# Patient Record
Sex: FEMALE | Race: WHITE | NOT HISPANIC OR LATINO
[De-identification: names, ages, dates, MRNs, and addresses within clinical notes are randomized per-mention and may not be internally consistent; named-entity substitution may affect disease eponyms.]

---

## 2017-01-10 ENCOUNTER — PRIOR ORIGINAL RECORDS (OUTPATIENT)
Dept: OTHER | Age: 67
End: 2017-01-10

## 2017-01-23 ENCOUNTER — OFFICE VISIT (OUTPATIENT)
Dept: INTERNAL MEDICINE CLINIC | Facility: CLINIC | Age: 67
End: 2017-01-23

## 2017-01-23 VITALS
TEMPERATURE: 98 F | DIASTOLIC BLOOD PRESSURE: 66 MMHG | HEART RATE: 60 BPM | BODY MASS INDEX: 31.29 KG/M2 | WEIGHT: 187.81 LBS | HEIGHT: 65 IN | SYSTOLIC BLOOD PRESSURE: 100 MMHG

## 2017-01-23 DIAGNOSIS — Z95.0 PRESENCE OF PERMANENT CARDIAC PACEMAKER: ICD-10-CM

## 2017-01-23 DIAGNOSIS — Z86.79 HISTORY OF ATRIAL FIBRILLATION: ICD-10-CM

## 2017-01-23 DIAGNOSIS — G40.909 SEIZURE DISORDER (HCC): ICD-10-CM

## 2017-01-23 DIAGNOSIS — N63.10 BREAST MASS, RIGHT: ICD-10-CM

## 2017-01-23 DIAGNOSIS — I10 ESSENTIAL HYPERTENSION: Primary | ICD-10-CM

## 2017-01-23 DIAGNOSIS — R41.3 MEMORY LOSS: ICD-10-CM

## 2017-01-23 PROCEDURE — G0463 HOSPITAL OUTPT CLINIC VISIT: HCPCS | Performed by: INTERNAL MEDICINE

## 2017-01-23 PROCEDURE — 99214 OFFICE O/P EST MOD 30 MIN: CPT | Performed by: INTERNAL MEDICINE

## 2017-01-23 NOTE — PROGRESS NOTES
HPI:   eBlkis Barrera is a 78year old female presents with the following problems. Patient generally feels well. She notes occasional lightheadedness. No syncope. These last for about 10-15 seconds. They have been 4-5 times a week.   She has a hist Rfl: 0   Meclizine HCl 12.5 MG Oral Tab Take 1 tablet (12.5 mg total) by mouth 3 (three) times daily as needed. Disp: 30 tablet Rfl: 0   nadolol 40 MG Oral Tab Take one half tablet daily.  Disp: 90 tablet Rfl: 3   lamoTRIgine 25 MG Oral Tab Take 25 mg by mo 2006    Comment as per NG    NASAL SCOPY,REMV TOTL ETHMOID  2006    Comment \"Ethmoidectomy, max antrostomies\"; as per NG    CARDIAC PACEMAKER PLACEMENT      Comment as per NG    ELECTROCARDIOGRAM, COMPLETE  02-    Comment SCANNED TO MEDIA TAB - 0 Types: Cigarettes      Quit date: 01/01/1998    Smokeless Status: Never Used                        Alcohol Use: Yes           0.6 oz/week       1 Standard drinks or equivalent per week       Comment: very seldom. 2-3 time per year.          REVIEW OF SYST was recently resected at a lower rate to monitor pulse rates above 130.    3. Presence of permanent cardiac pacemaker  Patient followed in pacemaker clinic. 4. Seizure disorder ([facility] [address]  Asymptomatic. Followed by neurology. On therapy.     5. Memory loss

## 2017-01-25 ENCOUNTER — LAB ENCOUNTER (OUTPATIENT)
Dept: LAB | Facility: HOSPITAL | Age: 67
End: 2017-01-25
Attending: INTERNAL MEDICINE
Payer: MEDICARE

## 2017-01-25 ENCOUNTER — HOSPITAL ENCOUNTER (OUTPATIENT)
Dept: ULTRASOUND IMAGING | Facility: HOSPITAL | Age: 67
Discharge: HOME | End: 2017-01-25
Attending: INTERNAL MEDICINE
Payer: MEDICARE

## 2017-01-25 ENCOUNTER — HOSPITAL ENCOUNTER (OUTPATIENT)
Dept: MAMMOGRAPHY | Facility: HOSPITAL | Age: 67
Discharge: HOME | End: 2017-01-25
Attending: INTERNAL MEDICINE
Payer: MEDICARE

## 2017-01-25 ENCOUNTER — OFFICE VISIT (OUTPATIENT)
Dept: NEUROLOGY | Facility: CLINIC | Age: 67
End: 2017-01-25

## 2017-01-25 VITALS
WEIGHT: 190 LBS | RESPIRATION RATE: 16 BRPM | HEIGHT: 65 IN | DIASTOLIC BLOOD PRESSURE: 76 MMHG | BODY MASS INDEX: 31.65 KG/M2 | SYSTOLIC BLOOD PRESSURE: 124 MMHG | HEART RATE: 72 BPM

## 2017-01-25 DIAGNOSIS — R56.9 SEIZURE (HCC): ICD-10-CM

## 2017-01-25 DIAGNOSIS — I10 ESSENTIAL HYPERTENSION: ICD-10-CM

## 2017-01-25 DIAGNOSIS — R94.02 ABNORMAL PET SCAN OF HEAD: ICD-10-CM

## 2017-01-25 DIAGNOSIS — G43.909 MIGRAINE WITHOUT STATUS MIGRAINOSUS, NOT INTRACTABLE, UNSPECIFIED MIGRAINE TYPE: ICD-10-CM

## 2017-01-25 DIAGNOSIS — M48.062 LUMBAR STENOSIS WITH NEUROGENIC CLAUDICATION: Primary | ICD-10-CM

## 2017-01-25 DIAGNOSIS — N63.10 BREAST MASS, RIGHT: ICD-10-CM

## 2017-01-25 DIAGNOSIS — G25.81 RESTLESS LEGS SYNDROME: ICD-10-CM

## 2017-01-25 DIAGNOSIS — N13.9 URINARY OBSTRUCTION: ICD-10-CM

## 2017-01-25 LAB
ALBUMIN SERPL BCP-MCNC: 4 G/DL (ref 3.5–4.8)
ALBUMIN/GLOB SERPL: 1.1 {RATIO} (ref 1–2)
ALP SERPL-CCNC: 61 U/L (ref 32–100)
ALT SERPL-CCNC: 51 U/L (ref 14–54)
ANION GAP SERPL CALC-SCNC: 9 MMOL/L (ref 0–18)
AST SERPL-CCNC: 33 U/L (ref 15–41)
BASOPHILS # BLD: 0 K/UL (ref 0–0.2)
BASOPHILS NFR BLD: 0 %
BILIRUB SERPL-MCNC: 0.7 MG/DL (ref 0.3–1.2)
BUN SERPL-MCNC: 14 MG/DL (ref 8–20)
BUN/CREAT SERPL: 12.8 (ref 10–20)
CALCIUM SERPL-MCNC: 9.6 MG/DL (ref 8.5–10.5)
CHLORIDE SERPL-SCNC: 109 MMOL/L (ref 95–110)
CHOLEST SERPL-MCNC: 273 MG/DL (ref 110–200)
CO2 SERPL-SCNC: 22 MMOL/L (ref 22–32)
CREAT SERPL-MCNC: 1.09 MG/DL (ref 0.5–1.5)
EOSINOPHIL # BLD: 0 K/UL (ref 0–0.7)
EOSINOPHIL NFR BLD: 0 %
ERYTHROCYTE [DISTWIDTH] IN BLOOD BY AUTOMATED COUNT: 13.5 % (ref 11–15)
GLOBULIN PLAS-MCNC: 3.6 G/DL (ref 2.5–3.7)
GLUCOSE SERPL-MCNC: 125 MG/DL (ref 70–99)
HCT VFR BLD AUTO: 42.4 % (ref 35–48)
HDLC SERPL-MCNC: 44 MG/DL
HGB BLD-MCNC: 14.3 G/DL (ref 12–16)
LDLC SERPL CALC-MCNC: 204 MG/DL (ref 0–99)
LYMPHOCYTES # BLD: 1.5 K/UL (ref 1–4)
LYMPHOCYTES NFR BLD: 16 %
MCH RBC QN AUTO: 30.1 PG (ref 27–32)
MCHC RBC AUTO-ENTMCNC: 33.8 G/DL (ref 32–37)
MCV RBC AUTO: 89.2 FL (ref 80–100)
MONOCYTES # BLD: 0.5 K/UL (ref 0–1)
MONOCYTES NFR BLD: 5 %
NEUTROPHILS # BLD AUTO: 7.5 K/UL (ref 1.8–7.7)
NEUTROPHILS NFR BLD: 79 %
NONHDLC SERPL-MCNC: 229 MG/DL
OSMOLALITY UR CALC.SUM OF ELEC: 292 MOSM/KG (ref 275–295)
PLATELET # BLD AUTO: 225 K/UL (ref 140–400)
PMV BLD AUTO: 10.5 FL (ref 7.4–10.3)
POTASSIUM SERPL-SCNC: 4.3 MMOL/L (ref 3.3–5.1)
PROT SERPL-MCNC: 7.6 G/DL (ref 5.9–8.4)
RBC # BLD AUTO: 4.75 M/UL (ref 3.7–5.4)
SODIUM SERPL-SCNC: 140 MMOL/L (ref 136–144)
TRIGL SERPL-MCNC: 123 MG/DL (ref 1–149)
WBC # BLD AUTO: 9.5 K/UL (ref 4–11)

## 2017-01-25 PROCEDURE — 85025 COMPLETE CBC W/AUTO DIFF WBC: CPT

## 2017-01-25 PROCEDURE — 36415 COLL VENOUS BLD VENIPUNCTURE: CPT

## 2017-01-25 PROCEDURE — 80061 LIPID PANEL: CPT

## 2017-01-25 PROCEDURE — 77066 DX MAMMO INCL CAD BI: CPT

## 2017-01-25 PROCEDURE — 99214 OFFICE O/P EST MOD 30 MIN: CPT | Performed by: OTHER

## 2017-01-25 PROCEDURE — 80053 COMPREHEN METABOLIC PANEL: CPT

## 2017-01-25 PROCEDURE — 76642 ULTRASOUND BREAST LIMITED: CPT

## 2017-01-25 NOTE — PATIENT INSTRUCTIONS
Refill policies:    • Allow 2 business days for refills; controlled substances may take longer.   • Contact your pharmacy at least 5 days prior to running out of medication and have them send an electronic request or submit request through the “request re your physician has recommended that you have a procedure or additional testing performed. [facility] will contact your insurance carrier to obtain pre-certification or prior authorization.     Unfortunately, DAWNA has seen an increas

## 2017-01-25 NOTE — PROGRESS NOTES
[facility]    Rima Cedillo is a 78year old female. HPI:     Patient is being seen in evaluation for a new complaint. She was last seen by me 12/19/16.   I currently follow her for seizures, RLS; in addition, she has histor by mouth daily. (Patient taking differently: Take 5 mg by mouth daily.  ) Disp: 30 tablet Rfl: 12   Acetaminophen-Codeine #3 300-30 MG Oral Tab Take 1 tablet by mouth every 6 (six) hours as needed for Pain.  Disp: 60 tablet Rfl: 1   omeprazole (PRILOSEC) 20 spinal stenosis with neurogenic claudication; less likely peripheral neuropathy. No myelopathic features to suggest cervical or thoracic myelopathy.     –Will request records of prior EMG/NCS, spinal imaging    –Will consider additional evaluation pending

## 2017-01-26 ENCOUNTER — TELEPHONE (OUTPATIENT)
Dept: INTERNAL MEDICINE CLINIC | Facility: CLINIC | Age: 67
End: 2017-01-26

## 2017-01-26 ENCOUNTER — TELEPHONE (OUTPATIENT)
Dept: NEUROLOGY | Facility: CLINIC | Age: 67
End: 2017-01-26

## 2017-01-26 DIAGNOSIS — E78.5 HYPERLIPIDEMIA, UNSPECIFIED HYPERLIPIDEMIA TYPE: Primary | ICD-10-CM

## 2017-01-26 RX ORDER — ATORVASTATIN CALCIUM 20 MG/1
20 TABLET, FILM COATED ORAL NIGHTLY
Qty: 30 TABLET | Refills: 12 | Status: SHIPPED | OUTPATIENT
Start: 2017-01-26 | End: 2018-02-13

## 2017-01-26 NOTE — TELEPHONE ENCOUNTER
Please notify patient that her labs show that her blood sugars 125. This is getting close to when we diagnosed diabetes mellitus. This can be helped with a 5-10 pound weight loss. However her cholesterol is increased significantly.   It is elevated enoug

## 2017-01-26 NOTE — TELEPHONE ENCOUNTER
Relayed MD's message to patient---verbalized understanding. Pt states she has taken Lipitor in past  eRx sent  Labs ordered  Pt has appt with Dr. Jacob Broderick next Tues.

## 2017-02-07 ENCOUNTER — TELEPHONE (OUTPATIENT)
Dept: NEUROLOGY | Facility: CLINIC | Age: 67
End: 2017-02-07

## 2017-02-08 ENCOUNTER — MED REC SCAN ONLY (OUTPATIENT)
Dept: NEUROLOGY | Facility: CLINIC | Age: 67
End: 2017-02-08

## 2017-02-08 NOTE — TELEPHONE ENCOUNTER
Bilateral lower extremity EMG report reviewed, done 10/3/16. Results felt to be most consistent with left L5/S1 radiculopathy. No evidence of peripheral neuropathy affecting lower extremities. Results scanned in.

## 2017-02-10 ENCOUNTER — TELEPHONE (OUTPATIENT)
Dept: NEUROLOGY | Facility: CLINIC | Age: 67
End: 2017-02-10

## 2017-02-24 ENCOUNTER — PRIOR ORIGINAL RECORDS (OUTPATIENT)
Dept: OTHER | Age: 67
End: 2017-02-24

## 2017-02-24 ENCOUNTER — APPOINTMENT (OUTPATIENT)
Dept: LAB | Facility: HOSPITAL | Age: 67
End: 2017-02-24
Attending: INTERNAL MEDICINE
Payer: MEDICARE

## 2017-02-24 ENCOUNTER — OFFICE VISIT (OUTPATIENT)
Dept: INTERNAL MEDICINE CLINIC | Facility: CLINIC | Age: 67
End: 2017-02-24

## 2017-02-24 VITALS
SYSTOLIC BLOOD PRESSURE: 138 MMHG | DIASTOLIC BLOOD PRESSURE: 80 MMHG | WEIGHT: 189 LBS | BODY MASS INDEX: 31.49 KG/M2 | TEMPERATURE: 98 F | HEIGHT: 65 IN | HEART RATE: 64 BPM

## 2017-02-24 DIAGNOSIS — E78.5 HYPERLIPIDEMIA, UNSPECIFIED HYPERLIPIDEMIA TYPE: ICD-10-CM

## 2017-02-24 DIAGNOSIS — R92.8 ABNORMAL MAMMOGRAM: ICD-10-CM

## 2017-02-24 DIAGNOSIS — N60.01 BREAST CYST, RIGHT: ICD-10-CM

## 2017-02-24 DIAGNOSIS — K64.9 BLEEDING HEMORRHOIDS: ICD-10-CM

## 2017-02-24 DIAGNOSIS — F48.2 PSEUDOBULBAR AFFECT: ICD-10-CM

## 2017-02-24 DIAGNOSIS — G40.909 SEIZURE DISORDER (HCC): ICD-10-CM

## 2017-02-24 DIAGNOSIS — R19.7 DIARRHEA, UNSPECIFIED TYPE: ICD-10-CM

## 2017-02-24 DIAGNOSIS — I10 ESSENTIAL HYPERTENSION: Primary | ICD-10-CM

## 2017-02-24 DIAGNOSIS — M48.061 SPINAL STENOSIS OF LUMBAR REGION: ICD-10-CM

## 2017-02-24 DIAGNOSIS — R41.3 MEMORY LOSS: ICD-10-CM

## 2017-02-24 PROCEDURE — 99213 OFFICE O/P EST LOW 20 MIN: CPT | Performed by: INTERNAL MEDICINE

## 2017-02-24 PROCEDURE — 99215 OFFICE O/P EST HI 40 MIN: CPT | Performed by: INTERNAL MEDICINE

## 2017-02-24 PROCEDURE — 87507 IADNA-DNA/RNA PROBE TQ 12-25: CPT | Performed by: INTERNAL MEDICINE

## 2017-02-24 NOTE — PROGRESS NOTES
HPI:   Monica Shaw is a 78year old female presents with the following problems. Patient has spinal stenosis. She had a epidural steroid injection February 6. This was by Iglesia Yates. She still has a significant amount of pain.   Pain level is at did see Dr. Vikram Olivares. He felt patient had dense breast tissue in the area of concern. Upper outer right breast.  No definite mass appreciated. He wished a 3 month follow-up. Patient reminded of this. Patient has had diarrhea.   Up to 6 or 7 times a Oral Tab Take 1 tablet (10 mg total) by mouth daily. (Patient taking differently: Take 5 mg by mouth daily.  ) Disp: 30 tablet Rfl: 12   Acetaminophen-Codeine #3 300-30 MG Oral Tab Take 1 tablet by mouth every 6 (six) hours as needed for Pain.  Disp: 60 tab pins and plate from right wrist (fracture)    CATARACT Right late July 2015    OTHER SURGICAL HISTORY Left     Comment L knee scoped--3 torn meniscus--biocartilage done    OTHER SURGICAL HISTORY Right 8/2016    Comment R knee scoped for torn miniscus 4. 0-11.0 K/UL   RBC 4.75 3.70-5.40 M/UL   HGB 14.3 12.0-16.0 g/dL   HCT 42.4 35.0-48.0 %   MCV 89.2 80.0-100.0 fL   MCH 30.1 27.0-32.0 pg   MCHC 33.8 32.0-37.0 g/dl   RDW 13.5 11.0-15.0 %    140-400 K/UL   MPV 10.5 (H) 7.4-10.3 fL   Neutrophil % 79 GI:  good BS's. no masses, organomegaly or tenderness   RECTAL: Patient had colonoscopy November 2015. Exam limited to perianal area. Hemorrhoids noted. Nonbleeding. Not irritated.   MUSCULOSKELETAL: No specific tenderness to palpation of lumbar spine

## 2017-02-25 ENCOUNTER — TELEPHONE (OUTPATIENT)
Dept: INTERNAL MEDICINE CLINIC | Facility: CLINIC | Age: 67
End: 2017-02-25

## 2017-02-25 NOTE — TELEPHONE ENCOUNTER
Please call patient and let her know that her stool test for different bacteria and viruses came out good. Please ask her if she still having the loose bowels.

## 2017-02-27 RX ORDER — ROPINIROLE 1 MG/1
TABLET, FILM COATED ORAL
Qty: 270 TABLET | Refills: 0 | Status: SHIPPED | OUTPATIENT
Start: 2017-02-27 | End: 2017-06-05

## 2017-02-27 NOTE — TELEPHONE ENCOUNTER
Discussed with patient. Patient having 5 times a day loose bowels. No other symptoms. No fever. Patient does however feel hot and cold. No abdominal pain. Encourage her to keep her fluids up. Check BMP tomorrow. Order in place.   If no relief may ne

## 2017-02-28 ENCOUNTER — APPOINTMENT (OUTPATIENT)
Dept: LAB | Facility: HOSPITAL | Age: 67
End: 2017-02-28
Attending: INTERNAL MEDICINE
Payer: MEDICARE

## 2017-02-28 ENCOUNTER — APPOINTMENT (OUTPATIENT)
Dept: GENERAL RADIOLOGY | Facility: HOSPITAL | Age: 67
End: 2017-02-28
Attending: EMERGENCY MEDICINE
Payer: MEDICARE

## 2017-02-28 ENCOUNTER — HOSPITAL ENCOUNTER (OUTPATIENT)
Facility: HOSPITAL | Age: 67
Setting detail: OBSERVATION
Discharge: HOME | End: 2017-03-01
Attending: EMERGENCY MEDICINE | Admitting: HOSPITALIST
Payer: MEDICARE

## 2017-02-28 ENCOUNTER — PRIOR ORIGINAL RECORDS (OUTPATIENT)
Dept: OTHER | Age: 67
End: 2017-02-28

## 2017-02-28 DIAGNOSIS — R07.9 ACUTE CHEST PAIN: Primary | ICD-10-CM

## 2017-02-28 DIAGNOSIS — E78.5 HYPERLIPIDEMIA, UNSPECIFIED HYPERLIPIDEMIA TYPE: ICD-10-CM

## 2017-02-28 DIAGNOSIS — I10 ESSENTIAL HYPERTENSION: ICD-10-CM

## 2017-02-28 PROCEDURE — 84460 ALANINE AMINO (ALT) (SGPT): CPT

## 2017-02-28 PROCEDURE — 84450 TRANSFERASE (AST) (SGOT): CPT

## 2017-02-28 PROCEDURE — 80048 BASIC METABOLIC PNL TOTAL CA: CPT

## 2017-02-28 PROCEDURE — 36415 COLL VENOUS BLD VENIPUNCTURE: CPT

## 2017-02-28 PROCEDURE — 80061 LIPID PANEL: CPT

## 2017-02-28 PROCEDURE — 71020 XR CHEST PA + LAT CHEST (CPT=71020): CPT

## 2017-02-28 PROCEDURE — 99219 INITIAL OBSERVATION CARE,LEVL II: CPT | Performed by: HOSPITALIST

## 2017-02-28 RX ORDER — SODIUM CHLORIDE 9 MG/ML
INJECTION, SOLUTION INTRAVENOUS
Status: COMPLETED
Start: 2017-02-28 | End: 2017-02-28

## 2017-02-28 RX ORDER — MECLIZINE HCL 12.5 MG/1
12.5 TABLET ORAL 3 TIMES DAILY PRN
Status: DISCONTINUED | OUTPATIENT
Start: 2017-02-28 | End: 2017-03-01

## 2017-02-28 RX ORDER — ALPRAZOLAM 0.25 MG/1
0.25 TABLET ORAL
Status: ACTIVE | OUTPATIENT
Start: 2017-03-01 | End: 2017-03-01

## 2017-02-28 RX ORDER — PANTOPRAZOLE SODIUM 20 MG/1
20 TABLET, DELAYED RELEASE ORAL
Status: DISCONTINUED | OUTPATIENT
Start: 2017-03-01 | End: 2017-03-01

## 2017-02-28 RX ORDER — ONDANSETRON 2 MG/ML
4 INJECTION INTRAMUSCULAR; INTRAVENOUS EVERY 6 HOURS PRN
Status: DISCONTINUED | OUTPATIENT
Start: 2017-02-28 | End: 2017-03-01

## 2017-02-28 RX ORDER — ACETAMINOPHEN AND CODEINE PHOSPHATE 300; 30 MG/1; MG/1
1 TABLET ORAL EVERY 6 HOURS PRN
Status: DISCONTINUED | OUTPATIENT
Start: 2017-02-28 | End: 2017-03-01

## 2017-02-28 RX ORDER — ROPINIROLE 1 MG/1
1 TABLET, FILM COATED ORAL 3 TIMES DAILY
Status: DISCONTINUED | OUTPATIENT
Start: 2017-02-28 | End: 2017-03-01

## 2017-02-28 RX ORDER — LAMOTRIGINE 25 MG/1
25 TABLET ORAL DAILY
Status: DISCONTINUED | OUTPATIENT
Start: 2017-02-28 | End: 2017-03-01

## 2017-02-28 RX ORDER — DILTIAZEM HYDROCHLORIDE 5 MG/ML
10 INJECTION INTRAVENOUS SEE ADMIN INSTRUCTIONS
Status: DISCONTINUED | OUTPATIENT
Start: 2017-03-01 | End: 2017-03-01

## 2017-02-28 RX ORDER — SODIUM CHLORIDE 9 MG/ML
INJECTION, SOLUTION INTRAVENOUS CONTINUOUS
Status: DISCONTINUED | OUTPATIENT
Start: 2017-02-28 | End: 2017-03-01

## 2017-02-28 RX ORDER — POLYETHYLENE GLYCOL 3350 17 G/17G
17 POWDER, FOR SOLUTION ORAL DAILY PRN
Status: DISCONTINUED | OUTPATIENT
Start: 2017-02-28 | End: 2017-03-01

## 2017-02-28 RX ORDER — BISACODYL 10 MG
10 SUPPOSITORY, RECTAL RECTAL
Status: DISCONTINUED | OUTPATIENT
Start: 2017-02-28 | End: 2017-03-01

## 2017-02-28 RX ORDER — HYDROCODONE BITARTRATE AND ACETAMINOPHEN 5; 325 MG/1; MG/1
1 TABLET ORAL EVERY 4 HOURS PRN
Status: DISCONTINUED | OUTPATIENT
Start: 2017-02-28 | End: 2017-03-01

## 2017-02-28 RX ORDER — ATORVASTATIN CALCIUM 20 MG/1
20 TABLET, FILM COATED ORAL NIGHTLY
Status: DISCONTINUED | OUTPATIENT
Start: 2017-02-28 | End: 2017-03-01

## 2017-02-28 RX ORDER — METOPROLOL TARTRATE 50 MG/1
100 TABLET, FILM COATED ORAL ONCE AS NEEDED
Status: DISCONTINUED | OUTPATIENT
Start: 2017-03-01 | End: 2017-03-01

## 2017-02-28 RX ORDER — NITROGLYCERIN 0.4 MG/1
0.4 TABLET SUBLINGUAL EVERY 5 MIN PRN
Status: DISCONTINUED | OUTPATIENT
Start: 2017-02-28 | End: 2017-03-01

## 2017-02-28 RX ORDER — DOCUSATE SODIUM 100 MG/1
100 CAPSULE, LIQUID FILLED ORAL 2 TIMES DAILY
Status: DISCONTINUED | OUTPATIENT
Start: 2017-02-28 | End: 2017-03-01

## 2017-02-28 RX ORDER — METOPROLOL TARTRATE 50 MG/1
50 TABLET, FILM COATED ORAL
Status: DISCONTINUED | OUTPATIENT
Start: 2017-02-28 | End: 2017-03-01

## 2017-02-28 RX ORDER — HYDROCODONE BITARTRATE AND ACETAMINOPHEN 5; 325 MG/1; MG/1
2 TABLET ORAL EVERY 4 HOURS PRN
Status: DISCONTINUED | OUTPATIENT
Start: 2017-02-28 | End: 2017-03-01

## 2017-02-28 RX ORDER — ACETAMINOPHEN 325 MG/1
650 TABLET ORAL EVERY 6 HOURS PRN
Status: DISCONTINUED | OUTPATIENT
Start: 2017-02-28 | End: 2017-03-01

## 2017-02-28 RX ORDER — ACETAMINOPHEN 325 MG/1
650 TABLET ORAL EVERY 4 HOURS PRN
Status: DISCONTINUED | OUTPATIENT
Start: 2017-02-28 | End: 2017-03-01

## 2017-02-28 RX ORDER — SODIUM PHOSPHATE, DIBASIC AND SODIUM PHOSPHATE, MONOBASIC 7; 19 G/133ML; G/133ML
1 ENEMA RECTAL ONCE AS NEEDED
Status: ACTIVE | OUTPATIENT
Start: 2017-02-28 | End: 2017-02-28

## 2017-02-28 RX ORDER — METOPROLOL TARTRATE 50 MG/1
50 TABLET, FILM COATED ORAL ONCE AS NEEDED
Status: DISCONTINUED | OUTPATIENT
Start: 2017-03-01 | End: 2017-03-01

## 2017-02-28 RX ORDER — NITROGLYCERIN 0.4 MG/1
0.4 TABLET SUBLINGUAL ONCE
Status: COMPLETED | OUTPATIENT
Start: 2017-03-01 | End: 2017-03-01

## 2017-02-28 RX ORDER — DONEPEZIL HYDROCHLORIDE 5 MG/1
5 TABLET, FILM COATED ORAL NIGHTLY
Status: DISCONTINUED | OUTPATIENT
Start: 2017-02-28 | End: 2017-03-01

## 2017-02-28 RX ORDER — LISINOPRIL 10 MG/1
10 TABLET ORAL DAILY
Status: DISCONTINUED | OUTPATIENT
Start: 2017-02-28 | End: 2017-03-01

## 2017-02-28 NOTE — H&P
[facility]    PATIENT'S NAME: Denise Carolann   ATTENDING PHYSICIAN: Arielle Whaley MD   PATIENT ACCOUNT#:   [id number]    LOCATION:  [place] [address]  MEDICAL RECORD #:   [id number]       YOB: 1950  ADMISSION DATE:       02/28/2 cancer. REVIEW OF SYSTEMS:  Patient describes chest discomfort, midsternal, which is sharp to tightness in sensation that comes and goes randomly and lasts around 5-10 minutes at a time. She denies any recent trauma or lifting any heavy objects.   No st

## 2017-02-28 NOTE — ED NOTES
Pt presents to ED for c/o midsternal CP onset earlier today while having blood drawn at [address], [address] for [facility]. Pt states CP is intermittent and described as \"tight\", rating 3-4/10. Denies SOB or dizziness. Hx of seizures and pacemaker.

## 2017-02-28 NOTE — ED INITIAL ASSESSMENT (HPI)
Chest Pain intermittently this morning, substernal, lasting approximately 10 seconds. Also c/o multiple episodes of diarrhea. +SOB/+Diaphoresis.

## 2017-02-28 NOTE — ED PROVIDER NOTES
Patient Seen in: [facility] AND [facility] Emergency Department    History   Patient presents with:  Chest Pain Angina (cardiovascular)    Stated Complaint: chest pain    HPI    78year old female presenting with chest pain  Location: left chest area.   Quality: st JING KULKARNI TOTL ETHMOID  2006    Comment \"Ethmoidectomy, max antrostomies\"; as per NG    CARDIAC PACEMAKER PLACEMENT      Comment as per NG    ELECTROCARDIOGRAM, COMPLETE  02-    Comment SCANNED TO MEDIA TAB - 02-    HYSTERECTOMY      Comme NG   • Cancer Sister      Lung Cancer, Kidney Cancer; as per NG   • Hypertension Brother      as per NG   • Cancer Brother      Throat Cancer; as per NG   • Heart Disease Brother      CAD; as per NG   • Cancer Other      Thyroid Cancer; as per NG   • Letty injected. No scleral icterus. Pupils are equal.   Neck: Normal range of motion and phonation normal. Neck supple. No JVD present. Cardiovascular: Normal rate and regular rhythm.     Pulmonary/Chest: Effort normal and breath sounds normal. No accessory mus (CPT=71020)         COMPARISON: [facility], X CHEST PA LAT ROUTINE, 4/22/2016,     9:24. [facility], X CHEST PA LAT ROUTINE, 5/23/2016,     12:06. [address], 5/24/2016,     12:24.   El related pathology such as GERD, gastritis, esophagitis, PUD, esophageal spasm; Musculoskeletal    HEART score for chest pain   History- (Highly suspicious 2pt, Mod 1pt, slightly 0pt)        1   ECG- (significant ST deviation 2pt, Non spec 1pt, nl 0pt)  1 management issues, and the need for follow-up, with the patient/gaurdian. See radiology reports for details.      Monitor Interpretation: normal sinus rhythm with a rate of 60    Oxygen Saturation: 97% on room air, Normal    Consults: No orders of the defin

## 2017-02-28 NOTE — HISTORICAL OFFICE NOTE
Norma Russian  : 1950  ACCOUNT:  [id number]  [phone]  PCP: Dr. Janell Paul     TODAY'S DATE: 01/10/2017  DICTATED BY:  Ene Reveles M.D. ]    CHIEF COMPLAINT: [Followup of Atrial fibrillation, paroxysmal, Followup of Pacemaker B.S DR  and no SOCIAL HISTORY: SMOKING: Former tobacco use. 30-40 pack year history smoking, quit 1998. CAFFEINE: none. ALCOHOL: drinks rarely. EXERCISE: regular exercise. DIET: low fat, low cholesterol. MARITAL STATUS: . OCCUPATION: .      ALLERGIES: N 03/08/16 LamoTRIgine           25MG      2 tablets twice daily                    03/08/16 Lisinopril            20MG      1 tablet twice daily                     06/12/15 Aspirin               81MG      1 tablet every day                       07/09/14 N

## 2017-03-01 ENCOUNTER — HOSPITAL ENCOUNTER (OUTPATIENT)
Dept: CT IMAGING | Facility: HOSPITAL | Age: 67
Setting detail: OBSERVATION
Discharge: HOME | End: 2017-03-01
Attending: HOSPITALIST
Payer: MEDICARE

## 2017-03-01 ENCOUNTER — APPOINTMENT (OUTPATIENT)
Dept: CT IMAGING | Facility: HOSPITAL | Age: 67
End: 2017-03-01
Attending: HOSPITALIST
Payer: MEDICARE

## 2017-03-01 ENCOUNTER — PRIOR ORIGINAL RECORDS (OUTPATIENT)
Dept: OTHER | Age: 67
End: 2017-03-01

## 2017-03-01 VITALS
HEART RATE: 60 BPM | HEIGHT: 65 IN | SYSTOLIC BLOOD PRESSURE: 128 MMHG | OXYGEN SATURATION: 98 % | DIASTOLIC BLOOD PRESSURE: 61 MMHG | WEIGHT: 190.19 LBS | TEMPERATURE: 98 F | RESPIRATION RATE: 16 BRPM | BODY MASS INDEX: 31.69 KG/M2

## 2017-03-01 PROCEDURE — 75574 CT ANGIO HRT W/3D IMAGE: CPT

## 2017-03-01 PROCEDURE — 99217 OBSERVATION CARE DISCHARGE: CPT | Performed by: HOSPITALIST

## 2017-03-01 RX ORDER — POTASSIUM CHLORIDE 20 MEQ/1
40 TABLET, EXTENDED RELEASE ORAL EVERY 4 HOURS
Status: COMPLETED | OUTPATIENT
Start: 2017-03-01 | End: 2017-03-01

## 2017-03-01 RX ORDER — NITROGLYCERIN 0.4 MG/1
TABLET SUBLINGUAL
Status: COMPLETED
Start: 2017-03-01 | End: 2017-03-01

## 2017-03-01 RX ADMIN — NITROGLYCERIN 0.4 MG: 0.4 TABLET SUBLINGUAL at 10:06:00

## 2017-03-01 NOTE — DISCHARGE SUMMARY
[facility] AND [facility]  Discharge Summary    Melva Fuentes Patient Status:  Observation    3/28/1950 MRN [id number]   [facility] Attending Clover Keita MD   Hosp Day # 1 PCP Morgan Joseph MD     Date of Admission: 2017    Date of unspecified timing of dementia onset    rivaroxaban 15 MG Oral Tab  Take 15 mg by mouth daily with food.   Associated Diagnoses:Migraine without status migrainosus, not intractable, unspecified migraine type; Nonintractable epilepsy without status epileptic

## 2017-03-01 NOTE — IMAGING NOTE
Impatient: TO CT at 1000  HX TAKEN, pt has pacemaker set at 60bpm ,verified consent signed by pt last night  18 gauge to right antecubital flushes well , good  Blood return  V/s131/64 HR60 R16 99 RA  Pt given nitro tablet 0.4 mg sl at 10:06  TO CT TABLE O2

## 2017-03-01 NOTE — PROGRESS NOTES
Patient has a permanent pacemaker. Unsure if she would be able to complete CTA because her heart rate is sustained at 60bpm. Discussed with hospitalist last night who was also unsure.  Called down to CT and they were unsure as well and said that we would ne

## 2017-03-02 ENCOUNTER — TELEPHONE (OUTPATIENT)
Dept: INTERNAL MEDICINE UNIT | Facility: HOSPITAL | Age: 67
End: 2017-03-02

## 2017-03-02 ENCOUNTER — TELEPHONE (OUTPATIENT)
Dept: INTERNAL MEDICINE CLINIC | Facility: CLINIC | Age: 67
End: 2017-03-02

## 2017-03-02 NOTE — TELEPHONE ENCOUNTER
Spoke with patient. She reports back pain started in hospital yesterday morning. Pain lasted about 20 minutes and then improved. She states she told the nurse and doctor at the hospital, but was told maybe she pulled a muscle.  Last night, pain returned at

## 2017-03-02 NOTE — TELEPHONE ENCOUNTER
Patient contacted and scheduled to see Demian Sellers tomorrow at [facility], patient advised that for now she can take Tylenol if needed for any discomfort.

## 2017-03-02 NOTE — TELEPHONE ENCOUNTER
Patient discharged from [facility] on March 1, 2017. Please call patient to schedule hospital follow-up appointment with PCP, Dr. Anastasiia Freeman.

## 2017-03-02 NOTE — TELEPHONE ENCOUNTER
Please ask patient to come tomorrow for office visit. I can then try to figure out what next steps should be. I will also repeat her kidney blood test since she had recent dye exposure.   For now if any discomfort okay to take Tylenol according to sorin

## 2017-03-02 NOTE — TELEPHONE ENCOUNTER
Pt was seen in the ER on Tuesday around 12:00, kept 23 hours, went in for chest pain  Did heart testing and standing xray  Now pt has back pain, can't lay on back or side, hurt last night 9:00 til 2:00, after 2:00 pt slept  Could she have pleurisy?   Please

## 2017-03-03 ENCOUNTER — TELEPHONE (OUTPATIENT)
Dept: INTERNAL MEDICINE CLINIC | Facility: CLINIC | Age: 67
End: 2017-03-03

## 2017-03-03 ENCOUNTER — APPOINTMENT (OUTPATIENT)
Dept: LAB | Age: 67
End: 2017-03-03
Attending: INTERNAL MEDICINE
Payer: MEDICARE

## 2017-03-03 ENCOUNTER — OFFICE VISIT (OUTPATIENT)
Dept: INTERNAL MEDICINE CLINIC | Facility: CLINIC | Age: 67
End: 2017-03-03

## 2017-03-03 VITALS
SYSTOLIC BLOOD PRESSURE: 88 MMHG | BODY MASS INDEX: 31 KG/M2 | TEMPERATURE: 99 F | DIASTOLIC BLOOD PRESSURE: 58 MMHG | WEIGHT: 186 LBS | HEART RATE: 64 BPM

## 2017-03-03 DIAGNOSIS — I10 ESSENTIAL HYPERTENSION: ICD-10-CM

## 2017-03-03 DIAGNOSIS — K59.1 FUNCTIONAL DIARRHEA: ICD-10-CM

## 2017-03-03 DIAGNOSIS — R07.89 OTHER CHEST PAIN: Primary | ICD-10-CM

## 2017-03-03 DIAGNOSIS — R03.1 LOW BLOOD PRESSURE READING: ICD-10-CM

## 2017-03-03 DIAGNOSIS — M54.6 ACUTE MIDLINE THORACIC BACK PAIN: ICD-10-CM

## 2017-03-03 DIAGNOSIS — I10 ESSENTIAL HYPERTENSION: Primary | ICD-10-CM

## 2017-03-03 LAB
ANION GAP SERPL CALC-SCNC: 8 MMOL/L (ref 0–18)
BUN SERPL-MCNC: 16 MG/DL (ref 8–20)
BUN/CREAT SERPL: 10.6 (ref 10–20)
CALCIUM SERPL-MCNC: 8.9 MG/DL (ref 8.5–10.5)
CHLORIDE SERPL-SCNC: 107 MMOL/L (ref 95–110)
CO2 SERPL-SCNC: 27 MMOL/L (ref 22–32)
CREAT SERPL-MCNC: 1.51 MG/DL (ref 0.5–1.5)
GLUCOSE SERPL-MCNC: 108 MG/DL (ref 70–99)
OSMOLALITY UR CALC.SUM OF ELEC: 296 MOSM/KG (ref 275–295)
POTASSIUM SERPL-SCNC: 3.5 MMOL/L (ref 3.3–5.1)
SODIUM SERPL-SCNC: 142 MMOL/L (ref 136–144)

## 2017-03-03 PROCEDURE — 99215 OFFICE O/P EST HI 40 MIN: CPT | Performed by: INTERNAL MEDICINE

## 2017-03-03 PROCEDURE — 36415 COLL VENOUS BLD VENIPUNCTURE: CPT

## 2017-03-03 PROCEDURE — G0463 HOSPITAL OUTPT CLINIC VISIT: HCPCS | Performed by: INTERNAL MEDICINE

## 2017-03-03 PROCEDURE — 80048 BASIC METABOLIC PNL TOTAL CA: CPT

## 2017-03-03 NOTE — PROGRESS NOTES
HPI:   Piotr Valdez is a 78year old female presents with the following problems. Patient was recently in the hospital.  This was for chest pain. .  She had chest pain. A cardiac workup was done. No ischemia was identified.   She had a CT angiogram t she will be fine driving home. She will be staying at home until feeling better. I told her to liberalize her diet with little extra salt in her diet. She states she does this anyway.   She will take 1 or 2 blood pressures today and call the office with • High cholesterol      as per NG   • Restless leg syndrome      as per NG   • Unspecified sleep apnea      as per NG   • Seizure disorder (HCC)      as per NG   • History of colonic polyps 07-     as per NG   • Diverticulosis      as per NG   • L NG   • Heart Disease Mother      as per NG   • Migraines Mother      as per NG   • Cancer Sister      Lung Cancer, Kidney Cancer; as per NG   • Hypertension Brother      as per NG   • Cancer Brother      Throat Cancer; as per NG   • Heart Disease Brother Range   Hold Gold Auto Resulted    -RAINBOW DRAW LAVENDER TALL (BNP)   Result Value Ref Range   Hold Lavender Auto Resulted    -CBC W/ DIFFERENTIAL   Result Value Ref Range   WBC 7.5 4.0-11.0 K/UL   RBC 4.60 3.70-5.40 M/UL   HGB 13.8 12.0-16.0 g/dL   HCT 4 above.  NEURO: See above. PSYCHE:  denies depression or anxiety      EXAM:   BP 88/58 mmHg  Pulse 64  Temp(Src) 99 °F (37.2 °C) (Oral)  Wt 186 lb (84.369 kg)    GENERAL:  well developed, well nourished. in no apparent distress  SKIN:  no rashes.   no susp exposure.     Follow up 2 weeks  Morgan Joseph MD  3/3/2017  9:26 AM

## 2017-03-03 NOTE — TELEPHONE ENCOUNTER
Pt calling with B/P readings from today, at 11:30 a.m. 98/64, 2:45 p.m. 130/74 she was telling an exciting story so she was excited she is not sure if the last one is accurate, she will continue to take it during the weekend.    Tasked to nursing

## 2017-03-03 NOTE — TELEPHONE ENCOUNTER
Please notify patient that her electrolytes today did show that her general kidney function was a little bit weaker. This I think is contributed to by multiple factors. One possibly the dye that she got was a little bit harder on her kidneys.   Next is th

## 2017-03-14 ENCOUNTER — TELEPHONE (OUTPATIENT)
Dept: NEUROLOGY | Facility: CLINIC | Age: 67
End: 2017-03-14

## 2017-03-14 NOTE — TELEPHONE ENCOUNTER
Clinic note 2/7/17 from Dr. Sera Traylor reviewed (scanned in). 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 CT head ordered as pt endorsed new L hemisensory loss.   Rufina Mazariegos discussed for PBA (awaiting cardio clearanc

## 2017-03-15 ENCOUNTER — MED REC SCAN ONLY (OUTPATIENT)
Dept: NEUROLOGY | Facility: CLINIC | Age: 67
End: 2017-03-15

## 2017-03-17 ENCOUNTER — APPOINTMENT (OUTPATIENT)
Dept: LAB | Age: 67
End: 2017-03-17
Attending: INTERNAL MEDICINE
Payer: MEDICARE

## 2017-03-17 ENCOUNTER — TELEPHONE (OUTPATIENT)
Dept: INTERNAL MEDICINE CLINIC | Facility: CLINIC | Age: 67
End: 2017-03-17

## 2017-03-17 ENCOUNTER — OFFICE VISIT (OUTPATIENT)
Dept: INTERNAL MEDICINE CLINIC | Facility: CLINIC | Age: 67
End: 2017-03-17

## 2017-03-17 VITALS
HEART RATE: 62 BPM | DIASTOLIC BLOOD PRESSURE: 78 MMHG | RESPIRATION RATE: 14 BRPM | OXYGEN SATURATION: 97 % | WEIGHT: 186 LBS | TEMPERATURE: 98 F | SYSTOLIC BLOOD PRESSURE: 110 MMHG | HEIGHT: 65 IN | BODY MASS INDEX: 30.99 KG/M2

## 2017-03-17 DIAGNOSIS — K59.1 FUNCTIONAL DIARRHEA: ICD-10-CM

## 2017-03-17 DIAGNOSIS — G40.909 SEIZURE DISORDER (HCC): ICD-10-CM

## 2017-03-17 DIAGNOSIS — I10 ESSENTIAL HYPERTENSION: Primary | ICD-10-CM

## 2017-03-17 DIAGNOSIS — R41.3 MEMORY LOSS: ICD-10-CM

## 2017-03-17 DIAGNOSIS — M48.061 SPINAL STENOSIS OF LUMBAR REGION: ICD-10-CM

## 2017-03-17 DIAGNOSIS — I10 ESSENTIAL HYPERTENSION: ICD-10-CM

## 2017-03-17 DIAGNOSIS — R07.89 OTHER CHEST PAIN: ICD-10-CM

## 2017-03-17 DIAGNOSIS — E78.5 HYPERLIPIDEMIA, UNSPECIFIED HYPERLIPIDEMIA TYPE: ICD-10-CM

## 2017-03-17 PROCEDURE — 36415 COLL VENOUS BLD VENIPUNCTURE: CPT

## 2017-03-17 PROCEDURE — 80048 BASIC METABOLIC PNL TOTAL CA: CPT

## 2017-03-17 PROCEDURE — 99215 OFFICE O/P EST HI 40 MIN: CPT | Performed by: INTERNAL MEDICINE

## 2017-03-17 PROCEDURE — G0463 HOSPITAL OUTPT CLINIC VISIT: HCPCS | Performed by: INTERNAL MEDICINE

## 2017-03-17 NOTE — PROGRESS NOTES
HPI:   Alvira Duane is a 78year old female presents with the following problems. Patient feels well. She has no acute complaints. She will be getting an epidural injection next week. This is her second one.    She states that she has been diagno ONE TABLET BY MOUTH THREE TIMES A DAY Disp: 270 tablet Rfl: 0   Atorvastatin Calcium (LIPITOR) 20 MG Oral Tab Take 1 tablet (20 mg total) by mouth nightly.  Disp: 30 tablet Rfl: 12   Meclizine HCl 12.5 MG Oral Tab Take 1 tablet (12.5 mg total) by mouth 3 (t Surgical History    CHOLECYSTECTOMY      Comment as per NG    CARPAL TUNNEL RELEASE  2011    Comment as per NG    RECONSTR NOSE+JEAN SEPTAL REPAIR  2006    Comment as per NG    NASAL SCOPY,REMV TOTL ETHMOID  2006    Comment \"Ethmoidectomy, max antrostomies Anion Gap 8 0-18   Calculated Osmolality 296 (H) 275-295 mOsm/kg   GFR, Non- 34 (L) >=60   GFR, -American 42 (L) >=60      Social History:     Smoking Status: Former Smoker                   Packs/Day: 1.00  Years: 40        Types: Essential hypertension  Blood pressure controlled off lisinopril. Will continue nadolol. 2. Other chest pain  Atypical chest pain. Noncardiac. 3. Functional diarrhea  Trial of Metamucil.     4. Hyperlipidemia, unspecified hyperlipidemia type  Lipids

## 2017-03-21 ENCOUNTER — TELEPHONE (OUTPATIENT)
Dept: NEUROLOGY | Facility: CLINIC | Age: 67
End: 2017-03-21

## 2017-03-21 ENCOUNTER — TELEPHONE (OUTPATIENT)
Dept: INTERNAL MEDICINE CLINIC | Facility: CLINIC | Age: 67
End: 2017-03-21

## 2017-03-21 DIAGNOSIS — N18.9 CKD (CHRONIC KIDNEY DISEASE), UNSPECIFIED STAGE: Primary | ICD-10-CM

## 2017-03-21 NOTE — TELEPHONE ENCOUNTER
Please notify patient that her recent kidney test shows continued mild decrease in kidney function. Slightly more than in the past.  For now will keep off of the lisinopril that she had been on before.   I would like to repeat her kidney ultrasound that sh

## 2017-03-28 ENCOUNTER — TELEPHONE (OUTPATIENT)
Dept: INTERNAL MEDICINE CLINIC | Facility: CLINIC | Age: 67
End: 2017-03-28

## 2017-03-28 ENCOUNTER — HOSPITAL ENCOUNTER (OUTPATIENT)
Dept: ULTRASOUND IMAGING | Age: 67
Discharge: HOME | End: 2017-03-28
Attending: INTERNAL MEDICINE
Payer: MEDICARE

## 2017-03-28 DIAGNOSIS — N18.9 CKD (CHRONIC KIDNEY DISEASE), UNSPECIFIED STAGE: ICD-10-CM

## 2017-03-28 PROCEDURE — 76770 US EXAM ABDO BACK WALL COMP: CPT

## 2017-03-29 NOTE — TELEPHONE ENCOUNTER
Patient contacted and relayed 's message regarding kidney ultrasound results. Contact information for  and Nasrin Son sent to patient via Mape message as requested.

## 2017-03-29 NOTE — TELEPHONE ENCOUNTER
Please notify patient that her kidney ultrasound came out good. She has a tiny stone in her left kidney. This is not causing any problems. There is also a small cyst right kidney.   It may be wise that she see a nephrologist.  We can give her the names o

## 2017-03-30 ENCOUNTER — TELEPHONE (OUTPATIENT)
Dept: INTERNAL MEDICINE CLINIC | Facility: CLINIC | Age: 67
End: 2017-03-30

## 2017-03-30 NOTE — TELEPHONE ENCOUNTER
Pt is calling she would like to have Dr Clement Atwood call her today, pt is experiencing nausea off and on when she stands it gets worse, pt is wondering if it is the kidneys working slow, Dr Swetha Amos told pt to call Dr Clement Atwood, please call pt [phone]   Tasked to nursing

## 2017-03-30 NOTE — TELEPHONE ENCOUNTER
I do not think that the nausea is from her kidneys. Kidney function is not that bad. I wonder if she has some mild gastritis. We could recommend her to take Zantac over-the-counter daily to see if that helps. I can see patient next week if she wishes.

## 2017-04-05 ENCOUNTER — TELEPHONE (OUTPATIENT)
Dept: INTERNAL MEDICINE CLINIC | Facility: CLINIC | Age: 67
End: 2017-04-05

## 2017-04-05 RX ORDER — AZITHROMYCIN 250 MG/1
TABLET, FILM COATED ORAL
Qty: 6 TABLET | Refills: 0 | Status: SHIPPED | OUTPATIENT
Start: 2017-04-05 | End: 2017-06-07 | Stop reason: ALTCHOICE

## 2017-04-05 NOTE — TELEPHONE ENCOUNTER
Started with laryngitis now has cough with chest congestion causing her to be out of breath  Can something be prescribed?  Please call patient today [phone] to advise

## 2017-04-05 NOTE — TELEPHONE ENCOUNTER
Z-pack ordered. Pt notified and also instructed to get OTC Robitussin DM and to follow up with Dr. Silas Reveles next week if not better.

## 2017-04-05 NOTE — TELEPHONE ENCOUNTER
Patient states symptoms started last weekend when she lost her voice, now with chest congestion and cough (greenish phlegm), gets short of breath easily with activity. Patient denies fever/chills, states she is sweating a lot.  Patient denies sore throat or

## 2017-04-21 ENCOUNTER — TELEPHONE (OUTPATIENT)
Dept: INTERNAL MEDICINE CLINIC | Facility: CLINIC | Age: 67
End: 2017-04-21

## 2017-04-21 RX ORDER — PREDNISONE 20 MG/1
TABLET ORAL
Qty: 8 TABLET | Refills: 0 | Status: SHIPPED | OUTPATIENT
Start: 2017-04-21 | End: 2017-06-14

## 2017-04-21 NOTE — TELEPHONE ENCOUNTER
Pt has gout in her L ankle. It started today. She is asking for a prescription for Prednisone. Please send to [facility] in [place].                   Tasked to Nursing. high

## 2017-04-21 NOTE — TELEPHONE ENCOUNTER
Please advise- called patient who states her left ankle is starting to get painful and a little red , she had gout before and thinks it is gout again and wants RX for Prednisone - to DR. FLEMING

## 2017-05-22 ENCOUNTER — HOSPITAL ENCOUNTER (OUTPATIENT)
Age: 67
Discharge: HOME | End: 2017-05-22
Attending: EMERGENCY MEDICINE
Payer: MEDICARE

## 2017-05-22 ENCOUNTER — APPOINTMENT (OUTPATIENT)
Dept: GENERAL RADIOLOGY | Age: 67
End: 2017-05-22
Attending: EMERGENCY MEDICINE
Payer: MEDICARE

## 2017-05-22 ENCOUNTER — TELEPHONE (OUTPATIENT)
Dept: INTERNAL MEDICINE CLINIC | Facility: CLINIC | Age: 67
End: 2017-05-22

## 2017-05-22 VITALS
SYSTOLIC BLOOD PRESSURE: 137 MMHG | WEIGHT: 190 LBS | BODY MASS INDEX: 32.44 KG/M2 | HEIGHT: 64 IN | HEART RATE: 70 BPM | DIASTOLIC BLOOD PRESSURE: 67 MMHG | RESPIRATION RATE: 18 BRPM | OXYGEN SATURATION: 95 % | TEMPERATURE: 98 F

## 2017-05-22 DIAGNOSIS — J20.8 ACUTE VIRAL BRONCHITIS: Primary | ICD-10-CM

## 2017-05-22 PROCEDURE — 93010 ELECTROCARDIOGRAM REPORT: CPT

## 2017-05-22 PROCEDURE — 93010 ELECTROCARDIOGRAM REPORT: CPT | Performed by: EMERGENCY MEDICINE

## 2017-05-22 PROCEDURE — 99214 OFFICE O/P EST MOD 30 MIN: CPT

## 2017-05-22 PROCEDURE — 93005 ELECTROCARDIOGRAM TRACING: CPT

## 2017-05-22 PROCEDURE — 71020 XR CHEST PA + LAT CHEST (CPT=71020): CPT | Performed by: EMERGENCY MEDICINE

## 2017-05-22 NOTE — TELEPHONE ENCOUNTER
Patient's chest feels tight since yesterday. Runny nose, dry cough. When she takes a deep breath it makes her cough. Gets out of breath easily & does hurt somewhat. Not cardiac issue  Questioning if it could be pneumonia.   No temp

## 2017-05-22 NOTE — ED INITIAL ASSESSMENT (HPI)
Presents with non-productive cough and nasal congestion since Friday that has gotten progressively worse. Reports cough is constant and states \"my chest just feels tight. \" Pt reports shortness of breath and green nasal drainage. Denies fevers or chills.

## 2017-05-22 NOTE — TELEPHONE ENCOUNTER
Please have patient go to immediate care where she can be examined in she can have a chest x-ray. Chest x-ray not available in our building today.

## 2017-05-22 NOTE — TELEPHONE ENCOUNTER
Patient states she \"knew something was coming on \" the day before yesterday but during the night cough got worse (dry) and chest started feeling tighter.  Patient states sometimes it hurts to take a deep breath and she will immediately start cough when sh

## 2017-05-22 NOTE — TELEPHONE ENCOUNTER
Called and relayed message to patient. Verbalized understanding. She will go to Immediate care in [place].  Nursing f/u 5/23

## 2017-05-22 NOTE — ED PROVIDER NOTES
Patient Seen in: [facility] CLINICS Immediate Care In [address] [place] [address]    History   Patient presents with:  Cough/URI    Stated Complaint: Cough/SOB    HPI    Patient is a 68-year-old female who presents to the urgent care with a chief complaint of cough and shor BSO; as per     KNEE ARTHROSCOPY Right 2013    Comment as per     KNEE SURGERY  1/2015    Comment x3     CATARACT Left 7/10/15    OTHER  June or July 2015    Comment removal of pins and plate from right wrist (fracture)    CATARACT Right late July 2015 CAD; as per NG   • Cancer Other      Thyroid Cancer; as per NG   • Cancer Sister      Kidney Cancer; as per NG   • Cancer Maternal Grandmother      Colon         Smoking Status: Former Smoker                   Packs/Day: 1.00  Years: 40        Types: Cigar Normal EKG            MDM           Disposition and Plan     Clinical Impression:  Acute viral bronchitis  (primary encounter diagnosis)    Disposition:  Discharge    Follow-up:  Magdiel Patel MD  [address] [place] [address] [id number] [phone]

## 2017-05-24 NOTE — TELEPHONE ENCOUNTER
Pt called for follow up from UC visit. Pt reported that she went to UC and \"they did nothing. \" Stated she was informed that she has acute bronchitis and was not given any medication. When asked how she is feeling, pt stated \"I'm okay.  I still have this

## 2017-05-25 ENCOUNTER — HOSPITAL ENCOUNTER (OUTPATIENT)
Dept: ULTRASOUND IMAGING | Facility: HOSPITAL | Age: 67
Discharge: HOME | End: 2017-05-25
Attending: UROLOGY
Payer: MEDICARE

## 2017-05-25 ENCOUNTER — LAB ENCOUNTER (OUTPATIENT)
Dept: LAB | Facility: HOSPITAL | Age: 67
End: 2017-05-25
Attending: UROLOGY
Payer: MEDICARE

## 2017-05-25 DIAGNOSIS — N13.9 OBSTRUCTIVE UROPATHY: ICD-10-CM

## 2017-05-25 DIAGNOSIS — N13.9 OBSTRUCTED, UROPATHY: ICD-10-CM

## 2017-05-25 DIAGNOSIS — N13.9 URINARY OBSTRUCTION: Primary | ICD-10-CM

## 2017-05-25 PROCEDURE — 82550 ASSAY OF CK (CPK): CPT

## 2017-05-25 PROCEDURE — 82565 ASSAY OF CREATININE: CPT

## 2017-05-25 PROCEDURE — 36415 COLL VENOUS BLD VENIPUNCTURE: CPT

## 2017-05-25 PROCEDURE — 76857 US EXAM PELVIC LIMITED: CPT | Performed by: UROLOGY

## 2017-05-25 NOTE — TELEPHONE ENCOUNTER
Patient called back and was relayed 's message. Patient verbalized understanding and states that she is feeling better, does not think that she needs to come in at this point.

## 2017-05-25 NOTE — TELEPHONE ENCOUNTER
I can see her today if she wants. The reason that she did not get any antibiotics was that her chest x-ray was clear without pneumonia.   Current guidelines are  indicating that most of our coughs and bronchitis are due to viruses and antibiotics are not v

## 2017-05-31 NOTE — H&P
[facility]    PATIENT'S NAME: Anthony Middleton   ATTENDING PHYSICIAN: Macarena Salvador MD   PATIENT ACCOUNT#:   [id number]    LOCATION:  [address] [place] [address] RECORD #:   [id number]       YOB: 1950  ADMISSION DATE:       06/19/201 rectocele may recur. She may have rectal damage. She may have problems with constipation. Understanding all, she accepts and desires us to proceed.     PAST MEDICAL HISTORY:  Urodynamics in 05/2006, history of diverticulosis, urosepsis twice in the past, lymphadenopathy. LUNGS:  Clear. HEART:  Normal S1, S2, with no murmur. Pulses normal.  FLANKS:  Normal.  ABDOMEN:  Normal.  There is no mass or tenderness. Liver and spleen are normal.  There is no hernia.   EXTREMITIES:  Normal gait and station, digits

## 2017-06-05 ENCOUNTER — LAB ENCOUNTER (OUTPATIENT)
Dept: LAB | Age: 67
End: 2017-06-05
Attending: UROLOGY
Payer: MEDICARE

## 2017-06-05 ENCOUNTER — APPOINTMENT (OUTPATIENT)
Dept: LAB | Age: 67
End: 2017-06-05
Attending: UROLOGY
Payer: MEDICARE

## 2017-06-05 DIAGNOSIS — Z01.818 PRE-OP TESTING: Primary | ICD-10-CM

## 2017-06-05 DIAGNOSIS — Z01.812 PRE-OPERATIVE LABORATORY EXAMINATION: ICD-10-CM

## 2017-06-05 PROCEDURE — 93010 ELECTROCARDIOGRAM REPORT: CPT | Performed by: UROLOGY

## 2017-06-05 PROCEDURE — 86850 RBC ANTIBODY SCREEN: CPT

## 2017-06-05 PROCEDURE — 93005 ELECTROCARDIOGRAM TRACING: CPT

## 2017-06-05 PROCEDURE — 36415 COLL VENOUS BLD VENIPUNCTURE: CPT

## 2017-06-05 PROCEDURE — 86900 BLOOD TYPING SEROLOGIC ABO: CPT

## 2017-06-05 PROCEDURE — 86901 BLOOD TYPING SEROLOGIC RH(D): CPT

## 2017-06-05 PROCEDURE — 85025 COMPLETE CBC W/AUTO DIFF WBC: CPT

## 2017-06-05 PROCEDURE — 85610 PROTHROMBIN TIME: CPT

## 2017-06-05 PROCEDURE — 80053 COMPREHEN METABOLIC PANEL: CPT

## 2017-06-06 ENCOUNTER — PRIOR ORIGINAL RECORDS (OUTPATIENT)
Dept: OTHER | Age: 67
End: 2017-06-06

## 2017-06-06 ENCOUNTER — TELEPHONE (OUTPATIENT)
Dept: INTERNAL MEDICINE CLINIC | Facility: CLINIC | Age: 67
End: 2017-06-06

## 2017-06-06 DIAGNOSIS — R73.9 HYPERGLYCEMIA: Primary | ICD-10-CM

## 2017-06-06 RX ORDER — NADOLOL 40 MG/1
TABLET ORAL
Qty: 90 TABLET | Refills: 3 | OUTPATIENT
Start: 2017-06-06

## 2017-06-06 RX ORDER — ROPINIROLE 1 MG/1
TABLET, FILM COATED ORAL
Qty: 270 TABLET | Refills: 0 | Status: SHIPPED | OUTPATIENT
Start: 2017-06-06 | End: 2017-09-08

## 2017-06-06 RX ORDER — NADOLOL 20 MG/1
20 TABLET ORAL DAILY
Qty: 90 TABLET | Refills: 3 | Status: SHIPPED | OUTPATIENT
Start: 2017-06-06 | End: 2017-07-06

## 2017-06-06 NOTE — TELEPHONE ENCOUNTER
Pt has been on Nadolol since prior to 2010 at 40 mg daily; Pt verified she has been changed to 20 mg daily ;   Will change the tablet size  Reviewed and Rx done

## 2017-06-06 NOTE — TELEPHONE ENCOUNTER
Called patient and relayed message from Dr. Perla Hagan. She did state that she had candy before last labs. She verbalized understanding of instructions and states she can have labs done tomorrow. She leaves Thursday for vacation.  Patient also inquiring about N

## 2017-06-06 NOTE — TELEPHONE ENCOUNTER
Pt is calling she had labs drawn on 6/5, Dr Tania King wants Dr Mathew Maurer to look at results and give the ok   Pt said the sugar was high but she had sugar before the test not knowing they would test her sugard  Pt is scheduled for surgery on 6/19.   She will be nimisha

## 2017-06-07 ENCOUNTER — TELEPHONE (OUTPATIENT)
Dept: INTERNAL MEDICINE CLINIC | Facility: CLINIC | Age: 67
End: 2017-06-07

## 2017-06-07 ENCOUNTER — APPOINTMENT (OUTPATIENT)
Dept: LAB | Age: 67
End: 2017-06-07
Attending: INTERNAL MEDICINE
Payer: MEDICARE

## 2017-06-07 ENCOUNTER — OFFICE VISIT (OUTPATIENT)
Dept: HEMATOLOGY/ONCOLOGY | Facility: HOSPITAL | Age: 67
End: 2017-06-07
Attending: INTERNAL MEDICINE
Payer: MEDICARE

## 2017-06-07 VITALS
HEIGHT: 64 IN | BODY MASS INDEX: 32.44 KG/M2 | SYSTOLIC BLOOD PRESSURE: 168 MMHG | TEMPERATURE: 100 F | WEIGHT: 190 LBS | DIASTOLIC BLOOD PRESSURE: 80 MMHG | RESPIRATION RATE: 16 BRPM | HEART RATE: 65 BPM

## 2017-06-07 DIAGNOSIS — IMO0002 CYSTOCELE, UNSPECIFIED CYSTOCELE LOCATION: ICD-10-CM

## 2017-06-07 DIAGNOSIS — R79.1 ELEVATED INR: Primary | ICD-10-CM

## 2017-06-07 DIAGNOSIS — R73.9 HYPERGLYCEMIA: ICD-10-CM

## 2017-06-07 DIAGNOSIS — Z80.51 FAMILY HISTORY OF RENAL CANCER: ICD-10-CM

## 2017-06-07 DIAGNOSIS — N28.9 RENAL LESION: ICD-10-CM

## 2017-06-07 DIAGNOSIS — N81.6 RECTOCELE: ICD-10-CM

## 2017-06-07 LAB
ANION GAP SERPL CALC-SCNC: 10 MMOL/L (ref 0–18)
BUN SERPL-MCNC: 20 MG/DL (ref 8–20)
BUN/CREAT SERPL: 16 (ref 10–20)
CALCIUM SERPL-MCNC: 9.6 MG/DL (ref 8.5–10.5)
CHLORIDE SERPL-SCNC: 109 MMOL/L (ref 95–110)
CO2 SERPL-SCNC: 25 MMOL/L (ref 22–32)
CREAT SERPL-MCNC: 1.25 MG/DL (ref 0.5–1.5)
GLUCOSE SERPL-MCNC: 87 MG/DL (ref 70–99)
HBA1C MFR BLD: 5.5 % (ref 4–6)
OSMOLALITY UR CALC.SUM OF ELEC: 300 MOSM/KG (ref 275–295)
POTASSIUM SERPL-SCNC: 4.2 MMOL/L (ref 3.3–5.1)
SODIUM SERPL-SCNC: 144 MMOL/L (ref 136–144)

## 2017-06-07 PROCEDURE — 99204 OFFICE O/P NEW MOD 45 MIN: CPT | Performed by: INTERNAL MEDICINE

## 2017-06-07 PROCEDURE — 36415 COLL VENOUS BLD VENIPUNCTURE: CPT

## 2017-06-07 PROCEDURE — G0463 HOSPITAL OUTPT CLINIC VISIT: HCPCS | Performed by: INTERNAL MEDICINE

## 2017-06-07 PROCEDURE — 80048 BASIC METABOLIC PNL TOTAL CA: CPT

## 2017-06-07 PROCEDURE — 83036 HEMOGLOBIN GLYCOSYLATED A1C: CPT

## 2017-06-07 RX ORDER — MULTIVITAMIN WITH IRON
250 TABLET ORAL
COMMUNITY
End: 2018-03-07

## 2017-06-07 NOTE — TELEPHONE ENCOUNTER
[facility] - verbalized understanding . Copy of results faxed to DR. Juan Jose Jj at [phone]

## 2017-06-07 NOTE — TELEPHONE ENCOUNTER
Blood sugar and HbA1 c came out good. blood sugar normal. I'm not sure why the recent blood sugar was so high but for now noth Thx.ing else to do kidney function still mildly weak but stable. Nothing further to do except avoid NSAID's.  Please fax results t

## 2017-06-07 NOTE — CONSULTS
[facility]    PATIENT'S NAME: Hillary GOMES   CONSULTING PHYSICIAN: Willem Butterfield MD   PATIENT ACCOUNT #: [de-identified] LOCATION: [address] [place] [address] RECORD #: [id number] YOB: 1950   CONSULTATION DATE: 06/07/2017       CANCER MYRTLE 2000, carpal tunnel repair in 2005, history of diverticulosis, history of sepsis secondary urinary tract infection. MEDICATION:  Atorvastatin, Aricept, Lamictal, meclizine, Corgard, omeprazole, Deltasone, rivaroxaban 15 mg daily, Requip.       ALLERGIES: Hematology preoperatively in the setting of an elevated INR of 1.3 prior to planned procedures including sling placement with rectocele repair and possible small cystocele repair and cystoscopy for rectocele/cystocele/stress incontinence as well as a left

## 2017-06-19 ENCOUNTER — HOSPITAL ENCOUNTER (INPATIENT)
Facility: HOSPITAL | Age: 67
LOS: 2 days | Discharge: HOME | DRG: 748 | End: 2017-06-21
Attending: UROLOGY | Admitting: UROLOGY
Payer: MEDICARE

## 2017-06-19 ENCOUNTER — ANESTHESIA (OUTPATIENT)
Dept: SURGERY | Facility: HOSPITAL | Age: 67
DRG: 748 | End: 2017-06-19
Payer: MEDICARE

## 2017-06-19 ENCOUNTER — SURGERY (OUTPATIENT)
Age: 67
End: 2017-06-19

## 2017-06-19 ENCOUNTER — ANESTHESIA EVENT (OUTPATIENT)
Dept: SURGERY | Facility: HOSPITAL | Age: 67
DRG: 748 | End: 2017-06-19
Payer: MEDICARE

## 2017-06-19 DIAGNOSIS — N39.3 STRESS INCONTINENCE IN FEMALE: Primary | ICD-10-CM

## 2017-06-19 PROBLEM — I48.0 PAROXYSMAL A-FIB (HCC): Chronic | Status: ACTIVE | Noted: 2017-06-19

## 2017-06-19 PROBLEM — R32 INCONTINENCE IN FEMALE: Status: ACTIVE | Noted: 2017-06-19

## 2017-06-19 PROCEDURE — 0JQC0ZZ REPAIR PELVIC REGION SUBCUTANEOUS TISSUE AND FASCIA, OPEN APPROACH: ICD-10-PCS | Performed by: OBSTETRICS & GYNECOLOGY

## 2017-06-19 PROCEDURE — 99232 SBSQ HOSP IP/OBS MODERATE 35: CPT | Performed by: HOSPITALIST

## 2017-06-19 PROCEDURE — 0TJB8ZZ INSPECTION OF BLADDER, VIA NATURAL OR ARTIFICIAL OPENING ENDOSCOPIC: ICD-10-PCS | Performed by: UROLOGY

## 2017-06-19 PROCEDURE — 0JUC0JZ SUPPLEMENT OF PELVIC REGION SUBCUTANEOUS TISSUE AND FASCIA WITH SYNTHETIC SUBSTITUTE, OPEN APPROACH: ICD-10-PCS | Performed by: UROLOGY

## 2017-06-19 PROCEDURE — 0KQM0ZZ REPAIR PERINEUM MUSCLE, OPEN APPROACH: ICD-10-PCS | Performed by: OBSTETRICS & GYNECOLOGY

## 2017-06-19 RX ORDER — NALOXONE HYDROCHLORIDE 0.4 MG/ML
80 INJECTION, SOLUTION INTRAMUSCULAR; INTRAVENOUS; SUBCUTANEOUS AS NEEDED
Status: ACTIVE | OUTPATIENT
Start: 2017-06-19 | End: 2017-06-19

## 2017-06-19 RX ORDER — DIPHENHYDRAMINE HYDROCHLORIDE 50 MG/ML
12.5 INJECTION INTRAMUSCULAR; INTRAVENOUS NIGHTLY PRN
Status: DISCONTINUED | OUTPATIENT
Start: 2017-06-19 | End: 2017-06-21

## 2017-06-19 RX ORDER — MORPHINE SULFATE 2 MG/ML
2 INJECTION, SOLUTION INTRAMUSCULAR; INTRAVENOUS EVERY 2 HOUR PRN
Status: DISCONTINUED | OUTPATIENT
Start: 2017-06-19 | End: 2017-06-21

## 2017-06-19 RX ORDER — ROPINIROLE 0.25 MG/1
0.25 TABLET, FILM COATED ORAL 3 TIMES DAILY
Status: DISCONTINUED | OUTPATIENT
Start: 2017-06-19 | End: 2017-06-21

## 2017-06-19 RX ORDER — SODIUM CHLORIDE 9 MG/ML
INJECTION, SOLUTION INTRAVENOUS CONTINUOUS PRN
Status: DISCONTINUED | OUTPATIENT
Start: 2017-06-19 | End: 2017-06-19 | Stop reason: SURG

## 2017-06-19 RX ORDER — HALOPERIDOL 5 MG/ML
0.25 INJECTION INTRAMUSCULAR ONCE AS NEEDED
Status: DISCONTINUED | OUTPATIENT
Start: 2017-06-19 | End: 2017-06-19 | Stop reason: HOSPADM

## 2017-06-19 RX ORDER — DOCUSATE SODIUM 100 MG/1
100 CAPSULE, LIQUID FILLED ORAL 2 TIMES DAILY
Status: DISCONTINUED | OUTPATIENT
Start: 2017-06-19 | End: 2017-06-21

## 2017-06-19 RX ORDER — SODIUM CHLORIDE, SODIUM LACTATE, POTASSIUM CHLORIDE, CALCIUM CHLORIDE 600; 310; 30; 20 MG/100ML; MG/100ML; MG/100ML; MG/100ML
INJECTION, SOLUTION INTRAVENOUS CONTINUOUS
Status: DISCONTINUED | OUTPATIENT
Start: 2017-06-19 | End: 2017-06-21

## 2017-06-19 RX ORDER — ROCURONIUM BROMIDE 10 MG/ML
INJECTION, SOLUTION INTRAVENOUS AS NEEDED
Status: DISCONTINUED | OUTPATIENT
Start: 2017-06-19 | End: 2017-06-19 | Stop reason: SURG

## 2017-06-19 RX ORDER — LAMOTRIGINE 25 MG/1
25 TABLET ORAL DAILY
Status: DISCONTINUED | OUTPATIENT
Start: 2017-06-20 | End: 2017-06-21

## 2017-06-19 RX ORDER — DONEPEZIL HYDROCHLORIDE 5 MG/1
5 TABLET, FILM COATED ORAL NIGHTLY
Status: DISCONTINUED | OUTPATIENT
Start: 2017-06-19 | End: 2017-06-21

## 2017-06-19 RX ORDER — ONDANSETRON 2 MG/ML
4 INJECTION INTRAMUSCULAR; INTRAVENOUS ONCE AS NEEDED
Status: DISCONTINUED | OUTPATIENT
Start: 2017-06-19 | End: 2017-06-19 | Stop reason: HOSPADM

## 2017-06-19 RX ORDER — HYDROMORPHONE HYDROCHLORIDE 1 MG/ML
0.2 INJECTION, SOLUTION INTRAMUSCULAR; INTRAVENOUS; SUBCUTANEOUS EVERY 5 MIN PRN
Status: DISCONTINUED | OUTPATIENT
Start: 2017-06-19 | End: 2017-06-19 | Stop reason: HOSPADM

## 2017-06-19 RX ORDER — ACETAMINOPHEN AND CODEINE PHOSPHATE 300; 30 MG/1; MG/1
1 TABLET ORAL EVERY 6 HOURS PRN
Status: DISCONTINUED | OUTPATIENT
Start: 2017-06-19 | End: 2017-06-21

## 2017-06-19 RX ORDER — OXYBUTYNIN CHLORIDE 5 MG/1
5 TABLET ORAL EVERY 6 HOURS PRN
Status: DISCONTINUED | OUTPATIENT
Start: 2017-06-19 | End: 2017-06-21

## 2017-06-19 RX ORDER — OMEPRAZOLE 20 MG/1
20 CAPSULE, DELAYED RELEASE ORAL
Status: DISCONTINUED | OUTPATIENT
Start: 2017-06-19 | End: 2017-06-19

## 2017-06-19 RX ORDER — LABETALOL HYDROCHLORIDE 5 MG/ML
INJECTION, SOLUTION INTRAVENOUS AS NEEDED
Status: DISCONTINUED | OUTPATIENT
Start: 2017-06-19 | End: 2017-06-19 | Stop reason: SURG

## 2017-06-19 RX ORDER — METOPROLOL TARTRATE 5 MG/5ML
2.5 INJECTION INTRAVENOUS ONCE
Status: DISCONTINUED | OUTPATIENT
Start: 2017-06-19 | End: 2017-06-19 | Stop reason: HOSPADM

## 2017-06-19 RX ORDER — ONDANSETRON 2 MG/ML
INJECTION INTRAMUSCULAR; INTRAVENOUS AS NEEDED
Status: DISCONTINUED | OUTPATIENT
Start: 2017-06-19 | End: 2017-06-19 | Stop reason: SURG

## 2017-06-19 RX ORDER — DEXAMETHASONE SODIUM PHOSPHATE 4 MG/ML
VIAL (ML) INJECTION AS NEEDED
Status: DISCONTINUED | OUTPATIENT
Start: 2017-06-19 | End: 2017-06-19 | Stop reason: SURG

## 2017-06-19 RX ORDER — ONDANSETRON 4 MG/1
4 TABLET, FILM COATED ORAL EVERY 6 HOURS PRN
Status: DISCONTINUED | OUTPATIENT
Start: 2017-06-19 | End: 2017-06-21

## 2017-06-19 RX ORDER — FAMOTIDINE 20 MG/1
20 TABLET ORAL 2 TIMES DAILY
Status: DISCONTINUED | OUTPATIENT
Start: 2017-06-19 | End: 2017-06-21

## 2017-06-19 RX ORDER — HYDROMORPHONE HYDROCHLORIDE 1 MG/ML
0.4 INJECTION, SOLUTION INTRAMUSCULAR; INTRAVENOUS; SUBCUTANEOUS EVERY 5 MIN PRN
Status: DISCONTINUED | OUTPATIENT
Start: 2017-06-19 | End: 2017-06-19 | Stop reason: HOSPADM

## 2017-06-19 RX ORDER — BISACODYL 10 MG
10 SUPPOSITORY, RECTAL RECTAL
Status: DISCONTINUED | OUTPATIENT
Start: 2017-06-19 | End: 2017-06-21

## 2017-06-19 RX ORDER — FAMOTIDINE 20 MG/1
20 TABLET ORAL ONCE
Status: COMPLETED | OUTPATIENT
Start: 2017-06-19 | End: 2017-06-19

## 2017-06-19 RX ORDER — SODIUM PHOSPHATE, DIBASIC AND SODIUM PHOSPHATE, MONOBASIC 7; 19 G/133ML; G/133ML
1 ENEMA RECTAL ONCE AS NEEDED
Status: DISCONTINUED | OUTPATIENT
Start: 2017-06-19 | End: 2017-06-20

## 2017-06-19 RX ORDER — DIPHENHYDRAMINE HCL 25 MG
25 CAPSULE ORAL NIGHTLY PRN
Status: DISCONTINUED | OUTPATIENT
Start: 2017-06-19 | End: 2017-06-21

## 2017-06-19 RX ORDER — DIAPER,BRIEF,INFANT-TODD,DISP
EACH MISCELLANEOUS EVERY 8 HOURS PRN
Status: DISCONTINUED | OUTPATIENT
Start: 2017-06-19 | End: 2017-06-21

## 2017-06-19 RX ORDER — ATORVASTATIN CALCIUM 20 MG/1
20 TABLET, FILM COATED ORAL NIGHTLY
Status: DISCONTINUED | OUTPATIENT
Start: 2017-06-19 | End: 2017-06-21

## 2017-06-19 RX ORDER — MORPHINE SULFATE 4 MG/ML
4 INJECTION, SOLUTION INTRAMUSCULAR; INTRAVENOUS EVERY 10 MIN PRN
Status: DISCONTINUED | OUTPATIENT
Start: 2017-06-19 | End: 2017-06-19 | Stop reason: HOSPADM

## 2017-06-19 RX ORDER — ATROPA BELLADONNA AND OPIUM 16.2; 6 MG/1; MG/1
1 SUPPOSITORY RECTAL EVERY 6 HOURS PRN
Status: DISCONTINUED | OUTPATIENT
Start: 2017-06-19 | End: 2017-06-21

## 2017-06-19 RX ORDER — HYDROCODONE BITARTRATE AND ACETAMINOPHEN 5; 325 MG/1; MG/1
1 TABLET ORAL AS NEEDED
Status: DISCONTINUED | OUTPATIENT
Start: 2017-06-19 | End: 2017-06-19 | Stop reason: HOSPADM

## 2017-06-19 RX ORDER — GLYCOPYRROLATE 0.2 MG/ML
INJECTION INTRAMUSCULAR; INTRAVENOUS AS NEEDED
Status: DISCONTINUED | OUTPATIENT
Start: 2017-06-19 | End: 2017-06-19 | Stop reason: SURG

## 2017-06-19 RX ORDER — SODIUM CHLORIDE 0.9 % (FLUSH) 0.9 %
10 SYRINGE (ML) INJECTION AS NEEDED
Status: DISCONTINUED | OUTPATIENT
Start: 2017-06-19 | End: 2017-06-21

## 2017-06-19 RX ORDER — MORPHINE SULFATE 10 MG/ML
6 INJECTION, SOLUTION INTRAMUSCULAR; INTRAVENOUS EVERY 10 MIN PRN
Status: DISCONTINUED | OUTPATIENT
Start: 2017-06-19 | End: 2017-06-19 | Stop reason: HOSPADM

## 2017-06-19 RX ORDER — METOCLOPRAMIDE 10 MG/1
10 TABLET ORAL ONCE
Status: COMPLETED | OUTPATIENT
Start: 2017-06-19 | End: 2017-06-19

## 2017-06-19 RX ORDER — HYDROCODONE BITARTRATE AND ACETAMINOPHEN 5; 325 MG/1; MG/1
2 TABLET ORAL AS NEEDED
Status: DISCONTINUED | OUTPATIENT
Start: 2017-06-19 | End: 2017-06-19 | Stop reason: HOSPADM

## 2017-06-19 RX ORDER — ACETAMINOPHEN AND CODEINE PHOSPHATE 300; 30 MG/1; MG/1
1 TABLET ORAL EVERY 4 HOURS PRN
Status: DISCONTINUED | OUTPATIENT
Start: 2017-06-19 | End: 2017-06-21

## 2017-06-19 RX ORDER — LIDOCAINE HYDROCHLORIDE 10 MG/ML
INJECTION, SOLUTION EPIDURAL; INFILTRATION; INTRACAUDAL; PERINEURAL AS NEEDED
Status: DISCONTINUED | OUTPATIENT
Start: 2017-06-19 | End: 2017-06-19 | Stop reason: SURG

## 2017-06-19 RX ORDER — PHENAZOPYRIDINE HYDROCHLORIDE 200 MG/1
200 TABLET, FILM COATED ORAL 3 TIMES DAILY PRN
Status: DISCONTINUED | OUTPATIENT
Start: 2017-06-19 | End: 2017-06-21

## 2017-06-19 RX ORDER — HYDROMORPHONE HYDROCHLORIDE 1 MG/ML
0.6 INJECTION, SOLUTION INTRAMUSCULAR; INTRAVENOUS; SUBCUTANEOUS EVERY 5 MIN PRN
Status: DISCONTINUED | OUTPATIENT
Start: 2017-06-19 | End: 2017-06-19 | Stop reason: HOSPADM

## 2017-06-19 RX ORDER — MORPHINE SULFATE 4 MG/ML
4 INJECTION, SOLUTION INTRAMUSCULAR; INTRAVENOUS EVERY 2 HOUR PRN
Status: DISCONTINUED | OUTPATIENT
Start: 2017-06-19 | End: 2017-06-21

## 2017-06-19 RX ORDER — NEOSTIGMINE METHYLSULFATE 0.5 MG/ML
INJECTION INTRAVENOUS AS NEEDED
Status: DISCONTINUED | OUTPATIENT
Start: 2017-06-19 | End: 2017-06-19 | Stop reason: SURG

## 2017-06-19 RX ORDER — ONDANSETRON 2 MG/ML
4 INJECTION INTRAMUSCULAR; INTRAVENOUS EVERY 6 HOURS PRN
Status: DISCONTINUED | OUTPATIENT
Start: 2017-06-19 | End: 2017-06-21

## 2017-06-19 RX ORDER — ACETAMINOPHEN 325 MG/1
650 TABLET ORAL ONCE
Status: COMPLETED | OUTPATIENT
Start: 2017-06-19 | End: 2017-06-19

## 2017-06-19 RX ORDER — POLYETHYLENE GLYCOL 3350 17 G/17G
17 POWDER, FOR SOLUTION ORAL DAILY PRN
Status: DISCONTINUED | OUTPATIENT
Start: 2017-06-19 | End: 2017-06-21

## 2017-06-19 RX ORDER — MORPHINE SULFATE 4 MG/ML
6 INJECTION, SOLUTION INTRAMUSCULAR; INTRAVENOUS EVERY 2 HOUR PRN
Status: DISCONTINUED | OUTPATIENT
Start: 2017-06-19 | End: 2017-06-21

## 2017-06-19 RX ORDER — MIDAZOLAM HYDROCHLORIDE 1 MG/ML
INJECTION INTRAMUSCULAR; INTRAVENOUS AS NEEDED
Status: DISCONTINUED | OUTPATIENT
Start: 2017-06-19 | End: 2017-06-19 | Stop reason: SURG

## 2017-06-19 RX ORDER — MAGNESIUM OXIDE 400 MG (241.3 MG MAGNESIUM) TABLET
400 TABLET DAILY
Status: DISCONTINUED | OUTPATIENT
Start: 2017-06-19 | End: 2017-06-21

## 2017-06-19 RX ORDER — MORPHINE SULFATE 2 MG/ML
2 INJECTION, SOLUTION INTRAMUSCULAR; INTRAVENOUS EVERY 10 MIN PRN
Status: DISCONTINUED | OUTPATIENT
Start: 2017-06-19 | End: 2017-06-19 | Stop reason: HOSPADM

## 2017-06-19 RX ADMIN — ONDANSETRON 4 MG: 2 INJECTION INTRAMUSCULAR; INTRAVENOUS at 12:57:00

## 2017-06-19 RX ADMIN — ROCURONIUM BROMIDE 40 MG: 10 INJECTION, SOLUTION INTRAVENOUS at 10:02:00

## 2017-06-19 RX ADMIN — ROCURONIUM BROMIDE 10 MG: 10 INJECTION, SOLUTION INTRAVENOUS at 12:08:00

## 2017-06-19 RX ADMIN — LABETALOL HYDROCHLORIDE 5 MG: 5 INJECTION, SOLUTION INTRAVENOUS at 11:54:00

## 2017-06-19 RX ADMIN — SODIUM CHLORIDE: 9 INJECTION, SOLUTION INTRAVENOUS at 12:23:00

## 2017-06-19 RX ADMIN — SODIUM CHLORIDE: 9 INJECTION, SOLUTION INTRAVENOUS at 15:06:00

## 2017-06-19 RX ADMIN — ROCURONIUM BROMIDE 10 MG: 10 INJECTION, SOLUTION INTRAVENOUS at 13:13:00

## 2017-06-19 RX ADMIN — LIDOCAINE HYDROCHLORIDE 50 MG: 10 INJECTION, SOLUTION EPIDURAL; INFILTRATION; INTRACAUDAL; PERINEURAL at 10:02:00

## 2017-06-19 RX ADMIN — MIDAZOLAM HYDROCHLORIDE 2 MG: 1 INJECTION INTRAMUSCULAR; INTRAVENOUS at 09:57:00

## 2017-06-19 RX ADMIN — DEXAMETHASONE SODIUM PHOSPHATE 4 MG: 4 MG/ML VIAL (ML) INJECTION at 10:21:00

## 2017-06-19 RX ADMIN — LABETALOL HYDROCHLORIDE 5 MG: 5 INJECTION, SOLUTION INTRAVENOUS at 12:03:00

## 2017-06-19 RX ADMIN — SODIUM CHLORIDE, SODIUM LACTATE, POTASSIUM CHLORIDE, CALCIUM CHLORIDE: 600; 310; 30; 20 INJECTION, SOLUTION INTRAVENOUS at 09:56:00

## 2017-06-19 RX ADMIN — GLYCOPYRROLATE 0.7 MG: 0.2 INJECTION INTRAMUSCULAR; INTRAVENOUS at 14:56:00

## 2017-06-19 RX ADMIN — NEOSTIGMINE METHYLSULFATE 4 MG: 0.5 INJECTION INTRAVENOUS at 14:56:00

## 2017-06-19 NOTE — H&P
Brandy Valentin 43 NAME: Dayanara aBker   ATTENDING PHYSICIAN: Mynor Nick MD   PATIENT ACCOUNT#:   [id number]    LOCATION:  [facility]  MEDICAL RECORD #:   [id number]       YOB: 1950  ADMISSION DATE:       06/19/2017 IBS, hyperlipidemia, and hypertension and seizure disorder. PAST SURGICAL HISTORY:  STEFANI-BSO 1993 with anterior repair. Cholecystectomy 2000. Carpal tunnel repair in 2005 and 2012. Pacemaker placed in 2010. History of knee surgeries x3.   No repl extends to the hymen and moderate levator strength. Cervix is absent. Uterus is absent. Adnexa shows no parametrial tenderness or masses. Rectovaginal examination is confirmatory. IMPRESSION:    1.    Mixed urinary incontinence, stress greater than u

## 2017-06-19 NOTE — INTERVAL H&P NOTE
Pre-op Diagnosis: Stress incontinence, urge incontinence, cystocele rectocele , Rectocele    The above referenced H&P was reviewed by René Tatum MD on 6/19/2017, the patient was examined and no significant changes have occurred in the patient's cond

## 2017-06-19 NOTE — INTERVAL H&P NOTE
Pre-op Diagnosis: Stress incontinence, urge incontinence, cystocele rectocele , Rectocele    The above referenced H&P was reviewed by Fabricio Mederos MD on 6/19/2017, the patient was examined and no significant changes have occurred in the patient's conditi

## 2017-06-19 NOTE — BRIEF OP NOTE
[facility] POST ANESTHESIA CARE UNIT  Brief Op Note       Patients Name: Harlanjomar Simon  Attending Physician: Vaibhav Parks MD  Operating Physician: Antonio Meade MD  CSN: [id number]     Location:  OR  MRN: [id number]    Date of Birth: [birth year]

## 2017-06-19 NOTE — PROGRESS NOTES
[facility] - [facility]    Progress Note    Rise Klinefelter Patient Status:  Hospital Outpatient Surgery    3/28/1950 MRN [id number]   Location [address] UNIT Attending Elsie Gilbert MD   Hosp Day # 0 PCP Raisa Yee placement, PAIN CONTROL, CONT HOME MEDS. Hypertension, benign  CONT HOME MEDS, MONITOR. Restless legs syndrome  CONT HOME MEDS. Epilepsy ([facility] [address]  CONT HOME MEDS. Obstructive sleep apnea  NON COMPLIANT WITH CPAP.        CKD (chronic kid

## 2017-06-19 NOTE — ANESTHESIA POSTPROCEDURE EVALUATION
Patient: Sola Christiansen    Procedure Summary     Date Anesthesia Start Anesthesia Stop Room / Location    06/19/17 0956 1515 300 [facility] MAIN OR 08 / 300 [facility] MAIN OR       Procedure Diagnosis Surgeon Responsible Provider    CYSTOSCOPY (N/A Perineum); CYSTOCELE REPAIR

## 2017-06-19 NOTE — ANESTHESIA PREPROCEDURE EVALUATION
Anesthesia PreOp Note    HPI:     Thien Torres is a 75year old female who presents for preoperative consultation requested by: Aimee Romero MD    Date of Surgery: 6/19/2017    Procedure(s):  CYSTOSCOPY  PUBO VAGINAL SLING  ANTERIOR POSTERIOR REPA Diverticulosis      as per NG   • Lymphocytic colitis 2011     as per NG   • Mild cognitive impairment      as per NG   • Carpal tunnel syndrome 2011     as per NG   • History of total hysterectomy with removal of both tubes and ovaries      as per NG   • TABLET BY MOUTH THREE TIMES A DAY Disp: 270 tablet Rfl: 0 6/19/2017 at Unknown time   nadolol 20 MG Oral Tab Take 1 tablet (20 mg total) by mouth daily.  (Patient taking differently: Take 20 mg by mouth nightly.  ) Disp: 90 tablet Rfl: 3 6/18/2017 at 2200 NG   • Cancer Brother      Throat Cancer; as per NG   • Heart Disease Brother      CAD; as per NG   • Cancer Other      Thyroid Cancer; as per NG   • Cancer Sister      Kidney Cancer; as per NG   • Cancer Maternal Grandmother      Colon       Social Histor 06/14/17  1458 06/19/17  0737   BP:  133/75   Pulse:  63   Temp:  98.1 °F (36.7 °C)   TempSrc:  Oral   Resp:  16   Height: 1.645 m (5' 4.75\")    Weight: 86.183 kg (190 lb) 83.462 kg (184 lb)   SpO2:  97%        Anesthesia ROS/Med Hx and Physical Exam

## 2017-06-19 NOTE — PLAN OF CARE
GENITOURINARY - ADULT    • Absence of urinary retention Progressing        PAIN - ADULT    • Verbalizes/displays adequate comfort level or patient's stated pain goal Progressing        Patient Centered Care    • Patient preferences are identified and integ

## 2017-06-20 PROCEDURE — 99233 SBSQ HOSP IP/OBS HIGH 50: CPT | Performed by: HOSPITALIST

## 2017-06-20 RX ORDER — ACETAMINOPHEN AND CODEINE PHOSPHATE 300; 30 MG/1; MG/1
1 TABLET ORAL EVERY 4 HOURS PRN
Status: DISCONTINUED | OUTPATIENT
Start: 2017-06-20 | End: 2017-06-20

## 2017-06-20 NOTE — PROGRESS NOTES
[facility]    Progress Note    Kelsi Lundberg Patient Status:  Inpatient    3/28/1950 MRN [id number]   Location [facility] 4W/SW/SE Attending Jocy Leyva MD   Hosp Day # 1 PCP Spencer Goodpasture, MD     Subjective:  Pain controlled    R Acetaminophen-Codeine #3, Normal Saline Flush, morphINE sulfate **OR** morphINE sulfate **OR** morphINE sulfate, diphenhydrAMINE **OR** DiphenhydrAMINE HCl, PEG 3350, magnesium hydroxide, bisacodyl, ondansetron HCl **OR** Ondansetron HCl, Bacitracin Zinc,

## 2017-06-20 NOTE — PROGRESS NOTES
Therapeutic Interchange B Complex Vitamins daily with STRESS FORMULA/ZINC  OR TABS daily    Gurjit WONG Ph.  [id number]  5/19/2017

## 2017-06-20 NOTE — CM/SW NOTE
CTL met with pt at bedside. Prior to admission pt lived in a 2 level home with her . Pt states that she was independent with all ADL's. Pt has 15 stairs up to her bedroom which she is able to navigate carefully due to painful knees.   Pt still has

## 2017-06-20 NOTE — PROGRESS NOTES
Therapeutic Interchange NADOLOL 20 MG OR TABS daily with metoprolol Tartrate (LOPRESSOR) tab 25 mg 2 times daily. Carrol WONG Ph.  [id number]  6/19/2017

## 2017-06-20 NOTE — PROGRESS NOTES
Patient resting in bed, states some mild discomfort, would like to have tylenol #3 when it due again. Dobbins in place and draining yellow urine. No bleeding or clotting noted. Tolerating diet. No distress noted. Will continue to monitor.

## 2017-06-20 NOTE — PROGRESS NOTES
Therapeutic Interchange magnesium 250 MG Oral Tab  With magnesium oxide (MAG-OX) tab 400 mg (provides 240mg elemental magnesium). Carrol WONG Ph.  [id number]  6/19/2017

## 2017-06-20 NOTE — PROGRESS NOTES
Juanita Mock is a 75year old female. Patient doing well this morning minimal drainage from vaginal wound. Her hemoglobin has drifted down. This probably postoperative. We will monitor today. Will leave packing in 1 more day.   Dobbins will continue f repaired; as per NG   • Lipid screening 11-     as per NG   • Osteoporosis screening 10-     as per NG   • Family history of colon cancer      Maternal grandmother had colon cancer in her 76s.    • Torn meniscus 2015     L knee   • Chronic atr as per NG   • Cancer Other      Thyroid Cancer; as per NG   • Cancer Sister      Kidney Cancer; as per NG   • Cancer Maternal Grandmother      Colon      Social History:   Smoking Status: Former Smoker                   Packs/Day: 1.00  Years: 36        Ty mL, Oral, Daily PRN  •  bisacodyl (DULCOLAX) rectal suppository 10 mg, 10 mg, Rectal, Daily PRN  •  ondansetron HCl (ZOFRAN) injection 4 mg, 4 mg, Intravenous, Q6H PRN **OR** Ondansetron HCl (ZOFRAN) tab 4 mg, 4 mg, Oral, Q6H PRN  •  famoTIDine (PEPCID) ta nose normal, mouth normal  NECK: supple, no lymphadenopathy, thyroid wnl, neck normal  RESPIRATORY: no rales, rhonchi, or wheezes B, lungs clear  CARDIO: RRR without murmur, no murmur, no gallop, normal S1 and S2  ABDOMEN: soft, non-tender with no palpable [facility], XR CHEST PA + LAT CHEST (CPT=71020), 2/28/2017, 13:34. INDICATIONS: Dry cough, difficulty breathing, and generalized chest pain for 3 days. TECHNIQUE:   Two views. PA and lateral views were obtained.   FINDINGS:  CARDIAC/MEDIASTINUM: The cardio disease)     Hepatic steatosis     Aortic atherosclerosis (HCC)     Abnormal PET scan of head     Alzheimer disease     Pseudobulbar affect     Memory loss     Abnormal mammogram     Acute chest pain     Chest pain     Incontinence in female     Paroxysmal

## 2017-06-20 NOTE — OPERATIVE REPORT
[facility]    PATIENT'S NAME: Apontehumza Suh   ATTENDING PHYSICIAN: Darcie Marie MD   OPERATING PHYSICIAN: Silviano Samuels MD   PATIENT ACCOUNT#:   [id number]    LOCATION:  [facility] [address] #:   [id number]       DATE OF BIRTH administered. She had a bowel prep preoperatively. She received Ancef 1 g on call to the OR. She was then put into the candy cane stirru in the lithotomy position. She had an abdominal prep and a vaginal prep.   First, a 16-French Dobbins catheter was p threaded the 0 Prolene that was tied to the sling on both needle openings and brought those through and on the suprapubic incision. We did this on the left and the right.   We marked the mid section of the sling and put this up nicely under the bladder Cloteal Bonus vagina too much, although the patient is not currently sexually active and she is beyond child-bearing years. We then undermined it. We then took out a small triangle of perineal tissue down to just above the rectum.   At this point, I helped Dr. Zainab Key as next day, and we will check her blood tests in the recovery room. Dictated By Ciera Curtis MD  d: 06/19/2017 15:32:21  t: 06/19/2017 19:21:46  [facility] [id number]  GJK/    cc: Chales Favre, MD [facility].  Josué Zambrano MD

## 2017-06-20 NOTE — OPERATIVE REPORT
[facility]    PATIENT'S NAME: Greggconnor Shaka GOMES   ATTENDING PHYSICIAN: Jose Angel Yanes MD   OPERATING PHYSICIAN: Mitra Phan.  Yossi Mariscal MD   PATIENT ACCOUNT#:   [id number]    LOCATION:  [address] [place] [address],[address] #:   [id number]       DATE OF BIRTH: anterior vaginal mucosa, attention was turned to the rectocele and perineum. Allis clamps were placed on the lateral edges of the introitus.   Next, an incision was made transversely across the top of the perineum and the most distal aspect of the introitu after irrigating out any remaining clots. The patient was then awakened, extubated, and taken to the recovery room in stable condition having tolerated the procedure well.   All sponge, needle, and instrument counts were correct x2 at the end of the proced

## 2017-06-20 NOTE — PROGRESS NOTES
[facility]    OB/GYNE Progress Note      Anaya Victoria Patient Status:  Inpatient    3/28/1950 MRN [id number]   Location [facility] 4W/SW/SE Attending Mary Christianson MD   Hosp Day # 1 PCP Jose Angel Jeffery MD       Assessment/Plan canseco catheter been removed: No  Reason for continuing: gyne or perineal surgery      Physical exam:  Constitution: WDWN female in NAD  Abd: soft NT, ND  Incision(s): abd bandage clean and dry  Extremities: calves nt, SCDs on  Pelvic: Ext genitalia:bruised

## 2017-06-21 ENCOUNTER — PRIOR ORIGINAL RECORDS (OUTPATIENT)
Dept: OTHER | Age: 67
End: 2017-06-21

## 2017-06-21 VITALS
TEMPERATURE: 98 F | BODY MASS INDEX: 31.03 KG/M2 | HEIGHT: 64.75 IN | HEART RATE: 61 BPM | DIASTOLIC BLOOD PRESSURE: 51 MMHG | WEIGHT: 184 LBS | RESPIRATION RATE: 16 BRPM | OXYGEN SATURATION: 97 % | SYSTOLIC BLOOD PRESSURE: 115 MMHG

## 2017-06-21 PROCEDURE — 99239 HOSP IP/OBS DSCHRG MGMT >30: CPT | Performed by: HOSPITALIST

## 2017-06-21 RX ORDER — SULFAMETHOXAZOLE AND TRIMETHOPRIM 800; 160 MG/1; MG/1
1 TABLET ORAL EVERY 12 HOURS SCHEDULED
Status: DISCONTINUED | OUTPATIENT
Start: 2017-06-21 | End: 2017-06-21

## 2017-06-21 RX ORDER — ACETAMINOPHEN AND CODEINE PHOSPHATE 300; 30 MG/1; MG/1
1 TABLET ORAL EVERY 4 HOURS PRN
Qty: 40 TABLET | Refills: 1 | Status: SHIPPED | OUTPATIENT
Start: 2017-06-21 | End: 2019-07-16 | Stop reason: ALTCHOICE

## 2017-06-21 RX ORDER — PHENAZOPYRIDINE HYDROCHLORIDE 95 MG/1
95 TABLET ORAL 3 TIMES DAILY PRN
Qty: 21 TABLET | Refills: 6 | Status: SHIPPED | OUTPATIENT
Start: 2017-06-21 | End: 2017-09-22

## 2017-06-21 RX ORDER — MELATONIN
325 2 TIMES DAILY WITH MEALS
Status: DISCONTINUED | OUTPATIENT
Start: 2017-06-21 | End: 2017-06-21

## 2017-06-21 RX ORDER — PSEUDOEPHEDRINE HCL 30 MG
100 TABLET ORAL 2 TIMES DAILY
Qty: 60 CAPSULE | Refills: 6 | Status: SHIPPED | OUTPATIENT
Start: 2017-06-21 | End: 2017-09-22

## 2017-06-21 RX ORDER — OXYBUTYNIN CHLORIDE 5 MG/1
5 TABLET ORAL EVERY 6 HOURS PRN
Qty: 5 TABLET | Refills: 1 | Status: SHIPPED | OUTPATIENT
Start: 2017-06-21 | End: 2017-06-29

## 2017-06-21 RX ORDER — MELATONIN
325 2 TIMES DAILY WITH MEALS
Qty: 120 TABLET | Refills: 6 | Status: SHIPPED | OUTPATIENT
Start: 2017-06-21 | End: 2018-04-02

## 2017-06-21 RX ORDER — SULFAMETHOXAZOLE AND TRIMETHOPRIM 800; 160 MG/1; MG/1
1 TABLET ORAL EVERY 12 HOURS SCHEDULED
Qty: 14 TABLET | Refills: 3 | Status: SHIPPED | OUTPATIENT
Start: 2017-06-21 | End: 2017-06-29

## 2017-06-21 NOTE — DISCHARGE SUMMARY
[facility] - [facility]    Discharge Summary    Tiffany Najera Patient Status:  Inpatient    3/28/1950 MRN [id number]   Location [facility] 4W/SW/SE Attending Zulay Chen MD   [address] Day # 2 PCP Calvin Woodall MD     Date of Admission: 2017 SpO2 97 %, not currently breastfeeding.   General:  Alert, no distress  HEENT:  Normocephalic, atraumatic  Neck:  Supple, symmetrical  Cardiac:  Regular rate, regular rhythm  Pulmonary:  Clear to auscultation bilaterally, respirations unlabored  Gastrointes you understand how and when to take each. Take 1 tablet by mouth every 6 (six) hours as needed for Pain.     Quantity:  60 tablet   Refills:  1       Acetaminophen-Codeine #3 300-30 MG Tabs   Last time this was given:  1 tablet on 6/21/2017  5:52 AM TIMES A DAY    Quantity:  270 tablet   Refills:  0            Where to Get Your Medications      Please  your prescriptions at the location directed by your doctor or nurse     Bring a paper prescription for each of these medications    - Acetaminop

## 2017-06-21 NOTE — PROGRESS NOTES
[facility]    OB/GYNE Progress Note      Belkis Barrera Patient Status:  Inpatient    3/28/1950 MRN [id number]   Location [facility] 4W/SW/SE Attending Fercho Broussard MD   Hosp Day # 2 PCP Shantel Lugo MD       Assessment/Plan   Dis mass index is 30.84 kg/(m^2).     Canseco Catheter Information  Does patient have a canseco catheter: yes  Has the canseco catheter been removed: No  Reason for continuing: gyne or perineal surgery and bladder dysfunction      Physical exam:  Constitution: WDWN f

## 2017-06-21 NOTE — PROGRESS NOTES
Tamiko Cazares is a 75year old female. Patient doing well today. Has history of neurogenic bladder. We will continue Dobbins at home and take out next week. Then begin patient on intermittent catheterization. After doing a fill pull flow.   Patient's h cognitive impairment      as per NG   • Carpal tunnel syndrome 2011     as per NG   • History of total hysterectomy with removal of both tubes and ovaries      as per NG   • Tendon tear 2012     Surgically repaired; as per NG   • Lipid screening 11- Mother      as per NG   • Cancer Sister      Lung Cancer, Kidney Cancer; as per NG   • Hypertension Brother      as per NG   • Cancer Brother      Throat Cancer; as per NG   • Heart Disease Brother      CAD; as per NG   • Cancer Other      Thyroid Cancer; Intravenous, Q2H PRN  •  diphenhydrAMINE (BENADRYL) cap/tab 25 mg, 25 mg, Oral, Nightly PRN **OR** DiphenhydrAMINE HCl (BENADRYL) injection 12.5 mg, 12.5 mg, Intravenous, Nightly PRN  •  docusate sodium (COLACE) cap 100 mg, 100 mg, Oral, BID  •  PEG 3350 ( negative  Behavioral/Psych: negative  Endocrine: negative  Allergic/Immunologic:negative    PHYSICAL EXAM:     /51 mmHg  Pulse 61  Temp(Src) 98.2 °F (36.8 °C) (Oral)  Resp 16  Ht 5' 4.75\" (1.645 m)  Wt 184 lb (83.462 kg)  BMI 30.84 kg/m2  SpO2 97% Lalo Click, [address], [address], [address] [facility], [address] [facility], [address], [address], NITRITE, LEUUR, WBCUR, RBCUR, BACUR, EPIUR in the last 72 hours. No results for input(s): URINE, CULTI, BLDSMR in the last 72 hours.      Imaging:   Xr Chest Pa + Lat Chest (cpt= first dose today. Patient will ambulate advance diet and then be discharged to home.   Principle Problem:   Incontinence in female    Active Problem:   Patient Active Problem List:     Hypertension, benign     Noninfectious gastroenteritis and colitis

## 2017-06-22 ENCOUNTER — TELEPHONE (OUTPATIENT)
Dept: INTERNAL MEDICINE UNIT | Facility: HOSPITAL | Age: 67
End: 2017-06-22

## 2017-06-22 NOTE — DISCHARGE SUMMARY
[facility]    PATIENT'S NAME: Maykel Pierre   ATTENDING PHYSICIAN: Leah Schultz MD   PATIENT ACCOUNT#:   [id number]    LOCATION:  [address] [place] [address] RECORD #:   [id number]       YOB: 1950  ADMISSION DATE:       06/19/2017 until her urine is completely clear, which I foresee happening in the next day or two. She understands the small stroke risk because of her atrial fibrillation when she is off the Xarelto.   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

## 2017-06-22 NOTE — TELEPHONE ENCOUNTER
Pt contacted, does she need to follow up with Dr Timo Mayers?   She is scheduled for follow up appts with Dr Shelli Springer and Dr Daija Bonilla  Pt would like to wait until after she sees the other specialists, after 7/10/17 if appt with Dr Timo Mayers is needed  Please call to ad

## 2017-06-22 NOTE — TELEPHONE ENCOUNTER
Please schedule patient for a hospital follow up appointment with pcp .   Pt was discharged on 6/21/17

## 2017-06-27 ENCOUNTER — LAB ENCOUNTER (OUTPATIENT)
Dept: LAB | Facility: HOSPITAL | Age: 67
End: 2017-06-27
Attending: UROLOGY
Payer: MEDICARE

## 2017-06-27 ENCOUNTER — PRIOR ORIGINAL RECORDS (OUTPATIENT)
Dept: OTHER | Age: 67
End: 2017-06-27

## 2017-06-27 DIAGNOSIS — N81.6 RECTOCELE: ICD-10-CM

## 2017-06-27 DIAGNOSIS — D64.9 ANEMIA: Primary | ICD-10-CM

## 2017-06-27 PROCEDURE — 80048 BASIC METABOLIC PNL TOTAL CA: CPT

## 2017-06-27 PROCEDURE — 36415 COLL VENOUS BLD VENIPUNCTURE: CPT

## 2017-06-27 PROCEDURE — 85018 HEMOGLOBIN: CPT

## 2017-06-28 ENCOUNTER — TELEPHONE (OUTPATIENT)
Dept: INTERNAL MEDICINE CLINIC | Facility: CLINIC | Age: 67
End: 2017-06-28

## 2017-06-28 NOTE — TELEPHONE ENCOUNTER
Dr. Sherlyn Jacob calling asking to speak to Dr. Timothy Lamb -     He wanted to speak to doctor about patient and an appointment for her to see Dr. Timothy Lamb for a hospital follow up - advised Dr. Sherlyn Jacob not to worry I would call pt which I did and made her an appt for

## 2017-06-29 ENCOUNTER — OFFICE VISIT (OUTPATIENT)
Dept: INTERNAL MEDICINE CLINIC | Facility: CLINIC | Age: 67
End: 2017-06-29

## 2017-06-29 VITALS
HEART RATE: 68 BPM | DIASTOLIC BLOOD PRESSURE: 70 MMHG | BODY MASS INDEX: 31.58 KG/M2 | WEIGHT: 185 LBS | HEIGHT: 64 IN | SYSTOLIC BLOOD PRESSURE: 106 MMHG | TEMPERATURE: 99 F

## 2017-06-29 DIAGNOSIS — R53.1 WEAKNESS: ICD-10-CM

## 2017-06-29 DIAGNOSIS — I10 ESSENTIAL HYPERTENSION: Primary | ICD-10-CM

## 2017-06-29 DIAGNOSIS — R03.1 LOW BLOOD PRESSURE READING: ICD-10-CM

## 2017-06-29 DIAGNOSIS — I48.0 PAROXYSMAL ATRIAL FIBRILLATION (HCC): ICD-10-CM

## 2017-06-29 DIAGNOSIS — IMO0002 CYSTOCELE, UNSPECIFIED CYSTOCELE LOCATION: ICD-10-CM

## 2017-06-29 DIAGNOSIS — N81.6 RECTOCELE: ICD-10-CM

## 2017-06-29 PROCEDURE — 93010 ELECTROCARDIOGRAM REPORT: CPT | Performed by: INTERNAL MEDICINE

## 2017-06-29 PROCEDURE — 93005 ELECTROCARDIOGRAM TRACING: CPT | Performed by: INTERNAL MEDICINE

## 2017-06-29 PROCEDURE — G0463 HOSPITAL OUTPT CLINIC VISIT: HCPCS | Performed by: INTERNAL MEDICINE

## 2017-06-29 PROCEDURE — 99215 OFFICE O/P EST HI 40 MIN: CPT | Performed by: INTERNAL MEDICINE

## 2017-06-29 RX ORDER — ERYTHROMYCIN 500 MG/1
1 TABLET, COATED ORAL DAILY
COMMUNITY
Start: 2017-05-31 | End: 2017-09-22

## 2017-06-29 NOTE — PROGRESS NOTES
HPI:   Caron Toth is a 75year old female presents with the following problems. Patient presents for weakness. She initially described it as dizziness but she now causing more weakness. Some lightheadedness.   Does not seem to be related to [facility] range since 2014. She has had a normal renal ultrasound. Otherwise she feels well with no chest pain or shortness of breath. No acute GI or  symptoms other than what is related to her surgery.     Yesterday I spoke to Sadi Blankenship regarding patient's s Diagnosis Date   • Alzheimer disease    • Arrhythmia    • Atrial fibrillation [facility])    • Back problem    • Carpal tunnel syndrome 2011    as per NG   • Chronic atrial fibrillation ([facility] [address]    • Diverticulosis     as per NG   • Family history of colon cancer done  8/2016: OTHER SURGICAL HISTORY Right      Comment: R knee scoped for torn miniscus  2006: RECONSTR NOSE+JEAN SEPTAL REPAIR      Comment: as per NG   Family History   Problem Relation Age of Onset   • Hypertension Father      as per NG   • Heart Diseas pain  HEENT:  denies nasal congestion, sinus pain or sore throat  LUNGS:  denies shortness of breath or cough  CARDIOVASCULAR:  denies chest pain or palpitations  GI:  denies abdominal pain, blood in stool, changes in bowels  :  denies dysuria or frequen with her cardiologist in July. She will keep track of her blood pressures. 2. Low blood pressure reading  Relatively low blood pressure reading on today's visit. Some orthostatic changes.  - ELECTROCARDIOGRAM, COMPLETE    3.  Paroxysmal atrial fibrilla

## 2017-07-06 RX ORDER — NADOLOL 20 MG/1
TABLET ORAL
Refills: 0 | COMMUNITY
Start: 2017-07-06 | End: 2018-02-14

## 2017-07-07 LAB
BUN: 17 MG/DL
CALCIUM: 9.3 MG/DL
CHLORIDE: 109 MEQ/L
CHOLESTEROL, TOTAL: 122 MG/DL
CREATININE, SERUM: 1.31 MG/DL
GLUCOSE: 90 MG/DL
GLUCOSE: 95 MG/DL
HDL CHOLESTEROL: 39 MG/DL
HEMATOCRIT: 29.6 %
HEMATOCRIT: 30.7 %
HEMATOCRIT: 36.2 %
HEMOGLOBIN: 10.3 G/DL
HEMOGLOBIN: 11.3 G/DL
HEMOGLOBIN: 12.2 G/DL
HEMOGLOBIN: 9.9 G/DL
LDL CHOLESTEROL: 64 MG/DL
MCH: 30.2 PG
MCH: 30.6 PG
MCHC: 33.5 G/DL
MCHC: 33.7 G/DL
MCV: 89.5 FL
MCV: 91.5 FL
NON-HDL CHOLESTEROL: 83 MG/DL
PLATELETS: 135 K/UL
PLATELETS: 152 K/UL
POTASSIUM, SERUM: 4.9 MEQ/L
RED BLOOD COUNT: 3.24 X 10-6/U
RED BLOOD COUNT: 4.04 X 10-6/U
SODIUM: 142 MEQ/L
TRIGLYCERIDES: 97 MG/DL
WHITE BLOOD COUNT: 4.6 X 10-3/U
WHITE BLOOD COUNT: 9.2 X 10-3/U

## 2017-07-11 ENCOUNTER — PRIOR ORIGINAL RECORDS (OUTPATIENT)
Dept: OTHER | Age: 67
End: 2017-07-11

## 2017-07-21 ENCOUNTER — HOSPITAL ENCOUNTER (OUTPATIENT)
Dept: ULTRASOUND IMAGING | Age: 67
Discharge: HOME | End: 2017-07-21
Attending: UROLOGY
Payer: MEDICARE

## 2017-07-21 ENCOUNTER — APPOINTMENT (OUTPATIENT)
Dept: LAB | Age: 67
End: 2017-07-21
Attending: UROLOGY
Payer: MEDICARE

## 2017-07-21 DIAGNOSIS — N81.6 RECTOCELE: ICD-10-CM

## 2017-07-21 DIAGNOSIS — N31.0 UNINHIBITED NEUROPATHIC BLADDER, NOT ELSEWHERE CLASSIFIED: ICD-10-CM

## 2017-07-21 DIAGNOSIS — D64.9 ANEMIA, UNSPECIFIED TYPE: ICD-10-CM

## 2017-07-21 LAB
ANION GAP SERPL CALC-SCNC: 6 MMOL/L (ref 0–18)
BUN SERPL-MCNC: 9 MG/DL (ref 8–20)
BUN/CREAT SERPL: 7.5 (ref 10–20)
CALCIUM SERPL-MCNC: 9.4 MG/DL (ref 8.5–10.5)
CHLORIDE SERPL-SCNC: 109 MMOL/L (ref 95–110)
CO2 SERPL-SCNC: 25 MMOL/L (ref 22–32)
CREAT SERPL-MCNC: 1.2 MG/DL (ref 0.5–1.5)
GLUCOSE SERPL-MCNC: 106 MG/DL (ref 70–99)
HGB BLD-MCNC: 12.4 G/DL (ref 12–16)
OSMOLALITY UR CALC.SUM OF ELEC: 289 MOSM/KG (ref 275–295)
POTASSIUM SERPL-SCNC: 4.3 MMOL/L (ref 3.3–5.1)
SODIUM SERPL-SCNC: 140 MMOL/L (ref 136–144)

## 2017-07-21 PROCEDURE — 36415 COLL VENOUS BLD VENIPUNCTURE: CPT

## 2017-07-21 PROCEDURE — 85018 HEMOGLOBIN: CPT

## 2017-07-21 PROCEDURE — 76857 US EXAM PELVIC LIMITED: CPT | Performed by: UROLOGY

## 2017-07-21 PROCEDURE — 80048 BASIC METABOLIC PNL TOTAL CA: CPT

## 2017-08-04 ENCOUNTER — HOSPITAL ENCOUNTER (OUTPATIENT)
Dept: ULTRASOUND IMAGING | Age: 67
Discharge: HOME | End: 2017-08-04
Attending: UROLOGY
Payer: MEDICARE

## 2017-08-04 ENCOUNTER — HOSPITAL ENCOUNTER (OUTPATIENT)
Dept: GENERAL RADIOLOGY | Age: 67
Discharge: HOME | End: 2017-08-04
Attending: UROLOGY
Payer: MEDICARE

## 2017-08-04 DIAGNOSIS — R10.9 BILATERAL FLANK PAIN: ICD-10-CM

## 2017-08-04 DIAGNOSIS — Z87.442 PERSONAL HISTORY OF KIDNEY STONES: ICD-10-CM

## 2017-08-04 DIAGNOSIS — R10.9 ACUTE LEFT FLANK PAIN: ICD-10-CM

## 2017-08-04 DIAGNOSIS — Z87.442 HISTORY OF KIDNEY STONES: ICD-10-CM

## 2017-08-04 PROCEDURE — 74000 XR ABDOMEN (KUB) (1 AP VIEW)  (CPT=74000): CPT | Performed by: UROLOGY

## 2017-08-04 PROCEDURE — 76770 US EXAM ABDO BACK WALL COMP: CPT | Performed by: UROLOGY

## 2017-08-28 ENCOUNTER — TELEPHONE (OUTPATIENT)
Dept: INTERNAL MEDICINE CLINIC | Facility: CLINIC | Age: 67
End: 2017-08-28

## 2017-08-28 RX ORDER — PREDNISONE 20 MG/1
TABLET ORAL
Qty: 8 TABLET | Refills: 0 | Status: SHIPPED | OUTPATIENT
Start: 2017-08-28 | End: 2017-09-13

## 2017-08-28 NOTE — TELEPHONE ENCOUNTER
Pt has gout in left foot - pt can be reached at [phone]    Pt is asking if dr. Alejandro Mitchell can give her something    Pharm using - [facility] [place]

## 2017-08-28 NOTE — TELEPHONE ENCOUNTER
Please advise - called patient who states  She has gout on left foot arch , swelling a little redness, tenderness , has to use cane to walk - to DR. FLEMING

## 2017-08-28 NOTE — TELEPHONE ENCOUNTER
Please let patient know that we can call in prednisone. 20 mg tablets. Quantity #8.  2 tablets every day for 4 days. No refills. This usually resolves her gout attack. Please ask her if the gout is in the first toe joint or on the top of her foot.   Pl

## 2017-08-31 ENCOUNTER — PRIOR ORIGINAL RECORDS (OUTPATIENT)
Dept: OTHER | Age: 67
End: 2017-08-31

## 2017-08-31 ENCOUNTER — MYAURORA ACCOUNT LINK (OUTPATIENT)
Dept: OTHER | Age: 67
End: 2017-08-31

## 2017-09-08 RX ORDER — ROPINIROLE 1 MG/1
TABLET, FILM COATED ORAL
Qty: 270 TABLET | Refills: 0 | Status: SHIPPED | OUTPATIENT
Start: 2017-09-08 | End: 2017-12-07

## 2017-09-08 NOTE — TELEPHONE ENCOUNTER
Medication request: Ropinirole HCL 1 mg take 1 tab TID, qt:270 0 refills    LOV: 1/25/17  NOV: None  Last refill: 6/6/17 qt:270

## 2017-09-11 ENCOUNTER — TELEPHONE (OUTPATIENT)
Dept: INTERNAL MEDICINE CLINIC | Facility: CLINIC | Age: 67
End: 2017-09-11

## 2017-09-11 NOTE — TELEPHONE ENCOUNTER
Edie Ross requesting refill for:  Atorvastatin 20MG, pt requesting #90 tablets per refill   Form placed in purple folder  Tasked to [employer]

## 2017-09-11 NOTE — TELEPHONE ENCOUNTER
Patient requesting refills be #90 not #30. Authorized pharmacy to change remaining refills to #90 per protocol. Verbalized understanding.

## 2017-09-13 ENCOUNTER — TELEPHONE (OUTPATIENT)
Dept: INTERNAL MEDICINE CLINIC | Facility: CLINIC | Age: 67
End: 2017-09-13

## 2017-09-13 RX ORDER — PREDNISONE 20 MG/1
TABLET ORAL
Qty: 8 TABLET | Refills: 0 | Status: SHIPPED | OUTPATIENT
Start: 2017-09-13 | End: 2017-09-21

## 2017-09-13 NOTE — TELEPHONE ENCOUNTER
Called patient to get more information. Patient just got over having gout in left foot. She took prednisone prescribed by Dr Shana Sparks. She now has gout in her root foot, the big toe joint that connects to the foot is painful. She has a history of gout.  [employer]

## 2017-09-21 RX ORDER — PREDNISONE 20 MG/1
TABLET ORAL
Qty: 8 TABLET | Refills: 0 | Status: SHIPPED | OUTPATIENT
Start: 2017-09-21 | End: 2017-10-24

## 2017-09-21 NOTE — TELEPHONE ENCOUNTER
Patient calling with week 3 of gout which is in her left ankle & instep     Please call pt asap [phone]

## 2017-09-21 NOTE — TELEPHONE ENCOUNTER
Please call in prednisone. 20 mg tablets. 2 tablets a day for 4 days. Quantity #8. This will get her started on treatment. Please make an appointment to see me tomorrow.

## 2017-09-21 NOTE — TELEPHONE ENCOUNTER
To Dr. Shana Sparks - see below - pain level 7-8/10. Pt asking if there a new maintenance med? Pt states she eats bland diet and drinks alcohol rarely (\"maybe once every 2 months\").

## 2017-09-22 ENCOUNTER — OFFICE VISIT (OUTPATIENT)
Dept: INTERNAL MEDICINE CLINIC | Facility: CLINIC | Age: 67
End: 2017-09-22

## 2017-09-22 ENCOUNTER — APPOINTMENT (OUTPATIENT)
Dept: LAB | Age: 67
End: 2017-09-22
Attending: INTERNAL MEDICINE
Payer: MEDICARE

## 2017-09-22 VITALS
HEIGHT: 64 IN | BODY MASS INDEX: 33.06 KG/M2 | TEMPERATURE: 98 F | HEART RATE: 60 BPM | WEIGHT: 193.63 LBS | SYSTOLIC BLOOD PRESSURE: 130 MMHG | DIASTOLIC BLOOD PRESSURE: 72 MMHG

## 2017-09-22 DIAGNOSIS — M10.00 ACUTE IDIOPATHIC GOUT, UNSPECIFIED SITE: Primary | ICD-10-CM

## 2017-09-22 DIAGNOSIS — M10.00 ACUTE IDIOPATHIC GOUT, UNSPECIFIED SITE: ICD-10-CM

## 2017-09-22 PROCEDURE — 80048 BASIC METABOLIC PNL TOTAL CA: CPT

## 2017-09-22 PROCEDURE — 99214 OFFICE O/P EST MOD 30 MIN: CPT | Performed by: INTERNAL MEDICINE

## 2017-09-22 PROCEDURE — 36415 COLL VENOUS BLD VENIPUNCTURE: CPT

## 2017-09-22 PROCEDURE — G0463 HOSPITAL OUTPT CLINIC VISIT: HCPCS | Performed by: INTERNAL MEDICINE

## 2017-09-22 PROCEDURE — 84550 ASSAY OF BLOOD/URIC ACID: CPT

## 2017-09-22 RX ORDER — PREDNISONE 10 MG/1
TABLET ORAL
Refills: 0 | COMMUNITY
Start: 2017-09-22 | End: 2017-10-03

## 2017-09-22 NOTE — PROGRESS NOTES
Jaylon Rojas is a 75year old female. HPI:   Patient presents with: Follow - Up: F/U on gout left foot , yesterday started also on right foot       Patient has had recurrent gout attacks.   On August 28 she had left MTP area pain and tenderness consist tablet Rfl: 1   ferrous sulfate 325 (65 FE) MG Oral Tab EC Take 1 tablet (325 mg total) by mouth 2 (two) times daily with meals. Disp: 120 tablet Rfl: 6   B COMPLEX VITAMINS OR Take by mouth. Disp:  Rfl:    magnesium 250 MG Oral Tab Take 250 mg by mouth.  D 1/1/1998  Smokeless tobacco: Never Used                      Alcohol use: Yes           0.6 oz/week     Standard drinks or equivalent: 1 per week     Comment: very seldom. 2-3 time per year.        REVIEW OF SYSTEMS:   GENERAL HEALTH:  feels well otherwise asked to call at the end of her treatment regime. Marylene Mech, MD  9/22/2017  12:26 PM

## 2017-09-24 ENCOUNTER — TELEPHONE (OUTPATIENT)
Dept: INTERNAL MEDICINE CLINIC | Facility: CLINIC | Age: 67
End: 2017-09-24

## 2017-09-24 NOTE — TELEPHONE ENCOUNTER
Please let patient know her uric acid level is elevated as I expected. Nothing to do now. I hope her foot pain from gout is improving. Per our office visit discussion she should call when she is on last dose of prednisone and I can figure out next steps.

## 2017-09-25 NOTE — TELEPHONE ENCOUNTER
Called patient and relayed DR. FLEMING message - verbalized understanding - so far not much improvement

## 2017-10-02 RX ORDER — PREDNISONE 10 MG/1
10 TABLET ORAL
Qty: 30 EACH | Refills: 1 | COMMUNITY
Start: 2017-10-02 | End: 2017-10-03

## 2017-10-02 NOTE — TELEPHONE ENCOUNTER
Pt. Is calling to inform Dr. Clement Atwood that she finished her prednisone, now her gout has came back  Ph. # [phone]   Routed to clinical

## 2017-10-02 NOTE — TELEPHONE ENCOUNTER
Discussed with patient. Patient has return of what sounds like gout left foot. Around the fourth digit and dorsal foot and ankle. Discussed with patient I am not sure why she is getting recurrent attacks. Her uric acid is elevated.   I have referred her

## 2017-10-03 ENCOUNTER — TELEPHONE (OUTPATIENT)
Dept: INTERNAL MEDICINE CLINIC | Facility: CLINIC | Age: 67
End: 2017-10-03

## 2017-10-03 RX ORDER — PREDNISONE 10 MG/1
20 TABLET ORAL DAILY
Qty: 30 TABLET | Refills: 1 | COMMUNITY
Start: 2017-10-03 | End: 2017-10-24

## 2017-10-03 RX ORDER — PREDNISONE 10 MG/1
10 TABLET ORAL
Qty: 30 EACH | Refills: 1 | Status: SHIPPED | OUTPATIENT
Start: 2017-10-03 | End: 2017-10-03 | Stop reason: CLARIF

## 2017-10-03 NOTE — TELEPHONE ENCOUNTER
Pt called Dr Tete Johnson but can't get in until 10/21, is there another Dr that is recommended?   Please casimiro pt [phone]   Tasked to nursing

## 2017-10-03 NOTE — TELEPHONE ENCOUNTER
Spoke with Abdirahman Stoddard at [facility]. Provided clarification that patient should be on Prednisone 10 mg tabs, 2 tabs daily until seen by rheumatology per Dr. Lisa Rank 9/24/17 TT note. Abdirahman Stoddard verbalized understanding. Med module updated.

## 2017-10-03 NOTE — TELEPHONE ENCOUNTER
Spoke with rheumatology office. First available new patient appt is 10/17/17 at 130 pm. This appt is at [address] [place] [address] location [address] [facility] with Dr. Billy Stinson. Patient scheduled.  Patient was also added to wait list and will get a notification via My

## 2017-10-11 PROCEDURE — 86038 ANTINUCLEAR ANTIBODIES: CPT | Performed by: INTERNAL MEDICINE

## 2017-10-11 PROCEDURE — 86200 CCP ANTIBODY: CPT | Performed by: INTERNAL MEDICINE

## 2017-10-20 ENCOUNTER — TELEPHONE (OUTPATIENT)
Dept: INTERNAL MEDICINE CLINIC | Facility: CLINIC | Age: 67
End: 2017-10-20

## 2017-10-20 NOTE — TELEPHONE ENCOUNTER
Barney Long. I will be seeing Diego Sung Monday for follow-up. Thank you for seeing her October 11 for gout. Please let me know your thoughts about continued treatment options for gout. Thank you for your consultation October 11.   In the past she had

## 2017-10-26 ENCOUNTER — TELEPHONE (OUTPATIENT)
Dept: INTERNAL MEDICINE CLINIC | Facility: CLINIC | Age: 67
End: 2017-10-26

## 2017-10-26 NOTE — TELEPHONE ENCOUNTER
Relayed message to patient. She verbalized understanding of instructions, but states she had forgotten all about the lump in her breast--reviewed mammo and US results from Jan 2017. States she has Alzheimer's.  She reports she will call Dr. Jogre Deal for a

## 2017-10-26 NOTE — TELEPHONE ENCOUNTER
Please call patient and let her know that I received the consultation from her rheumatologist regarding her gout. I hope she is doing well on the colchicine.   I wanted to remind her to see Dr. Ernesto Marshall the surgeon that she saw her earlier this year for

## 2017-12-07 ENCOUNTER — LAB ENCOUNTER (OUTPATIENT)
Dept: LAB | Age: 67
End: 2017-12-07
Attending: INTERNAL MEDICINE
Payer: MEDICARE

## 2017-12-07 DIAGNOSIS — Z79.899 ENCOUNTER FOR LONG-TERM (CURRENT) USE OF HIGH-RISK MEDICATION: ICD-10-CM

## 2017-12-07 DIAGNOSIS — M10.9 GOUTY ARTHRITIS: ICD-10-CM

## 2017-12-07 PROCEDURE — 84550 ASSAY OF BLOOD/URIC ACID: CPT

## 2017-12-07 PROCEDURE — 84520 ASSAY OF UREA NITROGEN: CPT

## 2017-12-07 PROCEDURE — 80076 HEPATIC FUNCTION PANEL: CPT

## 2017-12-07 PROCEDURE — 85025 COMPLETE CBC W/AUTO DIFF WBC: CPT

## 2017-12-07 PROCEDURE — 82565 ASSAY OF CREATININE: CPT

## 2017-12-07 PROCEDURE — 36415 COLL VENOUS BLD VENIPUNCTURE: CPT

## 2017-12-07 RX ORDER — ROPINIROLE 1 MG/1
TABLET, FILM COATED ORAL
Qty: 270 TABLET | Refills: 0 | Status: SHIPPED | OUTPATIENT
Start: 2017-12-07 | End: 2018-04-03

## 2018-01-09 ENCOUNTER — LAB ENCOUNTER (OUTPATIENT)
Dept: LAB | Age: 68
End: 2018-01-09
Attending: INTERNAL MEDICINE
Payer: COMMERCIAL

## 2018-01-09 ENCOUNTER — PRIOR ORIGINAL RECORDS (OUTPATIENT)
Dept: OTHER | Age: 68
End: 2018-01-09

## 2018-01-09 ENCOUNTER — OFFICE VISIT (OUTPATIENT)
Dept: INTERNAL MEDICINE CLINIC | Facility: CLINIC | Age: 68
End: 2018-01-09

## 2018-01-09 VITALS
TEMPERATURE: 99 F | WEIGHT: 190 LBS | DIASTOLIC BLOOD PRESSURE: 66 MMHG | SYSTOLIC BLOOD PRESSURE: 116 MMHG | HEIGHT: 64.5 IN | BODY MASS INDEX: 32.04 KG/M2 | HEART RATE: 61 BPM | OXYGEN SATURATION: 98 %

## 2018-01-09 DIAGNOSIS — I10 ESSENTIAL HYPERTENSION: ICD-10-CM

## 2018-01-09 DIAGNOSIS — M10.00 ACUTE IDIOPATHIC GOUT, UNSPECIFIED SITE: ICD-10-CM

## 2018-01-09 DIAGNOSIS — N63.10 BREAST MASS, RIGHT: ICD-10-CM

## 2018-01-09 DIAGNOSIS — E78.5 HYPERLIPIDEMIA, UNSPECIFIED HYPERLIPIDEMIA TYPE: ICD-10-CM

## 2018-01-09 DIAGNOSIS — I48.0 PAROXYSMAL ATRIAL FIBRILLATION (HCC): ICD-10-CM

## 2018-01-09 DIAGNOSIS — Z00.00 MEDICARE ANNUAL WELLNESS VISIT, INITIAL: Primary | ICD-10-CM

## 2018-01-09 DIAGNOSIS — R41.3 MEMORY LOSS: ICD-10-CM

## 2018-01-09 DIAGNOSIS — Z87.898 HISTORY OF SEIZURE: ICD-10-CM

## 2018-01-09 PROBLEM — Z90.79 HISTORY OF TOTAL HYSTERECTOMY WITH REMOVAL OF BOTH TUBES AND OVARIES: Status: RESOLVED | Noted: 2018-01-09 | Resolved: 2018-01-09

## 2018-01-09 PROBLEM — F48.2 PSEUDOBULBAR AFFECT: Status: RESOLVED | Noted: 2017-02-24 | Resolved: 2018-01-09

## 2018-01-09 PROBLEM — Z90.710 HISTORY OF TOTAL HYSTERECTOMY WITH REMOVAL OF BOTH TUBES AND OVARIES: Status: RESOLVED | Noted: 2018-01-09 | Resolved: 2018-01-09

## 2018-01-09 PROBLEM — R32 INCONTINENCE: Status: RESOLVED | Noted: 2018-01-09 | Resolved: 2018-01-09

## 2018-01-09 PROBLEM — E78.00 HIGH CHOLESTEROL: Status: RESOLVED | Noted: 2018-01-09 | Resolved: 2018-01-09

## 2018-01-09 PROBLEM — K57.90 DIVERTICULOSIS: Status: RESOLVED | Noted: 2018-01-09 | Resolved: 2018-01-09

## 2018-01-09 PROBLEM — G47.30 UNSPECIFIED SLEEP APNEA: Status: RESOLVED | Noted: 2018-01-09 | Resolved: 2018-01-09

## 2018-01-09 PROBLEM — Z90.722 HISTORY OF TOTAL HYSTERECTOMY WITH REMOVAL OF BOTH TUBES AND OVARIES: Status: RESOLVED | Noted: 2018-01-09 | Resolved: 2018-01-09

## 2018-01-09 PROBLEM — Z86.69 HISTORY OF MIGRAINE HEADACHES: Status: RESOLVED | Noted: 2018-01-09 | Resolved: 2018-01-09

## 2018-01-09 PROBLEM — G25.81 RESTLESS LEG SYNDROME: Status: RESOLVED | Noted: 2018-01-09 | Resolved: 2018-01-09

## 2018-01-09 LAB
ALBUMIN SERPL BCP-MCNC: 3.7 G/DL (ref 3.5–4.8)
ALBUMIN/GLOB SERPL: 1.3 {RATIO} (ref 1–2)
ALP SERPL-CCNC: 55 U/L (ref 32–100)
ALT SERPL-CCNC: 56 U/L (ref 14–54)
ANION GAP SERPL CALC-SCNC: 6 MMOL/L (ref 0–18)
AST SERPL-CCNC: 44 U/L (ref 15–41)
BASOPHILS # BLD: 0 K/UL (ref 0–0.2)
BASOPHILS NFR BLD: 1 %
BILIRUB SERPL-MCNC: 0.6 MG/DL (ref 0.3–1.2)
BUN SERPL-MCNC: 15 MG/DL (ref 8–20)
BUN/CREAT SERPL: 12.9 (ref 10–20)
CALCIUM SERPL-MCNC: 9.2 MG/DL (ref 8.5–10.5)
CHLORIDE SERPL-SCNC: 112 MMOL/L (ref 95–110)
CHOLEST SERPL-MCNC: 128 MG/DL (ref 110–200)
CO2 SERPL-SCNC: 26 MMOL/L (ref 22–32)
CREAT SERPL-MCNC: 1.16 MG/DL (ref 0.5–1.5)
EOSINOPHIL # BLD: 0.2 K/UL (ref 0–0.7)
EOSINOPHIL NFR BLD: 4 %
ERYTHROCYTE [DISTWIDTH] IN BLOOD BY AUTOMATED COUNT: 12.9 % (ref 11–15)
GLOBULIN PLAS-MCNC: 2.9 G/DL (ref 2.5–3.7)
GLUCOSE SERPL-MCNC: 92 MG/DL (ref 70–99)
HCT VFR BLD AUTO: 39.2 % (ref 35–48)
HDLC SERPL-MCNC: 32 MG/DL
HGB BLD-MCNC: 13.2 G/DL (ref 12–16)
LDLC SERPL CALC-MCNC: 61 MG/DL (ref 0–99)
LYMPHOCYTES # BLD: 1.4 K/UL (ref 1–4)
LYMPHOCYTES NFR BLD: 30 %
MCH RBC QN AUTO: 29.7 PG (ref 27–32)
MCHC RBC AUTO-ENTMCNC: 33.8 G/DL (ref 32–37)
MCV RBC AUTO: 87.8 FL (ref 80–100)
MONOCYTES # BLD: 0.4 K/UL (ref 0–1)
MONOCYTES NFR BLD: 9 %
NEUTROPHILS # BLD AUTO: 2.5 K/UL (ref 1.8–7.7)
NEUTROPHILS NFR BLD: 56 %
NONHDLC SERPL-MCNC: 96 MG/DL
OSMOLALITY UR CALC.SUM OF ELEC: 298 MOSM/KG (ref 275–295)
PLATELET # BLD AUTO: 149 K/UL (ref 140–400)
PMV BLD AUTO: 11.7 FL (ref 7.4–10.3)
POTASSIUM SERPL-SCNC: 4.1 MMOL/L (ref 3.3–5.1)
PROT SERPL-MCNC: 6.6 G/DL (ref 5.9–8.4)
RBC # BLD AUTO: 4.46 M/UL (ref 3.7–5.4)
SODIUM SERPL-SCNC: 144 MMOL/L (ref 136–144)
TRIGL SERPL-MCNC: 176 MG/DL (ref 1–149)
URATE SERPL-MCNC: 6 MG/DL (ref 2.1–7.4)
WBC # BLD AUTO: 4.6 K/UL (ref 4–11)

## 2018-01-09 PROCEDURE — 84550 ASSAY OF BLOOD/URIC ACID: CPT

## 2018-01-09 PROCEDURE — 80061 LIPID PANEL: CPT

## 2018-01-09 PROCEDURE — 85025 COMPLETE CBC W/AUTO DIFF WBC: CPT

## 2018-01-09 PROCEDURE — G0438 PPPS, INITIAL VISIT: HCPCS | Performed by: INTERNAL MEDICINE

## 2018-01-09 PROCEDURE — 80053 COMPREHEN METABOLIC PANEL: CPT

## 2018-01-09 PROCEDURE — 36415 COLL VENOUS BLD VENIPUNCTURE: CPT

## 2018-01-09 PROCEDURE — 99214 OFFICE O/P EST MOD 30 MIN: CPT | Performed by: INTERNAL MEDICINE

## 2018-01-09 NOTE — PROGRESS NOTES
HPI:   Anne Tucker is a 75year old female presents with the following problems. Patient here for Medicare annual wellness visit. She feels well. She has no complaints. I did notice that she is not on her seizure medication.   Patient states s and Alzheimer's disease. All these problems are noted and stable. They will be followed in the future with future office visits.     Wt Readings from Last 3 Encounters:  01/09/18 : 190 lb (86.2 kg)  10/24/17 : 193 lb (87.5 kg)  10/11/17 : 193 lb (87.5 kg) headaches     as per NG   • History of total hysterectomy with removal of both tubes and ovaries     as per NG   • Incontinence     DUE TO CYSTOCELE/RECTOCELE   • Lipid screening 11-    as per NG   • Lymphocytic colitis 2011    as per NG   • Mild co NG   • Migraines Mother      as per NG   • Cancer Sister      Lung Cancer, Kidney Cancer; as per NG   • Hypertension Brother      as per NG   • Cancer Brother      Throat Cancer; as per NG   • Heart Disease Brother      CAD; as per NG   • Cancer Sister OF SYSTEMS:   GENERAL:  feels well. No acute distress.    EYES ;  denies blurred vision or eye pain  HEENT:  denies nasal congestion, sinus pain or sore throat  LUNGS:  denies shortness of breath or cough  CARDIOVASCULAR:  denies chest pain or palpitations aspirin?: Yes    Have you had any immunizations at another office such as Influenza, Hepatitis B, Tetanus, or Pneumococcal?: Yes     Functional Ability     Bathing or Showering: Able without help    Toileting: Able without help    Dressing: Able without he Acuity: Corrected   Right Eye Chart Acuity: 20/20 Left Eye Chart Acuity: 20/20     Cognitive Assessment     What day of the week is this?: Correct    What month is it?: Correct    What year is it?: Correct    Recall \"Ball\": Correct    Recall \"Flag\": Co for: CHLAMYDIA No flowsheet data found. Screening Mammogram      Mammogram  Annually Mammogram,1 Yr due on 01/25/2018 Update Health Maintenance if applicable   Immunizations      Influenza No orders found for this or any previous visit.  Update Immunizat visit, initial  Patient here for Medicare annual wellness visit    2. Acute idiopathic gout, unspecified site  Controlled on colchicine. Asymptomatic. Check uric acid. - URIC ACID, SERUM; Future    3.  Essential hypertension  Blood pressure under good co MD  1/9/2018  9:34 AM

## 2018-01-12 PROBLEM — M10.9 ARTHRITIS, GOUTY: Status: ACTIVE | Noted: 2018-01-12

## 2018-01-16 ENCOUNTER — TELEPHONE (OUTPATIENT)
Dept: INTERNAL MEDICINE CLINIC | Facility: CLINIC | Age: 68
End: 2018-01-16

## 2018-01-16 DIAGNOSIS — R79.89 ELEVATED LIVER FUNCTION TESTS: Primary | ICD-10-CM

## 2018-01-17 NOTE — TELEPHONE ENCOUNTER
Called patient and relayed DR. FLEMING message , she verbalized understanding an will tessy on her calendar to go for blood work in 3 weeks

## 2018-01-17 NOTE — TELEPHONE ENCOUNTER
Please notify patient her cholesterol is good. Triglycerides just minimally elevated. This can be helped with mild weight loss of 5 or 10 pounds over the next 6 months. 2 of her liver enzymes were just minimally elevated.   For now I do not think anythin

## 2018-02-06 ENCOUNTER — HOSPITAL ENCOUNTER (OUTPATIENT)
Dept: MAMMOGRAPHY | Facility: HOSPITAL | Age: 68
Discharge: HOME | End: 2018-02-06
Attending: INTERNAL MEDICINE
Payer: MEDICARE

## 2018-02-06 DIAGNOSIS — N63.10 BREAST MASS, RIGHT: ICD-10-CM

## 2018-02-06 PROCEDURE — 77066 DX MAMMO INCL CAD BI: CPT | Performed by: INTERNAL MEDICINE

## 2018-02-08 RX ORDER — ATORVASTATIN CALCIUM 20 MG/1
20 TABLET, FILM COATED ORAL NIGHTLY
Qty: 90 TABLET | Refills: 3 | Status: CANCELLED | OUTPATIENT
Start: 2018-02-08

## 2018-02-08 NOTE — TELEPHONE ENCOUNTER
Please advise on refills - there is high interaction with Atorvastatin - colchicine, also patient states she she is Nadolol 10 mg daily not 20 - to DR. FLEMING

## 2018-02-08 NOTE — TELEPHONE ENCOUNTER
Pt requesting NEW RX for:  Nadolol  Atorvastatin    Pt will use [facility] for these medications  Pt is low on medication  Tasked to RX

## 2018-02-13 NOTE — TELEPHONE ENCOUNTER
Spoke to DR.K anderson- in light of pt currently taking colchicine, will hold off on atorvastatin and discussed TLC with pt at length;   Pt seems eager to trial  She will get repeat LFTs this month

## 2018-02-23 NOTE — TELEPHONE ENCOUNTER
Called patient to clarify. She is taking 1/2 of a 20 mg nadolol pill daily. RX pended below.   To Dr Israel Gongora to review---it is unclear if that dose has been authorized from this office before

## 2018-02-26 PROBLEM — R79.89 ELEVATED LFTS: Status: ACTIVE | Noted: 2018-02-26

## 2018-02-26 RX ORDER — NADOLOL 20 MG/1
10 TABLET ORAL DAILY
Qty: 45 TABLET | Refills: 3 | Status: ON HOLD | OUTPATIENT
Start: 2018-02-26 | End: 2018-03-15

## 2018-02-26 NOTE — TELEPHONE ENCOUNTER
Yes.  This is been a long-term medication. Okay to refill. She takes one half of a 20 mg tablet. Refills as necessary. Thank you.

## 2018-02-28 ENCOUNTER — TELEPHONE (OUTPATIENT)
Dept: INTERNAL MEDICINE CLINIC | Facility: CLINIC | Age: 68
End: 2018-02-28

## 2018-02-28 ENCOUNTER — LAB ENCOUNTER (OUTPATIENT)
Dept: LAB | Age: 68
End: 2018-02-28
Attending: INTERNAL MEDICINE
Payer: MEDICARE

## 2018-02-28 DIAGNOSIS — R53.1 WEAKNESS: ICD-10-CM

## 2018-02-28 DIAGNOSIS — M10.9 GOUTY ARTHRITIS: ICD-10-CM

## 2018-02-28 DIAGNOSIS — Z79.899 ENCOUNTER FOR LONG-TERM (CURRENT) USE OF HIGH-RISK MEDICATION: ICD-10-CM

## 2018-02-28 DIAGNOSIS — M51.17 INTERVERTEBRAL DISC DISORDER WITH RADICULOPATHY OF LUMBOSACRAL REGION: Primary | ICD-10-CM

## 2018-02-28 DIAGNOSIS — R79.89 ELEVATED LFTS: ICD-10-CM

## 2018-02-28 DIAGNOSIS — I10 ESSENTIAL HYPERTENSION, MALIGNANT: ICD-10-CM

## 2018-02-28 DIAGNOSIS — I48.91 A-FIB (HCC): ICD-10-CM

## 2018-02-28 DIAGNOSIS — R79.89 ELEVATED LIVER FUNCTION TESTS: ICD-10-CM

## 2018-02-28 DIAGNOSIS — Z79.899 ENCOUNTER FOR LONG-TERM (CURRENT) DRUG USE: ICD-10-CM

## 2018-02-28 LAB
ALBUMIN SERPL BCP-MCNC: 3.9 G/DL (ref 3.5–4.8)
ALBUMIN/GLOB SERPL: 1.4 {RATIO} (ref 1–2)
ALP SERPL-CCNC: 64 U/L (ref 32–100)
ALT SERPL-CCNC: 61 U/L (ref 14–54)
ANION GAP SERPL CALC-SCNC: 7 MMOL/L (ref 0–18)
APTT PPP: 32.5 SECONDS (ref 23.2–35.3)
AST SERPL-CCNC: 41 U/L (ref 15–41)
BASOPHILS # BLD: 0.1 K/UL (ref 0–0.2)
BASOPHILS NFR BLD: 2 %
BILIRUB DIRECT SERPL-MCNC: 0.1 MG/DL (ref 0–0.2)
BILIRUB SERPL-MCNC: 0.6 MG/DL (ref 0.3–1.2)
BILIRUB UR QL: NEGATIVE
BUN SERPL-MCNC: 9 MG/DL (ref 8–20)
BUN/CREAT SERPL: 8.7 (ref 10–20)
CALCIUM SERPL-MCNC: 9.3 MG/DL (ref 8.5–10.5)
CHLORIDE SERPL-SCNC: 110 MMOL/L (ref 95–110)
CO2 SERPL-SCNC: 25 MMOL/L (ref 22–32)
COLOR UR: YELLOW
CREAT SERPL-MCNC: 1.03 MG/DL (ref 0.5–1.5)
EOSINOPHIL # BLD: 0.1 K/UL (ref 0–0.7)
EOSINOPHIL NFR BLD: 3 %
ERYTHROCYTE [DISTWIDTH] IN BLOOD BY AUTOMATED COUNT: 14 % (ref 11–15)
GLOBULIN PLAS-MCNC: 2.8 G/DL (ref 2.5–3.7)
GLUCOSE SERPL-MCNC: 98 MG/DL (ref 70–99)
GLUCOSE UR-MCNC: NEGATIVE MG/DL
HCT VFR BLD AUTO: 39.7 % (ref 35–48)
HGB BLD-MCNC: 13.4 G/DL (ref 12–16)
INR BLD: 1.2 (ref 0.9–1.2)
KETONES UR-MCNC: NEGATIVE MG/DL
LYMPHOCYTES # BLD: 1.3 K/UL (ref 1–4)
LYMPHOCYTES NFR BLD: 28 %
MCH RBC QN AUTO: 29.2 PG (ref 27–32)
MCHC RBC AUTO-ENTMCNC: 33.8 G/DL (ref 32–37)
MCV RBC AUTO: 86.5 FL (ref 80–100)
MONOCYTES # BLD: 0.3 K/UL (ref 0–1)
MONOCYTES NFR BLD: 8 %
NEUTROPHILS # BLD AUTO: 2.7 K/UL (ref 1.8–7.7)
NEUTROPHILS NFR BLD: 59 %
NITRITE UR QL STRIP.AUTO: NEGATIVE
OSMOLALITY UR CALC.SUM OF ELEC: 293 MOSM/KG (ref 275–295)
PATIENT FASTING: YES
PH UR: 5 [PH] (ref 5–8)
PLATELET # BLD AUTO: 150 K/UL (ref 140–400)
PMV BLD AUTO: 10.4 FL (ref 7.4–10.3)
POTASSIUM SERPL-SCNC: 3.6 MMOL/L (ref 3.3–5.1)
PROT SERPL-MCNC: 6.7 G/DL (ref 5.9–8.4)
PROT UR-MCNC: NEGATIVE MG/DL
PROTHROMBIN TIME: 14.7 SECONDS (ref 11.8–14.5)
RBC # BLD AUTO: 4.59 M/UL (ref 3.7–5.4)
RBC #/AREA URNS AUTO: 5 /HPF
SODIUM SERPL-SCNC: 142 MMOL/L (ref 136–144)
SP GR UR STRIP: 1.02 (ref 1–1.03)
URATE SERPL-MCNC: 6.3 MG/DL (ref 2.1–7.4)
UROBILINOGEN UR STRIP-ACNC: <2
VIT C UR-MCNC: NEGATIVE MG/DL
WBC # BLD AUTO: 4.5 K/UL (ref 4–11)
WBC #/AREA URNS AUTO: 125 /HPF

## 2018-02-28 PROCEDURE — 80053 COMPREHEN METABOLIC PANEL: CPT

## 2018-02-28 PROCEDURE — 82248 BILIRUBIN DIRECT: CPT

## 2018-02-28 PROCEDURE — 85730 THROMBOPLASTIN TIME PARTIAL: CPT

## 2018-02-28 PROCEDURE — 85025 COMPLETE CBC W/AUTO DIFF WBC: CPT

## 2018-02-28 PROCEDURE — 85610 PROTHROMBIN TIME: CPT

## 2018-02-28 PROCEDURE — 84550 ASSAY OF BLOOD/URIC ACID: CPT

## 2018-02-28 PROCEDURE — 36415 COLL VENOUS BLD VENIPUNCTURE: CPT

## 2018-02-28 PROCEDURE — 81001 URINALYSIS AUTO W/SCOPE: CPT

## 2018-02-28 NOTE — PROGRESS NOTES
Rollert message sent to patient. May continue with current dose of allopurinol. One liver enzyme was noted to be mildly elevated.

## 2018-02-28 NOTE — TELEPHONE ENCOUNTER
Pt needs medical clearance for a disc discography with disc decompression. Pt dropped off a list of tests she needs. Does she need to see ? Pt is aware that  is not here today. She doesn't have the procedure scheduled yet. Placed on 's desk.      To Nursing

## 2018-03-01 ENCOUNTER — TELEPHONE (OUTPATIENT)
Dept: INTERNAL MEDICINE CLINIC | Facility: CLINIC | Age: 68
End: 2018-03-01

## 2018-03-01 DIAGNOSIS — R82.90 ABNORMAL URINALYSIS: Primary | ICD-10-CM

## 2018-03-01 NOTE — TELEPHONE ENCOUNTER
Patient called back and relayed MDs message-verbalized understanding. She is aware that Dr. Isabella Stern is going to wait until her hears from Dr. Karlene Cunningham and then will decide from there what test are needed. Patient states she has spinal stenosis and a herniated disc (believes it's in the lower spine) She's had back pain problems for 30-40 years but within the last year was founf to have the 2 conditions mentioned.      abdoulaye Leyv

## 2018-03-01 NOTE — TELEPHONE ENCOUNTER
Message noted. Please tell patient that I have sent a letter to [facility] to call me with the particulars about the proposed spinal procedure. In addition I advised him to call Dr. Shaun Thomas patient's cardiologist in regards to the Xarelto issue is probably Xarelto will need to be held for sometime before and after the procedure. The advice regarding Xarelto needs to be managed by patient's cardiologist and [address]. For now I will not place any orders until I hear from [facility]. Please ask patient if she is having back problems. How long she has been having the back problems. Thank you.

## 2018-03-02 NOTE — TELEPHONE ENCOUNTER
Called patient and relayed DR. FLEMING message - verbalized understanding.  Patient does not have any UTI symptoms

## 2018-03-02 NOTE — TELEPHONE ENCOUNTER
Please notify patient that the urinalysis that the rheumatologist did show some white cells and red cells. I have left an order for urine culture to make sure that she does not have any infection. Please ask her if she has any symptoms.

## 2018-03-04 ENCOUNTER — APPOINTMENT (OUTPATIENT)
Dept: LAB | Facility: HOSPITAL | Age: 68
End: 2018-03-04
Attending: INTERNAL MEDICINE
Payer: COMMERCIAL

## 2018-03-04 DIAGNOSIS — R82.90 ABNORMAL URINALYSIS: ICD-10-CM

## 2018-03-04 PROCEDURE — 87086 URINE CULTURE/COLONY COUNT: CPT

## 2018-03-04 PROCEDURE — 87077 CULTURE AEROBIC IDENTIFY: CPT

## 2018-03-04 PROCEDURE — 87186 SC STD MICRODIL/AGAR DIL: CPT

## 2018-03-06 ENCOUNTER — TELEPHONE (OUTPATIENT)
Dept: INTERNAL MEDICINE CLINIC | Facility: CLINIC | Age: 68
End: 2018-03-06

## 2018-03-06 DIAGNOSIS — R82.90 ABNORMAL URINALYSIS: Primary | ICD-10-CM

## 2018-03-06 RX ORDER — CIPROFLOXACIN 250 MG/1
250 TABLET, FILM COATED ORAL 2 TIMES DAILY
Qty: 6 TABLET | Refills: 0 | Status: SHIPPED | OUTPATIENT
Start: 2018-03-06 | End: 2018-03-07

## 2018-03-06 NOTE — TELEPHONE ENCOUNTER
Please call patient and let her know that her urine culture did show a bacteria called Klebsiella. I had like her to be on Cipro 250 mg.  Quantity #6.  1 p.o. twice daily ×3 days. Please ask if she is having any UTI symptoms.   I placed an order for urine

## 2018-03-07 ENCOUNTER — OFFICE VISIT (OUTPATIENT)
Dept: INTERNAL MEDICINE CLINIC | Facility: CLINIC | Age: 68
End: 2018-03-07

## 2018-03-07 VITALS
TEMPERATURE: 98 F | DIASTOLIC BLOOD PRESSURE: 80 MMHG | SYSTOLIC BLOOD PRESSURE: 150 MMHG | WEIGHT: 178 LBS | HEART RATE: 60 BPM | HEIGHT: 65 IN | BODY MASS INDEX: 29.66 KG/M2

## 2018-03-07 DIAGNOSIS — R73.9 HYPERGLYCEMIA: ICD-10-CM

## 2018-03-07 DIAGNOSIS — R11.2 NAUSEA AND VOMITING, INTRACTABILITY OF VOMITING NOT SPECIFIED, UNSPECIFIED VOMITING TYPE: ICD-10-CM

## 2018-03-07 DIAGNOSIS — R63.4 WEIGHT LOSS: Primary | ICD-10-CM

## 2018-03-07 DIAGNOSIS — R41.3 MEMORY LOSS: ICD-10-CM

## 2018-03-07 DIAGNOSIS — G89.29 CHRONIC BACK PAIN, UNSPECIFIED BACK LOCATION, UNSPECIFIED BACK PAIN LATERALITY: ICD-10-CM

## 2018-03-07 DIAGNOSIS — I48.0 PAROXYSMAL ATRIAL FIBRILLATION (HCC): ICD-10-CM

## 2018-03-07 DIAGNOSIS — E78.5 HYPERLIPIDEMIA, UNSPECIFIED HYPERLIPIDEMIA TYPE: ICD-10-CM

## 2018-03-07 DIAGNOSIS — I10 ESSENTIAL HYPERTENSION: ICD-10-CM

## 2018-03-07 DIAGNOSIS — M54.9 CHRONIC BACK PAIN, UNSPECIFIED BACK LOCATION, UNSPECIFIED BACK PAIN LATERALITY: ICD-10-CM

## 2018-03-07 PROCEDURE — G0463 HOSPITAL OUTPT CLINIC VISIT: HCPCS | Performed by: INTERNAL MEDICINE

## 2018-03-07 PROCEDURE — 99214 OFFICE O/P EST MOD 30 MIN: CPT | Performed by: INTERNAL MEDICINE

## 2018-03-07 RX ORDER — LIDOCAINE 50 MG/G
OINTMENT TOPICAL
Refills: 1 | COMMUNITY
Start: 2018-03-05 | End: 2018-05-17

## 2018-03-07 NOTE — TELEPHONE ENCOUNTER
Pt. Has been very nauseous this morning it was bright yellow she hasn't eaten anything this morning. Pt. Is shivering cold but doesn't have a fever   Please advise ph.  # [phone]   Routed high to clinical

## 2018-03-07 NOTE — PROGRESS NOTES
Kelsi Lundberg is a 75year old female. HPI:   Patient presents with:  Nausea: She has felt like she wants to throw up every day for about 2 weeks. She vomited today for the first time. She denies pain, fever.  She also is being treated for a UTI found Pseudobulbar affect 2/24/2017   • Restless leg syndrome     as per NG   • Restless legs syndrome 5/16/2013   • Seizure disorder [facility])     as per NG   • Tendon tear 2012    Surgically repaired; as per NG   • Unspecified essential hypertension     as per NG as per NG   • Cancer Maternal Grandmother      Colon      Social History: Smoking status: Former Smoker                                                              Packs/day: 1.00      Years: 40.00        Types: Cigarettes     Quit date: 1/1/1998  [employer] hematochezia  All other review of systems are negative. PHYSICAL EXAM:   Blood pressure 150/80, pulse 60, temperature 98.4 °F (36.9 °C), temperature source Oral, height 5' 5\" (1.651 m), weight 178 lb (80.7 kg), not currently breastfeeding.   Constit requested or ordered in this encounter       Imaging & Referrals:  CT ABDOMEN+PELVIS(CONTRAST ONLY)(CPT=74177)  XR CHEST PA + LAT CHEST (CPT=71046)     3/7/2018  Queen Junior MD

## 2018-03-13 ENCOUNTER — PRIOR ORIGINAL RECORDS (OUTPATIENT)
Dept: OTHER | Age: 68
End: 2018-03-13

## 2018-03-14 ENCOUNTER — TELEPHONE (OUTPATIENT)
Dept: INTERNAL MEDICINE CLINIC | Facility: CLINIC | Age: 68
End: 2018-03-14

## 2018-03-14 ENCOUNTER — APPOINTMENT (OUTPATIENT)
Dept: LAB | Facility: HOSPITAL | Age: 68
DRG: 392 | End: 2018-03-14
Attending: INTERNAL MEDICINE
Payer: MEDICARE

## 2018-03-14 ENCOUNTER — HOSPITAL ENCOUNTER (OUTPATIENT)
Dept: GENERAL RADIOLOGY | Facility: HOSPITAL | Age: 68
Discharge: HOME | DRG: 392 | End: 2018-03-14
Attending: INTERNAL MEDICINE
Payer: MEDICARE

## 2018-03-14 ENCOUNTER — HOSPITAL ENCOUNTER (OUTPATIENT)
Dept: CT IMAGING | Facility: HOSPITAL | Age: 68
Discharge: HOME | DRG: 392 | End: 2018-03-14
Attending: INTERNAL MEDICINE
Payer: MEDICARE

## 2018-03-14 DIAGNOSIS — R63.4 WEIGHT LOSS: ICD-10-CM

## 2018-03-14 DIAGNOSIS — I10 ESSENTIAL HYPERTENSION: ICD-10-CM

## 2018-03-14 DIAGNOSIS — R11.2 NAUSEA AND VOMITING, INTRACTABILITY OF VOMITING NOT SPECIFIED, UNSPECIFIED VOMITING TYPE: ICD-10-CM

## 2018-03-14 DIAGNOSIS — M51.17 INTERVERTEBRAL DISC DISORDER WITH RADICULOPATHY OF LUMBOSACRAL REGION: ICD-10-CM

## 2018-03-14 DIAGNOSIS — Z01.818 PRE-OP TESTING: ICD-10-CM

## 2018-03-14 DIAGNOSIS — R73.9 HYPERGLYCEMIA: ICD-10-CM

## 2018-03-14 DIAGNOSIS — R82.90 ABNORMAL URINALYSIS: ICD-10-CM

## 2018-03-14 DIAGNOSIS — E87.6 HYPOKALEMIA: Primary | ICD-10-CM

## 2018-03-14 DIAGNOSIS — R53.1 WEAKNESS: Primary | ICD-10-CM

## 2018-03-14 LAB
ALBUMIN SERPL BCP-MCNC: 3.9 G/DL (ref 3.5–4.8)
ALBUMIN/GLOB SERPL: 1.4 {RATIO} (ref 1–2)
ALP SERPL-CCNC: 59 U/L (ref 32–100)
ALT SERPL-CCNC: 68 U/L (ref 14–54)
ANION GAP SERPL CALC-SCNC: 9 MMOL/L (ref 0–18)
APTT PPP: 42.9 SECONDS (ref 23.2–35.3)
AST SERPL-CCNC: 50 U/L (ref 15–41)
BACTERIA UR QL AUTO: NEGATIVE /HPF
BASOPHILS # BLD: 0 K/UL (ref 0–0.2)
BASOPHILS NFR BLD: 1 %
BILIRUB SERPL-MCNC: 0.9 MG/DL (ref 0.3–1.2)
BILIRUB UR QL: NEGATIVE
BUN SERPL-MCNC: 10 MG/DL (ref 8–20)
BUN/CREAT SERPL: 7.9 (ref 10–20)
CALCIUM SERPL-MCNC: 9 MG/DL (ref 8.5–10.5)
CHLORIDE SERPL-SCNC: 108 MMOL/L (ref 95–110)
CLARITY UR: CLEAR
CO2 SERPL-SCNC: 23 MMOL/L (ref 22–32)
COLOR UR: YELLOW
CREAT SERPL-MCNC: 1.27 MG/DL (ref 0.5–1.5)
EOSINOPHIL # BLD: 0.2 K/UL (ref 0–0.7)
EOSINOPHIL NFR BLD: 3 %
ERYTHROCYTE [DISTWIDTH] IN BLOOD BY AUTOMATED COUNT: 14.6 % (ref 11–15)
GLOBULIN PLAS-MCNC: 2.8 G/DL (ref 2.5–3.7)
GLUCOSE SERPL-MCNC: 102 MG/DL (ref 70–99)
GLUCOSE UR-MCNC: NEGATIVE MG/DL
HBA1C MFR BLD: 5.4 % (ref 4–6)
HCT VFR BLD AUTO: 40.5 % (ref 35–48)
HGB BLD-MCNC: 13.9 G/DL (ref 12–16)
HGB UR QL STRIP.AUTO: NEGATIVE
INR BLD: 2.5 (ref 0.9–1.2)
KETONES UR-MCNC: NEGATIVE MG/DL
LYMPHOCYTES # BLD: 1.8 K/UL (ref 1–4)
LYMPHOCYTES NFR BLD: 34 %
MCH RBC QN AUTO: 29.8 PG (ref 27–32)
MCHC RBC AUTO-ENTMCNC: 34.2 G/DL (ref 32–37)
MCV RBC AUTO: 87 FL (ref 80–100)
MONOCYTES # BLD: 0.4 K/UL (ref 0–1)
MONOCYTES NFR BLD: 8 %
NEUTROPHILS # BLD AUTO: 2.9 K/UL (ref 1.8–7.7)
NEUTROPHILS NFR BLD: 55 %
NITRITE UR QL STRIP.AUTO: NEGATIVE
OSMOLALITY UR CALC.SUM OF ELEC: 289 MOSM/KG (ref 275–295)
PATIENT FASTING: YES
PH UR: 5 [PH] (ref 5–8)
PLATELET # BLD AUTO: 166 K/UL (ref 140–400)
PMV BLD AUTO: 10.9 FL (ref 7.4–10.3)
POTASSIUM SERPL-SCNC: 3 MMOL/L (ref 3.3–5.1)
PROT SERPL-MCNC: 6.7 G/DL (ref 5.9–8.4)
PROT UR-MCNC: NEGATIVE MG/DL
PROTHROMBIN TIME: 26.7 SECONDS (ref 11.8–14.5)
RBC # BLD AUTO: 4.66 M/UL (ref 3.7–5.4)
RBC #/AREA URNS AUTO: <1 /HPF
SODIUM SERPL-SCNC: 140 MMOL/L (ref 136–144)
SP GR UR STRIP: 1.02 (ref 1–1.03)
TSH SERPL-ACNC: 1.51 UIU/ML (ref 0.45–5.33)
UROBILINOGEN UR STRIP-ACNC: <2
VIT C UR-MCNC: NEGATIVE MG/DL
WBC # BLD AUTO: 5.3 K/UL (ref 4–11)
WBC #/AREA URNS AUTO: 3 /HPF

## 2018-03-14 PROCEDURE — 81001 URINALYSIS AUTO W/SCOPE: CPT

## 2018-03-14 PROCEDURE — 87086 URINE CULTURE/COLONY COUNT: CPT

## 2018-03-14 PROCEDURE — 85610 PROTHROMBIN TIME: CPT

## 2018-03-14 PROCEDURE — 93005 ELECTROCARDIOGRAM TRACING: CPT

## 2018-03-14 PROCEDURE — 83036 HEMOGLOBIN GLYCOSYLATED A1C: CPT

## 2018-03-14 PROCEDURE — 84443 ASSAY THYROID STIM HORMONE: CPT

## 2018-03-14 PROCEDURE — 93010 ELECTROCARDIOGRAM REPORT: CPT | Performed by: INTERNAL MEDICINE

## 2018-03-14 PROCEDURE — 36415 COLL VENOUS BLD VENIPUNCTURE: CPT

## 2018-03-14 PROCEDURE — 74177 CT ABD & PELVIS W/CONTRAST: CPT | Performed by: INTERNAL MEDICINE

## 2018-03-14 PROCEDURE — 85730 THROMBOPLASTIN TIME PARTIAL: CPT

## 2018-03-14 PROCEDURE — 71046 X-RAY EXAM CHEST 2 VIEWS: CPT | Performed by: INTERNAL MEDICINE

## 2018-03-14 PROCEDURE — 85025 COMPLETE CBC W/AUTO DIFF WBC: CPT

## 2018-03-14 PROCEDURE — 80053 COMPREHEN METABOLIC PANEL: CPT

## 2018-03-14 NOTE — TELEPHONE ENCOUNTER
Routed to Dr. Nicolasa Freeman (on call)- can you please review patient's labs from today? K+ 3.0. Okay to wait? Please advise. Thanks!

## 2018-03-14 NOTE — TELEPHONE ENCOUNTER
Tell her K+ low at 3.0-  Should be 3.6 - give her KCL 20 meq daily for 10 days and repeat serum K+. Is she on diuretics not on med sheet, vomiting or diarrhea? ?

## 2018-03-15 ENCOUNTER — HOSPITAL ENCOUNTER (INPATIENT)
Facility: HOSPITAL | Age: 68
LOS: 3 days | Discharge: HOME | DRG: 392 | End: 2018-03-18
Attending: EMERGENCY MEDICINE | Admitting: HOSPITALIST
Payer: MEDICARE

## 2018-03-15 DIAGNOSIS — E87.6 HYPOKALEMIA: Primary | ICD-10-CM

## 2018-03-15 DIAGNOSIS — R11.2 NAUSEA VOMITING AND DIARRHEA: ICD-10-CM

## 2018-03-15 DIAGNOSIS — R19.7 NAUSEA VOMITING AND DIARRHEA: ICD-10-CM

## 2018-03-15 LAB
ALBUMIN SERPL BCP-MCNC: 4.2 G/DL (ref 3.5–4.8)
ALP SERPL-CCNC: 65 U/L (ref 32–100)
ALT SERPL-CCNC: 72 U/L (ref 14–54)
ANION GAP SERPL CALC-SCNC: 8 MMOL/L (ref 0–18)
AST SERPL-CCNC: 56 U/L (ref 15–41)
BASOPHILS # BLD: 0 K/UL (ref 0–0.2)
BASOPHILS NFR BLD: 1 %
BILIRUB DIRECT SERPL-MCNC: 0.2 MG/DL (ref 0–0.2)
BILIRUB SERPL-MCNC: 1 MG/DL (ref 0.3–1.2)
BILIRUB UR QL: NEGATIVE
BUN SERPL-MCNC: 8 MG/DL (ref 8–20)
BUN/CREAT SERPL: 6.3 (ref 10–20)
CALCIUM SERPL-MCNC: 9.4 MG/DL (ref 8.5–10.5)
CHLORIDE SERPL-SCNC: 106 MMOL/L (ref 95–110)
CLARITY UR: CLEAR
CO2 SERPL-SCNC: 26 MMOL/L (ref 22–32)
COLOR UR: YELLOW
CREAT SERPL-MCNC: 1.26 MG/DL (ref 0.5–1.5)
CRP SERPL-MCNC: <0.5 MG/DL (ref 0–0.9)
EOSINOPHIL # BLD: 0.1 K/UL (ref 0–0.7)
EOSINOPHIL NFR BLD: 3 %
ERYTHROCYTE [DISTWIDTH] IN BLOOD BY AUTOMATED COUNT: 14.7 % (ref 11–15)
FERRITIN SERPL IA-MCNC: 116 NG/ML (ref 11–307)
GLUCOSE SERPL-MCNC: 93 MG/DL (ref 70–99)
GLUCOSE UR-MCNC: NEGATIVE MG/DL
HCT VFR BLD AUTO: 43.2 % (ref 35–48)
HGB BLD-MCNC: 14.4 G/DL (ref 12–16)
HGB UR QL STRIP.AUTO: NEGATIVE
INR BLD: 1.1 (ref 0.9–1.2)
IRON SATN MFR SERPL: 17 % (ref 15–50)
IRON SERPL-MCNC: 46 MCG/DL (ref 28–170)
KETONES UR-MCNC: NEGATIVE MG/DL
LEUKOCYTE ESTERASE UR QL STRIP.AUTO: NEGATIVE
LIPASE SERPL-CCNC: 66 U/L (ref 22–51)
LYMPHOCYTES # BLD: 1.6 K/UL (ref 1–4)
LYMPHOCYTES NFR BLD: 33 %
MCH RBC QN AUTO: 29.2 PG (ref 27–32)
MCHC RBC AUTO-ENTMCNC: 33.5 G/DL (ref 32–37)
MCV RBC AUTO: 87.1 FL (ref 80–100)
MONOCYTES # BLD: 0.4 K/UL (ref 0–1)
MONOCYTES NFR BLD: 8 %
NEUTROPHILS # BLD AUTO: 2.7 K/UL (ref 1.8–7.7)
NEUTROPHILS NFR BLD: 56 %
NITRITE UR QL STRIP.AUTO: NEGATIVE
OSMOLALITY UR CALC.SUM OF ELEC: 288 MOSM/KG (ref 275–295)
PH UR: 6 [PH] (ref 5–8)
PLATELET # BLD AUTO: 168 K/UL (ref 140–400)
PMV BLD AUTO: 10.1 FL (ref 7.4–10.3)
POTASSIUM SERPL-SCNC: 2.7 MMOL/L (ref 3.3–5.1)
POTASSIUM SERPL-SCNC: 3.2 MMOL/L (ref 3.3–5.1)
PROT SERPL-MCNC: 7.2 G/DL (ref 5.9–8.4)
PROT UR-MCNC: NEGATIVE MG/DL
PROTHROMBIN TIME: 14.1 SECONDS (ref 11.8–14.5)
RBC # BLD AUTO: 4.95 M/UL (ref 3.7–5.4)
SODIUM SERPL-SCNC: 140 MMOL/L (ref 136–144)
SP GR UR STRIP: 1.02 (ref 1–1.03)
TIBC SERPL-MCNC: 269 MCG/DL (ref 228–428)
TRANSFERRIN SERPL-MCNC: 204 MG/DL (ref 192–382)
UROBILINOGEN UR STRIP-ACNC: <2
VIT C UR-MCNC: NEGATIVE MG/DL
WBC # BLD AUTO: 4.9 K/UL (ref 4–11)

## 2018-03-15 PROCEDURE — 99221 1ST HOSP IP/OBS SF/LOW 40: CPT | Performed by: HOSPITALIST

## 2018-03-15 RX ORDER — 0.9 % SODIUM CHLORIDE 0.9 %
VIAL (ML) INJECTION
Status: COMPLETED
Start: 2018-03-15 | End: 2018-03-15

## 2018-03-15 RX ORDER — ONDANSETRON 2 MG/ML
4 INJECTION INTRAMUSCULAR; INTRAVENOUS EVERY 6 HOURS PRN
Status: DISCONTINUED | OUTPATIENT
Start: 2018-03-15 | End: 2018-03-18

## 2018-03-15 RX ORDER — MELATONIN
325 2 TIMES DAILY WITH MEALS
Status: DISCONTINUED | OUTPATIENT
Start: 2018-03-15 | End: 2018-03-18

## 2018-03-15 RX ORDER — ROPINIROLE 1 MG/1
1 TABLET, FILM COATED ORAL DAILY
Status: DISCONTINUED | OUTPATIENT
Start: 2018-03-16 | End: 2018-03-18

## 2018-03-15 RX ORDER — SODIUM CHLORIDE 0.9 % (FLUSH) 0.9 %
3 SYRINGE (ML) INJECTION AS NEEDED
Status: DISCONTINUED | OUTPATIENT
Start: 2018-03-15 | End: 2018-03-18

## 2018-03-15 RX ORDER — DONEPEZIL HYDROCHLORIDE 5 MG/1
5 TABLET, FILM COATED ORAL NIGHTLY
Status: DISCONTINUED | OUTPATIENT
Start: 2018-03-15 | End: 2018-03-18

## 2018-03-15 RX ORDER — ACETAMINOPHEN AND CODEINE PHOSPHATE 300; 30 MG/1; MG/1
1 TABLET ORAL EVERY 4 HOURS PRN
Status: DISCONTINUED | OUTPATIENT
Start: 2018-03-15 | End: 2018-03-18

## 2018-03-15 RX ORDER — PANTOPRAZOLE SODIUM 40 MG/1
INJECTION, POWDER, FOR SOLUTION INTRAVENOUS
Status: DISPENSED
Start: 2018-03-15 | End: 2018-03-16

## 2018-03-15 RX ORDER — ROPINIROLE 1 MG/1
2 TABLET, FILM COATED ORAL NIGHTLY
Status: DISCONTINUED | OUTPATIENT
Start: 2018-03-15 | End: 2018-03-18

## 2018-03-15 RX ORDER — ALLOPURINOL 100 MG/1
200 TABLET ORAL DAILY
Status: DISCONTINUED | OUTPATIENT
Start: 2018-03-15 | End: 2018-03-18

## 2018-03-15 RX ORDER — ONDANSETRON 2 MG/ML
INJECTION INTRAMUSCULAR; INTRAVENOUS
Status: COMPLETED
Start: 2018-03-15 | End: 2018-03-15

## 2018-03-15 RX ORDER — SODIUM CHLORIDE 9 MG/ML
125 INJECTION, SOLUTION INTRAVENOUS CONTINUOUS
Status: DISCONTINUED | OUTPATIENT
Start: 2018-03-15 | End: 2018-03-17

## 2018-03-15 RX ORDER — ENOXAPARIN SODIUM 100 MG/ML
40 INJECTION SUBCUTANEOUS DAILY
Status: DISCONTINUED | OUTPATIENT
Start: 2018-03-15 | End: 2018-03-15

## 2018-03-15 RX ORDER — NADOLOL 20 MG/1
10 TABLET ORAL DAILY
Status: DISCONTINUED | OUTPATIENT
Start: 2018-03-15 | End: 2018-03-15

## 2018-03-15 RX ORDER — MELATONIN
Status: DISPENSED
Start: 2018-03-15 | End: 2018-03-16

## 2018-03-15 RX ORDER — SODIUM CHLORIDE 9 MG/ML
INJECTION, SOLUTION INTRAVENOUS
Status: COMPLETED
Start: 2018-03-15 | End: 2018-03-15

## 2018-03-15 RX ORDER — MAGNESIUM CARB/ALUMINUM HYDROX 105-160MG
296 TABLET,CHEWABLE ORAL ONCE
Status: COMPLETED | OUTPATIENT
Start: 2018-03-15 | End: 2018-03-15

## 2018-03-15 NOTE — PROGRESS NOTES
Nadolol is a non-formulary medication and will be therapeutically interchanged to Metoprolol per P&T protocol

## 2018-03-15 NOTE — PLAN OF CARE
Minimal or absence of nausea and vomiting Progressing      Maintains or returns to baseline bowel function Progressing      Maintains adequate nutritional intake (undernourished) Progressing      Electrolytes maintained within normal limits Progressing

## 2018-03-15 NOTE — H&P
[facility]    PATIENT'S NAME: Aldo Shelia   ATTENDING PHYSICIAN: Slick Ulrich MD   PATIENT ACCOUNT#:   [id number]    LOCATION:  [address] [place] [address]  MEDICAL RECORD #:   [id number]       YOB: 1950  ADMISSION DATE:       03/15 and reconciled; please refer to the patient's chart for a detailed list of home medications. ALLERGIES:  Hydrocodone. FAMILY HISTORY:  Reviewed, currently noncontributory to the case.     SOCIAL HISTORY:  The patient is a former smoker, quit greater t is unable to tolerate a p.o. diet. She denies any abdominal pain. She does not have leukocytosis. No fevers or chills currently. At this time, we will start the patient on IV fluids, IV antiemetics, and IV PPI.   Gastroenterology consult has been placed

## 2018-03-15 NOTE — TELEPHONE ENCOUNTER
Riana Mesa. No need to address. I am addressing with patient. She is not on Coumadin. She is on Xarelto. Discussed I am not sure why her coags are elevated but she has lost weight and is not eating well. Has elevated liver enzymes. I will be evaluating.

## 2018-03-15 NOTE — ED PROVIDER NOTES
Patient Seen in: [facility] AND [facility] Emergency Department    History   Patient presents with:  Weakness    Stated Complaint: dizziness    HPI    Patient is a 16-year-old female that complains of weakness.   She states for the last 2 months she has had no ap cognitive impairment     as per NG   • Obstructive sleep apnea 12/30/2013   • Pseudobulbar affect 2/24/2017   • Restless leg syndrome     as per NG   • Restless legs syndrome 5/16/2013   • Seizure disorder [facility])     as per NG   • Tendon tear 2012    Surgic [03/15/18 0926]  BP: 148/81  Pulse: 62  Resp: 18  Temp: 97.9 °F (36.6 °C)  Temp src: Oral  SpO2: 97 %  O2 Device: None (Room air)    Current:/71   Pulse 58   Temp 97.9 °F (36.6 °C) (Oral)   Resp 15   Ht 157.5 cm (5' 2\")   Wt 78 kg   SpO2 95%   BMI 3 All other components within normal limits   PROTHROMBIN TIME (PT) - Normal   URINALYSIS WITH CULTURE REFLEX   CBC WITH DIFFERENTIAL WITH PLATELET    Narrative: The following orders were created for panel order CBC WITH DIFFERENTIAL WITH PLATELET.   Proc

## 2018-03-15 NOTE — TELEPHONE ENCOUNTER
Please advise - called patient to relay DR. SANDAR message - patient old me she got call from DR. FLEMING to go to ER .  Is that correct? - F/U3/16

## 2018-03-15 NOTE — TELEPHONE ENCOUNTER
Yes.  Sorry I should have documented this early. I am having patient go to emergency room. Nothing more needs to be done regarding her potassium. She should still go to emergency room as discussed with her earlier.

## 2018-03-15 NOTE — ED INITIAL ASSESSMENT (HPI)
Patient sent by Dr. Zainab Dumont for eval. Patient with poor appetite and nausea since January    Patient feels weak, tired, dizzy

## 2018-03-15 NOTE — CONSULTS
[facility]    Report of Consultation    Anne Tucker Patient Status:  Emergency    3/28/1950 MRN [id number]   Location [address] Attending Tami Campbell MD   Hosp Day # 0 PCP Sergio Armenta MD     Reason for C per NG   • Family history of colon cancer     Maternal grandmother had colon cancer in her 76s.    • Gout    • Hepatic steatosis 3/11/2016   • High cholesterol     as per NG   • History of colonic polyps 07-    as per NG   • History of migraine heada Father      as per NG   • Heart Disease Father      as per NG   • Migraines Father      as per NG   • Neurological Disorder Mother      Alzheimer's disease; as per NG   • Hypertension Mother      as per NG   • Heart Disease Mother      as per NG   • Van Child Chest wall:    No tenderness or deformity   Heart:    Regular rate and rhythm, S1 and S2 normal, no murmur, rub   or gallop   Abdomen:     Soft, non-tender, bowel sounds active all four quadrants,     no masses, no organomegaly   Genitalia:     Rectal: viral serology, JIMMY, iron studies    Lorenzo Felty, MD  3/15/2018  10:55 AM

## 2018-03-16 ENCOUNTER — SURGERY (OUTPATIENT)
Age: 68
End: 2018-03-16

## 2018-03-16 LAB
ALBUMIN SERPL BCP-MCNC: 3.6 G/DL (ref 3.5–4.8)
ALBUMIN/GLOB SERPL: 1.4 {RATIO} (ref 1–2)
ALP SERPL-CCNC: 57 U/L (ref 32–100)
ALT SERPL-CCNC: 60 U/L (ref 14–54)
ANION GAP SERPL CALC-SCNC: 6 MMOL/L (ref 0–18)
APTT PPP: 32.8 SECONDS (ref 23.2–35.3)
AST SERPL-CCNC: 48 U/L (ref 15–41)
BASOPHILS # BLD: 0 K/UL (ref 0–0.2)
BASOPHILS NFR BLD: 1 %
BILIRUB SERPL-MCNC: 1.2 MG/DL (ref 0.3–1.2)
BUN SERPL-MCNC: 6 MG/DL (ref 8–20)
BUN/CREAT SERPL: 5.9 (ref 10–20)
CALCIUM SERPL-MCNC: 8.9 MG/DL (ref 8.5–10.5)
CHLORIDE SERPL-SCNC: 115 MMOL/L (ref 95–110)
CO2 SERPL-SCNC: 21 MMOL/L (ref 22–32)
CREAT SERPL-MCNC: 1.01 MG/DL (ref 0.5–1.5)
EOSINOPHIL # BLD: 0.2 K/UL (ref 0–0.7)
EOSINOPHIL NFR BLD: 4 %
ERYTHROCYTE [DISTWIDTH] IN BLOOD BY AUTOMATED COUNT: 14.6 % (ref 11–15)
GLOBULIN PLAS-MCNC: 2.6 G/DL (ref 2.5–3.7)
GLUCOSE SERPL-MCNC: 93 MG/DL (ref 70–99)
HAV IGM SERPL QL IA: NONREACTIVE
HBV CORE IGM SERPL QL IA: NONREACTIVE
HBV SURFACE AG SERPL QL IA: NONREACTIVE
HCT VFR BLD AUTO: 38.1 % (ref 35–48)
HCV AB SERPL QL IA: NONREACTIVE
HGB BLD-MCNC: 13.1 G/DL (ref 12–16)
INR BLD: 1.2 (ref 0.9–1.2)
LYMPHOCYTES # BLD: 1.5 K/UL (ref 1–4)
LYMPHOCYTES NFR BLD: 34 %
MAGNESIUM SERPL-MCNC: 2.1 MG/DL (ref 1.8–2.5)
MCH RBC QN AUTO: 29.9 PG (ref 27–32)
MCHC RBC AUTO-ENTMCNC: 34.4 G/DL (ref 32–37)
MCV RBC AUTO: 86.8 FL (ref 80–100)
MONOCYTES # BLD: 0.4 K/UL (ref 0–1)
MONOCYTES NFR BLD: 9 %
NEUTROPHILS # BLD AUTO: 2.4 K/UL (ref 1.8–7.7)
NEUTROPHILS NFR BLD: 53 %
OSMOLALITY UR CALC.SUM OF ELEC: 291 MOSM/KG (ref 275–295)
PHOSPHATE SERPL-MCNC: 3.4 MG/DL (ref 2.4–4.7)
PLATELET # BLD AUTO: 163 K/UL (ref 140–400)
PMV BLD AUTO: 10.7 FL (ref 7.4–10.3)
POTASSIUM SERPL-SCNC: 3.7 MMOL/L (ref 3.3–5.1)
PROT SERPL-MCNC: 6.2 G/DL (ref 5.9–8.4)
PROTHROMBIN TIME: 14.4 SECONDS (ref 11.8–14.5)
RBC # BLD AUTO: 4.39 M/UL (ref 3.7–5.4)
SODIUM SERPL-SCNC: 142 MMOL/L (ref 136–144)
WBC # BLD AUTO: 4.5 K/UL (ref 4–11)

## 2018-03-16 PROCEDURE — 0DBN8ZX EXCISION OF SIGMOID COLON, VIA NATURAL OR ARTIFICIAL OPENING ENDOSCOPIC, DIAGNOSTIC: ICD-10-PCS | Performed by: INTERNAL MEDICINE

## 2018-03-16 PROCEDURE — 99233 SBSQ HOSP IP/OBS HIGH 50: CPT | Performed by: HOSPITALIST

## 2018-03-16 PROCEDURE — 0DB68ZX EXCISION OF STOMACH, VIA NATURAL OR ARTIFICIAL OPENING ENDOSCOPIC, DIAGNOSTIC: ICD-10-PCS | Performed by: INTERNAL MEDICINE

## 2018-03-16 RX ORDER — MIDAZOLAM HYDROCHLORIDE 1 MG/ML
INJECTION INTRAMUSCULAR; INTRAVENOUS
Status: DISCONTINUED | OUTPATIENT
Start: 2018-03-16 | End: 2018-03-16 | Stop reason: HOSPADM

## 2018-03-16 RX ORDER — 0.9 % SODIUM CHLORIDE 0.9 %
VIAL (ML) INJECTION
Status: COMPLETED
Start: 2018-03-16 | End: 2018-03-16

## 2018-03-16 RX ORDER — MULTIPLE VITAMINS W/ MINERALS TAB 9MG-400MCG
1 TAB ORAL DAILY
Status: DISCONTINUED | OUTPATIENT
Start: 2018-03-16 | End: 2018-03-18

## 2018-03-16 NOTE — OPERATIVE REPORT
[facility] - [facility]    Esophagogastroduodenoscopy and Colonoscopy Report    Marco Antoniomarshal Graylilibeth Patient Status:  Inpatient   Date of Birth 3/28/1950 MRN [id number]   Location [facility] ENDOSCOPY LAB SUITES Attending Ken Theodore MD     PCP the descending duodenum. The scope was withdrawn and the mucosa was observed for abnormalities. Subsequently, the Olympus video colonoscope was inserted into the rectum and advanced to the cecum.   The scope was withdrawn and the mucosa was observed for a

## 2018-03-16 NOTE — H&P
Orrspelsv 48 Patient Status:  Inpatient    3/28/1950 MRN [id number]   Location [facility] ENDOSCOPY LAB SUITES Attending Ken Theodore MD   Hosp Day # 1 PCP Taisha Rojas MD     Date:  3 Family history of colon cancer     Maternal grandmother had colon cancer in her 76s.    • Gout    • Hepatic steatosis 3/11/2016   • High cholesterol     as per NG   • History of colonic polyps 07-    as per NG   • History of migraine headaches     as per NG   • Heart Disease Father      as per NG   • Migraines Father      as per NG   • Neurological Disorder Mother      Alzheimer's disease; as per NG   • Hypertension Mother      as per NG   • Heart Disease Mother      as per NG   • Migraines Mother and rhythm  Abdominal: normal findings.        Results:     Lab Results  Component Value Date   WBC 4.5 03/16/2018   HGB 13.1 03/16/2018   HCT 38.1 03/16/2018    03/16/2018   CREATSERUM 1.01 03/16/2018   BUN 6 (L) 03/16/2018    03/16/2018   K 3

## 2018-03-16 NOTE — CM/SW NOTE
SW met w/ pt to discuss discharge planning. Pt stated that she lives at home w/ her . Pt reported being independent w/ ADL's. Pt reported that she has a walker and cane, but does not use. Pt requested HP information - SW provided paperwork.     Abb

## 2018-03-16 NOTE — PROGRESS NOTES
[facility]    Progress Note    David Simon Patient Status:  Inpatient    3/28/1950 MRN [id number]   Location [facility] 5SW/SE Attending Sana Triplett MD   [facility] Day # 1 PCP Tammy Castleman, MD     Subjective:   Subjective: re: treatment and plan.     Results:     Lab Results  Component Value Date   WBC 4.5 03/16/2018   HGB 13.1 03/16/2018   HCT 38.1 03/16/2018    03/16/2018   CREATSERUM 1.01 03/16/2018   BUN 6 (L) 03/16/2018    03/16/2018   K 3.7 03/16/2018   CL

## 2018-03-16 NOTE — PLAN OF CARE
Problem: Patient/Family Goals  Goal: Patient/Family Long Term Goal  Patient's Long Term Goal: to be able to tolerate food    Interventions:  - determine cause for nausea and diarrhea  - food/nutrition consult for weight loss  - See additional Care Plan [facility] tolerated  - Evaluate effectiveness of GI medications  - Encourage mobilization and activity  - Obtain nutritional consult as needed  - Establish a toileting routine/schedule  - Consider collaborating with pharmacy to review patient's medication profile

## 2018-03-16 NOTE — DIETARY NOTE
ADULT NUTRITION INITIAL ASSESSMENT    Pt is at high nutrition risk. Pt does not meet malnutrition criteria. RECOMMENDATIONS TO MD:  Pt has lost 7 % of body weight since 1/9/18, due to N, V and D.  Pt has reports she's had a poor oral intake x 2 months atherosclerosis seen on CT scan October 2014. • Atrophic kidney 3/11/2016    CT scan October 8, 2014 shows left kidney smaller than the right. Bilateral areas of renal parenchymal scar. Symmetric nephrograms.   Moderate calcific atherosclerosis of aort 86.2 kg (190 lb)      GASTROINTESTINAL: early satiety, nausea, vomiting and diarrhea    FOOD/NUTRITION RELATED HISTORY:  Appetite: Poor  Intake: not yet recorded; pt had taken small amount of clear liquid lunch when I visited her today.      Intake Meeting Goals:      halt wt loss, maintain wt within 5%, PO and supplement greater than 75% of needs, return to normal GI function, labs WNL, promote healing and improved GI status      DIETITIAN FOLLOW UP: RD to follow up within 5 days

## 2018-03-16 NOTE — RESPIRATORY THERAPY NOTE
JENNIFER ASSESSMENT:    Pt does have a previous diagnosis of JENNIFER. Pt does not routinely use a CPAP device at home.  Patient refused CPAP

## 2018-03-16 NOTE — PRE-SEDATION ASSESSMENT
Physician Pre-Sedation Assessment    Pre-Sedation Assessment:    Sedation History:No prior issues    Cardiac: normal S1, S2  Respiratory: breath sounds clear bilaterally   Abdomen: soft, BS (+), non-tender    ASA Classification: III    Plan: IV Sedation

## 2018-03-16 NOTE — CM/SW NOTE
Met with patient at bedside to explain the BPCI/Medicare program. Patient agreed with phone follow up for 3 months from [address] [place] [address] after discharge from [address] [place] [address]. Patient was enrolled under DRG    392  . BPCI/Medicare letter and brochure provided.

## 2018-03-17 LAB
ANION GAP SERPL CALC-SCNC: 4 MMOL/L (ref 0–18)
BASOPHILS # BLD: 0 K/UL (ref 0–0.2)
BASOPHILS NFR BLD: 1 %
BUN SERPL-MCNC: 2 MG/DL (ref 8–20)
BUN/CREAT SERPL: 2.1 (ref 10–20)
CALCIUM SERPL-MCNC: 8.5 MG/DL (ref 8.5–10.5)
CHLORIDE SERPL-SCNC: 112 MMOL/L (ref 95–110)
CO2 SERPL-SCNC: 26 MMOL/L (ref 22–32)
CREAT SERPL-MCNC: 0.97 MG/DL (ref 0.5–1.5)
EOSINOPHIL # BLD: 0.2 K/UL (ref 0–0.7)
EOSINOPHIL NFR BLD: 4 %
ERYTHROCYTE [DISTWIDTH] IN BLOOD BY AUTOMATED COUNT: 14.5 % (ref 11–15)
GLUCOSE SERPL-MCNC: 100 MG/DL (ref 70–99)
HCT VFR BLD AUTO: 35.6 % (ref 35–48)
HGB BLD-MCNC: 12.3 G/DL (ref 12–16)
LYMPHOCYTES # BLD: 1.6 K/UL (ref 1–4)
LYMPHOCYTES NFR BLD: 40 %
MAGNESIUM SERPL-MCNC: 1.7 MG/DL (ref 1.8–2.5)
MCH RBC QN AUTO: 29.8 PG (ref 27–32)
MCHC RBC AUTO-ENTMCNC: 34.5 G/DL (ref 32–37)
MCV RBC AUTO: 86.6 FL (ref 80–100)
MONOCYTES # BLD: 0.4 K/UL (ref 0–1)
MONOCYTES NFR BLD: 10 %
NEUTROPHILS # BLD AUTO: 1.8 K/UL (ref 1.8–7.7)
NEUTROPHILS NFR BLD: 45 %
OSMOLALITY UR CALC.SUM OF ELEC: 290 MOSM/KG (ref 275–295)
PHOSPHATE SERPL-MCNC: 3.5 MG/DL (ref 2.4–4.7)
PLATELET # BLD AUTO: 141 K/UL (ref 140–400)
PMV BLD AUTO: 9.8 FL (ref 7.4–10.3)
POTASSIUM SERPL-SCNC: 3.7 MMOL/L (ref 3.3–5.1)
RBC # BLD AUTO: 4.11 M/UL (ref 3.7–5.4)
SODIUM SERPL-SCNC: 142 MMOL/L (ref 136–144)
WBC # BLD AUTO: 3.9 K/UL (ref 4–11)

## 2018-03-17 PROCEDURE — 99233 SBSQ HOSP IP/OBS HIGH 50: CPT | Performed by: HOSPITALIST

## 2018-03-17 RX ORDER — SODIUM CHLORIDE 9 MG/ML
75 INJECTION, SOLUTION INTRAVENOUS CONTINUOUS
Status: DISCONTINUED | OUTPATIENT
Start: 2018-03-17 | End: 2018-03-18

## 2018-03-17 RX ORDER — AMLODIPINE BESYLATE 5 MG/1
5 TABLET ORAL DAILY
Status: DISCONTINUED | OUTPATIENT
Start: 2018-03-17 | End: 2018-03-18

## 2018-03-17 RX ORDER — MAGNESIUM OXIDE 400 MG (241.3 MG MAGNESIUM) TABLET
400 TABLET ONCE
Status: COMPLETED | OUTPATIENT
Start: 2018-03-17 | End: 2018-03-17

## 2018-03-17 RX ORDER — MAGNESIUM SULFATE 1 G/100ML
1 INJECTION INTRAVENOUS ONCE
Status: COMPLETED | OUTPATIENT
Start: 2018-03-17 | End: 2018-03-17

## 2018-03-17 RX ORDER — POTASSIUM CHLORIDE 20 MEQ/1
40 TABLET, EXTENDED RELEASE ORAL ONCE
Status: COMPLETED | OUTPATIENT
Start: 2018-03-17 | End: 2018-03-17

## 2018-03-17 NOTE — PLAN OF CARE
Problem: Patient/Family Goals  Goal: Patient/Family Long Term Goal  Patient's Long Term Goal: to be able to tolerate food    Interventions:  - determine cause for nausea and diarrhea  - food/nutrition consult for weight loss  - See additional Care Plan [facility] consult as needed  - Establish a toileting routine/schedule  - Consider collaborating with pharmacy to review patient's medication profile   Outcome: Progressing  Patient complain of nausea during the shift. Patient medicated with Zofran.   Goal: Maintains

## 2018-03-17 NOTE — PROGRESS NOTES
[facility]    Progress Note    [place] López Patient Status:  Inpatient    3/28/1950 MRN [id number]   Location [facility] 5SW/SE Attending Yariel Ochoa MD   [facility] Day # 2 PCP Don Cunningham MD     Subjective:   Subjective: prophylaxis will be IV PPI daily. Deep vein thrombosis prophylaxis. Xarelto    Dispo:  - biopsy results reviewed. - diet advanced to full liquds  - continue IVF, IV antiemetics. - appreciate GI Recs  - will continue to monitor.          Greater than 30

## 2018-03-17 NOTE — PROGRESS NOTES
[facility] HOSP - [facility]    GI Progress Note      [place] Jermyn Patient Status:  Inpatient    3/28/1950 MRN [id number]   Location [facility] 5SW/SE Attending Denise Gaviria MD   Hosp Day # 2 PCP Jermain Chester MD          SUBJECTIVE:     Anyi Cheng eosinophils, few acute inflammatory cells, few circumscribed hyperplastic lymphoid cell aggregates, and diffusely increased intraepithelial lymphocytes (see comment). · Fragment of hyperplastic polyp also present.   · No evidence of fungal organisms, jeffrey

## 2018-03-18 VITALS
RESPIRATION RATE: 20 BRPM | BODY MASS INDEX: 30.1 KG/M2 | OXYGEN SATURATION: 98 % | SYSTOLIC BLOOD PRESSURE: 166 MMHG | HEIGHT: 64 IN | DIASTOLIC BLOOD PRESSURE: 90 MMHG | WEIGHT: 176.31 LBS | TEMPERATURE: 98 F | HEART RATE: 60 BPM

## 2018-03-18 LAB
ANION GAP SERPL CALC-SCNC: 8 MMOL/L (ref 0–18)
BASOPHILS # BLD: 0 K/UL (ref 0–0.2)
BASOPHILS NFR BLD: 1 %
BUN SERPL-MCNC: 4 MG/DL (ref 8–20)
BUN/CREAT SERPL: 4.3 (ref 10–20)
CALCIUM SERPL-MCNC: 9.2 MG/DL (ref 8.5–10.5)
CHLORIDE SERPL-SCNC: 108 MMOL/L (ref 95–110)
CO2 SERPL-SCNC: 23 MMOL/L (ref 22–32)
CREAT SERPL-MCNC: 0.93 MG/DL (ref 0.5–1.5)
EOSINOPHIL # BLD: 0.2 K/UL (ref 0–0.7)
EOSINOPHIL NFR BLD: 4 %
ERYTHROCYTE [DISTWIDTH] IN BLOOD BY AUTOMATED COUNT: 14.9 % (ref 11–15)
GLUCOSE SERPL-MCNC: 113 MG/DL (ref 70–99)
HCT VFR BLD AUTO: 38.5 % (ref 35–48)
HGB BLD-MCNC: 13.3 G/DL (ref 12–16)
LYMPHOCYTES # BLD: 1.6 K/UL (ref 1–4)
LYMPHOCYTES NFR BLD: 30 %
MAGNESIUM SERPL-MCNC: 1.7 MG/DL (ref 1.8–2.5)
MCH RBC QN AUTO: 29.8 PG (ref 27–32)
MCHC RBC AUTO-ENTMCNC: 34.6 G/DL (ref 32–37)
MCV RBC AUTO: 86.1 FL (ref 80–100)
MONOCYTES # BLD: 0.5 K/UL (ref 0–1)
MONOCYTES NFR BLD: 10 %
NEUTROPHILS # BLD AUTO: 3 K/UL (ref 1.8–7.7)
NEUTROPHILS NFR BLD: 56 %
OSMOLALITY UR CALC.SUM OF ELEC: 286 MOSM/KG (ref 275–295)
PHOSPHATE SERPL-MCNC: 3.5 MG/DL (ref 2.4–4.7)
PLATELET # BLD AUTO: 156 K/UL (ref 140–400)
PMV BLD AUTO: 10.4 FL (ref 7.4–10.3)
POTASSIUM SERPL-SCNC: 3.1 MMOL/L (ref 3.3–5.1)
POTASSIUM SERPL-SCNC: 3.1 MMOL/L (ref 3.3–5.1)
POTASSIUM SERPL-SCNC: 4.2 MMOL/L (ref 3.3–5.1)
RBC # BLD AUTO: 4.47 M/UL (ref 3.7–5.4)
SODIUM SERPL-SCNC: 139 MMOL/L (ref 136–144)
WBC # BLD AUTO: 5.4 K/UL (ref 4–11)

## 2018-03-18 PROCEDURE — 99239 HOSP IP/OBS DSCHRG MGMT >30: CPT | Performed by: HOSPITALIST

## 2018-03-18 RX ORDER — AMLODIPINE BESYLATE 5 MG/1
5 TABLET ORAL DAILY
Qty: 30 TABLET | Refills: 0 | Status: SHIPPED | OUTPATIENT
Start: 2018-03-19 | End: 2018-05-17

## 2018-03-18 RX ORDER — MAGNESIUM SULFATE HEPTAHYDRATE 40 MG/ML
2 INJECTION, SOLUTION INTRAVENOUS ONCE
Status: COMPLETED | OUTPATIENT
Start: 2018-03-18 | End: 2018-03-18

## 2018-03-18 RX ORDER — PANTOPRAZOLE SODIUM 40 MG/1
40 TABLET, DELAYED RELEASE ORAL
Qty: 30 TABLET | Refills: 2 | Status: SHIPPED | OUTPATIENT
Start: 2018-03-18 | End: 2018-04-20 | Stop reason: ALTCHOICE

## 2018-03-18 NOTE — PLAN OF CARE
Pt discharged home in stable condition, written prescriptions given and follow up appointments, all questions answered, PIV removed, and tele box. Pt left unit in stable condition.

## 2018-03-18 NOTE — PROGRESS NOTES
[facility] HOSP - [facility]    GI Progress Note      Juanita Mock Patient Status:  Inpatient    3/28/1950 MRN [id number]   [facility] 5SW/SE Attending Justine Carvajal MD   Hosp Day # 3 PCP Ricardo Jara MD          SUBJECTIVE:     No e

## 2018-03-18 NOTE — PLAN OF CARE
Problem: Patient/Family Goals  Goal: Patient/Family Long Term Goal  Patient's Long Term Goal: to be able to tolerate food    Interventions:  - determine cause for nausea and diarrhea  - food/nutrition consult for weight loss  - See additional Care Plan [facility] PO as ordered and tolerated  - Evaluate effectiveness of GI medications  - Encourage mobilization and activity  - Obtain nutritional consult as needed  - Establish a toileting routine/schedule  - Consider collaborating with pharmacy to review patient's med patient safety including physical limitations  - Instruct pt to call for assistance with activity based on assessment  - Modify environment to reduce risk of injury  - Provide assistive devices as appropriate  - Consider OT/PT consult to assist with streng

## 2018-03-18 NOTE — DISCHARGE SUMMARY
[facility]    Discharge Summary    Alvira Duane Patient Status:  Inpatient    3/28/1950 MRN [id number]   [facility] 5SW/SE Attending Penny Asif MD   [facility] Day # 3 PCP Rubina Osborne MD     Date of Admission: 3/15/20 protocol. Acute microscopic colitis  - continue PPI  - discharge on soflt diet   - continue metamucil   - diarhea stopped at this time   - follow up with GI in 2-3 weeks.      Hypokalemia  - continue to replace K  - discharge after K repletion       [address] [facility]  Start taking on:  3/19/2018      Take 1 tablet (5 mg total) by mouth daily.    Quantity:  30 tablet  Refills:  0        CONTINUE taking these medications      Instructions Prescription details   Acetaminophen-Codeine #3 300-30 MG Tabs  Commonly kno

## 2018-03-19 ENCOUNTER — PATIENT OUTREACH (OUTPATIENT)
Dept: CASE MANAGEMENT | Age: 68
End: 2018-03-19

## 2018-03-19 ENCOUNTER — TELEPHONE (OUTPATIENT)
Dept: INTERNAL MEDICINE UNIT | Facility: HOSPITAL | Age: 68
End: 2018-03-19

## 2018-03-19 LAB — NUCLEAR IGG TITR SER IF: NEGATIVE {TITER}

## 2018-03-19 NOTE — PROGRESS NOTES
TCM OUTREACH    My chart message sent to patient requesting call back at [phone] to review discharge instructions and medications.

## 2018-03-19 NOTE — PROGRESS NOTES
Initial Post Discharge Follow Up   Discharge Date: 3/18/18  Contact Date: 3/19/2018    Consent Verification:  Assessment Completed With: Patient  HIPAA Verified?   Yes    Discharge Dx:   Hypokalemia     General:   • How have you been since your discharge were prescribed a new medication:   o Was the new medication’s purpose explained? yes  o Do you have any questions about your new medication?  No  • Did you  your discharge medications when you left the hospital? Yes  • May I go over your medications tomorrow)  Is there any reason as to why you cannot make your appointments? No  NCM Reviewed upcoming Specialist Appt with patient     Yes    Overall Rating:    How would you rate the care you received while in the hospital?  good      Interventions by NC

## 2018-03-20 ENCOUNTER — OFFICE VISIT (OUTPATIENT)
Dept: INTERNAL MEDICINE CLINIC | Facility: CLINIC | Age: 68
End: 2018-03-20

## 2018-03-20 VITALS
BODY MASS INDEX: 29.35 KG/M2 | WEIGHT: 174 LBS | TEMPERATURE: 98 F | HEART RATE: 60 BPM | SYSTOLIC BLOOD PRESSURE: 90 MMHG | DIASTOLIC BLOOD PRESSURE: 60 MMHG | OXYGEN SATURATION: 99 % | HEIGHT: 64.5 IN

## 2018-03-20 DIAGNOSIS — N18.30 STAGE 3 CHRONIC KIDNEY DISEASE (HCC): ICD-10-CM

## 2018-03-20 DIAGNOSIS — I48.0 PAROXYSMAL ATRIAL FIBRILLATION (HCC): ICD-10-CM

## 2018-03-20 DIAGNOSIS — K55.20 AVM (ARTERIOVENOUS MALFORMATION) OF COLON: ICD-10-CM

## 2018-03-20 DIAGNOSIS — K29.00 ACUTE SUPERFICIAL GASTRITIS WITHOUT HEMORRHAGE: ICD-10-CM

## 2018-03-20 DIAGNOSIS — I10 ESSENTIAL HYPERTENSION: ICD-10-CM

## 2018-03-20 DIAGNOSIS — R74.8 ELEVATED LIVER ENZYMES: ICD-10-CM

## 2018-03-20 DIAGNOSIS — R63.4 WEIGHT LOSS: Primary | ICD-10-CM

## 2018-03-20 DIAGNOSIS — R41.3 MEMORY LOSS: ICD-10-CM

## 2018-03-20 PROCEDURE — 99496 TRANSJ CARE MGMT HIGH F2F 7D: CPT | Performed by: INTERNAL MEDICINE

## 2018-03-20 PROCEDURE — 1111F DSCHRG MED/CURRENT MED MERGE: CPT | Performed by: INTERNAL MEDICINE

## 2018-03-20 NOTE — PROGRESS NOTES
HPI:   Samantha Ureña is a 75year old female presents with the following problems. Wt Readings from Last 3 Encounters:  03/20/18 : 174 lb (78.9 kg)  03/15/18 : 176 lb 4.8 oz (80 kg)  03/07/18 : 178 lb (80.7 kg)    Body mass index is 29.41 kg/m². • Chronic atrial fibrillation (HCC)    • Diverticulosis     as per NG   • Family history of colon cancer     Maternal grandmother had colon cancer in her 76s.    • Gout    • Hepatic steatosis 3/11/2016   • High cholesterol     as per NG   • History of col OTHER SURGICAL HISTORY Right      Comment: R knee scoped for torn miniscus  2006: RECONSTR NOSE+JEAN SEPTAL REPAIR      Comment: as per NG   Family History   Problem Relation Age of Onset   • Hypertension Father      as per NG   • Heart Disease Father >=60   -PROTHROMBIN TIME (PT)   Result Value Ref Range   PT 14.1 11.8 - 14.5 seconds   INR 1.1 0.9 - 1.2   -HEPATIC FUNCTION PANEL (7)   Result Value Ref Range   AST 56 (H) 15 - 41 U/L   ALT 72 (H) 14 - 54 U/L   Alkaline Phosphatase 65 32 - 100 U/L   Bilir -MAGNESIUM   Result Value Ref Range   Magnesium 2.1 1.8 - 2.5 mg/dL   -PHOSPHORUS   Result Value Ref Range   Phosphorus 3.4 2.4 - 4.7 mg/dL   -PTT, ACTIVATED   Result Value Ref Range   PTT 32.8 23.2 - 35.3 seconds   -PROTHROMBIN TIME (PT)   Result Value Collected:           03/16/2018 10:52 AM          Ordering Location:     [facility]          Received:            03/16/2018 01:42 PM                                Endoscopy Lab Suites                                                         Patholo similar histologic findings have   been described in association with other clinical conditions including   drug effects (Ticlopidine, others), infectious colitis ([place]   diarrhea), and with other autoimmune diseases (Hashimoto's thyroiditis,   others) MCHC 33.5 32.0 - 37.0 g/dl   RDW 14.7 11.0 - 15.0 %    140 - 400 K/UL   MPV 10.1 7.4 - 10.3 fL   Neutrophil % 56 %   Lymphocyte % 33 %   Monocyte % 8 %   Eosinophil % 3 %   Basophil % 1 %   Neutrophil Absolute 2.7 1.8 - 7.7 K/UL   Lymphocyte Absol 56 %   Lymphocyte % 30 %   Monocyte % 10 %   Eosinophil % 4 %   Basophil % 1 %   Neutrophil Absolute 3.0 1.8 - 7.7 K/UL   Lymphocyte Absolute 1.6 1.0 - 4.0 K/UL   Monocyte Absolute 0.5 0.0 - 1.0 K/UL   Eosinophil Absolute 0.2 0.0 - 0.7 K/UL   Basophil Abso Nonbleeding. MUSCULOSKELETAL:  back is not tender, no joint swelling  EXTREMITIES:  no cyanosis, clubbing or edema. Feet show no signs of ulceration. Dorsal pedal pulse normal B/L. NEURO:  Awake and aware.   motor strength symmetric arms and legs,  reflex daily living. ? Referrals as listed bleow in orders Assisted in scheduling required follow-up with community providers and services. Medication Reconciliation:  I am aware of an inpatient discharge within the last 30 days.   The discharge medication lis Pantoprazole Sodium 40 MG Oral Tab EC Take 1 tablet (40 mg total) by mouth every morning before breakfast.   lidocaine 5 % External Ointment APPLY 1 TO 3 GRAM(S) TO THE AFFECTED AREA(S) 3 TO 4 TIMES DAILY AS NEEDED   NADOLOL OR Take 10 mg by mouth daily. arthroscopy (Right, 2013); knee surgery (1/2015); cataract (Left, 7/10/15); other (June or July 2015); cataract (Right, late July 2015); other surgical history (Left); other surgical history (Right, 8/2016); arthroscopy of joint unlisted; and colonoscopy ( clear  NECK: supple, no adenopathy, no bruits  CHEST: Breast exam January 2018 was benign. Mammogram February 2018 benign.   LUNGS: clear to auscultation  CARDIO: RRR without murmur  GI: good BS's, no masses, HSM or tenderness  EXTREMITIES: no cyanosis, cl

## 2018-03-27 ENCOUNTER — TELEPHONE (OUTPATIENT)
Dept: INTERNAL MEDICINE CLINIC | Facility: CLINIC | Age: 68
End: 2018-03-27

## 2018-03-27 NOTE — TELEPHONE ENCOUNTER
Patient states she was hospitalized last week with microscopic colitis home last Sunday. Was feeling much better but now diarrhea and vomiting has come back. Wants to know what she should do. Please call her.

## 2018-03-28 NOTE — TELEPHONE ENCOUNTER
Discussed with patient. She had been doing well post discharge but now has some loose bowels and vomiting on occasion coming back. No acute symptoms. No fevers or chills. No abdominal pain. She continues with Protonix. She did have recent colonoscopy that showed microscopic/lymphocytic colitis. I discussed that it may be wise for her to contact Dr. Medardo Cloud. There may be other medication that aware of her microscopic colitis. Patient verbalized understanding and will call Dr. Medardo Cloud Wednesday. Amando Garcia, I wonder if you could make a recommendation as to if there is any treatment for her microscopic/lymphocytic colitis since her lower GI symptoms have returned. Thank you.   Marleny Brooks. )

## 2018-03-29 ENCOUNTER — TELEPHONE (OUTPATIENT)
Dept: INTERNAL MEDICINE CLINIC | Facility: CLINIC | Age: 68
End: 2018-03-29

## 2018-03-29 ENCOUNTER — LAB ENCOUNTER (OUTPATIENT)
Dept: LAB | Age: 68
End: 2018-03-29
Attending: INTERNAL MEDICINE
Payer: MEDICARE

## 2018-03-29 DIAGNOSIS — R11.10 VOMITING AND DIARRHEA: ICD-10-CM

## 2018-03-29 DIAGNOSIS — R19.7 VOMITING AND DIARRHEA: ICD-10-CM

## 2018-03-29 DIAGNOSIS — R19.7 VOMITING AND DIARRHEA: Primary | ICD-10-CM

## 2018-03-29 DIAGNOSIS — R11.10 VOMITING AND DIARRHEA: Primary | ICD-10-CM

## 2018-03-29 LAB
ALBUMIN SERPL BCP-MCNC: 4.1 G/DL (ref 3.5–4.8)
ALP SERPL-CCNC: 67 U/L (ref 32–100)
ALT SERPL-CCNC: 92 U/L (ref 14–54)
ANION GAP SERPL CALC-SCNC: 10 MMOL/L (ref 0–18)
AST SERPL-CCNC: 83 U/L (ref 15–41)
BASOPHILS # BLD: 0 K/UL (ref 0–0.2)
BASOPHILS NFR BLD: 1 %
BILIRUB DIRECT SERPL-MCNC: 0.2 MG/DL (ref 0–0.2)
BILIRUB SERPL-MCNC: 1.4 MG/DL (ref 0.3–1.2)
BUN SERPL-MCNC: 10 MG/DL (ref 8–20)
BUN/CREAT SERPL: 9.3 (ref 10–20)
CALCIUM SERPL-MCNC: 9.1 MG/DL (ref 8.5–10.5)
CHLORIDE SERPL-SCNC: 105 MMOL/L (ref 95–110)
CO2 SERPL-SCNC: 26 MMOL/L (ref 22–32)
CREAT SERPL-MCNC: 1.08 MG/DL (ref 0.5–1.5)
EOSINOPHIL # BLD: 0.1 K/UL (ref 0–0.7)
EOSINOPHIL NFR BLD: 2 %
ERYTHROCYTE [DISTWIDTH] IN BLOOD BY AUTOMATED COUNT: 14.4 % (ref 11–15)
GLUCOSE SERPL-MCNC: 97 MG/DL (ref 70–99)
HCT VFR BLD AUTO: 41.3 % (ref 35–48)
HGB BLD-MCNC: 14.2 G/DL (ref 12–16)
LYMPHOCYTES # BLD: 1.8 K/UL (ref 1–4)
LYMPHOCYTES NFR BLD: 30 %
MCH RBC QN AUTO: 29.6 PG (ref 27–32)
MCHC RBC AUTO-ENTMCNC: 34.4 G/DL (ref 32–37)
MCV RBC AUTO: 85.9 FL (ref 80–100)
MONOCYTES # BLD: 0.5 K/UL (ref 0–1)
MONOCYTES NFR BLD: 9 %
NEUTROPHILS # BLD AUTO: 3.6 K/UL (ref 1.8–7.7)
NEUTROPHILS NFR BLD: 59 %
OSMOLALITY UR CALC.SUM OF ELEC: 291 MOSM/KG (ref 275–295)
PLATELET # BLD AUTO: 208 K/UL (ref 140–400)
PMV BLD AUTO: 10.6 FL (ref 7.4–10.3)
POTASSIUM SERPL-SCNC: 2.7 MMOL/L (ref 3.3–5.1)
PROT SERPL-MCNC: 7 G/DL (ref 5.9–8.4)
RBC # BLD AUTO: 4.81 M/UL (ref 3.7–5.4)
SODIUM SERPL-SCNC: 141 MMOL/L (ref 136–144)
WBC # BLD AUTO: 6.1 K/UL (ref 4–11)

## 2018-03-29 PROCEDURE — 85025 COMPLETE CBC W/AUTO DIFF WBC: CPT

## 2018-03-29 PROCEDURE — 36415 COLL VENOUS BLD VENIPUNCTURE: CPT

## 2018-03-29 PROCEDURE — 80048 BASIC METABOLIC PNL TOTAL CA: CPT

## 2018-03-29 PROCEDURE — 80076 HEPATIC FUNCTION PANEL: CPT

## 2018-03-29 NOTE — TELEPHONE ENCOUNTER
Spoke with patient and relayed Dr. Ashley Andre message. Patient verbalized an understanding and will have labs done today.

## 2018-03-29 NOTE — TELEPHONE ENCOUNTER
I placed order for CBC, BMP and liver enzymes that will check all the check. She can go today for it. No fasting needed.

## 2018-03-29 NOTE — TELEPHONE ENCOUNTER
Clari Yip- My office will contact patient and start budesonide 9 mg qd. I will see her in 4 weeks if office.

## 2018-03-29 NOTE — TELEPHONE ENCOUNTER
Pt. Would like Dr. Raymond Stephenson to order a blood test for her to check her potassium levels Pt. States she has lost 5 lbs since her last office visit, she feels very weak, and tired. Please advise  ph.  # [phone]    Routed to clinical

## 2018-03-29 NOTE — TELEPHONE ENCOUNTER
Please contact patient and start Budesonide 3 mg #90 sig: take 3 PO q AM with 2 refills.  To see me in 4 weeks

## 2018-03-29 NOTE — TELEPHONE ENCOUNTER
Spoke with patient and she states that she did get message from Dr. Camila Griffith yesterday-- she picked up Zofran, but nausea has not been bad enough today that she needed it yet and Budesonide will not be ready to  until today.    Patient notes that she is s

## 2018-03-30 ENCOUNTER — HOSPITAL ENCOUNTER (EMERGENCY)
Facility: HOSPITAL | Age: 68
Discharge: HOME | End: 2018-03-30
Attending: EMERGENCY MEDICINE
Payer: MEDICARE

## 2018-03-30 ENCOUNTER — TELEPHONE (OUTPATIENT)
Dept: INTERNAL MEDICINE CLINIC | Facility: CLINIC | Age: 68
End: 2018-03-30

## 2018-03-30 VITALS
OXYGEN SATURATION: 98 % | SYSTOLIC BLOOD PRESSURE: 161 MMHG | HEART RATE: 62 BPM | DIASTOLIC BLOOD PRESSURE: 79 MMHG | TEMPERATURE: 98 F | RESPIRATION RATE: 18 BRPM

## 2018-03-30 DIAGNOSIS — E87.6 HYPOKALEMIA: Primary | ICD-10-CM

## 2018-03-30 PROCEDURE — 80048 BASIC METABOLIC PNL TOTAL CA: CPT | Performed by: EMERGENCY MEDICINE

## 2018-03-30 PROCEDURE — 83735 ASSAY OF MAGNESIUM: CPT | Performed by: EMERGENCY MEDICINE

## 2018-03-30 PROCEDURE — 93010 ELECTROCARDIOGRAM REPORT: CPT | Performed by: EMERGENCY MEDICINE

## 2018-03-30 PROCEDURE — 99284 EMERGENCY DEPT VISIT MOD MDM: CPT

## 2018-03-30 PROCEDURE — 93005 ELECTROCARDIOGRAM TRACING: CPT

## 2018-03-30 PROCEDURE — 85025 COMPLETE CBC W/AUTO DIFF WBC: CPT | Performed by: EMERGENCY MEDICINE

## 2018-03-30 PROCEDURE — 96360 HYDRATION IV INFUSION INIT: CPT

## 2018-03-30 RX ORDER — POTASSIUM CHLORIDE 20 MEQ/1
20 TABLET, EXTENDED RELEASE ORAL DAILY
Qty: 3 TABLET | Refills: 0 | Status: SHIPPED | OUTPATIENT
Start: 2018-03-30 | End: 2018-04-02

## 2018-03-30 RX ORDER — POTASSIUM CHLORIDE 20 MEQ/1
60 TABLET, EXTENDED RELEASE ORAL ONCE
Status: COMPLETED | OUTPATIENT
Start: 2018-03-30 | End: 2018-03-30

## 2018-03-30 NOTE — ED PROVIDER NOTES
Patient Seen in: [facility] AND [facility] Emergency Department    History   Patient presents with:  Abnormal Result (metabolic, cardiac)    Stated Complaint: low K    HPI    69-year-old female recently admitted for microscopic ischemic colitis who had problems cognitive impairment     as per NG   • Obstructive sleep apnea 12/30/2013   • Pseudobulbar affect 2/24/2017   • Restless leg syndrome     as per NG   • Restless legs syndrome 5/16/2013   • Seizure disorder [facility])     as per NG   • Tendon tear 2012    Surgic vital signs reviewed. All other systems reviewed and negative except as noted above.     Physical Exam   ED Triage Vitals [03/30/18 0853]  BP: 123/76  Pulse: 63  Resp: 16  Temp: 97.6 °F (36.4 °C)  Temp src: Oral  SpO2: 98 %  O2 Device: None (Room air) DIFFERENTIAL     EKG    Rate, intervals and axes as noted on EKG Report.   Rate: 60  Rhythm: Sinus Rhythm  Reading: No acute ischemia, normal intervals and axis           ED Course as of Mar 30 1113  ---------------------------------------------------------

## 2018-03-30 NOTE — ED INITIAL ASSESSMENT (HPI)
Patient sent by Dr. Silas Reveles for low potassium. Denies any symptoms, recent diarrhea, subsided.

## 2018-03-30 NOTE — TELEPHONE ENCOUNTER
[facility] - verbalized understanding F/U 4/2

## 2018-03-30 NOTE — TELEPHONE ENCOUNTER
Please call patient this morning early and let her know that her potassium is low at 2.7. This is from her diarrhea. This is what she expected.   I think with the diarrhea and the nausea and the low potassium please ask her to return to the emergency room

## 2018-04-02 ENCOUNTER — TELEPHONE (OUTPATIENT)
Dept: INTERNAL MEDICINE CLINIC | Facility: CLINIC | Age: 68
End: 2018-04-02

## 2018-04-02 ENCOUNTER — APPOINTMENT (OUTPATIENT)
Dept: LAB | Age: 68
End: 2018-04-02
Attending: INTERNAL MEDICINE
Payer: MEDICARE

## 2018-04-02 ENCOUNTER — OFFICE VISIT (OUTPATIENT)
Dept: INTERNAL MEDICINE CLINIC | Facility: CLINIC | Age: 68
End: 2018-04-02

## 2018-04-02 VITALS
DIASTOLIC BLOOD PRESSURE: 74 MMHG | HEART RATE: 60 BPM | HEIGHT: 64.5 IN | TEMPERATURE: 98 F | SYSTOLIC BLOOD PRESSURE: 120 MMHG | WEIGHT: 175.38 LBS | BODY MASS INDEX: 29.58 KG/M2

## 2018-04-02 DIAGNOSIS — I48.0 PAROXYSMAL ATRIAL FIBRILLATION (HCC): ICD-10-CM

## 2018-04-02 DIAGNOSIS — K52.832 LYMPHOCYTIC COLITIS: ICD-10-CM

## 2018-04-02 DIAGNOSIS — R63.4 WEIGHT LOSS: ICD-10-CM

## 2018-04-02 DIAGNOSIS — R74.8 ELEVATED LIVER ENZYMES: ICD-10-CM

## 2018-04-02 DIAGNOSIS — E87.6 HYPOKALEMIA: ICD-10-CM

## 2018-04-02 DIAGNOSIS — I10 ESSENTIAL HYPERTENSION: ICD-10-CM

## 2018-04-02 DIAGNOSIS — E87.6 HYPOKALEMIA: Primary | ICD-10-CM

## 2018-04-02 PROCEDURE — 99214 OFFICE O/P EST MOD 30 MIN: CPT | Performed by: INTERNAL MEDICINE

## 2018-04-02 PROCEDURE — 80048 BASIC METABOLIC PNL TOTAL CA: CPT

## 2018-04-02 PROCEDURE — G0463 HOSPITAL OUTPT CLINIC VISIT: HCPCS | Performed by: INTERNAL MEDICINE

## 2018-04-02 PROCEDURE — 36415 COLL VENOUS BLD VENIPUNCTURE: CPT

## 2018-04-02 PROCEDURE — 80076 HEPATIC FUNCTION PANEL: CPT

## 2018-04-02 RX ORDER — ROPINIROLE 1 MG/1
TABLET, FILM COATED ORAL
Qty: 90 TABLET | Refills: 0 | OUTPATIENT
Start: 2018-04-02

## 2018-04-02 NOTE — TELEPHONE ENCOUNTER
Called patient and relayed DR. FLEMING message - verbalized understanding and will be here at 10:00. She was able to  medication prescribed DR. Racquel Ramirez

## 2018-04-02 NOTE — TELEPHONE ENCOUNTER
Please call patient this morning at about 8:00 and ask how she is doing. I realize I had her go to the emergency room Friday for her low potassium. I know she was given a supplement of potassium. We should repeat the level today.   I can see her at 10 AM

## 2018-04-02 NOTE — PROGRESS NOTES
Cj Seo is a 70year old female   HPI:   Pt.presents for the following problems. Patient needed to go back to the emergency room Friday for a potassium of 0.7. Patient had recently been in the hospital with lymphocytic/microscopic colitis.   Her Rfl: 0   Budesonide 3 MG Oral Cap DR Particles Three caps PO daily Disp: 90 capsule Rfl: 1   Ondansetron HCl (ZOFRAN) 4 mg tablet Take 1 tablet (4 mg total) by mouth every 8 (eight) hours as needed for Nausea.  Disp: 30 tablet Rfl: 0   AmLODIPine Besylate 5 3/11/2016   • High cholesterol     as per NG   • History of colonic polyps 07-    as per NG   • History of migraine headaches     as per NG   • History of total hysterectomy with removal of both tubes and ovaries     as per NG   • Incontinence     D Hypertension Father      as per NG   • Heart Disease Father      as per NG   • Migraines Father      as per NG   • Neurological Disorder Mother      Alzheimer's disease; as per NG   • Hypertension Mother      as per NG   • Heart Disease Mother      as per tenderness  MUSCULOSKELETAL:  back is not tender, no joint swelling  EXTREMITIES:  no cyanosis, clubbing or edema. NEURO:  Awake and aware. ASSESSMENT AND PLAN:   1. Weight loss  Seems to have stabilized. We will continue to monitor.   Patient has f

## 2018-04-02 NOTE — TELEPHONE ENCOUNTER
Stat BMP and hepatic panel have resulted. No critical values but some abnormalities.     FYI to Dr Darla Kanner

## 2018-04-03 ENCOUNTER — TELEPHONE (OUTPATIENT)
Dept: INTERNAL MEDICINE CLINIC | Facility: CLINIC | Age: 68
End: 2018-04-03

## 2018-04-03 DIAGNOSIS — R74.8 ELEVATED LIVER ENZYMES: ICD-10-CM

## 2018-04-03 DIAGNOSIS — R94.4 DECREASED GFR: Primary | ICD-10-CM

## 2018-04-03 RX ORDER — ROPINIROLE 1 MG/1
TABLET, FILM COATED ORAL
Qty: 90 TABLET | Refills: 0 | Status: SHIPPED | OUTPATIENT
Start: 2018-04-03 | End: 2018-05-02

## 2018-04-03 RX ORDER — ROPINIROLE 1 MG/1
TABLET, FILM COATED ORAL
Qty: 90 TABLET | Refills: 0 | OUTPATIENT
Start: 2018-04-03

## 2018-04-03 NOTE — TELEPHONE ENCOUNTER
Spoke with patient and relayed Dr. Tessa Verde message below - potassium is perfectly normal and as long as there is no diarrhea, she does not need any more potassium replacement; liver enzymes are much better and pt may continue her allopurinol at current do

## 2018-04-03 NOTE — TELEPHONE ENCOUNTER
Please notify patient that her potassium now is perfectly normal.  As long as there is no diarrhea I do not think that she needs any more potassium replacement. Her liver enzymes are much better.   Therefore she can continue the allopurinol at current dose

## 2018-04-04 ENCOUNTER — PATIENT MESSAGE (OUTPATIENT)
Dept: INTERNAL MEDICINE CLINIC | Facility: CLINIC | Age: 68
End: 2018-04-04

## 2018-04-04 NOTE — TELEPHONE ENCOUNTER
From: David Simon  To: Zen Rubi MD  Sent: 4/4/2018 9:05 AM CDT  Subject: Test Results Question    I was told my potassium was at a normal level yesterday but the last test listed was on 3/30 and it said 2.9.  I’m either missing that test result on

## 2018-04-18 ENCOUNTER — OFFICE VISIT (OUTPATIENT)
Dept: NEUROLOGY | Facility: CLINIC | Age: 68
End: 2018-04-18

## 2018-04-18 VITALS
WEIGHT: 173 LBS | HEART RATE: 61 BPM | BODY MASS INDEX: 28.82 KG/M2 | HEIGHT: 65 IN | SYSTOLIC BLOOD PRESSURE: 108 MMHG | DIASTOLIC BLOOD PRESSURE: 70 MMHG

## 2018-04-18 DIAGNOSIS — M48.062 LUMBAR STENOSIS WITH NEUROGENIC CLAUDICATION: Primary | ICD-10-CM

## 2018-04-18 DIAGNOSIS — G43.909 MIGRAINE WITHOUT STATUS MIGRAINOSUS, NOT INTRACTABLE, UNSPECIFIED MIGRAINE TYPE: ICD-10-CM

## 2018-04-18 DIAGNOSIS — R94.02 ABNORMAL PET SCAN OF HEAD: ICD-10-CM

## 2018-04-18 DIAGNOSIS — G25.81 RESTLESS LEGS SYNDROME: ICD-10-CM

## 2018-04-18 DIAGNOSIS — R56.9 SEIZURE (HCC): ICD-10-CM

## 2018-04-18 PROCEDURE — 99213 OFFICE O/P EST LOW 20 MIN: CPT | Performed by: OTHER

## 2018-04-18 NOTE — PROGRESS NOTES
Neurology Outpateint Follow-up Note    Rise Klinefelter is a 70year old female. HPI:     Patient is being seen in follow-up. I saw her in clinic last in January 2017. In the interim, I have had a chance to review some outside records; see below. today's visit):    Current Outpatient Prescriptions:  rOPINIRole HCl 1 MG Oral Tab TAKE ONE TABLET BY MOUTH THREE TIMES DAILY Disp: 90 tablet Rfl: 0   Budesonide 3 MG Oral Cap DR Particles Three caps PO daily Disp: 90 capsule Rfl: 1   AmLODIPine Besylate 5 or dysphonia  Motor: 5/5 strength proximally and distally in arms including thumb opposition, 4+/5 in bilateral hip flexion, knee flexion and extension (very slightly weaker on right side), 4/5 in bilateral foot dorsiflexion and 5/5 in bilateral foot plant

## 2018-04-20 ENCOUNTER — TELEPHONE (OUTPATIENT)
Dept: INTERNAL MEDICINE CLINIC | Facility: CLINIC | Age: 68
End: 2018-04-20

## 2018-04-20 ENCOUNTER — OFFICE VISIT (OUTPATIENT)
Dept: INTERNAL MEDICINE CLINIC | Facility: CLINIC | Age: 68
End: 2018-04-20

## 2018-04-20 ENCOUNTER — APPOINTMENT (OUTPATIENT)
Dept: LAB | Age: 68
End: 2018-04-20
Attending: INTERNAL MEDICINE
Payer: MEDICARE

## 2018-04-20 VITALS
OXYGEN SATURATION: 99 % | WEIGHT: 170 LBS | TEMPERATURE: 99 F | BODY MASS INDEX: 28.32 KG/M2 | SYSTOLIC BLOOD PRESSURE: 102 MMHG | HEIGHT: 65 IN | HEART RATE: 61 BPM | DIASTOLIC BLOOD PRESSURE: 74 MMHG

## 2018-04-20 DIAGNOSIS — R63.4 WEIGHT LOSS: ICD-10-CM

## 2018-04-20 DIAGNOSIS — F03.91 DEMENTIA WITH BEHAVIORAL DISTURBANCE, UNSPECIFIED DEMENTIA TYPE (HCC): ICD-10-CM

## 2018-04-20 DIAGNOSIS — R94.4 DECREASED GFR: ICD-10-CM

## 2018-04-20 DIAGNOSIS — I10 ESSENTIAL HYPERTENSION: ICD-10-CM

## 2018-04-20 DIAGNOSIS — R74.8 ELEVATED LIVER ENZYMES: ICD-10-CM

## 2018-04-20 DIAGNOSIS — K52.832 LYMPHOCYTIC COLITIS: Primary | ICD-10-CM

## 2018-04-20 PROCEDURE — 99214 OFFICE O/P EST MOD 30 MIN: CPT | Performed by: INTERNAL MEDICINE

## 2018-04-20 PROCEDURE — 36415 COLL VENOUS BLD VENIPUNCTURE: CPT

## 2018-04-20 PROCEDURE — G0463 HOSPITAL OUTPT CLINIC VISIT: HCPCS | Performed by: INTERNAL MEDICINE

## 2018-04-20 PROCEDURE — 80053 COMPREHEN METABOLIC PANEL: CPT

## 2018-04-20 RX ORDER — POTASSIUM CHLORIDE 1500 MG/1
20 TABLET, FILM COATED, EXTENDED RELEASE ORAL DAILY
Qty: 30 TABLET | Refills: 0 | Status: SHIPPED | OUTPATIENT
Start: 2018-04-20 | End: 2018-05-20

## 2018-04-20 RX ORDER — NADOLOL 20 MG/1
10 TABLET ORAL DAILY
Qty: 45 TABLET | Refills: 3 | Status: SHIPPED | OUTPATIENT
Start: 2018-04-20 | End: 2018-06-07

## 2018-04-20 NOTE — PROGRESS NOTES
HPI:   Adrienne Duncan is a 70year old female presents with the following problems. Patient still has difficulty with bowels. She has a diagnosis of lymphocytic/microscopic colitis. She states she will not move her bowels for 2 or 3 days.   Then have TO THE AFFECTED AREA(S) 3 TO 4 TIMES DAILY AS NEEDED Disp:  Rfl: 1   NADOLOL OR Take 10 mg by mouth daily. Disp:  Rfl:    allopurinol 100 MG Oral Tab Take 2 tablets (200 mg total) by mouth daily.  Disp: 180 tablet Rfl: 0   Acetaminophen-Codeine #3 300-30 MG Unspecified sleep apnea     as per NG      Past Surgical History:  No date: ARTHROSCOPY OF JOINT UNLISTED      Comment: BILAT.  KNEES  No date: CARDIAC PACEMAKER PLACEMENT      Comment: as per VISHNU  2011: CARPAL TUNNEL RELEASE      Comment: as per VISHNU  7/10/15 62/05/18  -BASIC METABOLIC PANEL (8)   Result Value Ref Range   Glucose 96 70 - 99 mg/dL   Sodium 144 136 - 144 mmol/L   Potassium 4.5 3.3 - 5.1 mmol/L   Chloride 110 95 - 110 mmol/L   CO2 28 22 - 32 mmol/L   BUN 14 8 - 20 mg/dL   Creatinine 1.49 0.50 - 1. Biomarker positive.   PSYCHE:  denies depression or anxiety      EXAM:   /74 (BP Location: Right arm, Patient Position: Sitting, Cuff Size: adult)   Pulse 61   Temp 98.8 °F (37.1 °C) (Oral)   Ht 5' 5\" (1.651 m)   Wt 170 lb (77.1 kg)   SpO2 99%   BMI

## 2018-04-20 NOTE — TELEPHONE ENCOUNTER
Spoke with patient and relayed Dr. Taiwo Quiñones message and recommendation. Patient verbalized understanding. Patient agreeable to taking K-Dur 20 mEq 1 p.o. Daily #30. Rx sent to patient's pharmacy in [place] at [place], [place].  Also, Patient will call in 2 week

## 2018-04-20 NOTE — TELEPHONE ENCOUNTER
Please let patient know that her potassium came out just a little bit low again. Nothing very serious. However I would like her to take a potassium supplement. Please call in K-Dur 20 milliequivalents. 1 p.o. daily. Quantity #30.   Her kidney function

## 2018-04-26 ENCOUNTER — TELEPHONE (OUTPATIENT)
Dept: NEUROLOGY | Facility: CLINIC | Age: 68
End: 2018-04-26

## 2018-04-27 ENCOUNTER — MED REC SCAN ONLY (OUTPATIENT)
Dept: NEUROLOGY | Facility: CLINIC | Age: 68
End: 2018-04-27

## 2018-05-02 RX ORDER — ROPINIROLE 1 MG/1
TABLET, FILM COATED ORAL
Qty: 90 TABLET | Refills: 5 | Status: SHIPPED | OUTPATIENT
Start: 2018-05-02 | End: 2018-10-29

## 2018-05-29 ENCOUNTER — TELEPHONE (OUTPATIENT)
Dept: NEUROLOGY | Facility: CLINIC | Age: 68
End: 2018-05-29

## 2018-06-04 ENCOUNTER — MYAURORA ACCOUNT LINK (OUTPATIENT)
Dept: OTHER | Age: 68
End: 2018-06-04

## 2018-06-06 ENCOUNTER — TELEPHONE (OUTPATIENT)
Dept: NEUROLOGY | Facility: CLINIC | Age: 68
End: 2018-06-06

## 2018-06-06 ENCOUNTER — TELEPHONE (OUTPATIENT)
Dept: INTERNAL MEDICINE CLINIC | Facility: CLINIC | Age: 68
End: 2018-06-06

## 2018-06-06 NOTE — TELEPHONE ENCOUNTER
Progress note from [address] [place] [address] neurology 5/29/18 reviewed, placed for scanning.

## 2018-06-06 NOTE — TELEPHONE ENCOUNTER
As FYI to DR. FLEMING -- called patient who states she is not on Amlodipine , sh eis taking 10 mg Nadolol ( breaks 20 into half)

## 2018-06-06 NOTE — TELEPHONE ENCOUNTER
[address] and spoke with pharmacist - relayed message that patient does not take Amlodipine . Pharmacist states RX for Nadolol was never picked up. patient gets it from [facility] - as [id number] to DR. FLEMING

## 2018-06-06 NOTE — TELEPHONE ENCOUNTER
MD to advise. OK to refill? We received a refill request for pts Amlodipine. It looks like during her 5/17 Rheumtology Visit this Rx was discontinued.

## 2018-06-07 RX ORDER — NADOLOL 20 MG/1
10 TABLET ORAL DAILY
Qty: 45 TABLET | Refills: 3 | Status: SHIPPED | OUTPATIENT
Start: 2018-06-07 | End: 2019-08-22

## 2018-06-14 ENCOUNTER — OFFICE VISIT (OUTPATIENT)
Dept: INTERNAL MEDICINE CLINIC | Facility: CLINIC | Age: 68
End: 2018-06-14

## 2018-06-14 VITALS
WEIGHT: 167.81 LBS | DIASTOLIC BLOOD PRESSURE: 80 MMHG | HEART RATE: 64 BPM | BODY MASS INDEX: 27.96 KG/M2 | SYSTOLIC BLOOD PRESSURE: 160 MMHG | TEMPERATURE: 99 F | HEIGHT: 65 IN

## 2018-06-14 DIAGNOSIS — R21 RASH: Primary | ICD-10-CM

## 2018-06-14 PROCEDURE — 99214 OFFICE O/P EST MOD 30 MIN: CPT | Performed by: INTERNAL MEDICINE

## 2018-06-14 PROCEDURE — G0463 HOSPITAL OUTPT CLINIC VISIT: HCPCS | Performed by: INTERNAL MEDICINE

## 2018-06-14 NOTE — PROGRESS NOTES
Kelsi Lundberg is a 70year old female. HPI:   Patient presents with:  Rash: started 2 days ago , now between fingers , on arms and legs and face -tried OTC cortisone cream       Patient started 2 days ago with a rash. It started between her fingers.   [place] has not started that yet. She is on medical marijuana for her back which helps. Current Outpatient Prescriptions:  nadolol 20 MG Oral Tab Take 0.5 tablets (10 mg total) by mouth daily.  Disp: 45 tablet Rfl: 3   ALLOPURINOL 100 MG Oral Tab TAKE ONE TAB Lymphocytic colitis 2011    as per NG   • Migraine 9/21/2012   • Mild cognitive impairment     as per NG   • Obstructive sleep apnea 12/30/2013   • Pseudobulbar affect 2/24/2017   • Restless leg syndrome     as per NG   • Restless legs syndrome 5/16/2013 lot of time in the yard other than picking up dog waste. She will see dermatology tomorrow. She will take Benadryl 25 mg every 6 hours if needed.   If her face area gets worse she does have prednisone at home and I told her she could take 110 mg tablet to

## 2018-07-03 RX ORDER — ATORVASTATIN CALCIUM 20 MG/1
TABLET, FILM COATED ORAL
Qty: 30 TABLET | Refills: 11 | Status: SHIPPED | OUTPATIENT
Start: 2018-07-03 | End: 2019-06-27

## 2018-07-09 ENCOUNTER — LAB ENCOUNTER (OUTPATIENT)
Dept: LAB | Age: 68
End: 2018-07-09
Attending: INTERNAL MEDICINE
Payer: MEDICARE

## 2018-07-09 ENCOUNTER — OFFICE VISIT (OUTPATIENT)
Dept: INTERNAL MEDICINE CLINIC | Facility: CLINIC | Age: 68
End: 2018-07-09

## 2018-07-09 ENCOUNTER — TELEPHONE (OUTPATIENT)
Dept: INTERNAL MEDICINE CLINIC | Facility: CLINIC | Age: 68
End: 2018-07-09

## 2018-07-09 ENCOUNTER — PRIOR ORIGINAL RECORDS (OUTPATIENT)
Dept: OTHER | Age: 68
End: 2018-07-09

## 2018-07-09 VITALS
HEIGHT: 65 IN | DIASTOLIC BLOOD PRESSURE: 78 MMHG | HEART RATE: 59 BPM | WEIGHT: 159 LBS | OXYGEN SATURATION: 98 % | TEMPERATURE: 99 F | BODY MASS INDEX: 26.49 KG/M2 | SYSTOLIC BLOOD PRESSURE: 132 MMHG

## 2018-07-09 DIAGNOSIS — I10 ESSENTIAL HYPERTENSION: ICD-10-CM

## 2018-07-09 DIAGNOSIS — K52.832 LYMPHOCYTIC COLITIS: ICD-10-CM

## 2018-07-09 DIAGNOSIS — F03.91 DEMENTIA WITH BEHAVIORAL DISTURBANCE, UNSPECIFIED DEMENTIA TYPE (HCC): ICD-10-CM

## 2018-07-09 DIAGNOSIS — R94.4 DECREASED GFR: ICD-10-CM

## 2018-07-09 DIAGNOSIS — R63.4 WEIGHT LOSS: ICD-10-CM

## 2018-07-09 DIAGNOSIS — R63.4 WEIGHT LOSS: Primary | ICD-10-CM

## 2018-07-09 DIAGNOSIS — R74.8 ELEVATED LIVER ENZYMES: ICD-10-CM

## 2018-07-09 LAB
ALBUMIN SERPL BCP-MCNC: 3.9 G/DL (ref 3.5–4.8)
ALBUMIN/GLOB SERPL: 1.2 {RATIO} (ref 1–2)
ALP SERPL-CCNC: 72 U/L (ref 32–100)
ALT SERPL-CCNC: 35 U/L (ref 14–54)
ANION GAP SERPL CALC-SCNC: 10 MMOL/L (ref 0–18)
AST SERPL-CCNC: 44 U/L (ref 15–41)
BASOPHILS # BLD: 0 K/UL (ref 0–0.2)
BASOPHILS NFR BLD: 1 %
BILIRUB SERPL-MCNC: 1.1 MG/DL (ref 0.3–1.2)
BUN SERPL-MCNC: 10 MG/DL (ref 8–20)
BUN/CREAT SERPL: 8.8 (ref 10–20)
CALCIUM SERPL-MCNC: 9.4 MG/DL (ref 8.5–10.5)
CHLORIDE SERPL-SCNC: 105 MMOL/L (ref 95–110)
CO2 SERPL-SCNC: 26 MMOL/L (ref 22–32)
CREAT SERPL-MCNC: 1.14 MG/DL (ref 0.5–1.5)
EOSINOPHIL # BLD: 0.2 K/UL (ref 0–0.7)
EOSINOPHIL NFR BLD: 4 %
ERYTHROCYTE [DISTWIDTH] IN BLOOD BY AUTOMATED COUNT: 13.2 % (ref 11–15)
GLOBULIN PLAS-MCNC: 3.2 G/DL (ref 2.5–3.7)
GLUCOSE SERPL-MCNC: 94 MG/DL (ref 70–99)
HCT VFR BLD AUTO: 40.4 % (ref 35–48)
HGB BLD-MCNC: 13.7 G/DL (ref 12–16)
LYMPHOCYTES # BLD: 1.1 K/UL (ref 1–4)
LYMPHOCYTES NFR BLD: 23 %
MCH RBC QN AUTO: 29.2 PG (ref 27–32)
MCHC RBC AUTO-ENTMCNC: 34 G/DL (ref 32–37)
MCV RBC AUTO: 85.8 FL (ref 80–100)
MONOCYTES # BLD: 0.4 K/UL (ref 0–1)
MONOCYTES NFR BLD: 8 %
NEUTROPHILS # BLD AUTO: 3.2 K/UL (ref 1.8–7.7)
NEUTROPHILS NFR BLD: 65 %
OSMOLALITY UR CALC.SUM OF ELEC: 291 MOSM/KG (ref 275–295)
PATIENT FASTING: YES
PLATELET # BLD AUTO: 179 K/UL (ref 140–400)
PMV BLD AUTO: 10.4 FL (ref 7.4–10.3)
POTASSIUM SERPL-SCNC: 3.3 MMOL/L (ref 3.3–5.1)
PROT SERPL-MCNC: 7.1 G/DL (ref 5.9–8.4)
RBC # BLD AUTO: 4.71 M/UL (ref 3.7–5.4)
SODIUM SERPL-SCNC: 141 MMOL/L (ref 136–144)
T4 FREE SERPL-MCNC: 1.07 NG/DL (ref 0.58–1.64)
TSH SERPL-ACNC: 2.02 UIU/ML (ref 0.45–5.33)
WBC # BLD AUTO: 4.9 K/UL (ref 4–11)

## 2018-07-09 PROCEDURE — 36415 COLL VENOUS BLD VENIPUNCTURE: CPT

## 2018-07-09 PROCEDURE — G0463 HOSPITAL OUTPT CLINIC VISIT: HCPCS | Performed by: INTERNAL MEDICINE

## 2018-07-09 PROCEDURE — 99214 OFFICE O/P EST MOD 30 MIN: CPT | Performed by: INTERNAL MEDICINE

## 2018-07-09 PROCEDURE — 80053 COMPREHEN METABOLIC PANEL: CPT

## 2018-07-09 PROCEDURE — 84443 ASSAY THYROID STIM HORMONE: CPT

## 2018-07-09 PROCEDURE — 85025 COMPLETE CBC W/AUTO DIFF WBC: CPT

## 2018-07-09 PROCEDURE — 84439 ASSAY OF FREE THYROXINE: CPT

## 2018-07-09 RX ORDER — RIVASTIGMINE 4.6 MG/24H
PATCH, EXTENDED RELEASE TRANSDERMAL
COMMUNITY
Start: 2018-07-03 | End: 2018-07-09

## 2018-07-09 RX ORDER — PANTOPRAZOLE SODIUM 40 MG/1
TABLET, DELAYED RELEASE ORAL
COMMUNITY
Start: 2018-07-03 | End: 2018-07-09

## 2018-07-09 RX ORDER — MOMETASONE FUROATE 1 MG/G
OINTMENT TOPICAL
COMMUNITY
Start: 2018-06-15 | End: 2018-08-06

## 2018-07-09 RX ORDER — PANTOPRAZOLE SODIUM 40 MG/1
40 TABLET, DELAYED RELEASE ORAL
Qty: 30 TABLET | Refills: 1 | Status: SHIPPED | OUTPATIENT
Start: 2018-07-09 | End: 2018-08-09

## 2018-07-09 NOTE — PROGRESS NOTES
HPI:   Issac Lewis is a 70year old female presents with the following problems. Patient continues to lose weight. This started in January. Patient does admit to decreased oral calories. She has anorexia. On and off nausea.   If she eats too much members of [facility] gastroenterology. We will update labs. I will follow-up with her in 1 month. She will start the Protonix. His history of paroxysmal atrial fibrillation. She is on Xarelto for that.     Her rash that I recently saw he October 2014. • Atrophic kidney 3/11/2016    CT scan October 8, 2014 shows left kidney smaller than the right. Bilateral areas of renal parenchymal scar. Symmetric nephrograms. Moderate calcific atherosclerosis of aorta.     • Carpal tunnel syndrome 2 \"Ethmoidectomy, max antrostomies\"; as per NG  June or July 2015: OTHER      Comment: removal of pins and plate from right wrist                (fracture)  No date: OTHER SURGICAL HISTORY Left      Comment: L knee scoped--3 torn meniscus--biocartilage dysuria or frequency  NEURO:  denies headaches or dizziness  PSYCHE: Patient has dementia. She is functioning well. She is independent. She follows with neurology.       EXAM:   /78 (BP Location: Left arm, Patient Position: Sitting, Cuff Size: larg liver.  Will update levels. She does not seem to be in any toxic medication for her liver. 5. Essential hypertension  Blood pressure under satisfactory control. Continue nadolol.     6. Dementia with behavioral disturbance, unspecified dementia type  F

## 2018-07-10 NOTE — TELEPHONE ENCOUNTER
Spoke patient and informed of MD recommendation. She verbalized to follow thru with nephrology and gastroenterology. She verbalized understanding.

## 2018-07-10 NOTE — TELEPHONE ENCOUNTER
Please notify patient that her labs look acceptable. Mild kidney weakness that she has had before. Mild elevation of one liver enzyme. We discussed in office she will follow-up with nephrology either Dr. Amaury Hoyt or Dr. Dinora Moreno.    Also I gave her an order

## 2018-07-18 ENCOUNTER — HOSPITAL ENCOUNTER (OUTPATIENT)
Dept: ULTRASOUND IMAGING | Age: 68
Discharge: HOME | End: 2018-07-18
Attending: INTERNAL MEDICINE
Payer: MEDICARE

## 2018-07-18 DIAGNOSIS — R74.8 ELEVATED LIVER ENZYMES: ICD-10-CM

## 2018-07-18 DIAGNOSIS — R94.4 DECREASED GFR: ICD-10-CM

## 2018-07-18 PROCEDURE — 76775 US EXAM ABDO BACK WALL LIM: CPT | Performed by: INTERNAL MEDICINE

## 2018-07-22 ENCOUNTER — TELEPHONE (OUTPATIENT)
Dept: INTERNAL MEDICINE CLINIC | Facility: CLINIC | Age: 68
End: 2018-07-22

## 2018-07-22 NOTE — TELEPHONE ENCOUNTER
Please notify patient renal U/S okay. She does have some simple cysts but nothing more significant.  As we discussed in past I would like her to see a nephrologist Dr. Jose R Mcdermott or Dr. Salvador Giles

## 2018-07-23 NOTE — TELEPHONE ENCOUNTER
As FYI to DR. FLEMING - called patient and relayed DR. FLEMING message- verbalized understanding.  Patient states she has bello 8/7 with nephrologist

## 2018-07-24 NOTE — TELEPHONE ENCOUNTER
Pt is calling today asking if we can please send over the blood and US results sent to Dr Mingo Cameron. Fax number is [phone]. Best call back for the pt is [phone]. Tasked to nursing.

## 2018-07-24 NOTE — TELEPHONE ENCOUNTER
7/18/18 u/s results, 7/9/18 lab results faxed to Dr. Ethelene Prader: [phone]. Pt notified, understanding verbalized.

## 2018-08-03 ENCOUNTER — TELEPHONE (OUTPATIENT)
Dept: INTERNAL MEDICINE CLINIC | Facility: CLINIC | Age: 68
End: 2018-08-03

## 2018-08-03 NOTE — TELEPHONE ENCOUNTER
Arnold requesting PA    [employer]     ID  [id number],   Placed in purple folder    Tasked to rx

## 2018-08-06 ENCOUNTER — OFFICE VISIT (OUTPATIENT)
Dept: INTERNAL MEDICINE CLINIC | Facility: CLINIC | Age: 68
End: 2018-08-06
Payer: MEDICARE

## 2018-08-06 VITALS
WEIGHT: 151.19 LBS | HEART RATE: 64 BPM | TEMPERATURE: 99 F | DIASTOLIC BLOOD PRESSURE: 90 MMHG | BODY MASS INDEX: 25.19 KG/M2 | HEIGHT: 65 IN | SYSTOLIC BLOOD PRESSURE: 150 MMHG

## 2018-08-06 DIAGNOSIS — I48.0 PAROXYSMAL ATRIAL FIBRILLATION (HCC): ICD-10-CM

## 2018-08-06 DIAGNOSIS — R63.4 WEIGHT LOSS: Primary | ICD-10-CM

## 2018-08-06 DIAGNOSIS — R94.4 DECREASED GFR: ICD-10-CM

## 2018-08-06 DIAGNOSIS — R41.3 MEMORY LOSS: ICD-10-CM

## 2018-08-06 DIAGNOSIS — I10 ESSENTIAL HYPERTENSION: ICD-10-CM

## 2018-08-06 DIAGNOSIS — K52.832 LYMPHOCYTIC COLITIS: ICD-10-CM

## 2018-08-06 DIAGNOSIS — R74.8 ELEVATED LIVER ENZYMES: ICD-10-CM

## 2018-08-06 PROCEDURE — 99215 OFFICE O/P EST HI 40 MIN: CPT | Performed by: INTERNAL MEDICINE

## 2018-08-06 PROCEDURE — G0463 HOSPITAL OUTPT CLINIC VISIT: HCPCS | Performed by: INTERNAL MEDICINE

## 2018-08-06 NOTE — PROGRESS NOTES
HPI:   Nancy Currie is a 70year old female presents with the following problems. Patient continued to lose weight. Noted is that her weight March 2017 was 190 pounds. In January this year she was 190 pounds. We talked about this.   She feels krystle hydronephrosis. The etiology of her weight loss is unclear. She has an appoint with gastroenterology tomorrow. .     Wt Readings from Last 3 Encounters:  08/06/18 : 151 lb 3.2 oz (68.6 kg)  07/09/18 : 159 lb (72.1 kg)  06/14/18 : 167 lb 12.8 oz ( • Diverticulosis     as per NG   • Family history of colon cancer     Maternal grandmother had colon cancer in her 76s.    • Gout    • Hepatic steatosis 3/11/2016   • High cholesterol     as per NG   • History of colonic polyps 07-    as per NG   • R knee scoped for torn miniscus  2006: RECONSTR NOSE+JEAN SEPTAL REPAIR      Comment: as per NG   Family History   Problem Relation Age of Onset   • Hypertension Father      as per NG   • Heart Disease Father      as per NG   • Migraines Father      as per 27.0 - 32.0 pg   MCHC 34.0 32.0 - 37.0 g/dl   RDW 13.2 11.0 - 15.0 %    140 - 400 K/UL   MPV 10.4 (H) 7.4 - 10.3 fL   Neutrophil % 65 %   Lymphocyte % 23 %   Monocyte % 8 %   Eosinophil % 4 %   Basophil % 1 %   Neutrophil Absolute 3.2 1.8 - 7.7 K/UL organomegaly or tenderness   RECTAL: Recent EGD and colonoscopy. See above. MUSCULOSKELE recent EGD and colonoscopy. EXTREMITIES:  no cyanosis, clubbing or edema. NEURO:  Awake and aware. ASSESSMENT AND PLAN:         1.  Lymphocytic colitis  Patien

## 2018-08-07 ENCOUNTER — LAB ENCOUNTER (OUTPATIENT)
Dept: LAB | Age: 68
End: 2018-08-07
Attending: INTERNAL MEDICINE
Payer: MEDICARE

## 2018-08-07 DIAGNOSIS — K52.832 LYMPHOCYTIC COLITIS: ICD-10-CM

## 2018-08-07 DIAGNOSIS — R63.4 WEIGHT LOSS: ICD-10-CM

## 2018-08-07 DIAGNOSIS — R63.0 LOSS OF APPETITE: ICD-10-CM

## 2018-08-07 LAB
BUN SERPL-MCNC: 11 MG/DL (ref 8–20)
CREAT SERPL-MCNC: 1.26 MG/DL (ref 0.5–1.5)
IGA SERPL-MCNC: 245 MG/DL (ref 68–378)

## 2018-08-07 PROCEDURE — 82784 ASSAY IGA/IGD/IGG/IGM EACH: CPT

## 2018-08-07 PROCEDURE — 83516 IMMUNOASSAY NONANTIBODY: CPT

## 2018-08-07 PROCEDURE — 86256 FLUORESCENT ANTIBODY TITER: CPT

## 2018-08-07 PROCEDURE — 82565 ASSAY OF CREATININE: CPT

## 2018-08-07 PROCEDURE — 84520 ASSAY OF UREA NITROGEN: CPT

## 2018-08-07 PROCEDURE — 36415 COLL VENOUS BLD VENIPUNCTURE: CPT

## 2018-08-08 LAB
GLIADIN IGA SER-ACNC: 1 U/ML (ref ?–7)
GLIADIN IGG SER-ACNC: <0.4 U/ML (ref ?–7)
TTG IGA SER-ACNC: 0.5 U/ML (ref ?–7)

## 2018-08-09 ENCOUNTER — TELEPHONE (OUTPATIENT)
Dept: INTERNAL MEDICINE CLINIC | Facility: CLINIC | Age: 68
End: 2018-08-09

## 2018-08-09 NOTE — TELEPHONE ENCOUNTER
Barney Carballo. Thank you for seeing Alyx Yanez. She has chronic kidney disease. I think we should probably do her CAT scans WITHOUT  dye. Please let me know what you think.   My cell phone number [phone]

## 2018-08-10 ENCOUNTER — TELEPHONE (OUTPATIENT)
Dept: INTERNAL MEDICINE CLINIC | Facility: CLINIC | Age: 68
End: 2018-08-10

## 2018-08-10 NOTE — TELEPHONE ENCOUNTER
Nurse called and spoke to the patient and relayed the message from MD.     Patient verbalized understanding.

## 2018-08-10 NOTE — TELEPHONE ENCOUNTER
Please call patient and let her know that I have a message out to [facility] her gastroenterologist.  There is a CAT scan of chest abdomen and pelvis ordered but it is WITH IV dye and I do not want her to get any IV dye so as not to damage her kidneys.   I am w

## 2018-08-22 ENCOUNTER — TELEPHONE (OUTPATIENT)
Dept: INTERNAL MEDICINE CLINIC | Facility: CLINIC | Age: 68
End: 2018-08-22

## 2018-08-29 ENCOUNTER — TELEPHONE (OUTPATIENT)
Dept: NEUROLOGY | Facility: CLINIC | Age: 68
End: 2018-08-29

## 2018-08-30 LAB
ALT (SGPT): 35 U/L
AST (SGOT): 44 U/L
BILIRUBIN TOTAL: 1.1 MG/DL
BUN: 10 MG/DL
CALCIUM: 9.4 MG/DL
CHLORIDE: 105 MEQ/L
CHOLESTEROL, TOTAL: 128 MG/DL
CREATININE, SERUM: 1.14 MG/DL
GLUCOSE: 94 MG/DL
GLUCOSE: 94 MG/DL
HDL CHOLESTEROL: 32 MG/DL
HEMATOCRIT: 40.4 %
HEMOGLOBIN: 13.7 G/DL
LDL CHOLESTEROL: 61 MG/DL
NON-HDL CHOLESTEROL: 96 MG/DL
PLATELETS: 179 K/UL
POTASSIUM, SERUM: 3.3 MEQ/L
PROTEIN, TOTAL: 7.1 G/DL
RED BLOOD COUNT: 4.71 X 10-6/U
SGOT (AST): 44 IU/L
SGPT (ALT): 35 IU/L
SODIUM: 141 MEQ/L
THYROID STIMULATING HORMONE: 2.02 MLU/L
TRIGLYCERIDES: 176 MG/DL
WHITE BLOOD COUNT: 4.9 X 10-3/U

## 2018-08-31 ENCOUNTER — PRIOR ORIGINAL RECORDS (OUTPATIENT)
Dept: OTHER | Age: 68
End: 2018-08-31

## 2018-09-04 ENCOUNTER — TELEPHONE (OUTPATIENT)
Dept: INTERNAL MEDICINE CLINIC | Facility: CLINIC | Age: 68
End: 2018-09-04

## 2018-09-04 NOTE — TELEPHONE ENCOUNTER
Hi Dr. Cholo Tate. Thank you very much for seeing Mercy Noriega recently. I will be seeing her Thursday, September 6 for follow-up on her weight loss. Please let me know if you have any recommendations in terms of her weight loss from gastroenterology point of view.

## 2018-09-06 ENCOUNTER — OFFICE VISIT (OUTPATIENT)
Dept: INTERNAL MEDICINE CLINIC | Facility: CLINIC | Age: 68
End: 2018-09-06
Payer: MEDICARE

## 2018-09-06 VITALS
OXYGEN SATURATION: 98 % | HEART RATE: 60 BPM | TEMPERATURE: 99 F | HEIGHT: 64.75 IN | BODY MASS INDEX: 25.4 KG/M2 | SYSTOLIC BLOOD PRESSURE: 110 MMHG | DIASTOLIC BLOOD PRESSURE: 70 MMHG | WEIGHT: 150.63 LBS

## 2018-09-06 DIAGNOSIS — I10 ESSENTIAL HYPERTENSION: Primary | ICD-10-CM

## 2018-09-06 DIAGNOSIS — Z00.00 ROUTINE HEALTH MAINTENANCE: ICD-10-CM

## 2018-09-06 DIAGNOSIS — I48.0 PAROXYSMAL ATRIAL FIBRILLATION (HCC): ICD-10-CM

## 2018-09-06 DIAGNOSIS — R41.3 MEMORY LOSS: ICD-10-CM

## 2018-09-06 DIAGNOSIS — R94.4 DECREASED GFR: ICD-10-CM

## 2018-09-06 DIAGNOSIS — R63.4 WEIGHT LOSS: ICD-10-CM

## 2018-09-06 PROCEDURE — G0463 HOSPITAL OUTPT CLINIC VISIT: HCPCS | Performed by: INTERNAL MEDICINE

## 2018-09-06 PROCEDURE — 99214 OFFICE O/P EST MOD 30 MIN: CPT | Performed by: INTERNAL MEDICINE

## 2018-09-06 NOTE — PROGRESS NOTES
HPI:   Niko Delgado is a 70year old female presents with the following problems. Patient generally feels well. She is maintaining her weight right at 150. This is stable for 1 month. She has seen Dr. Cassie Jalloh from gastroneurology.   No organic basis fo Current Outpatient Prescriptions:  ALLOPURINOL 100 MG Oral Tab TAKE ONE TABLET BY MOUTH TWICE DAILY  Disp: 180 tablet Rfl: 0   ATORVASTATIN 20 MG Oral Tab TAKE ONE TABLET BY MOUTH NIGHTLY  Disp: 30 tablet Rfl: 11   nadolol 20 MG Oral Tab Take 0.5 tab ovaries     as per NG   • Incontinence     DUE TO CYSTOCELE/RECTOCELE   • Lymphocytic colitis 2011    as per NG   • Migraine 9/21/2012   • Migraines    • Mild cognitive impairment     as per NG   • Obstructive sleep apnea 12/30/2013   • Pseudobulbar affect as per NG   • Hypertension Mother      as per NG   • Heart Disease Mother      as per NG   • Migraines Mother      as per NG   • Cancer Sister      Lung Cancer, Kidney Cancer; as per NG   • Hypertension Brother      as per NG   • Cancer Brother      Throat frequency  MUSCULOSKELETAL:  Voices no back pain or joint pain  NEURO:  Voices no headaches or dizziness  PSYCHE: Voices no depression or anxiety      EXAM:   /70 (BP Location: Right arm, Patient Position: Sitting, Cuff Size: adult)   Pulse 60   Temp maintenance  Recommended flu vaccine.         Follow up 2 months    Spencer Goodpasture, MD  9/6/2018  2:50 PM

## 2018-09-06 NOTE — TELEPHONE ENCOUNTER
Dr. Aicha Jane did message me back. No organic problems found for weight loss. Patient has lost weight I believe due to decreased caloric intake. At times she has decreased appetite. On-and-off nausea. Workup negative.   I did tell her that Dr. Aicha Jane would like

## 2018-09-07 ENCOUNTER — HOSPITAL ENCOUNTER (OUTPATIENT)
Dept: INTERVENTIONAL RADIOLOGY/VASCULAR | Facility: HOSPITAL | Age: 68
Discharge: HOME | End: 2018-09-07
Attending: INTERNAL MEDICINE | Admitting: INTERNAL MEDICINE
Payer: MEDICARE

## 2018-09-07 ENCOUNTER — HOSPITAL ENCOUNTER (OUTPATIENT)
Dept: CV DIAGNOSTICS | Facility: HOSPITAL | Age: 68
Discharge: HOME | End: 2018-09-07
Attending: INTERNAL MEDICINE | Admitting: INTERNAL MEDICINE
Payer: MEDICARE

## 2018-09-07 VITALS
WEIGHT: 152 LBS | HEIGHT: 65 IN | DIASTOLIC BLOOD PRESSURE: 60 MMHG | BODY MASS INDEX: 25.33 KG/M2 | OXYGEN SATURATION: 97 % | HEART RATE: 60 BPM | RESPIRATION RATE: 10 BRPM | SYSTOLIC BLOOD PRESSURE: 108 MMHG

## 2018-09-07 DIAGNOSIS — I48.91 A-FIB (HCC): ICD-10-CM

## 2018-09-07 PROCEDURE — 93312 ECHO TRANSESOPHAGEAL: CPT

## 2018-09-07 PROCEDURE — 93320 DOPPLER ECHO COMPLETE: CPT | Performed by: INTERNAL MEDICINE

## 2018-09-07 PROCEDURE — B245ZZ4 ULTRASONOGRAPHY OF LEFT HEART, TRANSESOPHAGEAL: ICD-10-PCS | Performed by: INTERNAL MEDICINE

## 2018-09-07 PROCEDURE — 99152 MOD SED SAME PHYS/QHP 5/>YRS: CPT

## 2018-09-07 PROCEDURE — 99153 MOD SED SAME PHYS/QHP EA: CPT

## 2018-09-07 PROCEDURE — 93325 DOPPLER ECHO COLOR FLOW MAPG: CPT | Performed by: INTERNAL MEDICINE

## 2018-09-07 RX ORDER — SODIUM CHLORIDE 9 MG/ML
INJECTION, SOLUTION INTRAVENOUS CONTINUOUS
Status: DISCONTINUED | OUTPATIENT
Start: 2018-09-07 | End: 2018-09-07

## 2018-09-07 RX ORDER — SODIUM CHLORIDE 9 MG/ML
INJECTION, SOLUTION INTRAVENOUS
Status: DISCONTINUED
Start: 2018-09-07 | End: 2018-09-07

## 2018-09-07 RX ORDER — MIDAZOLAM HYDROCHLORIDE 1 MG/ML
INJECTION INTRAMUSCULAR; INTRAVENOUS
Status: DISCONTINUED
Start: 2018-09-07 | End: 2018-09-07

## 2018-09-07 RX ORDER — MIDAZOLAM HYDROCHLORIDE 1 MG/ML
INJECTION INTRAMUSCULAR; INTRAVENOUS
Status: DISCONTINUED
Start: 2018-09-07 | End: 2018-09-07 | Stop reason: WASHOUT

## 2018-09-07 NOTE — PROCEDURES
.Cardiology Transesophageal Echo Note    PRE-PROCEDURE DIAGNOSIS: Pre-Watchman Occlusion Device Evaluation    PROCEDURE: Transesophageal Echocardiogram.    SEDATION:   Topical spray x 1  Versed: 5 mg  Fentanyl: 75 mcg  I personally supervised the intraveno At 45 degrees the SARABJIT ostium diameter was 16.3 mm, the SARABJIT length was 19.1 mm. At 90 degrees the SARABJIT ostium diameter was 15.3 mm, the SARABJIT length was 19.7 mm. At 135 degrees the SARABJIT ostium diameter was 13.8 mm, the SARABJIT length was 16.4 mm.   · The maximal SARABJIT

## 2018-09-13 ENCOUNTER — HOSPITAL ENCOUNTER (OUTPATIENT)
Dept: ULTRASOUND IMAGING | Age: 68
Discharge: HOME | End: 2018-09-13
Attending: UROLOGY
Payer: MEDICARE

## 2018-09-13 DIAGNOSIS — N13.9 OBSTRUCTED, UROPATHY: ICD-10-CM

## 2018-09-13 PROCEDURE — 76857 US EXAM PELVIC LIMITED: CPT | Performed by: UROLOGY

## 2018-09-20 ENCOUNTER — TELEPHONE (OUTPATIENT)
Dept: INTERVENTIONAL RADIOLOGY/VASCULAR | Facility: HOSPITAL | Age: 68
End: 2018-09-20

## 2018-09-20 NOTE — TELEPHONE ENCOUNTER
Called pt to discuss Watchman procedure. Procedure explained to patient and questions answered. Informational packet mailed to pt. Will call her back with a procedure date.

## 2018-09-22 ENCOUNTER — TELEPHONE (OUTPATIENT)
Dept: NEUROLOGY | Facility: CLINIC | Age: 68
End: 2018-09-22

## 2018-09-22 NOTE — TELEPHONE ENCOUNTER
Note from Nakita CUMMINS from [facility] re: dementia reviewed (visit date 8/27/18); placed for scanning.

## 2018-09-25 ENCOUNTER — TELEPHONE (OUTPATIENT)
Dept: INTERVENTIONAL RADIOLOGY/VASCULAR | Facility: HOSPITAL | Age: 68
End: 2018-09-25

## 2018-09-25 NOTE — TELEPHONE ENCOUNTER
Called pt to give Watchman procedure date of November 9. PAT's put into Epic and central scheduling number given to pt and instructed to have PAT's done at THE [facility] the week of October 29.   Pt verbalized understanding

## 2018-09-26 ENCOUNTER — MED REC SCAN ONLY (OUTPATIENT)
Dept: NEUROLOGY | Facility: CLINIC | Age: 68
End: 2018-09-26

## 2018-10-08 ENCOUNTER — PRIOR ORIGINAL RECORDS (OUTPATIENT)
Dept: OTHER | Age: 68
End: 2018-10-08

## 2018-10-29 RX ORDER — ROPINIROLE 1 MG/1
TABLET, FILM COATED ORAL
Qty: 90 TABLET | Refills: 3 | Status: SHIPPED | OUTPATIENT
Start: 2018-10-29 | End: 2019-02-24

## 2018-11-07 ENCOUNTER — LAB ENCOUNTER (OUTPATIENT)
Dept: LAB | Facility: HOSPITAL | Age: 68
End: 2018-11-07
Attending: INTERNAL MEDICINE
Payer: MEDICARE

## 2018-11-07 ENCOUNTER — PRIOR ORIGINAL RECORDS (OUTPATIENT)
Dept: OTHER | Age: 68
End: 2018-11-07

## 2018-11-07 ENCOUNTER — HOSPITAL ENCOUNTER (OUTPATIENT)
Dept: GENERAL RADIOLOGY | Facility: HOSPITAL | Age: 68
Discharge: HOME | End: 2018-11-07
Attending: INTERNAL MEDICINE
Payer: MEDICARE

## 2018-11-07 DIAGNOSIS — R79.89 ELEVATED LFTS: ICD-10-CM

## 2018-11-07 DIAGNOSIS — M10.9 GOUTY ARTHRITIS: ICD-10-CM

## 2018-11-07 DIAGNOSIS — I48.91 ATRIAL FIBRILLATION (HCC): ICD-10-CM

## 2018-11-07 DIAGNOSIS — Z79.899 ENCOUNTER FOR LONG-TERM (CURRENT) USE OF HIGH-RISK MEDICATION: ICD-10-CM

## 2018-11-07 PROCEDURE — 86901 BLOOD TYPING SEROLOGIC RH(D): CPT

## 2018-11-07 PROCEDURE — 86850 RBC ANTIBODY SCREEN: CPT

## 2018-11-07 PROCEDURE — 80048 BASIC METABOLIC PNL TOTAL CA: CPT

## 2018-11-07 PROCEDURE — 86900 BLOOD TYPING SEROLOGIC ABO: CPT

## 2018-11-07 PROCEDURE — 84550 ASSAY OF BLOOD/URIC ACID: CPT

## 2018-11-07 PROCEDURE — 36415 COLL VENOUS BLD VENIPUNCTURE: CPT

## 2018-11-07 PROCEDURE — 85025 COMPLETE CBC W/AUTO DIFF WBC: CPT

## 2018-11-07 PROCEDURE — 71046 X-RAY EXAM CHEST 2 VIEWS: CPT | Performed by: INTERNAL MEDICINE

## 2018-11-07 PROCEDURE — 93010 ELECTROCARDIOGRAM REPORT: CPT | Performed by: UROLOGY

## 2018-11-07 PROCEDURE — 93005 ELECTROCARDIOGRAM TRACING: CPT

## 2018-11-07 NOTE — HISTORICAL OFFICE NOTE
Hemalatha Wolfe  : 1950  ACCOUNT:  [id number]  [phone]  PCP: Dr. Meyer Russian     TODAY'S DATE: 2018  DICTATED BY:  [Dr. Lobito Fletcher: [Followup of Atrial fibrillation, paroxysmal, Followup of Pacemaker B.S DR  and not exercise. DIET: low fat, low cholesterol. MARITAL STATUS: . OCCUPATION: .      ALLERGIES: No Known Allergies    MEDICATIONS: Selected prescriptions see below    VITAL SIGNS: [B/P - 170/86 , Pulse - 60, Respiration - 18, Weight -  152, Hei benign  5. Pacemaker B.S  7/12, not dependent  6. Pacer reprogramming  7. Precordial pain      PLAN:  [1.  Will call to schedule transesophageal echocardiogram for pre-watchman workup  2.   Continue medications  3 workup for annual follow-up in pacer clin

## 2018-11-08 ENCOUNTER — PRIOR ORIGINAL RECORDS (OUTPATIENT)
Dept: OTHER | Age: 68
End: 2018-11-08

## 2018-11-08 NOTE — PROGRESS NOTES
GIROPTIC message sent to patient. Uric acid level continues to be at goal. May continue with current medications.

## 2018-11-09 ENCOUNTER — HOSPITAL ENCOUNTER (INPATIENT)
Facility: HOSPITAL | Age: 68
LOS: 2 days | Discharge: HOME | DRG: 274 | End: 2018-11-11
Attending: INTERNAL MEDICINE | Admitting: INTERNAL MEDICINE
Payer: MEDICARE

## 2018-11-09 ENCOUNTER — HOSPITAL ENCOUNTER (INPATIENT)
Dept: CV DIAGNOSTICS | Facility: HOSPITAL | Age: 68
Discharge: HOME | DRG: 274 | End: 2018-11-09
Attending: INTERNAL MEDICINE
Payer: MEDICARE

## 2018-11-09 ENCOUNTER — ANESTHESIA EVENT (OUTPATIENT)
Dept: CARDIAC SURGERY | Facility: HOSPITAL | Age: 68
End: 2018-11-09

## 2018-11-09 ENCOUNTER — ANESTHESIA (OUTPATIENT)
Dept: CARDIAC SURGERY | Facility: HOSPITAL | Age: 68
End: 2018-11-09

## 2018-11-09 PROCEDURE — 85347 COAGULATION TIME ACTIVATED: CPT

## 2018-11-09 PROCEDURE — 80053 COMPREHEN METABOLIC PANEL: CPT | Performed by: INTERNAL MEDICINE

## 2018-11-09 PROCEDURE — 93005 ELECTROCARDIOGRAM TRACING: CPT

## 2018-11-09 PROCEDURE — 85027 COMPLETE CBC AUTOMATED: CPT | Performed by: INTERNAL MEDICINE

## 2018-11-09 PROCEDURE — 93010 ELECTROCARDIOGRAM REPORT: CPT | Performed by: INTERNAL MEDICINE

## 2018-11-09 PROCEDURE — 85014 HEMATOCRIT: CPT

## 2018-11-09 PROCEDURE — 82803 BLOOD GASES ANY COMBINATION: CPT

## 2018-11-09 PROCEDURE — 87081 CULTURE SCREEN ONLY: CPT | Performed by: INTERNAL MEDICINE

## 2018-11-09 PROCEDURE — 82330 ASSAY OF CALCIUM: CPT

## 2018-11-09 PROCEDURE — 84295 ASSAY OF SERUM SODIUM: CPT

## 2018-11-09 PROCEDURE — 85025 COMPLETE CBC W/AUTO DIFF WBC: CPT | Performed by: INTERNAL MEDICINE

## 2018-11-09 PROCEDURE — 36415 COLL VENOUS BLD VENIPUNCTURE: CPT

## 2018-11-09 PROCEDURE — B245ZZ4 ULTRASONOGRAPHY OF LEFT HEART, TRANSESOPHAGEAL: ICD-10-PCS | Performed by: INTERNAL MEDICINE

## 2018-11-09 PROCEDURE — 84132 ASSAY OF SERUM POTASSIUM: CPT | Performed by: INTERNAL MEDICINE

## 2018-11-09 PROCEDURE — 84132 ASSAY OF SERUM POTASSIUM: CPT

## 2018-11-09 PROCEDURE — 86920 COMPATIBILITY TEST SPIN: CPT

## 2018-11-09 PROCEDURE — 02L73DK OCCLUSION OF LEFT ATRIAL APPENDAGE WITH INTRALUMINAL DEVICE, PERCUTANEOUS APPROACH: ICD-10-PCS | Performed by: INTERNAL MEDICINE

## 2018-11-09 PROCEDURE — 85610 PROTHROMBIN TIME: CPT | Performed by: INTERNAL MEDICINE

## 2018-11-09 DEVICE — LEFT ATRIAL APPENDAGE CLOSURE DEVICE WITH DELIVERY SYSTEM
Type: IMPLANTABLE DEVICE | Status: FUNCTIONAL
Brand: WATCHMAN®

## 2018-11-09 RX ORDER — ROPINIROLE 1 MG/1
1 TABLET, FILM COATED ORAL 3 TIMES DAILY
Status: DISCONTINUED | OUTPATIENT
Start: 2018-11-09 | End: 2018-11-11

## 2018-11-09 RX ORDER — CEFAZOLIN SODIUM/WATER 2 G/20 ML
SYRINGE (ML) INTRAVENOUS
Status: DISCONTINUED | OUTPATIENT
Start: 2018-11-09 | End: 2018-11-11

## 2018-11-09 RX ORDER — ALLOPURINOL 100 MG/1
100 TABLET ORAL 2 TIMES DAILY
Status: DISCONTINUED | OUTPATIENT
Start: 2018-11-09 | End: 2018-11-11

## 2018-11-09 RX ORDER — SODIUM CHLORIDE 9 MG/ML
INJECTION, SOLUTION INTRAVENOUS CONTINUOUS
Status: DISCONTINUED | OUTPATIENT
Start: 2018-11-09 | End: 2018-11-09

## 2018-11-09 RX ORDER — ACETAMINOPHEN 325 MG/1
650 TABLET ORAL EVERY 4 HOURS PRN
Status: DISCONTINUED | OUTPATIENT
Start: 2018-11-09 | End: 2018-11-11

## 2018-11-09 RX ORDER — SODIUM CHLORIDE 9 MG/ML
INJECTION, SOLUTION INTRAVENOUS CONTINUOUS
Status: DISCONTINUED | OUTPATIENT
Start: 2018-11-09 | End: 2018-11-11

## 2018-11-09 RX ORDER — ACETAMINOPHEN AND CODEINE PHOSPHATE 300; 30 MG/1; MG/1
2 TABLET ORAL EVERY 4 HOURS PRN
Status: DISCONTINUED | OUTPATIENT
Start: 2018-11-09 | End: 2018-11-11

## 2018-11-09 RX ORDER — CEFAZOLIN SODIUM/WATER 2 G/20 ML
SYRINGE (ML) INTRAVENOUS
Status: COMPLETED
Start: 2018-11-09 | End: 2018-11-09

## 2018-11-09 RX ORDER — CEFAZOLIN SODIUM/WATER 2 G/20 ML
2 SYRINGE (ML) INTRAVENOUS
Status: DISCONTINUED | OUTPATIENT
Start: 2018-11-09 | End: 2018-11-09 | Stop reason: HOSPADM

## 2018-11-09 RX ORDER — COLCHICINE 0.6 MG/1
0.6 TABLET ORAL EVERY OTHER DAY
Status: DISCONTINUED | OUTPATIENT
Start: 2018-11-10 | End: 2018-11-11

## 2018-11-09 RX ORDER — ATORVASTATIN CALCIUM 20 MG/1
20 TABLET, FILM COATED ORAL NIGHTLY
Status: DISCONTINUED | OUTPATIENT
Start: 2018-11-09 | End: 2018-11-11

## 2018-11-09 RX ORDER — PROTAMINE SULFATE 10 MG/ML
INJECTION, SOLUTION INTRAVENOUS
Status: COMPLETED
Start: 2018-11-09 | End: 2018-11-09

## 2018-11-09 RX ORDER — POTASSIUM CHLORIDE 20 MEQ/1
40 TABLET, EXTENDED RELEASE ORAL EVERY 4 HOURS
Status: COMPLETED | OUTPATIENT
Start: 2018-11-09 | End: 2018-11-09

## 2018-11-09 RX ORDER — ACETAMINOPHEN AND CODEINE PHOSPHATE 300; 30 MG/1; MG/1
1 TABLET ORAL EVERY 4 HOURS PRN
Status: DISCONTINUED | OUTPATIENT
Start: 2018-11-09 | End: 2018-11-11

## 2018-11-09 RX ORDER — ONDANSETRON 2 MG/ML
4 INJECTION INTRAMUSCULAR; INTRAVENOUS EVERY 6 HOURS PRN
Status: DISCONTINUED | OUTPATIENT
Start: 2018-11-09 | End: 2018-11-11

## 2018-11-09 RX ORDER — HEPARIN SODIUM 5000 [USP'U]/ML
INJECTION, SOLUTION INTRAVENOUS; SUBCUTANEOUS
Status: COMPLETED
Start: 2018-11-09 | End: 2018-11-09

## 2018-11-09 NOTE — OPERATIVE REPORT
PROCEDURE PERFORMED:   1. Watchman device implant. 2. Transseptal catheterization with left atrial pressure recording.       INDICATIONS FOR PROCEDURE: History of bleeds on anticoagulation CHADSVasc score 3    OPERATORS: Jakob Joy MD, Minna Kemp compression between 14 and 15. Also, a seal of the device was measured by color flow Doppler, the size of the residual leak was 1-2mm.    The device was then released from the delivery sheath, and the delivery sheath and access sheath were gently withdrawn

## 2018-11-09 NOTE — PROCEDURES
[facility]    PATIENT'S NAME: Esteban Carbajal   ATTENDING PHYSICIAN: Jacky Tran M.D. OPERATING PHYSICIAN: Kirstin Goodman M.D.    PATIENT ACCOUNT#:   [de-identified]    LOCATION:  [address] [place] [address]  MEDICAL RECORD #:   [id number]       DATE OF BIRTH: posteriorly. Because of geographic orientation and the angle of the catheter across the septum, the Watchman delivery catheter would not cross the septum until this area was dilated with a small peripheral balloon.   The delivery catheter was then position

## 2018-11-09 NOTE — ANESTHESIA PREPROCEDURE EVALUATION
PRE-OP EVALUATION    Patient Name: Riana Green    Pre-op Diagnosis: atrial fibrillation    Procedure(s):      Surgeon(s) and Role:     Ever Lopez MD - Primary    Pre-op vitals reviewed. Body mass index is 24.96 kg/m².     Current medication colon          Past Surgical History:   Procedure Laterality Date   • ARTHROSCOPY OF JOINT UNLISTED      BILAT.  KNEES   • CARDIAC PACEMAKER PLACEMENT      as per NG   • CARPAL TUNNEL RELEASE  2011    as per NG   • CATARACT Left 7/10/15   • CATARACT Right l MCH 29.6 11/07/2018    MCHC 34.1 11/07/2018    RDW 14.5 11/07/2018     11/07/2018    MPV 10.3 11/07/2018     Lab Results   Component Value Date     11/07/2018    K 3.7 11/07/2018     11/07/2018    CO2 25 11/07/2018    BUN 15 11/07/2018

## 2018-11-09 NOTE — PROGRESS NOTES
[facility]  TAMRA Note    Nancy Currie Location:      MRN [id number]   Admission Date 11/9/2018 Operation Date 11/9/2018   Attending Physician Herman Murphy MD Operating Physician Noman Dacosta MD     Pre-Operative Diagnosis: PAF, SARABJIT occluder de

## 2018-11-09 NOTE — PROCEDURES
PROCEDURE PERFORMED:   1. Watchman device implant. 2. Transseptal catheterization with left atrial pressure recording. INDICATIONS FOR PROCEDURE: History of gib on xarelto.  CHADSVasc score 3    OPERATORS: Crow Mcneal MD, Dr. Wing Lenin DUMONT, Dr. Roger Salazar PASS criteria of position, anchor, size, and seal (Color flow leak was 1 mm). This is outlined in the procedure note. 2. No pericardial effusion by TAMRA at the conclusion of the procedure.   3. The patient was transported to postanesthesia recovery in [facility]

## 2018-11-09 NOTE — ANESTHESIA POSTPROCEDURE EVALUATION
[address] Patient Status:  Inpatient   Age/Gender 70year old female MRN [id number]   [facility] 6NE-A Attending Nafisa Dutton MD   Hosp Day # 0 PCP Nestor Goodpasture, MD       Anesthesia Post-op Note    Procedure(s):

## 2018-11-10 ENCOUNTER — PRIOR ORIGINAL RECORDS (OUTPATIENT)
Dept: OTHER | Age: 68
End: 2018-11-10

## 2018-11-10 ENCOUNTER — APPOINTMENT (OUTPATIENT)
Dept: CV DIAGNOSTICS | Facility: HOSPITAL | Age: 68
DRG: 274 | End: 2018-11-10
Attending: INTERNAL MEDICINE
Payer: MEDICARE

## 2018-11-10 PROCEDURE — 85610 PROTHROMBIN TIME: CPT | Performed by: INTERNAL MEDICINE

## 2018-11-10 PROCEDURE — 80048 BASIC METABOLIC PNL TOTAL CA: CPT | Performed by: INTERNAL MEDICINE

## 2018-11-10 NOTE — PLAN OF CARE
CARDIOVASCULAR - ADULT    • Maintains optimal cardiac output and hemodynamic stability Progressing        Patient/Family Goals    • Patient/Family Short Term Goal Progressing        S/p Watchman Device Placement earlier today, verbalized \"I feel fine. \"

## 2018-11-10 NOTE — PLAN OF CARE
Patient is alert and oriented. On room air, A-paced/SR on tele. Right groin is c/d/i, soft to the touch. Denies any chest pain or SOB. Called Echo and will be done this afternoon. Plan for echo today and then possible discharge.  POC updated with patient, a

## 2018-11-10 NOTE — PROGRESS NOTES
S: no c/o     O:   Blood pressure 110/55, pulse 60, temperature 98.1 °F (36.7 °C), temperature source Oral, resp. rate 18, height 5' 5\" (1.651 m), weight 150 lb (68 kg), SpO2 99 %, not currently breastfeeding.        11/09/18  2219 11/10/18  0107 11/10/18 12.5 mg Oral 2x Daily(Beta Blocker)   • rOPINIRole HCl  1 mg Oral TID           Patient Active Problem List:     Family history of colon cancer     CKD (chronic kidney disease)     Alzheimer disease     Paroxysmal A-fib (HCC)     Arthritis, gouty     [place]

## 2018-11-10 NOTE — SIGNIFICANT EVENT
Received patient as a transfer from [address] [place] [address]. She was alert and oriented and not showing any signs of distress. Right groin puncture site was soft and has no signs of bleeding. Needs attended.

## 2018-11-10 NOTE — PLAN OF CARE
1045-Admitted to 6614 s/p Watchman's procedure. VS per protocol, stable, rt groin CDI. Denies pain or nausea. 1500-HOB elevated, groin remains soft, CDI.  1600-Up to BR for voiding, in chair for meal. Monitor shows Atrial paced rhythm. K+ replaced.   CARDIO

## 2018-11-11 ENCOUNTER — APPOINTMENT (OUTPATIENT)
Dept: CV DIAGNOSTICS | Facility: HOSPITAL | Age: 68
DRG: 274 | End: 2018-11-11
Attending: INTERNAL MEDICINE
Payer: MEDICARE

## 2018-11-11 VITALS
BODY MASS INDEX: 24.99 KG/M2 | TEMPERATURE: 99 F | HEIGHT: 65 IN | RESPIRATION RATE: 18 BRPM | HEART RATE: 62 BPM | OXYGEN SATURATION: 95 % | DIASTOLIC BLOOD PRESSURE: 93 MMHG | WEIGHT: 150 LBS | SYSTOLIC BLOOD PRESSURE: 132 MMHG

## 2018-11-11 PROCEDURE — 93306 TTE W/DOPPLER COMPLETE: CPT | Performed by: INTERNAL MEDICINE

## 2018-11-11 PROCEDURE — 85610 PROTHROMBIN TIME: CPT | Performed by: INTERNAL MEDICINE

## 2018-11-11 NOTE — PROGRESS NOTES
[facility]  Progress Note    Genie Joel Patient Status:  Inpatient    3/28/1950 MRN [id number]   [facility] 8NE-A Attending Mariana Dixon MD   Hosp Day # 2 PCP Bibi Early MD       Assessment and Plan:  Patient Active Problem L 1.75 11/11/2018    PTP 21.1 11/11/2018       Medications:    Current Facility-Administered Medications:  ceFAZolin sodium (ANCEF/KEFZOL) 2 GM/20ML premix IV syringe   PRN   0.9%  NaCl infusion  Intravenous Continuous   acetaminophen (TYLENOL) tab 650 mg 65

## 2018-11-12 ENCOUNTER — PATIENT OUTREACH (OUTPATIENT)
Dept: CASE MANAGEMENT | Age: 68
End: 2018-11-12

## 2018-11-12 DIAGNOSIS — I48.0 PAROXYSMAL A-FIB (HCC): Chronic | ICD-10-CM

## 2018-11-12 DIAGNOSIS — Z02.9 ENCOUNTERS FOR ADMINISTRATIVE PURPOSE: ICD-10-CM

## 2018-11-12 LAB
BUN: 18 MG/DL
CALCIUM: 8.1 MG/DL
CHLORIDE: 113 MEQ/L
CREATININE, SERUM: 1.13 MG/DL
GLUCOSE: 104 MG/DL
POTASSIUM, SERUM: 4.8 MEQ/L
SODIUM: 141 MEQ/L

## 2018-11-12 PROCEDURE — 1111F DSCHRG MED/CURRENT MED MERGE: CPT

## 2018-11-12 NOTE — PROGRESS NOTES
Initial Post Discharge Follow Up   Discharge Date: 11/11/18  Contact Date: 11/12/2018    Consent Verification:  Assessment Completed With: Patient  HIPAA Verified? Yes    Discharge Dx:  S/p watchman device implant on 11/9/18 with Dr. Sandee Styles.      Gener ONE TABLET BY MOUTH EVERY EIGHT HOURS AS NEEDED FOR NAUSEA Disp: 30 tablet Rfl: 0   Acetaminophen-Codeine #3 300-30 MG Oral Tab Take 1 tablet by mouth every 4 (four) hours as needed.  Disp: 40 tablet Rfl: 1   rivaroxaban 15 MG Oral Tab Take 15 mg by mouth d Asya Lundberg, [address] [facility]. [address] (EMA [place])    Dec 21, 2018  7:00 AM CST CARDIAC EC TRANSESOPHOGEAL with [facility] CARD ECHO TAMRA [address] [facility])    This exam could require an [facility]          PCP TCM/HFU appointment: scheduled at D/C within 7-14 days  no     NCM Reviewed/scheduled/rescheduled PCP TCM/HFU appointment with pt:  Yes      Have you made all of your follow up appointments?  yes    Is there

## 2018-11-13 ENCOUNTER — TELEPHONE (OUTPATIENT)
Dept: INTERVENTIONAL RADIOLOGY/VASCULAR | Facility: HOSPITAL | Age: 68
End: 2018-11-13

## 2018-11-16 ENCOUNTER — PRIOR ORIGINAL RECORDS (OUTPATIENT)
Dept: OTHER | Age: 68
End: 2018-11-16

## 2018-11-16 ENCOUNTER — MYAURORA ACCOUNT LINK (OUTPATIENT)
Dept: OTHER | Age: 68
End: 2018-11-16

## 2018-11-16 NOTE — DISCHARGE SUMMARY
[facility]  Discharge Summary    Melva Fuentes Patient Status:  Inpatient    3/28/1950 MRN [id number]   [facility] 8NE-A Attending No att. providers found   Hosp Day # 2 PCP Morgan Joseph MD     Date of Admission: 2018    Date o needle was achieved after the needle was advanced beyond the dilator tip. Prior to transseptal access, a heparin bolus was given. Following transseptal access, a second heparin bolus was given followed by intermittent.  The needle and dilator were removed, Oral Tab  Take 15 mg by mouth daily.   , Historical    ROPINIROLE HCL 1 MG Oral Tab  TAKE ONE TABLET BY MOUTH THREE TIMES DAILY , Normal, Disp-90 tablet, R-3    colchicine 0.6 MG Oral Tab  Take 1 tablet (0.6 mg total) by mouth every other day., Normal, Disp

## 2018-11-19 ENCOUNTER — OFFICE VISIT (OUTPATIENT)
Dept: INTERNAL MEDICINE CLINIC | Facility: CLINIC | Age: 68
End: 2018-11-19
Payer: MEDICARE

## 2018-11-19 VITALS
BODY MASS INDEX: 25.8 KG/M2 | WEIGHT: 153 LBS | SYSTOLIC BLOOD PRESSURE: 120 MMHG | HEIGHT: 64.75 IN | HEART RATE: 64 BPM | DIASTOLIC BLOOD PRESSURE: 64 MMHG | TEMPERATURE: 98 F

## 2018-11-19 DIAGNOSIS — R41.3 MEMORY LOSS: ICD-10-CM

## 2018-11-19 DIAGNOSIS — R94.4 DECREASED GFR: ICD-10-CM

## 2018-11-19 DIAGNOSIS — R63.4 WEIGHT LOSS: ICD-10-CM

## 2018-11-19 DIAGNOSIS — Z00.00 ROUTINE HEALTH MAINTENANCE: ICD-10-CM

## 2018-11-19 DIAGNOSIS — I10 ESSENTIAL HYPERTENSION: Primary | ICD-10-CM

## 2018-11-19 DIAGNOSIS — I48.0 PAROXYSMAL ATRIAL FIBRILLATION (HCC): ICD-10-CM

## 2018-11-19 PROCEDURE — 99495 TRANSJ CARE MGMT MOD F2F 14D: CPT | Performed by: INTERNAL MEDICINE

## 2018-11-19 NOTE — PROGRESS NOTES
HPI:   Lyndsey Wright is a 70year old female presents with the following problems. Patient feels well. Her weight is stable. She has no GI complaints. Her diarrhea is resolved. She has hypertension. Repeat blood pressure 120/64.     She recently amyloid deposition in the gray matter consistent with Alzheimer's disease. • Alzheimer disease    • Aortic atherosclerosis ([address] 3/11/2016    Aortic atherosclerosis seen on CT scan October 2014.     • Arrhythmia    • Atrophic kidney 3/11/2016    CT scan 6/19/2017    Performed by Ravindra Norman MD at [address] [facility] OR   • ELECTROCARDIOGRAM, COMPLETE  02-    SCANNED TO MEDIA TAB - 02-   • ESOPHAGOGASTRODUODENOSCOPY (EGD) N/A 3/16/2018    Performed by Jud Dickerson MD at [address]  31.0 - 37.0 g/dL    RDW 13.7 11.5 - 16.0 %    RDW-SD 44.0 35.1 - 46.3 fL   COMP METABOLIC PANEL (14)   Result Value Ref Range    Glucose 115 (H) 70 - 99 mg/dL    Sodium 140 136 - 144 mmol/L    Potassium 3.4 (L) 3.6 - 5.1 mmol/L    Chloride 110 101 - 111 mm (Bezet) 462 ms    P Axis 60 degrees    R Axis 41 degrees    T Axis 50 degrees   EKG 12-LEAD   Result Value Ref Range    Ventricular rate 60 BPM    Atrial rate 60 BPM    P-R Interval 224 ms    QRS Duration 82 ms    Q-T Interval 466 ms    QTC Calculation (Be 0. 33 0.10 - 1.00 x10(3) uL    Eosinophil Absolute 0.09 0.00 - 0.30 x10(3) uL    Basophil Absolute 0.02 0.00 - 0.10 x10(3) uL    Immature Granulocyte Absolute 0.05 0.00 - 1.00 x10(3) uL    Neutrophil % 73.0 %    Lymphocyte % 18.1 %    Monocyte % 6.0 %    Eo cyanosis, clubbing or edema. NEURO:  Awake and aware. ASSESSMENT AND PLAN:         1. Essential hypertension  Blood pressure under good control. Continue current therapy. Follow-up in 6 months. 2. Decreased GFR  Stable. Discussed follow-up.   Pa

## 2018-11-26 LAB
BUN: 15 MG/DL
CALCIUM: 9.6 MG/DL
CHLORIDE: 108 MEQ/L
CREATININE, SERUM: 1.03 MG/DL
GLUCOSE: 85 MG/DL
HEMATOCRIT: 42.2 %
HEMOGLOBIN: 14.4 G/DL
PLATELETS: 174 K/UL
POTASSIUM, SERUM: 3.7 MEQ/L
RED BLOOD COUNT: 4.86 X 10-6/U
SODIUM: 140 MEQ/L
WHITE BLOOD COUNT: 5.1 X 10-3/U

## 2018-12-17 ENCOUNTER — MYAURORA ACCOUNT LINK (OUTPATIENT)
Dept: OTHER | Age: 68
End: 2018-12-17

## 2018-12-18 NOTE — HISTORICAL OFFICE NOTE
Arelviszahra Kwons  : 1950  ACCOUNT:  [id number]  [phone]  PCP: Dr. Cesar Butler     TODAY'S DATE: 2018  DICTATED BY:  [Dr. Marie Win: [Followup of Atrial fibrillation, paroxysmal, Followup of Pacemaker B.S DR  and not exercise. DIET: low fat, low cholesterol. MARITAL STATUS: . OCCUPATION: .      ALLERGIES: No Known Allergies    MEDICATIONS: Selected prescriptions see below    VITAL SIGNS: [B/P - 170/86 , Pulse - 60, Respiration - 18, Weight -  152, Hei benign  5. Pacemaker B.S  7/12, not dependent  6. Pacer reprogramming  7. Precordial pain      PLAN:  [1.  Will call to schedule transesophageal echocardiogram for pre-watchman workup  2.   Continue medications  3 workup for annual follow-up in pacer clin

## 2018-12-18 NOTE — HISTORICAL OFFICE NOTE
Michelle Chaya  : 1950  ACCOUNT:  [id number]  [phone]  PCP: Dr. Hicks Grade     TODAY'S DATE: 2018  DICTATED BY:  MARIA G Merlos]      CHIEF COMPLAINT: [Followup of Atrial fibrillation, paroxysmal, Followup of Hypertension, benign and Negative for AAA. SOCIAL HISTORY: SMOKING: Former tobacco use. 30-40 pack year history smoking, quit 1998. CAFFEINE: none. ALCOHOL: drinks rarely. EXERCISE: regular exercise. DIET: low fat, low cholesterol. MARITAL STATUS: .  OCCUPATION: [employer] [facility] as scheduled  Follow-up with Dr. Laurie Valle as scheduled  Call with questions or new symptoms]    PRESCRIPTIONS:   09/14/18 *Xarelto              15MG      1 TABLET DAILY AT     6:00PM             11/16/18 Atorvastatin Calcium  20MG      ONE NIG

## 2018-12-21 ENCOUNTER — HOSPITAL ENCOUNTER (OUTPATIENT)
Dept: CV DIAGNOSTICS | Facility: HOSPITAL | Age: 68
Discharge: HOME | End: 2018-12-21
Attending: INTERNAL MEDICINE
Payer: MEDICARE

## 2018-12-21 ENCOUNTER — HOSPITAL ENCOUNTER (OUTPATIENT)
Dept: INTERVENTIONAL RADIOLOGY/VASCULAR | Facility: HOSPITAL | Age: 68
Discharge: HOME | End: 2018-12-21
Attending: INTERNAL MEDICINE | Admitting: INTERNAL MEDICINE
Payer: MEDICARE

## 2018-12-21 VITALS
HEART RATE: 60 BPM | OXYGEN SATURATION: 100 % | RESPIRATION RATE: 14 BRPM | SYSTOLIC BLOOD PRESSURE: 162 MMHG | BODY MASS INDEX: 25.49 KG/M2 | WEIGHT: 153 LBS | HEIGHT: 65 IN | DIASTOLIC BLOOD PRESSURE: 76 MMHG

## 2018-12-21 DIAGNOSIS — I48.91 A-FIB (HCC): ICD-10-CM

## 2018-12-21 PROCEDURE — 93312 ECHO TRANSESOPHAGEAL: CPT

## 2018-12-21 PROCEDURE — 99153 MOD SED SAME PHYS/QHP EA: CPT

## 2018-12-21 PROCEDURE — 99152 MOD SED SAME PHYS/QHP 5/>YRS: CPT

## 2018-12-21 PROCEDURE — 93320 DOPPLER ECHO COMPLETE: CPT | Performed by: INTERNAL MEDICINE

## 2018-12-21 PROCEDURE — 93325 DOPPLER ECHO COLOR FLOW MAPG: CPT | Performed by: INTERNAL MEDICINE

## 2018-12-21 PROCEDURE — B24BZZ4 ULTRASONOGRAPHY OF HEART WITH AORTA, TRANSESOPHAGEAL: ICD-10-PCS | Performed by: INTERNAL MEDICINE

## 2018-12-21 RX ORDER — MIDAZOLAM HYDROCHLORIDE 1 MG/ML
INJECTION INTRAMUSCULAR; INTRAVENOUS
Status: COMPLETED
Start: 2018-12-21 | End: 2018-12-21

## 2018-12-21 RX ORDER — CLOPIDOGREL BISULFATE 75 MG/1
75 TABLET ORAL DAILY
Qty: 30 TABLET | Refills: 5 | Status: SHIPPED | OUTPATIENT
Start: 2018-12-21 | End: 2019-06-10

## 2018-12-21 RX ORDER — ASPIRIN 81 MG/1
81 TABLET, CHEWABLE ORAL DAILY
Qty: 30 TABLET | Refills: 11 | Status: SHIPPED | OUTPATIENT
Start: 2018-12-21 | End: 2019-03-28

## 2018-12-21 RX ORDER — SODIUM CHLORIDE 9 MG/ML
INJECTION, SOLUTION INTRAVENOUS CONTINUOUS
Status: DISCONTINUED | OUTPATIENT
Start: 2018-12-21 | End: 2018-12-21

## 2018-12-21 RX ORDER — MIDAZOLAM HYDROCHLORIDE 1 MG/ML
1 INJECTION INTRAMUSCULAR; INTRAVENOUS ONCE AS NEEDED
Status: DISCONTINUED | OUTPATIENT
Start: 2018-12-21 | End: 2018-12-21 | Stop reason: HOSPADM

## 2018-12-21 RX ORDER — MIDAZOLAM HYDROCHLORIDE 1 MG/ML
INJECTION INTRAMUSCULAR; INTRAVENOUS
Status: DISCONTINUED
Start: 2018-12-21 | End: 2018-12-21 | Stop reason: WASHOUT

## 2018-12-21 RX ADMIN — MIDAZOLAM HYDROCHLORIDE 5 MG: 1 INJECTION INTRAMUSCULAR; INTRAVENOUS at 10:00:00

## 2018-12-21 NOTE — PROGRESS NOTES
S/P Meng, pt naeem procedure well. VSS. Pt sleeping.  at bedside. Will continue to monitor. 1200: pt awake, c/o slight throat pain. DC instructions reviewed. Stop Eliquis. Script for plavix and aspirin given. Pt ambulated well. naeem po well.  IV DC and

## 2018-12-21 NOTE — H&P
History & Physical Examination    Patient Name: Shawn Louis  MRN: [id number]  [facility]: [id number]  YOB: 1950    Diagnosis: atrial fib s/p 24 mm Watchman implant    History of Present Illness:   69 yo female s/p Watchman device implantation now aorta.    • Carpal tunnel syndrome 2011    as per NG   • Chronic atrial fibrillation (HCC)    • Crohn disease ([facility] [address]    • Diverticulosis     as per NG   • Family history of colon cancer     Maternal grandmother had colon cancer in her 76s.    • Gout    • Hep antrostomies\"; as per NG   • OTHER  June or July 2015    removal of pins and plate from right wrist (fracture)   • OTHER SURGICAL HISTORY Left     L knee scoped--3 torn meniscus--biocartilage done   • OTHER SURGICAL HISTORY Right 8/2016    R knee scoped f Angel Luis  12/21/2018  9:57 AM

## 2018-12-21 NOTE — PROCEDURES
Cardiology Transesophageal Echo Note    PRE-PROCEDURE DIAGNOSIS: Paroxysmal atrial fibrillation and pre-WATCHMAN device assessment    PROCEDURE: Transesophageal Echocardiogram.     SEDATION:   Topical spray x 1  Versed: 5 mg  Fentanyl: 75 mcg    I personal of 2-3 mm. · There was no evidence for intracardiac mass or thrombus  over the [facility] device. 3D imaging shows well seated device with minimal mitral shoulder.     Recommend to stop coumadin and start aspirin 81 mg daily and plavix 75 mg daily at leas

## 2019-02-12 ENCOUNTER — HOSPITAL ENCOUNTER (EMERGENCY)
Facility: HOSPITAL | Age: 69
Discharge: HOME | End: 2019-02-13
Attending: EMERGENCY MEDICINE
Payer: MEDICARE

## 2019-02-12 DIAGNOSIS — R56.9 GENERALIZED SEIZURE (HCC): Primary | ICD-10-CM

## 2019-02-12 LAB
ANION GAP SERPL CALC-SCNC: 28 MMOL/L (ref 0–18)
BASOPHILS # BLD AUTO: 0.05 X10(3) UL (ref 0–0.2)
BASOPHILS NFR BLD AUTO: 0.5 %
BUN BLD-MCNC: 18 MG/DL (ref 7–18)
BUN/CREAT SERPL: 10.5 (ref 10–20)
CALCIUM BLD-MCNC: 9.5 MG/DL (ref 8.5–10.1)
CHLORIDE SERPL-SCNC: 101 MMOL/L (ref 98–107)
CO2 SERPL-SCNC: 12 MMOL/L (ref 21–32)
CREAT BLD-MCNC: 1.71 MG/DL (ref 0.55–1.02)
DEPRECATED RDW RBC AUTO: 43.8 FL (ref 35.1–46.3)
EOSINOPHIL # BLD AUTO: 0.03 X10(3) UL (ref 0–0.7)
EOSINOPHIL NFR BLD AUTO: 0.3 %
ERYTHROCYTE [DISTWIDTH] IN BLOOD BY AUTOMATED COUNT: 13.2 % (ref 11–15)
GLUCOSE BLD-MCNC: 199 MG/DL (ref 70–99)
GLUCOSE BLDC GLUCOMTR-MCNC: 181 MG/DL (ref 70–99)
HCT VFR BLD AUTO: 45.6 % (ref 35–48)
HGB BLD-MCNC: 14.4 G/DL (ref 12–16)
IMM GRANULOCYTES # BLD AUTO: 0.04 X10(3) UL (ref 0–1)
IMM GRANULOCYTES NFR BLD: 0.4 %
LYMPHOCYTES # BLD AUTO: 2.84 X10(3) UL (ref 1–4)
LYMPHOCYTES NFR BLD AUTO: 27.5 %
MCH RBC QN AUTO: 28.9 PG (ref 26–34)
MCHC RBC AUTO-ENTMCNC: 31.6 G/DL (ref 31–37)
MCV RBC AUTO: 91.4 FL (ref 80–100)
MONOCYTES # BLD AUTO: 0.44 X10(3) UL (ref 0.1–1)
MONOCYTES NFR BLD AUTO: 4.3 %
NEUTROPHILS # BLD AUTO: 6.93 X10 (3) UL (ref 1.5–7.7)
NEUTROPHILS # BLD AUTO: 6.93 X10(3) UL (ref 1.5–7.7)
NEUTROPHILS NFR BLD AUTO: 67 %
OSMOLALITY SERPL CALC.SUM OF ELEC: 299 MOSM/KG (ref 275–295)
PLATELET # BLD AUTO: 262 10(3)UL (ref 150–450)
POTASSIUM SERPL-SCNC: 3.6 MMOL/L (ref 3.5–5.1)
RBC # BLD AUTO: 4.99 X10(6)UL (ref 3.8–5.3)
SODIUM SERPL-SCNC: 141 MMOL/L (ref 136–145)
WBC # BLD AUTO: 10.3 X10(3) UL (ref 4–11)

## 2019-02-12 PROCEDURE — 80048 BASIC METABOLIC PNL TOTAL CA: CPT | Performed by: EMERGENCY MEDICINE

## 2019-02-12 PROCEDURE — 96365 THER/PROPH/DIAG IV INF INIT: CPT

## 2019-02-12 PROCEDURE — 93005 ELECTROCARDIOGRAM TRACING: CPT

## 2019-02-12 PROCEDURE — 82962 GLUCOSE BLOOD TEST: CPT

## 2019-02-12 PROCEDURE — 93010 ELECTROCARDIOGRAM REPORT: CPT | Performed by: EMERGENCY MEDICINE

## 2019-02-12 PROCEDURE — 96375 TX/PRO/DX INJ NEW DRUG ADDON: CPT

## 2019-02-12 PROCEDURE — 99284 EMERGENCY DEPT VISIT MOD MDM: CPT

## 2019-02-12 PROCEDURE — 85025 COMPLETE CBC W/AUTO DIFF WBC: CPT | Performed by: EMERGENCY MEDICINE

## 2019-02-12 RX ORDER — LEVETIRACETAM 500 MG/1
500 TABLET ORAL 2 TIMES DAILY
Qty: 60 TABLET | Refills: 0 | Status: SHIPPED | OUTPATIENT
Start: 2019-02-12 | End: 2019-03-14

## 2019-02-12 RX ORDER — ACETAMINOPHEN 500 MG
1000 TABLET ORAL ONCE
Status: COMPLETED | OUTPATIENT
Start: 2019-02-12 | End: 2019-02-12

## 2019-02-12 RX ORDER — ONDANSETRON 2 MG/ML
4 INJECTION INTRAMUSCULAR; INTRAVENOUS ONCE
Status: COMPLETED | OUTPATIENT
Start: 2019-02-12 | End: 2019-02-12

## 2019-02-13 ENCOUNTER — TELEPHONE (OUTPATIENT)
Dept: NEUROLOGY | Facility: CLINIC | Age: 69
End: 2019-02-13

## 2019-02-13 VITALS
DIASTOLIC BLOOD PRESSURE: 60 MMHG | HEART RATE: 83 BPM | WEIGHT: 165 LBS | TEMPERATURE: 98 F | SYSTOLIC BLOOD PRESSURE: 150 MMHG | OXYGEN SATURATION: 98 % | RESPIRATION RATE: 18 BRPM

## 2019-02-13 RX ORDER — IBUPROFEN 600 MG/1
600 TABLET ORAL ONCE
Status: COMPLETED | OUTPATIENT
Start: 2019-02-13 | End: 2019-02-13

## 2019-02-13 NOTE — ED PROVIDER NOTES
Patient Seen in: [facility] AND [facility] Emergency Department    History   Patient presents with:  Seizures      HPI    Patient presents to the ED after having a generalized seizure while at the [employer].   Witnessed tonic-clonic movements b deformity. Neurological: She has normal strength. No cranial nerve deficit or sensory deficit. GCS eye subscore is 3. GCS verbal subscore is 4. GCS motor subscore is 6. Somnolent but arousable   Skin: Skin is warm and dry.    Psychiatric: She has a norm 178/79 (!) 171/80 (!) 178/76 (!) 169/79   Pulse: 82 116 78 78   Resp: 20 21 16 17   Temp:       TempSrc:       SpO2: 99% (!) 68% 99% 99%   Weight:         *I personally reviewed and interpreted all ED vitals.     Pulse Ox: 99%, Room air, Normal     Monitor

## 2019-02-13 NOTE — TELEPHONE ENCOUNTER
Went to the [place] [address] ED around 9pm for a seizure. This morning she got up to use the bathroom, legs are weak.  states she has been sleeping all day. He tried to wake her but patient states she is too tired and falls back asleep.  ED prescribed Leveti

## 2019-02-14 ENCOUNTER — PRIOR ORIGINAL RECORDS (OUTPATIENT)
Dept: OTHER | Age: 69
End: 2019-02-14

## 2019-02-14 NOTE — TELEPHONE ENCOUNTER
Pt states that she feels \"wound up\", like she cannot relax and she's anxious and she is wondering if this is normal and if she should be driving.  Pt states she started the Keppra 2 nights ago and thinks she may be reacting to it, please advise

## 2019-02-14 NOTE — TELEPHONE ENCOUNTER
Agree w/ recommendations, seizures cause \"post ictal state\" afterwards which can cause fatigue, can last several days.

## 2019-02-14 NOTE — TELEPHONE ENCOUNTER
Informed Lore Senate of Dr. Pat Bowens note. He states Demi Torrez is doing much better now. She got up from bed and is moving around a little.

## 2019-02-14 NOTE — TELEPHONE ENCOUNTER
Pt called stated that she has been on Keppra 500mg po bid for couple of days and feels anxious, nervous and difficulty sleeping. Pt was told that this may occur until her body gets use of medication. Pt told not to stop Keppra.  That although bothersome sym

## 2019-02-15 ENCOUNTER — PRIOR ORIGINAL RECORDS (OUTPATIENT)
Dept: OTHER | Age: 69
End: 2019-02-15

## 2019-02-25 RX ORDER — ROPINIROLE 1 MG/1
TABLET, FILM COATED ORAL
Qty: 90 TABLET | Refills: 0 | Status: ON HOLD | OUTPATIENT
Start: 2019-02-25 | End: 2019-05-02

## 2019-02-28 VITALS
HEIGHT: 65 IN | WEIGHT: 152 LBS | HEART RATE: 60 BPM | RESPIRATION RATE: 18 BRPM | BODY MASS INDEX: 25.33 KG/M2 | DIASTOLIC BLOOD PRESSURE: 86 MMHG | SYSTOLIC BLOOD PRESSURE: 170 MMHG

## 2019-02-28 VITALS
SYSTOLIC BLOOD PRESSURE: 168 MMHG | WEIGHT: 190 LBS | HEART RATE: 66 BPM | RESPIRATION RATE: 16 BRPM | DIASTOLIC BLOOD PRESSURE: 90 MMHG

## 2019-02-28 VITALS
HEIGHT: 66 IN | HEART RATE: 60 BPM | SYSTOLIC BLOOD PRESSURE: 122 MMHG | OXYGEN SATURATION: 97 % | DIASTOLIC BLOOD PRESSURE: 74 MMHG | WEIGHT: 185 LBS | BODY MASS INDEX: 29.73 KG/M2 | RESPIRATION RATE: 10 BRPM

## 2019-02-28 VITALS
BODY MASS INDEX: 25.33 KG/M2 | DIASTOLIC BLOOD PRESSURE: 60 MMHG | HEART RATE: 60 BPM | WEIGHT: 152 LBS | SYSTOLIC BLOOD PRESSURE: 136 MMHG | HEIGHT: 65 IN

## 2019-03-01 VITALS
HEIGHT: 65 IN | WEIGHT: 190 LBS | SYSTOLIC BLOOD PRESSURE: 132 MMHG | OXYGEN SATURATION: 97 % | BODY MASS INDEX: 31.65 KG/M2 | HEART RATE: 68 BPM | DIASTOLIC BLOOD PRESSURE: 74 MMHG | RESPIRATION RATE: 10 BRPM

## 2019-03-01 VITALS
DIASTOLIC BLOOD PRESSURE: 70 MMHG | WEIGHT: 189 LBS | HEIGHT: 65 IN | HEART RATE: 62 BPM | SYSTOLIC BLOOD PRESSURE: 130 MMHG | BODY MASS INDEX: 31.49 KG/M2

## 2019-03-13 ENCOUNTER — TELEPHONE (OUTPATIENT)
Dept: CARDIOLOGY | Age: 69
End: 2019-03-13

## 2019-03-13 PROBLEM — E78.00 PURE HYPERCHOLESTEROLEMIA: Status: ACTIVE | Noted: 2019-03-13

## 2019-03-13 PROBLEM — I48.0 PAROXYSMAL ATRIAL FIBRILLATION (CMD): Status: ACTIVE | Noted: 2019-03-13

## 2019-03-13 PROBLEM — I10 HTN (HYPERTENSION), BENIGN: Status: ACTIVE | Noted: 2019-03-13

## 2019-03-13 PROBLEM — I49.5 SICK SINUS SYNDROME (CMD): Status: ACTIVE | Noted: 2019-03-13

## 2019-03-13 PROBLEM — Z95.0 PACEMAKER: Status: ACTIVE | Noted: 2019-03-13

## 2019-03-13 PROBLEM — R07.2 PRECORDIAL PAIN: Status: ACTIVE | Noted: 2019-03-13

## 2019-03-13 RX ORDER — ATORVASTATIN CALCIUM 20 MG/1
20 TABLET, FILM COATED ORAL
COMMUNITY
Start: 2018-11-16

## 2019-03-13 RX ORDER — NADOLOL 20 MG/1
20 TABLET ORAL
COMMUNITY
Start: 2014-07-09

## 2019-03-13 RX ORDER — ROPINIROLE 1 MG/1
1 TABLET, FILM COATED ORAL
COMMUNITY
Start: 2013-09-18

## 2019-03-14 ENCOUNTER — OFFICE VISIT (OUTPATIENT)
Dept: CARDIOLOGY | Age: 69
End: 2019-03-14

## 2019-03-14 VITALS
WEIGHT: 157 LBS | DIASTOLIC BLOOD PRESSURE: 76 MMHG | RESPIRATION RATE: 18 BRPM | HEART RATE: 66 BPM | HEIGHT: 65 IN | SYSTOLIC BLOOD PRESSURE: 110 MMHG | OXYGEN SATURATION: 94 % | BODY MASS INDEX: 26.16 KG/M2

## 2019-03-14 DIAGNOSIS — I48.92 ATRIAL FLUTTER, UNSPECIFIED TYPE (CMD): ICD-10-CM

## 2019-03-14 DIAGNOSIS — Z86.79 HISTORY OF ATRIAL FIBRILLATION: Primary | ICD-10-CM

## 2019-03-14 PROCEDURE — 93000 ELECTROCARDIOGRAM COMPLETE: CPT | Performed by: NURSE PRACTITIONER

## 2019-03-14 PROCEDURE — 99214 OFFICE O/P EST MOD 30 MIN: CPT | Performed by: NURSE PRACTITIONER

## 2019-03-14 RX ORDER — DONEPEZIL HYDROCHLORIDE 10 MG/1
10 TABLET, FILM COATED ORAL
COMMUNITY

## 2019-03-14 RX ORDER — COLCHICINE 0.6 MG/1
0.6 TABLET ORAL PRN
COMMUNITY

## 2019-03-14 RX ORDER — CLOPIDOGREL BISULFATE 75 MG/1
75 TABLET ORAL
COMMUNITY
Start: 2018-12-21 | End: 2019-08-20

## 2019-03-14 RX ORDER — ALLOPURINOL 100 MG/1
100 TABLET ORAL 2 TIMES DAILY
COMMUNITY

## 2019-03-18 ENCOUNTER — TELEPHONE (OUTPATIENT)
Dept: CARDIOLOGY | Age: 69
End: 2019-03-18

## 2019-03-19 ENCOUNTER — TELEPHONE (OUTPATIENT)
Dept: CARDIOLOGY | Age: 69
End: 2019-03-19

## 2019-03-21 ENCOUNTER — APPOINTMENT (OUTPATIENT)
Dept: LAB | Age: 69
End: 2019-03-21
Attending: UROLOGY
Payer: MEDICARE

## 2019-03-21 DIAGNOSIS — N39.0 URINARY TRACT INFECTION WITHOUT HEMATURIA, SITE UNSPECIFIED: ICD-10-CM

## 2019-03-21 PROCEDURE — 87186 SC STD MICRODIL/AGAR DIL: CPT

## 2019-03-21 PROCEDURE — 87077 CULTURE AEROBIC IDENTIFY: CPT

## 2019-03-21 PROCEDURE — 87086 URINE CULTURE/COLONY COUNT: CPT

## 2019-03-28 ENCOUNTER — APPOINTMENT (OUTPATIENT)
Dept: LAB | Age: 69
End: 2019-03-28
Attending: INTERNAL MEDICINE
Payer: MEDICARE

## 2019-03-28 ENCOUNTER — OFFICE VISIT (OUTPATIENT)
Dept: INTERNAL MEDICINE CLINIC | Facility: CLINIC | Age: 69
End: 2019-03-28
Payer: MEDICARE

## 2019-03-28 VITALS
DIASTOLIC BLOOD PRESSURE: 70 MMHG | HEART RATE: 64 BPM | SYSTOLIC BLOOD PRESSURE: 110 MMHG | HEIGHT: 65.1 IN | WEIGHT: 154.81 LBS | TEMPERATURE: 98 F | BODY MASS INDEX: 25.79 KG/M2

## 2019-03-28 DIAGNOSIS — I48.0 PAROXYSMAL ATRIAL FIBRILLATION (HCC): ICD-10-CM

## 2019-03-28 DIAGNOSIS — Z87.898 HISTORY OF SEIZURE: ICD-10-CM

## 2019-03-28 DIAGNOSIS — R41.3 MEMORY LOSS: ICD-10-CM

## 2019-03-28 DIAGNOSIS — I10 ESSENTIAL HYPERTENSION: Primary | ICD-10-CM

## 2019-03-28 DIAGNOSIS — M10.00 IDIOPATHIC GOUT, UNSPECIFIED CHRONICITY, UNSPECIFIED SITE: ICD-10-CM

## 2019-03-28 DIAGNOSIS — R11.0 NAUSEA: ICD-10-CM

## 2019-03-28 DIAGNOSIS — N18.30 STAGE 3 CHRONIC KIDNEY DISEASE (HCC): ICD-10-CM

## 2019-03-28 DIAGNOSIS — Z12.31 VISIT FOR SCREENING MAMMOGRAM: ICD-10-CM

## 2019-03-28 DIAGNOSIS — Z95.818 PRESENCE OF WATCHMAN LEFT ATRIAL APPENDAGE CLOSURE DEVICE: ICD-10-CM

## 2019-03-28 DIAGNOSIS — R82.90 ABNORMAL URINALYSIS: ICD-10-CM

## 2019-03-28 PROCEDURE — 99215 OFFICE O/P EST HI 40 MIN: CPT | Performed by: INTERNAL MEDICINE

## 2019-03-28 PROCEDURE — 81003 URINALYSIS AUTO W/O SCOPE: CPT

## 2019-03-28 PROCEDURE — G0463 HOSPITAL OUTPT CLINIC VISIT: HCPCS | Performed by: INTERNAL MEDICINE

## 2019-03-28 PROCEDURE — 87086 URINE CULTURE/COLONY COUNT: CPT

## 2019-03-28 RX ORDER — ONDANSETRON 4 MG/1
TABLET, FILM COATED ORAL
Qty: 30 TABLET | Refills: 0 | Status: SHIPPED | OUTPATIENT
Start: 2019-03-28 | End: 2019-07-16

## 2019-03-28 RX ORDER — LEVETIRACETAM 500 MG/1
500 TABLET ORAL 2 TIMES DAILY
COMMUNITY
End: 2019-10-10

## 2019-03-28 NOTE — PROGRESS NOTES
David Simon is a 78year old female   HPI:   Pt.presents for the following problems. Patient had a recent urine culture showing Klebsiella. She received an antibiotic. This was for 5 days. She cannot remember the name of the antibiotic.   She has Last 3 Encounters:  03/28/19 : 154 lb 12.8 oz (70.2 kg)  02/12/19 : 165 lb (74.8 kg)  12/18/18 : 153 lb (69.4 kg)    Body mass index is 25.68 kg/m².        Current Outpatient Medications:  Ondansetron HCl (ZOFRAN) 4 mg tablet TAKE ONE TABLET BY MOUTH EVERY grandmother had colon cancer in her 76s.    • Gout    • Hepatic steatosis 3/11/2016   • High blood pressure    • High cholesterol     as per NG   • History of colonic polyps 07-    as per NG   • History of migraine headaches     as per NG   • History scoped--3 torn meniscus--biocartilage done   • OTHER SURGICAL HISTORY Right 8/2016    R knee scoped for torn miniscus   • RECONSTR NOSE+JEAN SEPTAL REPAIR  2006    as per VISHNU   • TOTAL KNEE REPLACEMENT     • FRANK N/A 11/9/2018    Performed by Rosalie Ambrocio a history of Alzheimer's disease.     EXAM:   /70 (BP Location: Right arm, Patient Position: Sitting, Cuff Size: adult)   Pulse 64   Temp 97.9 °F (36.6 °C) (Oral)   Ht 5' 5.1\" (1.654 m)   Wt 154 lb 12.8 oz (70.2 kg)   BMI 25.68 kg/m²     GENERAL:  we device. Off anticoagulation. Tolerating well. On Plavix. Patient follows with cardiology. 6. Memory loss  Patient followed closely at her neurologist.  [place] to have Alzheimer's disease. Patient states that she is \"sundowning\".   She will get irrit

## 2019-03-29 ENCOUNTER — TELEPHONE (OUTPATIENT)
Dept: INTERNAL MEDICINE CLINIC | Facility: CLINIC | Age: 69
End: 2019-03-29

## 2019-03-29 RX ORDER — ROPINIROLE 1 MG/1
TABLET, FILM COATED ORAL
Qty: 90 TABLET | Refills: 0 | OUTPATIENT
Start: 2019-03-29

## 2019-04-05 ENCOUNTER — TELEPHONE (OUTPATIENT)
Dept: INTERNAL MEDICINE CLINIC | Facility: CLINIC | Age: 69
End: 2019-04-05

## 2019-04-05 ENCOUNTER — LAB ENCOUNTER (OUTPATIENT)
Dept: LAB | Age: 69
End: 2019-04-05
Attending: INTERNAL MEDICINE
Payer: MEDICARE

## 2019-04-05 DIAGNOSIS — R41.3 MEMORY LOSS: ICD-10-CM

## 2019-04-05 DIAGNOSIS — I10 ESSENTIAL HYPERTENSION: ICD-10-CM

## 2019-04-05 DIAGNOSIS — D72.819 LEUKOPENIA, UNSPECIFIED TYPE: Primary | ICD-10-CM

## 2019-04-05 PROCEDURE — 82607 VITAMIN B-12: CPT

## 2019-04-05 PROCEDURE — 85025 COMPLETE CBC W/AUTO DIFF WBC: CPT

## 2019-04-05 PROCEDURE — 80061 LIPID PANEL: CPT

## 2019-04-05 PROCEDURE — 80053 COMPREHEN METABOLIC PANEL: CPT

## 2019-04-05 PROCEDURE — 83921 ORGANIC ACID SINGLE QUANT: CPT

## 2019-04-05 PROCEDURE — 84443 ASSAY THYROID STIM HORMONE: CPT

## 2019-04-05 PROCEDURE — 36415 COLL VENOUS BLD VENIPUNCTURE: CPT

## 2019-04-05 NOTE — TELEPHONE ENCOUNTER
Please notify patient that I thought her labs came out good. Kidney function stable. Lipids are good. No anemia. White count is just a little bit low but I do not think this is very concerning. We can repeat in  1 month.   She does not have to fast.  I

## 2019-04-11 RX ORDER — RIVAROXABAN 15 MG/1
TABLET, FILM COATED ORAL
COMMUNITY
End: 2019-08-20

## 2019-04-23 ENCOUNTER — ANCILLARY PROCEDURE (OUTPATIENT)
Dept: CARDIOLOGY | Age: 69
End: 2019-04-23
Attending: INTERNAL MEDICINE

## 2019-04-23 ENCOUNTER — HOSPITAL ENCOUNTER (EMERGENCY)
Facility: HOSPITAL | Age: 69
Discharge: NURSING, REHAB OR LONG-TERM CARE | End: 2019-04-23
Attending: EMERGENCY MEDICINE
Payer: MEDICARE

## 2019-04-23 ENCOUNTER — ANCILLARY ORDERS (OUTPATIENT)
Dept: CARDIOLOGY | Age: 69
End: 2019-04-23

## 2019-04-23 VITALS
SYSTOLIC BLOOD PRESSURE: 122 MMHG | HEART RATE: 61 BPM | TEMPERATURE: 98 F | OXYGEN SATURATION: 97 % | RESPIRATION RATE: 16 BRPM | DIASTOLIC BLOOD PRESSURE: 55 MMHG

## 2019-04-23 DIAGNOSIS — Z45.018 ENCOUNTER FOR INTERROGATION OF CARDIAC PACEMAKER: ICD-10-CM

## 2019-04-23 DIAGNOSIS — F32.A DEPRESSION, UNSPECIFIED DEPRESSION TYPE: Primary | ICD-10-CM

## 2019-04-23 DIAGNOSIS — R45.851 SUICIDAL IDEATION: ICD-10-CM

## 2019-04-23 PROCEDURE — 80329 ANALGESICS NON-OPIOID 1 OR 2: CPT | Performed by: EMERGENCY MEDICINE

## 2019-04-23 PROCEDURE — 81001 URINALYSIS AUTO W/SCOPE: CPT | Performed by: EMERGENCY MEDICINE

## 2019-04-23 PROCEDURE — 99285 EMERGENCY DEPT VISIT HI MDM: CPT

## 2019-04-23 PROCEDURE — 87186 SC STD MICRODIL/AGAR DIL: CPT | Performed by: EMERGENCY MEDICINE

## 2019-04-23 PROCEDURE — 87086 URINE CULTURE/COLONY COUNT: CPT | Performed by: EMERGENCY MEDICINE

## 2019-04-23 PROCEDURE — 80320 DRUG SCREEN QUANTALCOHOLS: CPT | Performed by: EMERGENCY MEDICINE

## 2019-04-23 PROCEDURE — 93294 REM INTERROG EVL PM/LDLS PM: CPT | Performed by: INTERNAL MEDICINE

## 2019-04-23 PROCEDURE — 85025 COMPLETE CBC W/AUTO DIFF WBC: CPT | Performed by: EMERGENCY MEDICINE

## 2019-04-23 PROCEDURE — 87077 CULTURE AEROBIC IDENTIFY: CPT | Performed by: EMERGENCY MEDICINE

## 2019-04-23 PROCEDURE — 80307 DRUG TEST PRSMV CHEM ANLYZR: CPT | Performed by: EMERGENCY MEDICINE

## 2019-04-23 PROCEDURE — 80076 HEPATIC FUNCTION PANEL: CPT | Performed by: EMERGENCY MEDICINE

## 2019-04-23 PROCEDURE — 36415 COLL VENOUS BLD VENIPUNCTURE: CPT

## 2019-04-23 PROCEDURE — 80349 CANNABINOIDS NATURAL: CPT | Performed by: EMERGENCY MEDICINE

## 2019-04-23 PROCEDURE — 80048 BASIC METABOLIC PNL TOTAL CA: CPT | Performed by: EMERGENCY MEDICINE

## 2019-04-23 RX ORDER — ACETAMINOPHEN AND CODEINE PHOSPHATE 300; 30 MG/1; MG/1
1 TABLET ORAL ONCE
Status: COMPLETED | OUTPATIENT
Start: 2019-04-23 | End: 2019-04-23

## 2019-04-23 NOTE — ED INITIAL ASSESSMENT (HPI)
Pt comes in w/ SI. Pt called her MD and told her that she had some thoughts of going to sleep and not waking up. Pt states she recently has been diagnosed with dementia and has had a really hard time dealing with the diagnosis.  Pt states her  is in

## 2019-04-23 NOTE — ED PROVIDER NOTES
Patient Seen in: [facility] AND [facility] Emergency Department    History   No chief complaint on file. Stated Complaint: psych eval/ SI    HPI    Patient complains of depression/anxiety for several months.   She is having a hard time dealing with her diagnos disorder ([facility]     as per NG   • Seizure disorder ([facility]    • Tendon tear 2012    Surgically repaired; as per NG   • Unspecified essential hypertension     as per NG   • Unspecified sleep apnea     as per NG       Past Surgical History:   Procedure Promise Sagastume Bisulfate 75 MG Oral Tab,  Take 1 tablet (75 mg total) by mouth daily. colchicine 0.6 MG Oral Tab,  Take 1 tablet (0.6 mg total) by mouth every other day.    ATORVASTATIN 20 MG Oral Tab,  TAKE ONE TABLET BY MOUTH NIGHTLY    nadolol 20 MG Oral Tab,  Take 0 SpO2 100 %   O2 Device None (Room air)       Current:BP (!) 176/78   Pulse 60   Temp 98.2 °F (36.8 °C) (Temporal)   Resp 16   SpO2 99%    PULSE OX Nl on room air    GENERAL: awake alert no focal deficits  HEAD: normocephalic, atraumatic,   EYES: PERRLA, PLATELET    Narrative: The following orders were created for panel order CBC WITH DIFFERENTIAL WITH PLATELET.   Procedure                               Abnormality         Status                     ---------                               -----------

## 2019-04-23 NOTE — BH LEVEL OF CARE ASSESSMENT
Level of Care Assessment Note    General Questions  Why are you here?: \"I was diagnosed with dementia four years ago and I am feeling so overwhelmed. \"  Precipitating Events: Patient called her PCP with her feelings that she is overwhelmed and feels suici and this appears to be stressful for patient. Patient's  stated he wasn't aware of patient's suicidal feelings and how overwhelmed she was until seeing the police cars at the house this morning.   feels the patient has been on the Internet con it.\"  Family History or Personal Lived Experience of Loss or Near Loss by Suicide: Denies    Danger to Others/Property  Have you harmed someone or had thoughts about wanting someone harmed or killed in the past 30 days?: No  Have you harmed someone or had Treatment  Recovery Support Groups: Denies Past History  History of Seclusion/Restraint: No                                                                                                                                      Functional Impairment  Currentl behavior  Judgment: Fair  Fair/poor judgment as evidenced by: evidenced by symptoms and behavior  Thought Patterns  Clarity/Relevance: Coherent;Logical  Flow: Organized; Perseveration  Content: Ordinary  Level of Consciousness: Alert  Level of Consciousness Precautions: Seizure  Refused Treatment: No  Education Provided: Call 911 in an Emergency;[facility] Crisis Line Number;Advised to call if condition worsens; Advised to call with questions  Transferred: No    Primary Psychiatric Diagnosis  Depressive Disorders: Un

## 2019-04-24 NOTE — ED NOTES
Superior at pt bedside. Pt belongings given to superior, EMTALA paper work and original petition given to superior to bring to [facility], Copy of petition and face sheet given to superior.

## 2019-04-24 NOTE — ED NOTES
Call back from [facility]. Nash Honeycutt has been accepted for admission to [facility] under the care of Dr. Jaya Sosa. She will be admitted to [facility], Room 3310(2). Okay to call for transport.

## 2019-05-02 ENCOUNTER — HOSPITAL ENCOUNTER (OUTPATIENT)
Facility: HOSPITAL | Age: 69
Setting detail: OBSERVATION
Discharge: HOME HEALTH | End: 2019-05-06
Attending: EMERGENCY MEDICINE | Admitting: HOSPITALIST
Payer: MEDICARE

## 2019-05-02 ENCOUNTER — APPOINTMENT (OUTPATIENT)
Dept: CT IMAGING | Facility: HOSPITAL | Age: 69
End: 2019-05-02
Attending: EMERGENCY MEDICINE
Payer: MEDICARE

## 2019-05-02 DIAGNOSIS — R41.82 ALTERED MENTAL STATUS, UNSPECIFIED ALTERED MENTAL STATUS TYPE: Primary | ICD-10-CM

## 2019-05-02 DIAGNOSIS — T50.905A ADVERSE EFFECT OF DRUG, INITIAL ENCOUNTER: ICD-10-CM

## 2019-05-02 DIAGNOSIS — N30.00 ACUTE CYSTITIS WITHOUT HEMATURIA: ICD-10-CM

## 2019-05-02 PROCEDURE — 99223 1ST HOSP IP/OBS HIGH 75: CPT | Performed by: HOSPITALIST

## 2019-05-02 PROCEDURE — 70450 CT HEAD/BRAIN W/O DYE: CPT | Performed by: EMERGENCY MEDICINE

## 2019-05-02 RX ORDER — SODIUM CHLORIDE 9 MG/ML
INJECTION, SOLUTION INTRAVENOUS CONTINUOUS
Status: DISCONTINUED | OUTPATIENT
Start: 2019-05-02 | End: 2019-05-03

## 2019-05-02 RX ORDER — CLOPIDOGREL BISULFATE 75 MG/1
75 TABLET ORAL DAILY
Status: DISCONTINUED | OUTPATIENT
Start: 2019-05-03 | End: 2019-05-06

## 2019-05-02 RX ORDER — SODIUM CHLORIDE 9 MG/ML
INJECTION, SOLUTION INTRAVENOUS ONCE
Status: COMPLETED | OUTPATIENT
Start: 2019-05-02 | End: 2019-05-03

## 2019-05-02 RX ORDER — ONDANSETRON 2 MG/ML
4 INJECTION INTRAMUSCULAR; INTRAVENOUS EVERY 6 HOURS PRN
Status: DISCONTINUED | OUTPATIENT
Start: 2019-05-02 | End: 2019-05-06

## 2019-05-02 RX ORDER — CITALOPRAM 10 MG/1
10 TABLET ORAL DAILY
COMMUNITY
End: 2019-07-23

## 2019-05-02 RX ORDER — ALLOPURINOL 100 MG/1
100 TABLET ORAL 2 TIMES DAILY
Status: DISCONTINUED | OUTPATIENT
Start: 2019-05-02 | End: 2019-05-06

## 2019-05-02 RX ORDER — HEPARIN SODIUM 5000 [USP'U]/ML
5000 INJECTION, SOLUTION INTRAVENOUS; SUBCUTANEOUS EVERY 12 HOURS SCHEDULED
Status: DISCONTINUED | OUTPATIENT
Start: 2019-05-02 | End: 2019-05-03

## 2019-05-02 RX ORDER — COLCHICINE 0.6 MG/1
0.6 TABLET ORAL EVERY OTHER DAY
Status: DISCONTINUED | OUTPATIENT
Start: 2019-05-03 | End: 2019-05-06

## 2019-05-02 RX ORDER — LEVETIRACETAM 500 MG/1
1000 TABLET ORAL 2 TIMES DAILY
Status: DISCONTINUED | OUTPATIENT
Start: 2019-05-02 | End: 2019-05-06

## 2019-05-02 RX ORDER — ATORVASTATIN CALCIUM 20 MG/1
20 TABLET, FILM COATED ORAL NIGHTLY
Status: DISCONTINUED | OUTPATIENT
Start: 2019-05-02 | End: 2019-05-06

## 2019-05-02 RX ORDER — ESCITALOPRAM OXALATE 10 MG/1
10 TABLET ORAL DAILY
Status: DISCONTINUED | OUTPATIENT
Start: 2019-05-03 | End: 2019-05-06

## 2019-05-02 RX ORDER — TRAZODONE HYDROCHLORIDE 100 MG/1
100 TABLET ORAL NIGHTLY
Status: ON HOLD | COMMUNITY
End: 2019-05-06

## 2019-05-02 RX ORDER — SODIUM CHLORIDE 0.9 % (FLUSH) 0.9 %
3 SYRINGE (ML) INJECTION AS NEEDED
Status: DISCONTINUED | OUTPATIENT
Start: 2019-05-02 | End: 2019-05-06

## 2019-05-02 RX ORDER — ACETAMINOPHEN 325 MG/1
650 TABLET ORAL EVERY 6 HOURS PRN
Status: DISCONTINUED | OUTPATIENT
Start: 2019-05-02 | End: 2019-05-06

## 2019-05-02 NOTE — ED NOTES
Orders for admission, patient is aware of plan and ready to go upstairs. Any questions, please call ED RN Edson Garnett  at extension [phone]. Patient will be getting rocephin here.

## 2019-05-02 NOTE — ED PROVIDER NOTES
Patient Seen in: [facility] AND [facility] Emergency Department    History   Patient presents with:  Dizziness (neurologic)    Stated Complaint: Unsteady gate    HPI    Patient presents to the emergency department today with complaint of not feeling right.   Gracie NG   • Obstructive sleep apnea 12/30/2013   • Pacemaker    • Pseudobulbar affect 2/24/2017   • Restless leg syndrome     as per NG   • Restless legs syndrome 5/16/2013   • Seizure disorder [facility])     as per NG   • Seizure disorder (HCC)    • Tendon tear 201 in the evening   ALLOPURINOL 100 MG Oral Tab,  TAKE ONE TABLET BY MOUTH TWICE DAILY   ROPINIROLE HCL 1 MG Oral Tab,  TAKE ONE TABLET BY MOUTH THREE TIMES DAILY   Clopidogrel Bisulfate 75 MG Oral Tab,  Take 1 tablet (75 mg total) by mouth daily.    colchicin no vomiting  Genitourinary: no dysuria  Musculoskeletal: no back pain    Positive for stated complaint: Unsteady gate  Other systems are as noted in HPI. Constitutional and vital signs reviewed.       All other systems reviewed and negative except as noted urine infection. We will give IV fluids IV Rocephin. I recommended admission. It seems her altered mental status seems to be multifactorial.  She had recent urine infection on April 23 with growing out Klebsiella.   I discussed with her  and avelina that you schedule follow up care with a primary care provider within the next three months to obtain basic health screening including reassessment of your blood pressure.       Clinical Impression:  Altered mental status, unspecified altered mental status t

## 2019-05-02 NOTE — PLAN OF CARE
Problem: Patient Centered Care  Goal: Patient preferences are identified and integrated in the patient's plan of care  Description  Interventions:  - What would you like us to know as we care for you?  \"I just came back from [facility] for [facility]

## 2019-05-02 NOTE — ED INITIAL ASSESSMENT (HPI)
Patient here for difficulty keeping her balance today. Sister was in town and was concerned about her balance. Patient was just given new medications, and has a new prescription for medical marijuana.  States she feels like she has a \"rubbery spine and no

## 2019-05-03 ENCOUNTER — APPOINTMENT (OUTPATIENT)
Dept: ULTRASOUND IMAGING | Facility: HOSPITAL | Age: 69
End: 2019-05-03
Attending: Other
Payer: MEDICARE

## 2019-05-03 PROCEDURE — 99233 SBSQ HOSP IP/OBS HIGH 50: CPT | Performed by: HOSPITALIST

## 2019-05-03 PROCEDURE — 99214 OFFICE O/P EST MOD 30 MIN: CPT | Performed by: OTHER

## 2019-05-03 PROCEDURE — 93880 EXTRACRANIAL BILAT STUDY: CPT | Performed by: OTHER

## 2019-05-03 RX ORDER — HEPARIN SODIUM 5000 [USP'U]/ML
5000 INJECTION, SOLUTION INTRAVENOUS; SUBCUTANEOUS EVERY 12 HOURS SCHEDULED
Status: DISCONTINUED | OUTPATIENT
Start: 2019-05-03 | End: 2019-05-03

## 2019-05-03 RX ORDER — SENNOSIDES 8.6 MG
17.2 TABLET ORAL NIGHTLY
Status: DISCONTINUED | OUTPATIENT
Start: 2019-05-03 | End: 2019-05-06

## 2019-05-03 RX ORDER — ROPINIROLE 1 MG/1
1 TABLET, FILM COATED ORAL 2 TIMES DAILY
Status: DISCONTINUED | OUTPATIENT
Start: 2019-05-03 | End: 2019-05-03

## 2019-05-03 RX ORDER — ROPINIROLE 1 MG/1
1 TABLET, FILM COATED ORAL 2 TIMES DAILY
COMMUNITY
End: 2019-05-20 | Stop reason: DRUGHIGH

## 2019-05-03 RX ORDER — ROPINIROLE 1 MG/1
1 TABLET, FILM COATED ORAL DAILY
Status: DISCONTINUED | OUTPATIENT
Start: 2019-05-04 | End: 2019-05-06

## 2019-05-03 RX ORDER — ROPINIROLE 1 MG/1
2 TABLET, FILM COATED ORAL NIGHTLY
Status: DISCONTINUED | OUTPATIENT
Start: 2019-05-03 | End: 2019-05-06

## 2019-05-03 RX ORDER — SODIUM CHLORIDE 9 MG/ML
INJECTION, SOLUTION INTRAVENOUS
Status: COMPLETED
Start: 2019-05-03 | End: 2019-05-03

## 2019-05-03 RX ORDER — HEPARIN SODIUM 5000 [USP'U]/ML
5000 INJECTION, SOLUTION INTRAVENOUS; SUBCUTANEOUS EVERY 12 HOURS SCHEDULED
Status: DISCONTINUED | OUTPATIENT
Start: 2019-05-03 | End: 2019-05-06

## 2019-05-03 NOTE — CONSULTS
[facility]    Report of Consultation    Rima Cedillo Patient Status:  Observation    3/28/1950 MRN [id number]   Location [facility] 5SW/SE Attending Neto Stinson MD   Hosp Day # 0 PCP Leighton Kerns MD     Date of Admission: Moderate calcific atherosclerosis of aorta.     • Carpal tunnel syndrome 2011    as per NG   • Chronic atrial fibrillation (HCC)    • Crohn disease ([facility] [address]    • Diverticulosis     as per NG   • Family history of colon cancer     Maternal grandmother had colon as per NG   • KNEE SURGERY  1/2015    x3    • NASAL SCOPY,REMV TOTL ETHMOID  2006    \"Ethmoidectomy, max antrostomies\"; as per NG   • OTHER  June or July 2015    removal of pins and plate from right wrist (fracture)   • OTHER SURGICAL HISTORY Left     L 5,000 Units Subcutaneous 2 times per day   Normal Saline Flush 0.9 % injection 3 mL 3 mL Intravenous PRN   acetaminophen (TYLENOL) tab 650 mg 650 mg Oral Q6H PRN   ondansetron HCl (ZOFRAN) injection 4 mg 4 mg Intravenous Q6H PRN   CefTRIAXone Sodium (ROCEP 05/03/19  0951   BP: 144/67 111/50 125/68 140/72   Pulse: 59 60 65 60   Resp: 12 12 14 16   Temp: 98.4 °F (36.9 °C) 98.4 °F (36.9 °C) 97.3 °F (36.3 °C) 97.5 °F (36.4 °C)   TempSrc: Oral Oral Oral Oral   SpO2: 98% 94% 96% 100%   Weight:           General: N WBC 3.1 (L) 05/03/2019    HGB 11.2 (L) 05/03/2019    HCT 34.8 (L) 05/03/2019    .0 (L) 05/03/2019    CREATSERUM 0.99 05/03/2019    BUN 19 (H) 05/03/2019     05/03/2019    K 3.9 05/03/2019     (H) 05/03/2019    CO2 23.0 05/03/2019    GLU your patient.     Jazmyne Harris  5/3/2019

## 2019-05-03 NOTE — PHYSICAL THERAPY NOTE
PHYSICAL THERAPY EVALUATION - INPATIENT     Room Number: 561/561-A  Evaluation Date: 5/3/2019  Type of Evaluation: Initial   Physician Order: PT Eval and Treat    Presenting Problem: Poor balance, L sided weakness, UTI  Reason for Therapy: Mobility Dysfun Degree of Impairment: 41.77% has been calculated based on documentation in the [facility] '6 clicks' Inpatient Basic Mobility Short Form. Research supports that patients with this level of impairment may benefit from home with HHPT.  Recommended initial supervi [facility])    • Diverticulosis     as per NG   • Family history of colon cancer     Maternal grandmother had colon cancer in her 76s.    • Gout    • Hepatic steatosis 3/11/2016   • High blood pressure    • High cholesterol     as per NG   • History of colonic po or July 2015    removal of pins and plate from right wrist (fracture)   • OTHER SURGICAL HISTORY Left     L knee scoped--3 torn meniscus--biocartilage done   • OTHER SURGICAL HISTORY Right 8/2016    R knee scoped for torn miniscus   • RECONSTR NOSE+JEAN SEP the patient currently need. ..   -   Moving to and from a bed to a chair (including a wheelchair)?: A Little   -   Need to walk in hospital room?: A Little   -   Climbing 3-5 steps with a railing?: A Lot     AM-PAC Score:  Raw Score: 19   Approx Degree of I

## 2019-05-03 NOTE — PROGRESS NOTES
[facility]    Progress Note    Rise Klinefelter Patient Status:  Observation    3/28/1950 MRN [id number]   Location [facility] 5SW/SE Attending Diane Goodman MD   Hosp Day # 0 PCP Charley Bray MD       Subjective:     Feeling b Marcia Alcantar MD      Assessment and Plan:     Feeling \"out of it. \"   Poor balance. Trouble initiating gate. Improved but still present. This could very likely be medication effect likely due to trazodone or due to the marijuana.    Less likely due to celex

## 2019-05-03 NOTE — PLAN OF CARE
Problem: Patient Centered Care  Goal: Patient preferences are identified and integrated in the patient's plan of care  Description  Interventions:  - What would you like us to know as we care for you?  \"I just came back from [facility] for the Alzheimer precautions)  Outcome: Progressing     Problem: Impaired Functional Mobility  Goal: Achieve highest/safest level of mobility/gait  Description  Interventions:  - Assess patient's functional ability and stability  - Promote increasing activity/tolerance for

## 2019-05-03 NOTE — PLAN OF CARE
Problem: Patient Centered Care  Goal: Patient preferences are identified and integrated in the patient's plan of care  Description  Interventions:  - What would you like us to know as we care for you?  \"I just came back from [facility] for the Alzheimer part  Description  INTERVENTIONS:  - Support and protect limb and body alignment per provider's orders  - Instruct and reinforce with patient and family use of appropriate assistive device and precautions (e.g. spinal or hip dislocation precautions)  Nikole Foster

## 2019-05-03 NOTE — CM/SW NOTE
SW received MDO for [facility] services. Pt has been screened per chart review and during nursing rounds. Pt is from home w/ her  in [facility]. Pt live in a 2 level house w/ 15 stairs to the bedroom. Pt owns a cane and a walker.  [facility] orders have been entered

## 2019-05-03 NOTE — SLP NOTE
ADULT SWALLOWING EVALUATION    ASSESSMENT    ASSESSMENT/OVERALL IMPRESSION:  SLP BSSE orders received and acknowledged. Pt denies any difficulties swallowing and notes slurred speech has resolved.    Pt positioned upright 90 degrees in bed for swallow asses PET scan of brain May 6, 2016 showing bilateral temporoparietal beta amyloid deposition in the gray matter consistent with Alzheimer's disease.     • Alzheimer disease    • Aortic atherosclerosis ([address] 3/11/2016    Aortic atherosclerosis seen on CT scan Oct intracranial abnormality. OBJECTIVE   ORAL MOTOR EXAMINATION  Dentition: Functional(partials at home )  Symmetry: Within Functional Limits  Strength:  Within Functional Limits  Tone: Within Functional Limits  Range of Motion: Within Functional Limits

## 2019-05-03 NOTE — OCCUPATIONAL THERAPY NOTE
OCCUPATIONAL THERAPY EVALUATION - INPATIENT     Room Number: 561/561-A  Evaluation Date: 5/3/2019  Type of Evaluation: Initial  Presenting Problem: (AMS)    Physician Order: IP Consult to Occupational Therapy  Reason for Therapy: ADL/IADL Dysfunction and D third floor. Sister at bedside. The patient's Approx Degree of Impairment: 25.8% has been calculated based on documentation in the [facility] '6 clicks' Inpatient Daily Activity Short Form.   Research supports that patients with this level of impairment may b headaches     as per NG   • History of total hysterectomy with removal of both tubes and ovaries     as per NG   • Incontinence     DUE TO CYSTOCELE/RECTOCELE   • Lymphocytic colitis 2011    as per NG   • Migraine 9/21/2012   • Migraines    • Mild cognitiv • WATCHMAN N/A 11/9/2018    Performed by Jaiden Hamilton MD at [facility]  Type of Home: House  Home Layout: Two level  Lives With: Alone     Toilet and Equipment: Standard height toilet  Shower/Tub and Equipment: Tub-shower combo(suctio rinsing, drying)?: A Little  -   Toileting, which includes using toilet, bedpan or urinal? : A Little  -   Putting on and taking off regular upper body clothing?: None  -   Taking care of personal grooming such as brushing teeth?: None  -   Eating meals?:

## 2019-05-03 NOTE — CONSULTS
[facility]    Cardiology Consultation    [facility] Location: [facility] 5SW/SE    3/28/1950 MRN [id number]   Consulting Date 5/3/2019 [facility] [id number]   Consulting Physician Abundio Wagner MD Attending Physician Rishi Cardenas, pressure    • High cholesterol     as per NG   • History of colonic polyps 07-    as per NG   • History of migraine headaches     as per NG   • History of total hysterectomy with removal of both tubes and ovaries     as per NG   • Incontinence     D for torn miniscus   • RECONSTR NOSE+JEAN SEPTAL REPAIR  2006    as per NG   • TOTAL KNEE REPLACEMENT     • WATCHMAN N/A 11/9/2018    Performed by Mariana Dixon MD at [facility] History   Problem Relation Age of Onset   • Hypertension Father Disp:  Rfl:    ALLOPURINOL 100 MG Oral Tab TAKE ONE TABLET BY MOUTH TWICE DAILY Disp: 180 tablet Rfl: 0   Clopidogrel Bisulfate 75 MG Oral Tab Take 1 tablet (75 mg total) by mouth daily.  Disp: 30 tablet Rfl: 5   ATORVASTATIN 20 MG Oral Tab TAKE ONE TABLET 19 05/03/2019     05/03/2019    K 3.9 05/03/2019     05/03/2019    CO2 23.0 05/03/2019    GLU 93 05/03/2019    CA 7.9 05/03/2019    INR 1.01 05/03/2019       IMPRESSIONS:  1) Possible cerebellar stroke   - ataxia and diplopia   - other causes b

## 2019-05-04 ENCOUNTER — APPOINTMENT (OUTPATIENT)
Dept: CT IMAGING | Facility: HOSPITAL | Age: 69
End: 2019-05-04
Attending: Other
Payer: MEDICARE

## 2019-05-04 ENCOUNTER — APPOINTMENT (OUTPATIENT)
Dept: CV DIAGNOSTICS | Facility: HOSPITAL | Age: 69
End: 2019-05-04
Attending: Other
Payer: MEDICARE

## 2019-05-04 PROCEDURE — 70450 CT HEAD/BRAIN W/O DYE: CPT | Performed by: OTHER

## 2019-05-04 PROCEDURE — 93306 TTE W/DOPPLER COMPLETE: CPT | Performed by: OTHER

## 2019-05-04 PROCEDURE — 99233 SBSQ HOSP IP/OBS HIGH 50: CPT | Performed by: HOSPITALIST

## 2019-05-04 PROCEDURE — 99214 OFFICE O/P EST MOD 30 MIN: CPT | Performed by: OTHER

## 2019-05-04 NOTE — PROGRESS NOTES
[facility]    Progress Note    Genie Joel Patient Status:  Observation    3/28/1950 MRN [id number]   Location [facility] 3W/SW Attending Rosa Odonnell MD   Hosp Day # 0 PCP Bibi Early MD       Subjective:     No CP or S 5/3/2019  CONCLUSION:  1. Bilateral carotid bifurcation/proximal ICA plaque without hemodynamic significance. 2. Antegrade flow within both vertebral arteries.   No significant change    Dictated by (CST): Gini Griffin MD on 5/03/2019 at 14:09     Ap

## 2019-05-04 NOTE — PROGRESS NOTES
[facility]    Cardiology Progress Note    Genie Joel Patient Status:  Observation    3/28/1950 MRN [id number]   Location [facility] 3W/SW Attending Rosa Odonnell, [address] [facility] Se Day # 0 PCP Bibi Early MD     2019  Subject Comment:ITCHES WITH 2 TABS, O.K. WITH ONE    Medications:    Current Facility-Administered Medications:  Senna (SENOKOT) tab 17.2 mg 17.2 mg Oral Nightly   Heparin Sodium (Porcine) 5000 UNIT/ML injection 5,000 Units 5,000 Units Subcutaneous 2 times per day Carlito     RECOMMENDATIONS:  - continue Plavix  - needs TAMRA to evaluate the SARABJIT and Watchman position, rule leak or thrombus on Monday. Will follow. Foreign Franco MD  [facility]. Interventional Cardiology.   [phone]

## 2019-05-04 NOTE — PLAN OF CARE
Problem: Patient Centered Care  Goal: Patient preferences are identified and integrated in the patient's plan of care  Description  Interventions:  - What would you like us to know as we care for you?  \"I just came back from [facility] for the Alzheimer adequate protection for wounds/incisions during mobilization  - Obtain PT/OT consults as needed  - Advance activity as appropriate  - Communicate ordered activity level and limitations with patient/family  5/4/2019 0310 by Dianne Delarosa RN  Outcome: Progr

## 2019-05-04 NOTE — PROGRESS NOTES
Neurology Inpatient Follow-up Note      HPI:     Patient is being seen in follow-up. Interval notes and work-up reviewed. Patient states she is feeling better but not completely back to baseline. Still having some left-sided hemiataxia.   She states that ischemic disease. There is also large vessel atherosclerosis. 3. Chronic left maxillary sinusitis.     ASSESSMENT/PLAN:   Left-sided hemiataxia  Restless leg syndrome  History of seizure disorder  Although toxic/metabolic etiologies, such as recent ps

## 2019-05-05 PROCEDURE — 99232 SBSQ HOSP IP/OBS MODERATE 35: CPT | Performed by: HOSPITALIST

## 2019-05-05 PROCEDURE — 99214 OFFICE O/P EST MOD 30 MIN: CPT | Performed by: OTHER

## 2019-05-05 NOTE — PROGRESS NOTES
[facility]    Progress Note    Mati Murillo Patient Status:  Observation    3/28/1950 MRN [id number]   Location [facility] 3W/SW Attending Camacho Gill MD   Hosp Day # 0 PCP Anais Ventura MD       Subjective:     Pt says he balance.  Trouble initiating gate.   Improved but still present. This could very likely be medication effect likely due to trazodone or due to the marijuana.   Less likely due to celexa. At this point there is high suspicion that this is acute CVA.   Also

## 2019-05-05 NOTE — PHYSICAL THERAPY NOTE
PHYSICAL THERAPY TREATMENT NOTE - INPATIENT     Room Number: 320/320-A       Presenting Problem: Poor balance, L sided weakness, UTI(possible CVA)    Problem List  Principal Problem:    Altered mental status, unspecified altered mental status type  Active Seizure    WEIGHT BEARING RESTRICTION  Weight Bearing Restriction: None                PAIN ASSESSMENT   Ratin  Location: Mild back spasms  Management Techniques:  Activity promotion;Repositioning;Relaxation    BALANCE transfers Sit to/from Stand at assistance level: independent with least restrictive AD   Goal #2  Current Status Supervision   Goal #3 Patient is able to ambulate 500 feet with least restrictive assist device:  at assistance level: modified independent   G

## 2019-05-05 NOTE — PROGRESS NOTES
[facility]OSP - [facility]    Progress Note    Ruchi Pugh Patient Status:  Observation    3/28/1950 MRN [id number]   Location [facility] 3W/SW Attending Cruzito Baltazar MD   Hosp Day # 0 PCP Betsy Villatoro MD        Subjective:     Siddhartha Zuniga PHOS 3.5 03/18/2018    TROP 0.00 02/28/2017    CK 87 05/25/2017    B12 382 04/05/2019    ETOH <3 04/23/2019       Ct Brain Or Head (52453)    Result Date: 5/4/2019  CONCLUSION:  1. No acute intracranial process by noncontrast CT technique.   2. Senescent ch

## 2019-05-05 NOTE — PLAN OF CARE
Problem: Patient/Family Goals  Goal: Patient/Family Long Term Goal  Description  Patient's Long Term Goal: Prevent stroke in future    Interventions:  - Will make lifestyle changes to diet, exercise  - Know signs/symptoms of stroke /TIA   - Monitor self Daily Living  Goal: Achieve highest/safest level of independence in self care  Description  Interventions:  - Assess ability and encourage patient to participate in ADLs to maximize function  - Promote sitting position while performing ADLs such as feeding

## 2019-05-05 NOTE — PROGRESS NOTES
Neurology Inpatient Follow-up Note      HPI:     Patient is being seen in follow-up.   She states she had a very unusual sensation of a vision of \"chandeliers turning;\" she states that she has had  similar symptom in the past in 2010, which she attributes neurologist Dr. Vikki Ledesma; would be hesitant to increase antiepileptic dose any further as patient already on a high dose    –I did discuss with patient at length in lay terms the role that cannabis can play in aggravating/triggering various neurologic symptoms

## 2019-05-06 ENCOUNTER — TELEPHONE (OUTPATIENT)
Dept: INTERNAL MEDICINE CLINIC | Facility: CLINIC | Age: 69
End: 2019-05-06

## 2019-05-06 ENCOUNTER — APPOINTMENT (OUTPATIENT)
Dept: INTERVENTIONAL RADIOLOGY/VASCULAR | Facility: HOSPITAL | Age: 69
End: 2019-05-06
Attending: INTERNAL MEDICINE
Payer: MEDICARE

## 2019-05-06 ENCOUNTER — APPOINTMENT (OUTPATIENT)
Dept: CV DIAGNOSTICS | Facility: HOSPITAL | Age: 69
End: 2019-05-06
Attending: INTERNAL MEDICINE
Payer: MEDICARE

## 2019-05-06 VITALS
SYSTOLIC BLOOD PRESSURE: 126 MMHG | RESPIRATION RATE: 16 BRPM | DIASTOLIC BLOOD PRESSURE: 59 MMHG | BODY MASS INDEX: 27 KG/M2 | OXYGEN SATURATION: 98 % | TEMPERATURE: 98 F | HEART RATE: 61 BPM | WEIGHT: 164.5 LBS

## 2019-05-06 PROCEDURE — 93325 DOPPLER ECHO COLOR FLOW MAPG: CPT | Performed by: INTERNAL MEDICINE

## 2019-05-06 PROCEDURE — 93320 DOPPLER ECHO COMPLETE: CPT | Performed by: INTERNAL MEDICINE

## 2019-05-06 PROCEDURE — 99213 OFFICE O/P EST LOW 20 MIN: CPT | Performed by: OTHER

## 2019-05-06 PROCEDURE — 99217 OBSERVATION CARE DISCHARGE: CPT | Performed by: HOSPITALIST

## 2019-05-06 PROCEDURE — B245ZZ4 ULTRASONOGRAPHY OF LEFT HEART, TRANSESOPHAGEAL: ICD-10-PCS | Performed by: INTERNAL MEDICINE

## 2019-05-06 RX ORDER — MIDAZOLAM HYDROCHLORIDE 1 MG/ML
3 INJECTION INTRAMUSCULAR; INTRAVENOUS ONCE
Status: COMPLETED | OUTPATIENT
Start: 2019-05-06 | End: 2019-05-06

## 2019-05-06 RX ORDER — POTASSIUM CHLORIDE 20 MEQ/1
40 TABLET, EXTENDED RELEASE ORAL ONCE
Status: COMPLETED | OUTPATIENT
Start: 2019-05-06 | End: 2019-05-06

## 2019-05-06 RX ORDER — MIDAZOLAM HYDROCHLORIDE 1 MG/ML
INJECTION INTRAMUSCULAR; INTRAVENOUS
Status: COMPLETED
Start: 2019-05-06 | End: 2019-05-06

## 2019-05-06 RX ORDER — CEFADROXIL 500 MG/1
500 CAPSULE ORAL 2 TIMES DAILY
Qty: 8 CAPSULE | Refills: 0 | Status: SHIPPED | OUTPATIENT
Start: 2019-05-06 | End: 2019-05-06

## 2019-05-06 RX ORDER — SODIUM CHLORIDE 9 MG/ML
INJECTION, SOLUTION INTRAVENOUS
Status: DISCONTINUED
Start: 2019-05-06 | End: 2019-05-06

## 2019-05-06 RX ORDER — CEFADROXIL 500 MG/1
500 CAPSULE ORAL 2 TIMES DAILY
Qty: 8 CAPSULE | Refills: 0 | Status: SHIPPED | OUTPATIENT
Start: 2019-05-06 | End: 2019-05-10

## 2019-05-06 NOTE — PROGRESS NOTES
Neurology Inpatient Follow-up Note      HPI:     Patient is being seen in follow-up. Interval notes, work-up reviewed.       Past Medical Hisotory:  Reviewed    Medications:  Reviewed    Allergies:    Hydrocodone             UNKNOWN  Hydrocodone discharge      No further inpatient recommendations. Please page with any questions / concerns.     Melita Barakat MD  [address] [place] [address]  [phone]

## 2019-05-06 NOTE — CM/SW NOTE
349pm:     [facility] informed SW that pt is already current w/ another [place] [address] agency. Pt stated it is called [employer]. SW attempted to search agency and it is not listed; does not exist in [employer] either.      Pt provided SW w/ home RN, Catehrine Baker phone nu

## 2019-05-06 NOTE — PLAN OF CARE
Problem: Patient Centered Care  Goal: Patient preferences are identified and integrated in the patient's plan of care  Description  Interventions:  - What would you like us to know as we care for you?  \"I just came back from [facility] for the Alzheimer body part  Description  INTERVENTIONS:  - Support and protect limb and body alignment per provider's orders  - Instruct and reinforce with patient and family use of appropriate assistive device and precautions (e.g. spinal or hip dislocation precautions)

## 2019-05-06 NOTE — PROGRESS NOTES
[facility] - [facility]    Progress Note    Caryn López Patient Status:  Observation    3/28/1950 MRN [id number]   Location [facility] 3W/SW Attending Romy Izaguirre MD   Hosp Day # 0 PCP Don Cunningham MD       Subjective:     Pt says he Klebsiella  - PT/OT  - neurology on consult  - cont plavix  - TAMRA today to eval Watchman device position and to r/o leak or thrombus.   Cardiology on consult.     Hx of RLS  - cont home Requip       Hx of depression  - cont lexapro for home celexa     Hx o

## 2019-05-06 NOTE — PROGRESS NOTES
[facility] AND [facility]  [facility] Cardiology Progress Note    Susan Kingston Patient Status:  Observation    3/28/1950 MRN [id number]   Location [facility] Attending Candance Lewis, [address] [facility] Se Day # 0 PCP Mary Espinosa MD       Po migraine headaches     as per NG   • History of total hysterectomy with removal of both tubes and ovaries     as per NG   • Incontinence     DUE TO CYSTOCELE/RECTOCELE   • Lymphocytic colitis 2011    as per NG   • Migraine 9/21/2012   • Migraines    • Mild FRANK N/A 11/9/2018    Performed by Andrés Swenson MD at [facility] History   Problem Relation Age of Onset   • Hypertension Father         as per NG   • Heart Disease Father         as per NG   • Migraines Father         as per NG   • Neurologi

## 2019-05-06 NOTE — PLAN OF CARE
Problem: Patient Centered Care  Goal: Patient preferences are identified and integrated in the patient's plan of care  Description  Interventions:  - What would you like us to know as we care for you?  \"I just came back from [facility] for the Alzheimer needed  - Ensure adequate protection for wounds/incisions during mobilization  - Obtain PT/OT consults as needed  - Advance activity as appropriate  - Communicate ordered activity level and limitations with patient/family  5/6/2019 1542 by Bharath Finnegan,

## 2019-05-06 NOTE — TELEPHONE ENCOUNTER
Naila Mcconnell from [facility] calling. Asking if Dr. Silas Reveles okay with signing orders. Only need call back if Dr. Silas Reveles is not going to sign orders.  [phone]

## 2019-05-06 NOTE — DISCHARGE SUMMARY
[facility]    Discharge Summary    Samantha Ureña Patient Status:  Observation    3/28/1950 MRN [id number]   Location [facility] 3W/SW Attending Yocasta Smyth MD   Hosp Day # 0 PCP Salty Gardiner MD     Date of Admission:  brief period of blurred vision. Today pt felt very weak, especially in the legs, and she felt \"out of it. \"    Pt complains that it has been hard to initiate movement.    Pt ate two marijuana cookies last night however this is the norm for her and she olson Caps  Commonly known as:  DURICEF      Take 1 capsule (500 mg total) by mouth 2 (two) times daily for 4 days.    Stop taking on:  5/10/2019  Quantity:  8 capsule  Refills:  0        CONTINUE taking these medications      Instructions Prescription details or nurse    Bring a paper prescription for each of these medications  · Cefadroxil 500 MG Caps         Follow up Visits: Follow-up Information     Lalo Saenz MD In 1 week.     Specialty:  Internal Medicine  Contact information:  [facility].

## 2019-05-06 NOTE — OCCUPATIONAL THERAPY NOTE
OCCUPATIONAL THERAPY TREATMENT NOTE - INPATIENT        Room Number: 320/320-A           Presenting Problem: (AMS)    Problem List  Principal Problem:    Altered mental status, unspecified altered mental status type  Active Problems:    Altered mental statu bedpan or urinal? : None  -   Putting on and taking off regular upper body clothing?: None  -   Taking care of personal grooming such as brushing teeth?: None  -   Eating meals?: None    AM-PAC Score:  Score: 24  Approx Degree of Impairment: 0%  Standardiz

## 2019-05-06 NOTE — PROGRESS NOTES
Patient received back from TAMRA. VS stable. q15 vs post procedure completed. Patient alert and oriented x4. No complaints.  Gag reflex checked-back to normal.

## 2019-05-06 NOTE — PHYSICAL THERAPY NOTE
PHYSICAL THERAPY TREATMENT NOTE - INPATIENT     Room Number: 320/320-A       Presenting Problem: Poor balance, L sided weakness, UTI(possible CVA)    Problem List  Principal Problem:    Altered mental status, unspecified altered mental status type  Active Static Sitting: Good  Dynamic Sitting: Good           Static Standing: Good  Dynamic Standing: Fair +          AM-PAC '6-Clicks' INPATIENT SHORT FORM - BASIC MOBILITY  How much difficulty does the patient currently ha w/o AD & SBA to CGA   Goal #4 Patient will negotiate 12 stairs/one curb w/ assistive device and supervision   Goal #4   Current Status Up/down 12 stairs w/ HR & CGA   Goal #5 Patient to demonstrate independence with home activity/exercise instructions prov

## 2019-05-06 NOTE — PROGRESS NOTES
Nancy Currie  [id number]  5/6/2019    Post procedure/ recovery hand-off report given to [facility]. Patient's vital signs are stable. Kept NPO and on bedrest. Dr Hank Rooney spoke with her post procedure and he will call  as well.     Rosa Harris RN

## 2019-05-07 ENCOUNTER — PATIENT OUTREACH (OUTPATIENT)
Dept: CASE MANAGEMENT | Age: 69
End: 2019-05-07

## 2019-05-07 DIAGNOSIS — R27.0 ATAXIA: ICD-10-CM

## 2019-05-07 DIAGNOSIS — Z02.9 ENCOUNTERS FOR UNSPECIFIED ADMINISTRATIVE PURPOSE: ICD-10-CM

## 2019-05-07 PROCEDURE — 1111F DSCHRG MED/CURRENT MED MERGE: CPT

## 2019-05-07 NOTE — PROGRESS NOTES
Initial Post Discharge Follow Up   Discharge Date: 5/6/19  Contact Date: 5/7/2019    Consent Verification:  Assessment Completed With: Patient  HIPAA Verified?   Yes    Discharge Dx:     Ataxia  [place] disturbance  Medication side effect  Probable acute CV mouth daily. Disp:  Rfl:    Ondansetron HCl (ZOFRAN) 4 mg tablet TAKE ONE TABLET BY MOUTH EVERY EIGHT HOURS AS NEEDED FOR NAUSEA Disp: 30 tablet Rfl: 0   levETIRAcetam 500 MG Oral Tab Take 500 mg by mouth 2 (two) times daily.  1000 mg in am and 1000mg in th you need addressed before your next visit with your PCP?  (DME, meds, disease concerns, Etc): No     Follow up appointments:  Pt stated she is seeing her neurologist on 5/20/19 and the psychiatrist later today. NCM scheduled HFU appt with PCP on 5/10/19.

## 2019-05-08 DIAGNOSIS — Z86.79 HISTORY OF ATRIAL FIBRILLATION: ICD-10-CM

## 2019-05-10 ENCOUNTER — OFFICE VISIT (OUTPATIENT)
Dept: INTERNAL MEDICINE CLINIC | Facility: CLINIC | Age: 69
End: 2019-05-10
Payer: MEDICARE

## 2019-05-10 VITALS
BODY MASS INDEX: 26.66 KG/M2 | SYSTOLIC BLOOD PRESSURE: 114 MMHG | HEART RATE: 68 BPM | OXYGEN SATURATION: 98 % | TEMPERATURE: 98 F | HEIGHT: 65 IN | WEIGHT: 160 LBS | DIASTOLIC BLOOD PRESSURE: 64 MMHG

## 2019-05-10 DIAGNOSIS — G30.9 ALZHEIMER'S DEMENTIA WITHOUT BEHAVIORAL DISTURBANCE, UNSPECIFIED TIMING OF DEMENTIA ONSET (HCC): ICD-10-CM

## 2019-05-10 DIAGNOSIS — Z95.818 PRESENCE OF WATCHMAN LEFT ATRIAL APPENDAGE CLOSURE DEVICE: ICD-10-CM

## 2019-05-10 DIAGNOSIS — Z87.898 HISTORY OF SEIZURE: ICD-10-CM

## 2019-05-10 DIAGNOSIS — R26.9 GAIT DISTURBANCE: ICD-10-CM

## 2019-05-10 DIAGNOSIS — F02.80 ALZHEIMER'S DEMENTIA WITHOUT BEHAVIORAL DISTURBANCE, UNSPECIFIED TIMING OF DEMENTIA ONSET (HCC): ICD-10-CM

## 2019-05-10 DIAGNOSIS — R27.0 ATAXIA: Primary | ICD-10-CM

## 2019-05-10 DIAGNOSIS — N18.30 STAGE 3 CHRONIC KIDNEY DISEASE (HCC): ICD-10-CM

## 2019-05-10 DIAGNOSIS — R82.90 ABNORMAL URINALYSIS: ICD-10-CM

## 2019-05-10 DIAGNOSIS — M10.00 IDIOPATHIC GOUT, UNSPECIFIED CHRONICITY, UNSPECIFIED SITE: ICD-10-CM

## 2019-05-10 DIAGNOSIS — Z86.69 HISTORY OF RESTLESS LEGS SYNDROME: ICD-10-CM

## 2019-05-10 DIAGNOSIS — I10 ESSENTIAL HYPERTENSION: ICD-10-CM

## 2019-05-10 PROCEDURE — 99496 TRANSJ CARE MGMT HIGH F2F 7D: CPT | Performed by: INTERNAL MEDICINE

## 2019-05-10 NOTE — PROGRESS NOTES
HPI:    Rise Klinefelter is a 78year old female here today for hospital follow up.    [facility]   Admission Date: 5/2/19   Discharge Date: 5/6/19  Hospital Discharge Diagnoses (since 4/10/2019) she does this for sleep. Hospital discharge Aleks Chavis reviewed. She was seen by neurology. She was not able to have an MRI due to pacemaker. A CT scan of the brain mild chronic microvascular ischemic changes without acute intracranial abnormality.   Was n intermittently. I did tell her not quite sure why this is happening. Allergies:  She is allergic to hydrocodone and hydrocodone.     Current Meds:    Current Outpatient Medications on File Prior to Visit:  Cefadroxil 500 MG Oral Cap Take 1 capsule (500 (12/30/2013), Pacemaker, Pseudobulbar affect (2/24/2017), Restless leg syndrome, Restless legs syndrome (5/16/2013), Seizure disorder ([address] [facility] Tendon tear (2012), Unspecified essential hypertension, and Unspecified sleep apnea.     She hysterectomy. Estimated body mass index is 26.63 kg/m² as calculated from the following:    Height as of this encounter: 5' 5\" (1.651 m). Weight as of this encounter: 160 lb (72.6 kg).    /64   Pulse 68   Temp 97.7 °F (36.5 °C) (Oral)   Ht 5' 5\" severe    Overall Risk:   severe    Patient seen within 7 days from date of discharge.      Sissy Goddard MD, 5/10/2019

## 2019-05-14 ENCOUNTER — TELEPHONE (OUTPATIENT)
Dept: INTERVENTIONAL RADIOLOGY/VASCULAR | Facility: HOSPITAL | Age: 69
End: 2019-05-14

## 2019-05-14 NOTE — TELEPHONE ENCOUNTER
No answer, left message. It has been 6 months since your Watchman procedure and you can stop your Plavix. I instructed pt to call and let me know she rec'd this message.

## 2019-05-15 ENCOUNTER — TELEPHONE (OUTPATIENT)
Dept: INTERVENTIONAL RADIOLOGY/VASCULAR | Facility: HOSPITAL | Age: 69
End: 2019-05-15

## 2019-05-15 NOTE — TELEPHONE ENCOUNTER
Aristides Farnsworth returned call. She is very happy to D/C her Plavix. She will continue on ASA 81mg and I will call her at her 1 year post Watchman anniversary. Encouraged to call with questions/concerns.

## 2019-05-16 ENCOUNTER — APPOINTMENT (OUTPATIENT)
Dept: LAB | Age: 69
End: 2019-05-16
Attending: INTERNAL MEDICINE
Payer: MEDICARE

## 2019-05-16 ENCOUNTER — TELEPHONE (OUTPATIENT)
Dept: INTERVENTIONAL RADIOLOGY/VASCULAR | Facility: HOSPITAL | Age: 69
End: 2019-05-16

## 2019-05-16 ENCOUNTER — TELEPHONE (OUTPATIENT)
Dept: INTERNAL MEDICINE CLINIC | Facility: CLINIC | Age: 69
End: 2019-05-16

## 2019-05-16 DIAGNOSIS — R82.90 ABNORMAL URINALYSIS: Primary | ICD-10-CM

## 2019-05-16 DIAGNOSIS — R82.90 ABNORMAL URINALYSIS: ICD-10-CM

## 2019-05-16 PROCEDURE — 87086 URINE CULTURE/COLONY COUNT: CPT

## 2019-05-16 PROCEDURE — 81003 URINALYSIS AUTO W/O SCOPE: CPT

## 2019-05-16 NOTE — TELEPHONE ENCOUNTER
Please call pt, was order placed by Dr Silas Reveles for pt to take follow up urine lab  Pt has had several UTI's.   Dr Silas Reveles wanted to be sure pt ok  Pt saw Dr Silas Reveles earlier this week  Tasked to nursing

## 2019-05-16 NOTE — TELEPHONE ENCOUNTER
Spoke to patient and informed her orders have been placed in system for urinalysis and urine culture, she verbalizes understanding.

## 2019-05-16 NOTE — TELEPHONE ENCOUNTER
Per OV notes dated 5/10/2019 - \" will repeat urine and urine culture in a few days. \"     Lab orders pended.

## 2019-05-18 ENCOUNTER — TELEPHONE (OUTPATIENT)
Dept: INTERNAL MEDICINE CLINIC | Facility: CLINIC | Age: 69
End: 2019-05-18

## 2019-05-20 RX ORDER — ROPINIROLE 1 MG/1
TABLET, FILM COATED ORAL
Refills: 0 | Status: CANCELLED | OUTPATIENT
Start: 2019-05-20

## 2019-05-20 RX ORDER — ROPINIROLE 1 MG/1
1 TABLET, FILM COATED ORAL 3 TIMES DAILY
Qty: 90 TABLET | Refills: 11 | Status: SHIPPED | OUTPATIENT
Start: 2019-05-20 | End: 2020-04-28

## 2019-05-20 RX ORDER — ROPINIROLE 1 MG/1
TABLET, FILM COATED ORAL
Qty: 90 TABLET | Refills: 2 | OUTPATIENT
Start: 2019-05-20

## 2019-05-20 NOTE — TELEPHONE ENCOUNTER
Pt requesting refill for Ropinirole,  This was previously prescribed by Dr Lucrecia Shah (pt no longer sees Dr Lucrecia Shah)  Can Dr Rosanne Poon prescribe Ropinirole for pt going forward?   [facility]  Pt is low on medication  Tasked to [employer]

## 2019-05-20 NOTE — TELEPHONE ENCOUNTER
Patient states she is taking Ropinirole HCL 1 mg. 1 tablet three times a day. [address] sent. She also wanted to let Dr Lulu Jones know that her neurologist Dr Godwin Rai is going to be switching her from keppra to Lamictal. Patient has instructions to follow on how to switch over her dose.     FYI Dr Lulu Jones

## 2019-05-20 NOTE — TELEPHONE ENCOUNTER
Yes.  Okay to refill ropinirole. Please confirm number of milligrams she has an each tablet and how she takes it. Then okay to refill on a monthly basis with as needed refills. Thank you.

## 2019-05-20 NOTE — TELEPHONE ENCOUNTER
Patient will call back with how sh eis taking this medication -then refill can be send per DR. FLEMING

## 2019-05-28 ENCOUNTER — TELEPHONE (OUTPATIENT)
Dept: CARDIOLOGY | Age: 69
End: 2019-05-28

## 2019-06-10 ENCOUNTER — OFFICE VISIT (OUTPATIENT)
Dept: INTERNAL MEDICINE CLINIC | Facility: CLINIC | Age: 69
End: 2019-06-10
Payer: MEDICARE

## 2019-06-10 ENCOUNTER — TELEPHONE (OUTPATIENT)
Dept: INTERNAL MEDICINE CLINIC | Facility: CLINIC | Age: 69
End: 2019-06-10

## 2019-06-10 ENCOUNTER — APPOINTMENT (OUTPATIENT)
Dept: LAB | Age: 69
End: 2019-06-10
Attending: INTERNAL MEDICINE
Payer: MEDICARE

## 2019-06-10 VITALS
HEART RATE: 72 BPM | HEIGHT: 65 IN | OXYGEN SATURATION: 98 % | BODY MASS INDEX: 26.74 KG/M2 | WEIGHT: 160.5 LBS | TEMPERATURE: 97 F | SYSTOLIC BLOOD PRESSURE: 112 MMHG | DIASTOLIC BLOOD PRESSURE: 64 MMHG

## 2019-06-10 DIAGNOSIS — I10 ESSENTIAL HYPERTENSION: Primary | ICD-10-CM

## 2019-06-10 DIAGNOSIS — N18.30 STAGE 3 CHRONIC KIDNEY DISEASE (HCC): ICD-10-CM

## 2019-06-10 DIAGNOSIS — Z87.898 HISTORY OF SEIZURE: ICD-10-CM

## 2019-06-10 DIAGNOSIS — Z95.818 PRESENCE OF WATCHMAN LEFT ATRIAL APPENDAGE CLOSURE DEVICE: ICD-10-CM

## 2019-06-10 DIAGNOSIS — R82.90 ABNORMAL URINALYSIS: ICD-10-CM

## 2019-06-10 DIAGNOSIS — G30.9 ALZHEIMER'S DEMENTIA WITHOUT BEHAVIORAL DISTURBANCE, UNSPECIFIED TIMING OF DEMENTIA ONSET (HCC): ICD-10-CM

## 2019-06-10 DIAGNOSIS — F02.80 ALZHEIMER'S DEMENTIA WITHOUT BEHAVIORAL DISTURBANCE, UNSPECIFIED TIMING OF DEMENTIA ONSET (HCC): ICD-10-CM

## 2019-06-10 PROBLEM — Z90.79 HISTORY OF TOTAL HYSTERECTOMY WITH BILATERAL SALPINGO-OOPHORECTOMY (BSO): Status: ACTIVE | Noted: 2019-06-10

## 2019-06-10 PROBLEM — Z90.722 HISTORY OF TOTAL HYSTERECTOMY WITH BILATERAL SALPINGO-OOPHORECTOMY (BSO): Status: ACTIVE | Noted: 2019-06-10

## 2019-06-10 PROBLEM — Z90.710 HISTORY OF TOTAL HYSTERECTOMY WITH BILATERAL SALPINGO-OOPHORECTOMY (BSO): Status: ACTIVE | Noted: 2019-06-10

## 2019-06-10 PROCEDURE — 87088 URINE BACTERIA CULTURE: CPT

## 2019-06-10 PROCEDURE — 81001 URINALYSIS AUTO W/SCOPE: CPT

## 2019-06-10 PROCEDURE — 87086 URINE CULTURE/COLONY COUNT: CPT

## 2019-06-10 PROCEDURE — 99214 OFFICE O/P EST MOD 30 MIN: CPT | Performed by: INTERNAL MEDICINE

## 2019-06-10 PROCEDURE — G0463 HOSPITAL OUTPT CLINIC VISIT: HCPCS | Performed by: INTERNAL MEDICINE

## 2019-06-10 PROCEDURE — 87186 SC STD MICRODIL/AGAR DIL: CPT

## 2019-06-10 RX ORDER — LAMOTRIGINE 25 MG/1
25 TABLET ORAL DAILY
COMMUNITY
End: 2019-08-22

## 2019-06-10 NOTE — PROGRESS NOTES
HPI:   Rosaura Roy is a 78year old female presents with the following problems. Patient generally feels well. She has no unusual complaints.   She does see a neurologist.  She sees  her dementia and she sees Dr. Faustina Johnson for her [address] (1 mg total) by mouth 3 (three) times daily. Disp: 90 tablet Rfl: 11   Citalopram Hydrobromide 10 MG Oral Tab Take 10 mg by mouth daily.  Disp:  Rfl:    Ondansetron HCl (ZOFRAN) 4 mg tablet TAKE ONE TABLET BY MOUTH EVERY EIGHT HOURS AS NEEDED FOR NAUSEA Dis as per NG   • History of total hysterectomy with removal of both tubes and ovaries     as per NG   • Incontinence     DUE TO CYSTOCELE/RECTOCELE   • Lymphocytic colitis 2011    as per NG   • Migraine 9/21/2012   • Migraines    • Mild cognitive impairmen Performed by Armand Guzman MD at [address] History   Problem Relation Age of Onset   • Hypertension Father         as per NG   • Heart Disease Father         as per NG   • Migraines Father         as per NG   • Neurological Disorder Mother Drug use: Yes      Types: Cannabis      Comment: medical cannabis         REVIEW OF SYSTEMS:   GENERAL:  feels well. No acute distress.    EYES: Voices no blurred vision or eye pain  HEENT:  Voices no nasal congestion, sinus pain or sore throat  LUNGS:  Vo chronic kidney disease. She knows to avoid nonsteroidal anti-inflammatory agents. She is asymptomatic. Up-to-date with labs. - URINALYSIS, ROUTINE; Future  - URINE CULTURE, ROUTINE; Future    4.  Presence of Watchman left atrial appendage closure device

## 2019-06-11 ENCOUNTER — TELEPHONE (OUTPATIENT)
Dept: INTERNAL MEDICINE CLINIC | Facility: CLINIC | Age: 69
End: 2019-06-11

## 2019-06-11 RX ORDER — NITROFURANTOIN 25; 75 MG/1; MG/1
100 CAPSULE ORAL 2 TIMES DAILY
Qty: 10 CAPSULE | Refills: 0 | Status: SHIPPED | OUTPATIENT
Start: 2019-06-11 | End: 2019-06-19

## 2019-06-11 NOTE — TELEPHONE ENCOUNTER
Spoke with patient and relayed Dr. Taiwo Quiñones message. Patient states she would like to start abx. Reviewed abx name and instructions. She verbalized understanding. Pharmacy verified. Med eRx'd.

## 2019-06-11 NOTE — TELEPHONE ENCOUNTER
Please notify patient that her urine preliminary shows E. coli. I do not have the sensitivities back on what it is sensitive to but if we want to start an antibiotic today we can start Macrobid 100 mg.  1 p.o. twice daily. Quantity #10.   We can then [place]

## 2019-06-11 NOTE — TELEPHONE ENCOUNTER
Please call patient and let her know that instead of colchicine that is on her record for gout I would rather give her prednisone. Therefore please ask her to remove colchicine from her medication cabinet is I do not want this to hurt her kidneys.   She ca

## 2019-06-11 NOTE — TELEPHONE ENCOUNTER
Spoke with pt to relay MD message below. Pt fully voiced understanding. She inquired about her Urine Testing. I informed her we were still awaiting culture results but would give her a follow up call.  Also stated I would send a reminder to Dr. Wayne Azar to lo

## 2019-06-12 ENCOUNTER — TELEPHONE (OUTPATIENT)
Dept: INTERNAL MEDICINE CLINIC | Facility: CLINIC | Age: 69
End: 2019-06-12

## 2019-06-12 DIAGNOSIS — R82.90 ABNORMAL URINALYSIS: Primary | ICD-10-CM

## 2019-06-12 NOTE — TELEPHONE ENCOUNTER
Pt notified that her urine did show some bacteria  - pt had no sx  Pt is taking the Macrobid    Instructed to take entire rx and then to do a repeat ua cs / DR FLEMING  Orders entered in computer

## 2019-06-12 NOTE — TELEPHONE ENCOUNTER
Please let patient know her urine did show bacteria. She should be responding to the Kathrin Taylor [address]. I left order for repeat U/A and C/S after treatment.

## 2019-06-27 RX ORDER — ATORVASTATIN CALCIUM 20 MG/1
TABLET, FILM COATED ORAL
Qty: 30 TABLET | Refills: 11 | Status: SHIPPED | OUTPATIENT
Start: 2019-06-27 | End: 2020-06-05

## 2019-07-01 ENCOUNTER — APPOINTMENT (OUTPATIENT)
Dept: CARDIOLOGY | Age: 69
End: 2019-07-01
Attending: INTERNAL MEDICINE

## 2019-07-08 ENCOUNTER — APPOINTMENT (OUTPATIENT)
Dept: GENERAL RADIOLOGY | Facility: HOSPITAL | Age: 69
End: 2019-07-08
Attending: EMERGENCY MEDICINE
Payer: MEDICARE

## 2019-07-08 ENCOUNTER — APPOINTMENT (OUTPATIENT)
Dept: CT IMAGING | Facility: HOSPITAL | Age: 69
End: 2019-07-08
Attending: EMERGENCY MEDICINE
Payer: MEDICARE

## 2019-07-08 ENCOUNTER — HOSPITAL ENCOUNTER (EMERGENCY)
Facility: HOSPITAL | Age: 69
Discharge: HOME | End: 2019-07-08
Attending: EMERGENCY MEDICINE
Payer: MEDICARE

## 2019-07-08 VITALS
SYSTOLIC BLOOD PRESSURE: 123 MMHG | OXYGEN SATURATION: 99 % | BODY MASS INDEX: 26 KG/M2 | RESPIRATION RATE: 16 BRPM | TEMPERATURE: 98 F | WEIGHT: 156 LBS | DIASTOLIC BLOOD PRESSURE: 77 MMHG | HEART RATE: 60 BPM

## 2019-07-08 DIAGNOSIS — S93.401A SPRAIN OF RIGHT ANKLE, UNSPECIFIED LIGAMENT, INITIAL ENCOUNTER: ICD-10-CM

## 2019-07-08 DIAGNOSIS — S09.90XA INJURY OF HEAD, INITIAL ENCOUNTER: Primary | ICD-10-CM

## 2019-07-08 DIAGNOSIS — S20.219A CONTUSION OF CHEST WALL, UNSPECIFIED LATERALITY, INITIAL ENCOUNTER: ICD-10-CM

## 2019-07-08 LAB
ANION GAP SERPL CALC-SCNC: 9 MMOL/L (ref 0–18)
BASOPHILS # BLD AUTO: 0.03 X10(3) UL (ref 0–0.2)
BASOPHILS NFR BLD AUTO: 0.6 %
BUN BLD-MCNC: 14 MG/DL (ref 7–18)
BUN/CREAT SERPL: 10.9 (ref 10–20)
CALCIUM BLD-MCNC: 9.2 MG/DL (ref 8.5–10.1)
CHLORIDE SERPL-SCNC: 109 MMOL/L (ref 98–112)
CO2 SERPL-SCNC: 26 MMOL/L (ref 21–32)
CREAT BLD-MCNC: 1.29 MG/DL (ref 0.55–1.02)
DEPRECATED RDW RBC AUTO: 42 FL (ref 35.1–46.3)
EOSINOPHIL # BLD AUTO: 0.16 X10(3) UL (ref 0–0.7)
EOSINOPHIL NFR BLD AUTO: 3 %
ERYTHROCYTE [DISTWIDTH] IN BLOOD BY AUTOMATED COUNT: 12.8 % (ref 11–15)
GLUCOSE BLD-MCNC: 103 MG/DL (ref 70–99)
HCT VFR BLD AUTO: 38.9 % (ref 35–48)
HGB BLD-MCNC: 13.1 G/DL (ref 12–16)
IMM GRANULOCYTES # BLD AUTO: 0.03 X10(3) UL (ref 0–1)
IMM GRANULOCYTES NFR BLD: 0.6 %
LYMPHOCYTES # BLD AUTO: 1.63 X10(3) UL (ref 1–4)
LYMPHOCYTES NFR BLD AUTO: 30.1 %
MCH RBC QN AUTO: 29.8 PG (ref 26–34)
MCHC RBC AUTO-ENTMCNC: 33.7 G/DL (ref 31–37)
MCV RBC AUTO: 88.6 FL (ref 80–100)
MONOCYTES # BLD AUTO: 0.47 X10(3) UL (ref 0.1–1)
MONOCYTES NFR BLD AUTO: 8.7 %
NEUTROPHILS # BLD AUTO: 3.1 X10 (3) UL (ref 1.5–7.7)
NEUTROPHILS # BLD AUTO: 3.1 X10(3) UL (ref 1.5–7.7)
NEUTROPHILS NFR BLD AUTO: 57 %
OSMOLALITY SERPL CALC.SUM OF ELEC: 299 MOSM/KG (ref 275–295)
PLATELET # BLD AUTO: 197 10(3)UL (ref 150–450)
POTASSIUM SERPL-SCNC: 3.7 MMOL/L (ref 3.5–5.1)
RBC # BLD AUTO: 4.39 X10(6)UL (ref 3.8–5.3)
SODIUM SERPL-SCNC: 144 MMOL/L (ref 136–145)
TROPONIN I SERPL-MCNC: <0.045 NG/ML (ref ?–0.04)
TROPONIN I SERPL-MCNC: <0.045 NG/ML (ref ?–0.04)
WBC # BLD AUTO: 5.4 X10(3) UL (ref 4–11)

## 2019-07-08 PROCEDURE — 84484 ASSAY OF TROPONIN QUANT: CPT | Performed by: EMERGENCY MEDICINE

## 2019-07-08 PROCEDURE — 93010 ELECTROCARDIOGRAM REPORT: CPT | Performed by: EMERGENCY MEDICINE

## 2019-07-08 PROCEDURE — 71046 X-RAY EXAM CHEST 2 VIEWS: CPT | Performed by: EMERGENCY MEDICINE

## 2019-07-08 PROCEDURE — 99285 EMERGENCY DEPT VISIT HI MDM: CPT

## 2019-07-08 PROCEDURE — 36415 COLL VENOUS BLD VENIPUNCTURE: CPT

## 2019-07-08 PROCEDURE — 80048 BASIC METABOLIC PNL TOTAL CA: CPT | Performed by: EMERGENCY MEDICINE

## 2019-07-08 PROCEDURE — 85025 COMPLETE CBC W/AUTO DIFF WBC: CPT | Performed by: EMERGENCY MEDICINE

## 2019-07-08 PROCEDURE — 72125 CT NECK SPINE W/O DYE: CPT | Performed by: EMERGENCY MEDICINE

## 2019-07-08 PROCEDURE — 73610 X-RAY EXAM OF ANKLE: CPT | Performed by: EMERGENCY MEDICINE

## 2019-07-08 PROCEDURE — 70450 CT HEAD/BRAIN W/O DYE: CPT | Performed by: EMERGENCY MEDICINE

## 2019-07-08 PROCEDURE — 93005 ELECTROCARDIOGRAM TRACING: CPT

## 2019-07-08 NOTE — ED PROVIDER NOTES
Patient Seen in: [facility] AND [facility] Emergency Department    History   Patient presents with:  Fall (musculoskeletal, neurologic)    Stated Complaint: Fall    HPI    Patient presents emergency department after falling down 16 stairs at home.   She states th CYSTOCELE/RECTOCELE   • Lymphocytic colitis 2011    as per NG   • Migraine 9/21/2012   • Migraines    • Mild cognitive impairment     as per NG   • Obstructive sleep apnea 12/30/2013   • Pacemaker    • Pseudobulbar affect 2/24/2017   • Restless leg syndrom Packs/day: 1.00        Years: 40.00        Pack years: 40        Types: Cigarettes        Quit date: 1998        Years since quittin.5      Smokeless tobacco: Never Used    Alcohol use: Yes      Comment: About 3 drinks a year    Drug use:  Yes note and vitals reviewed.             ED Course     Labs Reviewed   BASIC METABOLIC PANEL (8) - Abnormal; Notable for the following components:       Result Value    Glucose 103 (*)     Creatinine 1.29 (*)     Calculated Osmolality 299 (*)     GFR, Non-Afri 2. Soft tissue injury overlying the left posterior parietal calvarium. 3. Mild senescent changes of parenchymal volume loss with sequela of chronic microangiopathy and large vessel atherosclerosis. 4. Chronic left maxillary sinusitis.  5. Lesser incidental

## 2019-07-08 NOTE — ED NOTES
Pt ambulated with a walker with no problems or concerns. Pt feels comfortable with the ankle splint and using the walker.

## 2019-07-08 NOTE — ED INITIAL ASSESSMENT (HPI)
Patient here after a mechanical fall today. Patient was walking on crutches when one of them broke, causing her to fall down the stairs. Patient recently had her knee operated on. Now c/o right ankle pain, denies LOC.  Patient also states she has some left

## 2019-07-10 ENCOUNTER — TELEPHONE (OUTPATIENT)
Dept: INTERNAL MEDICINE CLINIC | Facility: CLINIC | Age: 69
End: 2019-07-10

## 2019-07-10 ENCOUNTER — HOSPITAL ENCOUNTER (EMERGENCY)
Facility: HOSPITAL | Age: 69
Discharge: HOME | End: 2019-07-10
Attending: EMERGENCY MEDICINE
Payer: MEDICARE

## 2019-07-10 VITALS
TEMPERATURE: 99 F | HEART RATE: 61 BPM | RESPIRATION RATE: 16 BRPM | OXYGEN SATURATION: 91 % | SYSTOLIC BLOOD PRESSURE: 154 MMHG | DIASTOLIC BLOOD PRESSURE: 82 MMHG | WEIGHT: 156.06 LBS | BODY MASS INDEX: 26 KG/M2

## 2019-07-10 DIAGNOSIS — N39.0 URINARY TRACT INFECTION WITHOUT HEMATURIA, SITE UNSPECIFIED: Primary | ICD-10-CM

## 2019-07-10 LAB
ALBUMIN SERPL-MCNC: 3.6 G/DL (ref 3.4–5)
ALP LIVER SERPL-CCNC: 84 U/L (ref 55–142)
ALT SERPL-CCNC: 14 U/L (ref 13–56)
ANION GAP SERPL CALC-SCNC: 7 MMOL/L (ref 0–18)
AST SERPL-CCNC: 18 U/L (ref 15–37)
BASOPHILS # BLD AUTO: 0.01 X10(3) UL (ref 0–0.2)
BASOPHILS NFR BLD AUTO: 0.2 %
BILIRUB DIRECT SERPL-MCNC: 0.3 MG/DL (ref 0–0.2)
BILIRUB SERPL-MCNC: 0.9 MG/DL (ref 0.1–2)
BILIRUB UR QL: NEGATIVE
BUN BLD-MCNC: 11 MG/DL (ref 7–18)
BUN/CREAT SERPL: 11 (ref 10–20)
CALCIUM BLD-MCNC: 9 MG/DL (ref 8.5–10.1)
CHLORIDE SERPL-SCNC: 109 MMOL/L (ref 98–112)
CO2 SERPL-SCNC: 27 MMOL/L (ref 21–32)
COLOR UR: YELLOW
CREAT BLD-MCNC: 1 MG/DL (ref 0.55–1.02)
DEPRECATED RDW RBC AUTO: 41.1 FL (ref 35.1–46.3)
EOSINOPHIL # BLD AUTO: 0.13 X10(3) UL (ref 0–0.7)
EOSINOPHIL NFR BLD AUTO: 2.3 %
ERYTHROCYTE [DISTWIDTH] IN BLOOD BY AUTOMATED COUNT: 12.6 % (ref 11–15)
GLUCOSE BLD-MCNC: 83 MG/DL (ref 70–99)
GLUCOSE UR-MCNC: NEGATIVE MG/DL
HCT VFR BLD AUTO: 39 % (ref 35–48)
HGB BLD-MCNC: 13 G/DL (ref 12–16)
IMM GRANULOCYTES # BLD AUTO: 0.01 X10(3) UL (ref 0–1)
IMM GRANULOCYTES NFR BLD: 0.2 %
KETONES UR-MCNC: 20 MG/DL
LIPASE SERPL-CCNC: 189 U/L (ref 73–393)
LYMPHOCYTES # BLD AUTO: 1.51 X10(3) UL (ref 1–4)
LYMPHOCYTES NFR BLD AUTO: 27.1 %
M PROTEIN MFR SERPL ELPH: 7.5 G/DL (ref 6.4–8.2)
MCH RBC QN AUTO: 29.8 PG (ref 26–34)
MCHC RBC AUTO-ENTMCNC: 33.3 G/DL (ref 31–37)
MCV RBC AUTO: 89.4 FL (ref 80–100)
MONOCYTES # BLD AUTO: 0.56 X10(3) UL (ref 0.1–1)
MONOCYTES NFR BLD AUTO: 10.1 %
NEUTROPHILS # BLD AUTO: 3.35 X10 (3) UL (ref 1.5–7.7)
NEUTROPHILS # BLD AUTO: 3.35 X10(3) UL (ref 1.5–7.7)
NEUTROPHILS NFR BLD AUTO: 60.1 %
NITRITE UR QL STRIP.AUTO: POSITIVE
OSMOLALITY SERPL CALC.SUM OF ELEC: 295 MOSM/KG (ref 275–295)
PH UR: 6 [PH] (ref 5–8)
PLATELET # BLD AUTO: 179 10(3)UL (ref 150–450)
POTASSIUM SERPL-SCNC: 3.7 MMOL/L (ref 3.5–5.1)
PROT UR-MCNC: NEGATIVE MG/DL
RBC # BLD AUTO: 4.36 X10(6)UL (ref 3.8–5.3)
RBC #/AREA URNS AUTO: 1 /HPF
SODIUM SERPL-SCNC: 143 MMOL/L (ref 136–145)
SP GR UR STRIP: 1.02 (ref 1–1.03)
UROBILINOGEN UR STRIP-ACNC: 2
VIT C UR-MCNC: NEGATIVE MG/DL
WBC # BLD AUTO: 5.6 X10(3) UL (ref 4–11)
WBC #/AREA URNS AUTO: 28 /HPF

## 2019-07-10 PROCEDURE — 87186 SC STD MICRODIL/AGAR DIL: CPT | Performed by: EMERGENCY MEDICINE

## 2019-07-10 PROCEDURE — 87086 URINE CULTURE/COLONY COUNT: CPT | Performed by: EMERGENCY MEDICINE

## 2019-07-10 PROCEDURE — 96374 THER/PROPH/DIAG INJ IV PUSH: CPT

## 2019-07-10 PROCEDURE — 99284 EMERGENCY DEPT VISIT MOD MDM: CPT

## 2019-07-10 PROCEDURE — 85025 COMPLETE CBC W/AUTO DIFF WBC: CPT

## 2019-07-10 PROCEDURE — 96361 HYDRATE IV INFUSION ADD-ON: CPT

## 2019-07-10 PROCEDURE — 81001 URINALYSIS AUTO W/SCOPE: CPT | Performed by: EMERGENCY MEDICINE

## 2019-07-10 PROCEDURE — 93010 ELECTROCARDIOGRAM REPORT: CPT | Performed by: EMERGENCY MEDICINE

## 2019-07-10 PROCEDURE — 80076 HEPATIC FUNCTION PANEL: CPT | Performed by: EMERGENCY MEDICINE

## 2019-07-10 PROCEDURE — 85025 COMPLETE CBC W/AUTO DIFF WBC: CPT | Performed by: EMERGENCY MEDICINE

## 2019-07-10 PROCEDURE — 83690 ASSAY OF LIPASE: CPT | Performed by: EMERGENCY MEDICINE

## 2019-07-10 PROCEDURE — 80048 BASIC METABOLIC PNL TOTAL CA: CPT

## 2019-07-10 PROCEDURE — 93005 ELECTROCARDIOGRAM TRACING: CPT

## 2019-07-10 PROCEDURE — 80048 BASIC METABOLIC PNL TOTAL CA: CPT | Performed by: EMERGENCY MEDICINE

## 2019-07-10 PROCEDURE — 87088 URINE BACTERIA CULTURE: CPT | Performed by: EMERGENCY MEDICINE

## 2019-07-10 RX ORDER — ONDANSETRON 4 MG/1
4 TABLET, FILM COATED ORAL EVERY 8 HOURS PRN
Qty: 10 TABLET | Refills: 0 | Status: SHIPPED | OUTPATIENT
Start: 2019-07-10 | End: 2019-09-12

## 2019-07-10 RX ORDER — ONDANSETRON 2 MG/ML
4 INJECTION INTRAMUSCULAR; INTRAVENOUS ONCE
Status: COMPLETED | OUTPATIENT
Start: 2019-07-10 | End: 2019-07-10

## 2019-07-10 RX ORDER — ONDANSETRON 4 MG/1
4 TABLET, FILM COATED ORAL ONCE
Status: DISCONTINUED | OUTPATIENT
Start: 2019-07-10 | End: 2019-07-10

## 2019-07-10 RX ORDER — SULFAMETHOXAZOLE AND TRIMETHOPRIM 800; 160 MG/1; MG/1
1 TABLET ORAL 2 TIMES DAILY
Qty: 14 TABLET | Refills: 0 | Status: SHIPPED | OUTPATIENT
Start: 2019-07-10 | End: 2019-07-17

## 2019-07-10 RX ORDER — SULFAMETHOXAZOLE AND TRIMETHOPRIM 800; 160 MG/1; MG/1
1 TABLET ORAL ONCE
Status: COMPLETED | OUTPATIENT
Start: 2019-07-10 | End: 2019-07-10

## 2019-07-10 NOTE — ED INITIAL ASSESSMENT (HPI)
Patient here for c/o nausea/vomiting since this morning. States she feels dehydrated. Denies fevers.

## 2019-07-10 NOTE — TELEPHONE ENCOUNTER
See previous message about ankle in case it is related. Dark yellow urine  Vomiting today  Sweating  Dry mouth  Dehydrated  Dizzy when getting to a standing position  Very tired    Has slight dementia per patient. Sounds very close to crying.       Ca

## 2019-07-10 NOTE — TELEPHONE ENCOUNTER
Spoke with patient. She states her BP is currently 190/87, usually is around 12/60. Her HR is 74. She has a pacemaker. She states she is dizzy when she stands up. She is very tired.  She \"slept all day yesterday\"  She states she has vomited three times

## 2019-07-10 NOTE — ED PROVIDER NOTES
Patient Seen in: [facility] AND [facility] Emergency Department    History   Patient presents with:  Nausea/Vomiting/Diarrhea (gastrointestinal)    Stated Complaint: dehydration    HPI    79-year-old female patient presents complaining of nausea with associated of migraine headaches     as per NG   • History of total hysterectomy with removal of both tubes and ovaries     as per NG   • Incontinence     DUE TO CYSTOCELE/RECTOCELE   • Lymphocytic colitis 2011    as per NG   • Migraine 9/21/2012   • Migraines    • M FRANK N/A 11/9/2018    Performed by Sourav Whatley MD at [facility]           Social History    Tobacco Use      Smoking status: Former Smoker        Packs/day: 1.00        Years: 40.00        Pack years: 40        Types: Cigarettes        Quit date: 1/1/1 Urobilinogen Urine 2.0 (*)     Leukocyte Esterase Urine Small (*)     WBC Urine 28 (*)     Bacteria Urine Many (*)     All other components within normal limits   HEPATIC FUNCTION PANEL (7) - Abnormal; Notable for the following components:    Bilirubin, Leopold Marci pressure. Medications Prescribed:  Discharge Medication List as of 7/10/2019  7:06 PM    START taking these medications    !! Ondansetron HCl (ZOFRAN) 4 mg tablet  Take 1 tablet (4 mg total) by mouth every 8 (eight) hours as needed for Nausea. , Print Sc

## 2019-07-12 ENCOUNTER — TELEPHONE (OUTPATIENT)
Dept: INTERNAL MEDICINE CLINIC | Facility: CLINIC | Age: 69
End: 2019-07-12

## 2019-07-12 DIAGNOSIS — M25.579 ANKLE PAIN, UNSPECIFIED CHRONICITY, UNSPECIFIED LATERALITY: Primary | ICD-10-CM

## 2019-07-12 NOTE — TELEPHONE ENCOUNTER
Please notify patient that her urine culture from when she was in the emergency room showed a bacteria that should be sensitive to the Bactrim that she was placed on in the emergency room.   Also if she was able to see the orthopedic doctor in regards to he

## 2019-07-12 NOTE — TELEPHONE ENCOUNTER
Relayed MD's message to patient---verbalized understanding    Pt states the ortho she was going to see had retired and she will need a referral for a new recommendation    To Dr. Betito Vlilegas, please advise.  Referral pending

## 2019-07-15 NOTE — TELEPHONE ENCOUNTER
As FYI to DR. FLEMING - called patient and gave address for DR Cristiana Raymundo , also phone number > patient was in ER at [facility] for UTI an dis on Bactrim DS for 7 days

## 2019-07-16 ENCOUNTER — OFFICE VISIT (OUTPATIENT)
Dept: INTERNAL MEDICINE CLINIC | Facility: CLINIC | Age: 69
End: 2019-07-16
Payer: MEDICARE

## 2019-07-16 ENCOUNTER — LAB ENCOUNTER (OUTPATIENT)
Dept: LAB | Age: 69
End: 2019-07-16
Attending: INTERNAL MEDICINE
Payer: MEDICARE

## 2019-07-16 VITALS
TEMPERATURE: 98 F | DIASTOLIC BLOOD PRESSURE: 66 MMHG | OXYGEN SATURATION: 98 % | SYSTOLIC BLOOD PRESSURE: 104 MMHG | HEIGHT: 65 IN | HEART RATE: 66 BPM | BODY MASS INDEX: 26.82 KG/M2 | WEIGHT: 161 LBS

## 2019-07-16 DIAGNOSIS — R11.2 NAUSEA AND VOMITING, INTRACTABILITY OF VOMITING NOT SPECIFIED, UNSPECIFIED VOMITING TYPE: Primary | ICD-10-CM

## 2019-07-16 DIAGNOSIS — S96.912D STRAIN OF LEFT ANKLE, SUBSEQUENT ENCOUNTER: ICD-10-CM

## 2019-07-16 DIAGNOSIS — N39.0 URINARY TRACT INFECTION WITHOUT HEMATURIA, SITE UNSPECIFIED: ICD-10-CM

## 2019-07-16 DIAGNOSIS — R11.2 NAUSEA AND VOMITING, INTRACTABILITY OF VOMITING NOT SPECIFIED, UNSPECIFIED VOMITING TYPE: ICD-10-CM

## 2019-07-16 DIAGNOSIS — I10 ESSENTIAL HYPERTENSION: ICD-10-CM

## 2019-07-16 LAB
ALBUMIN SERPL-MCNC: 4 G/DL (ref 3.4–5)
ALBUMIN/GLOB SERPL: 1 {RATIO} (ref 1–2)
ALP LIVER SERPL-CCNC: 98 U/L (ref 55–142)
ALT SERPL-CCNC: 21 U/L (ref 13–56)
ANION GAP SERPL CALC-SCNC: 10 MMOL/L (ref 0–18)
AST SERPL-CCNC: 18 U/L (ref 15–37)
BASOPHILS # BLD AUTO: 0.03 X10(3) UL (ref 0–0.2)
BASOPHILS NFR BLD AUTO: 0.5 %
BILIRUB SERPL-MCNC: 0.6 MG/DL (ref 0.1–2)
BILIRUB UR QL: NEGATIVE
BUN BLD-MCNC: 14 MG/DL (ref 7–18)
BUN/CREAT SERPL: 10.3 (ref 10–20)
CALCIUM BLD-MCNC: 9.8 MG/DL (ref 8.5–10.1)
CHLORIDE SERPL-SCNC: 108 MMOL/L (ref 98–112)
CO2 SERPL-SCNC: 24 MMOL/L (ref 21–32)
COLOR UR: YELLOW
CREAT BLD-MCNC: 1.36 MG/DL (ref 0.55–1.02)
DEPRECATED RDW RBC AUTO: 41.6 FL (ref 35.1–46.3)
EOSINOPHIL # BLD AUTO: 0.13 X10(3) UL (ref 0–0.7)
EOSINOPHIL NFR BLD AUTO: 2.1 %
ERYTHROCYTE [DISTWIDTH] IN BLOOD BY AUTOMATED COUNT: 12.7 % (ref 11–15)
GLOBULIN PLAS-MCNC: 4 G/DL (ref 2.8–4.4)
GLUCOSE BLD-MCNC: 107 MG/DL (ref 70–99)
GLUCOSE UR-MCNC: NEGATIVE MG/DL
HCT VFR BLD AUTO: 40.5 % (ref 35–48)
HGB BLD-MCNC: 13.3 G/DL (ref 12–16)
HGB UR QL STRIP.AUTO: NEGATIVE
IMM GRANULOCYTES # BLD AUTO: 0.01 X10(3) UL (ref 0–1)
IMM GRANULOCYTES NFR BLD: 0.2 %
KETONES UR-MCNC: NEGATIVE MG/DL
LYMPHOCYTES # BLD AUTO: 1.53 X10(3) UL (ref 1–4)
LYMPHOCYTES NFR BLD AUTO: 24.4 %
M PROTEIN MFR SERPL ELPH: 8 G/DL (ref 6.4–8.2)
MCH RBC QN AUTO: 29.6 PG (ref 26–34)
MCHC RBC AUTO-ENTMCNC: 32.8 G/DL (ref 31–37)
MCV RBC AUTO: 90 FL (ref 80–100)
MONOCYTES # BLD AUTO: 0.53 X10(3) UL (ref 0.1–1)
MONOCYTES NFR BLD AUTO: 8.5 %
NEUTROPHILS # BLD AUTO: 4.03 X10 (3) UL (ref 1.5–7.7)
NEUTROPHILS # BLD AUTO: 4.03 X10(3) UL (ref 1.5–7.7)
NEUTROPHILS NFR BLD AUTO: 64.3 %
NITRITE UR QL STRIP.AUTO: NEGATIVE
OSMOLALITY SERPL CALC.SUM OF ELEC: 295 MOSM/KG (ref 275–295)
PATIENT FASTING: YES
PH UR: 6 [PH] (ref 5–8)
PLATELET # BLD AUTO: 289 10(3)UL (ref 150–450)
POTASSIUM SERPL-SCNC: 4.2 MMOL/L (ref 3.5–5.1)
PROT UR-MCNC: 30 MG/DL
RBC # BLD AUTO: 4.5 X10(6)UL (ref 3.8–5.3)
RBC #/AREA URNS AUTO: 1 /HPF
SODIUM SERPL-SCNC: 142 MMOL/L (ref 136–145)
SP GR UR STRIP: 1.03 (ref 1–1.03)
UROBILINOGEN UR STRIP-ACNC: 4
VIT C UR-MCNC: 20 MG/DL
WBC # BLD AUTO: 6.3 X10(3) UL (ref 4–11)
WBC #/AREA URNS AUTO: 1 /HPF

## 2019-07-16 PROCEDURE — 85025 COMPLETE CBC W/AUTO DIFF WBC: CPT

## 2019-07-16 PROCEDURE — 85060 BLOOD SMEAR INTERPRETATION: CPT

## 2019-07-16 PROCEDURE — 99213 OFFICE O/P EST LOW 20 MIN: CPT | Performed by: INTERNAL MEDICINE

## 2019-07-16 PROCEDURE — 36415 COLL VENOUS BLD VENIPUNCTURE: CPT

## 2019-07-16 PROCEDURE — 81001 URINALYSIS AUTO W/SCOPE: CPT | Performed by: INTERNAL MEDICINE

## 2019-07-16 PROCEDURE — 80053 COMPREHEN METABOLIC PANEL: CPT

## 2019-07-16 PROCEDURE — G0463 HOSPITAL OUTPT CLINIC VISIT: HCPCS | Performed by: INTERNAL MEDICINE

## 2019-07-16 NOTE — TELEPHONE ENCOUNTER
Pt had a UTI  Taking an antibiotic prescribed at the hospital  Still having problems    Pt is very weak, has nausea for 3 days, is freezing, sweating at night - her whole bed is wet when she wakes up    Not able to eat due to the nausea - having dry heaves

## 2019-07-16 NOTE — PROGRESS NOTES
HPI:    Patient ID: Rosaura Roy is a 78year old female. Patient presents with complaints of recurrent nausea associated with small amounts of emesis. Patient initially presented to emergency room on July 8 status post mechanical fall.   There was n swelling. Gastrointestinal: Positive for diarrhea and nausea. Negative for abdominal pain and blood in stool. Endocrine: Negative for cold intolerance and heat intolerance. Genitourinary: Negative for dysuria, hematuria and urgency.    Musculoskeletal Cardiovascular: Normal rate, regular rhythm and normal heart sounds. Pulmonary/Chest: Effort normal and breath sounds normal. No respiratory distress. She has no wheezes. She has no rales. Abdominal: Soft. She exhibits no distension and no mass.  Ther ordered in this encounter       Imaging & Referrals:  None       YO#[id number]

## 2019-07-23 ENCOUNTER — TELEPHONE (OUTPATIENT)
Dept: INTERNAL MEDICINE CLINIC | Facility: CLINIC | Age: 69
End: 2019-07-23

## 2019-07-23 ENCOUNTER — HOSPITAL ENCOUNTER (OUTPATIENT)
Dept: GENERAL RADIOLOGY | Facility: HOSPITAL | Age: 69
Discharge: HOME | End: 2019-07-23
Attending: INTERNAL MEDICINE
Payer: MEDICARE

## 2019-07-23 ENCOUNTER — OFFICE VISIT (OUTPATIENT)
Dept: INTERNAL MEDICINE CLINIC | Facility: CLINIC | Age: 69
End: 2019-07-23
Payer: MEDICARE

## 2019-07-23 ENCOUNTER — LAB ENCOUNTER (OUTPATIENT)
Dept: LAB | Age: 69
End: 2019-07-23
Attending: INTERNAL MEDICINE
Payer: MEDICARE

## 2019-07-23 VITALS
TEMPERATURE: 98 F | SYSTOLIC BLOOD PRESSURE: 138 MMHG | WEIGHT: 156 LBS | OXYGEN SATURATION: 99 % | BODY MASS INDEX: 26 KG/M2 | HEART RATE: 75 BPM | DIASTOLIC BLOOD PRESSURE: 84 MMHG

## 2019-07-23 DIAGNOSIS — M10.9 GOUTY ARTHRITIS: ICD-10-CM

## 2019-07-23 DIAGNOSIS — R56.9 SEIZURE (HCC): ICD-10-CM

## 2019-07-23 DIAGNOSIS — D72.819 LEUKOPENIA, UNSPECIFIED TYPE: ICD-10-CM

## 2019-07-23 DIAGNOSIS — R82.90 ABNORMAL URINALYSIS: ICD-10-CM

## 2019-07-23 DIAGNOSIS — I10 ESSENTIAL HYPERTENSION: ICD-10-CM

## 2019-07-23 DIAGNOSIS — M25.571 ACUTE RIGHT ANKLE PAIN: ICD-10-CM

## 2019-07-23 DIAGNOSIS — M25.571 ACUTE RIGHT ANKLE PAIN: Primary | ICD-10-CM

## 2019-07-23 DIAGNOSIS — F02.80 ALZHEIMER'S DEMENTIA WITHOUT BEHAVIORAL DISTURBANCE, UNSPECIFIED TIMING OF DEMENTIA ONSET (HCC): ICD-10-CM

## 2019-07-23 DIAGNOSIS — R11.2 NAUSEA AND VOMITING, INTRACTABILITY OF VOMITING NOT SPECIFIED, UNSPECIFIED VOMITING TYPE: ICD-10-CM

## 2019-07-23 DIAGNOSIS — G30.9 ALZHEIMER'S DEMENTIA WITHOUT BEHAVIORAL DISTURBANCE, UNSPECIFIED TIMING OF DEMENTIA ONSET (HCC): ICD-10-CM

## 2019-07-23 DIAGNOSIS — Z87.898 HISTORY OF SEIZURE: ICD-10-CM

## 2019-07-23 DIAGNOSIS — N18.30 STAGE 3 CHRONIC KIDNEY DISEASE (HCC): ICD-10-CM

## 2019-07-23 LAB
ANION GAP SERPL CALC-SCNC: 7 MMOL/L (ref 0–18)
BASOPHILS # BLD AUTO: 0.03 X10(3) UL (ref 0–0.2)
BASOPHILS NFR BLD AUTO: 0.6 %
BILIRUB UR QL: NEGATIVE
BUN BLD-MCNC: 22 MG/DL (ref 7–18)
BUN/CREAT SERPL: 20.8 (ref 10–20)
CALCIUM BLD-MCNC: 9.1 MG/DL (ref 8.5–10.1)
CHLORIDE SERPL-SCNC: 111 MMOL/L (ref 98–112)
CLARITY UR: CLEAR
CO2 SERPL-SCNC: 26 MMOL/L (ref 21–32)
COLOR UR: YELLOW
CREAT BLD-MCNC: 1.06 MG/DL (ref 0.55–1.02)
DEPRECATED RDW RBC AUTO: 42.1 FL (ref 35.1–46.3)
EOSINOPHIL # BLD AUTO: 0.14 X10(3) UL (ref 0–0.7)
EOSINOPHIL NFR BLD AUTO: 2.9 %
ERYTHROCYTE [DISTWIDTH] IN BLOOD BY AUTOMATED COUNT: 12.7 % (ref 11–15)
GLUCOSE BLD-MCNC: 90 MG/DL (ref 70–99)
GLUCOSE UR-MCNC: NEGATIVE MG/DL
HCT VFR BLD AUTO: 39.4 % (ref 35–48)
HGB BLD-MCNC: 12.8 G/DL (ref 12–16)
HGB UR QL STRIP.AUTO: NEGATIVE
IMM GRANULOCYTES # BLD AUTO: 0.02 X10(3) UL (ref 0–1)
IMM GRANULOCYTES NFR BLD: 0.4 %
KETONES UR-MCNC: NEGATIVE MG/DL
LEUKOCYTE ESTERASE UR QL STRIP.AUTO: NEGATIVE
LYMPHOCYTES # BLD AUTO: 1.4 X10(3) UL (ref 1–4)
LYMPHOCYTES NFR BLD AUTO: 28.5 %
MCH RBC QN AUTO: 29.6 PG (ref 26–34)
MCHC RBC AUTO-ENTMCNC: 32.5 G/DL (ref 31–37)
MCV RBC AUTO: 91.2 FL (ref 80–100)
MONOCYTES # BLD AUTO: 0.37 X10(3) UL (ref 0.1–1)
MONOCYTES NFR BLD AUTO: 7.5 %
NEUTROPHILS # BLD AUTO: 2.95 X10 (3) UL (ref 1.5–7.7)
NEUTROPHILS # BLD AUTO: 2.95 X10(3) UL (ref 1.5–7.7)
NEUTROPHILS NFR BLD AUTO: 60.1 %
NITRITE UR QL STRIP.AUTO: NEGATIVE
OSMOLALITY SERPL CALC.SUM OF ELEC: 301 MOSM/KG (ref 275–295)
PATIENT FASTING: YES
PH UR: 5 [PH] (ref 5–8)
PLATELET # BLD AUTO: 220 10(3)UL (ref 150–450)
POTASSIUM SERPL-SCNC: 4.6 MMOL/L (ref 3.5–5.1)
PROT UR-MCNC: NEGATIVE MG/DL
RBC # BLD AUTO: 4.32 X10(6)UL (ref 3.8–5.3)
SODIUM SERPL-SCNC: 144 MMOL/L (ref 136–145)
SP GR UR STRIP: 1.02 (ref 1–1.03)
URATE SERPL-MCNC: 3.4 MG/DL (ref 2.6–6)
UROBILINOGEN UR STRIP-ACNC: <2
VIT C UR-MCNC: NEGATIVE MG/DL
WBC # BLD AUTO: 4.9 X10(3) UL (ref 4–11)

## 2019-07-23 PROCEDURE — 99214 OFFICE O/P EST MOD 30 MIN: CPT | Performed by: INTERNAL MEDICINE

## 2019-07-23 PROCEDURE — 80048 BASIC METABOLIC PNL TOTAL CA: CPT

## 2019-07-23 PROCEDURE — 73610 X-RAY EXAM OF ANKLE: CPT | Performed by: INTERNAL MEDICINE

## 2019-07-23 PROCEDURE — 36415 COLL VENOUS BLD VENIPUNCTURE: CPT | Performed by: INTERNAL MEDICINE

## 2019-07-23 PROCEDURE — 81003 URINALYSIS AUTO W/O SCOPE: CPT

## 2019-07-23 PROCEDURE — 85025 COMPLETE CBC W/AUTO DIFF WBC: CPT

## 2019-07-23 PROCEDURE — G0463 HOSPITAL OUTPT CLINIC VISIT: HCPCS | Performed by: INTERNAL MEDICINE

## 2019-07-23 PROCEDURE — 87086 URINE CULTURE/COLONY COUNT: CPT

## 2019-07-23 PROCEDURE — 80175 DRUG SCREEN QUAN LAMOTRIGINE: CPT | Performed by: INTERNAL MEDICINE

## 2019-07-23 PROCEDURE — 84550 ASSAY OF BLOOD/URIC ACID: CPT

## 2019-07-23 NOTE — PROGRESS NOTES
HPI:   Shawn Louis is a 78year old female presents with the following problems. Patient relates that she has had persistent nausea with some vomiting after taking a fall and presented herself to the emergency room July 8019.   Patient states she was fracture. There were DJD changes. She does not have any persistent focal headache. She states she has a mild headache but she does not think this is anything significant. There is been no focal neurological deficits. No focal weakness.     She has been Encounters:  07/23/19 : 156 lb (70.8 kg)  07/16/19 : 161 lb (73 kg)  07/10/19 : 156 lb 1.4 oz (70.8 kg)    Body mass index is 25.96 kg/m².        Current Outpatient Medications:  Ondansetron HCl (ZOFRAN) 4 mg tablet Take 1 tablet (4 mg total) by mouth every grandmother had colon cancer in her 76s.    • Gout    • Hepatic steatosis 3/11/2016   • High blood pressure    • High cholesterol     as per NG   • History of colonic polyps 07-    as per NG   • History of migraine headaches     as per NG   • History scoped--3 torn meniscus--biocartilage done   • OTHER SURGICAL HISTORY Right 8/2016    R knee scoped for torn miniscus   • RECONSTR NOSE+JEAN SEPTAL REPAIR  2006    as per NG   • TOTAL KNEE REPLACEMENT     • FRANK N/A 11/9/2018    Performed by Violeta Roque Result Value Ref Range    WBC 6.3 4.0 - 11.0 x10(3) uL    RBC 4.50 3.80 - 5.30 x10(6)uL    HGB 13.3 12.0 - 16.0 g/dL    HCT 40.5 35.0 - 48.0 %    MCV 90.0 80.0 - 100.0 fL    MCH 29.6 26.0 - 34.0 pg    MCHC 32.8 31.0 - 37.0 g/dL    RDW-SD 41.6 35.1 - 46. 3 Left arm, Patient Position: Sitting, Cuff Size: adult)   Pulse 75   Temp 97.6 °F (36.4 °C) (Oral)   Wt 156 lb (70.8 kg)   SpO2 99%   BMI 25.96 kg/m²     GENERAL:  well developed, well nourished. in no apparent distress  SKIN:  no rashes.   no suspicious le vomiting. She did look up citalopram along with Zofran and interactions and it does say that she has many of the symptoms. She wishes to stop the citalopram which seems reasonable. She is not depressed.   She knows to call if symptoms of depression retur

## 2019-07-23 NOTE — TELEPHONE ENCOUNTER
Please let patient know that the radiology reading on her ankle x-ray did not show any fractures.   She can follow-up with Dr. Katina Dacosta ( ortho)  or his associates as we discussed during office visit

## 2019-07-24 LAB — LAMOTRIGINE: 9.4 UG/ML

## 2019-07-25 ENCOUNTER — TELEPHONE (OUTPATIENT)
Dept: INTERNAL MEDICINE CLINIC | Facility: CLINIC | Age: 69
End: 2019-07-25

## 2019-07-25 NOTE — TELEPHONE ENCOUNTER
Please let patient know that her Lamotrigine level came out within a therapeutic range. We will fax to Dr. Izella Boas. Contact and fax information in out box along with lab results.

## 2019-07-26 NOTE — TELEPHONE ENCOUNTER
I spoke with patient and relayed Dr. Kolton Arguello message. She verbalized understanding. Result faxed to Dr. Kamar Shore at [phone]. Fax confirmation received.

## 2019-08-20 ENCOUNTER — HOSPITAL ENCOUNTER (OUTPATIENT)
Facility: HOSPITAL | Age: 69
Setting detail: OBSERVATION
Discharge: HOME | End: 2019-08-21
Attending: EMERGENCY MEDICINE | Admitting: HOSPITALIST
Payer: MEDICARE

## 2019-08-20 ENCOUNTER — APPOINTMENT (OUTPATIENT)
Dept: GENERAL RADIOLOGY | Facility: HOSPITAL | Age: 69
End: 2019-08-20
Payer: MEDICARE

## 2019-08-20 ENCOUNTER — OFFICE VISIT (OUTPATIENT)
Dept: CARDIOLOGY | Age: 69
End: 2019-08-20

## 2019-08-20 ENCOUNTER — TELEPHONE (OUTPATIENT)
Dept: CARDIOLOGY | Age: 69
End: 2019-08-20

## 2019-08-20 VITALS
DIASTOLIC BLOOD PRESSURE: 72 MMHG | HEIGHT: 65 IN | SYSTOLIC BLOOD PRESSURE: 124 MMHG | BODY MASS INDEX: 26.82 KG/M2 | HEART RATE: 64 BPM | WEIGHT: 161 LBS

## 2019-08-20 DIAGNOSIS — R06.00 DYSPNEA, UNSPECIFIED TYPE: ICD-10-CM

## 2019-08-20 DIAGNOSIS — I48.0 PAROXYSMAL ATRIAL FIBRILLATION (CMD): Primary | ICD-10-CM

## 2019-08-20 DIAGNOSIS — R07.9 ACUTE CHEST PAIN: Primary | ICD-10-CM

## 2019-08-20 LAB
ANION GAP SERPL CALC-SCNC: 6 MMOL/L (ref 0–18)
BASOPHILS # BLD AUTO: 0.03 X10(3) UL (ref 0–0.2)
BASOPHILS NFR BLD AUTO: 0.6 %
BUN BLD-MCNC: 16 MG/DL (ref 7–18)
BUN/CREAT SERPL: 14.3 (ref 10–20)
CALCIUM BLD-MCNC: 9.2 MG/DL (ref 8.5–10.1)
CHLORIDE SERPL-SCNC: 109 MMOL/L (ref 98–112)
CO2 SERPL-SCNC: 27 MMOL/L (ref 21–32)
CREAT BLD-MCNC: 1.12 MG/DL (ref 0.55–1.02)
DEPRECATED RDW RBC AUTO: 45.2 FL (ref 35.1–46.3)
EOSINOPHIL # BLD AUTO: 0.21 X10(3) UL (ref 0–0.7)
EOSINOPHIL NFR BLD AUTO: 4.3 %
ERYTHROCYTE [DISTWIDTH] IN BLOOD BY AUTOMATED COUNT: 13.5 % (ref 11–15)
GLUCOSE BLD-MCNC: 80 MG/DL (ref 70–99)
HCT VFR BLD AUTO: 41.4 % (ref 35–48)
HGB BLD-MCNC: 13.3 G/DL (ref 12–16)
IMM GRANULOCYTES # BLD AUTO: 0.01 X10(3) UL (ref 0–1)
IMM GRANULOCYTES NFR BLD: 0.2 %
LYMPHOCYTES # BLD AUTO: 1.53 X10(3) UL (ref 1–4)
LYMPHOCYTES NFR BLD AUTO: 31.4 %
MCH RBC QN AUTO: 29.6 PG (ref 26–34)
MCHC RBC AUTO-ENTMCNC: 32.1 G/DL (ref 31–37)
MCV RBC AUTO: 92.2 FL (ref 80–100)
MONOCYTES # BLD AUTO: 0.4 X10(3) UL (ref 0.1–1)
MONOCYTES NFR BLD AUTO: 8.2 %
NEUTROPHILS # BLD AUTO: 2.69 X10 (3) UL (ref 1.5–7.7)
NEUTROPHILS # BLD AUTO: 2.69 X10(3) UL (ref 1.5–7.7)
NEUTROPHILS NFR BLD AUTO: 55.3 %
OSMOLALITY SERPL CALC.SUM OF ELEC: 294 MOSM/KG (ref 275–295)
PLATELET # BLD AUTO: 206 10(3)UL (ref 150–450)
POTASSIUM SERPL-SCNC: 4.6 MMOL/L (ref 3.5–5.1)
RBC # BLD AUTO: 4.49 X10(6)UL (ref 3.8–5.3)
SODIUM SERPL-SCNC: 142 MMOL/L (ref 136–145)
TROPONIN I SERPL-MCNC: <0.045 NG/ML (ref ?–0.04)
TROPONIN I SERPL-MCNC: <0.045 NG/ML (ref ?–0.04)
WBC # BLD AUTO: 4.9 X10(3) UL (ref 4–11)

## 2019-08-20 PROCEDURE — 99215 OFFICE O/P EST HI 40 MIN: CPT | Performed by: INTERNAL MEDICINE

## 2019-08-20 PROCEDURE — 99214 OFFICE O/P EST MOD 30 MIN: CPT | Performed by: NURSE PRACTITIONER

## 2019-08-20 PROCEDURE — 93000 ELECTROCARDIOGRAM COMPLETE: CPT | Performed by: NURSE PRACTITIONER

## 2019-08-20 PROCEDURE — 99220 INITIAL OBSERVATION CARE,LEVL III: CPT | Performed by: HOSPITALIST

## 2019-08-20 PROCEDURE — 71046 X-RAY EXAM CHEST 2 VIEWS: CPT | Performed by: EMERGENCY MEDICINE

## 2019-08-20 RX ORDER — CITALOPRAM HYDROBROMIDE 10 MG/1
10 TABLET ORAL DAILY
COMMUNITY
End: 2019-08-20

## 2019-08-20 RX ORDER — ONDANSETRON 4 MG/1
4 TABLET, FILM COATED ORAL PRN
COMMUNITY
Start: 2019-03-28

## 2019-08-20 RX ORDER — LEVETIRACETAM 500 MG/1
500 TABLET ORAL 2 TIMES DAILY
COMMUNITY
End: 2019-08-20

## 2019-08-20 RX ORDER — ASPIRIN 81 MG/1
324 TABLET, CHEWABLE ORAL ONCE
Status: COMPLETED | OUTPATIENT
Start: 2019-08-20 | End: 2019-08-20

## 2019-08-20 NOTE — ED NOTES
Patient has no chest pain or SOB at this time. Patient placed on monitor.  Family remains at bedside

## 2019-08-20 NOTE — ED INITIAL ASSESSMENT (HPI)
Patient seen in her cardiologist's office today for chest tightness and sob. Sent to ER for abnormal EKG.

## 2019-08-20 NOTE — ED NOTES
Patient states she has had chestpain/SOB x1 week. Patient has unlabored,even breathing at this time.    Patient sent by cardiologist.

## 2019-08-21 ENCOUNTER — APPOINTMENT (OUTPATIENT)
Dept: CT IMAGING | Facility: HOSPITAL | Age: 69
End: 2019-08-21
Attending: HOSPITALIST
Payer: MEDICARE

## 2019-08-21 VITALS
SYSTOLIC BLOOD PRESSURE: 126 MMHG | BODY MASS INDEX: 26.66 KG/M2 | HEART RATE: 63 BPM | TEMPERATURE: 98 F | DIASTOLIC BLOOD PRESSURE: 49 MMHG | HEIGHT: 65 IN | OXYGEN SATURATION: 94 % | RESPIRATION RATE: 16 BRPM | WEIGHT: 160 LBS

## 2019-08-21 PROBLEM — R06.00 DYSPNEA, UNSPECIFIED TYPE: Status: ACTIVE | Noted: 2019-08-21

## 2019-08-21 LAB
ANION GAP SERPL CALC-SCNC: 9 MMOL/L (ref 0–18)
BASOPHILS # BLD AUTO: 0.03 X10(3) UL (ref 0–0.2)
BASOPHILS NFR BLD AUTO: 0.6 %
BUN BLD-MCNC: 17 MG/DL (ref 7–18)
BUN/CREAT SERPL: 15.2 (ref 10–20)
CALCIUM BLD-MCNC: 8.6 MG/DL (ref 8.5–10.1)
CHLORIDE SERPL-SCNC: 111 MMOL/L (ref 98–112)
CHOLEST SMN-MCNC: 156 MG/DL (ref ?–200)
CO2 SERPL-SCNC: 25 MMOL/L (ref 21–32)
CREAT BLD-MCNC: 1.12 MG/DL (ref 0.55–1.02)
DEPRECATED RDW RBC AUTO: 43.3 FL (ref 35.1–46.3)
EOSINOPHIL # BLD AUTO: 0.15 X10(3) UL (ref 0–0.7)
EOSINOPHIL NFR BLD AUTO: 3.2 %
ERYTHROCYTE [DISTWIDTH] IN BLOOD BY AUTOMATED COUNT: 13.3 % (ref 11–15)
GLUCOSE BLD-MCNC: 94 MG/DL (ref 70–99)
HCT VFR BLD AUTO: 37.4 % (ref 35–48)
HDLC SERPL-MCNC: 47 MG/DL (ref 40–59)
HGB BLD-MCNC: 12.1 G/DL (ref 12–16)
IMM GRANULOCYTES # BLD AUTO: 0.01 X10(3) UL (ref 0–1)
IMM GRANULOCYTES NFR BLD: 0.2 %
LDLC SERPL CALC-MCNC: 89 MG/DL (ref ?–100)
LYMPHOCYTES # BLD AUTO: 1.33 X10(3) UL (ref 1–4)
LYMPHOCYTES NFR BLD AUTO: 28.8 %
MCH RBC QN AUTO: 28.9 PG (ref 26–34)
MCHC RBC AUTO-ENTMCNC: 32.4 G/DL (ref 31–37)
MCV RBC AUTO: 89.5 FL (ref 80–100)
MONOCYTES # BLD AUTO: 0.35 X10(3) UL (ref 0.1–1)
MONOCYTES NFR BLD AUTO: 7.6 %
NEUTROPHILS # BLD AUTO: 2.75 X10 (3) UL (ref 1.5–7.7)
NEUTROPHILS # BLD AUTO: 2.75 X10(3) UL (ref 1.5–7.7)
NEUTROPHILS NFR BLD AUTO: 59.6 %
NONHDLC SERPL-MCNC: 109 MG/DL (ref ?–130)
OSMOLALITY SERPL CALC.SUM OF ELEC: 301 MOSM/KG (ref 275–295)
PATIENT FASTING: NO
PATIENT FASTING: NO
PLATELET # BLD AUTO: 170 10(3)UL (ref 150–450)
POTASSIUM SERPL-SCNC: 4.2 MMOL/L (ref 3.5–5.1)
RBC # BLD AUTO: 4.18 X10(6)UL (ref 3.8–5.3)
SODIUM SERPL-SCNC: 145 MMOL/L (ref 136–145)
TRIGL SERPL-MCNC: 98 MG/DL (ref 30–149)
TROPONIN I SERPL-MCNC: <0.045 NG/ML (ref ?–0.04)
VLDLC SERPL CALC-MCNC: 20 MG/DL (ref 0–30)
WBC # BLD AUTO: 4.6 X10(3) UL (ref 4–11)

## 2019-08-21 PROCEDURE — 75574 CT ANGIO HRT W/3D IMAGE: CPT | Performed by: INTERNAL MEDICINE

## 2019-08-21 PROCEDURE — 99217 OBSERVATION CARE DISCHARGE: CPT | Performed by: HOSPITALIST

## 2019-08-21 PROCEDURE — 75574 CT ANGIO HRT W/3D IMAGE: CPT | Performed by: HOSPITALIST

## 2019-08-21 PROCEDURE — 99214 OFFICE O/P EST MOD 30 MIN: CPT | Performed by: INTERNAL MEDICINE

## 2019-08-21 RX ORDER — SODIUM CHLORIDE 9 MG/ML
INJECTION, SOLUTION INTRAVENOUS CONTINUOUS
Status: DISCONTINUED | OUTPATIENT
Start: 2019-08-21 | End: 2019-08-21

## 2019-08-21 RX ORDER — ACETAMINOPHEN 325 MG/1
650 TABLET ORAL EVERY 6 HOURS PRN
Status: DISCONTINUED | OUTPATIENT
Start: 2019-08-21 | End: 2019-08-21

## 2019-08-21 RX ORDER — METOPROLOL TARTRATE 50 MG/1
50 TABLET, FILM COATED ORAL ONCE
Status: DISCONTINUED | OUTPATIENT
Start: 2019-08-21 | End: 2019-08-21

## 2019-08-21 RX ORDER — METOPROLOL TARTRATE 100 MG/1
100 TABLET ORAL ONCE
Status: DISCONTINUED | OUTPATIENT
Start: 2019-08-21 | End: 2019-08-21

## 2019-08-21 RX ORDER — METOPROLOL TARTRATE 50 MG/1
50 TABLET, FILM COATED ORAL ONCE
Status: DISCONTINUED | OUTPATIENT
Start: 2019-08-22 | End: 2019-08-21

## 2019-08-21 RX ORDER — NITROGLYCERIN 0.4 MG/1
0.4 TABLET SUBLINGUAL EVERY 5 MIN PRN
Status: DISCONTINUED | OUTPATIENT
Start: 2019-08-21 | End: 2019-08-21

## 2019-08-21 RX ORDER — ROPINIROLE 1 MG/1
1 TABLET, FILM COATED ORAL DAILY
Status: DISCONTINUED | OUTPATIENT
Start: 2019-08-21 | End: 2019-08-21

## 2019-08-21 RX ORDER — LEVETIRACETAM 500 MG/1
500 TABLET ORAL 2 TIMES DAILY
Status: DISCONTINUED | OUTPATIENT
Start: 2019-08-21 | End: 2019-08-21

## 2019-08-21 RX ORDER — METOPROLOL TARTRATE 50 MG/1
50 TABLET, FILM COATED ORAL ONCE AS NEEDED
Status: DISCONTINUED | OUTPATIENT
Start: 2019-08-21 | End: 2019-08-21

## 2019-08-21 RX ORDER — ONDANSETRON 4 MG/1
4 TABLET, FILM COATED ORAL EVERY 8 HOURS PRN
Status: DISCONTINUED | OUTPATIENT
Start: 2019-08-21 | End: 2019-08-21

## 2019-08-21 RX ORDER — TEMAZEPAM 7.5 MG/1
7.5 CAPSULE ORAL NIGHTLY PRN
Status: DISCONTINUED | OUTPATIENT
Start: 2019-08-21 | End: 2019-08-21

## 2019-08-21 RX ORDER — ALLOPURINOL 100 MG/1
100 TABLET ORAL 2 TIMES DAILY
Status: DISCONTINUED | OUTPATIENT
Start: 2019-08-21 | End: 2019-08-21

## 2019-08-21 RX ORDER — ASPIRIN 325 MG
325 TABLET ORAL DAILY
Status: DISCONTINUED | OUTPATIENT
Start: 2019-08-21 | End: 2019-08-21

## 2019-08-21 RX ORDER — NITROGLYCERIN 0.4 MG/1
0.4 TABLET SUBLINGUAL ONCE
Status: COMPLETED | OUTPATIENT
Start: 2019-08-21 | End: 2019-08-21

## 2019-08-21 RX ORDER — METOPROLOL TARTRATE 50 MG/1
50 TABLET, FILM COATED ORAL ONCE AS NEEDED
Status: DISCONTINUED | OUTPATIENT
Start: 2019-08-22 | End: 2019-08-21

## 2019-08-21 RX ORDER — SODIUM CHLORIDE 0.9 % (FLUSH) 0.9 %
3 SYRINGE (ML) INJECTION AS NEEDED
Status: DISCONTINUED | OUTPATIENT
Start: 2019-08-21 | End: 2019-08-21

## 2019-08-21 RX ORDER — DILTIAZEM HYDROCHLORIDE 5 MG/ML
5 INJECTION INTRAVENOUS SEE ADMIN INSTRUCTIONS
Status: DISCONTINUED | OUTPATIENT
Start: 2019-08-21 | End: 2019-08-21

## 2019-08-21 RX ORDER — ROPINIROLE 1 MG/1
2 TABLET, FILM COATED ORAL NIGHTLY
Status: DISCONTINUED | OUTPATIENT
Start: 2019-08-21 | End: 2019-08-21

## 2019-08-21 RX ORDER — METOPROLOL TARTRATE 100 MG/1
100 TABLET ORAL ONCE
Status: DISCONTINUED | OUTPATIENT
Start: 2019-08-22 | End: 2019-08-21

## 2019-08-21 RX ORDER — METOPROLOL TARTRATE 5 MG/5ML
5 INJECTION INTRAVENOUS SEE ADMIN INSTRUCTIONS
Status: DISCONTINUED | OUTPATIENT
Start: 2019-08-21 | End: 2019-08-21

## 2019-08-21 RX ORDER — METOPROLOL TARTRATE 100 MG/1
100 TABLET ORAL ONCE AS NEEDED
Status: DISCONTINUED | OUTPATIENT
Start: 2019-08-21 | End: 2019-08-21

## 2019-08-21 RX ORDER — LAMOTRIGINE 25 MG/1
25 TABLET ORAL DAILY
Status: DISCONTINUED | OUTPATIENT
Start: 2019-08-21 | End: 2019-08-21

## 2019-08-21 RX ORDER — ATORVASTATIN CALCIUM 20 MG/1
20 TABLET, FILM COATED ORAL NIGHTLY
Status: DISCONTINUED | OUTPATIENT
Start: 2019-08-21 | End: 2019-08-21

## 2019-08-21 RX ORDER — ONDANSETRON 2 MG/ML
4 INJECTION INTRAMUSCULAR; INTRAVENOUS EVERY 6 HOURS PRN
Status: DISCONTINUED | OUTPATIENT
Start: 2019-08-21 | End: 2019-08-21

## 2019-08-21 RX ORDER — ROPINIROLE 1 MG/1
2 TABLET, FILM COATED ORAL NIGHTLY
Status: ON HOLD | COMMUNITY
End: 2019-08-21

## 2019-08-21 NOTE — CONSULTS
[facility] NOTE  Cardiology Consultation    Llano Bob Patient Status:  Emergency    3/28/1950 MRN [id number]   Location [address] Attending Ba Leiva MD   Hosp Day # 0 PCP Efren Mittal MD     Re hematuria     Adverse effect of drug, initial encounter     Ataxia     Epilepsy ([facility] [address]     Essential hypertension     History of total hysterectomy with bilateral salpingo-oophorectomy (BSO)     Presence of Watchman left atrial appendage closure device Unspecified sleep apnea     as per NG     Past Surgical History:   Procedure Laterality Date   • ARTHROSCOPY OF JOINT UNLISTED      BILAT.  KNEES   • CARDIAC PACEMAKER PLACEMENT      as per NG   • CARPAL TUNNEL RELEASE  2011    as per NG   • CATARACT Left 7 NG   • Cancer Sister         Kidney Cancer; as per NG   • Cancer Other         Thyroid Cancer; as per NG   • Cancer Maternal Grandmother         Colon      reports that she quit smoking about 21 years ago. Her smoking use included cigarettes. She has a 40. 08/20/19 2018       Last 3 Weights  08/20/19 1656 : 160 lb (72.6 kg)  07/23/19 1028 : 156 lb (70.8 kg)  07/16/19 1044 : 161 lb (73 kg)            Physical Exam:  Physical Exam:  Gen: Aox3, NAD  Head; atraumatic normocephalic  Conjunctiva; not injected cassidy

## 2019-08-21 NOTE — ED PROVIDER NOTES
Patient Seen in: [facility] AND [facility] Emergency Department    History   Patient presents with:  Chest Pain Angina (cardiovascular)    Stated Complaint: Abnormal EKG-sent by cardiology    HPI    The patient is a 62-year-old female who initially presented to Obstructive sleep apnea 12/30/2013   • Pacemaker    • Pseudobulbar affect 2/24/2017   • Restless leg syndrome     as per NG   • Restless legs syndrome 5/16/2013   • Seizure disorder [facility])     as per NG   [facility]    • Tendon tear 2012    Louis 1998        Years since quittin.6      Smokeless tobacco: Never Used    Alcohol use: Yes      Comment: About 3 drinks a year    Drug use: Yes      Types: Cannabis      Comment: medical cannabis             Review of Systems    Positive for stated diagnosis includes acute coronary syndrome, arrhythmia, infection        ED Course     Labs Reviewed   BASIC METABOLIC PANEL (8) - Abnormal; Notable for the following components:       Result Value    Creatinine 1.12 (*)     GFR, Non- 50 (* diagnosis)  Dyspnea, unspecified type    Disposition:  Admit  8/20/2019  7:13 pm    Follow-up:  No follow-up provider specified.   We recommend that you schedule follow up care with a primary care provider within the next three months to obtain basic health

## 2019-08-21 NOTE — PROGRESS NOTES
[facility]    Cardiology Progress Note    Rosaura Roy Patient Status:  Observation    3/28/1950 MRN [id number]   Location [facility] 3W/ Attending Rajendra Juarez MD   Hosp Day # 0 PCP Rahul Knowles MD         Assessmen Value Date    WBC 4.6 08/21/2019    HGB 12.1 08/21/2019    HCT 37.4 08/21/2019    .0 08/21/2019    CREATSERUM 1.12 (H) 08/21/2019    BUN 17 08/21/2019     08/21/2019    K 4.2 08/21/2019     08/21/2019    CO2 25.0 08/21/2019    GLU 94 08/

## 2019-08-21 NOTE — IMAGING NOTE
SIGNED CONSENT FOR CTA IN CHART. HISTORY REVIEWED. Francis3 Rae Seals. LABS COMPLETED:  GFR = 50   CREATINE = 1.12.    CTA ORDERED BY DR. Carlos Stallworth. PT WAS NOT GIVEN CTA PREMEDS. PACEMAKER ADJUSTED TO HR IN MID-50'S.     TO CT TABLE A

## 2019-08-21 NOTE — H&P
[facility]    PATIENT'S NAME: Denise Carolann   ATTENDING PHYSICIAN: Arielle Whaley MD   PATIENT ACCOUNT#:   [id number]    LOCATION:  [facility] [address] [place] [address]  MEDICAL RECORD #:   [id number]       YOB: 1950  ADMISSION DATE:       08/20/ activity because of generalized osteoarthritis. Other 12-point review of systems negative. No jaw pain, shoulder pain, or neck pain. PHYSICAL EXAMINATION:    GENERAL:  Alert. Oriented to time, place, and person. No acute distress.    VITAL SIGNS:

## 2019-08-21 NOTE — PLAN OF CARE
Problem: Patient Centered Care  Goal: Patient preferences are identified and integrated in the patient's plan of care  Description  Interventions:  - What would you like us to know as we care for you?  I was diagnosed with apnea and do not use a machine a Continuous cardiac monitoring, monitor vital signs, obtain 12 lead EKG if indicated  - Evaluate effectiveness of antiarrhythmic and heart rate control medications as ordered  - Initiate emergency measures for life threatening arrhythmias  - Monitor electro

## 2019-08-22 ENCOUNTER — TELEPHONE (OUTPATIENT)
Dept: FAMILY MEDICINE CLINIC | Facility: CLINIC | Age: 69
End: 2019-08-22

## 2019-08-22 ENCOUNTER — PATIENT OUTREACH (OUTPATIENT)
Dept: CASE MANAGEMENT | Age: 69
End: 2019-08-22

## 2019-08-22 ENCOUNTER — OFFICE VISIT (OUTPATIENT)
Dept: INTERNAL MEDICINE CLINIC | Facility: CLINIC | Age: 69
End: 2019-08-22
Payer: MEDICARE

## 2019-08-22 VITALS
TEMPERATURE: 98 F | SYSTOLIC BLOOD PRESSURE: 140 MMHG | HEART RATE: 60 BPM | BODY MASS INDEX: 26.33 KG/M2 | WEIGHT: 158 LBS | HEIGHT: 65 IN | OXYGEN SATURATION: 97 % | DIASTOLIC BLOOD PRESSURE: 82 MMHG

## 2019-08-22 DIAGNOSIS — R07.9 ACUTE CHEST PAIN: ICD-10-CM

## 2019-08-22 DIAGNOSIS — I48.0 PAROXYSMAL ATRIAL FIBRILLATION (CMD): ICD-10-CM

## 2019-08-22 DIAGNOSIS — R05.9 COUGH: ICD-10-CM

## 2019-08-22 DIAGNOSIS — R55 NEAR SYNCOPE: ICD-10-CM

## 2019-08-22 DIAGNOSIS — R39.9 LOWER URINARY TRACT SYMPTOMS: ICD-10-CM

## 2019-08-22 DIAGNOSIS — N18.30 STAGE 3 CHRONIC KIDNEY DISEASE (HCC): ICD-10-CM

## 2019-08-22 DIAGNOSIS — Z95.818 PRESENCE OF WATCHMAN LEFT ATRIAL APPENDAGE CLOSURE DEVICE: ICD-10-CM

## 2019-08-22 DIAGNOSIS — Z02.9 ENCOUNTERS FOR UNSPECIFIED ADMINISTRATIVE PURPOSE: ICD-10-CM

## 2019-08-22 DIAGNOSIS — Z09 HOSPITAL DISCHARGE FOLLOW-UP: Primary | ICD-10-CM

## 2019-08-22 DIAGNOSIS — R19.4 BOWEL HABIT CHANGES: ICD-10-CM

## 2019-08-22 DIAGNOSIS — G30.9 ALZHEIMER'S DEMENTIA WITHOUT BEHAVIORAL DISTURBANCE, UNSPECIFIED TIMING OF DEMENTIA ONSET (HCC): ICD-10-CM

## 2019-08-22 DIAGNOSIS — Z87.898 HISTORY OF SEIZURES: ICD-10-CM

## 2019-08-22 DIAGNOSIS — F02.80 ALZHEIMER'S DEMENTIA WITHOUT BEHAVIORAL DISTURBANCE, UNSPECIFIED TIMING OF DEMENTIA ONSET (HCC): ICD-10-CM

## 2019-08-22 DIAGNOSIS — R11.0 NAUSEA: ICD-10-CM

## 2019-08-22 LAB
BACTERIA UR QL AUTO: NEGATIVE /HPF
BILIRUB UR QL: NEGATIVE
CLARITY UR: CLEAR
COLOR UR: YELLOW
GLUCOSE UR-MCNC: NEGATIVE MG/DL
KETONES UR-MCNC: NEGATIVE MG/DL
LEUKOCYTE ESTERASE UR QL STRIP.AUTO: NEGATIVE
NITRITE UR QL STRIP.AUTO: NEGATIVE
PH UR: 6 [PH] (ref 5–8)
PROT UR-MCNC: NEGATIVE MG/DL
RBC #/AREA URNS AUTO: 1 /HPF
SP GR UR STRIP: 1.01 (ref 1–1.03)
UROBILINOGEN UR STRIP-ACNC: <2
VIT C UR-MCNC: NEGATIVE MG/DL
WBC #/AREA URNS AUTO: <1 /HPF

## 2019-08-22 PROCEDURE — 81001 URINALYSIS AUTO W/SCOPE: CPT | Performed by: INTERNAL MEDICINE

## 2019-08-22 PROCEDURE — 93005 ELECTROCARDIOGRAM TRACING: CPT | Performed by: INTERNAL MEDICINE

## 2019-08-22 PROCEDURE — 1111F DSCHRG MED/CURRENT MED MERGE: CPT | Performed by: INTERNAL MEDICINE

## 2019-08-22 PROCEDURE — 99215 OFFICE O/P EST HI 40 MIN: CPT | Performed by: INTERNAL MEDICINE

## 2019-08-22 PROCEDURE — 93010 ELECTROCARDIOGRAM REPORT: CPT | Performed by: INTERNAL MEDICINE

## 2019-08-22 PROCEDURE — G0463 HOSPITAL OUTPT CLINIC VISIT: HCPCS | Performed by: INTERNAL MEDICINE

## 2019-08-22 PROCEDURE — 1111F DSCHRG MED/CURRENT MED MERGE: CPT

## 2019-08-22 RX ORDER — LAMOTRIGINE 150 MG/1
1 TABLET ORAL 2 TIMES DAILY
COMMUNITY
Start: 2019-08-06 | End: 2021-11-30 | Stop reason: ALTCHOICE

## 2019-08-22 RX ORDER — OMEPRAZOLE 20 MG/1
20 TABLET, DELAYED RELEASE ORAL DAILY
Refills: 0 | COMMUNITY
Start: 2019-08-22 | End: 2020-09-18

## 2019-08-22 RX ORDER — DONEPEZIL HYDROCHLORIDE 10 MG/1
10 TABLET, FILM COATED ORAL NIGHTLY
COMMUNITY

## 2019-08-22 RX ORDER — NADOLOL 20 MG/1
10 TABLET ORAL DAILY
Qty: 45 TABLET | Refills: 3 | Status: SHIPPED | OUTPATIENT
Start: 2019-08-22 | End: 2020-08-03

## 2019-08-22 NOTE — PROGRESS NOTES
HPI:   Piotr Valdez is a 78year old female presents with the following problems. Patient was in the hospital August 20,019 overnight. She presented with chest discomfort and fatigue.   Patient relates to me that she had been for about a week having hemorrhoids. Wt Readings from Last 3 Encounters:  08/22/19 : 158 lb (71.7 kg)  08/20/19 : 160 lb (72.6 kg)  07/23/19 : 156 lb (70.8 kg)    Body mass index is 26.29 kg/m².        Current Outpatient Medications:  lamoTRIgine 150 MG Oral Tab Take 1 tablet b Maternal grandmother had colon cancer in her 76s.    • Gout    • Hepatic steatosis 3/11/2016   • High blood pressure    • High cholesterol     as per NG   • History of colonic polyps 07-    as per NG   • History of migraine headaches     as per NG L knee scoped--3 torn meniscus--biocartilage done   • OTHER SURGICAL HISTORY Right 8/2016    R knee scoped for torn miniscus   • RECONSTR NOSE+JEAN SEPTAL REPAIR  2006    as per NG   • TOTAL KNEE REPLACEMENT     • FRANK N/A 11/9/2018    Performed by Albina Arevalo HDL Chol 109 <130 mg/dL    FASTING No    TROPONIN I   Result Value Ref Range    Troponin <0.045 <0.045 ng/mL   BASIC METABOLIC PANEL (8)   Result Value Ref Range    Glucose 94 70 - 99 mg/dL    Sodium 145 136 - 145 mmol/L    Potassium 4.2 3.5 - 5.1 mmol/L 48.0 %    MCV 89.5 80.0 - 100.0 fL    MCH 28.9 26.0 - 34.0 pg    MCHC 32.4 31.0 - 37.0 g/dL    RDW-SD 43.3 35.1 - 46.3 fL    RDW 13.3 11.0 - 15.0 %    .0 150.0 - 450.0 10(3)uL    Neutrophil Absolute Prelim 2.75 1.50 - 7.70 x10 (3) uL    Neutrophil A Atraumatic.    pharynx without exudate or erythema  EYES:  PERRL . conjunctiva clear. NECK:  supple, no adenopathy,   LUNGS:  clear to auscultation. Effort normal  CARDIO:  RRR without murmur.   S1 and S2 normal.  GI:  good BS's. no masses, organomegaly or regular rhythm at a rate of 76. She felt back to normal in about 5 to 10 minutes. Because of this episode I did not want her to drive home and her  Cameron Rincon came to get her.   I introduced myself and we had a discussion regarding patient's Dre Morton

## 2019-08-22 NOTE — PROGRESS NOTES
Initial Post Discharge Follow Up   Discharge Date: 8/21/19  Contact Date: 8/22/2019    Consent Verification:  Assessment Completed With: Patient  HIPAA Verified?   Yes    Discharge Dx:   Acute chest pain    Was TCC ordered: no    General:   • How have yo Nausea. Disp: 10 tablet Rfl: 0   ATORVASTATIN 20 MG Oral Tab TAKE ONE TABLET BY MOUTH ONE TIME DAILY at night Disp: 30 tablet Rfl: 11   lamoTRIgine 25 MG Oral Tab Take 25 mg by mouth daily.  Titrate up; Start with 25mg po qd for 2 week and then 25mg po bid disease concerns, Etc): Yes     Follow up appointments:      Your appointments     Date & Time Appointment Department [facility])    Sep 12, 2019 10:30 AM CDT Exam - Establ[facility] ([place] reviewed and discussed. Any changes or updates to medications and or orders sent to PCP.      For patients with TCC appointments:     NCM offered sooner TCC appointment if schedule allowed:  n/a    []  Advised patient to bring all medications and blood gluc

## 2019-08-22 NOTE — TELEPHONE ENCOUNTER
NCM spoke with pt for TCM. Pt complains of cough, fatigue, n/v and SOB with exertion. Pt having no complaints of ST, HA, fever or chills. Pt has scheduled a TCM HFU to see Jeaneth Callahan today at 2:30pm.  Advised pt if symptoms worsening to call 911 or go to ER for evaluation. Please advise of any further recommendations. Pt high risk for readmission. Thank you.

## 2019-08-23 ENCOUNTER — TELEPHONE (OUTPATIENT)
Dept: INTERNAL MEDICINE CLINIC | Facility: CLINIC | Age: 69
End: 2019-08-23

## 2019-08-23 NOTE — TELEPHONE ENCOUNTER
Pt is calling she saw Blood Urine results on MyChart  The results said Large, pt would like to know what this means   Please call pt [phone]    Tasked to nursing

## 2019-08-23 NOTE — TELEPHONE ENCOUNTER
Please let her know that for now she is okay. That she has the dipstick. She had nothing out of the microscope. Usually if nothing is under the microscope in terms of blood the dipstick is not very significant. We will wait for urine culture to come back and then call her with results. Nothing to do for now.

## 2019-08-25 ENCOUNTER — TELEPHONE (OUTPATIENT)
Dept: INTERNAL MEDICINE CLINIC | Facility: CLINIC | Age: 69
End: 2019-08-25

## 2019-08-25 NOTE — TELEPHONE ENCOUNTER
Please let patient know her urine was good. No infection. In addition ask her sometime this week to go for follow up kidney blood test we wanted to get but then she had a weak spell in office and we decided not to get it,  BMP order in place.

## 2019-09-03 ENCOUNTER — APPOINTMENT (OUTPATIENT)
Dept: CT IMAGING | Facility: HOSPITAL | Age: 69
End: 2019-09-03
Attending: EMERGENCY MEDICINE
Payer: MEDICARE

## 2019-09-03 ENCOUNTER — HOSPITAL ENCOUNTER (EMERGENCY)
Facility: HOSPITAL | Age: 69
Discharge: HOME | End: 2019-09-03
Attending: EMERGENCY MEDICINE
Payer: MEDICARE

## 2019-09-03 ENCOUNTER — TELEPHONE (OUTPATIENT)
Dept: INTERNAL MEDICINE CLINIC | Facility: CLINIC | Age: 69
End: 2019-09-03

## 2019-09-03 VITALS
WEIGHT: 152 LBS | SYSTOLIC BLOOD PRESSURE: 181 MMHG | TEMPERATURE: 98 F | BODY MASS INDEX: 25.33 KG/M2 | RESPIRATION RATE: 18 BRPM | HEIGHT: 65 IN | OXYGEN SATURATION: 100 % | DIASTOLIC BLOOD PRESSURE: 88 MMHG | HEART RATE: 115 BPM

## 2019-09-03 DIAGNOSIS — R11.2 NAUSEA AND VOMITING IN ADULT: Primary | ICD-10-CM

## 2019-09-03 DIAGNOSIS — N18.30 STAGE 3 CHRONIC KIDNEY DISEASE (HCC): Primary | ICD-10-CM

## 2019-09-03 DIAGNOSIS — R18.8 PELVIC FLUID COLLECTION: ICD-10-CM

## 2019-09-03 LAB
ALBUMIN SERPL-MCNC: 4 G/DL (ref 3.4–5)
ALBUMIN/GLOB SERPL: 1 {RATIO} (ref 1–2)
ALP LIVER SERPL-CCNC: 90 U/L (ref 55–142)
ALT SERPL-CCNC: 17 U/L (ref 13–56)
ANION GAP SERPL CALC-SCNC: 7 MMOL/L (ref 0–18)
AST SERPL-CCNC: 17 U/L (ref 15–37)
BASOPHILS # BLD AUTO: 0.02 X10(3) UL (ref 0–0.2)
BASOPHILS NFR BLD AUTO: 0.4 %
BILIRUB SERPL-MCNC: 0.8 MG/DL (ref 0.1–2)
BILIRUB UR QL: NEGATIVE
BUN BLD-MCNC: 13 MG/DL (ref 7–18)
BUN/CREAT SERPL: 11.3 (ref 10–20)
CALCIUM BLD-MCNC: 9.6 MG/DL (ref 8.5–10.1)
CHLORIDE SERPL-SCNC: 112 MMOL/L (ref 98–112)
CLARITY UR: CLEAR
CO2 SERPL-SCNC: 26 MMOL/L (ref 21–32)
COLOR UR: YELLOW
CREAT BLD-MCNC: 1.15 MG/DL (ref 0.55–1.02)
DEPRECATED RDW RBC AUTO: 44.3 FL (ref 35.1–46.3)
EOSINOPHIL # BLD AUTO: 0.13 X10(3) UL (ref 0–0.7)
EOSINOPHIL NFR BLD AUTO: 2.3 %
ERYTHROCYTE [DISTWIDTH] IN BLOOD BY AUTOMATED COUNT: 13.6 % (ref 11–15)
GLOBULIN PLAS-MCNC: 3.9 G/DL (ref 2.8–4.4)
GLUCOSE BLD-MCNC: 92 MG/DL (ref 70–99)
GLUCOSE UR-MCNC: NEGATIVE MG/DL
HCT VFR BLD AUTO: 40.5 % (ref 35–48)
HGB BLD-MCNC: 13.4 G/DL (ref 12–16)
HGB UR QL STRIP.AUTO: NEGATIVE
IMM GRANULOCYTES # BLD AUTO: 0.01 X10(3) UL (ref 0–1)
IMM GRANULOCYTES NFR BLD: 0.2 %
KETONES UR-MCNC: NEGATIVE MG/DL
LEUKOCYTE ESTERASE UR QL STRIP.AUTO: NEGATIVE
LIPASE SERPL-CCNC: 266 U/L (ref 73–393)
LYMPHOCYTES # BLD AUTO: 1.67 X10(3) UL (ref 1–4)
LYMPHOCYTES NFR BLD AUTO: 30.1 %
M PROTEIN MFR SERPL ELPH: 7.9 G/DL (ref 6.4–8.2)
MCH RBC QN AUTO: 29.5 PG (ref 26–34)
MCHC RBC AUTO-ENTMCNC: 33.1 G/DL (ref 31–37)
MCV RBC AUTO: 89 FL (ref 80–100)
MONOCYTES # BLD AUTO: 0.46 X10(3) UL (ref 0.1–1)
MONOCYTES NFR BLD AUTO: 8.3 %
NEUTROPHILS # BLD AUTO: 3.26 X10 (3) UL (ref 1.5–7.7)
NEUTROPHILS # BLD AUTO: 3.26 X10(3) UL (ref 1.5–7.7)
NEUTROPHILS NFR BLD AUTO: 58.7 %
NITRITE UR QL STRIP.AUTO: NEGATIVE
OSMOLALITY SERPL CALC.SUM OF ELEC: 300 MOSM/KG (ref 275–295)
PH UR: 6 [PH] (ref 5–8)
PLATELET # BLD AUTO: 214 10(3)UL (ref 150–450)
POTASSIUM SERPL-SCNC: 3.6 MMOL/L (ref 3.5–5.1)
PROT UR-MCNC: NEGATIVE MG/DL
RBC # BLD AUTO: 4.55 X10(6)UL (ref 3.8–5.3)
SODIUM SERPL-SCNC: 145 MMOL/L (ref 136–145)
SP GR UR STRIP: 1.01 (ref 1–1.03)
UROBILINOGEN UR STRIP-ACNC: <2
VIT C UR-MCNC: NEGATIVE MG/DL
WBC # BLD AUTO: 5.6 X10(3) UL (ref 4–11)

## 2019-09-03 PROCEDURE — 80053 COMPREHEN METABOLIC PANEL: CPT | Performed by: EMERGENCY MEDICINE

## 2019-09-03 PROCEDURE — 96361 HYDRATE IV INFUSION ADD-ON: CPT

## 2019-09-03 PROCEDURE — 85025 COMPLETE CBC W/AUTO DIFF WBC: CPT | Performed by: EMERGENCY MEDICINE

## 2019-09-03 PROCEDURE — 74177 CT ABD & PELVIS W/CONTRAST: CPT | Performed by: EMERGENCY MEDICINE

## 2019-09-03 PROCEDURE — 99285 EMERGENCY DEPT VISIT HI MDM: CPT

## 2019-09-03 PROCEDURE — 80053 COMPREHEN METABOLIC PANEL: CPT

## 2019-09-03 PROCEDURE — 85025 COMPLETE CBC W/AUTO DIFF WBC: CPT

## 2019-09-03 PROCEDURE — 83690 ASSAY OF LIPASE: CPT | Performed by: EMERGENCY MEDICINE

## 2019-09-03 PROCEDURE — 81003 URINALYSIS AUTO W/O SCOPE: CPT | Performed by: EMERGENCY MEDICINE

## 2019-09-03 PROCEDURE — 96374 THER/PROPH/DIAG INJ IV PUSH: CPT

## 2019-09-03 RX ORDER — ONDANSETRON 4 MG/1
4 TABLET, ORALLY DISINTEGRATING ORAL EVERY 8 HOURS PRN
Qty: 20 TABLET | Refills: 0 | Status: SHIPPED | OUTPATIENT
Start: 2019-09-03 | End: 2019-09-11

## 2019-09-03 RX ORDER — ONDANSETRON 4 MG/1
4 TABLET, ORALLY DISINTEGRATING ORAL EVERY 4 HOURS PRN
Qty: 20 TABLET | Refills: 0 | Status: SHIPPED | OUTPATIENT
Start: 2019-09-03 | End: 2019-09-10

## 2019-09-03 RX ORDER — ONDANSETRON 2 MG/ML
4 INJECTION INTRAMUSCULAR; INTRAVENOUS ONCE
Status: COMPLETED | OUTPATIENT
Start: 2019-09-03 | End: 2019-09-03

## 2019-09-03 NOTE — ED PROVIDER NOTES
Patient Seen in: [facility] Emergency Department    History   Patient presents with:  Nausea/Vomiting/Diarrhea (gastrointestinal)    Stated Complaint: vomiting    HPI    This patient is here with nausea and vomiting that started yesterday.   She st per NG   • History of colonic polyps 07-    as per NG   • History of migraine headaches     as per NG   • History of total hysterectomy with removal of both tubes and ovaries     as per NG   • Incontinence     DUE TO CYSTOCELE/RECTOCELE   • Lymphocy NOSE+JEAN SEPTAL REPAIR  2006    as per NG   • TOTAL KNEE REPLACEMENT     • WATCHMAN N/A 11/9/2018    Performed by Roshan Benton MD at [address] History    Tobacco Use      Smoking status: Former Smoker        Packs/day: 1.00        Years: 40.00 above.      Physical Exam     ED Triage Vitals [09/03/19 1355]   BP (!) 168/78   Pulse 60   Resp 16   Temp 97.4 °F (36.3 °C)   Temp src Oral   SpO2 96 %   O2 Device None (Room air)       Current:BP (!) 181/88   Pulse 115   Temp 98 °F (36.7 °C)   Resp 18 limits   LIPASE - Normal   CBC WITH DIFFERENTIAL WITH PLATELET    Narrative: The following orders were created for panel order CBC WITH DIFFERENTIAL WITH PLATELET.   Procedure                               Abnormality         Status

## 2019-09-03 NOTE — TELEPHONE ENCOUNTER
Spoke with patient and relayed Dr. Penny Mendoza message. She verbalized understanding of instructions. She is hesitant to go to ER because of how long she will likely have to be there, but agrees to go to protect her kidneys. Nursing, please f/u tomorrow.

## 2019-09-03 NOTE — TELEPHONE ENCOUNTER
Pt spent all day yesterday throwing up  Woke up today nauseous  Out of breath last night & today  Breaking out into sweats     Requests to speak with Dr Andrew Glass nurse

## 2019-09-03 NOTE — TELEPHONE ENCOUNTER
Spoke with patient. She reports she has had nausea in the mornings x 1.5 years. However, yesterday she developed vomiting. She states she \"could not stop\" vomiting from 11 am to 2 pm yesterday.  Vomited green bile, then had dry heaves, once she had nothin

## 2019-09-03 NOTE — TELEPHONE ENCOUNTER
Message noted. I think with those symptoms it may be wise for her to be checked in the emergency room where she can get a blood test to make sure she is not getting dehydrated and possibly receive some IV fluids.   I realize this is something she has exper

## 2019-09-04 NOTE — TELEPHONE ENCOUNTER
Message noted. I agree with patient seeing Dr. Pauline Elizalde. She may or may not need another colonoscopy. Her kidney function in the emergency room was stable. However she did get some dye with her CAT scan and is sometimes can affect kidney function.   Please

## 2019-09-05 ENCOUNTER — OFFICE VISIT (OUTPATIENT)
Dept: PHYSICAL THERAPY | Age: 69
End: 2019-09-05
Attending: ORTHOPAEDIC SURGERY
Payer: MEDICARE

## 2019-09-05 ENCOUNTER — ORDER TRANSCRIPTION (OUTPATIENT)
Dept: PHYSICAL THERAPY | Age: 69
End: 2019-09-05

## 2019-09-05 ENCOUNTER — APPOINTMENT (OUTPATIENT)
Dept: LAB | Age: 69
End: 2019-09-05
Attending: INTERNAL MEDICINE
Payer: MEDICARE

## 2019-09-05 DIAGNOSIS — M25.571 RIGHT ANKLE PAIN: Primary | ICD-10-CM

## 2019-09-05 DIAGNOSIS — M25.571 RIGHT ANKLE PAIN: ICD-10-CM

## 2019-09-05 DIAGNOSIS — N18.30 STAGE 3 CHRONIC KIDNEY DISEASE (HCC): ICD-10-CM

## 2019-09-05 LAB
ANION GAP SERPL CALC-SCNC: 6 MMOL/L (ref 0–18)
BUN BLD-MCNC: 13 MG/DL (ref 7–18)
BUN/CREAT SERPL: 10.3 (ref 10–20)
CALCIUM BLD-MCNC: 9.1 MG/DL (ref 8.5–10.1)
CHLORIDE SERPL-SCNC: 111 MMOL/L (ref 98–112)
CO2 SERPL-SCNC: 28 MMOL/L (ref 21–32)
CREAT BLD-MCNC: 1.26 MG/DL (ref 0.55–1.02)
GLUCOSE BLD-MCNC: 96 MG/DL (ref 70–99)
OSMOLALITY SERPL CALC.SUM OF ELEC: 300 MOSM/KG (ref 275–295)
PATIENT FASTING: YES
POTASSIUM SERPL-SCNC: 3.8 MMOL/L (ref 3.5–5.1)
SODIUM SERPL-SCNC: 145 MMOL/L (ref 136–145)

## 2019-09-05 PROCEDURE — 97110 THERAPEUTIC EXERCISES: CPT

## 2019-09-05 PROCEDURE — 80048 BASIC METABOLIC PNL TOTAL CA: CPT

## 2019-09-05 PROCEDURE — 97162 PT EVAL MOD COMPLEX 30 MIN: CPT

## 2019-09-05 PROCEDURE — 36415 COLL VENOUS BLD VENIPUNCTURE: CPT

## 2019-09-05 NOTE — PROGRESS NOTES
LOWER EXTREMITY EVALUATION:   Referring Physician: Dr. Blaire Head  Diagnosis: R ankle pain/sprain  Date of Service: 9/5/2019     PATIENT Dalia Vásquez is a 78year old female who presents to therapy today with complaints of R ankle pain.   Pt HEP.  Pt voiced understanding and performs HEP correctly without reported pain. Skilled Physical Therapy is medically necessary to address the above impairments and reach functional goals.      Precautions:  None  OBJECTIVE:   Observation: increased redness without compensation  · Pt will have increased ankle strength to 5/5 throughout for improved ankle control with ADLs such as prolonged gait and stair negotiation   · Pt will have improved SLS to >10s for increased ankle stability with ambulation on uneven

## 2019-09-05 NOTE — TELEPHONE ENCOUNTER
I spoke with patient and relayed Dr. Estella Galloway message. She verbalized understanding. I provided her with the number for central scheduling. She wanted to let Dr. Parveen Ovalle know that she is still nauseated today.

## 2019-09-05 NOTE — TELEPHONE ENCOUNTER
CT results noted with trace fluid in the pelvic area. This usually is not a problem but I did place order for pelvic ultrasound.

## 2019-09-05 NOTE — TELEPHONE ENCOUNTER
Noted.  Will await BMP to make recommendations regarding nausea. I am wondering if it is one of her medications.

## 2019-09-05 NOTE — TELEPHONE ENCOUNTER
Spoke to Alyce Cushing. I relayed Dr Kolton Arguello message to her. She verbalized understanding. She states she can go for BMP today. She woke up this morning with some nausea again. She expressed frustration for why she still has this after 1.5 years.  I encouraged

## 2019-09-06 NOTE — TELEPHONE ENCOUNTER
Discussed with patient. Discussed her constant nausea that she is had on and off for 2 years. No consistent diarrhea but occasional loose stools.   I did tell her I am at a loss to know exactly why she has the persistent nausea but I did encourage her to

## 2019-09-09 ENCOUNTER — OFFICE VISIT (OUTPATIENT)
Dept: PHYSICAL THERAPY | Age: 69
End: 2019-09-09
Attending: ORTHOPAEDIC SURGERY
Payer: MEDICARE

## 2019-09-09 PROCEDURE — 97110 THERAPEUTIC EXERCISES: CPT

## 2019-09-09 PROCEDURE — 97112 NEUROMUSCULAR REEDUCATION: CPT

## 2019-09-09 NOTE — PROGRESS NOTES
Dx:    Right ankle pain (M25.571)      Insurance (Authorized # of Visits): Medicare           Authorizing Physician: Dr. Esteban Stuart Next MD visit: none scheduled  Fall Risk: standard         Precautions: n/a             Subjective: Ankle is feeling better.

## 2019-09-11 ENCOUNTER — TELEPHONE (OUTPATIENT)
Dept: INTERNAL MEDICINE CLINIC | Facility: CLINIC | Age: 69
End: 2019-09-11

## 2019-09-11 RX ORDER — ONDANSETRON 4 MG/1
4 TABLET, ORALLY DISINTEGRATING ORAL EVERY 8 HOURS PRN
Qty: 20 TABLET | Refills: 0 | Status: SHIPPED | OUTPATIENT
Start: 2019-09-11 | End: 2019-09-25

## 2019-09-11 NOTE — TELEPHONE ENCOUNTER
S/w patient for update. She states that she has not vomited since she last spoke with our office. Just dry heaving. She has been taking frequent small sips of Gingerale. No appetite to eat. I advised to continue with hydration as that's most important.  Spo

## 2019-09-11 NOTE — TELEPHONE ENCOUNTER
Pt has been vomiting this morning for about 1/2 an hour  Can't even get out of bed if she stands up she throws up    Asks for call back from Nurse to advise on how she can settle her stomach  - pt does have an appt tomorrow with Dr Odalys Camacho      [phone]

## 2019-09-11 NOTE — TELEPHONE ENCOUNTER
She can take zofran 4mg every 6 hours; would encourage small amounts of liquids at a time maybe 4oz every 30 minutes to get her hydrated. pedialyte and gatorade can help keep her hydration for now.  If she is unable to tolerate anything orally, needs to go

## 2019-09-11 NOTE — TELEPHONE ENCOUNTER
Called patient and relayed DR. ALMAGUER message -verbalized understanding. F/U in afternoon .  Has bello tomorrow with

## 2019-09-11 NOTE — TELEPHONE ENCOUNTER
Patient was seen in the ER on 9/319 for nausea and vomiting. Given zofran for home. She was advised to see GI for follow up in 2 days. She is scheduled with Dr. Marie Serrano in GI on 9/12/19. She is scheduled to see Dr. Syeda Mclain 9/12/19.  She has vomited three times t

## 2019-09-12 ENCOUNTER — APPOINTMENT (OUTPATIENT)
Dept: LAB | Age: 69
End: 2019-09-12
Attending: INTERNAL MEDICINE
Payer: MEDICARE

## 2019-09-12 ENCOUNTER — TELEPHONE (OUTPATIENT)
Dept: INTERNAL MEDICINE CLINIC | Facility: CLINIC | Age: 69
End: 2019-09-12

## 2019-09-12 ENCOUNTER — OFFICE VISIT (OUTPATIENT)
Dept: INTERNAL MEDICINE CLINIC | Facility: CLINIC | Age: 69
End: 2019-09-12
Payer: MEDICARE

## 2019-09-12 VITALS
BODY MASS INDEX: 25.22 KG/M2 | DIASTOLIC BLOOD PRESSURE: 92 MMHG | HEART RATE: 61 BPM | WEIGHT: 151.38 LBS | HEIGHT: 65 IN | TEMPERATURE: 98 F | SYSTOLIC BLOOD PRESSURE: 166 MMHG | OXYGEN SATURATION: 98 %

## 2019-09-12 DIAGNOSIS — Z12.31 VISIT FOR SCREENING MAMMOGRAM: ICD-10-CM

## 2019-09-12 DIAGNOSIS — R18.8 PELVIC FLUID COLLECTION: ICD-10-CM

## 2019-09-12 DIAGNOSIS — R93.5 ABNORMAL CT OF THE ABDOMEN: ICD-10-CM

## 2019-09-12 DIAGNOSIS — F02.80 ALZHEIMER'S DEMENTIA WITHOUT BEHAVIORAL DISTURBANCE, UNSPECIFIED TIMING OF DEMENTIA ONSET (HCC): ICD-10-CM

## 2019-09-12 DIAGNOSIS — N13.9 OBSTRUCTED, UROPATHY: ICD-10-CM

## 2019-09-12 DIAGNOSIS — I10 ESSENTIAL HYPERTENSION: ICD-10-CM

## 2019-09-12 DIAGNOSIS — R39.9 LOWER URINARY TRACT SYMPTOMS: ICD-10-CM

## 2019-09-12 DIAGNOSIS — N18.30 STAGE 3 CHRONIC KIDNEY DISEASE (HCC): ICD-10-CM

## 2019-09-12 DIAGNOSIS — R11.2 INTRACTABLE VOMITING WITH NAUSEA, UNSPECIFIED VOMITING TYPE: ICD-10-CM

## 2019-09-12 DIAGNOSIS — N18.30 STAGE 3 CHRONIC KIDNEY DISEASE (HCC): Primary | ICD-10-CM

## 2019-09-12 DIAGNOSIS — R63.4 WEIGHT LOSS: ICD-10-CM

## 2019-09-12 DIAGNOSIS — N63.20 LUMP OF BREAST, LEFT: ICD-10-CM

## 2019-09-12 DIAGNOSIS — Z87.898 HISTORY OF SEIZURES: ICD-10-CM

## 2019-09-12 DIAGNOSIS — G30.9 ALZHEIMER'S DEMENTIA WITHOUT BEHAVIORAL DISTURBANCE, UNSPECIFIED TIMING OF DEMENTIA ONSET (HCC): ICD-10-CM

## 2019-09-12 LAB
ANION GAP SERPL CALC-SCNC: 4 MMOL/L (ref 0–18)
BUN BLD-MCNC: 12 MG/DL (ref 7–18)
BUN/CREAT SERPL: 9.6 (ref 10–20)
CALCIUM BLD-MCNC: 9.1 MG/DL (ref 8.5–10.1)
CHLORIDE SERPL-SCNC: 112 MMOL/L (ref 98–112)
CO2 SERPL-SCNC: 30 MMOL/L (ref 21–32)
CREAT BLD-MCNC: 1.25 MG/DL (ref 0.55–1.02)
GLUCOSE BLD-MCNC: 94 MG/DL (ref 70–99)
HAV IGM SER QL: 2.4 MG/DL (ref 1.6–2.6)
OSMOLALITY SERPL CALC.SUM OF ELEC: 302 MOSM/KG (ref 275–295)
PATIENT FASTING: YES
POTASSIUM SERPL-SCNC: 4 MMOL/L (ref 3.5–5.1)
SODIUM SERPL-SCNC: 146 MMOL/L (ref 136–145)

## 2019-09-12 PROCEDURE — G0463 HOSPITAL OUTPT CLINIC VISIT: HCPCS | Performed by: INTERNAL MEDICINE

## 2019-09-12 PROCEDURE — 80048 BASIC METABOLIC PNL TOTAL CA: CPT

## 2019-09-12 PROCEDURE — 83735 ASSAY OF MAGNESIUM: CPT

## 2019-09-12 PROCEDURE — 36415 COLL VENOUS BLD VENIPUNCTURE: CPT

## 2019-09-12 PROCEDURE — 99214 OFFICE O/P EST MOD 30 MIN: CPT | Performed by: INTERNAL MEDICINE

## 2019-09-12 NOTE — TELEPHONE ENCOUNTER
Called patient who is feeling somewhat better . Will be seeing DR. Dawson GHOSH today and also DR. FLEMING

## 2019-09-12 NOTE — PROGRESS NOTES
HPI:   Juanita Mock is a 78year old female presents with the following problems. Patient continues with nausea and vomiting. She states this is been going on for 2 years. However a little bit worse recently. She is continue to lose weight.   Etiol She will be seeing her neurologist  in 2 weeks. She will be going for some testing this Monday. She wonders about her magnesium level. We will recheck BMP and magnesium.     Wt Readings from Last 3 Encounters:  09/12/19 : 151 lb 6.4 oz (68.7 kg Bilateral areas of renal parenchymal scar. Symmetric nephrograms. Moderate calcific atherosclerosis of aorta.     • Carpal tunnel syndrome 2011    as per NG   • Chronic atrial fibrillation ([address]    • Crohn disease ([address]    • Diverticulosis     as per NG as per NG   • KNEE ARTHROSCOPY Right 2013    as per NG   • KNEE SURGERY  1/2015    x3    • NASAL SCOPY,REMV TOTL ETHMOID  2006    \"Ethmoidectomy, max antrostomies\"; as per NG   • OTHER  June or July 2015    removal of pins and plate from right wrist (fra Smoking status: Former Smoker        Packs/day: 1.00        Years: 40.00        Pack years: 40        Types: Cigarettes        Quit date: 1998        Years since quittin.7      Smokeless tobacco: Never Used    Alcohol use: Yes      Comment: About wishes to be checked for magnesium. Will check magnesium along with repeat renal function  - MAGNESIUM; Future  - BASIC METABOLIC PANEL (8); Future    2. Weight loss  Patient continues to lose weight. Historically May 2016 she was 189 pounds.   March 2017 (CPT=77066);  Future        Follow up 6 months    Spencer Goodpasture, MD  9/12/2019  12:26 PM

## 2019-09-13 NOTE — TELEPHONE ENCOUNTER
Please let patient know that her electrolytes and magnesium are good. Kidney level still weak but stable. No changes needed for now.

## 2019-09-16 ENCOUNTER — HOSPITAL ENCOUNTER (OUTPATIENT)
Dept: MAMMOGRAPHY | Facility: HOSPITAL | Age: 69
Discharge: HOME | End: 2019-09-16
Attending: INTERNAL MEDICINE
Payer: MEDICARE

## 2019-09-16 ENCOUNTER — HOSPITAL ENCOUNTER (OUTPATIENT)
Dept: ULTRASOUND IMAGING | Facility: HOSPITAL | Age: 69
Discharge: HOME | End: 2019-09-16
Attending: INTERNAL MEDICINE
Payer: MEDICARE

## 2019-09-16 DIAGNOSIS — N63.20 LEFT BREAST MASS: ICD-10-CM

## 2019-09-16 DIAGNOSIS — Z12.31 VISIT FOR SCREENING MAMMOGRAM: ICD-10-CM

## 2019-09-16 DIAGNOSIS — N63.20 LUMP OF BREAST, LEFT: ICD-10-CM

## 2019-09-16 PROCEDURE — 76642 ULTRASOUND BREAST LIMITED: CPT | Performed by: INTERNAL MEDICINE

## 2019-09-16 PROCEDURE — 77062 BREAST TOMOSYNTHESIS BI: CPT | Performed by: INTERNAL MEDICINE

## 2019-09-16 PROCEDURE — 77066 DX MAMMO INCL CAD BI: CPT | Performed by: INTERNAL MEDICINE

## 2019-09-17 ENCOUNTER — TELEPHONE (OUTPATIENT)
Dept: INTERNAL MEDICINE CLINIC | Facility: CLINIC | Age: 69
End: 2019-09-17

## 2019-09-18 ENCOUNTER — OFFICE VISIT (OUTPATIENT)
Dept: PHYSICAL THERAPY | Age: 69
End: 2019-09-18
Attending: ORTHOPAEDIC SURGERY
Payer: MEDICARE

## 2019-09-18 PROCEDURE — 97112 NEUROMUSCULAR REEDUCATION: CPT

## 2019-09-18 PROCEDURE — 97110 THERAPEUTIC EXERCISES: CPT

## 2019-09-18 NOTE — PROGRESS NOTES
Dx:    Right ankle pain (M25.571)      Insurance (Authorized # of Visits): Medicare           Authorizing Physician: Dr. Cinda Jeffery Next MD visit: none scheduled  Fall Risk: standard         Precautions: n/a             Subjective: Pt wears her air cast in HEP: no change    Charges: 1 NM, 2 TE    Total Timed Treatment: 40 min  Total Treatment Time: 45 min

## 2019-09-18 NOTE — TELEPHONE ENCOUNTER
Yes because we need to be sure Dr. Thomas Morris does not feel anything as mammograms and U/S may miss small masses but very doubtful. This is just to be on safe side.

## 2019-09-18 NOTE — TELEPHONE ENCOUNTER
I spoke with patient and relayed Dr. Ashley Andre message. She verbalized understanding. She had a breast exam during the US part of the mammogram testing. Both breasts were checked. Should she still see Dr. Dean Polk? Thank you.

## 2019-09-18 NOTE — TELEPHONE ENCOUNTER
I spoke with patient and relayed Dr. Jailyn Reeves message. She verbalized understanding. I provided her with the number for Dr. Jorge Deal. She verbalized understanding.

## 2019-09-20 ENCOUNTER — OFFICE VISIT (OUTPATIENT)
Dept: PHYSICAL THERAPY | Age: 69
End: 2019-09-20
Attending: ORTHOPAEDIC SURGERY
Payer: MEDICARE

## 2019-09-20 PROCEDURE — 97112 NEUROMUSCULAR REEDUCATION: CPT

## 2019-09-20 PROCEDURE — 97110 THERAPEUTIC EXERCISES: CPT

## 2019-09-20 NOTE — PROGRESS NOTES
Dx:    Right ankle pain (M25.571)      Insurance (Authorized # of Visits): Medicare           Authorizing Physician: Dr. Loraine Scott Next MD visit: none scheduled  Fall Risk: standard         Precautions: n/a             Subjective: Pt wearing her compressio kicks 10x on airex  Marches on airex 20x  SL stance 5x  Cone taps (aeromat) 10x  Heel toe on line with therapist assist 2 laps   NeuroMuscular Shaun  SL kicks 10x2 on airex  Marches on airex 20x  SL stance 5x  Cone taps (aeromat) 10x2  Heel toe on line with

## 2019-09-24 ENCOUNTER — OFFICE VISIT (OUTPATIENT)
Dept: PHYSICAL THERAPY | Age: 69
End: 2019-09-24
Attending: ORTHOPAEDIC SURGERY
Payer: MEDICARE

## 2019-09-24 PROCEDURE — 97110 THERAPEUTIC EXERCISES: CPT

## 2019-09-24 NOTE — PROGRESS NOTES
Dx:    Right ankle pain (M25.571)      Insurance (Authorized # of Visits): Medicare           Authorizing Physician: Dr. Payne July Next MD visit: none scheduled  Fall Risk: standard         Precautions: n/a             Subjective: Pt has come to the clinic Date: 9/24/19                 TX#: 5/10 Date:    Tx#: 6/   TherEx:  PROM L Ankle all directions 5 min  TB ankle ex's PF/DF/INV/EVER Red 20x each  Seated HR 20x  Seated TR 20  Standing HR 10x TherEx:  PROM L Ankle all directions 5 min  Seated HR 20x  Seated

## 2019-09-25 ENCOUNTER — TELEPHONE (OUTPATIENT)
Dept: PHYSICAL THERAPY | Age: 69
End: 2019-09-25

## 2019-09-25 ENCOUNTER — TELEPHONE (OUTPATIENT)
Dept: INTERNAL MEDICINE CLINIC | Facility: CLINIC | Age: 69
End: 2019-09-25

## 2019-09-25 RX ORDER — ONDANSETRON 4 MG/1
4 TABLET, ORALLY DISINTEGRATING ORAL EVERY 8 HOURS PRN
Qty: 20 TABLET | Refills: 0 | Status: SHIPPED | OUTPATIENT
Start: 2019-09-25 | End: 2019-12-06

## 2019-09-25 NOTE — TELEPHONE ENCOUNTER
Mobile number is still busy. I reached her at home and let her know that I could not reach on her cell. She said something might be wrong with her cell. She feels the same.  Patient was not sure what dose of zofran she is currently taking and she asked me t

## 2019-09-25 NOTE — TELEPHONE ENCOUNTER
I sent over refill for Zofran. Please ask her to call Dr. Brenda Presley from  who she recently saw as I do not know next step but maybe Dr. Brenda Presley may have some ideas. ( Hi Dr. Brenda Presley, Sravanthi Davila. I am not sure what next steps would be for her nausea. Thank you.   Ronni Mart )

## 2019-09-25 NOTE — TELEPHONE ENCOUNTER
Attempted to call patient back x2. Phone rings busy each time. How much zofran is she currently taking?

## 2019-09-26 ENCOUNTER — HOSPITAL ENCOUNTER (OUTPATIENT)
Dept: ULTRASOUND IMAGING | Age: 69
Discharge: HOME | End: 2019-09-26
Attending: UROLOGY
Payer: MEDICARE

## 2019-09-26 ENCOUNTER — APPOINTMENT (OUTPATIENT)
Dept: PHYSICAL THERAPY | Age: 69
End: 2019-09-26
Attending: ORTHOPAEDIC SURGERY
Payer: MEDICARE

## 2019-09-26 DIAGNOSIS — N13.9 OBSTRUCTED, UROPATHY: ICD-10-CM

## 2019-09-26 DIAGNOSIS — N28.1 RENAL CYST: ICD-10-CM

## 2019-09-26 DIAGNOSIS — R31.9 HEMATURIA, UNSPECIFIED TYPE: ICD-10-CM

## 2019-09-26 PROCEDURE — 76770 US EXAM ABDO BACK WALL COMP: CPT | Performed by: UROLOGY

## 2019-09-27 ENCOUNTER — APPOINTMENT (OUTPATIENT)
Dept: GENERAL RADIOLOGY | Facility: HOSPITAL | Age: 69
End: 2019-09-27
Attending: EMERGENCY MEDICINE
Payer: MEDICARE

## 2019-09-27 ENCOUNTER — HOSPITAL ENCOUNTER (EMERGENCY)
Facility: HOSPITAL | Age: 69
Discharge: HOME | End: 2019-09-27
Attending: EMERGENCY MEDICINE
Payer: MEDICARE

## 2019-09-27 ENCOUNTER — TELEPHONE (OUTPATIENT)
Dept: INTERNAL MEDICINE CLINIC | Facility: CLINIC | Age: 69
End: 2019-09-27

## 2019-09-27 VITALS
HEART RATE: 59 BPM | TEMPERATURE: 98 F | DIASTOLIC BLOOD PRESSURE: 79 MMHG | SYSTOLIC BLOOD PRESSURE: 148 MMHG | OXYGEN SATURATION: 95 % | RESPIRATION RATE: 10 BRPM

## 2019-09-27 DIAGNOSIS — R07.9 CHEST PAIN OF UNCERTAIN ETIOLOGY: Primary | ICD-10-CM

## 2019-09-27 PROCEDURE — 84484 ASSAY OF TROPONIN QUANT: CPT | Performed by: EMERGENCY MEDICINE

## 2019-09-27 PROCEDURE — 85025 COMPLETE CBC W/AUTO DIFF WBC: CPT | Performed by: EMERGENCY MEDICINE

## 2019-09-27 PROCEDURE — 71045 X-RAY EXAM CHEST 1 VIEW: CPT | Performed by: EMERGENCY MEDICINE

## 2019-09-27 PROCEDURE — 85379 FIBRIN DEGRADATION QUANT: CPT | Performed by: EMERGENCY MEDICINE

## 2019-09-27 PROCEDURE — 93010 ELECTROCARDIOGRAM REPORT: CPT | Performed by: EMERGENCY MEDICINE

## 2019-09-27 PROCEDURE — 80048 BASIC METABOLIC PNL TOTAL CA: CPT | Performed by: EMERGENCY MEDICINE

## 2019-09-27 PROCEDURE — 99284 EMERGENCY DEPT VISIT MOD MDM: CPT

## 2019-09-27 PROCEDURE — 93005 ELECTROCARDIOGRAM TRACING: CPT

## 2019-09-27 PROCEDURE — 96374 THER/PROPH/DIAG INJ IV PUSH: CPT

## 2019-09-27 RX ORDER — KETOROLAC TROMETHAMINE 15 MG/ML
15 INJECTION, SOLUTION INTRAMUSCULAR; INTRAVENOUS ONCE
Status: COMPLETED | OUTPATIENT
Start: 2019-09-27 | End: 2019-09-27

## 2019-09-27 NOTE — TELEPHONE ENCOUNTER
Pt states of +nausea x2yrs. /83, pulse 60. She has a pacemaker. Intermittent sharp pain rates 4 out of 10. Pain start R side of R breast and radiates to above breast and faint feeling in R shoulder. Pain onset this morning.  \"If I open mouth and take

## 2019-09-27 NOTE — TELEPHONE ENCOUNTER
Patient calling, having right sided pain. Up around the breast, above inside. Nausea. Pain level 3 to a 4.

## 2019-09-27 NOTE — ED INITIAL ASSESSMENT (HPI)
Patient here for right sided chest pain that began this morning and is worse when she takes a deep breath. States the pain radiates up in to her jaw and down her right arm. Patient denies nausea, diaphoresis or recent travel.

## 2019-09-27 NOTE — TELEPHONE ENCOUNTER
Discussed with patient. She indicates that she has some right-sided chest pain that hurts worse with breathing. She is not on a blood thinner. I did tell her my concern regarding blood clots and pulmonary emboli. Patient verbalizes understanding.   She

## 2019-09-27 NOTE — ED NOTES
Pt states this morning approx 8am woke up with rt sided chest pain. States it began to radiate down rt arm and seemed to get worse with deep breathing. States on her way her it began to radiate to rt jaw.  Denies any new n/v, recent travel, fevers, diaphore

## 2019-09-27 NOTE — ED PROVIDER NOTES
Patient Seen in: [facility] [place] AND [facility] Emergency Department      History   Patient presents with:  Chest Pain Angina (cardiovascular)    Stated Complaint: Chest pain    HPI    23-year-old female presents for evaluation of chest pain.   Patient reports right-s 2/24/2017   • Restless leg syndrome     as per NG   • Restless legs syndrome 5/16/2013   • Seizure disorder [facility])     as per NG   • Seizure disorder ([facility]    • Tendon tear 2012    Surgically repaired; as per NG   • Unspecified essential hypertension     as Comment: About 3 drinks a year    Drug use: Yes      Types: Cannabis      Comment: medical cannabis             Review of Systems    Positive for stated complaint: Chest pain  Other systems are as noted in HPI. Constitutional and vital signs reviewed. Narrative: The following orders were created for panel order CBC WITH DIFFERENTIAL WITH PLATELET.   Procedure                               Abnormality         Status                     ---------                               -----------         ------ on 9/27/2019 at 14:07         Approved by (CST):  Sherry Rader MD on 9/27/2019 at 14:09                 ED Medications Administered:   Medications   Ketorolac Tromethamine (TORADOL) injection 15 mg (has no administration in time range)             Re-E bedside. They both verbalized understanding of and agreement with this plan. Further Outpatient evaluation and treatment will be required.  I personally discussed the results of the above ED workup and a number of associated acute management issues with

## 2019-10-01 NOTE — DISCHARGE SUMMARY
[facility] HOSPITALIST  DISCHARGE SUMMARY     Samantha Ureña Patient Status:  Observation    3/28/1950 MRN [id number]   [facility] 3W/ Attending No att. providers found   Hosp Day # 0 PCP Salty Gardiner MD     Date of Admission:  medications      Instructions Prescription details   atorvastatin 20 MG Tabs  Commonly known as:  LIPITOR      TAKE ONE TABLET BY MOUTH ONE TIME DAILY at night   Quantity:  30 tablet  Refills:  11     levETIRAcetam 500 MG Tabs  Commonly known as:  KEPPRA

## 2019-10-03 NOTE — TELEPHONE ENCOUNTER
Noted patient did report to ER on 9/27/19. Prior to this, per 9/25/19 TT, Dr. James Garza states, \"I sent over refill for Zofran. Please ask her to call Dr. Gosia Snowden from GI who she recently saw as I do not know next step but maybe Dr. Gosia Snowden may have some ideas.   ( H

## 2019-10-09 NOTE — PROGRESS NOTES
Pt was last seen on 9/24/19. On 10/9/19, the patient self-discharged secondary to an ongoing medical issue that includes nausea effecting her ability to participate in physical therapy. Unable to perform an official discharge secondary to attendance.  Recom

## 2019-10-15 ENCOUNTER — APPOINTMENT (OUTPATIENT)
Dept: PHYSICAL THERAPY | Age: 69
End: 2019-10-15
Attending: ORTHOPAEDIC SURGERY
Payer: MEDICARE

## 2019-10-15 PROCEDURE — 88305 TISSUE EXAM BY PATHOLOGIST: CPT | Performed by: INTERNAL MEDICINE

## 2019-10-18 ENCOUNTER — TELEPHONE (OUTPATIENT)
Dept: INTERNAL MEDICINE CLINIC | Facility: CLINIC | Age: 69
End: 2019-10-18

## 2019-10-18 RX ORDER — ONDANSETRON 4 MG/1
4 TABLET, ORALLY DISINTEGRATING ORAL EVERY 8 HOURS PRN
Qty: 20 TABLET | Refills: 0 | Status: CANCELLED | OUTPATIENT
Start: 2019-10-18

## 2019-10-18 NOTE — TELEPHONE ENCOUNTER
Refill request for ondansetron 4 MG Oral Tablet Dispersible. Pt is hoping to get a higher dose than she has gotten before. Pt states she is having the same symptoms.      Symptoms: nausea, gagging, feeling of throwing up    [place] in [facility] call

## 2019-10-25 ENCOUNTER — OFFICE VISIT (OUTPATIENT)
Dept: INTERNAL MEDICINE CLINIC | Facility: CLINIC | Age: 69
End: 2019-10-25
Payer: MEDICARE

## 2019-10-25 VITALS
SYSTOLIC BLOOD PRESSURE: 150 MMHG | WEIGHT: 149 LBS | BODY MASS INDEX: 25 KG/M2 | OXYGEN SATURATION: 99 % | HEART RATE: 60 BPM | DIASTOLIC BLOOD PRESSURE: 70 MMHG

## 2019-10-25 DIAGNOSIS — Z87.898 HISTORY OF SEIZURES: ICD-10-CM

## 2019-10-25 DIAGNOSIS — R11.2 INTRACTABLE VOMITING WITH NAUSEA, UNSPECIFIED VOMITING TYPE: ICD-10-CM

## 2019-10-25 DIAGNOSIS — N18.30 STAGE 3 CHRONIC KIDNEY DISEASE (HCC): ICD-10-CM

## 2019-10-25 DIAGNOSIS — F03.90 DEMENTIA WITHOUT BEHAVIORAL DISTURBANCE, UNSPECIFIED DEMENTIA TYPE (HCC): ICD-10-CM

## 2019-10-25 DIAGNOSIS — R61 PERSPIRATION EXCESSIVE: ICD-10-CM

## 2019-10-25 DIAGNOSIS — N63.20 BREAST MASS, LEFT: ICD-10-CM

## 2019-10-25 DIAGNOSIS — R23.2 HOT FLASHES: Primary | ICD-10-CM

## 2019-10-25 PROCEDURE — G0463 HOSPITAL OUTPT CLINIC VISIT: HCPCS | Performed by: INTERNAL MEDICINE

## 2019-10-25 PROCEDURE — 99214 OFFICE O/P EST MOD 30 MIN: CPT | Performed by: INTERNAL MEDICINE

## 2019-10-25 NOTE — PROGRESS NOTES
HPI:   Shawn Louis is a 78year old female presents with the following problems. Patient is here because over the last 2 months she has been having hot flashes. She will get a hot flash.   She will feel warm all over her body and then have a breakou a watchman's device. He has a history of atrial fibrillation and now because of the watchman's device does not need to take any anticoagulation. An echocardiogram May 2019 showed ejection fraction 60%. No wall motion abnormalities.   Watchman device was parenchymal scar. Symmetric nephrograms. Moderate calcific atherosclerosis of aorta.     • Carpal tunnel syndrome 2011    as per NG   • Chronic atrial fibrillation    • Crohn disease ([address]    • Diverticulosis     as per NG   • Family history of colon canc POLYPECTOMY 57000 N/A 10/15/2019    Performed by Davey Burkitt, MD at [address]; as per NG   • KNEE ARTHROSCOPY Right 2013    as per NG   • KNEE SURGERY  1/2015    x3    • NASAL SCOPY,REMV TOTL ETHMOID  2006    \"Et Specimens:   A) - Duodenum, duodenal bxs                                                                         B) - Gastric biopsy, gastric bxs                                                                    C) - Esophagu measuring in aggregate 0.4 x 0.3 x 0.1 cm. The specimen is submitted in toto in cassette C.   (cp/jaf)         Interpretation Benign        Social History:   Social History    Tobacco Use      Smoking status: Former Smoker        Packs/day: 1.00        Yea postmenopausal.  She did have a thyroid test earlier this year. We may need to repeat that with other testing. We will get back to her. 3. Stage 3 chronic kidney disease (HCC)  Stable. 4. History of seizures  Stable. On therapy.      5. Intractable

## 2019-10-29 ENCOUNTER — ANCILLARY PROCEDURE (OUTPATIENT)
Dept: CARDIOLOGY | Age: 69
End: 2019-10-29
Attending: INTERNAL MEDICINE

## 2019-10-29 ENCOUNTER — TELEPHONE (OUTPATIENT)
Dept: CARDIOLOGY | Age: 69
End: 2019-10-29

## 2019-10-29 DIAGNOSIS — Z95.0 PACEMAKER: ICD-10-CM

## 2019-11-19 ENCOUNTER — HOSPITAL ENCOUNTER (OUTPATIENT)
Age: 69
Discharge: HOME | End: 2019-11-19
Payer: COMMERCIAL

## 2019-11-27 ENCOUNTER — TELEPHONE (OUTPATIENT)
Dept: INTERNAL MEDICINE CLINIC | Facility: CLINIC | Age: 69
End: 2019-11-27

## 2019-11-27 NOTE — TELEPHONE ENCOUNTER
JUANA to Dr. Hao Blanchard----    To Dr. Rodriguez Riggs----    Please review in MD absence. Patient has chronic nausea and was referred to GI, had EGD with pan gastritis charted. Pt inquiring if she can take 8mg of Zofran instead of prescribed 4mg?

## 2019-11-27 NOTE — TELEPHONE ENCOUNTER
Pt like to know if she can take 2 of Ondansetron 4mg since once is not helping. She is still gagging and threw up yesterday and today.

## 2019-12-02 RX ORDER — ONDANSETRON 4 MG/1
4 TABLET, ORALLY DISINTEGRATING ORAL EVERY 8 HOURS PRN
Qty: 20 TABLET | Refills: 0 | Status: CANCELLED | OUTPATIENT
Start: 2019-12-02

## 2019-12-02 NOTE — TELEPHONE ENCOUNTER
Spoke to Murray. She complains of urinary frequency. She states she is going every 10 minutes and only a small amount comes out. This is the second day of frequency. The urine is cloudy. No pain or burning with urination.   She does report lower abdominal

## 2019-12-03 ENCOUNTER — TELEPHONE (OUTPATIENT)
Dept: INTERVENTIONAL RADIOLOGY/VASCULAR | Facility: HOSPITAL | Age: 69
End: 2019-12-03

## 2019-12-03 NOTE — TELEPHONE ENCOUNTER
Message noted. I placed order for urine and urine culture. Please let her know that I will check with  her gastroenterologist about the safety of Zofran.   I do realize she gets nauseous and she has had an extensive evaluation without an obvious cau

## 2019-12-05 ENCOUNTER — TELEPHONE (OUTPATIENT)
Dept: INTERNAL MEDICINE CLINIC | Facility: CLINIC | Age: 69
End: 2019-12-05

## 2019-12-05 ENCOUNTER — APPOINTMENT (OUTPATIENT)
Dept: LAB | Age: 69
End: 2019-12-05
Attending: INTERNAL MEDICINE
Payer: MEDICARE

## 2019-12-05 DIAGNOSIS — R39.198 ABNORMAL URINATION: Primary | ICD-10-CM

## 2019-12-05 DIAGNOSIS — R39.198 ABNORMAL URINATION: ICD-10-CM

## 2019-12-05 PROCEDURE — 87186 SC STD MICRODIL/AGAR DIL: CPT

## 2019-12-05 PROCEDURE — 87086 URINE CULTURE/COLONY COUNT: CPT

## 2019-12-05 PROCEDURE — 87088 URINE BACTERIA CULTURE: CPT

## 2019-12-05 PROCEDURE — 81001 URINALYSIS AUTO W/SCOPE: CPT

## 2019-12-06 ENCOUNTER — TELEPHONE (OUTPATIENT)
Dept: INTERNAL MEDICINE CLINIC | Facility: CLINIC | Age: 69
End: 2019-12-06

## 2019-12-06 DIAGNOSIS — R82.90 ABNORMAL URINALYSIS: Primary | ICD-10-CM

## 2019-12-06 RX ORDER — ONDANSETRON 4 MG/1
4 TABLET, ORALLY DISINTEGRATING ORAL EVERY 8 HOURS PRN
Qty: 20 TABLET | Refills: 0 | Status: SHIPPED | OUTPATIENT
Start: 2019-12-06 | End: 2020-02-17

## 2019-12-06 RX ORDER — NITROFURANTOIN 25; 75 MG/1; MG/1
100 CAPSULE ORAL 2 TIMES DAILY
Qty: 10 CAPSULE | Refills: 0 | Status: SHIPPED | OUTPATIENT
Start: 2019-12-06 | End: 2019-12-11

## 2019-12-06 NOTE — TELEPHONE ENCOUNTER
Please ask Vianca Callahan to call 's office at [phone]. Please ask her to get in touch with  in regards to the question about Zofran. I am not quite sure what next steps would be for her chronic nausea.   I did leave a message for Dr. Angela Andrade December

## 2019-12-06 NOTE — TELEPHONE ENCOUNTER
Called and Relayed MD's message to patient---verbalized understanding. Contact info provided for Dr. Emil Theodore.

## 2019-12-06 NOTE — TELEPHONE ENCOUNTER
Please let patient know that her urine culture does show a preliminary result of having a bacteria called E. coli. I sent over Macrobid 100 mg.  1 p.o. twice daily x5 days to her pharmacy.

## 2019-12-06 NOTE — TELEPHONE ENCOUNTER
Please let patient know that her urinalysis does show some evidence of UTI but the culture is still being worked on and will not be available till Monday.   With that in mind if she does have urinary symptoms such as burning or frequency we can go ahead and

## 2019-12-06 NOTE — TELEPHONE ENCOUNTER
Dr Timo Mayers, patients called and relayed your message. Patient denies any urinary symptoms with burning, frequency, pain, or foul smelling urine. Pt states it was just that one day with some urine cloudiness that went away the next day.  Oral antibiotic was n

## 2019-12-06 NOTE — TELEPHONE ENCOUNTER
Refill request received for ondansetron. Please refer to 12/2/19 telephone encounter for reference. To Dr. Grayce Eisenmenger to please advise.

## 2019-12-06 NOTE — TELEPHONE ENCOUNTER
Patient calling asking if Dr. Patience Gomez spoke to Dr. Judge Walker regarding Zofran. Still having nausea daily. Today lying in bed with nausea.     Best number to contact patient at [phone]

## 2019-12-06 NOTE — TELEPHONE ENCOUNTER
I spoke with patient to let her know that Dr. Timo Mayers refilled her medication. She verbalized understanding.

## 2019-12-06 NOTE — TELEPHONE ENCOUNTER
Patient called and relayed Dr Taiwo Quiñones message. Patient verbalized understanding with no further questions noted. Patient will  oral antibiotic to get started on.

## 2019-12-06 NOTE — TELEPHONE ENCOUNTER
Routing comment      MD Lc Jennings MD; Leopoldo Cota [facility] Clinical Staff; Vesna Gonzalez, RN 5 minutes ago (1:47 PM)      Hi.   T  No evidence of organic GI pathology contributing to her chronic nausea at this time.  Currently being rx for UT

## 2019-12-10 NOTE — TELEPHONE ENCOUNTER
Please call patient and let her know that a put in an order for a urine and urine culture that she can get after she has completed her antibiotics to make sure the bacteria in her urine has resolved.

## 2019-12-13 ENCOUNTER — APPOINTMENT (OUTPATIENT)
Dept: LAB | Age: 69
End: 2019-12-13
Attending: INTERNAL MEDICINE
Payer: MEDICARE

## 2019-12-13 DIAGNOSIS — R82.90 ABNORMAL URINALYSIS: ICD-10-CM

## 2019-12-13 PROCEDURE — 81003 URINALYSIS AUTO W/O SCOPE: CPT

## 2019-12-13 PROCEDURE — 87086 URINE CULTURE/COLONY COUNT: CPT

## 2019-12-16 ENCOUNTER — TELEPHONE (OUTPATIENT)
Dept: INTERNAL MEDICINE CLINIC | Facility: CLINIC | Age: 69
End: 2019-12-16

## 2019-12-16 NOTE — TELEPHONE ENCOUNTER
Discussed with Talib Kilpatrick. No mention of any GI symptoms when urinalysis report given to patient.

## 2020-01-02 NOTE — Clinical Note
How Severe Is Your Skin Lesion?: moderate
JUANA, TCM appt 10/8/2020.  TCM template completed
Have Your Skin Lesions Been Treated?: not been treated
Is This A New Presentation, Or A Follow-Up?: Skin Lesions

## 2020-01-09 ENCOUNTER — APPOINTMENT (OUTPATIENT)
Dept: LAB | Age: 70
End: 2020-01-09
Attending: INTERNAL MEDICINE
Payer: MEDICARE

## 2020-01-09 PROCEDURE — 84120 ASSAY OF URINE PORPHYRINS: CPT | Performed by: INTERNAL MEDICINE

## 2020-01-09 PROCEDURE — 83497 ASSAY OF 5-HIAA: CPT | Performed by: INTERNAL MEDICINE

## 2020-01-27 LAB
ALBUMIN SERPL-MCNC: 3.9 G/DL
ALP SERPL-CCNC: 93 U/L
ALT SERPL-CCNC: 17 UNITS/L
AST SERPL-CCNC: 18 UNITS/L
BILIRUB SERPL-MCNC: 0.63 MG/DL
CALCIUM SERPL-MCNC: 8.9 MG/DL
CHLORIDE SERPL-SCNC: 107 MMOL/L
CO2 SERPL-SCNC: 29.3 MMOL/L
CREAT SERPL-MCNC: 1.1 MG/DL
GLUCOSE SERPL-MCNC: 94 MG/DL
HCT VFR BLD CALC: 41.4 %
HGB BLD-MCNC: 13.5 G/DL
POTASSIUM SERPL-SCNC: 4.3 MMOL/L
PROT SERPL-MCNC: 7.7 G/DL
RBC # BLD: 4.63 10*6/UL
SODIUM SERPL-SCNC: 145 MMOL/L
WBC # BLD: 5.53 K/MCL

## 2020-02-10 ENCOUNTER — TELEPHONE (OUTPATIENT)
Dept: CARDIOLOGY | Age: 70
End: 2020-02-10

## 2020-02-11 ENCOUNTER — APPOINTMENT (OUTPATIENT)
Dept: CARDIOLOGY | Age: 70
End: 2020-02-11
Attending: INTERNAL MEDICINE

## 2020-02-17 RX ORDER — ONDANSETRON 4 MG/1
4 TABLET, ORALLY DISINTEGRATING ORAL EVERY 8 HOURS PRN
Qty: 20 TABLET | Refills: 0 | Status: SHIPPED | OUTPATIENT
Start: 2020-02-17 | End: 2022-10-24

## 2020-02-28 ENCOUNTER — ANCILLARY PROCEDURE (OUTPATIENT)
Dept: CARDIOLOGY | Age: 70
End: 2020-02-28
Attending: INTERNAL MEDICINE

## 2020-02-28 ENCOUNTER — APPOINTMENT (OUTPATIENT)
Dept: CARDIOLOGY | Age: 70
End: 2020-02-28

## 2020-02-28 VITALS — BODY MASS INDEX: 24.13 KG/M2 | WEIGHT: 145 LBS

## 2020-02-28 DIAGNOSIS — Z95.0 CARDIAC PACEMAKER: ICD-10-CM

## 2020-02-28 PROCEDURE — 93280 PM DEVICE PROGR EVAL DUAL: CPT | Performed by: INTERNAL MEDICINE

## 2020-02-28 RX ORDER — LAMOTRIGINE 150 MG/1
1 TABLET ORAL 2 TIMES DAILY
COMMUNITY
Start: 2019-08-06

## 2020-02-28 RX ORDER — CITALOPRAM HYDROBROMIDE 10 MG/1
10 TABLET ORAL EVERY EVENING
COMMUNITY
Start: 2020-02-11

## 2020-03-10 ENCOUNTER — TELEPHONE (OUTPATIENT)
Dept: INTERNAL MEDICINE CLINIC | Facility: CLINIC | Age: 70
End: 2020-03-10

## 2020-03-10 NOTE — TELEPHONE ENCOUNTER
Patient is calling with problems with her stomach. Patient states she has had this problem for two years that Dr Parveen Ovalle knows about according to the patient.    Patient took a bite of a donut and doubled over in pain, patient is wondering if Dr Parveen Ovalle can o

## 2020-03-23 ENCOUNTER — TELEPHONE (OUTPATIENT)
Dept: INTERNAL MEDICINE CLINIC | Facility: CLINIC | Age: 70
End: 2020-03-23

## 2020-03-23 DIAGNOSIS — R19.8 GASTROINTESTINAL COMPLAINTS: ICD-10-CM

## 2020-03-23 DIAGNOSIS — R82.90 ABNORMAL URINALYSIS: Primary | ICD-10-CM

## 2020-03-24 NOTE — TELEPHONE ENCOUNTER
RX sent to [address] [facility] [place] with [employer] mail order.    Dr Mac Neither please advise on Referral? 1

## 2020-03-24 NOTE — TELEPHONE ENCOUNTER
See telephone message from yesterday. Patient was going to see a gastroenterologist at [facility] today. Yesterday discussed with her and both of us felt she did not need an appointment.

## 2020-04-21 ENCOUNTER — ANCILLARY PROCEDURE (OUTPATIENT)
Dept: CARDIOLOGY | Age: 70
End: 2020-04-21
Attending: INTERNAL MEDICINE

## 2020-04-21 DIAGNOSIS — Z95.0 CARDIAC PACEMAKER IN SITU: ICD-10-CM

## 2020-04-21 PROCEDURE — 93294 REM INTERROG EVL PM/LDLS PM: CPT | Performed by: INTERNAL MEDICINE

## 2020-04-28 ENCOUNTER — APPOINTMENT (OUTPATIENT)
Dept: CARDIOLOGY | Age: 70
End: 2020-04-28

## 2020-04-28 RX ORDER — ROPINIROLE 1 MG/1
TABLET, FILM COATED ORAL
Qty: 90 TABLET | Refills: 5 | Status: SHIPPED | OUTPATIENT
Start: 2020-04-28 | End: 2021-03-09

## 2020-04-28 NOTE — TELEPHONE ENCOUNTER
Due for OV in October    Refill request is for a maintenance medication and has met the criteria specified in the Ambulatory Medication Refill Standing Order for eligibility, visits, laboratory, alerts and was sent to the requested pharmacy.     Requested P

## 2020-06-05 RX ORDER — ATORVASTATIN CALCIUM 20 MG/1
TABLET, FILM COATED ORAL
Qty: 90 TABLET | Refills: 3 | Status: SHIPPED | OUTPATIENT
Start: 2020-06-05 | End: 2021-06-01

## 2020-07-13 ENCOUNTER — TELEPHONE (OUTPATIENT)
Dept: CARDIOLOGY | Age: 70
End: 2020-07-13

## 2020-07-15 ENCOUNTER — OFFICE VISIT (OUTPATIENT)
Dept: CARDIOLOGY | Age: 70
End: 2020-07-15

## 2020-07-15 VITALS
HEIGHT: 65 IN | BODY MASS INDEX: 24.66 KG/M2 | HEART RATE: 71 BPM | SYSTOLIC BLOOD PRESSURE: 136 MMHG | DIASTOLIC BLOOD PRESSURE: 82 MMHG | WEIGHT: 148 LBS | OXYGEN SATURATION: 97 %

## 2020-07-15 DIAGNOSIS — I49.5 SICK SINUS SYNDROME (CMD): Primary | ICD-10-CM

## 2020-07-15 DIAGNOSIS — I48.0 PAROXYSMAL ATRIAL FIBRILLATION (CMD): ICD-10-CM

## 2020-07-15 DIAGNOSIS — E78.00 PURE HYPERCHOLESTEROLEMIA: ICD-10-CM

## 2020-07-15 DIAGNOSIS — I10 HTN (HYPERTENSION), BENIGN: ICD-10-CM

## 2020-07-15 DIAGNOSIS — Z95.0 PACEMAKER: ICD-10-CM

## 2020-07-15 PROCEDURE — 99214 OFFICE O/P EST MOD 30 MIN: CPT | Performed by: INTERNAL MEDICINE

## 2020-07-15 RX ORDER — ASPIRIN 81 MG/1
81 TABLET, CHEWABLE ORAL DAILY
Qty: 30 TABLET | Refills: 11 | Status: SHIPPED | COMMUNITY
Start: 2020-07-15

## 2020-07-15 ASSESSMENT — PATIENT HEALTH QUESTIONNAIRE - PHQ9
SUM OF ALL RESPONSES TO PHQ9 QUESTIONS 1 AND 2: 0
2. FEELING DOWN, DEPRESSED OR HOPELESS: NOT AT ALL
CLINICAL INTERPRETATION OF PHQ2 SCORE: NO FURTHER SCREENING NEEDED
1. LITTLE INTEREST OR PLEASURE IN DOING THINGS: NOT AT ALL
CLINICAL INTERPRETATION OF PHQ9 SCORE: NO FURTHER SCREENING NEEDED
SUM OF ALL RESPONSES TO PHQ9 QUESTIONS 1 AND 2: 0

## 2020-07-17 ENCOUNTER — APPOINTMENT (OUTPATIENT)
Dept: CARDIOLOGY | Age: 70
End: 2020-07-17

## 2020-07-31 ENCOUNTER — ANCILLARY PROCEDURE (OUTPATIENT)
Dept: CARDIOLOGY | Age: 70
End: 2020-07-31
Attending: INTERNAL MEDICINE

## 2020-07-31 DIAGNOSIS — Z95.0 CARDIAC PACEMAKER: ICD-10-CM

## 2020-08-03 PROCEDURE — 93294 REM INTERROG EVL PM/LDLS PM: CPT | Performed by: INTERNAL MEDICINE

## 2020-08-03 RX ORDER — NADOLOL 20 MG/1
10 TABLET ORAL DAILY
Qty: 45 TABLET | Refills: 3 | Status: SHIPPED | OUTPATIENT
Start: 2020-08-03 | End: 2020-09-15

## 2020-08-14 NOTE — TELEPHONE ENCOUNTER
Called patient piper cailin she threw up this morning , had nothing to eat , so it was like bile. She also lost 10 pounds in 3 weeks. Still nauseated which has been going on for 3 weeks, denies fever .  Transferred to [employer] to schedule with available MD giles 5

## 2020-08-29 ENCOUNTER — TELEPHONE (OUTPATIENT)
Dept: INTERNAL MEDICINE CLINIC | Facility: CLINIC | Age: 70
End: 2020-08-29

## 2020-08-29 NOTE — TELEPHONE ENCOUNTER
Please let patient know her celiac test was negative ( good ). I see a Urine with reflex is ( in process ) but I do not see any results? Can you check with lab? Thank you.

## 2020-08-31 NOTE — TELEPHONE ENCOUNTER
Spoke with the lab. Specimen was sent to [facility] an needs to process for 3 days - will likely see result come through today. Left voicemail for patient relaying Dr. Skyler Doherty message per HIPAA and notifying that other lab work is still processing.      Radha Linda

## 2020-08-31 NOTE — TELEPHONE ENCOUNTER
Please let patient know that I did receive her urinalysis back and her urinalysis was clear. No signs of infection.

## 2020-09-04 ENCOUNTER — OFFICE VISIT (OUTPATIENT)
Dept: INTERNAL MEDICINE CLINIC | Facility: CLINIC | Age: 70
End: 2020-09-04
Payer: MEDICARE

## 2020-09-04 VITALS
WEIGHT: 155 LBS | BODY MASS INDEX: 25.83 KG/M2 | DIASTOLIC BLOOD PRESSURE: 86 MMHG | HEIGHT: 65 IN | HEART RATE: 64 BPM | TEMPERATURE: 97 F | SYSTOLIC BLOOD PRESSURE: 158 MMHG

## 2020-09-04 DIAGNOSIS — N18.30 STAGE 3 CHRONIC KIDNEY DISEASE (HCC): ICD-10-CM

## 2020-09-04 DIAGNOSIS — Z23 FLU VACCINE NEED: ICD-10-CM

## 2020-09-04 DIAGNOSIS — Z12.31 VISIT FOR SCREENING MAMMOGRAM: ICD-10-CM

## 2020-09-04 DIAGNOSIS — Z78.0 ASYMPTOMATIC POSTMENOPAUSAL STATE: ICD-10-CM

## 2020-09-04 DIAGNOSIS — I10 ESSENTIAL HYPERTENSION: ICD-10-CM

## 2020-09-04 DIAGNOSIS — Z00.00 MEDICARE ANNUAL WELLNESS VISIT, INITIAL: Primary | ICD-10-CM

## 2020-09-04 DIAGNOSIS — F03.90 DEMENTIA WITHOUT BEHAVIORAL DISTURBANCE, UNSPECIFIED DEMENTIA TYPE (HCC): ICD-10-CM

## 2020-09-04 PROCEDURE — G0439 PPPS, SUBSEQ VISIT: HCPCS | Performed by: INTERNAL MEDICINE

## 2020-09-04 PROCEDURE — 99214 OFFICE O/P EST MOD 30 MIN: CPT | Performed by: INTERNAL MEDICINE

## 2020-09-04 PROCEDURE — G0463 HOSPITAL OUTPT CLINIC VISIT: HCPCS | Performed by: INTERNAL MEDICINE

## 2020-09-04 NOTE — PROGRESS NOTES
HPI:   Mati Murillo is a 69year old female presents with the following problems. Patient is here for Medicare annual wellness visit    She does have a diagnosis of dementia. She has a diagnosis of Alzheimer's dementia.   However she is wondering if encourage her to get her seasonal flu vaccine  Wt Readings from Last 3 Encounters:  09/04/20 : 155 lb (70.3 kg)  08/27/20 : 153 lb (69.4 kg)  01/06/20 : 147 lb (66.7 kg)    Body mass index is 25.79 kg/m².      Current Outpatient Medications   Medication Sig atherosclerosis of aorta.     • Carpal tunnel syndrome 2011    as per NG   • Chronic atrial fibrillation (HCC)    • Crohn disease ([facility] [address]    • Diverticulosis     as per NG   • Family history of colon cancer     Maternal grandmother had colon cancer in her 76s MD CARLY at [address] [place] [address]; as per NG   • KNEE ARTHROSCOPY Right 2013    as per NG   • KNEE SURGERY  1/2015    x3    • NASAL SCOPY,REMV TOTL ETHMOID  2006    \"Ethmoidectomy, max antrostomies\"; as per NG   • OTHER  Iliana U/mL      Social History:   Social History    Tobacco Use      Smoking status: Former Smoker        Packs/day: 1.00        Years: 40.00        Pack years: 40        Types: Cigarettes        Quit date: 1998        Years since quittin.6      Smokele poor?: No    Type of Diet: Balanced    How does the patient maintain a good energy level?: Other    How would you describe your daily physical activity?: Moderate    How would you describe your current health state?: Fair    How do you maintain positive me for AWV/SWV)      Hearing Screening    Screening Method:  Questionnaire  I have a problem hearing over the telephone:  No I have trouble following the conversations when two or more people are talking at the same time:  No   I have trouble understanding th HbgA1C   Annually HgbA1C (%)   Date Value   05/03/2019 5.4    No flowsheet data found. Fasting Blood Sugar (FSB)Annually Glucose (mg/dL)   Date Value   01/27/2020 94    No flowsheet data found.    Cardiovascular Disease Screening     LDL Annually LDL Cho previous visit.  Update Immunization Activity if applicable     SPECIFIC DISEASE MONITORING Internal Lab or Procedure External Lab or Procedure   Annual Monitoring of Persistent     Medications (ACE/ARB, digoxin, diuretics)    Potassium  Annually Potassium entered into electronic health record includes paroxysmal atrial fibrillation, family history of colon cancer, chronic kidney disease, Alzheimer's disease, arthritis, gouty, elevated LFTs, AVM of the colon, altered mental status, acute cystitis without hem

## 2020-09-15 ENCOUNTER — TELEPHONE (OUTPATIENT)
Dept: INTERNAL MEDICINE CLINIC | Facility: CLINIC | Age: 70
End: 2020-09-15

## 2020-09-15 ENCOUNTER — NURSE ONLY (OUTPATIENT)
Dept: INTERNAL MEDICINE CLINIC | Facility: CLINIC | Age: 70
End: 2020-09-15
Payer: MEDICARE

## 2020-09-15 ENCOUNTER — LAB ENCOUNTER (OUTPATIENT)
Dept: LAB | Age: 70
End: 2020-09-15
Attending: INTERNAL MEDICINE
Payer: MEDICARE

## 2020-09-15 VITALS — SYSTOLIC BLOOD PRESSURE: 156 MMHG | HEART RATE: 62 BPM | DIASTOLIC BLOOD PRESSURE: 82 MMHG

## 2020-09-15 DIAGNOSIS — M79.671 RIGHT FOOT PAIN: ICD-10-CM

## 2020-09-15 DIAGNOSIS — I10 ESSENTIAL HYPERTENSION: Primary | ICD-10-CM

## 2020-09-15 DIAGNOSIS — N18.30 STAGE 3 CHRONIC KIDNEY DISEASE (HCC): ICD-10-CM

## 2020-09-15 DIAGNOSIS — I10 ESSENTIAL HYPERTENSION: ICD-10-CM

## 2020-09-15 LAB
ALBUMIN SERPL-MCNC: 4 G/DL (ref 3.4–5)
ALP LIVER SERPL-CCNC: 73 U/L (ref 55–142)
ALT SERPL-CCNC: 19 U/L (ref 13–56)
ANION GAP SERPL CALC-SCNC: 8 MMOL/L (ref 0–18)
AST SERPL-CCNC: 21 U/L (ref 15–37)
BILIRUB DIRECT SERPL-MCNC: 0.2 MG/DL (ref 0–0.2)
BILIRUB SERPL-MCNC: 0.8 MG/DL (ref 0.1–2)
BUN BLD-MCNC: 23 MG/DL (ref 7–18)
BUN/CREAT SERPL: 16.8 (ref 10–20)
CALCIUM BLD-MCNC: 9.3 MG/DL (ref 8.5–10.1)
CHLORIDE SERPL-SCNC: 107 MMOL/L (ref 98–112)
CHOLEST SMN-MCNC: 173 MG/DL (ref ?–200)
CO2 SERPL-SCNC: 25 MMOL/L (ref 21–32)
CREAT BLD-MCNC: 1.37 MG/DL (ref 0.55–1.02)
GLUCOSE BLD-MCNC: 109 MG/DL (ref 70–99)
HDLC SERPL-MCNC: 54 MG/DL (ref 40–59)
LDLC SERPL CALC-MCNC: 100 MG/DL (ref ?–100)
M PROTEIN MFR SERPL ELPH: 8 G/DL (ref 6.4–8.2)
NONHDLC SERPL-MCNC: 119 MG/DL (ref ?–130)
OSMOLALITY SERPL CALC.SUM OF ELEC: 294 MOSM/KG (ref 275–295)
PATIENT FASTING Y/N/NP: YES
PATIENT FASTING Y/N/NP: YES
POTASSIUM SERPL-SCNC: 4.3 MMOL/L (ref 3.5–5.1)
SODIUM SERPL-SCNC: 140 MMOL/L (ref 136–145)
TRIGL SERPL-MCNC: 97 MG/DL (ref 30–149)
URATE SERPL-MCNC: 5 MG/DL (ref 2.6–6)
VLDLC SERPL CALC-MCNC: 19 MG/DL (ref 0–30)

## 2020-09-15 PROCEDURE — 36415 COLL VENOUS BLD VENIPUNCTURE: CPT

## 2020-09-15 PROCEDURE — 80076 HEPATIC FUNCTION PANEL: CPT

## 2020-09-15 PROCEDURE — 84550 ASSAY OF BLOOD/URIC ACID: CPT

## 2020-09-15 PROCEDURE — G0463 HOSPITAL OUTPT CLINIC VISIT: HCPCS

## 2020-09-15 PROCEDURE — 80048 BASIC METABOLIC PNL TOTAL CA: CPT

## 2020-09-15 PROCEDURE — 80061 LIPID PANEL: CPT

## 2020-09-15 RX ORDER — NADOLOL 20 MG/1
20 TABLET ORAL NIGHTLY
Qty: 1 TABLET | Refills: 0 | COMMUNITY
Start: 2020-09-15 | End: 2021-07-16

## 2020-09-15 NOTE — TELEPHONE ENCOUNTER
Please let patient know that her recent labs showed that her kidney function has gotten a little bit weaker. In the past I had wanted her to see either  or Dr. Funmilayo Hall from nephrology. I think it would be a good idea for her to do this.   I think s

## 2020-09-15 NOTE — PROGRESS NOTES
Caron Toth is a 69year old female who has an elevated blood pressure reading at visit today. She does check home blood pressures and likes her machine but did not bring it.   She denies chest pain, dyspnea, edema and headaches and c/o light headed a

## 2020-09-16 NOTE — TELEPHONE ENCOUNTER
Pt notified per DR FLEMING  Recent labs show kidney function has gotten a little weaker   DR FLEMING would like pt to see DR Mancuso/ Silvio Mohr for eval   Avoid Nsaids   Telephone numbers given -pt will let DR FLEMING know when she makes appt

## 2020-09-18 ENCOUNTER — OFFICE VISIT (OUTPATIENT)
Dept: INTERNAL MEDICINE CLINIC | Facility: CLINIC | Age: 70
End: 2020-09-18
Payer: MEDICARE

## 2020-09-18 VITALS
TEMPERATURE: 98 F | OXYGEN SATURATION: 97 % | HEART RATE: 66 BPM | DIASTOLIC BLOOD PRESSURE: 70 MMHG | WEIGHT: 153 LBS | HEIGHT: 65 IN | BODY MASS INDEX: 25.49 KG/M2 | SYSTOLIC BLOOD PRESSURE: 152 MMHG

## 2020-09-18 DIAGNOSIS — R42 LIGHTHEADEDNESS: ICD-10-CM

## 2020-09-18 DIAGNOSIS — N18.30 STAGE 3 CHRONIC KIDNEY DISEASE (HCC): ICD-10-CM

## 2020-09-18 DIAGNOSIS — F03.90 DEMENTIA WITHOUT BEHAVIORAL DISTURBANCE, UNSPECIFIED DEMENTIA TYPE (HCC): ICD-10-CM

## 2020-09-18 DIAGNOSIS — I10 ESSENTIAL HYPERTENSION: Primary | ICD-10-CM

## 2020-09-18 DIAGNOSIS — R10.13 ABDOMINAL DISCOMFORT, EPIGASTRIC: ICD-10-CM

## 2020-09-18 DIAGNOSIS — Z23 FLU VACCINE NEED: ICD-10-CM

## 2020-09-18 PROCEDURE — G0008 ADMIN INFLUENZA VIRUS VAC: HCPCS | Performed by: INTERNAL MEDICINE

## 2020-09-18 PROCEDURE — G0463 HOSPITAL OUTPT CLINIC VISIT: HCPCS | Performed by: INTERNAL MEDICINE

## 2020-09-18 PROCEDURE — 90662 IIV NO PRSV INCREASED AG IM: CPT | Performed by: INTERNAL MEDICINE

## 2020-09-18 PROCEDURE — 99214 OFFICE O/P EST MOD 30 MIN: CPT | Performed by: INTERNAL MEDICINE

## 2020-09-18 RX ORDER — OMEPRAZOLE 20 MG/1
20 TABLET, DELAYED RELEASE ORAL DAILY
Qty: 30 TABLET | Refills: 11 | Status: SHIPPED | OUTPATIENT
Start: 2020-09-18 | End: 2020-09-20

## 2020-09-18 RX ORDER — CITALOPRAM 10 MG/1
10 TABLET ORAL DAILY
COMMUNITY
End: 2020-09-18

## 2020-09-18 RX ORDER — ESCITALOPRAM OXALATE 5 MG/1
5 TABLET ORAL NIGHTLY
COMMUNITY
End: 2020-12-15

## 2020-09-18 NOTE — PROGRESS NOTES
Royce Alberts is a 69year old female. HPI:   Patient presents with:  Checkup     Patient is here for follow-up for recheck on blood pressure. On September 15 she had a follow-up for her blood pressure with Jonny Shone. D.   At that time her blood press Tab Take 10 mg by mouth daily. • escitalopram 5 MG Oral Tab Take 5 mg by mouth daily. • Omeprazole Magnesium (PRILOSEC OTC) 20 MG Oral Tab EC Take 1 tablet (20 mg total) by mouth daily.  30 tablet 11   • nadolol 20 MG Oral Tab Take 1 tablet (20 mg t Family history of colon cancer     Maternal grandmother had colon cancer in her 76s.    • Gout    • Hepatic steatosis 3/11/2016   • High blood pressure    • High cholesterol     as per NG   • History of colonic polyps 07-    as per NG   • History of Pharynx normal without exudate. EYES:  PERRL. Sclera anicteric. NECK:  Supple,  no adenopathy,   LUNGS:  clear to auscultation. Effort normal  CARDIO:  RRR without murmur.    S1 and S2 normal  GI:  good BS's,  no masses,   HSM or tenderness  EXTREMITIES

## 2020-09-18 NOTE — TELEPHONE ENCOUNTER
[facility] pharmacy calling regarding new prescription for Prilosec. Insurance will cover capsules not tablets. Asking if could change to capsules.     Please call [facility] at  [phone]

## 2020-09-20 RX ORDER — OMEPRAZOLE 20 MG/1
20 CAPSULE, DELAYED RELEASE ORAL
Qty: 30 CAPSULE | Refills: 11 | Status: ON HOLD | OUTPATIENT
Start: 2020-09-20 | End: 2020-09-29

## 2020-09-29 ENCOUNTER — APPOINTMENT (OUTPATIENT)
Dept: GENERAL RADIOLOGY | Facility: HOSPITAL | Age: 70
End: 2020-09-29
Attending: EMERGENCY MEDICINE
Payer: MEDICARE

## 2020-09-29 ENCOUNTER — TELEPHONE (OUTPATIENT)
Dept: INTERNAL MEDICINE CLINIC | Facility: CLINIC | Age: 70
End: 2020-09-29

## 2020-09-29 ENCOUNTER — HOSPITAL ENCOUNTER (OUTPATIENT)
Facility: HOSPITAL | Age: 70
Setting detail: OBSERVATION
Discharge: HOME | End: 2020-09-30
Attending: EMERGENCY MEDICINE | Admitting: HOSPITALIST
Payer: MEDICARE

## 2020-09-29 DIAGNOSIS — R07.9 CHEST PAIN OF UNCERTAIN ETIOLOGY: Primary | ICD-10-CM

## 2020-09-29 LAB
BUN SERPL-MCNC: 19 MG/DL
BUN/CREAT SERPL: 15.6
CHLORIDE SERPL-SCNC: 109 MMOL/L
CO2 SERPL-SCNC: 31 MMOL/L
CREAT SERPL-MCNC: 1.22 MG/DL
GLUCOSE SERPL-MCNC: 95 MG/DL
HCT VFR BLD CALC: 36.1 %
HGB BLD-MCNC: 12.1 G/DL
PLATELET # BLD: 176 K/MCL
POTASSIUM SERPL-SCNC: 4 MMOL/L
RBC # BLD: 4.03 10*6/UL
SODIUM SERPL-SCNC: 142 MMOL/L
WBC # BLD: 5.1 K/MCL

## 2020-09-29 PROCEDURE — 99214 OFFICE O/P EST MOD 30 MIN: CPT | Performed by: INTERNAL MEDICINE

## 2020-09-29 PROCEDURE — 71045 X-RAY EXAM CHEST 1 VIEW: CPT | Performed by: EMERGENCY MEDICINE

## 2020-09-29 PROCEDURE — 99219 INITIAL OBSERVATION CARE,LEVL II: CPT | Performed by: HOSPITALIST

## 2020-09-29 RX ORDER — DONEPEZIL HYDROCHLORIDE 10 MG/1
10 TABLET, FILM COATED ORAL NIGHTLY
Status: DISCONTINUED | OUTPATIENT
Start: 2020-09-29 | End: 2020-09-30

## 2020-09-29 RX ORDER — ACETAMINOPHEN 325 MG/1
650 TABLET ORAL EVERY 6 HOURS PRN
Status: DISCONTINUED | OUTPATIENT
Start: 2020-09-29 | End: 2020-09-30

## 2020-09-29 RX ORDER — SODIUM CHLORIDE 0.9 % (FLUSH) 0.9 %
3 SYRINGE (ML) INJECTION AS NEEDED
Status: DISCONTINUED | OUTPATIENT
Start: 2020-09-29 | End: 2020-09-30

## 2020-09-29 RX ORDER — ONDANSETRON 2 MG/ML
4 INJECTION INTRAMUSCULAR; INTRAVENOUS EVERY 6 HOURS PRN
Status: DISCONTINUED | OUTPATIENT
Start: 2020-09-29 | End: 2020-09-30

## 2020-09-29 RX ORDER — LAMOTRIGINE 150 MG/1
150 TABLET ORAL 2 TIMES DAILY
Status: DISCONTINUED | OUTPATIENT
Start: 2020-09-29 | End: 2020-09-30

## 2020-09-29 RX ORDER — ROPINIROLE 1 MG/1
1 TABLET, FILM COATED ORAL 3 TIMES DAILY
Status: DISCONTINUED | OUTPATIENT
Start: 2020-09-29 | End: 2020-09-30

## 2020-09-29 RX ORDER — ALLOPURINOL 100 MG/1
100 TABLET ORAL 2 TIMES DAILY
Status: DISCONTINUED | OUTPATIENT
Start: 2020-09-29 | End: 2020-09-30

## 2020-09-29 RX ORDER — TEMAZEPAM 7.5 MG/1
7.5 CAPSULE ORAL NIGHTLY PRN
Status: DISCONTINUED | OUTPATIENT
Start: 2020-09-29 | End: 2020-09-30

## 2020-09-29 RX ORDER — NITROGLYCERIN 0.4 MG/1
0.4 TABLET SUBLINGUAL ONCE
Status: DISCONTINUED | OUTPATIENT
Start: 2020-09-29 | End: 2020-09-29

## 2020-09-29 RX ORDER — ATORVASTATIN CALCIUM 20 MG/1
20 TABLET, FILM COATED ORAL NIGHTLY
Status: DISCONTINUED | OUTPATIENT
Start: 2020-09-29 | End: 2020-09-30

## 2020-09-29 RX ORDER — MAGNESIUM HYDROXIDE/ALUMINUM HYDROXICE/SIMETHICONE 120; 1200; 1200 MG/30ML; MG/30ML; MG/30ML
30 SUSPENSION ORAL 4 TIMES DAILY PRN
Status: DISCONTINUED | OUTPATIENT
Start: 2020-09-29 | End: 2020-09-30

## 2020-09-29 RX ORDER — METOCLOPRAMIDE HYDROCHLORIDE 5 MG/ML
10 INJECTION INTRAMUSCULAR; INTRAVENOUS EVERY 8 HOURS PRN
Status: DISCONTINUED | OUTPATIENT
Start: 2020-09-29 | End: 2020-09-30

## 2020-09-29 RX ORDER — ROPINIROLE 1 MG/1
1 TABLET, FILM COATED ORAL ONCE
Status: COMPLETED | OUTPATIENT
Start: 2020-09-29 | End: 2020-09-29

## 2020-09-29 RX ORDER — ESCITALOPRAM OXALATE 5 MG/1
5 TABLET ORAL NIGHTLY
Status: DISCONTINUED | OUTPATIENT
Start: 2020-09-29 | End: 2020-09-30

## 2020-09-29 RX ORDER — COLCHICINE 0.6 MG/1
0.6 TABLET ORAL DAILY PRN
Status: ON HOLD | COMMUNITY
End: 2020-09-30

## 2020-09-29 RX ORDER — ASPIRIN 81 MG/1
324 TABLET, CHEWABLE ORAL ONCE
Status: COMPLETED | OUTPATIENT
Start: 2020-09-29 | End: 2020-09-29

## 2020-09-29 NOTE — ED NOTES
Assumed care to this pt who came in ambulatory with steady gait to room 13 from triage for complaints of intermittent mid chest pain that started Thursday after pulling some weeds. Pt is A/O x 4, breathing is non labored.   Pt changed to hospital gown, att

## 2020-09-29 NOTE — H&P
[facility]    PATIENT'S NAME: Jordyn Rowley   ATTENDING PHYSICIAN: Alden Montano MD   PATIENT ACCOUNT#:   [id number]    LOCATION:  [place] [address]  MEDICAL RECORD #:   [id number]       YOB: 1950  ADMISSION DATE:       09/29/2 unpredictable with no correlation to physical activity or oral intake. Does not get worse when she lies down at night. Other 12-point review of systems is negative. PHYSICAL EXAMINATION:    GENERAL:  Alert. Oriented to time, place, and person.   No

## 2020-09-29 NOTE — CONSULTS
[facility]    Cardiology Consultation  [facility]    Anne Tucker Patient Status:  Emergency    3/28/1950 MRN [id number]   Location [address] Attending Uli Barnes MD   [facility] steatosis 3/11/2016   • High blood pressure    • High cholesterol     as per NG   • History of colonic polyps 07-    as per NG   • History of migraine headaches     as per NG   • History of total hysterectomy with removal of both tubes and ovaries or July 2015    removal of pins and plate from right wrist (fracture)   • OTHER SURGICAL HISTORY Left     L knee scoped--3 torn meniscus--biocartilage done   • OTHER SURGICAL HISTORY Right 8/2016    R knee scoped for torn miniscus   • RECONSTR NOSE+JEAN SEP Systems:   See above and below  Review of Systems   Respiratory: Positive for shortness of breath. Cardiovascular: Positive for chest pain. Gastrointestinal: Positive for nausea. Musculoskeletal: Positive for joint pain and myalgias.    Neurological: 4.0 09/15/2020    ALKPHO 73 09/15/2020    TP 8.0 09/15/2020    AST 21 09/15/2020    ALT 19 09/15/2020    PTT 32.8 03/16/2018    INR 1.01 05/03/2019    PTP 13.1 05/03/2019    T4F 1.07 07/09/2018    TSH 1.270 04/05/2019     01/27/2020    DDIMER 0.45 0

## 2020-09-29 NOTE — TELEPHONE ENCOUNTER
Patient is calling on Thursday she was pulling weeds and she had a terrible chest pain, tightening around her chest especially in the middle favoring the left side,  She stood up and it got lighter in about 2 or more minutes  On Sunday & Monday she still experienced the pain along with the tightening in the chest but not as severe    Today her chest feels a little achy in the middle    Call given to nursing

## 2020-09-29 NOTE — TELEPHONE ENCOUNTER
To Dr. Rosanne Poon - see below. Advised ER for evaluation, pt verbalized understanding.   Nursing to f/u in AM.

## 2020-09-29 NOTE — ED NOTES
Orders for admission, patient is aware of plan and ready to go upstairs. Any questions, please call ED WILLIAM Granados  at extension [phone].    Type of COVID test sent:  COVID Suspicion level: Low    Titratable drug(s) infusing:NONE  Rate:    LOC at time of transpo

## 2020-09-29 NOTE — ED PROVIDER NOTES
Patient Seen in: [facility] AND [facility] Emergency Department      History   Patient presents with:  Chest Pain Angina    Stated Complaint: chest pain    HPI  Patient is a 34-year-old female history of hypertension, hyperlipidemia, pacemaker, atrial fibrillati History of total hysterectomy with removal of both tubes and ovaries     as per NG   • Incontinence     DUE TO CYSTOCELE/RECTOCELE   • Lymphocytic colitis 2011    as per NG   • Migraine 9/21/2012   • Migraines    • Mild cognitive impairment     as per NG scoped for torn miniscus   • RECONSTR NOSE+JEAN SEPTAL REPAIR  2006    as per NG   • TOTAL KNEE REPLACEMENT     • WATCHMAN N/A 11/9/2018    Performed by Juwan Tolentino MD at [facility]      Family History   Problem Relation Age of Onset   • Hypertension Father nursing note reviewed. Constitutional:       General: She is not in acute distress. Appearance: Normal appearance. She is not toxic-appearing. HENT:      Head: Normocephalic.       Mouth/Throat:      Mouth: Mucous membranes are moist.   Eyes:      E Please view results for these tests on the individual orders.    RAINBOW DRAW BLUE   RAINBOW DRAW LAVENDER   RAINBOW DRAW LIGHT GREEN   RAINBOW DRAW GOLD   SARS-COV-2 BY PCR ()   CBC W/ DIFFERENTIAL     EKG    Rate, intervals and axes as noted on E fibrillation, CKD, seizure disorder presenting from home for chest pain. Arrived in no acute distress. Vital signs with hypertension otherwise normal.  Exam with comfortable appearing female, regular rate and rhythm, lungs clear bilaterally, no edema.

## 2020-09-30 ENCOUNTER — APPOINTMENT (OUTPATIENT)
Dept: NUCLEAR MEDICINE | Facility: HOSPITAL | Age: 70
End: 2020-09-30
Attending: INTERNAL MEDICINE
Payer: MEDICARE

## 2020-09-30 ENCOUNTER — APPOINTMENT (OUTPATIENT)
Dept: CV DIAGNOSTICS | Facility: HOSPITAL | Age: 70
End: 2020-09-30
Attending: INTERNAL MEDICINE
Payer: MEDICARE

## 2020-09-30 VITALS
WEIGHT: 148 LBS | OXYGEN SATURATION: 97 % | HEART RATE: 61 BPM | HEIGHT: 65 IN | DIASTOLIC BLOOD PRESSURE: 94 MMHG | SYSTOLIC BLOOD PRESSURE: 154 MMHG | TEMPERATURE: 98 F | RESPIRATION RATE: 16 BRPM | BODY MASS INDEX: 24.66 KG/M2

## 2020-09-30 PROCEDURE — 93017 CV STRESS TEST TRACING ONLY: CPT | Performed by: INTERNAL MEDICINE

## 2020-09-30 PROCEDURE — 78452 HT MUSCLE IMAGE SPECT MULT: CPT | Performed by: INTERNAL MEDICINE

## 2020-09-30 PROCEDURE — 99217 OBSERVATION CARE DISCHARGE: CPT | Performed by: HOSPITALIST

## 2020-09-30 PROCEDURE — 99214 OFFICE O/P EST MOD 30 MIN: CPT | Performed by: INTERNAL MEDICINE

## 2020-09-30 RX ORDER — METOCLOPRAMIDE HYDROCHLORIDE 5 MG/ML
5 INJECTION INTRAMUSCULAR; INTRAVENOUS EVERY 8 HOURS PRN
Status: DISCONTINUED | OUTPATIENT
Start: 2020-09-30 | End: 2020-09-30

## 2020-09-30 NOTE — PROGRESS NOTES
[facility]    Progress Note    Sola Medin Patient Status:  Observation    3/28/1950 MRN [id number]   Location [facility] 3W/SW Attending Michael Dawn MD   Hosp Day # 0 PCP Murphy Rodríguez MD        Subjective:     Constitu meds.  Follow-up w/ Dr. Carleen Wan in 4-6 weeks.         Results:     Lab Results   Component Value Date    WBC 5.1 09/29/2020    HGB 12.1 09/29/2020    HCT 36.1 09/29/2020    .0 09/29/2020    CREATSERUM 1.22 (H) 09/29/2020    BUN 19 (H) 09/29/2020    NA

## 2020-09-30 NOTE — DISCHARGE SUMMARY
[facility]    Discharge Summary    Genie Joel Patient Status:  Observation    3/28/1950 MRN [id number]   Location [facility] 3W/SW Attending Sharon Wasserman MD   Hosp Day # 0 PCP Bibi Early MD     Date of Admission:  Valente Flood MD Consulting Physician  Interventional, Cardiology               Discharge Plan:   Discharge Condition: Stable    Current Discharge Medication List    Home Meds - Unchanged    escitalopram 5 MG Oral Tab  Take 5 mg by mouth nig Caps  Commonly known as: BENTYL      Take 1 capsule (10 mg total) by mouth 3 (three) times daily as needed. Quantity: 60 capsule  Refills: 3     Donepezil HCl 10 MG Tabs  Commonly known as: ARICEPT      Take 10 mg by mouth nightly.    Refills: 0     escit

## 2020-09-30 NOTE — PROGRESS NOTES
[facility] Pharmacy Note:  Therapeutic Interchange    Nadiya Felix was previously taking nadolol 20 mg PO q24h at home prior to admission.      Per therapeutic interchange, the patient will be switched to metoprolol tartrate 25 mg PO q12h during hospitalization

## 2020-09-30 NOTE — PROGRESS NOTES
[facility] Pharmacy Note:  Renal Dose Adjustment for Metoclopramide (REGLAN)    Richy Perera has been prescribed Metoclopramide (REGLAN) 10 mg every 8 hours as needed for n/v.    Estimated Creatinine Clearance: 38.1 mL/min (A) (based on SCr of 1.22 mg/dL (H)).

## 2020-09-30 NOTE — TELEPHONE ENCOUNTER
TERRY Triage RN following up as pt was directed to go to ER yesterday--- pt has been admitted to telemetry floor. No RN triage phone call needed.  Nothing further, JUANA Alexandre.

## 2020-09-30 NOTE — RESPIRATORY THERAPY NOTE
JENNIFER ASSESSMENT:    Pt does have a previous diagnosis of JENNIFER. Pt does not routinely use a CPAP device at home. CPAP INITIATION:    Pt to be placed on CPAP: no  Pt refused: yes

## 2020-09-30 NOTE — PLAN OF CARE
Patient was provided with discharge instructions, education, and follow up information. Patient was instructed to stop taking colchicine due to kidney function.  Patient verbalizes understanding of follow up information, specifically next dose of medication

## 2020-10-01 ENCOUNTER — PATIENT OUTREACH (OUTPATIENT)
Dept: CASE MANAGEMENT | Age: 70
End: 2020-10-01

## 2020-10-01 DIAGNOSIS — Z02.9 ENCOUNTERS FOR ADMINISTRATIVE PURPOSE: ICD-10-CM

## 2020-10-01 PROCEDURE — 1111F DSCHRG MED/CURRENT MED MERGE: CPT

## 2020-10-01 NOTE — PROGRESS NOTES
Initial Post Discharge Follow Up   Discharge Date: 9/30/20  Contact Date: 10/1/2020    Consent Verification:  Assessment Completed With: Patient  HIPAA Verified?   Yes    Discharge Dx:   Chest pain of uncertain etiology        General:   • How have you b needed for Nausea. 20 tablet 0   • Dicyclomine HCl 10 MG Oral Cap Take 1 capsule (10 mg total) by mouth 3 (three) times daily as needed.  60 capsule 3   • Acetaminophen-Codeine #3 300-30 MG Oral Tab Take 1 tablet by mouth every 6 (six) hours as needed for P Doylene Chyle will need to be removed prior to the scan. If you have the report from a prior DEXA scan performed elsewhere, please bring the report with you to your appointment.        Oct 07, 2020  8:20 AM CDT SCREENING MAMMOGRAM with JEMMAO KORTNEY RM1 [facility] Jojo Wagner)    Please arrive 15 minutes prior to your appointment.     Same-day results are only available Monday through Friday when the appointment is completed by 2:00 pm AND you bring all prior mammography films to the appointment AND you are si [facility], [place], [facility]  [address]  [address] [place] [address]  [phone]          PCP TCM/HFU appointment: scheduled at D/C within 7-14 days  yes , appt was scheduled prior to [facility]

## 2020-10-07 ENCOUNTER — HOSPITAL ENCOUNTER (OUTPATIENT)
Dept: MAMMOGRAPHY | Age: 70
Discharge: HOME | End: 2020-10-07
Attending: INTERNAL MEDICINE
Payer: MEDICARE

## 2020-10-07 ENCOUNTER — HOSPITAL ENCOUNTER (OUTPATIENT)
Dept: BONE DENSITY | Age: 70
Discharge: HOME | End: 2020-10-07
Attending: INTERNAL MEDICINE
Payer: MEDICARE

## 2020-10-07 ENCOUNTER — TELEPHONE (OUTPATIENT)
Dept: INTERNAL MEDICINE CLINIC | Facility: CLINIC | Age: 70
End: 2020-10-07

## 2020-10-07 DIAGNOSIS — Z12.31 VISIT FOR SCREENING MAMMOGRAM: ICD-10-CM

## 2020-10-07 DIAGNOSIS — Z78.0 ASYMPTOMATIC POSTMENOPAUSAL STATE: ICD-10-CM

## 2020-10-07 PROCEDURE — 77067 SCR MAMMO BI INCL CAD: CPT | Performed by: INTERNAL MEDICINE

## 2020-10-07 PROCEDURE — 77063 BREAST TOMOSYNTHESIS BI: CPT | Performed by: INTERNAL MEDICINE

## 2020-10-07 PROCEDURE — 77080 DXA BONE DENSITY AXIAL: CPT | Performed by: INTERNAL MEDICINE

## 2020-10-08 ENCOUNTER — OFFICE VISIT (OUTPATIENT)
Dept: INTERNAL MEDICINE CLINIC | Facility: CLINIC | Age: 70
End: 2020-10-08
Payer: MEDICARE

## 2020-10-08 ENCOUNTER — LAB ENCOUNTER (OUTPATIENT)
Dept: LAB | Age: 70
End: 2020-10-08
Attending: INTERNAL MEDICINE
Payer: MEDICARE

## 2020-10-08 VITALS
DIASTOLIC BLOOD PRESSURE: 78 MMHG | HEIGHT: 64 IN | OXYGEN SATURATION: 98 % | SYSTOLIC BLOOD PRESSURE: 126 MMHG | BODY MASS INDEX: 25.7 KG/M2 | HEART RATE: 62 BPM | TEMPERATURE: 97 F | WEIGHT: 150.5 LBS

## 2020-10-08 DIAGNOSIS — G30.9 ALZHEIMER'S DEMENTIA WITHOUT BEHAVIORAL DISTURBANCE, UNSPECIFIED TIMING OF DEMENTIA ONSET (HCC): ICD-10-CM

## 2020-10-08 DIAGNOSIS — Z00.00 ROUTINE HEALTH MAINTENANCE: ICD-10-CM

## 2020-10-08 DIAGNOSIS — I10 ESSENTIAL HYPERTENSION: ICD-10-CM

## 2020-10-08 DIAGNOSIS — G40.909 SEIZURE DISORDER (HCC): ICD-10-CM

## 2020-10-08 DIAGNOSIS — E78.5 HYPERLIPIDEMIA, UNSPECIFIED HYPERLIPIDEMIA TYPE: ICD-10-CM

## 2020-10-08 DIAGNOSIS — R07.89 OTHER CHEST PAIN: Primary | ICD-10-CM

## 2020-10-08 DIAGNOSIS — N18.30 STAGE 3 CHRONIC KIDNEY DISEASE, UNSPECIFIED WHETHER STAGE 3A OR 3B CKD (HCC): ICD-10-CM

## 2020-10-08 DIAGNOSIS — Z87.39 HISTORY OF GOUT: ICD-10-CM

## 2020-10-08 DIAGNOSIS — N18.30 CHRONIC KIDNEY DISEASE, STAGE III (MODERATE) (HCC): ICD-10-CM

## 2020-10-08 DIAGNOSIS — F03.90 DEMENTIA WITHOUT BEHAVIORAL DISTURBANCE, UNSPECIFIED DEMENTIA TYPE (HCC): ICD-10-CM

## 2020-10-08 DIAGNOSIS — F02.80 ALZHEIMER'S DEMENTIA WITHOUT BEHAVIORAL DISTURBANCE, UNSPECIFIED TIMING OF DEMENTIA ONSET (HCC): ICD-10-CM

## 2020-10-08 PROCEDURE — 87186 SC STD MICRODIL/AGAR DIL: CPT

## 2020-10-08 PROCEDURE — G0463 HOSPITAL OUTPT CLINIC VISIT: HCPCS | Performed by: INTERNAL MEDICINE

## 2020-10-08 PROCEDURE — 1111F DSCHRG MED/CURRENT MED MERGE: CPT | Performed by: INTERNAL MEDICINE

## 2020-10-08 PROCEDURE — 99214 OFFICE O/P EST MOD 30 MIN: CPT | Performed by: INTERNAL MEDICINE

## 2020-10-08 PROCEDURE — 36415 COLL VENOUS BLD VENIPUNCTURE: CPT

## 2020-10-08 PROCEDURE — 80069 RENAL FUNCTION PANEL: CPT

## 2020-10-08 PROCEDURE — 81001 URINALYSIS AUTO W/SCOPE: CPT

## 2020-10-08 PROCEDURE — 87086 URINE CULTURE/COLONY COUNT: CPT

## 2020-10-08 PROCEDURE — 87088 URINE BACTERIA CULTURE: CPT

## 2020-10-08 NOTE — PROGRESS NOTES
Sola Christiansen is a 69year old female. HPI:   Patient presents with:  Checkup: Hospital F/U for Chest Pain; Feeling good without pain. c/o Balance Issues     Patient here for follow-up. She was in the emergency room for chest pain.   This was felt to 500 or 600 mg a day along with vitamin D 800 or thousand units a day. She was a little unclear about her medications and I gave her our current medication list for her to reconcile at home.   She will now stop there is any changes  Current Outpatient Med Carpal tunnel syndrome 2011    as per NG   • Chronic atrial fibrillation ([facility] [address]    • Crohn disease ([facility] [address]    • Diverticulosis     as per NG   • Family history of colon cancer     Maternal grandmother had colon cancer in her 76s.    • Gout    • Hepatic steatos BMI 25.83 kg/m²     GENERAL:  well developed, well nourished, in no apparent distress  SKIN:  no rashes , no suspicious lesions  HEENT: atraumatic. Pharynx normal without exudate. EYES:  PERRL. Sclera anicteric.   NECK:  Supple,  no adenopathy,  thyroid

## 2020-10-09 ENCOUNTER — TELEPHONE (OUTPATIENT)
Dept: INTERNAL MEDICINE CLINIC | Facility: CLINIC | Age: 70
End: 2020-10-09

## 2020-10-09 DIAGNOSIS — R82.90 ABNORMAL URINALYSIS: Primary | ICD-10-CM

## 2020-10-09 RX ORDER — CEPHALEXIN 500 MG/1
500 CAPSULE ORAL 2 TIMES DAILY
Qty: 6 CAPSULE | Refills: 0 | Status: SHIPPED | OUTPATIENT
Start: 2020-10-09 | End: 2020-10-12

## 2020-10-09 NOTE — TELEPHONE ENCOUNTER
I spoke with Khanh Hutchinson and relayed Dr. Stephenie Germain message. She verbalized understanding.

## 2020-10-09 NOTE — TELEPHONE ENCOUNTER
Please call patient let her know that her urine did show some bacteria. We do not have the sensitivities back yet and so with the weekend coming I sent over a prescription for Keflex that she can take twice a day for 3 days.   This usually will take care o

## 2020-10-12 NOTE — TELEPHONE ENCOUNTER
Please let patient know that the bacteria in her urine was sensitive to the Keflex. I am thinking the Keflex should taken care of this. I left order for urinalysis and urine culture that she can go sometime this week to make sure the bacteria is gone.

## 2020-10-12 NOTE — TELEPHONE ENCOUNTER
Patient called and relayed Dr Em Holder message. Patient verbalized understanding with no further questions noted. Patient transferred to [facility]  to schedule lab.

## 2020-10-15 ENCOUNTER — LAB ENCOUNTER (OUTPATIENT)
Dept: LAB | Age: 70
End: 2020-10-15
Attending: INTERNAL MEDICINE
Payer: MEDICARE

## 2020-10-15 DIAGNOSIS — R82.90 ABNORMAL URINALYSIS: ICD-10-CM

## 2020-10-15 PROCEDURE — 81003 URINALYSIS AUTO W/O SCOPE: CPT

## 2020-10-15 PROCEDURE — 87086 URINE CULTURE/COLONY COUNT: CPT

## 2020-10-16 ENCOUNTER — TELEPHONE (OUTPATIENT)
Dept: INTERNAL MEDICINE CLINIC | Facility: CLINIC | Age: 70
End: 2020-10-16

## 2020-10-22 ENCOUNTER — TELEPHONE (OUTPATIENT)
Dept: INTERNAL MEDICINE CLINIC | Facility: CLINIC | Age: 70
End: 2020-10-22

## 2020-10-22 DIAGNOSIS — R53.83 FATIGUE, UNSPECIFIED TYPE: Primary | ICD-10-CM

## 2020-10-22 NOTE — TELEPHONE ENCOUNTER
To Dr. Priscilla Mcdaniel - see below. Pt confirms message below, \"I can't stay awake\". Pt states she walks once/week, no other exercise but states she is \"doing a lot of crafting\". Pt reports no other sx, denies fever/cough/sob.

## 2020-10-22 NOTE — TELEPHONE ENCOUNTER
Message noted. We just checked her blood counts September 29 and she was not anemic. However with her symptoms of \"not being able to stay awake\" I can place order for thyroid.   She does not have to fast.  It has been over a year since we last checked h

## 2020-10-22 NOTE — TELEPHONE ENCOUNTER
Please call pt  Pt cannot stay awake during the day  This has been over a week and a half  Doesn't matter how much she sleeps at night  Is pt anemic?   Please call to discuss/advise  Tasked to nursing

## 2020-10-22 NOTE — TELEPHONE ENCOUNTER
Spoke to patient and relayed MD message. Patient verbalized understanding. Patient will call our office tomorrow morning to schedule lab appt.

## 2020-10-23 ENCOUNTER — TELEPHONE (OUTPATIENT)
Dept: INTERNAL MEDICINE CLINIC | Facility: CLINIC | Age: 70
End: 2020-10-23

## 2020-10-23 RX ORDER — AMLODIPINE BESYLATE 2.5 MG/1
2.5 TABLET ORAL DAILY
Qty: 30 TABLET | Refills: 3 | Status: SHIPPED | OUTPATIENT
Start: 2020-10-23 | End: 2021-03-09

## 2020-10-23 NOTE — TELEPHONE ENCOUNTER
Message noted. I think a small dose of amlodipine 2.5 mg will help. I have called it in for her. I can see her next week for follow up.

## 2020-10-23 NOTE — TELEPHONE ENCOUNTER
Pt. Called stating her BP is elevated at  201/85 yesterday. Just now her BP is 189/90. She is asking if this could be related to the fact that she can't sleep? She's not sure what's causing it. Demi Torrez can be reached at [phone].

## 2020-10-23 NOTE — TELEPHONE ENCOUNTER
Called patient and relayed DR. FLEMING message - verbalized understanding .  Transferred to [facility] to schedule F/U with  for next week

## 2020-10-23 NOTE — TELEPHONE ENCOUNTER
To Dr. Eric Morales-----    Pt is calling to report high BP since yesterday. 10/22 AM: 213/86  10/22 noon: 201/85  10/22 afternoon: 170/78  10/22 evenin/86    10/23AM 180/90     Pt denies chest pain, SOB, heart palpitations, HA, blurry vision, dizziness.

## 2020-10-26 ENCOUNTER — LAB ENCOUNTER (OUTPATIENT)
Dept: LAB | Age: 70
End: 2020-10-26
Attending: INTERNAL MEDICINE
Payer: MEDICARE

## 2020-10-26 ENCOUNTER — OFFICE VISIT (OUTPATIENT)
Dept: INTERNAL MEDICINE CLINIC | Facility: CLINIC | Age: 70
End: 2020-10-26
Payer: MEDICARE

## 2020-10-26 VITALS
SYSTOLIC BLOOD PRESSURE: 154 MMHG | WEIGHT: 153 LBS | DIASTOLIC BLOOD PRESSURE: 92 MMHG | TEMPERATURE: 99 F | OXYGEN SATURATION: 98 % | HEIGHT: 64 IN | HEART RATE: 76 BPM | BODY MASS INDEX: 26.12 KG/M2

## 2020-10-26 DIAGNOSIS — R53.83 FATIGUE, UNSPECIFIED TYPE: ICD-10-CM

## 2020-10-26 DIAGNOSIS — I10 ESSENTIAL HYPERTENSION: Primary | ICD-10-CM

## 2020-10-26 DIAGNOSIS — F03.90 DEMENTIA WITHOUT BEHAVIORAL DISTURBANCE, UNSPECIFIED DEMENTIA TYPE (HCC): ICD-10-CM

## 2020-10-26 DIAGNOSIS — N18.30 STAGE 3 CHRONIC KIDNEY DISEASE, UNSPECIFIED WHETHER STAGE 3A OR 3B CKD (HCC): ICD-10-CM

## 2020-10-26 DIAGNOSIS — Z00.00 ROUTINE HEALTH MAINTENANCE: ICD-10-CM

## 2020-10-26 LAB — TSH SERPL-ACNC: 2.29 MCUNITS/ML

## 2020-10-26 PROCEDURE — 84443 ASSAY THYROID STIM HORMONE: CPT

## 2020-10-26 PROCEDURE — G0463 HOSPITAL OUTPT CLINIC VISIT: HCPCS | Performed by: INTERNAL MEDICINE

## 2020-10-26 PROCEDURE — 36415 COLL VENOUS BLD VENIPUNCTURE: CPT

## 2020-10-26 PROCEDURE — 99214 OFFICE O/P EST MOD 30 MIN: CPT | Performed by: INTERNAL MEDICINE

## 2020-10-26 NOTE — PROGRESS NOTES
Genie Joel is a 69year old female. HPI:   Patient presents with: Follow - Up: Elevated BP  Fatigue: x 2 weeks      Patient is following up. She does feel fatigued. Will check thyroid. Patient looks good here in the office. Awake and aware.   No • ONDANSETRON 4 MG Oral Tablet Dispersible Take 1 tablet (4 mg total) by mouth every 8 (eight) hours as needed for Nausea. 20 tablet 0   • Dicyclomine HCl 10 MG Oral Cap Take 1 capsule (10 mg total) by mouth 3 (three) times daily as needed.  60 capsule 3 per NG   • Restless legs syndrome 5/16/2013   • Seizure disorder [facility])     as per NG   • Seizure disorder ([facility] [address]    • Tendon tear 2012    Surgically repaired; as per NG   • Unspecified essential hypertension     as per NG   • Unspecified sleep apnea     as Seems to be doing fair. She follows with neurology. 3. Routine health maintenance  Patient did have her flu vaccine    4. Stage 3 chronic kidney disease, unspecified whether stage 3a or 3b CKD  Stable. - US KIDNEYS ([id number]);  Future    Follow up 2 w

## 2020-10-28 ENCOUNTER — APPOINTMENT (OUTPATIENT)
Dept: GENERAL RADIOLOGY | Facility: HOSPITAL | Age: 70
End: 2020-10-28
Payer: MEDICARE

## 2020-10-28 ENCOUNTER — HOSPITAL ENCOUNTER (EMERGENCY)
Facility: HOSPITAL | Age: 70
Discharge: HOME | End: 2020-10-28
Attending: EMERGENCY MEDICINE
Payer: MEDICARE

## 2020-10-28 VITALS
BODY MASS INDEX: 25.61 KG/M2 | SYSTOLIC BLOOD PRESSURE: 124 MMHG | HEART RATE: 64 BPM | DIASTOLIC BLOOD PRESSURE: 69 MMHG | OXYGEN SATURATION: 95 % | WEIGHT: 150 LBS | HEIGHT: 64 IN | TEMPERATURE: 98 F | RESPIRATION RATE: 16 BRPM

## 2020-10-28 DIAGNOSIS — S82.891A CLOSED FRACTURE OF RIGHT ANKLE, INITIAL ENCOUNTER: Primary | ICD-10-CM

## 2020-10-28 PROCEDURE — 73590 X-RAY EXAM OF LOWER LEG: CPT | Performed by: EMERGENCY MEDICINE

## 2020-10-28 PROCEDURE — 29515 APPLICATION SHORT LEG SPLINT: CPT

## 2020-10-28 PROCEDURE — 99284 EMERGENCY DEPT VISIT MOD MDM: CPT

## 2020-10-28 PROCEDURE — 73560 X-RAY EXAM OF KNEE 1 OR 2: CPT | Performed by: EMERGENCY MEDICINE

## 2020-10-28 PROCEDURE — 73610 X-RAY EXAM OF ANKLE: CPT | Performed by: EMERGENCY MEDICINE

## 2020-10-29 ENCOUNTER — TELEPHONE (OUTPATIENT)
Dept: INTERNAL MEDICINE CLINIC | Facility: CLINIC | Age: 70
End: 2020-10-29

## 2020-10-29 RX ORDER — AMLODIPINE BESYLATE 2.5 MG/1
2.5 TABLET ORAL DAILY
COMMUNITY
Start: 2020-10-23

## 2020-10-29 NOTE — ED INITIAL ASSESSMENT (HPI)
Tripped and fell on stairs, misstepped w/ R foot and has pain/swelling/deformity to R ankle. C/o pain to R leg and soreness to R knee. Denies head injury, LOC or any further injuries. Pt a/ox4, respirations unlabored, speech full/clear.

## 2020-10-29 NOTE — ED PROVIDER NOTES
[facility]      History   Patient presents with:  Fall  Leg or Foot Injury    Stated Complaint: Mechanical Fall    HPI    69year old female who tripped over the last stair and twisted her R ankle.  She has kirill Right ankle: She exhibits decreased range of motion (she is able to move foot in all directions, dec lateral), swelling and ecchymosis. She exhibits no deformity and no laceration. Tenderness. Lateral malleolus tenderness found.  Achilles tendon normal. A short post mold splint was applied to the RLE. After application of the splint I returned and re-examined the patient. The splint was adequately immobilizing the joint and distal to the splint the patient's circulation and sensation was intact.     Crut

## 2020-10-29 NOTE — TELEPHONE ENCOUNTER
To Dr. Michelle Tipton -  Pt was given a soft cast and referral to Dr. Bart Cedeno. Relayed Dr. Enoch Oquendo to pt as well - she will try Dr. Tammi Beach first.  Pt missed step as she was carrying box downstairs. She is in pain, did not sleep much last night.

## 2020-10-29 NOTE — TELEPHONE ENCOUNTER
Please call patient. She was in the emergency room yesterday after a fall and has a ankle fracture. Please ask her if they put her in some sort of cast or boot.   Please ask ask her if they gave her a referral to orthopedics and which doctor they referred

## 2020-11-02 ENCOUNTER — OFFICE VISIT (OUTPATIENT)
Dept: ORTHOPEDICS CLINIC | Facility: CLINIC | Age: 70
End: 2020-11-02
Payer: MEDICARE

## 2020-11-02 VITALS — BODY MASS INDEX: 24.99 KG/M2 | WEIGHT: 150 LBS | HEIGHT: 65 IN

## 2020-11-02 DIAGNOSIS — M80.071A: ICD-10-CM
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
## 2020-11-02 DIAGNOSIS — S82.831A OTHER CLOSED FRACTURE OF DISTAL END OF RIGHT FIBULA, INITIAL ENCOUNTER: Primary | ICD-10-CM
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
## 2020-11-02 PROCEDURE — 99203 OFFICE O/P NEW LOW 30 MIN: CPT | Performed by: ORTHOPAEDIC SURGERY

## 2020-11-02 PROCEDURE — L4386 NON-PNEUM WALK BOOT PRE CST: HCPCS | Performed by: ORTHOPAEDIC SURGERY

## 2020-11-02 PROCEDURE — 27786 TREATMENT OF ANKLE FRACTURE: CPT | Performed by: PHYSICIAN ASSISTANT

## 2020-11-02 PROCEDURE — G0463 HOSPITAL OUTPT CLINIC VISIT: HCPCS | Performed by: ORTHOPAEDIC SURGERY

## 2020-11-02 RX ORDER — OXYBUTYNIN CHLORIDE 5 MG/1
5 TABLET, EXTENDED RELEASE ORAL DAILY
COMMUNITY
Start: 2020-10-06 | End: 2021-11-30

## 2020-11-02 RX ORDER — OMEPRAZOLE 20 MG/1
20 CAPSULE, DELAYED RELEASE ORAL EVERY MORNING
COMMUNITY
Start: 2020-10-17 | End: 2021-08-20

## 2020-11-04 ENCOUNTER — TELEPHONE (OUTPATIENT)
Dept: ORTHOPEDICS CLINIC | Facility: CLINIC | Age: 70
End: 2020-11-04

## 2020-11-04 NOTE — TELEPHONE ENCOUNTER
S/w pt and she states when she woke up this morning her 2nd, 3rd & 4th toes were red and felt cold. She denies any increase in pain. She states there is swelling in the toes/foot/ankle. She is icing and elevating. She denies sleeping with the boot on.  She

## 2020-11-04 NOTE — TELEPHONE ENCOUNTER
Pt sent [employer] message.  [facility] through that message

## 2020-11-06 ENCOUNTER — OFFICE VISIT (OUTPATIENT)
Dept: CARDIOLOGY | Age: 70
End: 2020-11-06

## 2020-11-06 ENCOUNTER — ANCILLARY PROCEDURE (OUTPATIENT)
Dept: CARDIOLOGY | Age: 70
End: 2020-11-06
Attending: INTERNAL MEDICINE

## 2020-11-06 VITALS
WEIGHT: 150 LBS | HEIGHT: 65 IN | HEART RATE: 74 BPM | OXYGEN SATURATION: 98 % | BODY MASS INDEX: 24.99 KG/M2 | SYSTOLIC BLOOD PRESSURE: 120 MMHG | DIASTOLIC BLOOD PRESSURE: 84 MMHG

## 2020-11-06 DIAGNOSIS — I48.0 PAROXYSMAL ATRIAL FIBRILLATION (CMD): ICD-10-CM

## 2020-11-06 DIAGNOSIS — Z95.0 PACEMAKER: Primary | ICD-10-CM

## 2020-11-06 DIAGNOSIS — I49.5 SICK SINUS SYNDROME (CMD): ICD-10-CM

## 2020-11-06 DIAGNOSIS — Z45.018 ENCOUNTER FOR CARE OF PACEMAKER: ICD-10-CM

## 2020-11-06 DIAGNOSIS — E78.00 PURE HYPERCHOLESTEROLEMIA: ICD-10-CM

## 2020-11-06 DIAGNOSIS — I10 HTN (HYPERTENSION), BENIGN: ICD-10-CM

## 2020-11-06 PROCEDURE — 93294 REM INTERROG EVL PM/LDLS PM: CPT | Performed by: INTERNAL MEDICINE

## 2020-11-06 PROCEDURE — 99214 OFFICE O/P EST MOD 30 MIN: CPT | Performed by: INTERNAL MEDICINE

## 2020-11-06 ASSESSMENT — PATIENT HEALTH QUESTIONNAIRE - PHQ9
2. FEELING DOWN, DEPRESSED OR HOPELESS: NOT AT ALL
CLINICAL INTERPRETATION OF PHQ2 SCORE: NO FURTHER SCREENING NEEDED
CLINICAL INTERPRETATION OF PHQ9 SCORE: NO FURTHER SCREENING NEEDED
1. LITTLE INTEREST OR PLEASURE IN DOING THINGS: NOT AT ALL
2. FEELING DOWN, DEPRESSED OR HOPELESS: NOT AT ALL
SUM OF ALL RESPONSES TO PHQ9 QUESTIONS 1 AND 2: 0
SUM OF ALL RESPONSES TO PHQ9 QUESTIONS 1 AND 2: 0
CLINICAL INTERPRETATION OF PHQ2 SCORE: NO FURTHER SCREENING NEEDED
SUM OF ALL RESPONSES TO PHQ9 QUESTIONS 1 AND 2: 0
1. LITTLE INTEREST OR PLEASURE IN DOING THINGS: NOT AT ALL

## 2020-11-10 ENCOUNTER — PATIENT MESSAGE (OUTPATIENT)
Dept: ORTHOPEDICS CLINIC | Facility: CLINIC | Age: 70
End: 2020-11-10

## 2020-11-10 ENCOUNTER — HOSPITAL ENCOUNTER (OUTPATIENT)
Dept: ULTRASOUND IMAGING | Facility: HOSPITAL | Age: 70
Discharge: HOME | End: 2020-11-10
Attending: ORTHOPAEDIC SURGERY
Payer: MEDICARE

## 2020-11-10 DIAGNOSIS — S82.891D CLOSED FRACTURE OF RIGHT ANKLE WITH ROUTINE HEALING, SUBSEQUENT ENCOUNTER: ICD-10-CM

## 2020-11-10 DIAGNOSIS — M79.661 RIGHT CALF PAIN: ICD-10-CM

## 2020-11-10 DIAGNOSIS — M79.661 RIGHT CALF PAIN: Primary | ICD-10-CM

## 2020-11-10 PROCEDURE — 93971 EXTREMITY STUDY: CPT | Performed by: ORTHOPAEDIC SURGERY

## 2020-11-10 NOTE — TELEPHONE ENCOUNTER
US called and US was negative for DVT. Pt notified of results and advised to head home. Pt wants to know if  has any recommendations for swelling and warmth of leg?

## 2020-11-10 NOTE — TELEPHONE ENCOUNTER
Pt notified per [employer] [employer] [facility] message. Changed order to STAT. Called hospital and appt scheduled today @ 2pm at [facility] main. Pt notified of US appt and location and they will call us direct with results.

## 2020-11-10 NOTE — TELEPHONE ENCOUNTER
From: Riana Green  To: Allyn Kim MD  Sent: 11/10/2020 9:50 AM CST  Subject: Visit Follow-up Question    My ankle and half way up my calf have been pretty warm, is that normal? It been 3 days and it is still pretty warm, even if sitting in my rec

## 2020-11-10 NOTE — TELEPHONE ENCOUNTER
S/w pt and she states right ankle/foot and calf are warm to touch. She states there seems to be more swelling this week than last wk in the ankle foot and part of the calf.  She states it feels like there is knot in her calf and the leg/ankle foot is tender

## 2020-11-16 ENCOUNTER — OFFICE VISIT (OUTPATIENT)
Dept: ORTHOPEDICS CLINIC | Facility: CLINIC | Age: 70
End: 2020-11-16
Payer: MEDICARE

## 2020-11-16 ENCOUNTER — HOSPITAL ENCOUNTER (OUTPATIENT)
Dept: GENERAL RADIOLOGY | Facility: HOSPITAL | Age: 70
Discharge: HOME | End: 2020-11-16
Attending: ORTHOPAEDIC SURGERY
Payer: MEDICARE

## 2020-11-16 DIAGNOSIS — Z47.89 ORTHOPEDIC AFTERCARE: ICD-10-CM
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
## 2020-11-16 DIAGNOSIS — S82.891D CLOSED FRACTURE OF RIGHT ANKLE WITH ROUTINE HEALING, SUBSEQUENT ENCOUNTER: Primary | ICD-10-CM

## 2020-11-16 DIAGNOSIS — M80.071A: ICD-10-CM

## 2020-11-16 PROCEDURE — 73610 X-RAY EXAM OF ANKLE: CPT | Performed by: ORTHOPAEDIC SURGERY

## 2020-11-16 PROCEDURE — G0463 HOSPITAL OUTPT CLINIC VISIT: HCPCS | Performed by: ORTHOPAEDIC SURGERY

## 2020-11-16 PROCEDURE — 99024 POSTOP FOLLOW-UP VISIT: CPT | Performed by: ORTHOPAEDIC SURGERY

## 2020-11-17 NOTE — PROGRESS NOTES
NURSING INTAKE COMMENTS: Patient presents with: Follow - Up: FX right ankle follow up. Patient still c/o ongoing pain. 5/10 pain scale. Icing and elevating which helps.        HPI: This 69year old female presents today follow-up after right distal fibular • Restless leg syndrome     as per NG   • Restless legs syndrome 5/16/2013   • Seizure disorder [facility])     as per NG   • Seizure disorder ([facility] [address]    • Tendon tear 2012    Surgically repaired; as per NG   • Unspecified essential hypertension     as per NG Release      • amLODIPine Besylate (NORVASC) 2.5 MG Oral Tab Take 1 tablet (2.5 mg total) by mouth daily. 30 tablet 3   • escitalopram 5 MG Oral Tab Take 5 mg by mouth nightly. • nadolol 20 MG Oral Tab Take 20 mg by mouth nightly.    1 tablet 0   • AT Other         Thyroid Cancer; as per NG   • Cancer Maternal Grandmother         Colon     No family Hx of DVT/PE    Social History    Occupational History      Not on file    Tobacco Use      Smoking status: Former Smoker        Packs/day: 1.00        Year office today show the lateral malleolus fracture remains nondisplaced. The mortise and syndesmosis is properly aligned.        Xr Ankle (min 3 Views), Right (cpt=73610)    Result Date: 11/16/2020  PROCEDURE: XR ANKLE (MIN 3 VIEWS), RIGHT (CPT=73610)  JUANY moderate plantar calcaneal spur is unchanged. SOFT TISSUES: There is extensive soft tissue swelling over the lateral malleolus. EFFUSION: There has been development of a small right ankle joint effusion. OTHER: Negative. CONCLUSION:  1.  Development FINDINGS:  BONES: There has been development of a nondisplaced transverse fracture in the lateral malleolus. There is no dislocation.   There has been development of mild joint space narrowing in the medial compartment of the right knee since 2015 with ass 09/29/2020      Lab Results   Component Value Date     (H) 10/08/2020    BUN 29 (H) 10/08/2020    CREATSERUM 1.18 (H) 10/08/2020    GFRNAA 47 (L) 10/08/2020    GFRAA 54 (L) 10/08/2020        Assessment and Plan:  Diagnoses and all orders for this vi

## 2020-11-19 ENCOUNTER — TELEPHONE (OUTPATIENT)
Dept: INTERNAL MEDICINE CLINIC | Facility: CLINIC | Age: 70
End: 2020-11-19

## 2020-11-19 ENCOUNTER — HOSPITAL ENCOUNTER (OUTPATIENT)
Dept: ULTRASOUND IMAGING | Age: 70
Discharge: HOME | End: 2020-11-19
Attending: INTERNAL MEDICINE
Payer: MEDICARE

## 2020-11-19 DIAGNOSIS — I10 ESSENTIAL HYPERTENSION: ICD-10-CM

## 2020-11-19 DIAGNOSIS — N18.30 STAGE 3 CHRONIC KIDNEY DISEASE, UNSPECIFIED WHETHER STAGE 3A OR 3B CKD (HCC): ICD-10-CM

## 2020-11-19 PROCEDURE — 76775 US EXAM ABDO BACK WALL LIM: CPT | Performed by: INTERNAL MEDICINE

## 2020-11-19 NOTE — TELEPHONE ENCOUNTER
Please let patient know that her ultrasound showed that her kidneys to show weakness. There is mild shrinkage of the left kidney. There is also some small cysts which are nothing to worry about. May be a small kidney stone left kidney.     With the o

## 2020-12-01 ENCOUNTER — HOSPITAL ENCOUNTER (OUTPATIENT)
Dept: GENERAL RADIOLOGY | Facility: HOSPITAL | Age: 70
Discharge: HOME | End: 2020-12-01
Attending: ORTHOPAEDIC SURGERY
Payer: MEDICARE

## 2020-12-01 ENCOUNTER — OFFICE VISIT (OUTPATIENT)
Dept: ORTHOPEDICS CLINIC | Facility: CLINIC | Age: 70
End: 2020-12-01
Payer: MEDICARE

## 2020-12-01 DIAGNOSIS — Z47.89 ORTHOPEDIC AFTERCARE: ICD-10-CM
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
## 2020-12-01 DIAGNOSIS — Z47.89 ORTHOPEDIC AFTERCARE: Primary | ICD-10-CM

## 2020-12-01 DIAGNOSIS — S82.891D CLOSED FRACTURE OF RIGHT ANKLE WITH ROUTINE HEALING, SUBSEQUENT ENCOUNTER: ICD-10-CM
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
## 2020-12-01 PROCEDURE — 73610 X-RAY EXAM OF ANKLE: CPT | Performed by: ORTHOPAEDIC SURGERY

## 2020-12-01 PROCEDURE — 99024 POSTOP FOLLOW-UP VISIT: CPT | Performed by: ORTHOPAEDIC SURGERY

## 2020-12-01 PROCEDURE — G0463 HOSPITAL OUTPT CLINIC VISIT: HCPCS | Performed by: ORTHOPAEDIC SURGERY

## 2020-12-01 NOTE — PROGRESS NOTES
NURSING INTAKE COMMENTS: Patient presents with:  Fracture: right ankle -- Rates pain 1-2/10 depending on activity. HPI: This 69year old female presents today for follow-up for right distal fibular fracture Aguilar type A about 6 weeks out.   She has be Tendon tear 2012    Surgically repaired; as per NG   • Unspecified essential hypertension     as per NG   • Unspecified sleep apnea     as per NG     Past Surgical History:   Procedure Laterality Date   • ARTHROSCOPY OF JOINT UNLISTED      BILAT.  KNEES   • tablet (2.5 mg total) by mouth daily. 30 tablet 3   • escitalopram 5 MG Oral Tab Take 5 mg by mouth nightly. • nadolol 20 MG Oral Tab Take 20 mg by mouth nightly.    1 tablet 0   • ATORVASTATIN 20 MG Oral Tab TAKE ONE TABLET BY MOUTH ONE TIME DAILY at Smoking status: Former Smoker        Packs/day: 1.00        Years: 40.00        Pack years: 40        Types: Cigarettes        Quit date: 1998        Years since quittin.9      Smokeless tobacco: Never Used    Substance and Sexual Activity      Al 11/16/2020  PROCEDURE: XR ANKLE (MIN 3 VIEWS), RIGHT (CPT=73610)  COMPARISON: [facility], XR ANKLE (MIN 3 VIEWS), RIGHT (CPT=73610), 10/28/2020, 7:45 PM.  [facility] for [facility], XR ANKLE (MIN 3 VIEWS), RIGHT (CPT=73610), 7/23/ for hydronephrosis or perirenal fluid. There is a 7 x 7 x 5 mm cyst at the left upper renal pole centrally. There is an echogenicity with potential mild shadowing, probably calculus, at the left lower renal pole measuring 3 x 2 x 3 mm.  BLADDER: Urinary Plan:  Diagnoses and all orders for this visit:    Orthopedic aftercare  -     XR ANKLE (MIN 3 VIEWS), RIGHT (CPT=73610);  Future    Closed fracture of right ankle with routine healing, subsequent encounter    Fall in home, subsequent encounter        Asses

## 2020-12-15 ENCOUNTER — OFFICE VISIT (OUTPATIENT)
Dept: ORTHOPEDICS CLINIC | Facility: CLINIC | Age: 70
End: 2020-12-15
Payer: MEDICARE

## 2020-12-15 ENCOUNTER — HOSPITAL ENCOUNTER (OUTPATIENT)
Dept: GENERAL RADIOLOGY | Facility: HOSPITAL | Age: 70
Discharge: HOME | End: 2020-12-15
Attending: ORTHOPAEDIC SURGERY
Payer: MEDICARE

## 2020-12-15 DIAGNOSIS — Z47.89 ORTHOPEDIC AFTERCARE: ICD-10-CM
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
## 2020-12-15 DIAGNOSIS — M80.071A: ICD-10-CM
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
## 2020-12-15 DIAGNOSIS — S82.891D CLOSED FRACTURE OF RIGHT ANKLE WITH ROUTINE HEALING, SUBSEQUENT ENCOUNTER: Primary | ICD-10-CM

## 2020-12-15 PROCEDURE — 99212 OFFICE O/P EST SF 10 MIN: CPT | Performed by: ORTHOPAEDIC SURGERY

## 2020-12-15 PROCEDURE — 73610 X-RAY EXAM OF ANKLE: CPT | Performed by: ORTHOPAEDIC SURGERY

## 2020-12-15 PROCEDURE — G0463 HOSPITAL OUTPT CLINIC VISIT: HCPCS | Performed by: ORTHOPAEDIC SURGERY

## 2020-12-15 RX ORDER — ESCITALOPRAM OXALATE 10 MG/1
TABLET ORAL
COMMUNITY
Start: 2020-12-11 | End: 2021-12-29

## 2020-12-16 NOTE — PROGRESS NOTES
NURSING INTAKE COMMENTS: Patient presents with:  Fracture: Right ankle f/u - states she is realy good - has some aches with too long of standing on it       HPI: This 69year old female presents today about 8 weeks out from a right distal fibular fracture leg syndrome     as per NG   • Restless legs syndrome 5/16/2013   • Seizure disorder [facility])     as per NG   • Seizure disorder ([facility] [address]    • Tendon tear 2012    Surgically repaired; as per NG   • Unspecified essential hypertension     as per NG   • Unspecified 100 MG Oral Tab TAKE ONE TABLET BY MOUTH TWICE DAILY 180 tablet 0   • Oxybutynin Chloride ER 5 MG Oral Tablet 24 Hr      • omeprazole 20 MG Oral Capsule Delayed Release      • amLODIPine Besylate (NORVASC) 2.5 MG Oral Tab Take 1 tablet (2.5 mg total) by mo as per NG   • Cancer Maternal Grandmother         Colon     No family Hx of DVT/PE    Social History    Occupational History      Not on file    Tobacco Use      Smoking status: Former Smoker        Packs/day: 1.00        Years: 40.00        Pack years: 36 healing of the fracture with good maintenance of the mortise and syndesmosis.     Xr Ankle (min 3 Views), Right (cpt=73610)    Result Date: 12/15/2020  PROCEDURE: XR ANKLE (MIN 3 VIEWS), RIGHT (CPT=73610)  COMPARISON: [facility] for [facility] Right (cpt=73610)    Result Date: 11/16/2020  PROCEDURE: XR ANKLE (MIN 3 VIEWS), RIGHT (CPT=73610)  COMPARISON: [facility], XR ANKLE (MIN 3 VIEWS), RIGHT (CPT=73610), 10/28/2020, 7:45 PM.  [facility], [facility]. for [facility], XR ANKLE (MIN echogenicity diffusely. Negative for hydronephrosis or perirenal fluid. There is a 7 x 7 x 5 mm cyst at the left upper renal pole centrally.    There is an echogenicity with potential mild shadowing, probably calculus, at the left lower renal pole [facility]

## 2021-01-22 ENCOUNTER — TELEPHONE (OUTPATIENT)
Dept: INTERVENTIONAL RADIOLOGY/VASCULAR | Facility: HOSPITAL | Age: 71
End: 2021-01-22

## 2021-02-05 ENCOUNTER — OFFICE VISIT (OUTPATIENT)
Dept: INTERNAL MEDICINE CLINIC | Facility: CLINIC | Age: 71
End: 2021-02-05
Payer: MEDICARE

## 2021-02-05 ENCOUNTER — TELEPHONE (OUTPATIENT)
Dept: INTERNAL MEDICINE CLINIC | Facility: CLINIC | Age: 71
End: 2021-02-05

## 2021-02-05 VITALS
DIASTOLIC BLOOD PRESSURE: 86 MMHG | HEART RATE: 64 BPM | SYSTOLIC BLOOD PRESSURE: 140 MMHG | WEIGHT: 156 LBS | HEIGHT: 65 IN | TEMPERATURE: 98 F | OXYGEN SATURATION: 95 % | BODY MASS INDEX: 25.99 KG/M2

## 2021-02-05 DIAGNOSIS — Z00.00 ROUTINE HEALTH MAINTENANCE: ICD-10-CM

## 2021-02-05 DIAGNOSIS — I10 ESSENTIAL HYPERTENSION: Primary | ICD-10-CM

## 2021-02-05 DIAGNOSIS — F03.91 DEMENTIA WITH BEHAVIORAL DISTURBANCE, UNSPECIFIED DEMENTIA TYPE (HCC): ICD-10-CM

## 2021-02-05 DIAGNOSIS — Z95.818 PRESENCE OF WATCHMAN LEFT ATRIAL APPENDAGE CLOSURE DEVICE: ICD-10-CM

## 2021-02-05 DIAGNOSIS — N18.30 STAGE 3 CHRONIC KIDNEY DISEASE, UNSPECIFIED WHETHER STAGE 3A OR 3B CKD (HCC): ICD-10-CM

## 2021-02-05 PROCEDURE — 99215 OFFICE O/P EST HI 40 MIN: CPT | Performed by: INTERNAL MEDICINE

## 2021-02-05 NOTE — TELEPHONE ENCOUNTER
Dr. Lulu Jones, I spoke with patient and she reviewed her medications with me. She read the medication names and doses off of the bottles she has at home. She does not have tylenol #3, diclofenac gel or dicyclomine. She has zofran as needed. She says that she is taking escitalopram and that the citalopram is old and she does not take that anymore. She also confirmed that she has allopurinol, amlodipine, atorvastatin, donepezil, lamotrigine, nadolol, omeprazole, oxybutynin and ropinirole. To Dr. Lulu Jones, notes on your desk.

## 2021-02-05 NOTE — PROGRESS NOTES
Ruchi Pugh is a 69year old female. HPI:   Patient presents with:  Checkup: She thinks the neurologist recommended that she stop one medication but she does not know what medication.        Alyce Cushing comes in to request referral to psychiatry and a counse \"argumentative\" with her cells. All this was discussed with her. She has no other physical complaints. Blood pressure seems to be under satisfactory control. She has no chest pain or shortness of breath. No GI or  issues.   She is keeping her Rella Mandril Oral Tab Take 10 mg by mouth nightly.         Past Medical History:   Diagnosis Date   • Abnormal PET scan of head 5/13/2016    Patient had abnormal PET scan of brain May 6, 2016 showing bilateral temporoparietal beta amyloid deposition in the gray matter c since quittin.1      Smokeless tobacco: Never Used    Alcohol use: Yes      Comment: About 3 drinks a year    Drug use: Yes      Types: Cannabis      Comment: medical cannabis       REVIEW OF SYSTEMS:   GENERAL HEALTH:  feels well otherwise  RESPIRATO Min[place] generated. The patient indicates understanding of these issues and agrees to the plan. This visit was 40 minutes. I spent 10 minutes before and after visit preparing and reviewing old records.   Greater than 50% of the visit was engaged i

## 2021-02-12 ENCOUNTER — TELEPHONE (OUTPATIENT)
Dept: INTERNAL MEDICINE CLINIC | Facility: CLINIC | Age: 71
End: 2021-02-12

## 2021-02-23 ENCOUNTER — TELEPHONE (OUTPATIENT)
Dept: INTERNAL MEDICINE CLINIC | Facility: CLINIC | Age: 71
End: 2021-02-23

## 2021-02-23 ENCOUNTER — LAB ENCOUNTER (OUTPATIENT)
Dept: LAB | Age: 71
End: 2021-02-23
Attending: INTERNAL MEDICINE
Payer: MEDICARE

## 2021-02-23 DIAGNOSIS — N18.30 STAGE 3 CHRONIC KIDNEY DISEASE, UNSPECIFIED WHETHER STAGE 3A OR 3B CKD (HCC): ICD-10-CM

## 2021-02-23 LAB
ALBUMIN SERPL-MCNC: 3.8 G/DL (ref 3.4–5)
ANION GAP SERPL CALC-SCNC: 6 MMOL/L (ref 0–18)
BILIRUB UR QL: NEGATIVE
BUN BLD-MCNC: 30 MG/DL (ref 7–18)
BUN/CREAT SERPL: 25.6 (ref 10–20)
CALCIUM BLD-MCNC: 9.3 MG/DL (ref 8.5–10.1)
CHLORIDE SERPL-SCNC: 108 MMOL/L (ref 98–112)
CLARITY UR: CLEAR
CO2 SERPL-SCNC: 26 MMOL/L (ref 21–32)
COLOR UR: YELLOW
CREAT BLD-MCNC: 1.17 MG/DL
GLUCOSE BLD-MCNC: 107 MG/DL (ref 70–99)
GLUCOSE UR-MCNC: NEGATIVE MG/DL
HGB UR QL STRIP.AUTO: NEGATIVE
KETONES UR-MCNC: NEGATIVE MG/DL
NITRITE UR QL STRIP.AUTO: NEGATIVE
OSMOLALITY SERPL CALC.SUM OF ELEC: 297 MOSM/KG (ref 275–295)
PH UR: 5 [PH] (ref 5–8)
PHOSPHATE SERPL-MCNC: 4 MG/DL (ref 2.5–4.9)
POTASSIUM SERPL-SCNC: 4.5 MMOL/L (ref 3.5–5.1)
PROT UR-MCNC: NEGATIVE MG/DL
SODIUM SERPL-SCNC: 140 MMOL/L (ref 136–145)
SP GR UR STRIP: 1.02 (ref 1–1.03)
UROBILINOGEN UR STRIP-ACNC: <2

## 2021-02-23 PROCEDURE — 87086 URINE CULTURE/COLONY COUNT: CPT

## 2021-02-23 PROCEDURE — 81001 URINALYSIS AUTO W/SCOPE: CPT

## 2021-02-23 PROCEDURE — 80069 RENAL FUNCTION PANEL: CPT

## 2021-02-23 PROCEDURE — 87088 URINE BACTERIA CULTURE: CPT

## 2021-02-23 PROCEDURE — 87186 SC STD MICRODIL/AGAR DIL: CPT

## 2021-02-23 PROCEDURE — 36415 COLL VENOUS BLD VENIPUNCTURE: CPT

## 2021-02-23 NOTE — TELEPHONE ENCOUNTER
Needs a  note from Dr Umana Deayuli stating  she is of sound mind and can engage in estate planning so she will be able to set up an estate trust for her kids    Pt states she needs this by Monday March 1    Ok to send via [employer]  Any questions she can be reache

## 2021-02-23 NOTE — TELEPHONE ENCOUNTER
Message noted. Please tell patient that that type a letter would be most appropriate legally from her neurologist at [place] [id number] since she has been diagnosed as having dementia.   I myself would not be able to come in too much on her cognitive functio

## 2021-02-24 NOTE — TELEPHONE ENCOUNTER
Spoke with patient and relayed Dr. Lena Escobar message. Patient verbalized an understanding and states that she will call her neurologist for the letter.

## 2021-02-25 ENCOUNTER — TELEPHONE (OUTPATIENT)
Dept: INTERNAL MEDICINE CLINIC | Facility: CLINIC | Age: 71
End: 2021-02-25

## 2021-02-25 RX ORDER — CEPHALEXIN 500 MG/1
500 CAPSULE ORAL 4 TIMES DAILY
Qty: 20 CAPSULE | Refills: 0 | Status: ON HOLD | OUTPATIENT
Start: 2021-02-25 | End: 2021-06-19

## 2021-02-25 NOTE — TELEPHONE ENCOUNTER
Dr Veronika Charlton, patient states she does not have any urinary symptoms at all and never does she says. Patient will start oral antibiotic. Patient called and relayed Dr Bi Wills message. Patient verbalized understanding with no further questions noted.

## 2021-02-25 NOTE — TELEPHONE ENCOUNTER
Please let patient know that her recent urinalysis did show that she had some bacteria in the urine. I did speak to  today. I have called in an antibiotic Keflex that she can take 4 times a day for 5 days.   Please ask her if she has any urinary

## 2021-02-27 ENCOUNTER — IMMUNIZATION (OUTPATIENT)
Dept: LAB | Age: 71
End: 2021-02-27

## 2021-02-27 DIAGNOSIS — Z23 NEED FOR VACCINATION: Primary | ICD-10-CM

## 2021-02-27 PROCEDURE — 91300 COVID 19 PFIZER-BIONTECH: CPT

## 2021-02-27 PROCEDURE — 0001A COVID 19 PFIZER-BIONTECH: CPT

## 2021-03-01 ENCOUNTER — APPOINTMENT (OUTPATIENT)
Dept: CARDIOLOGY | Age: 71
End: 2021-03-01
Attending: INTERNAL MEDICINE

## 2021-03-08 ENCOUNTER — TELEPHONE (OUTPATIENT)
Dept: CARDIOLOGY | Age: 71
End: 2021-03-08

## 2021-03-09 RX ORDER — AMLODIPINE BESYLATE 2.5 MG/1
2.5 TABLET ORAL DAILY
Qty: 30 TABLET | Refills: 11 | Status: SHIPPED | OUTPATIENT
Start: 2021-03-09 | End: 2021-11-11

## 2021-03-09 RX ORDER — ROPINIROLE 1 MG/1
TABLET, FILM COATED ORAL
Qty: 90 TABLET | Refills: 5 | Status: ON HOLD | OUTPATIENT
Start: 2021-03-09 | End: 2021-06-19

## 2021-03-13 DIAGNOSIS — Z23 NEED FOR VACCINATION: ICD-10-CM

## 2021-03-20 ENCOUNTER — IMMUNIZATION (OUTPATIENT)
Dept: LAB | Age: 71
End: 2021-03-20
Attending: HOSPITALIST

## 2021-03-20 DIAGNOSIS — Z23 NEED FOR VACCINATION: Primary | ICD-10-CM

## 2021-03-20 PROCEDURE — 91300 COVID 19 PFIZER-BIONTECH: CPT

## 2021-03-20 PROCEDURE — 0002A COVID 19 PFIZER-BIONTECH: CPT

## 2021-03-23 ENCOUNTER — OFFICE VISIT (OUTPATIENT)
Dept: CARDIOLOGY | Age: 71
End: 2021-03-23

## 2021-03-23 ENCOUNTER — ANCILLARY PROCEDURE (OUTPATIENT)
Dept: CARDIOLOGY | Age: 71
End: 2021-03-23
Attending: INTERNAL MEDICINE

## 2021-03-23 VITALS
RESPIRATION RATE: 14 BRPM | WEIGHT: 156 LBS | SYSTOLIC BLOOD PRESSURE: 122 MMHG | DIASTOLIC BLOOD PRESSURE: 62 MMHG | HEART RATE: 62 BPM | BODY MASS INDEX: 25.96 KG/M2

## 2021-03-23 VITALS
HEIGHT: 65 IN | WEIGHT: 154 LBS | SYSTOLIC BLOOD PRESSURE: 122 MMHG | DIASTOLIC BLOOD PRESSURE: 62 MMHG | BODY MASS INDEX: 25.66 KG/M2 | HEART RATE: 62 BPM

## 2021-03-23 DIAGNOSIS — I49.5 SICK SINUS SYNDROME (CMD): ICD-10-CM

## 2021-03-23 DIAGNOSIS — Z95.0 PACEMAKER: ICD-10-CM

## 2021-03-23 DIAGNOSIS — I10 HTN (HYPERTENSION), BENIGN: Primary | ICD-10-CM

## 2021-03-23 DIAGNOSIS — E78.00 PURE HYPERCHOLESTEROLEMIA: ICD-10-CM

## 2021-03-23 DIAGNOSIS — I48.0 PAROXYSMAL ATRIAL FIBRILLATION (CMD): ICD-10-CM

## 2021-03-23 DIAGNOSIS — Z95.0 CARDIAC PACEMAKER: Primary | ICD-10-CM

## 2021-03-23 PROCEDURE — 99214 OFFICE O/P EST MOD 30 MIN: CPT | Performed by: INTERNAL MEDICINE

## 2021-03-23 PROCEDURE — 93280 PM DEVICE PROGR EVAL DUAL: CPT | Performed by: INTERNAL MEDICINE

## 2021-03-23 ASSESSMENT — PATIENT HEALTH QUESTIONNAIRE - PHQ9
CLINICAL INTERPRETATION OF PHQ2 SCORE: NO FURTHER SCREENING NEEDED
2. FEELING DOWN, DEPRESSED OR HOPELESS: NOT AT ALL
SUM OF ALL RESPONSES TO PHQ9 QUESTIONS 1 AND 2: 0
1. LITTLE INTEREST OR PLEASURE IN DOING THINGS: NOT AT ALL
SUM OF ALL RESPONSES TO PHQ9 QUESTIONS 1 AND 2: 0
CLINICAL INTERPRETATION OF PHQ9 SCORE: NO FURTHER SCREENING NEEDED

## 2021-03-24 ENCOUNTER — TELEPHONE (OUTPATIENT)
Dept: INTERNAL MEDICINE CLINIC | Facility: CLINIC | Age: 71
End: 2021-03-24

## 2021-03-24 RX ORDER — ASPIRIN 81 MG/1
81 TABLET ORAL DAILY
Qty: 1 TABLET | Refills: 0 | COMMUNITY
Start: 2021-03-24

## 2021-03-30 ENCOUNTER — APPOINTMENT (OUTPATIENT)
Dept: CARDIOLOGY | Age: 71
End: 2021-03-30
Attending: INTERNAL MEDICINE

## 2021-04-12 ENCOUNTER — TELEPHONE (OUTPATIENT)
Dept: INTERNAL MEDICINE CLINIC | Facility: CLINIC | Age: 71
End: 2021-04-12

## 2021-04-12 DIAGNOSIS — R44.3 HALLUCINATIONS: ICD-10-CM

## 2021-04-12 DIAGNOSIS — F03.91 DEMENTIA WITH BEHAVIORAL DISTURBANCE, UNSPECIFIED DEMENTIA TYPE: ICD-10-CM

## 2021-04-12 DIAGNOSIS — I48.91 ATRIAL FIBRILLATION, UNSPECIFIED TYPE (HCC): ICD-10-CM

## 2021-04-12 DIAGNOSIS — I10 ESSENTIAL HYPERTENSION: ICD-10-CM

## 2021-04-12 DIAGNOSIS — G47.52 REM SLEEP BEHAVIOR DISORDER: Primary | ICD-10-CM

## 2021-04-12 NOTE — TELEPHONE ENCOUNTER
Message noted. 1.  I generated sleep referral.  In outbox. Probably best to mail this to her or we can just give her the phone number of the phone and she can schedule. 2. In terms of psychiatrist please ask her permission to send a note to behavioral health so they can give her resources in regards to psychiatry. I will then send them a note regarding need for referral to a psychiatrist for help with diagnosis and sleep.

## 2021-04-12 NOTE — TELEPHONE ENCOUNTER
Pt would like to move forward & schedule sleep study Dr Denisse Fay mentioned on 2/5 OV    Also needs recommendation for a psychiatrist  - female would preferable     Please call pt to advise [phone]

## 2021-04-12 NOTE — TELEPHONE ENCOUNTER
Spoke with Gary Mendez and relayed MD message, she verbalized understanding. Provided her with sleep center phone number to call and schedule as well as mailed hard copy to her home.      TO Dr. Neo Morales-- patient gives verbal permission to send a note to behavioral health department to assist in referral.

## 2021-04-12 NOTE — TELEPHONE ENCOUNTER
Spoke with patient as Afshan Carver referral was placed in Feb. Patient states she did try to get in with them but they were all booked. She saw Aakash Villasenor recently and she was told she needs to see a psychiatrist as some of her symptoms may be associated with schizophrenia, others with dementia. Lenorechantel Jacklyn also wanted patient to see a psychiatrist to discuss a sleep mediation that would be ok for her to take. Per patient she is so exhausted but then when she goes to bed she is up and down all night and then wants to sleep all day. Per note 2/5/21:  Dior Hanson she has REM sleep behavior disorder and she was recommended to have a sleep study. \"    TO Dr. Amber Alvarez to advise on psychiatrist/sleep study. Thanks!

## 2021-04-13 ENCOUNTER — TELEPHONE (OUTPATIENT)
Dept: INTERNAL MEDICINE CLINIC | Facility: CLINIC | Age: 71
End: 2021-04-13

## 2021-04-13 NOTE — TELEPHONE ENCOUNTER
Millicent Osgood, MD Dr. Nanetta Holm,     On 4/13/2021, the following referrals for psychiatry were provided to the patient:     David Garcia, Psychiatrist, [address], [facility], [employer]

## 2021-04-15 ENCOUNTER — OFFICE VISIT (OUTPATIENT)
Dept: SLEEP CENTER | Age: 71
End: 2021-04-15
Attending: INTERNAL MEDICINE
Payer: MEDICARE

## 2021-04-15 DIAGNOSIS — F03.91 DEMENTIA WITH BEHAVIORAL DISTURBANCE, UNSPECIFIED DEMENTIA TYPE (HCC): ICD-10-CM

## 2021-04-15 DIAGNOSIS — I10 ESSENTIAL HYPERTENSION: ICD-10-CM

## 2021-04-15 DIAGNOSIS — I48.91 ATRIAL FIBRILLATION, UNSPECIFIED TYPE (HCC): ICD-10-CM

## 2021-04-15 DIAGNOSIS — G47.52 REM SLEEP BEHAVIOR DISORDER: ICD-10-CM

## 2021-04-15 PROCEDURE — 95810 POLYSOM 6/> YRS 4/> PARAM: CPT

## 2021-04-19 ENCOUNTER — TELEPHONE (OUTPATIENT)
Dept: INTERNAL MEDICINE CLINIC | Facility: CLINIC | Age: 71
End: 2021-04-19

## 2021-04-19 NOTE — TELEPHONE ENCOUNTER
MD Dr. Timothy Abbott,     On 4/13/2021, the following referrals for psychiatry were provided to the patient:     Adri Russell, Psychiatrist, [address], [facility], [facility]

## 2021-04-19 NOTE — TELEPHONE ENCOUNTER
Elvin Griffith MD  Em [facility] Clinical Staff 56 minutes ago (9:32 AM)     RN, reach out to the patient nor her family to facilitate an appointment to see me in the office. Eledina Bal be added on within the next few weeks.     Message text    Elvin Griffith MD

## 2021-04-19 NOTE — PROCEDURES
[address] [facility]  Accredited by the [facility] ([facility]ASM)    PATIENT'S NAME: Sherry Gabiherman   ATTENDING PHYSICIAN: Lolly Villalta MD   REFERRING PHYSICIAN: Lolly Villalta MD   PATIENT ACCOUNT #: [de-identified] LOCATION: [facility] was fairly normal except for brief periods of wakefulness after sleep onset. There were 5 normal sleep cycles including REM and supine REM was demonstrated.     INTERPRETATION:  The data generated from this study is consistent with mild obstructive sleep a

## 2021-04-19 NOTE — TELEPHONE ENCOUNTER
Please let patient know that her sleep study did show that she has some mild sleep apnea. There was noted NO abnormal behavior during sleep.     However I would like her to see Dr. Aliyah Wang who read the study for proper interpretation and next steps in terms

## 2021-04-19 NOTE — TELEPHONE ENCOUNTER
Relayed MD message to pt, who verbalized understanding. Pt would like sleep study faxed to Meredith Smith  nurse practitioner at [facility] f- [phone]. Ok to fax?

## 2021-04-21 NOTE — TELEPHONE ENCOUNTER
Pt accepted appt w/ Dr. Deondre Sepulveda on 5/6 @ 12:45 pm at [facility]. Appt info given. She voiced understanding.

## 2021-04-28 ENCOUNTER — HOSPITAL ENCOUNTER (EMERGENCY)
Facility: HOSPITAL | Age: 71
Discharge: HOME | End: 2021-04-28
Attending: EMERGENCY MEDICINE
Payer: MEDICARE

## 2021-04-28 ENCOUNTER — TELEPHONE (OUTPATIENT)
Dept: INTERNAL MEDICINE CLINIC | Facility: CLINIC | Age: 71
End: 2021-04-28

## 2021-04-28 VITALS
SYSTOLIC BLOOD PRESSURE: 136 MMHG | BODY MASS INDEX: 27.31 KG/M2 | WEIGHT: 160 LBS | HEART RATE: 60 BPM | HEIGHT: 64 IN | RESPIRATION RATE: 18 BRPM | OXYGEN SATURATION: 97 % | DIASTOLIC BLOOD PRESSURE: 82 MMHG | TEMPERATURE: 98 F

## 2021-04-28 DIAGNOSIS — S39.012A STRAIN OF LUMBAR REGION, INITIAL ENCOUNTER: Primary | ICD-10-CM

## 2021-04-28 PROCEDURE — 99284 EMERGENCY DEPT VISIT MOD MDM: CPT

## 2021-04-28 PROCEDURE — 96375 TX/PRO/DX INJ NEW DRUG ADDON: CPT

## 2021-04-28 PROCEDURE — 96374 THER/PROPH/DIAG INJ IV PUSH: CPT

## 2021-04-28 PROCEDURE — 85025 COMPLETE CBC W/AUTO DIFF WBC: CPT | Performed by: EMERGENCY MEDICINE

## 2021-04-28 PROCEDURE — 81003 URINALYSIS AUTO W/O SCOPE: CPT | Performed by: EMERGENCY MEDICINE

## 2021-04-28 PROCEDURE — 80048 BASIC METABOLIC PNL TOTAL CA: CPT | Performed by: EMERGENCY MEDICINE

## 2021-04-28 RX ORDER — ONDANSETRON 2 MG/ML
4 INJECTION INTRAMUSCULAR; INTRAVENOUS ONCE
Status: COMPLETED | OUTPATIENT
Start: 2021-04-28 | End: 2021-04-28

## 2021-04-28 RX ORDER — MORPHINE SULFATE 4 MG/ML
4 INJECTION, SOLUTION INTRAMUSCULAR; INTRAVENOUS ONCE
Status: COMPLETED | OUTPATIENT
Start: 2021-04-28 | End: 2021-04-28

## 2021-04-28 RX ORDER — KETOROLAC TROMETHAMINE 15 MG/ML
15 INJECTION, SOLUTION INTRAMUSCULAR; INTRAVENOUS ONCE
Status: COMPLETED | OUTPATIENT
Start: 2021-04-28 | End: 2021-04-28

## 2021-04-28 RX ORDER — TRAMADOL HYDROCHLORIDE 50 MG/1
50 TABLET ORAL EVERY 6 HOURS PRN
Qty: 16 TABLET | Refills: 0 | Status: SHIPPED | OUTPATIENT
Start: 2021-04-28 | End: 2021-05-05 | Stop reason: WASHOUT

## 2021-04-28 NOTE — ED INITIAL ASSESSMENT (HPI)
Patient presents to ER with complaints of right sided flank pain, that is now radiating to the left. Patient admits less urination, and \"slower\". Advised to come in by PCP, no other complaints.

## 2021-04-28 NOTE — TELEPHONE ENCOUNTER
Patient is having back pain in the kidney area  More so on the right side  Doesn't think she has a UTI    Requests call back from nursing to discuss & advise if she should make an appt with Dr Syeda Mclain    [phone]

## 2021-04-28 NOTE — ED PROVIDER NOTES
Patient Seen in: [facility] AND [facility] Emergency Department      History   Patient presents with:  Abdomen/Flank Pain    Stated Complaint: kidney pain    HPI/Subjective:   HPI    34-year-old female with past medical history significant for Alzheimer's, CAD, with removal of both tubes and ovaries     as per NG   • Incontinence     DUE TO CYSTOCELE/RECTOCELE   • Lymphocytic colitis 2011    as per NG   • Migraine 9/21/2012   • Migraines    • Mild cognitive impairment     as per NG   • Obstructive sleep apnea 12/ tobacco: Never Used    Vaping Use      Vaping Use: Never used    Alcohol use: Yes      Comment: About 3 drinks a year    Drug use: Yes      Types: Cannabis      Comment: medical cannabis             Review of Systems    Positive for stated complaint: rafael CBC WITH DIFFERENTIAL WITH PLATELET    Narrative: The following orders were created for panel order CBC WITH DIFFERENTIAL WITH PLATELET.   Procedure                               Abnormality         Status                     ---------

## 2021-04-28 NOTE — TELEPHONE ENCOUNTER
Spoke to pt who reports since yesterday R flank pain, very tender to touch, pain 5/10, achy and sharp with movement. States today pain has started to radiate to L flank, but not as severe on L. States can barely stand up if she bends over.  Also reports deana

## 2021-04-29 NOTE — TELEPHONE ENCOUNTER
OZI to Dr. Ashanti Fernandez due to pt reporting her urine output is still decreased. Pt reports feeling better since ER visit---pt reports pain is improving. Pt is able to complete ADLs today. Reports urine is still \"slow\". Denies fever or chills.  Encouraged patient t

## 2021-04-30 NOTE — TELEPHONE ENCOUNTER
Message noted. I did review emergency room evaluation. Urinalysis negative and renal function stable and no white count. It would appear that patient's low back pain is musculoskeletal.  For now I would not need to see patient in the office.     She ca

## 2021-05-18 ENCOUNTER — ANCILLARY ORDERS (OUTPATIENT)
Dept: CARDIOLOGY | Age: 71
End: 2021-05-18

## 2021-05-18 ENCOUNTER — ANCILLARY PROCEDURE (OUTPATIENT)
Dept: CARDIOLOGY | Age: 71
End: 2021-05-18
Attending: INTERNAL MEDICINE

## 2021-05-18 DIAGNOSIS — Z45.018 ENCOUNTER FOR CARE OF PACEMAKER: ICD-10-CM

## 2021-05-18 PROCEDURE — X1114 CARDIAC DEVICE HOME CHECK - REMOTE UNSCHEDULED: HCPCS | Performed by: INTERNAL MEDICINE

## 2021-05-18 PROCEDURE — 93294 REM INTERROG EVL PM/LDLS PM: CPT | Performed by: INTERNAL MEDICINE

## 2021-06-01 ENCOUNTER — OFFICE VISIT (OUTPATIENT)
Dept: PULMONOLOGY | Facility: CLINIC | Age: 71
End: 2021-06-01
Payer: MEDICARE

## 2021-06-01 VITALS
WEIGHT: 155 LBS | BODY MASS INDEX: 25.83 KG/M2 | SYSTOLIC BLOOD PRESSURE: 139 MMHG | HEART RATE: 60 BPM | OXYGEN SATURATION: 97 % | DIASTOLIC BLOOD PRESSURE: 72 MMHG | RESPIRATION RATE: 18 BRPM | HEIGHT: 65 IN

## 2021-06-01 DIAGNOSIS — G47.33 OSA (OBSTRUCTIVE SLEEP APNEA): Primary | ICD-10-CM

## 2021-06-01 PROCEDURE — 99204 OFFICE O/P NEW MOD 45 MIN: CPT | Performed by: INTERNAL MEDICINE

## 2021-06-01 RX ORDER — ATORVASTATIN CALCIUM 20 MG/1
TABLET, FILM COATED ORAL
Qty: 90 TABLET | Refills: 0 | Status: SHIPPED | OUTPATIENT
Start: 2021-06-01 | End: 2021-12-16

## 2021-06-01 NOTE — PROGRESS NOTES
Dear Balaji Drake:           As you know, Shashi De La Torre is a delightful 57-year-old female who I am now evaluating for sleep disturbance.        HISTORY OF PRESENT ILLNESS: The patient has prior history of obstructive sleep apnea and many years ago she had tried CPAP inclu morning headache but she does have a depressed mood and her Dalton Sleepiness Scale score is 16 out of 24. PAST MEDICAL AND SURGICAL HISTORY:   1.   Organic brain syndrome sleep apnea epilepsy watchman device hypertension paroxysmal A. fib arthritis hys are bizarre and including going to bed 3 hours before she intends to fall asleep and then smoking marijuana while in bed. Her nocturnal awakenings likely had to do with the fact of spending too much time in bed. She should pursue sleep restriction.   She

## 2021-06-07 NOTE — TELEPHONE ENCOUNTER
Called patient  and relayed DR. FLEMING message - verbalized understanding. Med module updated
Please call patient let her know that I did not see Dr. Peter Cisneros's consult note. Please tell patient that he recommended that she be on aspirin 81 mg a day.   She may be already taking this but I do not have this documented on her medication
Strong peripheral pulses

## 2021-06-19 ENCOUNTER — APPOINTMENT (OUTPATIENT)
Dept: GENERAL RADIOLOGY | Facility: HOSPITAL | Age: 71
End: 2021-06-19
Attending: EMERGENCY MEDICINE
Payer: MEDICARE

## 2021-06-19 ENCOUNTER — HOSPITAL ENCOUNTER (OUTPATIENT)
Facility: HOSPITAL | Age: 71
Setting detail: OBSERVATION
Discharge: HOME | End: 2021-06-21
Attending: EMERGENCY MEDICINE | Admitting: HOSPITALIST
Payer: MEDICARE

## 2021-06-19 DIAGNOSIS — R07.9 CHEST PAIN OF UNCERTAIN ETIOLOGY: Primary | ICD-10-CM

## 2021-06-19 PROCEDURE — 71045 X-RAY EXAM CHEST 1 VIEW: CPT | Performed by: EMERGENCY MEDICINE

## 2021-06-19 PROCEDURE — 99219 INITIAL OBSERVATION CARE,LEVL II: CPT | Performed by: HOSPITALIST

## 2021-06-19 RX ORDER — NITROGLYCERIN 0.4 MG/1
0.4 TABLET SUBLINGUAL ONCE
Status: COMPLETED | OUTPATIENT
Start: 2021-06-19 | End: 2021-06-19

## 2021-06-19 RX ORDER — ESCITALOPRAM OXALATE 10 MG/1
10 TABLET ORAL EVERY EVENING
Status: DISCONTINUED | OUTPATIENT
Start: 2021-06-19 | End: 2021-06-21

## 2021-06-19 RX ORDER — ROPINIROLE 0.25 MG/1
1 TABLET, FILM COATED ORAL DAILY
Status: DISCONTINUED | OUTPATIENT
Start: 2021-06-20 | End: 2021-06-21

## 2021-06-19 RX ORDER — ASPIRIN 81 MG/1
81 TABLET ORAL DAILY
Status: DISCONTINUED | OUTPATIENT
Start: 2021-06-20 | End: 2021-06-19

## 2021-06-19 RX ORDER — ROPINIROLE 1 MG/1
2 TABLET, FILM COATED ORAL NIGHTLY
Status: DISCONTINUED | OUTPATIENT
Start: 2021-06-19 | End: 2021-06-21

## 2021-06-19 RX ORDER — AMLODIPINE BESYLATE 2.5 MG/1
2.5 TABLET ORAL DAILY
Status: DISCONTINUED | OUTPATIENT
Start: 2021-06-20 | End: 2021-06-21

## 2021-06-19 RX ORDER — ONDANSETRON 2 MG/ML
4 INJECTION INTRAMUSCULAR; INTRAVENOUS EVERY 6 HOURS PRN
Status: DISCONTINUED | OUTPATIENT
Start: 2021-06-19 | End: 2021-06-21

## 2021-06-19 RX ORDER — ONDANSETRON 2 MG/ML
INJECTION INTRAMUSCULAR; INTRAVENOUS
Status: COMPLETED
Start: 2021-06-19 | End: 2021-06-19

## 2021-06-19 RX ORDER — ROPINIROLE 0.25 MG/1
1 TABLET, FILM COATED ORAL 3 TIMES DAILY
Status: DISCONTINUED | OUTPATIENT
Start: 2021-06-19 | End: 2021-06-19

## 2021-06-19 RX ORDER — ROPINIROLE 1 MG/1
1 TABLET, FILM COATED ORAL DAILY
COMMUNITY
End: 2021-11-24

## 2021-06-19 RX ORDER — DONEPEZIL HYDROCHLORIDE 10 MG/1
10 TABLET, FILM COATED ORAL NIGHTLY
Status: DISCONTINUED | OUTPATIENT
Start: 2021-06-19 | End: 2021-06-21

## 2021-06-19 RX ORDER — ROPINIROLE 2 MG/1
2 TABLET, FILM COATED ORAL NIGHTLY
COMMUNITY
End: 2021-11-30

## 2021-06-19 RX ORDER — ONDANSETRON 2 MG/ML
4 INJECTION INTRAMUSCULAR; INTRAVENOUS ONCE
Status: COMPLETED | OUTPATIENT
Start: 2021-06-19 | End: 2021-06-19

## 2021-06-19 RX ORDER — OXYBUTYNIN CHLORIDE 5 MG/1
5 TABLET, EXTENDED RELEASE ORAL DAILY
Status: DISCONTINUED | OUTPATIENT
Start: 2021-06-20 | End: 2021-06-21

## 2021-06-19 RX ORDER — ALLOPURINOL 100 MG/1
100 TABLET ORAL 2 TIMES DAILY
Status: DISCONTINUED | OUTPATIENT
Start: 2021-06-19 | End: 2021-06-21

## 2021-06-19 RX ORDER — ATORVASTATIN CALCIUM 20 MG/1
20 TABLET, FILM COATED ORAL NIGHTLY
Status: DISCONTINUED | OUTPATIENT
Start: 2021-06-19 | End: 2021-06-21

## 2021-06-19 RX ORDER — LAMOTRIGINE 150 MG/1
150 TABLET ORAL 2 TIMES DAILY
Status: DISCONTINUED | OUTPATIENT
Start: 2021-06-19 | End: 2021-06-21

## 2021-06-19 RX ORDER — HEPARIN SODIUM 5000 [USP'U]/ML
5000 INJECTION, SOLUTION INTRAVENOUS; SUBCUTANEOUS EVERY 12 HOURS SCHEDULED
Status: DISCONTINUED | OUTPATIENT
Start: 2021-06-20 | End: 2021-06-21

## 2021-06-20 ENCOUNTER — APPOINTMENT (OUTPATIENT)
Dept: CV DIAGNOSTICS | Facility: HOSPITAL | Age: 71
End: 2021-06-20
Attending: NURSE PRACTITIONER
Payer: MEDICARE

## 2021-06-20 PROCEDURE — 99214 OFFICE O/P EST MOD 30 MIN: CPT | Performed by: INTERNAL MEDICINE

## 2021-06-20 PROCEDURE — 93306 TTE W/DOPPLER COMPLETE: CPT | Performed by: NURSE PRACTITIONER

## 2021-06-20 PROCEDURE — 99226 SUBSEQUENT OBSERVATION CARE: CPT | Performed by: HOSPITALIST

## 2021-06-20 RX ORDER — METOCLOPRAMIDE HYDROCHLORIDE 5 MG/ML
5 INJECTION INTRAMUSCULAR; INTRAVENOUS EVERY 6 HOURS PRN
Status: DISCONTINUED | OUTPATIENT
Start: 2021-06-20 | End: 2021-06-20

## 2021-06-20 RX ORDER — ACETAMINOPHEN 325 MG/1
650 TABLET ORAL EVERY 6 HOURS PRN
Status: DISCONTINUED | OUTPATIENT
Start: 2021-06-20 | End: 2021-06-21

## 2021-06-20 RX ORDER — ASPIRIN 81 MG/1
81 TABLET ORAL DAILY
Status: DISCONTINUED | OUTPATIENT
Start: 2021-06-20 | End: 2021-06-21

## 2021-06-20 RX ORDER — METOCLOPRAMIDE HYDROCHLORIDE 5 MG/ML
10 INJECTION INTRAMUSCULAR; INTRAVENOUS EVERY 6 HOURS PRN
Status: DISCONTINUED | OUTPATIENT
Start: 2021-06-20 | End: 2021-06-21

## 2021-06-20 NOTE — ED NOTES
Orders for admission, patient is aware of plan and ready to go upstairs. Any questions, please call ED RN Akbar  at [address]. Type of COVID test sent: Rapid pending  COVID Suspicion level: Low  The patient is fully vaccinated.     Titratable drug(s)

## 2021-06-20 NOTE — PROGRESS NOTES
[facility]    Progress Note    Niko Bolder Patient Status:  Observation    3/28/1950 MRN [id number]   Location [facility] 3W/SW Attending Jeremy Samuel MD   Hosp Day # 0 PCP Cesar Butler MD       Subjective:   Hernán Gamez allopurinol  100 mg Oral BID   • amLODIPine Besylate  2.5 mg Oral Daily   • Heparin Sodium (Porcine)  5,000 Units Subcutaneous 2 times per day   • rOPINIRole HCl  2 mg Oral Nightly   • rOPINIRole HCl  1 mg Oral Daily       Current PRN Inpatient Meds: 03/15/2018       Assessment and Plan:   Chest pain possible ACS  Possibly MSK  -echo  -stress test in AM  -trial lidocaine patch / tylenol      Essential hypertension  -better  -cpm     Hyperlipidemia  Continue statin.     Dementia without signs of agitati

## 2021-06-20 NOTE — PLAN OF CARE
Patient denies chest pain overnight. PRN zofran given for nausea. Vital signs stable. Will continue to monitor.     Problem: PAIN - ADULT  Goal: Verbalizes/displays adequate comfort level or patient's stated pain goal  Description: INTERVENTIONS:  Shannan Xiao Minimal or absence of nausea and vomiting  Description: INTERVENTIONS:  - Maintain adequate hydration with IV or PO as ordered and tolerated  - Nasogastric tube to low intermittent suction as ordered  - Evaluate effectiveness of ordered antiemetic medicati

## 2021-06-20 NOTE — PLAN OF CARE
Seen by mds & updated, midstrenal chest discomfort sl. Better, ha better w/tylenol, nausea sl. Better w/zofran, room darkened for rest, plan for echo & stress test, started po asa, cont. To monitor & maintain comfort & safety.   Problem: Patient Centered Ca and temperature  - Assess for signs of decreased coronary artery perfusion - ex.  Angina  - Evaluate fluid balance, assess for edema, trend weights  Outcome: Progressing  Goal: Absence of cardiac arrhythmias or at baseline  Description: INTERVENTIONS:  - Co

## 2021-06-20 NOTE — CONSULTS
[facility]    Report of Consultation    Anne Tucker Patient Status:  Observation    3/28/1950 MRN [id number]   Location [facility] 3W/SW Attending Jamilah Nicholson MD   Hosp Day # 0 PCP Sergio Armenta MD     Date of Admission: hysterectomy with removal of both tubes and ovaries     as per NG   • Incontinence     DUE TO CYSTOCELE/RECTOCELE   • Lymphocytic colitis 2011    as per NG   • Migraine 9/21/2012   • Migraines    • Mild cognitive impairment     as per NG   • Obstructive sl • Hypertension Mother         as per NG   • Heart Disease Mother         as per NG   • Migraines Mother         as per NG   • Cancer Sister         Lung Cancer, Kidney Cancer; as per NG   • Hypertension Brother         as per NG   • Cancer Brother Tab, Take 10 mg by mouth nightly. aspirin 81 MG Oral Tab EC, Take 1 tablet (81 mg total) by mouth daily. Diclofenac Sodium (VOLTAREN) 1 % Transdermal Gel, Apply 2 g topically 3 (three) times daily as needed.   Dicyclomine HCl 10 MG Oral Cap, Take 1 capsul 03/16/2018    INR 1.01 05/03/2019    T4F 1.07 07/09/2018    TSH 2.290 10/26/2020     01/27/2020    DDIMER 0.46 06/19/2021    CRP <0.5 03/15/2018    MG 2.4 09/12/2019    PHOS 4.0 02/23/2021    TROP <0.045 06/19/2021    CK 87 05/25/2017    B12 382 04/

## 2021-06-20 NOTE — ED PROVIDER NOTES
Patient Seen in: [facility] AND [facility] Emergency Department      History   Patient presents with:  Chest Pain Angina    Stated Complaint: chest pain     HPI/Subjective:   HPI    Patient presents to the emergency department complaining of chest pain.   She sta NG   • Obstructive sleep apnea 12/30/2013   • Pacemaker    • Pseudobulbar affect 2/24/2017   • Restless leg syndrome     as per NG   • Restless legs syndrome 5/16/2013   • Seizure disorder [facility])     as per NG   • Seizure disorder (HCC)    • Tendon tear 201 for stated complaint: chest pain   Other systems are as noted in HPI. Constitutional and vital signs reviewed. All other systems reviewed and negative except as noted above.     Physical Exam     ED Triage Vitals [06/19/21 1914]   BP (!) 186/73   Puls ---------                               -----------         ------                     CBC W/ DIFFERENTIAL[[id number]]                              Final result                 Please view results for these tests on the individual orders. follow-up provider specified. We recommend that you schedule follow up care with a primary care provider within the next three months to obtain basic health screening including reassessment of your blood pressure.       Medications Prescribed:  Current Dis

## 2021-06-20 NOTE — H&P
[address] Patient Status:  Observation    3/28/1950 MRN [id number]   Location [facility] 3W/SW Attending Paula Garcia MD   Hosp Day # 0 PCP Jermain Chester MD     Date:  2021  Date History of total hysterectomy with removal of both tubes and ovaries     as per NG   • Incontinence     DUE TO CYSTOCELE/RECTOCELE   • Lymphocytic colitis 2011    as per NG   • Migraine 9/21/2012   • Migraines    • Mild cognitive impairment     as per NG disease; as per NG   • Hypertension Mother         as per NG   • Heart Disease Mother         as per NG   • Migraines Mother         as per NG   • Cancer Sister         Lung Cancer, Kidney Cancer; as per NG   • Hypertension Brother         as per NG   • Ca TAKE ONE TABLET BY MOUTH THREE TIMES DAILY    aspirin 81 MG Oral Tab EC   Yes No   Sig: Take 1 tablet (81 mg total) by mouth daily. cephALEXin (KEFLEX) 500 MG Oral Cap   No No   Sig: Take 1 capsule (500 mg total) by mouth 4 (four) times daily.    Patient range of motion. normal strength. Feet:  Normal pulses. Neurologic:  Alert, oriented, no focal deficits, cranial nerves II-XII are grossly intact. Cognition and Speech:  Oriented, speech clear and coherent.   Psychiatric:  Cooperative, appropriate mood

## 2021-06-21 ENCOUNTER — APPOINTMENT (OUTPATIENT)
Dept: CV DIAGNOSTICS | Facility: HOSPITAL | Age: 71
End: 2021-06-21
Attending: HOSPITALIST
Payer: MEDICARE

## 2021-06-21 ENCOUNTER — APPOINTMENT (OUTPATIENT)
Dept: NUCLEAR MEDICINE | Facility: HOSPITAL | Age: 71
End: 2021-06-21
Attending: HOSPITALIST
Payer: MEDICARE

## 2021-06-21 VITALS
SYSTOLIC BLOOD PRESSURE: 127 MMHG | HEIGHT: 65 IN | HEART RATE: 66 BPM | OXYGEN SATURATION: 98 % | WEIGHT: 154.81 LBS | TEMPERATURE: 98 F | BODY MASS INDEX: 25.79 KG/M2 | DIASTOLIC BLOOD PRESSURE: 74 MMHG | RESPIRATION RATE: 18 BRPM

## 2021-06-21 PROCEDURE — 93017 CV STRESS TEST TRACING ONLY: CPT | Performed by: HOSPITALIST

## 2021-06-21 PROCEDURE — 99217 OBSERVATION CARE DISCHARGE: CPT | Performed by: HOSPITALIST

## 2021-06-21 PROCEDURE — 78452 HT MUSCLE IMAGE SPECT MULT: CPT | Performed by: HOSPITALIST

## 2021-06-21 PROCEDURE — 99214 OFFICE O/P EST MOD 30 MIN: CPT | Performed by: NURSE PRACTITIONER

## 2021-06-21 NOTE — PLAN OF CARE
Problem: Patient Centered Care  Goal: Patient preferences are identified and integrated in the patient's plan of care  Description: Interventions:  - What would you like us to know as we care for you?   - Provide timely, complete, and accurate informatio of decreased cardiac output  - Evaluate effectiveness of vasoactive medications to optimize hemodynamic stability  - Monitor arterial and/or venous puncture sites for bleeding and/or hematoma  - Assess quality of pulses, skin color and temperature  - Asses

## 2021-06-21 NOTE — PROGRESS NOTES
[facility]    Progress Note    Susan Kingston Patient Status:  Observation    3/28/1950 MRN [id number]   Location [facility] 3W/SW Attending Cristel Contreras MD   Hosp Day # 0 PCP Mary Espinosa MD       Subjective:   Anai Folres atorvastatin  20 mg Oral Nightly   • allopurinol  100 mg Oral BID   • amLODIPine Besylate  2.5 mg Oral Daily   • Heparin Sodium (Porcine)  5,000 Units Subcutaneous 2 times per day   • rOPINIRole HCl  2 mg Oral Nightly   • rOPINIRole HCl  1 mg Oral Daily -------------------------- Sinus Rhythm Low voltage in limb leads.  - Nonspecific T-abnormality.  ABNORMAL When compared with ECG of 06/19/2021 19:14:31 No significant changes have occurred Electronically signed on 06/21/2021 at 09:01 by Jorge Hernandez MD    E

## 2021-06-21 NOTE — PROGRESS NOTES
[facility]    Progress Note    Juanita Mock Patient Status:  Observation    3/28/1950 MRN [id number]   Location [facility] 3W/SW Attending Ceasar Treviño MD   Hosp Day # 0 PCP Ricardo Jara MD     HPI/Subjective:     Constitu (CPT=71045)    Result Date: 6/19/2021  CONCLUSION:   Enlarged cardiomediastinal silhouette, unchanged since the prior exams. Mild bibasilar atelectasis, also unchanged. Otherwise no acute cardiopulmonary abnormality.     Dictated by (CST): Rupesh Mahmood MD

## 2021-06-21 NOTE — DISCHARGE PLANNING
Patient was provided with discharge instructions, education, and follow up information. Patient verbalizes understanding of follow up information and non-cardiac chest pain.  Patient has no questions after reviewing all instructions and will be going home w

## 2021-06-21 NOTE — PLAN OF CARE
Nadiya Cadenax4, RA, up self, Nuc Stress test done today, pt to be discharged per MD's orders and to follow up with cardiology in 4-6 weeks.        Problem: Patient Centered Care  Goal: Patient preferences are identified and integrated in the patient's plan of c Notify MD/LIP if interventions unsuccessful or patient reports new pain  - Anticipate increased pain with activity and pre-medicate as appropriate  Outcome: Completed     Problem: CARDIOVASCULAR - ADULT  Goal: Maintains optimal cardiac output and hemodynam

## 2021-06-25 NOTE — DISCHARGE SUMMARY
[facility] HOSPITALIST  DISCHARGE SUMMARY     Roanoke Rojas Patient Status:  Observation    3/28/1950 MRN [id number]   [facility] 3W/ Attending No att. providers found   Hosp Day # 0 PCP Efren Mittal MD     DATE OF ADMISSION:  CONTINUE taking these medications      Instructions Prescription details   Acetaminophen-Codeine #3 300-30 MG Tabs  Commonly known as: TYLENOL #3      Take 1 tablet by mouth every 6 (six) hours as needed for Pain.    Quantity: 15 tablet  Refills: 0     al mouth daily. Refills: 0     rOPINIRole HCl 1 MG Tabs  Commonly known as: REQUIP      Take 1 mg by mouth daily. Refills: 0     rOPINIRole HCl 2 MG Tabs  Commonly known as: REQUIP      Take 2 mg by mouth nightly.    Refills: 0            CONSULTANTS  Cons

## 2021-07-02 ENCOUNTER — TELEPHONE (OUTPATIENT)
Dept: PULMONOLOGY | Facility: CLINIC | Age: 71
End: 2021-07-02

## 2021-07-08 ENCOUNTER — APPOINTMENT (OUTPATIENT)
Dept: CARDIOLOGY | Age: 71
End: 2021-07-08

## 2021-07-15 ENCOUNTER — LAB ENCOUNTER (OUTPATIENT)
Dept: LAB | Age: 71
End: 2021-07-15
Attending: INTERNAL MEDICINE
Payer: MEDICARE

## 2021-07-15 ENCOUNTER — OFFICE VISIT (OUTPATIENT)
Dept: INTERNAL MEDICINE CLINIC | Facility: CLINIC | Age: 71
End: 2021-07-15
Payer: MEDICARE

## 2021-07-15 VITALS
HEART RATE: 66 BPM | WEIGHT: 153 LBS | TEMPERATURE: 98 F | BODY MASS INDEX: 25.49 KG/M2 | DIASTOLIC BLOOD PRESSURE: 56 MMHG | HEIGHT: 65 IN | OXYGEN SATURATION: 98 % | SYSTOLIC BLOOD PRESSURE: 104 MMHG

## 2021-07-15 DIAGNOSIS — F03.91 DEMENTIA WITH BEHAVIORAL DISTURBANCE, UNSPECIFIED DEMENTIA TYPE (HCC): ICD-10-CM

## 2021-07-15 DIAGNOSIS — A09 DIARRHEA OF INFECTIOUS ORIGIN: Primary | ICD-10-CM

## 2021-07-15 DIAGNOSIS — R11.10 VOMITING AND DIARRHEA: ICD-10-CM

## 2021-07-15 DIAGNOSIS — I10 ESSENTIAL HYPERTENSION: ICD-10-CM

## 2021-07-15 DIAGNOSIS — A09 DIARRHEA OF INFECTIOUS ORIGIN: ICD-10-CM

## 2021-07-15 DIAGNOSIS — M79.671 RIGHT FOOT PAIN: ICD-10-CM

## 2021-07-15 DIAGNOSIS — M10.9 GOUTY ARTHRITIS: ICD-10-CM

## 2021-07-15 DIAGNOSIS — R19.7 VOMITING AND DIARRHEA: ICD-10-CM

## 2021-07-15 DIAGNOSIS — I48.91 ATRIAL FIBRILLATION, UNSPECIFIED TYPE (HCC): ICD-10-CM

## 2021-07-15 LAB
ALBUMIN SERPL-MCNC: 3.7 G/DL (ref 3.4–5)
ALBUMIN/GLOB SERPL: 1 {RATIO} (ref 1–2)
ALP LIVER SERPL-CCNC: 59 U/L
ALT SERPL-CCNC: 18 U/L
ANION GAP SERPL CALC-SCNC: 5 MMOL/L (ref 0–18)
AST SERPL-CCNC: 15 U/L (ref 15–37)
BASOPHILS # BLD AUTO: 0.01 X10(3) UL (ref 0–0.2)
BASOPHILS NFR BLD AUTO: 0.2 %
BILIRUB SERPL-MCNC: 0.5 MG/DL (ref 0.1–2)
BUN BLD-MCNC: 16 MG/DL (ref 7–18)
BUN/CREAT SERPL: 15 (ref 10–20)
CALCIUM BLD-MCNC: 8.6 MG/DL (ref 8.5–10.1)
CHLORIDE SERPL-SCNC: 110 MMOL/L (ref 98–112)
CO2 SERPL-SCNC: 28 MMOL/L (ref 21–32)
CREAT BLD-MCNC: 1.07 MG/DL
DEPRECATED RDW RBC AUTO: 43.5 FL (ref 35.1–46.3)
EOSINOPHIL # BLD AUTO: 0.18 X10(3) UL (ref 0–0.7)
EOSINOPHIL NFR BLD AUTO: 3.6 %
ERYTHROCYTE [DISTWIDTH] IN BLOOD BY AUTOMATED COUNT: 12.9 % (ref 11–15)
GLOBULIN PLAS-MCNC: 3.7 G/DL (ref 2.8–4.4)
GLUCOSE BLD-MCNC: 103 MG/DL (ref 70–99)
HCT VFR BLD AUTO: 39.2 %
HGB BLD-MCNC: 12.7 G/DL
IMM GRANULOCYTES # BLD AUTO: 0.01 X10(3) UL (ref 0–1)
IMM GRANULOCYTES NFR BLD: 0.2 %
LYMPHOCYTES # BLD AUTO: 1.42 X10(3) UL (ref 1–4)
LYMPHOCYTES NFR BLD AUTO: 28.2 %
M PROTEIN MFR SERPL ELPH: 7.4 G/DL (ref 6.4–8.2)
MCH RBC QN AUTO: 29.9 PG (ref 26–34)
MCHC RBC AUTO-ENTMCNC: 32.4 G/DL (ref 31–37)
MCV RBC AUTO: 92.2 FL
MONOCYTES # BLD AUTO: 0.45 X10(3) UL (ref 0.1–1)
MONOCYTES NFR BLD AUTO: 8.9 %
NEUTROPHILS # BLD AUTO: 2.97 X10 (3) UL (ref 1.5–7.7)
NEUTROPHILS # BLD AUTO: 2.97 X10(3) UL (ref 1.5–7.7)
NEUTROPHILS NFR BLD AUTO: 58.9 %
OSMOLALITY SERPL CALC.SUM OF ELEC: 297 MOSM/KG (ref 275–295)
PATIENT FASTING Y/N/NP: NO
PLATELET # BLD AUTO: 178 10(3)UL (ref 150–450)
POTASSIUM SERPL-SCNC: 3.9 MMOL/L (ref 3.5–5.1)
RBC # BLD AUTO: 4.25 X10(6)UL
SODIUM SERPL-SCNC: 143 MMOL/L (ref 136–145)
TSI SER-ACNC: 0.93 MIU/ML (ref 0.36–3.74)
URATE SERPL-MCNC: 5.6 MG/DL
WBC # BLD AUTO: 5 X10(3) UL (ref 4–11)

## 2021-07-15 PROCEDURE — 1111F DSCHRG MED/CURRENT MED MERGE: CPT | Performed by: INTERNAL MEDICINE

## 2021-07-15 PROCEDURE — 80053 COMPREHEN METABOLIC PANEL: CPT

## 2021-07-15 PROCEDURE — 85025 COMPLETE CBC W/AUTO DIFF WBC: CPT

## 2021-07-15 PROCEDURE — 84550 ASSAY OF BLOOD/URIC ACID: CPT

## 2021-07-15 PROCEDURE — 99214 OFFICE O/P EST MOD 30 MIN: CPT | Performed by: INTERNAL MEDICINE

## 2021-07-15 PROCEDURE — 84443 ASSAY THYROID STIM HORMONE: CPT

## 2021-07-15 PROCEDURE — 36415 COLL VENOUS BLD VENIPUNCTURE: CPT

## 2021-07-15 NOTE — PROGRESS NOTES
Richy Perera is a 66year old female. HPI:   Patient presents with:  Hospital F/U: 6/19/21 for chest pain. Patient states chest pain continues intermittently.  Pain 2/10 to mid chest.      Lizett Davis comes in for follow-up after having been in the hospital f • rOPINIRole HCl 1 MG Oral Tab Take 1 mg by mouth daily. • rOPINIRole HCl 2 MG Oral Tab Take 2 mg by mouth nightly.      • ATORVASTATIN 20 MG Oral Tab TAKE ONE TABLET BY MOUTH ONE TIME DAILY at night 90 tablet 0   • AMLODIPINE BESYLATE 2.5 MG Oral Tab right.  Bilateral areas of renal parenchymal scar. Symmetric nephrograms. Moderate calcific atherosclerosis of aorta.     • Carpal tunnel syndrome 2011    as per NG   • Chronic atrial fibrillation ([address]    • Crohn disease ([address]    • Diverticulosis     as urination. NEURO:  Voices no  headaches or dizziness. She does say that she \"drops things\" when she is holding them in her right hand sometimes on the left.   However her neurological exam is grossly normal without any obvious facial asymmetry and no mo unknown etiology and type since thousand 10. She has had an EEG and she is waiting for the report. Noted is that she has instances where she is in her room working on some activity and has no recollection of doing certain activities.   She can go to [facility]

## 2021-07-16 ENCOUNTER — APPOINTMENT (OUTPATIENT)
Dept: CARDIOLOGY | Age: 71
End: 2021-07-16

## 2021-07-16 ENCOUNTER — TELEPHONE (OUTPATIENT)
Dept: INTERNAL MEDICINE CLINIC | Facility: CLINIC | Age: 71
End: 2021-07-16

## 2021-07-16 RX ORDER — NADOLOL 20 MG/1
20 TABLET ORAL NIGHTLY
Qty: 90 TABLET | Refills: 3 | Status: SHIPPED | OUTPATIENT
Start: 2021-07-16 | End: 2021-11-16

## 2021-07-16 NOTE — TELEPHONE ENCOUNTER
Please let patient know that her labs look acceptable. No signs of any significant dehydration. 1.  She does have stool culture containers.   When she submits the specimen back to the lab at [address] [facility], [address] for [facility] the results take about 2 to 3 days to get ba

## 2021-07-16 NOTE — TELEPHONE ENCOUNTER
See [facility] Pharmacist note from 9/15/2020: \"Increase Nadolol to 20 mg daily\".      Refill request is for a maintenance medication and has met the criteria specified in the Ambulatory Medication Refill Standing Order for eligibility, visits, laboratory, alert

## 2021-07-16 NOTE — TELEPHONE ENCOUNTER
I spoke with patient and relayed Dr. Perla Stein message. She verbalized understanding. She was invited to call back with any questions or concerns.

## 2021-07-20 DIAGNOSIS — R10.10 PAIN OF UPPER ABDOMEN: ICD-10-CM

## 2021-07-20 DIAGNOSIS — R19.8: ICD-10-CM

## 2021-07-20 NOTE — TELEPHONE ENCOUNTER
Demetria calling for refill. Patient is completely out of medication  Dr. Syeda Mclain at last appointment instructed patient to start taking Dicyclomine.

## 2021-07-21 RX ORDER — DICYCLOMINE HYDROCHLORIDE 10 MG/1
10 CAPSULE ORAL 3 TIMES DAILY PRN
Qty: 60 CAPSULE | Refills: 3 | Status: SHIPPED | OUTPATIENT
Start: 2021-07-21

## 2021-07-21 NOTE — TELEPHONE ENCOUNTER
Refill sent. The following prescription was reviewed, authorized, and electronically sent to the pharmacy.     Requested Prescriptions     Signed Prescriptions Disp Refills   • Dicyclomine HCl 10 MG Oral Cap 60 capsule 3     Sig: Take 1 capsule (10 mg to

## 2021-07-21 NOTE — TELEPHONE ENCOUNTER
This medication not refilled by our office before (Chad Nelson last prescribed).  To  to please review and advise on

## 2021-08-06 ENCOUNTER — TELEPHONE (OUTPATIENT)
Dept: INTERNAL MEDICINE CLINIC | Facility: CLINIC | Age: 71
End: 2021-08-06

## 2021-08-06 ENCOUNTER — OFFICE VISIT (OUTPATIENT)
Dept: INTERNAL MEDICINE CLINIC | Facility: CLINIC | Age: 71
End: 2021-08-06
Payer: MEDICARE

## 2021-08-06 VITALS
DIASTOLIC BLOOD PRESSURE: 84 MMHG | SYSTOLIC BLOOD PRESSURE: 134 MMHG | HEIGHT: 65 IN | TEMPERATURE: 98 F | HEART RATE: 62 BPM | WEIGHT: 147.81 LBS | OXYGEN SATURATION: 99 % | BODY MASS INDEX: 24.63 KG/M2

## 2021-08-06 DIAGNOSIS — T14.8XXA BRUISING: ICD-10-CM

## 2021-08-06 DIAGNOSIS — I10 ESSENTIAL HYPERTENSION: ICD-10-CM
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
## 2021-08-06 DIAGNOSIS — Z87.898 HISTORY OF SEIZURES: ICD-10-CM

## 2021-08-06 DIAGNOSIS — I48.91 ATRIAL FIBRILLATION, UNSPECIFIED TYPE (HCC): ICD-10-CM

## 2021-08-06 DIAGNOSIS — F03.91 DEMENTIA WITH BEHAVIORAL DISTURBANCE, UNSPECIFIED DEMENTIA TYPE (HCC): ICD-10-CM

## 2021-08-06 PROCEDURE — 99214 OFFICE O/P EST MOD 30 MIN: CPT | Performed by: INTERNAL MEDICINE

## 2021-08-06 NOTE — PROGRESS NOTES
Belkis Barrera is a 66year old female. HPI:   Patient presents with:  Fall: Pt fell out of bed last night. Pain 4/10 to right leg, right thumb, and left side of face.       Angela walked in to the office wanting to have a nurse visit in terms of evaluatio • nadolol 20 MG Oral Tab Take 1 tablet (20 mg total) by mouth nightly. 90 tablet 3   • rOPINIRole HCl 1 MG Oral Tab Take 1 mg by mouth daily. • rOPINIRole HCl 2 MG Oral Tab Take 2 mg by mouth nightly.      • ATORVASTATIN 20 MG Oral Tab TAKE ONE TABLET atherosclerosis of aorta.     • Carpal tunnel syndrome 2011    as per NG   • Chronic atrial fibrillation (HCC)    • Crohn disease ([facility] [address]    • Diverticulosis     as per NG   • Family history of colon cancer     Maternal grandmother had colon cancer in her 76s being off balance.     EXAM:   /84   Pulse 62   Temp 98 °F (36.7 °C)   Ht 5' 5\" (1.651 m)   Wt 147 lb 12.8 oz (67 kg)   SpO2 99%   BMI 24.60 kg/m²     GENERAL:  well developed, well nourished, in no apparent distress  SKIN:  no rashes , see above for MD  8/6/2021  5:07 PM

## 2021-08-06 NOTE — TELEPHONE ENCOUNTER
Pt stopped in office, hoping to speak to Dr Oren Jansen or his nurse  Pt has been falling, has bruises, fell out of bed  Not sure why this is happening  Pt was in the building dropping something for her  so stopped in office  Would like to discuss with Dr Oren Jansen why this is happening  Printed for Dr Oren Jansen nurse

## 2021-08-06 NOTE — TELEPHONE ENCOUNTER
Spoke to patient in office. Reports she fell last night while sleeping. Hit L knee, and left side of face/cheekbone area. Has hx of seizures, last seizure in February. Woke up when she hit the floor. Has bruise on right shin, she does not know where this came from. Previously walked into a wall a couple months ago while sleeping. Something similar to this has happened about 4 times before. First happened several months ago, where she woke up and had walked into a wall. At first pt denied dizziness while awake and ambulating. When asked again, reported that she normally experiences dizziness/lightheadedness while awake on and off normally. Headache (sides of head and back of neck) all day yesterday, this morning still there. No more drowsy than normal. A&Ox3. Having blurred vision on and off today which is new for patient. Normally has nausea daily. Has not been any more nauseous than normal.     Reports she seems to be active while sleeping (talking, violent swinging of arms, talking in sleep - has been going on for a while and worsening).      To MD to advise

## 2021-08-19 ENCOUNTER — TELEPHONE (OUTPATIENT)
Dept: INTERNAL MEDICINE CLINIC | Facility: CLINIC | Age: 71
End: 2021-08-19

## 2021-08-20 RX ORDER — OMEPRAZOLE 20 MG/1
CAPSULE, DELAYED RELEASE ORAL
Qty: 90 CAPSULE | Refills: 3 | Status: SHIPPED | OUTPATIENT
Start: 2021-08-20 | End: 2021-10-19

## 2021-08-23 ENCOUNTER — LAB ENCOUNTER (OUTPATIENT)
Dept: LAB | Facility: HOSPITAL | Age: 71
End: 2021-08-23
Attending: INTERNAL MEDICINE
Payer: MEDICARE

## 2021-08-23 DIAGNOSIS — G40.909 SEIZURE DISORDER (HCC): ICD-10-CM

## 2021-08-23 DIAGNOSIS — N18.31 CHRONIC KIDNEY DISEASE, STAGE 3A (HCC): ICD-10-CM

## 2021-08-23 DIAGNOSIS — E78.5 HYPERLIPIDEMIA, UNSPECIFIED HYPERLIPIDEMIA TYPE: ICD-10-CM

## 2021-08-23 DIAGNOSIS — G30.9 ALZHEIMER'S DISEASE (HCC): ICD-10-CM

## 2021-08-23 DIAGNOSIS — N30.90 BLADDER INFECTION: ICD-10-CM

## 2021-08-23 DIAGNOSIS — I10 PRIMARY HYPERTENSION: ICD-10-CM

## 2021-08-23 DIAGNOSIS — F02.80 ALZHEIMER'S DISEASE (HCC): ICD-10-CM

## 2021-08-23 LAB
ALBUMIN SERPL-MCNC: 3.8 G/DL (ref 3.4–5)
ANION GAP SERPL CALC-SCNC: 1 MMOL/L (ref 0–18)
BILIRUB UR QL: NEGATIVE
BUN BLD-MCNC: 17 MG/DL (ref 7–18)
BUN/CREAT SERPL: 17 (ref 10–20)
CALCIUM BLD-MCNC: 9.6 MG/DL (ref 8.5–10.1)
CHLORIDE SERPL-SCNC: 109 MMOL/L (ref 98–112)
CLARITY UR: CLEAR
CO2 SERPL-SCNC: 30 MMOL/L (ref 21–32)
COLOR UR: YELLOW
CREAT BLD-MCNC: 1 MG/DL
GLUCOSE BLD-MCNC: 99 MG/DL (ref 70–99)
GLUCOSE UR-MCNC: NEGATIVE MG/DL
HCT VFR BLD AUTO: 40.5 %
HGB BLD-MCNC: 13.3 G/DL
HGB UR QL STRIP.AUTO: NEGATIVE
KETONES UR-MCNC: NEGATIVE MG/DL
LEUKOCYTE ESTERASE UR QL STRIP.AUTO: NEGATIVE
NITRITE UR QL STRIP.AUTO: NEGATIVE
OSMOLALITY SERPL CALC.SUM OF ELEC: 292 MOSM/KG (ref 275–295)
PH UR: 5 [PH] (ref 5–8)
PHOSPHATE SERPL-MCNC: 3.5 MG/DL (ref 2.5–4.9)
POTASSIUM SERPL-SCNC: 4.3 MMOL/L (ref 3.5–5.1)
PROT UR-MCNC: 30 MG/DL
SODIUM SERPL-SCNC: 140 MMOL/L (ref 136–145)
SP GR UR STRIP: 1.02 (ref 1–1.03)
UROBILINOGEN UR STRIP-ACNC: <2

## 2021-08-23 PROCEDURE — 36415 COLL VENOUS BLD VENIPUNCTURE: CPT

## 2021-08-23 PROCEDURE — 85014 HEMATOCRIT: CPT

## 2021-08-23 PROCEDURE — 81001 URINALYSIS AUTO W/SCOPE: CPT

## 2021-08-23 PROCEDURE — 85018 HEMOGLOBIN: CPT

## 2021-08-23 PROCEDURE — 80069 RENAL FUNCTION PANEL: CPT

## 2021-09-14 NOTE — ED AVS SNAPSHOT
Monica Shaw   MRN: [id number]    Department:  [facility] Emergency Department   Date of Visit:  9/27/2019           Disclosure     Insurance plans vary and the physician(s) referred by the ER may not be covered by your plan.  Please contact within the next three months to obtain basic health screening including reassessment of your blood pressure.     IF THERE IS ANY CHANGE OR WORSENING OF YOUR CONDITION, CALL YOUR PRIMARY CARE PHYSICIAN AT ONCE OR RETURN IMMEDIATELY TO THE EMERGENCY DEPARTMEN Ani Olson(Resident)

## 2021-09-27 RX ORDER — ROPINIROLE 1 MG/1
TABLET, FILM COATED ORAL
Qty: 90 TABLET | Refills: 0 | OUTPATIENT
Start: 2021-09-27

## 2021-10-18 ENCOUNTER — HOSPITAL ENCOUNTER (OUTPATIENT)
Dept: ULTRASOUND IMAGING | Facility: HOSPITAL | Age: 71
Discharge: HOME | End: 2021-10-18
Attending: UROLOGY
Payer: MEDICARE

## 2021-10-18 DIAGNOSIS — N13.9 OBSTRUCTION, UROPATHY: ICD-10-CM

## 2021-10-18 PROCEDURE — 76770 US EXAM ABDO BACK WALL COMP: CPT | Performed by: UROLOGY

## 2021-10-19 ENCOUNTER — OFFICE VISIT (OUTPATIENT)
Dept: INTERNAL MEDICINE CLINIC | Facility: CLINIC | Age: 71
End: 2021-10-19
Payer: MEDICARE

## 2021-10-19 VITALS
OXYGEN SATURATION: 99 % | HEIGHT: 65 IN | TEMPERATURE: 98 F | BODY MASS INDEX: 24.66 KG/M2 | DIASTOLIC BLOOD PRESSURE: 72 MMHG | WEIGHT: 148 LBS | HEART RATE: 66 BPM | SYSTOLIC BLOOD PRESSURE: 130 MMHG

## 2021-10-19 DIAGNOSIS — Z87.898 HISTORY OF SEIZURES: ICD-10-CM

## 2021-10-19 DIAGNOSIS — N18.30 STAGE 3 CHRONIC KIDNEY DISEASE, UNSPECIFIED WHETHER STAGE 3A OR 3B CKD (HCC): ICD-10-CM

## 2021-10-19 DIAGNOSIS — R26.89 BALANCE DISORDER: ICD-10-CM

## 2021-10-19 DIAGNOSIS — Z00.00 ROUTINE HEALTH MAINTENANCE: ICD-10-CM

## 2021-10-19 DIAGNOSIS — N32.89 BLADDER MASS: ICD-10-CM

## 2021-10-19 DIAGNOSIS — I48.91 ATRIAL FIBRILLATION, UNSPECIFIED TYPE (HCC): ICD-10-CM

## 2021-10-19 DIAGNOSIS — Z95.818 PRESENCE OF WATCHMAN LEFT ATRIAL APPENDAGE CLOSURE DEVICE: ICD-10-CM

## 2021-10-19 DIAGNOSIS — R63.4 WEIGHT LOSS: Primary | ICD-10-CM

## 2021-10-19 DIAGNOSIS — I10 ESSENTIAL HYPERTENSION: ICD-10-CM

## 2021-10-19 DIAGNOSIS — F03.91 DEMENTIA WITH BEHAVIORAL DISTURBANCE, UNSPECIFIED DEMENTIA TYPE (HCC): ICD-10-CM

## 2021-10-19 PROCEDURE — 99214 OFFICE O/P EST MOD 30 MIN: CPT | Performed by: INTERNAL MEDICINE

## 2021-10-19 RX ORDER — PANTOPRAZOLE SODIUM 40 MG/1
40 TABLET, DELAYED RELEASE ORAL
Qty: 30 TABLET | Refills: 0 | Status: SHIPPED | OUTPATIENT
Start: 2021-10-19 | End: 2021-11-30 | Stop reason: ALTCHOICE

## 2021-10-19 NOTE — PROGRESS NOTES
Belkis Barrera is a 66year old female. HPI:   Patient presents with:  Checkup: weight loss  Nausea: x 2 months   Dizziness: 2 months   Lightheadedness: x 2 months      Mercy Noriega comes in for several symptoms. She has lost a little weight.   She does somet injury.   Current Outpatient Medications   Medication Sig Dispense Refill   • pantoprazole (PROTONIX) 40 MG Oral Tab EC Take 1 tablet (40 mg total) by mouth every morning before breakfast. 30 tablet 0   • ALLOPURINOL 100 MG Oral Tab TAKE ONE TABLET BY MOUTH October 2014. • Arrhythmia    • Atrophic kidney 3/11/2016    CT scan October 8, 2014 shows left kidney smaller than the right. Bilateral areas of renal parenchymal scar. Symmetric nephrograms. Moderate calcific atherosclerosis of aorta.     • Carpal t cough  CARDIOVASCULAR:  Voices no chest pain on exertion or shortness of breath  GI:   Voices no abdominal pain or changes of bowels   :Viices no urning or frequency of urination.   NEURO:  See above    EXAM:   /72   Pulse 66   Temp 97.8 °F (36.6 °C Bladder mass  She did have a kidney ultrasound ordered by Dr. Tan Sierra and her kidneys do show increased echogenicity consistent with medical renal failure process. Small bilateral cysts.   There was suspected asymmetry thickening of the posterior left blad

## 2021-10-26 ENCOUNTER — TELEPHONE (OUTPATIENT)
Dept: INTERNAL MEDICINE CLINIC | Facility: CLINIC | Age: 71
End: 2021-10-26

## 2021-10-26 ENCOUNTER — HOSPITAL ENCOUNTER (OUTPATIENT)
Dept: CT IMAGING | Facility: HOSPITAL | Age: 71
Discharge: HOME | End: 2021-10-26
Attending: INTERNAL MEDICINE
Payer: MEDICARE

## 2021-10-26 DIAGNOSIS — R26.89 BALANCE PROBLEM: Primary | ICD-10-CM

## 2021-10-26 DIAGNOSIS — F03.90 DEMENTIA WITHOUT BEHAVIORAL DISTURBANCE, UNSPECIFIED DEMENTIA TYPE (HCC): ICD-10-CM

## 2021-10-26 DIAGNOSIS — R26.89 BALANCE DISORDER: ICD-10-CM

## 2021-10-26 PROCEDURE — 70450 CT HEAD/BRAIN W/O DYE: CPT | Performed by: INTERNAL MEDICINE

## 2021-10-26 NOTE — TELEPHONE ENCOUNTER
Spoke with Dr. Merry Chapa office yesterday.   Patient scheduled for cystoscope under general anesthesia for evaluation of her bladder abnormality noted on renal ultrasound October 18, 2021

## 2021-10-26 NOTE — TELEPHONE ENCOUNTER
We can let patient know the following regarding her CT of brain    1. Please let her know that the CT scan of the brain came out fairly acceptable.   There is some evidence of hardening of the arteries but that is not too unusual.    2.  Because of the bal

## 2021-10-26 NOTE — TELEPHONE ENCOUNTER
Patient called and relayed Dr Anais Montalvo message. Patient verbalized understanding with no further questions noted with number to schedule PT given.

## 2021-10-28 ENCOUNTER — OFFICE VISIT (OUTPATIENT)
Dept: INTERNAL MEDICINE CLINIC | Facility: CLINIC | Age: 71
End: 2021-10-28
Payer: MEDICARE

## 2021-10-28 ENCOUNTER — TELEPHONE (OUTPATIENT)
Dept: INTERNAL MEDICINE CLINIC | Facility: CLINIC | Age: 71
End: 2021-10-28

## 2021-10-28 VITALS
HEART RATE: 78 BPM | BODY MASS INDEX: 25.16 KG/M2 | OXYGEN SATURATION: 98 % | SYSTOLIC BLOOD PRESSURE: 126 MMHG | HEIGHT: 65 IN | WEIGHT: 151 LBS | TEMPERATURE: 98 F | DIASTOLIC BLOOD PRESSURE: 78 MMHG

## 2021-10-28 DIAGNOSIS — I10 ESSENTIAL HYPERTENSION: ICD-10-CM

## 2021-10-28 DIAGNOSIS — R26.89 BALANCE PROBLEM: ICD-10-CM

## 2021-10-28 DIAGNOSIS — Z01.818 PRE-OP EVALUATION: Primary | ICD-10-CM

## 2021-10-28 DIAGNOSIS — N32.89 BLADDER MASS: ICD-10-CM

## 2021-10-28 DIAGNOSIS — Z87.898 HISTORY OF SEIZURES: ICD-10-CM

## 2021-10-28 DIAGNOSIS — N18.30 STAGE 3 CHRONIC KIDNEY DISEASE, UNSPECIFIED WHETHER STAGE 3A OR 3B CKD (HCC): ICD-10-CM

## 2021-10-28 DIAGNOSIS — F03.90 DEMENTIA WITHOUT BEHAVIORAL DISTURBANCE, UNSPECIFIED DEMENTIA TYPE (HCC): ICD-10-CM

## 2021-10-28 PROCEDURE — 93000 ELECTROCARDIOGRAM COMPLETE: CPT | Performed by: INTERNAL MEDICINE

## 2021-10-28 PROCEDURE — 99214 OFFICE O/P EST MOD 30 MIN: CPT | Performed by: INTERNAL MEDICINE

## 2021-10-28 NOTE — TELEPHONE ENCOUNTER
Fly/Dr Membreno office called to see if patient scheduled cardiac clearance with Dr Amber Alvarez  Pt is scheduled for today at 1:00  Please fax copy of EKG results to Dr Jeff Machuca office  FAX#: [phone]  Tasked to nursing

## 2021-10-28 NOTE — TELEPHONE ENCOUNTER
To Dr. Doris Cui as FYI---called pt who is driving to get EKG done at this time. Please advise on EKG once resulted and staff will fax results to Dr. Edtih Stanley office.

## 2021-10-28 NOTE — PROGRESS NOTES
Rima Cedillo is a 66year old female. HPI:   Patient presents with:  Akila Khan presents for preoperative physical.  She will be going Monday for a cystoscope under anesthesia. She is prepared for that.   She states she has had anesthesia be Cap Take 1 capsule (10 mg total) by mouth 3 (three) times daily as needed. 60 capsule 3   • nadolol 20 MG Oral Tab Take 1 tablet (20 mg total) by mouth nightly. 90 tablet 3   • rOPINIRole HCl 1 MG Oral Tab Take 1 mg by mouth daily.      • rOPINIRole HCl 2 M fibrillation ([facility] [address]    • Crohn disease ([facility] [address]    • Diverticulosis     as per NG   • Family history of colon cancer     Maternal grandmother had colon cancer in her 76s.    • Gout    • Hepatic steatosis 3/11/2016   • High blood pressure    • High cholesterol (68.5 kg)   SpO2 98%   BMI 25.13 kg/m²     GENERAL:  well developed, well nourished, in no apparent distress  SKIN:  no rashes , no suspicious lesions  HEENT: atraumatic. Pharynx normal without exudate. EYES:  PERRL. Sclera anicteric.   NECK:  Supple,  no

## 2021-10-29 ENCOUNTER — LAB REQUISITION (OUTPATIENT)
Dept: SURGERY | Age: 71
End: 2021-10-29
Payer: MEDICARE

## 2021-10-29 DIAGNOSIS — Z01.818 PREOP EXAMINATION: ICD-10-CM

## 2021-11-10 ENCOUNTER — TELEPHONE (OUTPATIENT)
Dept: INTERNAL MEDICINE CLINIC | Facility: CLINIC | Age: 71
End: 2021-11-10

## 2021-11-10 NOTE — TELEPHONE ENCOUNTER
Pt. Called stating she was talking to a friend and became dizzy and nauseous. Friend suggested she take her BP. It was 113/68. Pt. Concerned, is that to low? Pt. Can be reached at [phone].

## 2021-11-10 NOTE — TELEPHONE ENCOUNTER
Called patient who states she is having dizziness and nausea on and off for the past weeks - bello given with  for 11/11 at 8 am

## 2021-11-11 ENCOUNTER — TELEPHONE (OUTPATIENT)
Dept: INTERNAL MEDICINE CLINIC | Facility: CLINIC | Age: 71
End: 2021-11-11

## 2021-11-11 ENCOUNTER — OFFICE VISIT (OUTPATIENT)
Dept: INTERNAL MEDICINE CLINIC | Facility: CLINIC | Age: 71
End: 2021-11-11
Payer: MEDICARE

## 2021-11-11 VITALS
OXYGEN SATURATION: 98 % | WEIGHT: 155 LBS | SYSTOLIC BLOOD PRESSURE: 100 MMHG | BODY MASS INDEX: 25.83 KG/M2 | DIASTOLIC BLOOD PRESSURE: 60 MMHG | HEART RATE: 60 BPM | HEIGHT: 65 IN | TEMPERATURE: 97 F

## 2021-11-11 DIAGNOSIS — N18.30 STAGE 3 CHRONIC KIDNEY DISEASE, UNSPECIFIED WHETHER STAGE 3A OR 3B CKD (HCC): ICD-10-CM

## 2021-11-11 DIAGNOSIS — I48.0 PAROXYSMAL ATRIAL FIBRILLATION (HCC): ICD-10-CM

## 2021-11-11 DIAGNOSIS — I10 ESSENTIAL HYPERTENSION: ICD-10-CM

## 2021-11-11 DIAGNOSIS — I95.1: ICD-10-CM

## 2021-11-11 DIAGNOSIS — R42 DIZZY: Primary | ICD-10-CM

## 2021-11-11 DIAGNOSIS — N32.89 BLADDER MASS: ICD-10-CM

## 2021-11-11 DIAGNOSIS — Z95.0 PRESENCE OF PERMANENT CARDIAC PACEMAKER: ICD-10-CM

## 2021-11-11 DIAGNOSIS — Z87.898 HISTORY OF SEIZURES: ICD-10-CM

## 2021-11-11 DIAGNOSIS — F03.90 DEMENTIA WITHOUT BEHAVIORAL DISTURBANCE, UNSPECIFIED DEMENTIA TYPE (HCC): ICD-10-CM

## 2021-11-11 DIAGNOSIS — R11.0 NAUSEA: ICD-10-CM

## 2021-11-11 PROCEDURE — 99214 OFFICE O/P EST MOD 30 MIN: CPT | Performed by: INTERNAL MEDICINE

## 2021-11-11 PROCEDURE — 93000 ELECTROCARDIOGRAM COMPLETE: CPT | Performed by: INTERNAL MEDICINE

## 2021-11-11 NOTE — PROGRESS NOTES
Richy Perera is a 66year old female. HPI:   No chief complaint on file. Lizett Davis comes in for evaluation of dizziness and nausea. She does have some dementia so her history may be a little bit vague but she seems to have \"spells\".   She will feel • nadolol 20 MG Oral Tab Take 1 tablet (20 mg total) by mouth nightly. 90 tablet 3   • rOPINIRole HCl 1 MG Oral Tab Take 1 mg by mouth daily. • rOPINIRole HCl 2 MG Oral Tab Take 2 mg by mouth nightly.      • ATORVASTATIN 20 MG Oral Tab TAKE ONE TABLET history of colon cancer     Maternal grandmother had colon cancer in her 76s.    • Gout    • Hepatic steatosis 3/11/2016   • High blood pressure    • High cholesterol     as per NG   • History of colonic polyps 07-    as per NG   • History of migrain rashes , no suspicious lesions  HEENT: atraumatic. Pharynx normal without exudate. EYES:  PERRL. Sclera anicteric. NECK:  Supple,  no adenopathy,   LUNGS:  clear to auscultation. Effort normal  CARDIO:  RRR without murmur.    S1 and S2 normal  GI:  go blood pressure cuff at home. She will take her blood pressures if she gets symptomatic. May need to cut the nadolol from 20 mg down to 10 mg. She indicates she had been on 10 mg for a while but then we increased it due to hypertension.     9. Presence of

## 2021-11-11 NOTE — TELEPHONE ENCOUNTER
Please call Dr. Ryley Hobbs office sometime today. Patient apparently had a cystoscopy with bladder mass biopsy but I cannot find any information in the computer. Please see if we can get operative report and any pathology that might be available.   Lillette [place]

## 2021-11-12 ENCOUNTER — ORDER TRANSCRIPTION (OUTPATIENT)
Dept: SLEEP CENTER | Age: 71
End: 2021-11-12

## 2021-11-12 DIAGNOSIS — G47.33 OBSTRUCTIVE SLEEP APNEA (ADULT) (PEDIATRIC): Primary | ICD-10-CM

## 2021-11-15 ENCOUNTER — TELEPHONE (OUTPATIENT)
Dept: INTERNAL MEDICINE CLINIC | Facility: CLINIC | Age: 71
End: 2021-11-15

## 2021-11-15 NOTE — TELEPHONE ENCOUNTER
Patient was last here to see Dr Hao Blanchard on 11/11/2021 for dizziness. Patient is calling today after being asked to call back with blood pressure readings when feeling dizzy.      Patient took all readings today between 1300 and 1330.    1300 117/67  1306 120

## 2021-11-16 RX ORDER — NADOLOL 20 MG/1
10 TABLET ORAL NIGHTLY
Qty: 90 TABLET | Refills: 3 | COMMUNITY
Start: 2021-11-16

## 2021-11-16 NOTE — TELEPHONE ENCOUNTER
Spoke with patient to relay MD message below; Patient verbalized understanding. Pt did not have any questions or concerns at this time --- she will monitor and record BP reading to report to Dr. Timothy Lamb on Friday.  Clinical to F/U with Nadiya if we dont hear

## 2021-11-16 NOTE — TELEPHONE ENCOUNTER
Message noted. We can tell patient the following    1. Next step I think is to cut the nadolol that she is taking in half which would give her 10 mg.  She used to take this in the past.    2. There is a potential by cutting it in half it could lead to a lit

## 2021-11-19 NOTE — TELEPHONE ENCOUNTER
As FYI to DR. FLEMING - called patient who states she did not feel light headed anymore , feeling ok , but she also did not check her BP - she will call back on monday

## 2021-11-23 NOTE — TELEPHONE ENCOUNTER
Spoke to patient and relayed MD message and instructions, patient verbalizes understanding and agrees with plan. She does have a follow up next week.

## 2021-11-23 NOTE — TELEPHONE ENCOUNTER
Pt. Called to report her BP readings. She forgot to take it yesterday. The readings are from today. 11/23/21-  9:00 a.m.    168/80    Pt was very nauseous                 10:00 a.m.   130/66    Pt. Still nauseous and feeling a little weak.

## 2021-11-23 NOTE — TELEPHONE ENCOUNTER
Please let patient know I thought of the following recommendations    1. For the dizziness I am wondering if it is a combination of her medications. She can follow-up with neurology as they may have some ideas regarding her dizziness and her medications.

## 2021-11-24 RX ORDER — ROPINIROLE 1 MG/1
TABLET, FILM COATED ORAL
Qty: 90 TABLET | Refills: 0 | Status: SHIPPED | OUTPATIENT
Start: 2021-11-24 | End: 2021-11-30

## 2021-11-24 NOTE — TELEPHONE ENCOUNTER
I will refill but please ask patient to discuss further refills because I am wondering about all her neurological medications contributing to her dizziness.

## 2021-11-24 NOTE — TELEPHONE ENCOUNTER
To  - not sure if you want to refill or talk to neurologist after dizziness/ ortho HypoTN noted at last OV

## 2021-11-30 ENCOUNTER — OFFICE VISIT (OUTPATIENT)
Dept: INTERNAL MEDICINE CLINIC | Facility: CLINIC | Age: 71
End: 2021-11-30
Payer: MEDICARE

## 2021-11-30 ENCOUNTER — TELEPHONE (OUTPATIENT)
Dept: INTERNAL MEDICINE CLINIC | Facility: CLINIC | Age: 71
End: 2021-11-30

## 2021-11-30 VITALS
OXYGEN SATURATION: 100 % | SYSTOLIC BLOOD PRESSURE: 130 MMHG | WEIGHT: 151 LBS | BODY MASS INDEX: 25.16 KG/M2 | HEART RATE: 61 BPM | HEIGHT: 65 IN | DIASTOLIC BLOOD PRESSURE: 80 MMHG | TEMPERATURE: 98 F

## 2021-11-30 DIAGNOSIS — R42 DIZZINESS: Primary | ICD-10-CM

## 2021-11-30 DIAGNOSIS — Z87.898 HISTORY OF SEIZURES: ICD-10-CM

## 2021-11-30 DIAGNOSIS — F03.90 DEMENTIA WITHOUT BEHAVIORAL DISTURBANCE, UNSPECIFIED DEMENTIA TYPE (HCC): ICD-10-CM

## 2021-11-30 DIAGNOSIS — R11.0 NAUSEA: ICD-10-CM

## 2021-11-30 DIAGNOSIS — I10 ESSENTIAL HYPERTENSION: ICD-10-CM

## 2021-11-30 DIAGNOSIS — N18.30 STAGE 3 CHRONIC KIDNEY DISEASE, UNSPECIFIED WHETHER STAGE 3A OR 3B CKD (HCC): ICD-10-CM

## 2021-11-30 PROCEDURE — 99214 OFFICE O/P EST MOD 30 MIN: CPT | Performed by: INTERNAL MEDICINE

## 2021-11-30 RX ORDER — TRAMADOL HYDROCHLORIDE 50 MG/1
50 TABLET ORAL EVERY 6 HOURS PRN
Qty: 1 TABLET | Refills: 0 | COMMUNITY
Start: 2021-11-30 | End: 2021-12-29

## 2021-11-30 RX ORDER — ROPINIROLE 1 MG/1
TABLET, FILM COATED ORAL
Qty: 90 TABLET | Refills: 0 | COMMUNITY
Start: 2021-11-30 | End: 2021-12-16

## 2021-11-30 RX ORDER — ROPINIROLE 1 MG/1
1 TABLET, FILM COATED ORAL NIGHTLY
Qty: 90 TABLET | Refills: 0 | COMMUNITY
Start: 2021-11-30 | End: 2021-11-30

## 2021-11-30 NOTE — PROGRESS NOTES
HPI:   Rima Cedillo is a 66year old female presents with the following problems. Katie Clay comes in for follow-up. She does say that she feels better. She has less dizziness but dizziness still comes once or twice a day.   This does not seem to be as Take 2 mg by mouth nightly. • ATORVASTATIN 20 MG Oral Tab TAKE ONE TABLET BY MOUTH ONE TIME DAILY at night 90 tablet 0   • aspirin 81 MG Oral Tab EC Take 1 tablet (81 mg total) by mouth daily.  1 tablet 0   • Oxybutynin Chloride ER 5 MG Oral Tablet 24 H History of migraine headaches     as per NG   • History of total hysterectomy with removal of both tubes and ovaries     as per NG   • Incontinence     DUE TO CYSTOCELE/RECTOCELE   • Lymphocytic colitis 2011    as per NG   • Migraine 9/21/2012   • Migraine Neurological Disorder Mother         Alzheimer's disease; as per NG   • Hypertension Mother         as per NG   • Heart Disease Mother         as per NG   • Migraines Mother         as per NG   • Cancer Sister         Lung Cancer, Kidney Cancer; as per NG nourished. in no apparent distress  SKIN:  no rashes. no suspicious lesions in areas examined. HEENT:  Atraumatic.   pharynx without exudate or erythema  EYES:  PERRL . conjunctiva clear.   NECK:  supple, no adenopathy, thyroid normal  BREAST: Had breast

## 2021-12-14 ENCOUNTER — OFFICE VISIT (OUTPATIENT)
Dept: SLEEP CENTER | Age: 71
End: 2021-12-14
Attending: DENTIST
Payer: MEDICARE

## 2021-12-14 ENCOUNTER — TELEPHONE (OUTPATIENT)
Dept: INTERNAL MEDICINE CLINIC | Facility: CLINIC | Age: 71
End: 2021-12-14

## 2021-12-14 DIAGNOSIS — G47.33 OBSTRUCTIVE SLEEP APNEA (ADULT) (PEDIATRIC): ICD-10-CM

## 2021-12-14 PROCEDURE — 95806 SLEEP STUDY UNATT&RESP EFFT: CPT

## 2021-12-16 RX ORDER — ROPINIROLE 1 MG/1
2 TABLET, FILM COATED ORAL NIGHTLY
Qty: 180 TABLET | Refills: 0 | Status: SHIPPED | OUTPATIENT
Start: 2021-12-16

## 2021-12-16 RX ORDER — ATORVASTATIN CALCIUM 20 MG/1
TABLET, FILM COATED ORAL
Qty: 90 TABLET | Refills: 0 | Status: SHIPPED | OUTPATIENT
Start: 2021-12-16

## 2021-12-16 NOTE — TELEPHONE ENCOUNTER
The following prescription was reviewed, authorized, and electronically sent to the pharmacy.     Requested Prescriptions     Signed Prescriptions Disp Refills   • rOPINIRole HCl 1 MG Oral Tab 180 tablet 0     Sig: Take 2 tablets (2 mg total) by mouth night

## 2021-12-16 NOTE — TELEPHONE ENCOUNTER
Per 11/30/21 telephone encounter, medications reconciled with patient. Med module indicates that patient is taking ropinirole 1 mg tabs, take 2 mg nightly. Request from pharmacy is for 1 mg TID. Elvis mcelroy to send as patient has been taking?  Rx pending a

## 2021-12-20 NOTE — PROCEDURES
[facility]  Accredited by the [employer] ([employer])    PATIENT'S NAME: Dev Ming   ATTENDING PHYSICIAN: Henry Yoder MD   REFERRING PHYSICIAN:    PATIENT ACCOUNT #: [de-identified] LOCATION: [address] [place] [address] Avoid alcohol. 4.   Avoid sedating drug. 5.   Patient should not drive if at all sleepy. Please do not hesitate to contact me if there is any question whatsoever regarding interpretation of this study.      Dictated By hPilip Park MD  d: 15

## 2021-12-27 ENCOUNTER — APPOINTMENT (OUTPATIENT)
Dept: GENERAL RADIOLOGY | Facility: HOSPITAL | Age: 71
End: 2021-12-27
Attending: EMERGENCY MEDICINE
Payer: MEDICARE

## 2021-12-27 ENCOUNTER — HOSPITAL ENCOUNTER (EMERGENCY)
Facility: HOSPITAL | Age: 71
Discharge: HOME | End: 2021-12-27
Attending: EMERGENCY MEDICINE
Payer: MEDICARE

## 2021-12-27 ENCOUNTER — APPOINTMENT (OUTPATIENT)
Dept: GENERAL RADIOLOGY | Facility: HOSPITAL | Age: 71
End: 2021-12-27
Payer: MEDICARE

## 2021-12-27 VITALS
TEMPERATURE: 98 F | DIASTOLIC BLOOD PRESSURE: 80 MMHG | SYSTOLIC BLOOD PRESSURE: 125 MMHG | HEART RATE: 60 BPM | HEIGHT: 65 IN | WEIGHT: 147 LBS | BODY MASS INDEX: 24.49 KG/M2 | RESPIRATION RATE: 18 BRPM | OXYGEN SATURATION: 96 %

## 2021-12-27 DIAGNOSIS — S52.615A CLOSED NONDISPLACED FRACTURE OF STYLOID PROCESS OF LEFT ULNA, INITIAL ENCOUNTER: ICD-10-CM

## 2021-12-27 DIAGNOSIS — S52.572A OTHER CLOSED INTRA-ARTICULAR FRACTURE OF DISTAL END OF LEFT RADIUS, INITIAL ENCOUNTER: Primary | ICD-10-CM

## 2021-12-27 PROCEDURE — 73110 X-RAY EXAM OF WRIST: CPT | Performed by: EMERGENCY MEDICINE

## 2021-12-27 PROCEDURE — 72220 X-RAY EXAM SACRUM TAILBONE: CPT | Performed by: EMERGENCY MEDICINE

## 2021-12-27 PROCEDURE — 73080 X-RAY EXAM OF ELBOW: CPT | Performed by: EMERGENCY MEDICINE

## 2021-12-27 PROCEDURE — 72100 X-RAY EXAM L-S SPINE 2/3 VWS: CPT | Performed by: EMERGENCY MEDICINE

## 2021-12-27 PROCEDURE — 29125 APPL SHORT ARM SPLINT STATIC: CPT

## 2021-12-27 PROCEDURE — 73502 X-RAY EXAM HIP UNI 2-3 VIEWS: CPT | Performed by: EMERGENCY MEDICINE

## 2021-12-27 PROCEDURE — 73130 X-RAY EXAM OF HAND: CPT | Performed by: EMERGENCY MEDICINE

## 2021-12-27 PROCEDURE — 99284 EMERGENCY DEPT VISIT MOD MDM: CPT

## 2021-12-27 RX ORDER — HYDROCODONE BITARTRATE AND ACETAMINOPHEN 5; 325 MG/1; MG/1
1-2 TABLET ORAL EVERY 6 HOURS PRN
Qty: 10 TABLET | Refills: 0 | Status: SHIPPED | OUTPATIENT
Start: 2021-12-27 | End: 2021-12-29

## 2021-12-27 RX ORDER — IBUPROFEN 400 MG/1
400 TABLET ORAL ONCE
Status: COMPLETED | OUTPATIENT
Start: 2021-12-27 | End: 2021-12-27

## 2021-12-27 RX ORDER — HYDROCODONE BITARTRATE AND ACETAMINOPHEN 5; 325 MG/1; MG/1
1 TABLET ORAL ONCE
Status: COMPLETED | OUTPATIENT
Start: 2021-12-27 | End: 2021-12-27

## 2021-12-27 NOTE — ED PROVIDER NOTES
Patient Seen in: [facility] AND [facility] Emergency Department      History   No chief complaint on file.     Stated Complaint: fall, left wrist injury     Subjective:   HPI    58-year-old female with history of hypertension, hypercholesterolemia, atrial fibrill colitis 2011    as per NG   • Migraine 9/21/2012   • Migraines    • Mild cognitive impairment     as per NG   • Obstructive sleep apnea 12/30/2013   • Pacemaker    • Pseudobulbar affect 2/24/2017   • Restless leg syndrome     as per NG   • Restless legs sy Drug use: Yes      Frequency: 7.0 times per week      Types: Cannabis      Comment: medical cannabis nightly             Review of Systems    Positive for stated complaint: fall, left wrist injury   Other systems are as noted in HPI.   Constitutional and vi range of motion of the hip had tenderness with flexion. No other joint tenderness is noted to palpation or range of motion of bilateral lower extremities. Bilateral dorsalis pedis pulses intact and symmetric.   There is tenderness to palpation to the midl

## 2021-12-28 ENCOUNTER — TELEPHONE (OUTPATIENT)
Dept: INTERNAL MEDICINE CLINIC | Facility: CLINIC | Age: 71
End: 2021-12-28

## 2021-12-28 ENCOUNTER — OFFICE VISIT (OUTPATIENT)
Dept: INTERNAL MEDICINE CLINIC | Facility: CLINIC | Age: 71
End: 2021-12-28
Payer: MEDICARE

## 2021-12-28 ENCOUNTER — OFFICE VISIT (OUTPATIENT)
Dept: ORTHOPEDICS CLINIC | Facility: CLINIC | Age: 71
End: 2021-12-28
Payer: MEDICARE

## 2021-12-28 ENCOUNTER — TELEPHONE (OUTPATIENT)
Dept: ORTHOPEDICS CLINIC | Facility: CLINIC | Age: 71
End: 2021-12-28

## 2021-12-28 VITALS
SYSTOLIC BLOOD PRESSURE: 124 MMHG | BODY MASS INDEX: 25.26 KG/M2 | WEIGHT: 151.63 LBS | HEIGHT: 65 IN | HEART RATE: 62 BPM | OXYGEN SATURATION: 98 % | DIASTOLIC BLOOD PRESSURE: 76 MMHG | TEMPERATURE: 98 F

## 2021-12-28 VITALS — HEIGHT: 65 IN | BODY MASS INDEX: 24.49 KG/M2 | WEIGHT: 147 LBS

## 2021-12-28 DIAGNOSIS — S52.502D CLOSED FRACTURE OF DISTAL END OF LEFT RADIUS WITH ROUTINE HEALING, UNSPECIFIED FRACTURE MORPHOLOGY, SUBSEQUENT ENCOUNTER: ICD-10-CM

## 2021-12-28 DIAGNOSIS — S52.572A OTHER CLOSED INTRA-ARTICULAR FRACTURE OF DISTAL END OF LEFT RADIUS, INITIAL ENCOUNTER: Primary | ICD-10-CM

## 2021-12-28 DIAGNOSIS — Z01.818 PREOP EXAMINATION: Primary | ICD-10-CM

## 2021-12-28 PROCEDURE — 99214 OFFICE O/P EST MOD 30 MIN: CPT | Performed by: ORTHOPAEDIC SURGERY

## 2021-12-28 PROCEDURE — 99214 OFFICE O/P EST MOD 30 MIN: CPT | Performed by: INTERNAL MEDICINE

## 2021-12-28 PROCEDURE — 93000 ELECTROCARDIOGRAM COMPLETE: CPT | Performed by: INTERNAL MEDICINE

## 2021-12-28 RX ORDER — TRAMADOL HYDROCHLORIDE 50 MG/1
50 TABLET ORAL EVERY 8 HOURS PRN
Qty: 30 TABLET | Refills: 0 | Status: SHIPPED | OUTPATIENT
Start: 2021-12-28

## 2021-12-28 NOTE — TELEPHONE ENCOUNTER
Patient saw Dr Marky Garcia today, she broke her left wrist.  Patient will be having surgery on 12/30. She needs presurgical clearance. Can patient be added?   Phone [phone]    Message placed on Dr Ishan Ashton desk

## 2021-12-28 NOTE — PROGRESS NOTES
Rd Kay is a 66year old female. Patient presents with:  Pre-Op Exam: 12/30/21 LEFT DISTAL RADIUS FRACTURE, OPEN REDUCTION INTERNAL FIXATION, Pain 7/10 to left arm and tail bone    HPI:     Here for pre-op exam for ORIF of left distal radius fractur (eight) hours as needed for Nausea. 20 tablet 0   • Acetaminophen-Codeine #3 300-30 MG Oral Tab Take 1 tablet by mouth every 6 (six) hours as needed for Pain. 15 tablet 0   • Donepezil HCl 10 MG Oral Tab Take 10 mg by mouth nightly.         Past Medical His History:  Social History    Tobacco Use      Smoking status: Former Smoker        Packs/day: 1.00        Years: 40.00        Pack years: 40        Types: Cigarettes        Quit date: 1998        Years since quittin.0      Smokeless tobacco: Never

## 2021-12-28 NOTE — TELEPHONE ENCOUNTER
Type of surgery:  Left distal radius fracture open reduction internal fixation  Date: 12/30/21  Location: [facility]  Medical Clearance:      *Medical: Yes      *Dental: No      *Other:  Prior Authorization Status: Pending  Workers Comp:  Medacta/Faye:  Tee Saunders

## 2021-12-28 NOTE — H&P (VIEW-ONLY)
NURSING INTAKE COMMENTS: Patient presents with:  Consult: 66year old female is here today c/o L wrist pain due to injury that occured 12/27/21 while bowling, went to the ER & XR's done: 12/27/21.  Pain scale: 8/10 at this time, Norco & ibuprofen for pain r and ovaries     as per NG   • Incontinence     DUE TO CYSTOCELE/RECTOCELE   • Lymphocytic colitis 2011    as per NG   • Migraine 9/21/2012   • Migraines    • Mild cognitive impairment     as per NG   • Obstructive sleep apnea 12/30/2013   • Pacemaker    • rOPINIRole HCl 1 MG Oral Tab Take 2 tablets (2 mg total) by mouth nightly.  180 tablet 0   • ATORVASTATIN 20 MG Oral Tab TAKE ONE TABLET BY MOUTH ONE TIME DAILY AT NIGHT 90 tablet 0   • traMADol 50 MG Oral Tab Take 1 tablet (50 mg total) by mouth every 6 (s NG   • Cancer Maternal Grandmother         Colon       Social History    Occupational History      Not on file    Tobacco Use      Smoking status: Former Smoker        Packs/day: 1.00        Years: 40.00        Pack years: 40        Types: Cigarettes swelling of the digits. Imaging: XR SACRUM + COCCYX (MIN 2 VIEWS) (CPT=72220)    Result Date: 12/27/2021  PROCEDURE: XR SACRUM + COCCYX (MIN 2 VIEWS) (CPT=72220)  COMPARISON: None. INDICATIONS: Post fall while bowling. Low back pain.   TECHNIQUE: AP and mildly impacted and displaced fracture of the distal radius, as noted on wrist radiographs performed same day. Nondisplaced ulnar styloid fracture.   Likely resection of the trapezium with small well-corticated probable postsurgical fragments at the base o nondisplaced ulnar styloid fracture.   Fragmentation with well corticated small ossific fragments and absence of the trapezium noted at the base of the thumb, with smooth erosion of the adjacent upper pole of the scaphoid, likely related to a prior surgery post cholecystectomy. 5. Atherosclerosis. Dictated by (CST): Chiqui Ochoa MD on 12/27/2021 at 2:02 PM     Finalized by (CST):  Chiqui Ochoa MD on 12/27/2021 at 2:05 PM          XR HIP W OR WO PELVIS 2 OR 3 VIEWS, LEFT (CPT=73502)    Result Date creating using Dragon speech recognition technology. Please excuse any typos.     Arleen Forman MD

## 2021-12-28 NOTE — TELEPHONE ENCOUNTER
Spoke to Dr Davi reilly to add patient to schedule today at 4:30 pm, spoke with patient she will be in for the appointment

## 2021-12-28 NOTE — H&P
NURSING INTAKE COMMENTS: Patient presents with:  Consult: 66year old female is here today c/o L wrist pain due to injury that occured 12/27/21 while bowling, went to the ER & XR's done: 12/27/21.  Pain scale: 8/10 at this time, Norco & ibuprofen for pain r and ovaries     as per NG   • Incontinence     DUE TO CYSTOCELE/RECTOCELE   • Lymphocytic colitis 2011    as per NG   • Migraine 9/21/2012   • Migraines    • Mild cognitive impairment     as per NG   • Obstructive sleep apnea 12/30/2013   • Pacemaker    • rOPINIRole HCl 1 MG Oral Tab Take 2 tablets (2 mg total) by mouth nightly.  180 tablet 0   • ATORVASTATIN 20 MG Oral Tab TAKE ONE TABLET BY MOUTH ONE TIME DAILY AT NIGHT 90 tablet 0   • traMADol 50 MG Oral Tab Take 1 tablet (50 mg total) by mouth every 6 (s NG   • Cancer Maternal Grandmother         Colon       Social History    Occupational History      Not on file    Tobacco Use      Smoking status: Former Smoker        Packs/day: 1.00        Years: 40.00        Pack years: 40        Types: Cigarettes swelling of the digits. Imaging: XR SACRUM + COCCYX (MIN 2 VIEWS) (CPT=72220)    Result Date: 12/27/2021  PROCEDURE: XR SACRUM + COCCYX (MIN 2 VIEWS) (CPT=72220)  COMPARISON: None. INDICATIONS: Post fall while bowling. Low back pain.   TECHNIQUE: AP and mildly impacted and displaced fracture of the distal radius, as noted on wrist radiographs performed same day. Nondisplaced ulnar styloid fracture.   Likely resection of the trapezium with small well-corticated probable postsurgical fragments at the base o nondisplaced ulnar styloid fracture.   Fragmentation with well corticated small ossific fragments and absence of the trapezium noted at the base of the thumb, with smooth erosion of the adjacent upper pole of the scaphoid, likely related to a prior surgery post cholecystectomy. 5. Atherosclerosis. Dictated by (CST): Caio Sepulveda MD on 12/27/2021 at 2:02 PM     Finalized by (CST):  Caio Sepulveda MD on 12/27/2021 at 2:05 PM          XR HIP W OR WO PELVIS 2 OR 3 VIEWS, LEFT (CPT=73502)    Result Date creating using Dragon speech recognition technology. Please excuse any typos.     Shania Mcneil MD

## 2021-12-28 NOTE — TELEPHONE ENCOUNTER
Please see if we can see patient tomorrow for pre op clearance. Dr. Carolina Valdez responded to Dr. Sweeney Frees chat message that she is okay from cardiac point of view. From Dr. Denita Quiles. Unfortunately Ms. Silvio Richardson broke her wrist a few days ago.  I did recommen

## 2021-12-29 ENCOUNTER — LAB ENCOUNTER (OUTPATIENT)
Dept: LAB | Facility: HOSPITAL | Age: 71
End: 2021-12-29
Attending: ORTHOPAEDIC SURGERY
Payer: MEDICARE

## 2021-12-29 DIAGNOSIS — Z01.818 PREOP EXAMINATION: ICD-10-CM

## 2021-12-29 DIAGNOSIS — Z01.818 PRE-OP TESTING: ICD-10-CM

## 2021-12-29 LAB
ALBUMIN SERPL-MCNC: 3.8 G/DL (ref 3.4–5)
ALBUMIN/GLOB SERPL: 1.1 {RATIO} (ref 1–2)
ALP LIVER SERPL-CCNC: 59 U/L
ALT SERPL-CCNC: 21 U/L
ANION GAP SERPL CALC-SCNC: 5 MMOL/L (ref 0–18)
AST SERPL-CCNC: 12 U/L (ref 15–37)
BASOPHILS # BLD AUTO: 0.03 X10(3) UL (ref 0–0.2)
BASOPHILS NFR BLD AUTO: 0.5 %
BILIRUB SERPL-MCNC: 0.6 MG/DL (ref 0.1–2)
BUN BLD-MCNC: 17 MG/DL (ref 7–18)
BUN/CREAT SERPL: 17.3 (ref 10–20)
CALCIUM BLD-MCNC: 9 MG/DL (ref 8.5–10.1)
CHLORIDE SERPL-SCNC: 107 MMOL/L (ref 98–112)
CO2 SERPL-SCNC: 29 MMOL/L (ref 21–32)
CREAT BLD-MCNC: 0.98 MG/DL
DEPRECATED RDW RBC AUTO: 42.3 FL (ref 35.1–46.3)
EOSINOPHIL # BLD AUTO: 0.53 X10(3) UL (ref 0–0.7)
EOSINOPHIL NFR BLD AUTO: 9.4 %
ERYTHROCYTE [DISTWIDTH] IN BLOOD BY AUTOMATED COUNT: 12.6 % (ref 11–15)
FASTING STATUS PATIENT QL REPORTED: NO
GLOBULIN PLAS-MCNC: 3.5 G/DL (ref 2.8–4.4)
GLUCOSE BLD-MCNC: 88 MG/DL (ref 70–99)
HCT VFR BLD AUTO: 38 %
HGB BLD-MCNC: 12.6 G/DL
IMM GRANULOCYTES # BLD AUTO: 0.03 X10(3) UL (ref 0–1)
IMM GRANULOCYTES NFR BLD: 0.5 %
LYMPHOCYTES # BLD AUTO: 1.34 X10(3) UL (ref 1–4)
LYMPHOCYTES NFR BLD AUTO: 23.8 %
MCH RBC QN AUTO: 30.4 PG (ref 26–34)
MCHC RBC AUTO-ENTMCNC: 33.2 G/DL (ref 31–37)
MCV RBC AUTO: 91.6 FL
MONOCYTES # BLD AUTO: 0.52 X10(3) UL (ref 0.1–1)
MONOCYTES NFR BLD AUTO: 9.2 %
NEUTROPHILS # BLD AUTO: 3.18 X10 (3) UL (ref 1.5–7.7)
NEUTROPHILS # BLD AUTO: 3.18 X10(3) UL (ref 1.5–7.7)
NEUTROPHILS NFR BLD AUTO: 56.6 %
OSMOLALITY SERPL CALC.SUM OF ELEC: 293 MOSM/KG (ref 275–295)
PLATELET # BLD AUTO: 144 10(3)UL (ref 150–450)
POTASSIUM SERPL-SCNC: 4.6 MMOL/L (ref 3.5–5.1)
PROT SERPL-MCNC: 7.3 G/DL (ref 6.4–8.2)
RBC # BLD AUTO: 4.15 X10(6)UL
SARS-COV-2 RNA RESP QL NAA+PROBE: NOT DETECTED
SODIUM SERPL-SCNC: 141 MMOL/L (ref 136–145)
WBC # BLD AUTO: 5.6 X10(3) UL (ref 4–11)

## 2021-12-29 PROCEDURE — 85025 COMPLETE CBC W/AUTO DIFF WBC: CPT

## 2021-12-29 PROCEDURE — 80053 COMPREHEN METABOLIC PANEL: CPT

## 2021-12-29 PROCEDURE — 36415 COLL VENOUS BLD VENIPUNCTURE: CPT

## 2021-12-29 RX ORDER — LAMOTRIGINE 150 MG/1
150 TABLET ORAL 2 TIMES DAILY
COMMUNITY

## 2021-12-30 ENCOUNTER — ANESTHESIA (OUTPATIENT)
Dept: SURGERY | Facility: HOSPITAL | Age: 71
End: 2021-12-30
Payer: MEDICARE

## 2021-12-30 ENCOUNTER — HOSPITAL ENCOUNTER (OUTPATIENT)
Facility: HOSPITAL | Age: 71
Setting detail: HOSPITAL OUTPATIENT SURGERY
Discharge: HOME | End: 2021-12-30
Attending: ORTHOPAEDIC SURGERY | Admitting: ORTHOPAEDIC SURGERY
Payer: MEDICARE

## 2021-12-30 ENCOUNTER — APPOINTMENT (OUTPATIENT)
Dept: GENERAL RADIOLOGY | Facility: HOSPITAL | Age: 71
End: 2021-12-30
Attending: ORTHOPAEDIC SURGERY
Payer: MEDICARE

## 2021-12-30 ENCOUNTER — ANESTHESIA EVENT (OUTPATIENT)
Dept: SURGERY | Facility: HOSPITAL | Age: 71
End: 2021-12-30
Payer: MEDICARE

## 2021-12-30 VITALS
WEIGHT: 149 LBS | TEMPERATURE: 97 F | SYSTOLIC BLOOD PRESSURE: 164 MMHG | RESPIRATION RATE: 16 BRPM | DIASTOLIC BLOOD PRESSURE: 61 MMHG | HEART RATE: 60 BPM | OXYGEN SATURATION: 97 % | BODY MASS INDEX: 25.44 KG/M2 | HEIGHT: 64 IN

## 2021-12-30 DIAGNOSIS — S52.572A OTHER CLOSED INTRA-ARTICULAR FRACTURE OF DISTAL END OF LEFT RADIUS, INITIAL ENCOUNTER: ICD-10-CM

## 2021-12-30 DIAGNOSIS — Z01.818 PRE-OP TESTING: Primary | ICD-10-CM

## 2021-12-30 PROCEDURE — 25609 OPTX DST RD XART FX/EP SEP3+: CPT | Performed by: ORTHOPAEDIC SURGERY

## 2021-12-30 PROCEDURE — 0PSJ04Z REPOSITION LEFT RADIUS WITH INTERNAL FIXATION DEVICE, OPEN APPROACH: ICD-10-PCS | Performed by: ORTHOPAEDIC SURGERY

## 2021-12-30 PROCEDURE — 76000 FLUOROSCOPY <1 HR PHYS/QHP: CPT | Performed by: ORTHOPAEDIC SURGERY

## 2021-12-30 DEVICE — SCREW BN 2.4MM 16MM LCP SS: Type: IMPLANTABLE DEVICE | Site: WRIST | Status: FUNCTIONAL

## 2021-12-30 DEVICE — PLATE NAR 51MM SS 2.4MM SCR 6: Type: IMPLANTABLE DEVICE | Site: WRIST | Status: FUNCTIONAL

## 2021-12-30 DEVICE — SCREW BN 2.4MM 12MM LCP SS: Type: IMPLANTABLE DEVICE | Site: WRIST | Status: FUNCTIONAL

## 2021-12-30 DEVICE — SCREW BN 2.4MM 20MM LCP SS: Type: IMPLANTABLE DEVICE | Site: WRIST | Status: FUNCTIONAL

## 2021-12-30 DEVICE — SCREW BN 2.4MM 22MM LCP SS: Type: IMPLANTABLE DEVICE | Site: WRIST | Status: FUNCTIONAL

## 2021-12-30 DEVICE — SCREW BN 2.4MM 4MM 14MM LCP SS: Type: IMPLANTABLE DEVICE | Site: WRIST | Status: FUNCTIONAL

## 2021-12-30 RX ORDER — MORPHINE SULFATE 4 MG/ML
4 INJECTION, SOLUTION INTRAMUSCULAR; INTRAVENOUS EVERY 10 MIN PRN
Status: DISCONTINUED | OUTPATIENT
Start: 2021-12-30 | End: 2021-12-30

## 2021-12-30 RX ORDER — HYDROMORPHONE HYDROCHLORIDE 1 MG/ML
0.6 INJECTION, SOLUTION INTRAMUSCULAR; INTRAVENOUS; SUBCUTANEOUS EVERY 5 MIN PRN
Status: DISCONTINUED | OUTPATIENT
Start: 2021-12-30 | End: 2021-12-30

## 2021-12-30 RX ORDER — NALOXONE HYDROCHLORIDE 0.4 MG/ML
80 INJECTION, SOLUTION INTRAMUSCULAR; INTRAVENOUS; SUBCUTANEOUS AS NEEDED
Status: DISCONTINUED | OUTPATIENT
Start: 2021-12-30 | End: 2021-12-30

## 2021-12-30 RX ORDER — HYDROMORPHONE HYDROCHLORIDE 1 MG/ML
0.2 INJECTION, SOLUTION INTRAMUSCULAR; INTRAVENOUS; SUBCUTANEOUS EVERY 5 MIN PRN
Status: DISCONTINUED | OUTPATIENT
Start: 2021-12-30 | End: 2021-12-30

## 2021-12-30 RX ORDER — LIDOCAINE HYDROCHLORIDE 10 MG/ML
INJECTION, SOLUTION INFILTRATION; PERINEURAL
Status: COMPLETED | OUTPATIENT
Start: 2021-12-30 | End: 2021-12-30

## 2021-12-30 RX ORDER — ROPIVACAINE HYDROCHLORIDE 5 MG/ML
INJECTION, SOLUTION EPIDURAL; INFILTRATION; PERINEURAL
Status: COMPLETED | OUTPATIENT
Start: 2021-12-30 | End: 2021-12-30

## 2021-12-30 RX ORDER — HYDROMORPHONE HYDROCHLORIDE 1 MG/ML
0.4 INJECTION, SOLUTION INTRAMUSCULAR; INTRAVENOUS; SUBCUTANEOUS EVERY 5 MIN PRN
Status: DISCONTINUED | OUTPATIENT
Start: 2021-12-30 | End: 2021-12-30

## 2021-12-30 RX ORDER — CEFAZOLIN SODIUM/WATER 2 G/20 ML
SYRINGE (ML) INTRAVENOUS AS NEEDED
Status: DISCONTINUED | OUTPATIENT
Start: 2021-12-30 | End: 2021-12-30 | Stop reason: SURG

## 2021-12-30 RX ORDER — MORPHINE SULFATE 4 MG/ML
2 INJECTION, SOLUTION INTRAMUSCULAR; INTRAVENOUS EVERY 10 MIN PRN
Status: DISCONTINUED | OUTPATIENT
Start: 2021-12-30 | End: 2021-12-30

## 2021-12-30 RX ORDER — CEFAZOLIN SODIUM/WATER 2 G/20 ML
2 SYRINGE (ML) INTRAVENOUS ONCE
Status: DISCONTINUED | OUTPATIENT
Start: 2021-12-30 | End: 2021-12-30 | Stop reason: HOSPADM

## 2021-12-30 RX ORDER — DEXAMETHASONE SODIUM PHOSPHATE 10 MG/ML
INJECTION, SOLUTION INTRAMUSCULAR; INTRAVENOUS
Status: COMPLETED | OUTPATIENT
Start: 2021-12-30 | End: 2021-12-30

## 2021-12-30 RX ORDER — PROCHLORPERAZINE EDISYLATE 5 MG/ML
5 INJECTION INTRAMUSCULAR; INTRAVENOUS ONCE AS NEEDED
Status: DISCONTINUED | OUTPATIENT
Start: 2021-12-30 | End: 2021-12-30

## 2021-12-30 RX ORDER — HYDROCODONE BITARTRATE AND ACETAMINOPHEN 5; 325 MG/1; MG/1
1 TABLET ORAL AS NEEDED
Status: DISCONTINUED | OUTPATIENT
Start: 2021-12-30 | End: 2021-12-30

## 2021-12-30 RX ORDER — ONDANSETRON 2 MG/ML
4 INJECTION INTRAMUSCULAR; INTRAVENOUS ONCE AS NEEDED
Status: DISCONTINUED | OUTPATIENT
Start: 2021-12-30 | End: 2021-12-30

## 2021-12-30 RX ORDER — METOPROLOL TARTRATE 5 MG/5ML
2.5 INJECTION INTRAVENOUS ONCE
Status: DISCONTINUED | OUTPATIENT
Start: 2021-12-30 | End: 2021-12-30

## 2021-12-30 RX ORDER — LIDOCAINE HYDROCHLORIDE 10 MG/ML
INJECTION, SOLUTION EPIDURAL; INFILTRATION; INTRACAUDAL; PERINEURAL AS NEEDED
Status: DISCONTINUED | OUTPATIENT
Start: 2021-12-30 | End: 2021-12-30 | Stop reason: SURG

## 2021-12-30 RX ORDER — SODIUM CHLORIDE, SODIUM LACTATE, POTASSIUM CHLORIDE, CALCIUM CHLORIDE 600; 310; 30; 20 MG/100ML; MG/100ML; MG/100ML; MG/100ML
INJECTION, SOLUTION INTRAVENOUS CONTINUOUS
Status: DISCONTINUED | OUTPATIENT
Start: 2021-12-30 | End: 2021-12-30

## 2021-12-30 RX ORDER — MORPHINE SULFATE 10 MG/ML
6 INJECTION, SOLUTION INTRAMUSCULAR; INTRAVENOUS EVERY 10 MIN PRN
Status: DISCONTINUED | OUTPATIENT
Start: 2021-12-30 | End: 2021-12-30

## 2021-12-30 RX ORDER — HYDROCODONE BITARTRATE AND ACETAMINOPHEN 5; 325 MG/1; MG/1
2 TABLET ORAL AS NEEDED
Status: DISCONTINUED | OUTPATIENT
Start: 2021-12-30 | End: 2021-12-30

## 2021-12-30 RX ORDER — ACETAMINOPHEN 500 MG
1000 TABLET ORAL ONCE
Status: COMPLETED | OUTPATIENT
Start: 2021-12-30 | End: 2021-12-30

## 2021-12-30 RX ORDER — HALOPERIDOL 5 MG/ML
0.25 INJECTION INTRAMUSCULAR ONCE AS NEEDED
Status: DISCONTINUED | OUTPATIENT
Start: 2021-12-30 | End: 2021-12-30

## 2021-12-30 RX ADMIN — LIDOCAINE HYDROCHLORIDE 50 MG: 10 INJECTION, SOLUTION EPIDURAL; INFILTRATION; INTRACAUDAL; PERINEURAL at 17:42:00

## 2021-12-30 RX ADMIN — LIDOCAINE HYDROCHLORIDE 5 ML: 10 INJECTION, SOLUTION INFILTRATION; PERINEURAL at 16:45:00

## 2021-12-30 RX ADMIN — SODIUM CHLORIDE, SODIUM LACTATE, POTASSIUM CHLORIDE, CALCIUM CHLORIDE: 600; 310; 30; 20 INJECTION, SOLUTION INTRAVENOUS at 17:39:00

## 2021-12-30 RX ADMIN — CEFAZOLIN SODIUM/WATER 2 G: 2 G/20 ML SYRINGE (ML) INTRAVENOUS at 17:45:00

## 2021-12-30 RX ADMIN — ROPIVACAINE HYDROCHLORIDE 30 ML: 5 INJECTION, SOLUTION EPIDURAL; INFILTRATION; PERINEURAL at 16:45:00

## 2021-12-30 RX ADMIN — DEXAMETHASONE SODIUM PHOSPHATE 10 MG: 10 INJECTION, SOLUTION INTRAMUSCULAR; INTRAVENOUS at 16:45:00

## 2021-12-30 NOTE — INTERVAL H&P NOTE
Pre-op Diagnosis: Other closed intra-articular fracture of distal end of left radius, initial encounter [S52.573C]    The above referenced H&P was reviewed by Jj Brooke MD on 12/30/2021, the patient was examined and no significant changes have oc

## 2021-12-31 NOTE — OPERATIVE REPORT
[facility] OPERATING ROOM  Operative Note     Riaz Gambrosalia Location: OR   [facility] [id number] MRN [id number]   Admission Date 12/30/2021 Operation Date 12/30/2021   Attending Physician Emmanuel Maynard MD Operating Physician Shaina Salas MD wrist injury and x-rays confirmed an unstable fracture pattern with displacement. Therefore, I recommended surgery and discussed the risks, benefits and alternatives. The patient agreed to proceed with surgery.   Written and verbal consent was given by jey under fluoroscopy guidance to confirm they were not within the joint. Final fluoroscopy imaging confirmed anatomic reduction of the fracture with appropriate plate and screw position.   The wound was irrigated copiously with normal saline and closed in a l

## 2021-12-31 NOTE — ANESTHESIA POSTPROCEDURE EVALUATION
Patient: Kelsi Lundberg    Procedure Summary     Date: 12/30/21 Room / Location: [address] [place] [address] MAIN OR 11 / [address] [place] [address] OR    Anesthesia Start: 0467 Anesthesia Stop: 1910    Procedure: LEFT DISTAL RADIUS FRACTURE, OPEN REDUCTION INTERNAL FIXATION (Left Wrist) Yobanii

## 2021-12-31 NOTE — ANESTHESIA PROCEDURE NOTES
Peripheral Block    Date/Time: 12/30/2021 4:45 PM  Performed by: Jeff Sánchez MD  Authorized by: Payam ,        General Information and Staff    Start Time:  12/30/2021 4:40 PM  End Time:  12/30/2021 4:46 PM  Anesthesiologist:  Denise Levy,

## 2022-01-11 ENCOUNTER — OFFICE VISIT (OUTPATIENT)
Dept: ORTHOPEDICS CLINIC | Facility: CLINIC | Age: 72
End: 2022-01-11
Payer: MEDICARE

## 2022-01-11 ENCOUNTER — HOSPITAL ENCOUNTER (OUTPATIENT)
Dept: GENERAL RADIOLOGY | Facility: HOSPITAL | Age: 72
Discharge: HOME | End: 2022-01-11
Attending: ORTHOPAEDIC SURGERY
Payer: MEDICARE

## 2022-01-11 DIAGNOSIS — Z47.89 ORTHOPEDIC AFTERCARE: Primary | ICD-10-CM

## 2022-01-11 DIAGNOSIS — Z47.89 ORTHOPEDIC AFTERCARE: ICD-10-CM

## 2022-01-11 PROCEDURE — 73110 X-RAY EXAM OF WRIST: CPT | Performed by: ORTHOPAEDIC SURGERY

## 2022-01-11 PROCEDURE — 99024 POSTOP FOLLOW-UP VISIT: CPT | Performed by: ORTHOPAEDIC SURGERY

## 2022-01-12 NOTE — PROGRESS NOTES
NURSING INTAKE COMMENTS: Patient presents with:  Post-Op: surgery done on 12/30/21 for  LEFT DISTAL RADIUS FRACTURE, OPEN REDUCTION INTERNAL FIXATION. Pain 5/10. Radiates up the arm.      Musculoskeletal Problem: Patient complaining of ongoing hip pain as apnea 12/30/2013   • Pacemaker    • Pseudobulbar affect 2/24/2017   • Renal disorder     stage 3   • Restless leg syndrome     as per NG   • Restless legs syndrome 5/16/2013   • Seizure disorder [facility])     last one 2/2019   • Seizure disorder [facility])    • Ten tablet 0   • nadolol 20 MG Oral Tab Take 10 mg by mouth at bedtime. Cut in half 20mg 90 tablet 3   • Dicyclomine HCl 10 MG Oral Cap Take 1 capsule (10 mg total) by mouth 3 (three) times daily as needed.  60 capsule 3   • aspirin 81 MG Oral Tab EC Take 1 tab Cigarettes        Quit date: 1998        Years since quittin.0      Smokeless tobacco: Never Used    Vaping Use      Vaping Use: Every day        Substances: cannabis    Substance and Sexual Activity      Alcohol use: Never      Drug use:  Yes COMPARISON: None. INDICATIONS: Post fall today while bowling. Left elbow pain. TECHNIQUE: 4 views were obtained. FINDINGS:  BONES: Bones appear likely demineralized diffusely. No significant arthropathy, fracture or acute abnormality.  SOFT TISSUES: Neg noted on same day comparison wrist radiographs. No other hand fractures are noted. Moderate osteoarthritis of the left hand, worst distally. Postsurgical changes suggestive of previous trapezium resection. Diffuse bone demineralization.     Dictated by the central articular surface. There is mild dorsal dislocation of the principal distal fracture fragments with respect to the proximal, by about 4-5 mm. There is also mild impaction of the fracture. There is also a nondisplaced ulnar styloid fracture. OTHER: Surgical clips are seen from prior cholecystectomy. Vascular calcifications are noted. CONCLUSION:  1. Scoliosis. 2. Demineralization. 3. Mild osteoarthritis showing mild progression from October 4, 2010. 4. Status post cholecystectomy.  5. COMPLETE (MIN 3 VIEWS), LEFT (CPT=73110); Future        Assessment: Status post left distal radius fracture ORIF    Plan: Transition to removable wrist brace which the patient owns given a history of carpal tunnel syndrome.   Work on gentle wrist mobility e

## 2022-02-08 ENCOUNTER — HOSPITAL ENCOUNTER (OUTPATIENT)
Dept: GENERAL RADIOLOGY | Facility: HOSPITAL | Age: 72
Discharge: HOME | End: 2022-02-08
Attending: ORTHOPAEDIC SURGERY
Payer: MEDICARE

## 2022-02-08 ENCOUNTER — OFFICE VISIT (OUTPATIENT)
Dept: ORTHOPEDICS CLINIC | Facility: CLINIC | Age: 72
End: 2022-02-08
Payer: MEDICARE

## 2022-02-08 DIAGNOSIS — Z47.89 ORTHOPEDIC AFTERCARE: ICD-10-CM

## 2022-02-08 DIAGNOSIS — M67.441 GANGLION CYST OF FLEXOR TENDON SHEATH OF FINGER OF RIGHT HAND: Primary | ICD-10-CM

## 2022-02-08 PROBLEM — G20.A1 PARKINSON'S DISEASE (HCC): Status: ACTIVE | Noted: 2022-02-08

## 2022-02-08 PROBLEM — G20 PARKINSON'S DISEASE: Status: ACTIVE | Noted: 2022-02-08

## 2022-02-08 PROBLEM — G20.A1 PARKINSON'S DISEASE: Status: ACTIVE | Noted: 2022-02-08

## 2022-02-08 PROBLEM — G20 PARKINSON'S DISEASE (HCC): Status: ACTIVE | Noted: 2022-02-08

## 2022-02-08 PROCEDURE — 73110 X-RAY EXAM OF WRIST: CPT | Performed by: ORTHOPAEDIC SURGERY

## 2022-02-08 PROCEDURE — 99024 POSTOP FOLLOW-UP VISIT: CPT | Performed by: ORTHOPAEDIC SURGERY

## 2022-02-16 ENCOUNTER — TELEPHONE (OUTPATIENT)
Dept: INTERNAL MEDICINE CLINIC | Facility: CLINIC | Age: 72
End: 2022-02-16

## 2022-02-16 ENCOUNTER — APPOINTMENT (OUTPATIENT)
Dept: GENERAL RADIOLOGY | Facility: HOSPITAL | Age: 72
End: 2022-02-16
Attending: EMERGENCY MEDICINE
Payer: MEDICARE

## 2022-02-16 ENCOUNTER — HOSPITAL ENCOUNTER (EMERGENCY)
Facility: HOSPITAL | Age: 72
Discharge: HOME | End: 2022-02-16
Attending: EMERGENCY MEDICINE
Payer: MEDICARE

## 2022-02-16 VITALS
RESPIRATION RATE: 11 BRPM | HEIGHT: 65 IN | HEART RATE: 60 BPM | OXYGEN SATURATION: 97 % | WEIGHT: 147 LBS | SYSTOLIC BLOOD PRESSURE: 156 MMHG | TEMPERATURE: 98 F | BODY MASS INDEX: 24.49 KG/M2 | DIASTOLIC BLOOD PRESSURE: 66 MMHG

## 2022-02-16 DIAGNOSIS — R00.2 PALPITATIONS: Primary | ICD-10-CM

## 2022-02-16 LAB
ANION GAP SERPL CALC-SCNC: 6 MMOL/L (ref 0–18)
BASOPHILS # BLD AUTO: 0.03 X10(3) UL (ref 0–0.2)
BASOPHILS NFR BLD AUTO: 0.3 %
BILIRUB UR QL: NEGATIVE
BUN BLD-MCNC: 25 MG/DL (ref 7–18)
BUN/CREAT SERPL: 25.8 (ref 10–20)
CALCIUM BLD-MCNC: 9.4 MG/DL (ref 8.5–10.1)
CHLORIDE SERPL-SCNC: 108 MMOL/L (ref 98–112)
CLARITY UR: CLEAR
CO2 SERPL-SCNC: 27 MMOL/L (ref 21–32)
COLOR UR: YELLOW
CREAT BLD-MCNC: 0.97 MG/DL
DEPRECATED RDW RBC AUTO: 41.1 FL (ref 35.1–46.3)
EOSINOPHIL # BLD AUTO: 0.09 X10(3) UL (ref 0–0.7)
EOSINOPHIL NFR BLD AUTO: 1 %
ERYTHROCYTE [DISTWIDTH] IN BLOOD BY AUTOMATED COUNT: 12.3 % (ref 11–15)
GLUCOSE BLD-MCNC: 110 MG/DL (ref 70–99)
GLUCOSE UR-MCNC: NEGATIVE MG/DL
HCT VFR BLD AUTO: 40.8 %
HGB BLD-MCNC: 13.6 G/DL
HGB UR QL STRIP.AUTO: NEGATIVE
IMM GRANULOCYTES # BLD AUTO: 0.03 X10(3) UL (ref 0–1)
IMM GRANULOCYTES NFR BLD: 0.3 %
LEUKOCYTE ESTERASE UR QL STRIP.AUTO: NEGATIVE
LYMPHOCYTES # BLD AUTO: 1.12 X10(3) UL (ref 1–4)
LYMPHOCYTES NFR BLD AUTO: 12.5 %
MCH RBC QN AUTO: 30.5 PG (ref 26–34)
MCHC RBC AUTO-ENTMCNC: 33.3 G/DL (ref 31–37)
MCV RBC AUTO: 91.5 FL
MONOCYTES # BLD AUTO: 0.45 X10(3) UL (ref 0.1–1)
MONOCYTES NFR BLD AUTO: 5 %
NEUTROPHILS # BLD AUTO: 7.22 X10 (3) UL (ref 1.5–7.7)
NEUTROPHILS # BLD AUTO: 7.22 X10(3) UL (ref 1.5–7.7)
NEUTROPHILS NFR BLD AUTO: 80.9 %
NITRITE UR QL STRIP.AUTO: NEGATIVE
OSMOLALITY SERPL CALC.SUM OF ELEC: 297 MOSM/KG (ref 275–295)
PH UR: 6 [PH] (ref 5–8)
PLATELET # BLD AUTO: 193 10(3)UL (ref 150–450)
POTASSIUM SERPL-SCNC: 4.1 MMOL/L (ref 3.5–5.1)
PROT UR-MCNC: NEGATIVE MG/DL
RBC # BLD AUTO: 4.46 X10(6)UL
SARS-COV-2 RNA RESP QL NAA+PROBE: NOT DETECTED
SODIUM SERPL-SCNC: 141 MMOL/L (ref 136–145)
SP GR UR STRIP: 1.02 (ref 1–1.03)
TROPONIN I HIGH SENSITIVITY: 6 NG/L
UROBILINOGEN UR STRIP-ACNC: 2
WBC # BLD AUTO: 8.9 X10(3) UL (ref 4–11)

## 2022-02-16 PROCEDURE — 93005 ELECTROCARDIOGRAM TRACING: CPT

## 2022-02-16 PROCEDURE — 80048 BASIC METABOLIC PNL TOTAL CA: CPT | Performed by: EMERGENCY MEDICINE

## 2022-02-16 PROCEDURE — 81001 URINALYSIS AUTO W/SCOPE: CPT | Performed by: EMERGENCY MEDICINE

## 2022-02-16 PROCEDURE — 71045 X-RAY EXAM CHEST 1 VIEW: CPT | Performed by: EMERGENCY MEDICINE

## 2022-02-16 PROCEDURE — 96374 THER/PROPH/DIAG INJ IV PUSH: CPT

## 2022-02-16 PROCEDURE — 93010 ELECTROCARDIOGRAM REPORT: CPT | Performed by: INTERNAL MEDICINE

## 2022-02-16 PROCEDURE — 99284 EMERGENCY DEPT VISIT MOD MDM: CPT

## 2022-02-16 PROCEDURE — 85025 COMPLETE CBC W/AUTO DIFF WBC: CPT | Performed by: EMERGENCY MEDICINE

## 2022-02-16 PROCEDURE — 84484 ASSAY OF TROPONIN QUANT: CPT | Performed by: EMERGENCY MEDICINE

## 2022-02-16 RX ORDER — ONDANSETRON 2 MG/ML
4 INJECTION INTRAMUSCULAR; INTRAVENOUS ONCE
Status: COMPLETED | OUTPATIENT
Start: 2022-02-16 | End: 2022-02-16

## 2022-02-16 RX ORDER — ONDANSETRON 4 MG/1
4 TABLET, ORALLY DISINTEGRATING ORAL ONCE
Status: DISCONTINUED | OUTPATIENT
Start: 2022-02-16 | End: 2022-02-16

## 2022-02-16 NOTE — TELEPHONE ENCOUNTER
Patient is calling to speak with a nurse. Patient states yesterday she started to feel, \"like I was going to pass out, weak, and odd. \" Patient stated to  that this started yesterday and continued to wake up through the night with the same symptoms. Patient then informed  that she also has some nausea and had this weird heat sensation from her head to her feet that comes and goes, stays for about five minutes, and happened nine times yesterday. Patient has been taking Ibuprofen to help with the headaches but nothing to help with any of the other symptoms. Denies any fever, shortness of breath, or cough. Patient has not been around anyone with covid, she stays home. Patient is fully vaccinated and boosted.       # [phone]

## 2022-02-16 NOTE — ED INITIAL ASSESSMENT (HPI)
Pt reports intermittent palpitations since yesterday when palpitations come on pt reports feeling like\" I am going to pass out\"

## 2022-02-16 NOTE — TELEPHONE ENCOUNTER
Spoke to pt who reports since yesterday she has been experiencing light headedness, nausea, weakness, chest tightness/\"tenseness,\" and feeling that her heart is \"flopping around/bouncing. \" Reports every time she stands up, she feels near syncopal (denies syncope) and that there is a \"draining\" sensation from her head to her toes. Additionally when standing feels that there is a hot/warm sensation that starts in her head and radiates down to toes. Denies fever, cough, covid symptoms/exposure, chest pain, difficulty breathing. /77 this AM and recheck per pt 137/70. Temp on phone with RN 96.8 F. RN advised pt to ER now for evaluation of concerning above symptoms.  She is agreeable and will go now to [address] [place] [address].

## 2022-02-18 ENCOUNTER — OFFICE VISIT (OUTPATIENT)
Dept: INTERNAL MEDICINE CLINIC | Facility: CLINIC | Age: 72
End: 2022-02-18
Payer: MEDICARE

## 2022-02-18 VITALS
SYSTOLIC BLOOD PRESSURE: 110 MMHG | HEART RATE: 60 BPM | HEIGHT: 65 IN | TEMPERATURE: 98 F | DIASTOLIC BLOOD PRESSURE: 74 MMHG | OXYGEN SATURATION: 99 % | BODY MASS INDEX: 24.86 KG/M2 | WEIGHT: 149.19 LBS

## 2022-02-18 DIAGNOSIS — R00.2 PALPITATIONS: Primary | ICD-10-CM

## 2022-02-18 DIAGNOSIS — F03.90 DEMENTIA WITHOUT BEHAVIORAL DISTURBANCE, UNSPECIFIED DEMENTIA TYPE (HCC): ICD-10-CM

## 2022-02-18 DIAGNOSIS — Z87.898 HISTORY OF SEIZURES: ICD-10-CM

## 2022-02-18 DIAGNOSIS — I48.0 PAROXYSMAL ATRIAL FIBRILLATION (HCC): ICD-10-CM

## 2022-02-18 DIAGNOSIS — S52.502D CLOSED FRACTURE OF DISTAL END OF LEFT RADIUS WITH ROUTINE HEALING, UNSPECIFIED FRACTURE MORPHOLOGY, SUBSEQUENT ENCOUNTER: ICD-10-CM

## 2022-02-18 DIAGNOSIS — N18.30 STAGE 3 CHRONIC KIDNEY DISEASE, UNSPECIFIED WHETHER STAGE 3A OR 3B CKD (HCC): ICD-10-CM

## 2022-02-18 DIAGNOSIS — Z95.818 PRESENCE OF WATCHMAN LEFT ATRIAL APPENDAGE CLOSURE DEVICE: ICD-10-CM

## 2022-02-18 DIAGNOSIS — Z12.31 VISIT FOR SCREENING MAMMOGRAM: ICD-10-CM

## 2022-02-18 DIAGNOSIS — Z95.0 PRESENCE OF PERMANENT CARDIAC PACEMAKER: ICD-10-CM

## 2022-02-18 DIAGNOSIS — I10 ESSENTIAL HYPERTENSION: ICD-10-CM

## 2022-02-18 PROCEDURE — 99214 OFFICE O/P EST MOD 30 MIN: CPT | Performed by: INTERNAL MEDICINE

## 2022-02-22 ENCOUNTER — LAB ENCOUNTER (OUTPATIENT)
Dept: LAB | Age: 72
End: 2022-02-22
Attending: Other
Payer: MEDICARE

## 2022-02-22 DIAGNOSIS — G40.909 EPILEPSY, UNSPECIFIED, NOT INTRACTABLE, WITHOUT STATUS EPILEPTICUS (HCC): Primary | ICD-10-CM

## 2022-02-22 PROCEDURE — 36415 COLL VENOUS BLD VENIPUNCTURE: CPT

## 2022-02-22 PROCEDURE — 80175 DRUG SCREEN QUAN LAMOTRIGINE: CPT

## 2022-02-27 RX ORDER — AMLODIPINE BESYLATE 2.5 MG/1
2.5 TABLET ORAL DAILY
Qty: 30 TABLET | Refills: 0 | OUTPATIENT
Start: 2022-02-27

## 2022-02-28 LAB — LAMOTRIGINE: <0.9 UG/ML

## 2022-03-07 ENCOUNTER — PATIENT MESSAGE (OUTPATIENT)
Dept: INTERNAL MEDICINE CLINIC | Facility: CLINIC | Age: 72
End: 2022-03-07

## 2022-03-08 NOTE — TELEPHONE ENCOUNTER
To Dr. Richard Garrison-----    Spoke to pt who reports she has been experiencing R side pain for about 2 weeks (states could be longer but unsure). Reports pain is dull in nature, but when sitting or putting pressure on back it becomes sharp. Does reports dull pain with deep breaths. States pain does radiate from midline R side back under rib cage and wraps around rib cage to front. Denies fall/injury, wound/bruising, not tender to touch, rash/postules or history of shingles, urinary symptoms (does states with past UTI's she exhibited no symptoms). Pt reports concerns as she has not had 2 sisters with kidney CA. Please advise, OK for office visit?

## 2022-03-09 NOTE — TELEPHONE ENCOUNTER
Called and relayed MDs message-verbalized understanding. Office visit scheduled for 3/11/22. Patient instructed to call the office with any new or worsening symptoms.

## 2022-03-09 NOTE — TELEPHONE ENCOUNTER
Message noted. We can tell patient the followin. I can see her Thursday or Friday. 2.  I would not think that it is kidney cancer since she is having several images of her kidneys the last being 2021 with Dr. Ana Sharma which did not show any evidence of kidney cancer. 3.  It sounds like it may be muscular. 4.  She is to avoid medications in the class of nonsteroidal anti-inflammatory agents which are Aleve, ibuprofen, Motrin or Advil. 5.  She can take over-the-counter Tylenol as directed on the bottle and she can get over-the-counter Salonpas with lidocaine patches in place that over the painful areas.

## 2022-03-22 ENCOUNTER — HOSPITAL ENCOUNTER (OUTPATIENT)
Dept: GENERAL RADIOLOGY | Facility: HOSPITAL | Age: 72
Discharge: HOME | End: 2022-03-22
Attending: ORTHOPAEDIC SURGERY
Payer: MEDICARE

## 2022-03-22 ENCOUNTER — OFFICE VISIT (OUTPATIENT)
Dept: ORTHOPEDICS CLINIC | Facility: CLINIC | Age: 72
End: 2022-03-22
Payer: MEDICARE

## 2022-03-22 ENCOUNTER — TELEPHONE (OUTPATIENT)
Dept: PHYSICAL THERAPY | Facility: HOSPITAL | Age: 72
End: 2022-03-22

## 2022-03-22 VITALS — DIASTOLIC BLOOD PRESSURE: 79 MMHG | HEART RATE: 72 BPM | SYSTOLIC BLOOD PRESSURE: 139 MMHG

## 2022-03-22 DIAGNOSIS — M67.441 GANGLION CYST OF FLEXOR TENDON SHEATH OF FINGER OF RIGHT HAND: Primary | ICD-10-CM

## 2022-03-22 DIAGNOSIS — M67.441 GANGLION CYST OF FLEXOR TENDON SHEATH OF FINGER OF RIGHT HAND: ICD-10-CM

## 2022-03-22 DIAGNOSIS — Z47.89 ORTHOPEDIC AFTERCARE: ICD-10-CM

## 2022-03-22 DIAGNOSIS — S52.572A OTHER CLOSED INTRA-ARTICULAR FRACTURE OF DISTAL END OF LEFT RADIUS, INITIAL ENCOUNTER: ICD-10-CM

## 2022-03-22 DIAGNOSIS — M47.816 LUMBAR SPONDYLOSIS: ICD-10-CM

## 2022-03-22 PROCEDURE — 73110 X-RAY EXAM OF WRIST: CPT | Performed by: ORTHOPAEDIC SURGERY

## 2022-03-22 PROCEDURE — 20612 ASPIRATE/INJ GANGLION CYST: CPT | Performed by: ORTHOPAEDIC SURGERY

## 2022-03-22 PROCEDURE — 73130 X-RAY EXAM OF HAND: CPT | Performed by: ORTHOPAEDIC SURGERY

## 2022-03-22 PROCEDURE — 99213 OFFICE O/P EST LOW 20 MIN: CPT | Performed by: ORTHOPAEDIC SURGERY

## 2022-03-22 RX ORDER — TRIAMCINOLONE ACETONIDE 40 MG/ML
20 INJECTION, SUSPENSION INTRA-ARTICULAR; INTRAMUSCULAR ONCE
Status: COMPLETED | OUTPATIENT
Start: 2022-03-22 | End: 2022-03-22

## 2022-03-22 RX ADMIN — TRIAMCINOLONE ACETONIDE 20 MG: 40 INJECTION, SUSPENSION INTRA-ARTICULAR; INTRAMUSCULAR at 11:01:00

## 2022-03-22 NOTE — PROGRESS NOTES
Per verbal order from Dr. Antoni Rg, draw up 0.5ml of 1% lidocaine and 0.5ml of Kenalog 40 for cortisone injection to right hand, ring finger. Kayla Torres, [facility]    Patient provided education handout for cortisone injection.

## 2022-03-22 NOTE — PROCEDURES
The patient identified the right hand ring finger as the correct procedure site. This was verified with the consent and with 2 patient identifiers. The skin over the subcutaneous retinacular cyst injection site was prepped with betadine and alcohol. A 25-gauge needle was introduced into the cyst with aspiration of less than 1 mL of serous fluid and then injected with 20 mg of Kenalog and 0.5 mL of 1% lidocaine. The patient tolerated the procedure well. A soft dressing was applied.

## 2022-03-25 ENCOUNTER — APPOINTMENT (OUTPATIENT)
Dept: CARDIOLOGY | Age: 72
End: 2022-03-25

## 2022-03-25 ENCOUNTER — APPOINTMENT (OUTPATIENT)
Dept: CARDIOLOGY | Age: 72
End: 2022-03-25
Attending: INTERNAL MEDICINE

## 2022-04-05 RX ORDER — ROPINIROLE 1 MG/1
TABLET, FILM COATED ORAL
Qty: 180 TABLET | Refills: 1 | Status: SHIPPED | OUTPATIENT
Start: 2022-04-05

## 2022-04-05 RX ORDER — ATORVASTATIN CALCIUM 20 MG/1
TABLET, FILM COATED ORAL
Qty: 90 TABLET | Refills: 1 | Status: SHIPPED | OUTPATIENT
Start: 2022-04-05

## 2022-04-15 ENCOUNTER — TELEPHONE (OUTPATIENT)
Dept: PHYSICAL THERAPY | Facility: HOSPITAL | Age: 72
End: 2022-04-15

## 2022-04-19 ENCOUNTER — APPOINTMENT (OUTPATIENT)
Dept: PHYSICAL THERAPY | Age: 72
End: 2022-04-19
Attending: ORTHOPAEDIC SURGERY
Payer: MEDICARE

## 2022-04-21 ENCOUNTER — APPOINTMENT (OUTPATIENT)
Dept: PHYSICAL THERAPY | Age: 72
End: 2022-04-21
Attending: ORTHOPAEDIC SURGERY
Payer: MEDICARE

## 2022-04-22 ENCOUNTER — LAB ENCOUNTER (OUTPATIENT)
Dept: LAB | Age: 72
End: 2022-04-22
Attending: INTERNAL MEDICINE
Payer: MEDICARE

## 2022-04-22 ENCOUNTER — TELEPHONE (OUTPATIENT)
Dept: INTERNAL MEDICINE CLINIC | Facility: CLINIC | Age: 72
End: 2022-04-22

## 2022-04-22 DIAGNOSIS — N18.30 HYPERTENSIVE KIDNEY DISEASE WITH STAGE 3 CHRONIC KIDNEY DISEASE, UNSPECIFIED WHETHER STAGE 3A OR 3B CKD (HCC): ICD-10-CM

## 2022-04-22 DIAGNOSIS — N18.31 STAGE 3A CHRONIC KIDNEY DISEASE (HCC): ICD-10-CM

## 2022-04-22 DIAGNOSIS — R42 DIZZY: ICD-10-CM

## 2022-04-22 DIAGNOSIS — I12.9 HYPERTENSIVE KIDNEY DISEASE WITH STAGE 3 CHRONIC KIDNEY DISEASE, UNSPECIFIED WHETHER STAGE 3A OR 3B CKD (HCC): ICD-10-CM

## 2022-04-22 LAB
ALBUMIN SERPL-MCNC: 3.6 G/DL (ref 3.4–5)
ALBUMIN/GLOB SERPL: 1.1 {RATIO} (ref 1–2)
ALP LIVER SERPL-CCNC: 54 U/L
ALT SERPL-CCNC: 19 U/L
ANION GAP SERPL CALC-SCNC: 5 MMOL/L (ref 0–18)
AST SERPL-CCNC: 12 U/L (ref 15–37)
BASOPHILS # BLD AUTO: 0.02 X10(3) UL (ref 0–0.2)
BASOPHILS NFR BLD AUTO: 0.5 %
BILIRUB SERPL-MCNC: 0.3 MG/DL (ref 0.1–2)
BUN BLD-MCNC: 19 MG/DL (ref 7–18)
BUN/CREAT SERPL: 18.3 (ref 10–20)
CALCIUM BLD-MCNC: 9.4 MG/DL (ref 8.5–10.1)
CHLORIDE SERPL-SCNC: 108 MMOL/L (ref 98–112)
CO2 SERPL-SCNC: 29 MMOL/L (ref 21–32)
CREAT BLD-MCNC: 1.04 MG/DL
DEPRECATED RDW RBC AUTO: 42.9 FL (ref 35.1–46.3)
EOSINOPHIL # BLD AUTO: 0.19 X10(3) UL (ref 0–0.7)
EOSINOPHIL NFR BLD AUTO: 4.6 %
ERYTHROCYTE [DISTWIDTH] IN BLOOD BY AUTOMATED COUNT: 13 % (ref 11–15)
FASTING STATUS PATIENT QL REPORTED: YES
GLOBULIN PLAS-MCNC: 3.4 G/DL (ref 2.8–4.4)
GLUCOSE BLD-MCNC: 106 MG/DL (ref 70–99)
HCT VFR BLD AUTO: 40.7 %
HGB BLD-MCNC: 13.3 G/DL
IMM GRANULOCYTES # BLD AUTO: 0.01 X10(3) UL (ref 0–1)
IMM GRANULOCYTES NFR BLD: 0.2 %
LYMPHOCYTES # BLD AUTO: 1.07 X10(3) UL (ref 1–4)
LYMPHOCYTES NFR BLD AUTO: 26.1 %
MCH RBC QN AUTO: 29.6 PG (ref 26–34)
MCHC RBC AUTO-ENTMCNC: 32.7 G/DL (ref 31–37)
MCV RBC AUTO: 90.4 FL
MONOCYTES # BLD AUTO: 0.35 X10(3) UL (ref 0.1–1)
MONOCYTES NFR BLD AUTO: 8.5 %
NEUTROPHILS # BLD AUTO: 2.46 X10 (3) UL (ref 1.5–7.7)
NEUTROPHILS # BLD AUTO: 2.46 X10(3) UL (ref 1.5–7.7)
NEUTROPHILS NFR BLD AUTO: 60.1 %
OSMOLALITY SERPL CALC.SUM OF ELEC: 297 MOSM/KG (ref 275–295)
PHOSPHATE SERPL-MCNC: 4 MG/DL (ref 2.5–4.9)
PLATELET # BLD AUTO: 168 10(3)UL (ref 150–450)
POTASSIUM SERPL-SCNC: 4.2 MMOL/L (ref 3.5–5.1)
PROT SERPL-MCNC: 7 G/DL (ref 6.4–8.2)
RBC # BLD AUTO: 4.5 X10(6)UL
SODIUM SERPL-SCNC: 142 MMOL/L (ref 136–145)
WBC # BLD AUTO: 4.1 X10(3) UL (ref 4–11)

## 2022-04-22 PROCEDURE — 85025 COMPLETE CBC W/AUTO DIFF WBC: CPT

## 2022-04-22 PROCEDURE — 80053 COMPREHEN METABOLIC PANEL: CPT

## 2022-04-22 PROCEDURE — 84100 ASSAY OF PHOSPHORUS: CPT

## 2022-04-22 PROCEDURE — 36415 COLL VENOUS BLD VENIPUNCTURE: CPT

## 2022-04-22 NOTE — TELEPHONE ENCOUNTER
Please call patient  Experiencing mucous and blood in stool  Please call to advise  Tasked to nursing

## 2022-04-22 NOTE — TELEPHONE ENCOUNTER
Pt reporting 1 month of mucous and small amount of bright red blood on toilet paper after bowel movements. Sometimes there is BRB streaks on outside of formed stools. Pt states she has constipation and only has 1-2 BMs weekly. She reports the mucous and blood do not happen with every bowel movement. Reports some abdominal discomfort r/t constipation. Denies any black/tarry stools, diarrhea, n/v, abdominal cramping, or dizziness. Pt scheduled to see Dr. Hayder Solis Monday 4/25. ER precautions reviewed with patient. Pt verbalized understanding.      FYI to Dr. Samara Jay

## 2022-04-24 ENCOUNTER — APPOINTMENT (OUTPATIENT)
Dept: LAB | Facility: HOSPITAL | Age: 72
End: 2022-04-24
Attending: INTERNAL MEDICINE
Payer: MEDICARE

## 2022-04-24 PROCEDURE — 87045 FECES CULTURE AEROBIC BACT: CPT

## 2022-04-24 PROCEDURE — 87046 STOOL CULTR AEROBIC BACT EA: CPT

## 2022-04-24 PROCEDURE — 87272 CRYPTOSPORIDIUM AG IF: CPT

## 2022-04-24 PROCEDURE — 87427 SHIGA-LIKE TOXIN AG IA: CPT

## 2022-04-24 PROCEDURE — 87329 GIARDIA AG IA: CPT

## 2022-04-25 ENCOUNTER — APPOINTMENT (OUTPATIENT)
Dept: PHYSICAL THERAPY | Age: 72
End: 2022-04-25
Attending: ORTHOPAEDIC SURGERY
Payer: MEDICARE

## 2022-04-25 ENCOUNTER — LAB ENCOUNTER (OUTPATIENT)
Dept: LAB | Age: 72
End: 2022-04-25
Attending: INTERNAL MEDICINE
Payer: MEDICARE

## 2022-04-25 ENCOUNTER — OFFICE VISIT (OUTPATIENT)
Dept: INTERNAL MEDICINE CLINIC | Facility: CLINIC | Age: 72
End: 2022-04-25
Payer: MEDICARE

## 2022-04-25 VITALS
BODY MASS INDEX: 25.16 KG/M2 | OXYGEN SATURATION: 99 % | WEIGHT: 151 LBS | DIASTOLIC BLOOD PRESSURE: 80 MMHG | TEMPERATURE: 98 F | HEART RATE: 68 BPM | HEIGHT: 65 IN | SYSTOLIC BLOOD PRESSURE: 142 MMHG

## 2022-04-25 DIAGNOSIS — Z95.0 PRESENCE OF PERMANENT CARDIAC PACEMAKER: ICD-10-CM

## 2022-04-25 DIAGNOSIS — R19.4 CHANGE IN BOWEL HABITS: Primary | ICD-10-CM

## 2022-04-25 DIAGNOSIS — R19.7 VOMITING AND DIARRHEA: ICD-10-CM

## 2022-04-25 DIAGNOSIS — N18.30 STAGE 3 CHRONIC KIDNEY DISEASE, UNSPECIFIED WHETHER STAGE 3A OR 3B CKD (HCC): ICD-10-CM

## 2022-04-25 DIAGNOSIS — K62.5 RECTAL BLEEDING: ICD-10-CM

## 2022-04-25 DIAGNOSIS — A09 DIARRHEA OF INFECTIOUS ORIGIN: ICD-10-CM

## 2022-04-25 DIAGNOSIS — I10 ESSENTIAL HYPERTENSION: ICD-10-CM

## 2022-04-25 DIAGNOSIS — F03.90 DEMENTIA WITHOUT BEHAVIORAL DISTURBANCE, UNSPECIFIED DEMENTIA TYPE (HCC): ICD-10-CM

## 2022-04-25 DIAGNOSIS — I48.0 PAROXYSMAL ATRIAL FIBRILLATION (HCC): ICD-10-CM

## 2022-04-25 DIAGNOSIS — Z95.818 PRESENCE OF WATCHMAN LEFT ATRIAL APPENDAGE CLOSURE DEVICE: ICD-10-CM

## 2022-04-25 DIAGNOSIS — Z87.898 HISTORY OF SEIZURES: ICD-10-CM

## 2022-04-25 DIAGNOSIS — R11.10 VOMITING AND DIARRHEA: ICD-10-CM

## 2022-04-25 LAB
CRYPTOSP AG STL QL IA: NEGATIVE
G LAMBLIA AG STL QL IA: NEGATIVE

## 2022-04-25 PROCEDURE — 99214 OFFICE O/P EST MOD 30 MIN: CPT | Performed by: INTERNAL MEDICINE

## 2022-04-26 ENCOUNTER — APPOINTMENT (OUTPATIENT)
Dept: PHYSICAL THERAPY | Age: 72
End: 2022-04-26
Attending: ORTHOPAEDIC SURGERY
Payer: MEDICARE

## 2022-04-27 ENCOUNTER — OFFICE VISIT (OUTPATIENT)
Dept: PHYSICAL THERAPY | Age: 72
End: 2022-04-27
Attending: ORTHOPAEDIC SURGERY
Payer: MEDICARE

## 2022-04-27 PROCEDURE — 97110 THERAPEUTIC EXERCISES: CPT

## 2022-04-27 PROCEDURE — 97162 PT EVAL MOD COMPLEX 30 MIN: CPT

## 2022-04-28 ENCOUNTER — APPOINTMENT (OUTPATIENT)
Dept: PHYSICAL THERAPY | Age: 72
End: 2022-04-28
Attending: ORTHOPAEDIC SURGERY
Payer: MEDICARE

## 2022-05-02 ENCOUNTER — OFFICE VISIT (OUTPATIENT)
Dept: PHYSICAL THERAPY | Age: 72
End: 2022-05-02
Attending: ORTHOPAEDIC SURGERY
Payer: MEDICARE

## 2022-05-02 PROCEDURE — 97110 THERAPEUTIC EXERCISES: CPT

## 2022-05-02 PROCEDURE — 97140 MANUAL THERAPY 1/> REGIONS: CPT

## 2022-05-04 ENCOUNTER — OFFICE VISIT (OUTPATIENT)
Dept: PHYSICAL THERAPY | Age: 72
End: 2022-05-04
Attending: ORTHOPAEDIC SURGERY
Payer: MEDICARE

## 2022-05-04 PROCEDURE — 97110 THERAPEUTIC EXERCISES: CPT

## 2022-05-04 PROCEDURE — 97140 MANUAL THERAPY 1/> REGIONS: CPT

## 2022-05-05 ENCOUNTER — APPOINTMENT (OUTPATIENT)
Dept: PHYSICAL THERAPY | Age: 72
End: 2022-05-05
Attending: ORTHOPAEDIC SURGERY

## 2022-05-09 ENCOUNTER — APPOINTMENT (OUTPATIENT)
Dept: PHYSICAL THERAPY | Age: 72
End: 2022-05-09
Attending: ORTHOPAEDIC SURGERY
Payer: MEDICARE

## 2022-05-10 ENCOUNTER — APPOINTMENT (OUTPATIENT)
Dept: PHYSICAL THERAPY | Age: 72
End: 2022-05-10
Attending: ORTHOPAEDIC SURGERY

## 2022-05-11 ENCOUNTER — OFFICE VISIT (OUTPATIENT)
Dept: PHYSICAL THERAPY | Age: 72
End: 2022-05-11
Attending: ORTHOPAEDIC SURGERY
Payer: MEDICARE

## 2022-05-11 PROCEDURE — 97110 THERAPEUTIC EXERCISES: CPT

## 2022-05-11 PROCEDURE — 97140 MANUAL THERAPY 1/> REGIONS: CPT

## 2022-05-12 ENCOUNTER — APPOINTMENT (OUTPATIENT)
Dept: PHYSICAL THERAPY | Age: 72
End: 2022-05-12
Attending: ORTHOPAEDIC SURGERY

## 2022-05-16 ENCOUNTER — APPOINTMENT (OUTPATIENT)
Dept: PHYSICAL THERAPY | Age: 72
End: 2022-05-16
Attending: ORTHOPAEDIC SURGERY
Payer: MEDICARE

## 2022-05-17 ENCOUNTER — APPOINTMENT (OUTPATIENT)
Dept: PHYSICAL THERAPY | Age: 72
End: 2022-05-17
Attending: ORTHOPAEDIC SURGERY

## 2022-05-18 ENCOUNTER — OFFICE VISIT (OUTPATIENT)
Dept: PHYSICAL THERAPY | Age: 72
End: 2022-05-18
Attending: ORTHOPAEDIC SURGERY
Payer: MEDICARE

## 2022-05-18 PROCEDURE — 97140 MANUAL THERAPY 1/> REGIONS: CPT

## 2022-05-18 PROCEDURE — 97110 THERAPEUTIC EXERCISES: CPT

## 2022-05-21 ENCOUNTER — HOSPITAL ENCOUNTER (INPATIENT)
Facility: HOSPITAL | Age: 72
LOS: 1 days | Discharge: HOME | End: 2022-05-22
Attending: EMERGENCY MEDICINE | Admitting: HOSPITALIST
Payer: MEDICARE

## 2022-05-21 ENCOUNTER — APPOINTMENT (OUTPATIENT)
Dept: CT IMAGING | Facility: HOSPITAL | Age: 72
End: 2022-05-21
Attending: EMERGENCY MEDICINE
Payer: MEDICARE

## 2022-05-21 ENCOUNTER — APPOINTMENT (OUTPATIENT)
Dept: GENERAL RADIOLOGY | Facility: HOSPITAL | Age: 72
End: 2022-05-21
Attending: EMERGENCY MEDICINE
Payer: MEDICARE

## 2022-05-21 DIAGNOSIS — K85.90 ACUTE PANCREATITIS, UNSPECIFIED COMPLICATION STATUS, UNSPECIFIED PANCREATITIS TYPE: Primary | ICD-10-CM

## 2022-05-21 PROBLEM — R73.9 HYPERGLYCEMIA: Status: ACTIVE | Noted: 2022-05-21

## 2022-05-21 LAB
ALBUMIN SERPL-MCNC: 3.8 G/DL (ref 3.4–5)
ALP LIVER SERPL-CCNC: 57 U/L
ALT SERPL-CCNC: 18 U/L
ANION GAP SERPL CALC-SCNC: 5 MMOL/L (ref 0–18)
AST SERPL-CCNC: 17 U/L (ref 15–37)
BASOPHILS # BLD AUTO: 0.03 X10(3) UL (ref 0–0.2)
BASOPHILS NFR BLD AUTO: 0.6 %
BILIRUB DIRECT SERPL-MCNC: 0.2 MG/DL (ref 0–0.2)
BILIRUB SERPL-MCNC: 0.6 MG/DL (ref 0.1–2)
BILIRUB UR QL: NEGATIVE
BUN BLD-MCNC: 18 MG/DL (ref 7–18)
BUN/CREAT SERPL: 16.8 (ref 10–20)
CALCIUM BLD-MCNC: 9.3 MG/DL (ref 8.5–10.1)
CHLORIDE SERPL-SCNC: 108 MMOL/L (ref 98–112)
CLARITY UR: CLEAR
CO2 SERPL-SCNC: 28 MMOL/L (ref 21–32)
COLOR UR: YELLOW
CREAT BLD-MCNC: 1.07 MG/DL
DEPRECATED RDW RBC AUTO: 44 FL (ref 35.1–46.3)
EOSINOPHIL # BLD AUTO: 0.21 X10(3) UL (ref 0–0.7)
EOSINOPHIL NFR BLD AUTO: 4.1 %
ERYTHROCYTE [DISTWIDTH] IN BLOOD BY AUTOMATED COUNT: 12.9 % (ref 11–15)
GLUCOSE BLD-MCNC: 100 MG/DL (ref 70–99)
GLUCOSE UR-MCNC: NEGATIVE MG/DL
HCT VFR BLD AUTO: 42.3 %
HGB BLD-MCNC: 13.6 G/DL
HGB UR QL STRIP.AUTO: NEGATIVE
IMM GRANULOCYTES # BLD AUTO: 0.02 X10(3) UL (ref 0–1)
IMM GRANULOCYTES NFR BLD: 0.4 %
KETONES UR-MCNC: NEGATIVE MG/DL
LEUKOCYTE ESTERASE UR QL STRIP.AUTO: NEGATIVE
LIPASE SERPL-CCNC: 3472 U/L (ref 73–393)
LYMPHOCYTES # BLD AUTO: 1.29 X10(3) UL (ref 1–4)
LYMPHOCYTES NFR BLD AUTO: 25.3 %
MCH RBC QN AUTO: 29.6 PG (ref 26–34)
MCHC RBC AUTO-ENTMCNC: 32.2 G/DL (ref 31–37)
MCV RBC AUTO: 92.2 FL
MONOCYTES # BLD AUTO: 0.45 X10(3) UL (ref 0.1–1)
MONOCYTES NFR BLD AUTO: 8.8 %
NEUTROPHILS # BLD AUTO: 3.1 X10 (3) UL (ref 1.5–7.7)
NEUTROPHILS # BLD AUTO: 3.1 X10(3) UL (ref 1.5–7.7)
NEUTROPHILS NFR BLD AUTO: 60.8 %
NITRITE UR QL STRIP.AUTO: NEGATIVE
OSMOLALITY SERPL CALC.SUM OF ELEC: 294 MOSM/KG (ref 275–295)
PH UR: 6 [PH] (ref 5–8)
PLATELET # BLD AUTO: 172 10(3)UL (ref 150–450)
POTASSIUM SERPL-SCNC: 4.5 MMOL/L (ref 3.5–5.1)
PROT SERPL-MCNC: 7.7 G/DL (ref 6.4–8.2)
PROT UR-MCNC: NEGATIVE MG/DL
RBC # BLD AUTO: 4.59 X10(6)UL
SARS-COV-2 RNA RESP QL NAA+PROBE: NOT DETECTED
SODIUM SERPL-SCNC: 141 MMOL/L (ref 136–145)
SP GR UR STRIP: 1.02 (ref 1–1.03)
UROBILINOGEN UR STRIP-ACNC: 2
VIT C UR-MCNC: NEGATIVE MG/DL
WBC # BLD AUTO: 5.1 X10(3) UL (ref 4–11)

## 2022-05-21 PROCEDURE — 74177 CT ABD & PELVIS W/CONTRAST: CPT | Performed by: EMERGENCY MEDICINE

## 2022-05-21 PROCEDURE — 71045 X-RAY EXAM CHEST 1 VIEW: CPT | Performed by: EMERGENCY MEDICINE

## 2022-05-21 PROCEDURE — 99222 1ST HOSP IP/OBS MODERATE 55: CPT | Performed by: HOSPITALIST

## 2022-05-21 PROCEDURE — 71260 CT THORAX DX C+: CPT | Performed by: EMERGENCY MEDICINE

## 2022-05-21 RX ORDER — ASPIRIN 81 MG/1
81 TABLET ORAL DAILY
Status: DISCONTINUED | OUTPATIENT
Start: 2022-05-22 | End: 2022-05-22

## 2022-05-21 RX ORDER — MORPHINE SULFATE 2 MG/ML
1 INJECTION, SOLUTION INTRAMUSCULAR; INTRAVENOUS ONCE
Status: COMPLETED | OUTPATIENT
Start: 2022-05-21 | End: 2022-05-21

## 2022-05-21 RX ORDER — SODIUM PHOSPHATE, DIBASIC AND SODIUM PHOSPHATE, MONOBASIC 7; 19 G/133ML; G/133ML
1 ENEMA RECTAL ONCE AS NEEDED
Status: DISCONTINUED | OUTPATIENT
Start: 2022-05-21 | End: 2022-05-22

## 2022-05-21 RX ORDER — BISACODYL 10 MG
10 SUPPOSITORY, RECTAL RECTAL 2 TIMES DAILY
Status: DISCONTINUED | OUTPATIENT
Start: 2022-05-21 | End: 2022-05-22

## 2022-05-21 RX ORDER — SENNOSIDES 8.6 MG
8.6 TABLET ORAL EVERY EVENING
Status: DISCONTINUED | OUTPATIENT
Start: 2022-05-21 | End: 2022-05-22

## 2022-05-21 RX ORDER — ALLOPURINOL 100 MG/1
100 TABLET ORAL 2 TIMES DAILY
Status: DISCONTINUED | OUTPATIENT
Start: 2022-05-21 | End: 2022-05-22

## 2022-05-21 RX ORDER — ROPINIROLE 1 MG/1
2 TABLET, FILM COATED ORAL NIGHTLY
Status: DISCONTINUED | OUTPATIENT
Start: 2022-05-21 | End: 2022-05-22

## 2022-05-21 RX ORDER — POLYETHYLENE GLYCOL 3350 17 G/17G
17 POWDER, FOR SOLUTION ORAL DAILY
Status: DISCONTINUED | OUTPATIENT
Start: 2022-05-21 | End: 2022-05-22

## 2022-05-21 RX ORDER — DICYCLOMINE HYDROCHLORIDE 10 MG/1
10 CAPSULE ORAL 3 TIMES DAILY PRN
Status: DISCONTINUED | OUTPATIENT
Start: 2022-05-21 | End: 2022-05-22

## 2022-05-21 RX ORDER — HEPARIN SODIUM 5000 [USP'U]/ML
5000 INJECTION, SOLUTION INTRAVENOUS; SUBCUTANEOUS EVERY 12 HOURS SCHEDULED
Status: DISCONTINUED | OUTPATIENT
Start: 2022-05-21 | End: 2022-05-22

## 2022-05-21 RX ORDER — METOCLOPRAMIDE HYDROCHLORIDE 5 MG/ML
10 INJECTION INTRAMUSCULAR; INTRAVENOUS EVERY 8 HOURS PRN
Status: DISCONTINUED | OUTPATIENT
Start: 2022-05-21 | End: 2022-05-22

## 2022-05-21 RX ORDER — LAMOTRIGINE 150 MG/1
150 TABLET ORAL 2 TIMES DAILY
Status: DISCONTINUED | OUTPATIENT
Start: 2022-05-21 | End: 2022-05-22

## 2022-05-21 RX ORDER — SODIUM PHOSPHATE, DIBASIC AND SODIUM PHOSPHATE, MONOBASIC 7; 19 G/133ML; G/133ML
1 ENEMA RECTAL ONCE AS NEEDED
Status: DISCONTINUED | OUTPATIENT
Start: 2022-05-21 | End: 2022-05-21

## 2022-05-21 RX ORDER — ACETAMINOPHEN 325 MG/1
650 TABLET ORAL EVERY 6 HOURS PRN
Status: DISCONTINUED | OUTPATIENT
Start: 2022-05-21 | End: 2022-05-22

## 2022-05-21 RX ORDER — BISACODYL 10 MG
10 SUPPOSITORY, RECTAL RECTAL
Status: DISCONTINUED | OUTPATIENT
Start: 2022-05-21 | End: 2022-05-22

## 2022-05-21 RX ORDER — SODIUM CHLORIDE, SODIUM LACTATE, POTASSIUM CHLORIDE, CALCIUM CHLORIDE 600; 310; 30; 20 MG/100ML; MG/100ML; MG/100ML; MG/100ML
INJECTION, SOLUTION INTRAVENOUS CONTINUOUS
Status: DISCONTINUED | OUTPATIENT
Start: 2022-05-21 | End: 2022-05-22

## 2022-05-21 RX ORDER — ONDANSETRON 2 MG/ML
4 INJECTION INTRAMUSCULAR; INTRAVENOUS EVERY 6 HOURS PRN
Status: DISCONTINUED | OUTPATIENT
Start: 2022-05-21 | End: 2022-05-22

## 2022-05-21 RX ORDER — SENNOSIDES 8.6 MG
17.2 TABLET ORAL NIGHTLY PRN
Status: DISCONTINUED | OUTPATIENT
Start: 2022-05-21 | End: 2022-05-22

## 2022-05-21 RX ORDER — DONEPEZIL HYDROCHLORIDE 10 MG/1
10 TABLET, FILM COATED ORAL NIGHTLY
Status: DISCONTINUED | OUTPATIENT
Start: 2022-05-21 | End: 2022-05-22

## 2022-05-21 RX ORDER — POLYETHYLENE GLYCOL 3350 17 G/17G
17 POWDER, FOR SOLUTION ORAL DAILY PRN
Status: DISCONTINUED | OUTPATIENT
Start: 2022-05-21 | End: 2022-05-22

## 2022-05-21 RX ORDER — ROPINIROLE 1 MG/1
1 TABLET, FILM COATED ORAL DAILY
Status: DISCONTINUED | OUTPATIENT
Start: 2022-05-22 | End: 2022-05-22

## 2022-05-21 RX ORDER — ATORVASTATIN CALCIUM 20 MG/1
20 TABLET, FILM COATED ORAL NIGHTLY
Status: DISCONTINUED | OUTPATIENT
Start: 2022-05-21 | End: 2022-05-22

## 2022-05-21 NOTE — ED QUICK NOTES
Orders for admission, patient is aware of plan and ready to go upstairs. Any questions, please call ED RN trevor at extension [phone].      Patient Covid vaccination status: Fully vaccinated     COVID Test Ordered in ED: Rapid SARS-CoV-2 by PCR    COVID Suspicion at Admission: N/A    Running Infusions:  None    Mental Status/LOC at time of transport: axox4      Other pertinent information:   CIWA score: N/A   NIH score:  N/A

## 2022-05-21 NOTE — PLAN OF CARE
Ale Humphries is from home with her spouse. Alert and oriented x 4. Presents to ED with abdominal pain-work up revealed elevated lipase and constipation. GI consulted-ivf and bowel regimen ordered. Clear liquids tolerated at this time. Small bm after suppository, senokot, and miralax. Zofran for nausea. Denies pain. Oob with a sba-steady gait. Continue to monitor gi status and advance diet as toelrated/ordered  Problem: Patient Centered Care  Goal: Patient preferences are identified and integrated in the patient's plan of care  Description: Interventions:  - What would you like us to know as we care for you?  I used to have a history of diarrhea and then lately aleksandra been constipated  - Provide timely, complete, and accurate information to patient/family  - Incorporate patient and family knowledge, values, beliefs, and cultural backgrounds into the planning and delivery of care  - Encourage patient/family to participate in care and decision-making at the level they choose  - Honor patient and family perspectives and choices  Outcome: Progressing     Problem: Patient/Family Goals  Goal: Patient/Family Long Term Goal  Description: Patient's Long Term Goal:bowel regimen normalized    Interventions:  - follow at home bowel regimen  Follow up with GI/IM as needed  - See additional Care Plan goals for specific interventions  Outcome: Progressing  Goal: Patient/Family Short Term Goal  Description: Patient's Short Term Goal: home 1-2 days    Interventions:   - assess toleration of diet  GI assessment  - See additional Care Plan goals for specific interventions  Outcome: Progressing     Problem: PAIN - ADULT  Goal: Verbalizes/displays adequate comfort level or patient's stated pain goal  Description: INTERVENTIONS:  - Encourage pt to monitor pain and request assistance  - Assess pain using appropriate pain scale  - Administer analgesics based on type and severity of pain and evaluate response  - Implement non-pharmacological measures as appropriate and evaluate response  - Consider cultural and social influences on pain and pain management  - Manage/alleviate anxiety  - Utilize distraction and/or relaxation techniques  - Monitor for opioid side effects  - Notify MD/LIP if interventions unsuccessful or patient reports new pain  - Anticipate increased pain with activity and pre-medicate as appropriate  Outcome: Progressing     Problem: SAFETY ADULT - FALL  Goal: Free from fall injury  Description: INTERVENTIONS:  - Assess pt frequently for physical needs  - Identify cognitive and physical deficits and behaviors that affect risk of falls.   - [place] fall precautions as indicated by assessment.  - Educate pt/family on patient safety including physical limitations  - Instruct pt to call for assistance with activity based on assessment  - Modify environment to reduce risk of injury  - Provide assistive devices as appropriate  - Consider OT/PT consult to assist with strengthening/mobility  - Encourage toileting schedule  Outcome: Progressing     Problem: DISCHARGE PLANNING  Goal: Discharge to home or other facility with appropriate resources  Description: INTERVENTIONS:  - Identify barriers to discharge w/pt and caregiver  - Include patient/family/discharge partner in discharge planning  - Arrange for needed discharge resources and transportation as appropriate  - Identify discharge learning needs (meds, wound care, etc)  - Arrange for interpreters to assist at discharge as needed  - Consider post-discharge preferences of patient/family/discharge partner  - Complete POLST form as appropriate  - Assess patient's ability to be responsible for managing their own health  - Refer to Case Management Department for coordinating discharge planning if the patient needs post-hospital services based on physician/LIP order or complex needs related to functional status, cognitive ability or social support system  Outcome: Progressing     Problem: GASTROINTESTINAL - ADULT  Goal: Minimal or absence of nausea and vomiting  Description: INTERVENTIONS:  - Maintain adequate hydration with IV or PO as ordered and tolerated  - Nasogastric tube to low intermittent suction as ordered  - Evaluate effectiveness of ordered antiemetic medications  - Provide nonpharmacologic comfort measures as appropriate  - Advance diet as tolerated, if ordered  - Obtain nutritional consult as needed  - Evaluate fluid balance  Outcome: Progressing  Goal: Maintains or returns to baseline bowel function  Description: INTERVENTIONS:  - Assess bowel function  - Maintain adequate hydration with IV or PO as ordered and tolerated  - Evaluate effectiveness of GI medications  - Encourage mobilization and activity  - Obtain nutritional consult as needed  - Establish a toileting routine/schedule  - Consider collaborating with pharmacy to review patient's medication profile  Outcome: Progressing

## 2022-05-22 VITALS
OXYGEN SATURATION: 96 % | TEMPERATURE: 98 F | HEIGHT: 65 IN | WEIGHT: 157 LBS | SYSTOLIC BLOOD PRESSURE: 156 MMHG | DIASTOLIC BLOOD PRESSURE: 91 MMHG | RESPIRATION RATE: 20 BRPM | BODY MASS INDEX: 26.16 KG/M2 | HEART RATE: 59 BPM

## 2022-05-22 LAB
ANION GAP SERPL CALC-SCNC: 4 MMOL/L (ref 0–18)
BASOPHILS # BLD AUTO: 0.02 X10(3) UL (ref 0–0.2)
BASOPHILS NFR BLD AUTO: 0.6 %
BUN BLD-MCNC: 12 MG/DL (ref 7–18)
BUN/CREAT SERPL: 12.1 (ref 10–20)
CALCIUM BLD-MCNC: 9 MG/DL (ref 8.5–10.1)
CHLORIDE SERPL-SCNC: 110 MMOL/L (ref 98–112)
CO2 SERPL-SCNC: 30 MMOL/L (ref 21–32)
CREAT BLD-MCNC: 0.99 MG/DL
DEPRECATED RDW RBC AUTO: 41.8 FL (ref 35.1–46.3)
EOSINOPHIL # BLD AUTO: 0.17 X10(3) UL (ref 0–0.7)
EOSINOPHIL NFR BLD AUTO: 5.3 %
ERYTHROCYTE [DISTWIDTH] IN BLOOD BY AUTOMATED COUNT: 12.6 % (ref 11–15)
GLUCOSE BLD-MCNC: 92 MG/DL (ref 70–99)
HCT VFR BLD AUTO: 40.2 %
HGB BLD-MCNC: 13.3 G/DL
IMM GRANULOCYTES # BLD AUTO: 0.01 X10(3) UL (ref 0–1)
IMM GRANULOCYTES NFR BLD: 0.3 %
LYMPHOCYTES # BLD AUTO: 0.98 X10(3) UL (ref 1–4)
LYMPHOCYTES NFR BLD AUTO: 30.8 %
MCH RBC QN AUTO: 29.9 PG (ref 26–34)
MCHC RBC AUTO-ENTMCNC: 33.1 G/DL (ref 31–37)
MCV RBC AUTO: 90.3 FL
MONOCYTES # BLD AUTO: 0.3 X10(3) UL (ref 0.1–1)
MONOCYTES NFR BLD AUTO: 9.4 %
NEUTROPHILS # BLD AUTO: 1.7 X10 (3) UL (ref 1.5–7.7)
NEUTROPHILS # BLD AUTO: 1.7 X10(3) UL (ref 1.5–7.7)
NEUTROPHILS NFR BLD AUTO: 53.6 %
OSMOLALITY SERPL CALC.SUM OF ELEC: 297 MOSM/KG (ref 275–295)
PLATELET # BLD AUTO: 142 10(3)UL (ref 150–450)
POTASSIUM SERPL-SCNC: 3.9 MMOL/L (ref 3.5–5.1)
RBC # BLD AUTO: 4.45 X10(6)UL
SODIUM SERPL-SCNC: 144 MMOL/L (ref 136–145)
WBC # BLD AUTO: 3.2 X10(3) UL (ref 4–11)

## 2022-05-22 PROCEDURE — 99239 HOSP IP/OBS DSCHRG MGMT >30: CPT | Performed by: HOSPITALIST

## 2022-05-22 RX ORDER — MAGNESIUM CARB/ALUMINUM HYDROX 105-160MG
296 TABLET,CHEWABLE ORAL ONCE
Status: COMPLETED | OUTPATIENT
Start: 2022-05-22 | End: 2022-05-22

## 2022-05-22 RX ORDER — POLYETHYLENE GLYCOL 3350 17 G/17G
17 POWDER, FOR SOLUTION ORAL DAILY
Qty: 30 PACKET | Refills: 0 | Status: SHIPPED | OUTPATIENT
Start: 2022-05-23 | End: 2022-06-22

## 2022-05-22 RX ORDER — LABETALOL HYDROCHLORIDE 5 MG/ML
10 INJECTION, SOLUTION INTRAVENOUS EVERY 4 HOURS PRN
Status: DISCONTINUED | OUTPATIENT
Start: 2022-05-22 | End: 2022-05-22

## 2022-05-22 RX ORDER — SENNA AND DOCUSATE SODIUM 50; 8.6 MG/1; MG/1
2 TABLET, FILM COATED ORAL NIGHTLY
Qty: 60 TABLET | Refills: 0 | Status: ON HOLD | OUTPATIENT
Start: 2022-05-22 | End: 2022-06-04

## 2022-05-22 RX ORDER — HYDRALAZINE HYDROCHLORIDE 20 MG/ML
10 INJECTION INTRAMUSCULAR; INTRAVENOUS EVERY 4 HOURS PRN
Status: DISCONTINUED | OUTPATIENT
Start: 2022-05-22 | End: 2022-05-22

## 2022-05-22 NOTE — PLAN OF CARE
AO3, up with assist.  IV fluids lactated ringers at 100cc/hr. Still being treated for constipation and will await response from current laxatives given. Small BM yesterday following adminstration. N/V this morning and reglan given with relief.       Problem: PAIN - ADULT  Goal: Verbalizes/displays adequate comfort level or patient's stated pain goal  Description: INTERVENTIONS:  - Encourage pt to monitor pain and request assistance  - Assess pain using appropriate pain scale  - Administer analgesics based on type and severity of pain and evaluate response  - Implement non-pharmacological measures as appropriate and evaluate response  - Consider cultural and social influences on pain and pain management  - Manage/alleviate anxiety  - Utilize distraction and/or relaxation techniques  - Monitor for opioid side effects  - Notify MD/LIP if interventions unsuccessful or patient reports new pain  - Anticipate increased pain with activity and pre-medicate as appropriate  Outcome: Not Progressing     Problem: GASTROINTESTINAL - ADULT  Goal: Minimal or absence of nausea and vomiting  Description: INTERVENTIONS:  - Maintain adequate hydration with IV or PO as ordered and tolerated  - Nasogastric tube to low intermittent suction as ordered  - Evaluate effectiveness of ordered antiemetic medications  - Provide nonpharmacologic comfort measures as appropriate  - Advance diet as tolerated, if ordered  - Obtain nutritional consult as needed  - Evaluate fluid balance  Outcome: Not Progressing  Goal: Maintains or returns to baseline bowel function  Description: INTERVENTIONS:  - Assess bowel function  - Maintain adequate hydration with IV or PO as ordered and tolerated  - Evaluate effectiveness of GI medications  - Encourage mobilization and activity  - Obtain nutritional consult as needed  - Establish a toileting routine/schedule  - Consider collaborating with pharmacy to review patient's medication profile  Outcome: Not Progressing

## 2022-05-24 ENCOUNTER — PATIENT OUTREACH (OUTPATIENT)
Dept: CASE MANAGEMENT | Age: 72
End: 2022-05-24

## 2022-05-24 ENCOUNTER — OFFICE VISIT (OUTPATIENT)
Dept: PHYSICAL THERAPY | Age: 72
End: 2022-05-24
Attending: ORTHOPAEDIC SURGERY
Payer: MEDICARE

## 2022-05-24 DIAGNOSIS — Z02.9 ENCOUNTERS FOR UNSPECIFIED ADMINISTRATIVE PURPOSE: ICD-10-CM

## 2022-05-24 DIAGNOSIS — K85.90 ACUTE PANCREATITIS, UNSPECIFIED COMPLICATION STATUS, UNSPECIFIED PANCREATITIS TYPE: ICD-10-CM

## 2022-05-24 PROCEDURE — 97140 MANUAL THERAPY 1/> REGIONS: CPT

## 2022-05-24 PROCEDURE — 1111F DSCHRG MED/CURRENT MED MERGE: CPT

## 2022-05-24 PROCEDURE — 97110 THERAPEUTIC EXERCISES: CPT

## 2022-05-31 ENCOUNTER — HOSPITAL ENCOUNTER (INPATIENT)
Facility: HOSPITAL | Age: 72
LOS: 2 days | Discharge: NURSING, REHAB OR LONG-TERM CARE | End: 2022-06-04
Attending: EMERGENCY MEDICINE | Admitting: HOSPITALIST
Payer: MEDICARE

## 2022-05-31 ENCOUNTER — APPOINTMENT (OUTPATIENT)
Dept: CT IMAGING | Facility: HOSPITAL | Age: 72
End: 2022-05-31
Attending: EMERGENCY MEDICINE
Payer: MEDICARE

## 2022-05-31 ENCOUNTER — TELEPHONE (OUTPATIENT)
Dept: INTERNAL MEDICINE CLINIC | Facility: CLINIC | Age: 72
End: 2022-05-31

## 2022-05-31 ENCOUNTER — APPOINTMENT (OUTPATIENT)
Dept: GENERAL RADIOLOGY | Facility: HOSPITAL | Age: 72
End: 2022-05-31
Attending: EMERGENCY MEDICINE
Payer: MEDICARE

## 2022-05-31 DIAGNOSIS — R42 VERTIGO: Primary | ICD-10-CM

## 2022-05-31 DIAGNOSIS — R27.0 ATAXIA: ICD-10-CM

## 2022-05-31 LAB
ANION GAP SERPL CALC-SCNC: 7 MMOL/L (ref 0–18)
BASOPHILS # BLD AUTO: 0.03 X10(3) UL (ref 0–0.2)
BASOPHILS NFR BLD AUTO: 0.5 %
BUN BLD-MCNC: 17 MG/DL (ref 7–18)
BUN/CREAT SERPL: 15.5 (ref 10–20)
CALCIUM BLD-MCNC: 9.2 MG/DL (ref 8.5–10.1)
CHLORIDE SERPL-SCNC: 109 MMOL/L (ref 98–112)
CHOLEST SERPL-MCNC: 151 MG/DL (ref ?–200)
CO2 SERPL-SCNC: 25 MMOL/L (ref 21–32)
CREAT BLD-MCNC: 1.1 MG/DL
DEPRECATED RDW RBC AUTO: 41.8 FL (ref 35.1–46.3)
EOSINOPHIL # BLD AUTO: 0.21 X10(3) UL (ref 0–0.7)
EOSINOPHIL NFR BLD AUTO: 3.6 %
ERYTHROCYTE [DISTWIDTH] IN BLOOD BY AUTOMATED COUNT: 12.8 % (ref 11–15)
GLUCOSE BLD-MCNC: 108 MG/DL (ref 70–99)
HCT VFR BLD AUTO: 38.8 %
HDLC SERPL-MCNC: 55 MG/DL (ref 40–59)
HGB BLD-MCNC: 12.7 G/DL
IMM GRANULOCYTES # BLD AUTO: 0.02 X10(3) UL (ref 0–1)
IMM GRANULOCYTES NFR BLD: 0.3 %
LDLC SERPL CALC-MCNC: 69 MG/DL (ref ?–100)
LYMPHOCYTES # BLD AUTO: 1.13 X10(3) UL (ref 1–4)
LYMPHOCYTES NFR BLD AUTO: 19.3 %
MCH RBC QN AUTO: 29.8 PG (ref 26–34)
MCHC RBC AUTO-ENTMCNC: 32.7 G/DL (ref 31–37)
MCV RBC AUTO: 91.1 FL
MONOCYTES # BLD AUTO: 0.41 X10(3) UL (ref 0.1–1)
MONOCYTES NFR BLD AUTO: 7 %
NEUTROPHILS # BLD AUTO: 4.04 X10 (3) UL (ref 1.5–7.7)
NEUTROPHILS # BLD AUTO: 4.04 X10(3) UL (ref 1.5–7.7)
NEUTROPHILS NFR BLD AUTO: 69.3 %
NONHDLC SERPL-MCNC: 96 MG/DL (ref ?–130)
OSMOLALITY SERPL CALC.SUM OF ELEC: 294 MOSM/KG (ref 275–295)
PLATELET # BLD AUTO: 175 10(3)UL (ref 150–450)
POTASSIUM SERPL-SCNC: 3.7 MMOL/L (ref 3.5–5.1)
RBC # BLD AUTO: 4.26 X10(6)UL
SARS-COV-2 RNA RESP QL NAA+PROBE: NOT DETECTED
SODIUM SERPL-SCNC: 141 MMOL/L (ref 136–145)
TRIGL SERPL-MCNC: 156 MG/DL (ref 30–149)
TROPONIN I HIGH SENSITIVITY: 7 NG/L
VLDLC SERPL CALC-MCNC: 24 MG/DL (ref 0–30)
WBC # BLD AUTO: 5.8 X10(3) UL (ref 4–11)

## 2022-05-31 PROCEDURE — 99223 1ST HOSP IP/OBS HIGH 75: CPT | Performed by: HOSPITALIST

## 2022-05-31 PROCEDURE — 70496 CT ANGIOGRAPHY HEAD: CPT | Performed by: EMERGENCY MEDICINE

## 2022-05-31 PROCEDURE — 70450 CT HEAD/BRAIN W/O DYE: CPT | Performed by: EMERGENCY MEDICINE

## 2022-05-31 PROCEDURE — 71045 X-RAY EXAM CHEST 1 VIEW: CPT | Performed by: EMERGENCY MEDICINE

## 2022-05-31 PROCEDURE — 99223 1ST HOSP IP/OBS HIGH 75: CPT | Performed by: OTHER

## 2022-05-31 PROCEDURE — 70498 CT ANGIOGRAPHY NECK: CPT | Performed by: EMERGENCY MEDICINE

## 2022-05-31 RX ORDER — ONDANSETRON 2 MG/ML
4 INJECTION INTRAMUSCULAR; INTRAVENOUS EVERY 6 HOURS PRN
Status: DISCONTINUED | OUTPATIENT
Start: 2022-05-31 | End: 2022-06-04

## 2022-05-31 RX ORDER — ASPIRIN 81 MG/1
81 TABLET, CHEWABLE ORAL DAILY
Status: DISCONTINUED | OUTPATIENT
Start: 2022-05-31 | End: 2022-06-04

## 2022-05-31 RX ORDER — CLOPIDOGREL BISULFATE 75 MG/1
75 TABLET ORAL DAILY
Status: DISCONTINUED | OUTPATIENT
Start: 2022-05-31 | End: 2022-06-04

## 2022-05-31 RX ORDER — ALLOPURINOL 100 MG/1
100 TABLET ORAL 2 TIMES DAILY
Status: DISCONTINUED | OUTPATIENT
Start: 2022-05-31 | End: 2022-06-04

## 2022-05-31 RX ORDER — ONDANSETRON 2 MG/ML
4 INJECTION INTRAMUSCULAR; INTRAVENOUS ONCE
Status: COMPLETED | OUTPATIENT
Start: 2022-05-31 | End: 2022-05-31

## 2022-05-31 RX ORDER — HYDRALAZINE HYDROCHLORIDE 20 MG/ML
10 INJECTION INTRAMUSCULAR; INTRAVENOUS EVERY 2 HOUR PRN
Status: DISCONTINUED | OUTPATIENT
Start: 2022-05-31 | End: 2022-06-04

## 2022-05-31 RX ORDER — HEPARIN SODIUM 5000 [USP'U]/ML
5000 INJECTION, SOLUTION INTRAVENOUS; SUBCUTANEOUS EVERY 12 HOURS SCHEDULED
Status: DISCONTINUED | OUTPATIENT
Start: 2022-05-31 | End: 2022-06-04

## 2022-05-31 RX ORDER — LABETALOL HYDROCHLORIDE 5 MG/ML
10 INJECTION, SOLUTION INTRAVENOUS EVERY 10 MIN PRN
Status: DISCONTINUED | OUTPATIENT
Start: 2022-05-31 | End: 2022-06-04

## 2022-05-31 RX ORDER — MECLIZINE HYDROCHLORIDE 25 MG/1
25 TABLET ORAL ONCE
Status: COMPLETED | OUTPATIENT
Start: 2022-05-31 | End: 2022-05-31

## 2022-05-31 RX ORDER — ACETAMINOPHEN 325 MG/1
650 TABLET ORAL EVERY 6 HOURS PRN
Status: DISCONTINUED | OUTPATIENT
Start: 2022-05-31 | End: 2022-06-04

## 2022-05-31 RX ORDER — DICYCLOMINE HYDROCHLORIDE 10 MG/1
10 CAPSULE ORAL 3 TIMES DAILY PRN
Status: DISCONTINUED | OUTPATIENT
Start: 2022-05-31 | End: 2022-06-04

## 2022-05-31 RX ORDER — ATORVASTATIN CALCIUM 20 MG/1
20 TABLET, FILM COATED ORAL NIGHTLY
Status: DISCONTINUED | OUTPATIENT
Start: 2022-05-31 | End: 2022-06-04

## 2022-05-31 RX ORDER — ACETAMINOPHEN 650 MG/1
650 SUPPOSITORY RECTAL EVERY 4 HOURS PRN
Status: DISCONTINUED | OUTPATIENT
Start: 2022-05-31 | End: 2022-06-04

## 2022-05-31 RX ORDER — ACETAMINOPHEN 325 MG/1
650 TABLET ORAL EVERY 4 HOURS PRN
Status: DISCONTINUED | OUTPATIENT
Start: 2022-05-31 | End: 2022-06-04

## 2022-05-31 RX ORDER — METOCLOPRAMIDE HYDROCHLORIDE 5 MG/ML
10 INJECTION INTRAMUSCULAR; INTRAVENOUS EVERY 8 HOURS PRN
Status: DISCONTINUED | OUTPATIENT
Start: 2022-05-31 | End: 2022-06-04

## 2022-05-31 RX ORDER — SENNA AND DOCUSATE SODIUM 50; 8.6 MG/1; MG/1
2 TABLET, FILM COATED ORAL NIGHTLY
Status: DISCONTINUED | OUTPATIENT
Start: 2022-05-31 | End: 2022-06-04

## 2022-05-31 RX ORDER — DONEPEZIL HYDROCHLORIDE 10 MG/1
10 TABLET, FILM COATED ORAL NIGHTLY
Status: DISCONTINUED | OUTPATIENT
Start: 2022-05-31 | End: 2022-06-04

## 2022-05-31 RX ORDER — PREDNISONE 20 MG/1
20 TABLET ORAL
Status: CANCELLED | OUTPATIENT
Start: 2022-05-31 | End: 2022-06-03

## 2022-05-31 RX ORDER — MECLIZINE HYDROCHLORIDE 25 MG/1
25 TABLET ORAL EVERY 6 HOURS SCHEDULED
Status: CANCELLED | OUTPATIENT
Start: 2022-05-31

## 2022-05-31 RX ORDER — TROLAMINE SALICYLATE 10 G/100G
CREAM TOPICAL 4 TIMES DAILY PRN
Refills: 0 | Status: DISCONTINUED | OUTPATIENT
Start: 2022-05-31 | End: 2022-06-04

## 2022-05-31 RX ORDER — LAMOTRIGINE 150 MG/1
150 TABLET ORAL 2 TIMES DAILY
Status: DISCONTINUED | OUTPATIENT
Start: 2022-05-31 | End: 2022-06-04

## 2022-05-31 NOTE — ED QUICK NOTES
Orders for admission, patient is aware of plan and ready to go upstairs. Any questions, please call ED RN Stormy Leonardo at extension [phone].      Patient Covid vaccination status: Fully vaccinated     COVID Test Ordered in ED: Rapid SARS-CoV-2 by PCR negative    COVID Suspicion at Admission: N/A    Running Infusions:  None    Mental Status/LOC at time of transport: AOX3, hx dementia     Other pertinent information:   CIWA score: N/A   NIH score:  N/A

## 2022-05-31 NOTE — CONSULTS
Consult dictated. History provided by the patient, exam is disconcerting for posterior circulation stroke. Patient has pacemaker, watchman device which precludes MRI scan. I asked the ER physician to place order for CTA of the head, neck. Discussed with Dr. Anita Mclaughlin. Please see dictation.

## 2022-05-31 NOTE — ED QUICK NOTES
This RN road tested patient, gait was not steady. Patient reports dizziness. ED PROVIDER aware. Patient escorted back to the room, connected to the monitor.

## 2022-05-31 NOTE — CONSULTS
[facility]    PATIENT'S NAME: Nile Humphries   ATTENDING PHYSICIAN: Eric Yoon MD   CONSULTING PHYSICIAN: Ann Marie Wong MD   PATIENT ACCOUNT#:   [id number]    LOCATION:  [place] [address]  MEDICAL RECORD #:   [id number]       YOB: 1950  ADMISSION DATE:       05/31/2022      CONSULT DATE:  05/31/2022    REPORT OF CONSULTATION    REFERRING PHYSICIAN:  Eric Yoon M.D. REASON FOR CONSULTATION:  Dr. Randy Rogers asked for neurology consult regarding vertigo. HISTORY OF PRESENT ILLNESS:  I evaluated the patient in the emergency room. She relates she awoke about 7:30 this morning and upon standing, was off balance and felt like she was going to fall to the right and the left. She also noted horizontal diplopia when looking to the right or left. She was not aware of any focal weakness. She complained of generalized weakness. No headache. No sensory symptoms on the face, arms or legs. She relates the main problem with her presentation was feeling off balance. She is unsure of any external vertigo at the present time. I did review Dr. Irene Chaparro note and Dr. Velasquez Cisneros note of vertigo while in bed, increased with moving the head. She denies headache. In the emergency room, she had a CT of the brain, report reviewed. Primary care doctor is Dr. Clara Diaz. She also sees Dr. Johnathon Steel. PAST MEDICAL HISTORY:  She apparently, has a pacemaker, Watchman device, and cannot have an MRI scan. Patient has atrial fibrillation and is off anticoagulation. History of sick sinus syndrome. Other past medical history of elevated cholesterol, hypertension, obstructive sleep apnea, remote seizure disorder, cognitive impairment for which she states I have evaluated her in the past, chronic kidney disease.       PAST SURGICAL HISTORY:  She has had a hysterectomy, arthroscopic knee surgery, cholecystectomy, cataract surgery, carpal tunnel surgery, nasal surgery, left distal radial fracture, treated with internal fixation. MEDICATIONS:  Reviewed. PHYSICAL EXAMINATION:    VITAL SIGNS:  As quoted in the chart, temperature 98.5, pulse 61, respiratory rate 18, blood pressure 186/85, pulse ox 97%. NEUROLOGIC:  On exam, I detected no nystagmus. Visual fields were full. Cranial nerves appear to be normal.  She does have complaints of diplopia when walking far to the right or far to the left. Speech and language intact. Some slight trouble with right and left finger-to-nose. Upon standing, she could only walk with assistance. No focal weakness. She had some trouble with right hand coordination on testing for dysdiadochokinesis. Heel-to-shin appears to be normal.  Deep tendon reflexes symmetrical without Babinski signs. Joint position sense, hands and feet, is normal.      LABORATORY DATA:  Reviewed. Dr. Travis Christianson states he is placing stroke order sets. IMPRESSION:  Ataxia, diplopia is disconcerting for posterior circulation stroke. I have asked Dr. Laurent Champagne to place orders for CTA of the head and neck. Dr. Travis Christianson states he is placing stroke order sets. I would recommend dual antiplatelet drugs. Symptomatic treatment with Meclizine. Will review orders. Thank you for the consult.       Dictated By Taj Macias MD  d: 05/31/2022 17:06:09  t: 05/31/2022 17:28:14  Job [id number]  BXZ/

## 2022-05-31 NOTE — ED QUICK NOTES
Arrived via EMS. Dementia. AOX3 at this time. C/o midsternal CP and dizziness x this morning. Also reports HTN, 200s/90's. PMH HTN, seizures and noncompliant with medication regimen. Denies fevers/chills/SOB/n/v/d. CP is a 2/10 at this time.

## 2022-05-31 NOTE — TELEPHONE ENCOUNTER
Pt left voicemail message (at 9:49AM) stating she is experiencing high blood pressure, very weak & gagging since she woke up at 6:45 this morning  Blood pressure was 210/99 when she woke up  BP now 211/88 but it jumps up & down  Is this something to worry about   Requests call back [phone]

## 2022-05-31 NOTE — H&P
[facility]    PATIENT'S NAME: Radha GOMES   ATTENDING PHYSICIAN: Arpita Joseph MD   PATIENT ACCOUNT#:   [de-identified]    LOCATION:  [place] [address]  MEDICAL RECORD #:   [id number]       YOB: 1950  ADMISSION DATE:       05/31/2022    HISTORY AND PHYSICAL EXAMINATION    (Addendum added 06/01/2022)    CHIEF COMPLAINT:  Intractable vertigo. HISTORY OF PRESENT ILLNESS:  Patient is a 65-year-old  female who woke up with a spinning sensation since this morning every time she moves her head or tries to walk around. Came into the emergency department for evaluation. CBC, chemistry, troponin were unremarkable. EKG showed paced rhythm. CT scan of the brain and chest x-ray were unremarkable. MRI could not be performed because of her pacemaker. Patient had road testing and she could not walk around without severe vertigo symptoms. She will be admitted to the hospital for further observation. PAST MEDICAL HISTORY:  Paroxysmal atrial fibrillation, status post Watchman device, off anticoagulation. Sick sinus syndrome, status post dual-chamber pacemaker. Generalized osteoarthritis. Obstructive sleep apnea. Diverticulosis. Gout. Hypertension. Hyperlipidemia. Seizure disorder. Chronic kidney disease stage 2 to 3. PAST SURGICAL HISTORY:  Hysterectomy, total knee arthroplasty, cholecystectomy, cataract procedure, carpal tunnel release, nasal septoplasty, left distal radius open reduction and internal fixation. MEDICATIONS:  Please see medication reconciliation list.      ALLERGIES:  No known drug allergies. She had side effects to hydrocodone. FAMILY HISTORY:  Both parents had coronary artery disease. SOCIAL HISTORY:  No tobacco, alcohol, or drug use. Lives with her family. Independent in her basic activities of daily living.      REVIEW OF SYSTEMS:  Patient reports every time she moves her head or looks toward the left or right she gets a spinning sensation associated with nausea. If she lies still in bed, she will still have symptoms but they are much better when she does not move around. Other 12-point review of systems is negative. PHYSICAL EXAMINATION:    GENERAL:  Alert, oriented to time, place, and person. Moderate distress. VITAL SIGNS:  Temperature 98.5, pulse 59, respiratory rate 21, blood pressure 148/73, on a different measurement 170/93, pulse ox 95% on room air. HEENT:  Atraumatic. Oropharynx clear. Dry mucous membranes. Normal hard and soft palate. Eyes:  Anicteric sclerae. NECK:  Supple. No lymphadenopathy. Trachea midline. Full range of motion. LUNGS:  Clear to auscultation bilaterally. Normal respiratory effort. No intercostal retractions. HEART:  Regular rate and rhythm. S1, S2 auscultated. No murmur. ABDOMEN:  Soft, nondistended. No tenderness. Positive bowel sounds. EXTREMITIES:  No peripheral edema, clubbing, or cyanosis. NEUROLOGIC:  Eye examination shows no nystagmus, but extraocular movement toward the left or right reproduces symptoms. Head movement reproduces symptoms as well. ASSESSMENT:    1. Intractable vertigo, possibly peripheral in nature, doubt central etiology. 2.   Hypertension. 3.   Underlying paroxysmal atrial fibrillation. PLAN:  Patient will be admitted to general medical floor, remote telemetry. Fall precautions. Physical therapy for vestibular maneuvers. She had an echocardiogram done roughly 1 year ago, which was unremarkable. Obtain neurology consult. Place her on meclizine and a short dose of prednisone for possibility of vestibulitis. Further recommendations to follow. ADDENDUM (Job [id number]):    ASSESSMENT AND PLAN:  On further examination by Neurology, patient appeared ataxic with her gait with no vertiginous symptoms upon walking around.   Considering the lack of nystagmus on physical exam and a double vision looking toward the right and left, consider midbrain cerebrovascular accident. CT angiogram of the carotids and brain were ordered. Started on aspirin and Plavix. Obtain 2D echocardiogram with Doppler. Monitor her clinical status. Repeat CT scan. Further recommendations per Neurology.     Dictated By Lindsey Samuel MD  d: 05/31/2022 15:29:45  t: 05/31/2022 15:49:52  [facility] [id number]  FB/

## 2022-05-31 NOTE — TELEPHONE ENCOUNTER
Spoke with patient who woke up today feeling nauseated, feels like she needs to throw up but has dry heaves, feels very weak, \"very unstable\", \"can't walk in a straight line\" even with her walker, very dizzy. She gives me two blood pressure readings of 192/94 and 218/95. She is on nadolol 20mg daily but has not taken it today since she feels like vomiting. Advised patient she needs to go to emergency room as soon as possible for evaluation. Advised that with her change in ambulation status and elevated blood pressure she will need an evaluation and possibly IV antihypertensives. Offered to call ambulance for patient. Patient declines ambulance but is agreeable to ER eval. She will call her neighbor who already agreed to take her if needed. Asked that she go as soon as possible and she agrees to do so. JUANA to Dr. Watson Notice to follow up tomorrow, 6/1.

## 2022-05-31 NOTE — ED INITIAL ASSESSMENT (HPI)
CP and dizziness with standing since this AM. Multiple elevated blood pressure readings at home.  324mg Asprin and 1 nitroglycerin tab administered PTA by EMS

## 2022-06-01 ENCOUNTER — APPOINTMENT (OUTPATIENT)
Dept: CV DIAGNOSTICS | Facility: HOSPITAL | Age: 72
End: 2022-06-01
Attending: HOSPITALIST
Payer: MEDICARE

## 2022-06-01 LAB
ANION GAP SERPL CALC-SCNC: 6 MMOL/L (ref 0–18)
BASOPHILS # BLD AUTO: 0.03 X10(3) UL (ref 0–0.2)
BASOPHILS NFR BLD AUTO: 0.8 %
BUN BLD-MCNC: 17 MG/DL (ref 7–18)
BUN/CREAT SERPL: 14.7 (ref 10–20)
CALCIUM BLD-MCNC: 9.5 MG/DL (ref 8.5–10.1)
CHLORIDE SERPL-SCNC: 107 MMOL/L (ref 98–112)
CO2 SERPL-SCNC: 30 MMOL/L (ref 21–32)
CREAT BLD-MCNC: 1.16 MG/DL
DEPRECATED RDW RBC AUTO: 43.4 FL (ref 35.1–46.3)
EOSINOPHIL # BLD AUTO: 0.23 X10(3) UL (ref 0–0.7)
EOSINOPHIL NFR BLD AUTO: 5.8 %
ERYTHROCYTE [DISTWIDTH] IN BLOOD BY AUTOMATED COUNT: 12.9 % (ref 11–15)
GLUCOSE BLD-MCNC: 90 MG/DL (ref 70–99)
HCT VFR BLD AUTO: 42 %
HGB BLD-MCNC: 13.5 G/DL
IMM GRANULOCYTES # BLD AUTO: 0.01 X10(3) UL (ref 0–1)
IMM GRANULOCYTES NFR BLD: 0.3 %
LYMPHOCYTES # BLD AUTO: 1.1 X10(3) UL (ref 1–4)
LYMPHOCYTES NFR BLD AUTO: 27.5 %
MCH RBC QN AUTO: 29.5 PG (ref 26–34)
MCHC RBC AUTO-ENTMCNC: 32.1 G/DL (ref 31–37)
MCV RBC AUTO: 91.9 FL
MONOCYTES # BLD AUTO: 0.4 X10(3) UL (ref 0.1–1)
MONOCYTES NFR BLD AUTO: 10 %
NEUTROPHILS # BLD AUTO: 2.23 X10 (3) UL (ref 1.5–7.7)
NEUTROPHILS # BLD AUTO: 2.23 X10(3) UL (ref 1.5–7.7)
NEUTROPHILS NFR BLD AUTO: 55.6 %
OSMOLALITY SERPL CALC.SUM OF ELEC: 297 MOSM/KG (ref 275–295)
PLATELET # BLD AUTO: 178 10(3)UL (ref 150–450)
POTASSIUM SERPL-SCNC: 3.7 MMOL/L (ref 3.5–5.1)
RBC # BLD AUTO: 4.57 X10(6)UL
SODIUM SERPL-SCNC: 143 MMOL/L (ref 136–145)
WBC # BLD AUTO: 4 X10(3) UL (ref 4–11)

## 2022-06-01 PROCEDURE — 99232 SBSQ HOSP IP/OBS MODERATE 35: CPT | Performed by: OTHER

## 2022-06-01 PROCEDURE — 93306 TTE W/DOPPLER COMPLETE: CPT | Performed by: HOSPITALIST

## 2022-06-01 PROCEDURE — 99233 SBSQ HOSP IP/OBS HIGH 50: CPT | Performed by: HOSPITALIST

## 2022-06-01 RX ORDER — POLYETHYLENE GLYCOL 3350 17 G/17G
17 POWDER, FOR SOLUTION ORAL 2 TIMES DAILY
Status: DISCONTINUED | OUTPATIENT
Start: 2022-06-01 | End: 2022-06-04

## 2022-06-01 RX ORDER — SODIUM CHLORIDE 9 MG/ML
INJECTION, SOLUTION INTRAVENOUS
Status: COMPLETED | OUTPATIENT
Start: 2022-06-02 | End: 2022-06-02

## 2022-06-01 RX ORDER — BISACODYL 10 MG
10 SUPPOSITORY, RECTAL RECTAL
Status: DISCONTINUED | OUTPATIENT
Start: 2022-06-01 | End: 2022-06-04

## 2022-06-01 RX ORDER — SODIUM CHLORIDE 0.9 % (FLUSH) 0.9 %
10 SYRINGE (ML) INJECTION AS NEEDED
Status: DISCONTINUED | OUTPATIENT
Start: 2022-06-01 | End: 2022-06-04

## 2022-06-01 NOTE — PLAN OF CARE
Tiffanie Rich is alert and oriented, up with one assist and a walker. Room air. Neuro assessments Q4H until 8a on 6/3. Pacemaker not MRI compatible. PT/OT recommending Rehab. Plan is to continue stroke protocol and placement pending clearance. Problem: Patient Centered Care  Goal: Patient preferences are identified and integrated in the patient's plan of care  Description: Interventions:  - What would you like us to know as we care for you? I live at home with my .   - Provide timely, complete, and accurate information to patient/family  - Incorporate patient and family knowledge, values, beliefs, and cultural backgrounds into the planning and delivery of care  - Encourage patient/family to participate in care and decision-making at the level they choose  - Honor patient and family perspectives and choices  Outcome: Progressing     Problem: Patient/Family Goals  Goal: Patient/Family Long Term Goal  Description: Patient's Long Term Goal: to go home    Interventions:  - stroke workup  -neuro assessments  -PT/OT  - See additional Care Plan goals for specific interventions  Outcome: Progressing  Goal: Patient/Family Short Term Goal  Description: Patient's Short Term Goal: to feel better    Interventions:   - neuro work up  -PT/OT  -neuro assessments  - See additional Care Plan goals for specific interventions  Outcome: Progressing     Problem: NEUROLOGICAL - ADULT  Goal: Achieves stable or improved neurological status  Description: INTERVENTIONS  - Assess for and report changes in neurological status  - Initiate measures to prevent increased intracranial pressure  - Maintain blood pressure and fluid volume within ordered parameters to optimize cerebral perfusion and minimize risk of hemorrhage  - Monitor temperature, glucose, and sodium.  Initiate appropriate interventions as ordered  Outcome: Progressing  Goal: Absence of seizures  Description: INTERVENTIONS  - Monitor for seizure activity  - Administer anti-seizure medications as ordered  - Monitor neurological status  Outcome: Progressing  Goal: Remains free of injury related to seizure activity  Description: INTERVENTIONS:  - Maintain airway, patient safety  and administer oxygen as ordered  - Monitor patient for seizure activity, document and report duration and description of seizure to MD/LIP  - If seizure occurs, turn patient to side and suction secretions as needed  - Reorient patient post seizure  - Seizure pads on all 4 side rails  - Instruct patient/family to notify RN of any seizure activity  - Instruct patient/family to call for assistance with activity based on assessment  Outcome: Progressing  Goal: Achieves maximal functionality and self care  Description: INTERVENTIONS  - Monitor swallowing and airway patency with patient fatigue and changes in neurological status  - Encourage and assist patient to increase activity and self care with guidance from PT/OT  - Encourage visually impaired, hearing impaired and aphasic patients to use assistive/communication devices  Outcome: Progressing     Problem: Impaired Functional Mobility  Goal: Achieve highest/safest level of mobility/gait  Description: Interventions:  - Assess patient's functional ability and stability  - Promote increasing activity/tolerance for mobility and gait  - Educate and engage patient/family in tolerated activity level and precautions  - Recommend use of  RW for transfers and ambulation  - Recommend use of chair position in bed 3 times per day  Outcome: Progressing     Problem: Impaired Cognition  Goal: Patient will exhibit improved attention, thought processing and/or memory  Description: Interventions:  - Minimize distractions in the room when full attention is required  - Allow additional time for processing after asking questions or providing instructions  - Encourage use of eye glasses  Outcome: Progressing

## 2022-06-01 NOTE — CONSULTS
Cardiology (consult dictated)    Assessment:  1. Cryptogenic stroke    2. History of paroxysmal atrial fibrillation. 3.  Status post watchman device November, 2018    4. Chest pain, atypical for ischemia. Negative troponins. No acute EKG abnormality. 5.  Hypertension, mildly elevated at present. 6.  History of dual-chamber pacemaker, not MRI compatible. Plan:  1. Transesophageal echo for tomorrow. 2.  Antiplatelet agents per neurology. 3.  Permissive mild hypertension for now.       Thank you

## 2022-06-01 NOTE — RESPIRATORY THERAPY NOTE
Patient refused cpap therapy. [facility] has educated the patient on the purpose of and need for this therapy as well as potential negative outcomes associated with deferring treatment.  Despite education, patient maintains refusal.

## 2022-06-01 NOTE — PLAN OF CARE
Patient new admit this shift on stroke monitoring, vitals q 2, neuro check q 2, no s/s noted at this time. Safety precautions in place. Problem: Patient Centered Care  Goal: Patient preferences are identified and integrated in the patient's plan of care  Description: Interventions:  - What would you like us to know as we care for you? From home with , came in for vertigo  - Provide timely, complete, and accurate information to patient/family  - Incorporate patient and family knowledge, values, beliefs, and cultural backgrounds into the planning and delivery of care  - Encourage patient/family to participate in care and decision-making at the level they choose  - Honor patient and family perspectives and choices  Outcome: Progressing     Problem: Patient/Family Goals  Goal: Patient/Family Long Term Goal  Description: Patient's Long Term Goal: go home    Interventions:  -   - See additional Care Plan goals for specific interventions  Outcome: Progressing  Goal: Patient/Family Short Term Goal  Description: Patient's Short Term Goal: resolve vertigo    Interventions:   - meclizine prn  - See additional Care Plan goals for specific interventions  Outcome: Progressing     Problem: NEUROLOGICAL - ADULT  Goal: Achieves stable or improved neurological status  Description: INTERVENTIONS  - Assess for and report changes in neurological status  - Initiate measures to prevent increased intracranial pressure  - Maintain blood pressure and fluid volume within ordered parameters to optimize cerebral perfusion and minimize risk of hemorrhage  - Monitor temperature, glucose, and sodium.  Initiate appropriate interventions as ordered  Outcome: Progressing  Goal: Absence of seizures  Description: INTERVENTIONS  - Monitor for seizure activity  - Administer anti-seizure medications as ordered  - Monitor neurological status  Outcome: Progressing  Goal: Remains free of injury related to seizure activity  Description: INTERVENTIONS:  - Maintain airway, patient safety  and administer oxygen as ordered  - Monitor patient for seizure activity, document and report duration and description of seizure to MD/LIP  - If seizure occurs, turn patient to side and suction secretions as needed  - Reorient patient post seizure  - Seizure pads on all 4 side rails  - Instruct patient/family to notify RN of any seizure activity  - Instruct patient/family to call for assistance with activity based on assessment  Outcome: Progressing  Goal: Achieves maximal functionality and self care  Description: INTERVENTIONS  - Monitor swallowing and airway patency with patient fatigue and changes in neurological status  - Encourage and assist patient to increase activity and self care with guidance from PT/OT  - Encourage visually impaired, hearing impaired and aphasic patients to use assistive/communication devices  Outcome: Progressing     Problem: Impaired Functional Mobility  Goal: Achieve highest/safest level of mobility/gait  Description: Interventions:  - Assess patient's functional ability and stability  - Promote increasing activity/tolerance for mobility and gait  - Educate and engage patient/family in tolerated activity level and precautions  - Recommend use of  RW for transfers and ambulation  Outcome: Progressing     Problem: Impaired Cognition  Goal: Patient will exhibit improved attention, thought processing and/or memory  Description: Interventions:  - Allow additional time for processing after asking questions or providing instructions  Outcome: Progressing

## 2022-06-01 NOTE — PROGRESS NOTES
Report given to [facility]. Patient on remote tele. Patient going to Echo then to new room 502. All patients belonging gathered.

## 2022-06-02 ENCOUNTER — APPOINTMENT (OUTPATIENT)
Dept: INTERVENTIONAL RADIOLOGY/VASCULAR | Facility: HOSPITAL | Age: 72
End: 2022-06-02
Attending: HOSPITALIST
Payer: MEDICARE

## 2022-06-02 ENCOUNTER — APPOINTMENT (OUTPATIENT)
Dept: CV DIAGNOSTICS | Facility: HOSPITAL | Age: 72
End: 2022-06-02
Attending: INTERNAL MEDICINE
Payer: MEDICARE

## 2022-06-02 ENCOUNTER — APPOINTMENT (OUTPATIENT)
Dept: PHYSICAL THERAPY | Age: 72
End: 2022-06-02
Attending: ORTHOPAEDIC SURGERY
Payer: MEDICARE

## 2022-06-02 LAB
ANION GAP SERPL CALC-SCNC: 3 MMOL/L (ref 0–18)
BASOPHILS # BLD AUTO: 0.02 X10(3) UL (ref 0–0.2)
BASOPHILS NFR BLD AUTO: 0.4 %
BUN BLD-MCNC: 19 MG/DL (ref 7–18)
BUN/CREAT SERPL: 16.1 (ref 10–20)
CALCIUM BLD-MCNC: 9.8 MG/DL (ref 8.5–10.1)
CHLORIDE SERPL-SCNC: 105 MMOL/L (ref 98–112)
CO2 SERPL-SCNC: 32 MMOL/L (ref 21–32)
CREAT BLD-MCNC: 1.18 MG/DL
DEPRECATED RDW RBC AUTO: 43.7 FL (ref 35.1–46.3)
EOSINOPHIL # BLD AUTO: 0.25 X10(3) UL (ref 0–0.7)
EOSINOPHIL NFR BLD AUTO: 5.1 %
ERYTHROCYTE [DISTWIDTH] IN BLOOD BY AUTOMATED COUNT: 12.9 % (ref 11–15)
GLUCOSE BLD-MCNC: 96 MG/DL (ref 70–99)
HCT VFR BLD AUTO: 45 %
HGB BLD-MCNC: 14.5 G/DL
IMM GRANULOCYTES # BLD AUTO: 0.02 X10(3) UL (ref 0–1)
IMM GRANULOCYTES NFR BLD: 0.4 %
LYMPHOCYTES # BLD AUTO: 1.48 X10(3) UL (ref 1–4)
LYMPHOCYTES NFR BLD AUTO: 30.3 %
MAGNESIUM SERPL-MCNC: 2.3 MG/DL (ref 1.6–2.6)
MCH RBC QN AUTO: 29.7 PG (ref 26–34)
MCHC RBC AUTO-ENTMCNC: 32.2 G/DL (ref 31–37)
MCV RBC AUTO: 92 FL
MONOCYTES # BLD AUTO: 0.51 X10(3) UL (ref 0.1–1)
MONOCYTES NFR BLD AUTO: 10.4 %
NEUTROPHILS # BLD AUTO: 2.61 X10 (3) UL (ref 1.5–7.7)
NEUTROPHILS # BLD AUTO: 2.61 X10(3) UL (ref 1.5–7.7)
NEUTROPHILS NFR BLD AUTO: 53.4 %
OSMOLALITY SERPL CALC.SUM OF ELEC: 292 MOSM/KG (ref 275–295)
PLATELET # BLD AUTO: 198 10(3)UL (ref 150–450)
POTASSIUM SERPL-SCNC: 3.6 MMOL/L (ref 3.5–5.1)
RBC # BLD AUTO: 4.89 X10(6)UL
SODIUM SERPL-SCNC: 140 MMOL/L (ref 136–145)
WBC # BLD AUTO: 4.9 X10(3) UL (ref 4–11)

## 2022-06-02 PROCEDURE — 99233 SBSQ HOSP IP/OBS HIGH 50: CPT | Performed by: HOSPITALIST

## 2022-06-02 PROCEDURE — B24BZZ4 ULTRASONOGRAPHY OF HEART WITH AORTA, TRANSESOPHAGEAL: ICD-10-PCS | Performed by: INTERNAL MEDICINE

## 2022-06-02 PROCEDURE — 93325 DOPPLER ECHO COLOR FLOW MAPG: CPT | Performed by: INTERNAL MEDICINE

## 2022-06-02 PROCEDURE — 93320 DOPPLER ECHO COMPLETE: CPT | Performed by: INTERNAL MEDICINE

## 2022-06-02 PROCEDURE — 99232 SBSQ HOSP IP/OBS MODERATE 35: CPT | Performed by: OTHER

## 2022-06-02 RX ORDER — LIDOCAINE HYDROCHLORIDE 20 MG/ML
10 SOLUTION OROPHARYNGEAL ONCE
Status: COMPLETED | OUTPATIENT
Start: 2022-06-02 | End: 2022-06-02

## 2022-06-02 RX ORDER — LIDOCAINE HYDROCHLORIDE 20 MG/ML
SOLUTION OROPHARYNGEAL
Status: COMPLETED
Start: 2022-06-02 | End: 2022-06-02

## 2022-06-02 RX ORDER — MIDAZOLAM HYDROCHLORIDE 1 MG/ML
3 INJECTION INTRAMUSCULAR; INTRAVENOUS ONCE
Status: COMPLETED | OUTPATIENT
Start: 2022-06-02 | End: 2022-06-02

## 2022-06-02 RX ORDER — MIDAZOLAM HYDROCHLORIDE 1 MG/ML
INJECTION INTRAMUSCULAR; INTRAVENOUS
Status: COMPLETED
Start: 2022-06-02 | End: 2022-06-02

## 2022-06-02 NOTE — IVS NOTE
Hand-Off     Procedure hand off report given to [facility]. Pt's vital signs are stable. Patient denies of any sore throat, shortness of breath, or pain. No crepitus noted.

## 2022-06-02 NOTE — SLP NOTE
SLP attempt this AM. Per RN, pt to remain NPO for procedure. SLP to follow up as pt cleared for PO. Please contact SLP with any questions, concerns, and/or change in status. Thank you. Marina Herron  Speech Language Pathologist  Phone Number Ext. [phone]

## 2022-06-02 NOTE — CONSULTS
[facility]    PATIENT'S NAME: Alexx Moreno   ATTENDING PHYSICIAN: Harshad Agrawal MD   CONSULTING PHYSICIAN: Jennifer Mo. Dara Erickson MD   PATIENT ACCOUNT#:   [id number]    LOCATION:  [address] [place] [address] #:   [id number]       YOB: 1950  ADMISSION DATE:       05/31/2022      CONSULT DATE:  06/01/2022    REPORT OF CONSULTATION    HISTORY OF PRESENT ILLNESS:  This is a 25-year-old female admitted to the hospital yesterday morning. She presented to the ER with chest pain and dizziness. The chest pain is in the upper anterior chest with radiation to the left anterior chest.  She has had it off and on for many years. Yesterday's pain was no different and she is actually still having low-grade pain right now. She also noted that her blood pressure was elevated at home and that was the main reason for coming in. Upon arrival, she also mentioned dizziness. She was eventually seen by Neurology and there was concern for posterior circulation stroke. The patient has a dual-chamber pacemaker and cannot get an MRI. CTA of the brain showed no major vessel occlusion or significant stenosis. PAST MEDICAL HISTORY:    1.   Status post dual-chamber pacemaker. 2.   Paroxysmal atrial fibrillation. 3.   Status post Watchman device implant, November 2018. Patient has been off anticoagulation since that time. 4.   History of colitis. ALLERGIES:  [place].    SOCIAL HISTORY:  She was a smoker, but quit in 1998. Drinks alcohol socially and has occasional tea. She is a retired mail person. She is  with 2 children. REVIEW OF SYSTEMS:  Ten point review of systems otherwise negative. PHYSICAL EXAMINATION:    GENERAL:  Well-developed, well-nourished female, no acute distress. Alert and oriented x3. VITAL SIGNS:  Blood pressure 154/73; respirations 18, unlabored; pulse 61, regular rhythm; afebrile; saturation 99% on room air. HEENT:  Unremarkable. NECK:  Supple.   Jugular venous pressure normal.  Carotids brisk without bruits. No thyromegaly or adenopathy. LUNGS:  Clear. HEART:  S1, S2 normal.  No gallop, murmur, rub, or click. ABDOMEN:  Benign. EXTREMITIES:  Warm and dry. Good pulses. No edema. LABORATORY DATA:  CBC and differential are normal.  Basic metabolic panel normal except for a GFR of 47. LDL cholesterol is 69. Echocardiogram done this afternoon is normal except for grade 3 diastolic dysfunction, mild mitral regurgitation, and mild-to-moderate tricuspid regurgitation. They report a venous catheter in the superior vena cava. Estimated PA systolic pressure is 34, representing mild pulmonary hypertension. A bubble study was not done. ASSESSMENT:    1. Apparent cryptogenic stroke. 2.   History of paroxysmal atrial fibrillation. Low atrial fibrillation burden on pacemaker analysis. 3.   Status post Watchman device, November 2018. 4.   History of dual-chamber pacemaker. 5.   Chest pain, atypical for ischemia. Negative troponins. No acute electrocardiogram abnormality. No need for further ischemic workup. 6.   Hypertension, mildly elevated at present. PLAN:    1. Transesophageal echo in the morning. 2.   Antiplatelet agents per Neurology. 3.   Permissive mild hypertension for now. Thank you for this consultation. Dictated By Gerald Sherman.  Micah Sky MD  d: 06/01/2022 18:32:37  t: 06/01/2022 19:20:56  Job [id number]  ELISHA/

## 2022-06-02 NOTE — CM/SW NOTE
PT/OT rec is acute rehab. Per neuro note-We recommend rehab. Patient would like to discuss this with her . I believe she would have more benefit from acute/subacute rehab then rehab at home. JOSE notified MD of above and requested PMR consult orders. MD agreed. 6/2 1000  Pt is off the unit for testing. CM requested department  [facility]) to initiate tentative [facility] referral for acute rehab. CM will follow up with pt after PMR recs are known. 1300   CM was notified by RN that pt and spouse have decided to decline rehab and are agreeable to home with [address]. CM cancelled PMR consult and acute referral. CM requested department  [facility]) to initiate [address] referral for [address]. f2f done for RN/PT/OT    6/3 0830  CM rec'd update from RN that pt is agreeable to acute rehab now. CM attempted to confirm with pt however she is not available to discuss. Per RN in dc rounds pt is not dc ready. CM sent new  [facility]in ref for acute and will f/u with pt to confirm decision and obtain choice. 1218  CM was notified by RN that pt has been cleared by Neuro for dc. CM called pt who confirmed she is agreeable to Acute rehab and her choice would be [facility] if available. CM called [facility] liaison who reports she will not have a bed today and will need to look into next available bed and call CM back. CM notified pt of above and provided [facility] and [facility] as back up plan. Pt is agreeable to [facility] if [facility] will not have a bed.    1345  CM confirmed with [facility] liaison that there will be a bed available tomorrow. JOSE reserved in aidin and notified Pt/MD and RN    Plan  [facility] Acute rehab, bed available Saturday  [facility] is on will call, pcs done    / to remain available for support and/or discharge planning.      Jr Christensen RN    Ext [phone]

## 2022-06-02 NOTE — PLAN OF CARE
Patient complained of dizziness over night with ambulation; although she stated that it has improved. She was able to ambulate to the bathroom with standby assist and walker. Plan for TAMRA today; patient unable to have MRI due to non-compatible pacemaker. Problem: Patient Centered Care  Goal: Patient preferences are identified and integrated in the patient's plan of care  Description: Interventions:  - What would you like us to know as we care for you? I live at home with my .   - Provide timely, complete, and accurate information to patient/family  - Incorporate patient and family knowledge, values, beliefs, and cultural backgrounds into the planning and delivery of care  - Encourage patient/family to participate in care and decision-making at the level they choose  - Honor patient and family perspectives and choices  Outcome: Progressing     Problem: NEUROLOGICAL - ADULT  Goal: Achieves stable or improved neurological status  Description: INTERVENTIONS  - Assess for and report changes in neurological status  - Initiate measures to prevent increased intracranial pressure  - Maintain blood pressure and fluid volume within ordered parameters to optimize cerebral perfusion and minimize risk of hemorrhage  - Monitor temperature, glucose, and sodium.  Initiate appropriate interventions as ordered  Outcome: Progressing  Goal: Absence of seizures  Description: INTERVENTIONS  - Monitor for seizure activity  - Administer anti-seizure medications as ordered  - Monitor neurological status  Outcome: Progressing  Goal: Remains free of injury related to seizure activity  Description: INTERVENTIONS:  - Maintain airway, patient safety  and administer oxygen as ordered  - Monitor patient for seizure activity, document and report duration and description of seizure to MD/LIP  - If seizure occurs, turn patient to side and suction secretions as needed  - Reorient patient post seizure  - Seizure pads on all 4 side rails  - Instruct patient/family to notify RN of any seizure activity  - Instruct patient/family to call for assistance with activity based on assessment  Outcome: Progressing  Goal: Achieves maximal functionality and self care  Description: INTERVENTIONS  - Monitor swallowing and airway patency with patient fatigue and changes in neurological status  - Encourage and assist patient to increase activity and self care with guidance from PT/OT  - Encourage visually impaired, hearing impaired and aphasic patients to use assistive/communication devices  Outcome: Progressing     Problem: Impaired Functional Mobility  Goal: Achieve highest/safest level of mobility/gait  Description: Interventions:  - Assess patient's functional ability and stability  - Promote increasing activity/tolerance for mobility and gait  - Educate and engage patient/family in tolerated activity level and precautions  - Recommend use of  RW for transfers and ambulation  Outcome: Progressing     Problem: Impaired Cognition  Goal: Patient will exhibit improved attention, thought processing and/or memory  Description: Interventions:  - Minimize distractions in the room when full attention is required  - Allow additional time for processing after asking questions or providing instructions  Outcome: Progressing

## 2022-06-02 NOTE — PLAN OF CARE
Jose Archer is alert and oriented, up with one assist and a walker. She is on room air. TAMRA this AM. PT/OT recommending acute rehab. Patient and son hesitant. Plan is home with home health vs acute rehab pending patient's decision. Problem: Patient Centered Care  Goal: Patient preferences are identified and integrated in the patient's plan of care  Description: Interventions:  - What would you like us to know as we care for you? I live at home with my .   - Provide timely, complete, and accurate information to patient/family  - Incorporate patient and family knowledge, values, beliefs, and cultural backgrounds into the planning and delivery of care  - Encourage patient/family to participate in care and decision-making at the level they choose  - Honor patient and family perspectives and choices  Outcome: Progressing     Problem: Patient/Family Goals  Goal: Patient/Family Long Term Goal  Description: Patient's Long Term Goal: to go home    Interventions:  - stroke workup  -neuro assessments  -PT/OT  - See additional Care Plan goals for specific interventions  Outcome: Progressing  Goal: Patient/Family Short Term Goal  Description: Patient's Short Term Goal: to feel better    Interventions:   - neuro work up  -PT/OT  -neuro assessments  - See additional Care Plan goals for specific interventions  Outcome: Progressing     Problem: NEUROLOGICAL - ADULT  Goal: Achieves stable or improved neurological status  Description: INTERVENTIONS  - Assess for and report changes in neurological status  - Initiate measures to prevent increased intracranial pressure  - Maintain blood pressure and fluid volume within ordered parameters to optimize cerebral perfusion and minimize risk of hemorrhage  - Monitor temperature, glucose, and sodium.  Initiate appropriate interventions as ordered  Outcome: Progressing  Goal: Absence of seizures  Description: INTERVENTIONS  - Monitor for seizure activity  - Administer anti-seizure medications as ordered  - Monitor neurological status  Outcome: Progressing  Goal: Remains free of injury related to seizure activity  Description: INTERVENTIONS:  - Maintain airway, patient safety  and administer oxygen as ordered  - Monitor patient for seizure activity, document and report duration and description of seizure to MD/LIP  - If seizure occurs, turn patient to side and suction secretions as needed  - Reorient patient post seizure  - Seizure pads on all 4 side rails  - Instruct patient/family to notify RN of any seizure activity  - Instruct patient/family to call for assistance with activity based on assessment  Outcome: Progressing  Goal: Achieves maximal functionality and self care  Description: INTERVENTIONS  - Monitor swallowing and airway patency with patient fatigue and changes in neurological status  - Encourage and assist patient to increase activity and self care with guidance from PT/OT  - Encourage visually impaired, hearing impaired and aphasic patients to use assistive/communication devices  Outcome: Progressing     Problem: Impaired Functional Mobility  Goal: Achieve highest/safest level of mobility/gait  Description: Interventions:  - Assess patient's functional ability and stability  - Promote increasing activity/tolerance for mobility and gait  - Educate and engage patient/family in tolerated activity level and precautions  - Recommend use of  RW for transfers and ambulation  - Recommend use of chair position in bed 3 times per day  Outcome: Progressing     Problem: Impaired Cognition  Goal: Patient will exhibit improved attention, thought processing and/or memory  Description: Interventions:  - Minimize distractions in the room when full attention is required  - Allow additional time for processing after asking questions or providing instructions  - Encourage use of eye glasses  Outcome: Progressing

## 2022-06-02 NOTE — CM/SW NOTE
Department  notified of request for Infusion , aidin referrals started. Assigned CM/SW to follow up with pt/family on further discharge planning.      Ronda Cannon  [facility]

## 2022-06-02 NOTE — CM/SW NOTE
Department  notified of request for [facility], aidin referrals started. Assigned CM/SW to follow up with pt/family on further discharge planning.      Penelope Meyer  [facility]

## 2022-06-03 ENCOUNTER — APPOINTMENT (OUTPATIENT)
Dept: CT IMAGING | Facility: HOSPITAL | Age: 72
End: 2022-06-03
Attending: Other
Payer: MEDICARE

## 2022-06-03 LAB
ANION GAP SERPL CALC-SCNC: 6 MMOL/L (ref 0–18)
BASOPHILS # BLD AUTO: 0.02 X10(3) UL (ref 0–0.2)
BASOPHILS NFR BLD AUTO: 0.5 %
BUN BLD-MCNC: 21 MG/DL (ref 7–18)
BUN/CREAT SERPL: 18.4 (ref 10–20)
CALCIUM BLD-MCNC: 9.4 MG/DL (ref 8.5–10.1)
CHLORIDE SERPL-SCNC: 108 MMOL/L (ref 98–112)
CO2 SERPL-SCNC: 26 MMOL/L (ref 21–32)
CREAT BLD-MCNC: 1.14 MG/DL
DEPRECATED RDW RBC AUTO: 41.9 FL (ref 35.1–46.3)
EOSINOPHIL # BLD AUTO: 0.22 X10(3) UL (ref 0–0.7)
EOSINOPHIL NFR BLD AUTO: 5.2 %
ERYTHROCYTE [DISTWIDTH] IN BLOOD BY AUTOMATED COUNT: 12.8 % (ref 11–15)
GLUCOSE BLD-MCNC: 102 MG/DL (ref 70–99)
HCT VFR BLD AUTO: 41.2 %
HGB BLD-MCNC: 13.7 G/DL
IMM GRANULOCYTES # BLD AUTO: 0.02 X10(3) UL (ref 0–1)
IMM GRANULOCYTES NFR BLD: 0.5 %
LYMPHOCYTES # BLD AUTO: 1.5 X10(3) UL (ref 1–4)
LYMPHOCYTES NFR BLD AUTO: 35.5 %
MAGNESIUM SERPL-MCNC: 2.2 MG/DL (ref 1.6–2.6)
MCH RBC QN AUTO: 30 PG (ref 26–34)
MCHC RBC AUTO-ENTMCNC: 33.3 G/DL (ref 31–37)
MCV RBC AUTO: 90.2 FL
MONOCYTES # BLD AUTO: 0.42 X10(3) UL (ref 0.1–1)
MONOCYTES NFR BLD AUTO: 9.9 %
NEUTROPHILS # BLD AUTO: 2.05 X10 (3) UL (ref 1.5–7.7)
NEUTROPHILS # BLD AUTO: 2.05 X10(3) UL (ref 1.5–7.7)
NEUTROPHILS NFR BLD AUTO: 48.4 %
OSMOLALITY SERPL CALC.SUM OF ELEC: 293 MOSM/KG (ref 275–295)
PLATELET # BLD AUTO: 164 10(3)UL (ref 150–450)
POTASSIUM SERPL-SCNC: 3.4 MMOL/L (ref 3.5–5.1)
RBC # BLD AUTO: 4.57 X10(6)UL
SODIUM SERPL-SCNC: 140 MMOL/L (ref 136–145)
WBC # BLD AUTO: 4.2 X10(3) UL (ref 4–11)

## 2022-06-03 PROCEDURE — 99233 SBSQ HOSP IP/OBS HIGH 50: CPT | Performed by: HOSPITALIST

## 2022-06-03 PROCEDURE — 70450 CT HEAD/BRAIN W/O DYE: CPT | Performed by: OTHER

## 2022-06-03 PROCEDURE — 99232 SBSQ HOSP IP/OBS MODERATE 35: CPT | Performed by: OTHER

## 2022-06-03 RX ORDER — POTASSIUM CHLORIDE 20 MEQ/1
40 TABLET, EXTENDED RELEASE ORAL ONCE
Status: COMPLETED | OUTPATIENT
Start: 2022-06-03 | End: 2022-06-03

## 2022-06-03 RX ORDER — CLOPIDOGREL BISULFATE 75 MG/1
75 TABLET ORAL DAILY
Qty: 30 TABLET | Refills: 0 | Status: SHIPPED | OUTPATIENT
Start: 2022-06-03

## 2022-06-03 NOTE — PLAN OF CARE
Gracie Watkins is alert and oriented, but can be forgetful. On RA. Voiding freely. No pain complaints overnight. Started c/o left eye blurred vision, MD notified, see note. Neuros now Q2H. Plan for Neurology to see. Discharge to Rehab once choice is made. Problem: Patient Centered Care  Goal: Patient preferences are identified and integrated in the patient's plan of care  Description: Interventions:  - What would you like us to know as we care for you? I live at home with my .   - Provide timely, complete, and accurate information to patient/family  - Incorporate patient and family knowledge, values, beliefs, and cultural backgrounds into the planning and delivery of care  - Encourage patient/family to participate in care and decision-making at the level they choose  - Honor patient and family perspectives and choices  Outcome: Progressing     Problem: Patient/Family Goals  Goal: Patient/Family Long Term Goal  Description: Patient's Long Term Goal: to go home    Interventions:  - stroke workup  -neuro assessments  -PT/OT  - See additional Care Plan goals for specific interventions  Outcome: Progressing  Goal: Patient/Family Short Term Goal  Description: Patient's Short Term Goal: to feel better    Interventions:   - neuro work up  -PT/OT  -neuro assessments  - See additional Care Plan goals for specific interventions  Outcome: Progressing     Problem: NEUROLOGICAL - ADULT  Goal: Achieves stable or improved neurological status  Description: INTERVENTIONS  - Assess for and report changes in neurological status  - Initiate measures to prevent increased intracranial pressure  - Maintain blood pressure and fluid volume within ordered parameters to optimize cerebral perfusion and minimize risk of hemorrhage  - Monitor temperature, glucose, and sodium.  Initiate appropriate interventions as ordered  Outcome: Progressing  Goal: Absence of seizures  Description: INTERVENTIONS  - Monitor for seizure activity  - Administer anti-seizure medications as ordered  - Monitor neurological status  Outcome: Progressing  Goal: Remains free of injury related to seizure activity  Description: INTERVENTIONS:  - Maintain airway, patient safety  and administer oxygen as ordered  - Monitor patient for seizure activity, document and report duration and description of seizure to MD/LIP  - If seizure occurs, turn patient to side and suction secretions as needed  - Reorient patient post seizure  - Seizure pads on all 4 side rails  - Instruct patient/family to notify RN of any seizure activity  - Instruct patient/family to call for assistance with activity based on assessment  Outcome: Progressing  Goal: Achieves maximal functionality and self care  Description: INTERVENTIONS  - Monitor swallowing and airway patency with patient fatigue and changes in neurological status  - Encourage and assist patient to increase activity and self care with guidance from PT/OT  - Encourage visually impaired, hearing impaired and aphasic patients to use assistive/communication devices  Outcome: Progressing     Problem: Impaired Functional Mobility  Goal: Achieve highest/safest level of mobility/gait  Description: Interventions:  - Assess patient's functional ability and stability  - Promote increasing activity/tolerance for mobility and gait  - Educate and engage patient/family in tolerated activity level and precautions  - Recommend use of  RW for transfers and ambulation  Outcome: Progressing     Problem: Impaired Cognition  Goal: Patient will exhibit improved attention, thought processing and/or memory  Description: Interventions:  - Minimize distractions in the room when full attention is required  - Allow additional time for processing after asking questions or providing instructions  - Encourage use of eye glasses  Outcome: Progressing

## 2022-06-03 NOTE — PROGRESS NOTES
At midnight neuro assessment, patient stated she noticed blurred vision in left eye that started 15 min ago and slight tingling at fingertips, Dr. Porfirio Gutiérrez notified, STAT CT brain ordered. Radiologist stated area of suspicion of possible infarct in right Posterior Corpus Collosum. Dr. Porfirio Gutiérrez notified, no new orders, will see patient in the Morning.

## 2022-06-03 NOTE — PLAN OF CARE
Patient alert and oriented x4. On room air. Ambulating Stand by assist with a walker. Pt denies pain. L eye vision blurry, MD and Neuro aware. NIHSS Daily, Q4 Neuro checks began at 0800, [see orders]. Plan for patient to discharge to [facility] tomorrow. Problem: Patient Centered Care  Goal: Patient preferences are identified and integrated in the patient's plan of care  Description: Interventions:  - What would you like us to know as we care for you? I live at home with my .   - Provide timely, complete, and accurate information to patient/family  - Incorporate patient and family knowledge, values, beliefs, and cultural backgrounds into the planning and delivery of care  - Encourage patient/family to participate in care and decision-making at the level they choose  - Honor patient and family perspectives and choices  Outcome: Progressing     Problem: Patient/Family Goals  Goal: Patient/Family Long Term Goal  Description: Patient's Long Term Goal: to go home    Interventions:  - stroke workup  -neuro assessments  -PT/OT  - See additional Care Plan goals for specific interventions  Outcome: Progressing  Goal: Patient/Family Short Term Goal  Description: Patient's Short Term Goal: to feel better    Interventions:   - neuro work up  -PT/OT  -neuro assessments  - See additional Care Plan goals for specific interventions  Outcome: Progressing     Problem: NEUROLOGICAL - ADULT  Goal: Achieves stable or improved neurological status  Description: INTERVENTIONS  - Assess for and report changes in neurological status  - Initiate measures to prevent increased intracranial pressure  - Maintain blood pressure and fluid volume within ordered parameters to optimize cerebral perfusion and minimize risk of hemorrhage  - Monitor temperature, glucose, and sodium.  Initiate appropriate interventions as ordered  Outcome: Progressing  Goal: Absence of seizures  Description: INTERVENTIONS  - Monitor for seizure activity  - Administer anti-seizure medications as ordered  - Monitor neurological status  Outcome: Progressing  Goal: Remains free of injury related to seizure activity  Description: INTERVENTIONS:  - Maintain airway, patient safety  and administer oxygen as ordered  - Monitor patient for seizure activity, document and report duration and description of seizure to MD/LIP  - If seizure occurs, turn patient to side and suction secretions as needed  - Reorient patient post seizure  - Seizure pads on all 4 side rails  - Instruct patient/family to notify RN of any seizure activity  - Instruct patient/family to call for assistance with activity based on assessment  Outcome: Progressing  Goal: Achieves maximal functionality and self care  Description: INTERVENTIONS  - Monitor swallowing and airway patency with patient fatigue and changes in neurological status  - Encourage and assist patient to increase activity and self care with guidance from PT/OT  - Encourage visually impaired, hearing impaired and aphasic patients to use assistive/communication devices  Outcome: Progressing     Problem: Impaired Functional Mobility  Goal: Achieve highest/safest level of mobility/gait  Description: Interventions:  - Assess patient's functional ability and stability  - Promote increasing activity/tolerance for mobility and gait  - Educate and engage patient/family in tolerated activity level and precautions    Outcome: Progressing     Problem: Impaired Cognition  Goal: Patient will exhibit improved attention, thought processing and/or memory  Description: Interventions:    Outcome: Progressing

## 2022-06-04 VITALS
WEIGHT: 147.13 LBS | HEART RATE: 65 BPM | RESPIRATION RATE: 18 BRPM | OXYGEN SATURATION: 96 % | BODY MASS INDEX: 24.51 KG/M2 | HEIGHT: 65 IN | SYSTOLIC BLOOD PRESSURE: 153 MMHG | TEMPERATURE: 98 F | DIASTOLIC BLOOD PRESSURE: 68 MMHG

## 2022-06-04 LAB
POTASSIUM SERPL-SCNC: 4.3 MMOL/L (ref 3.5–5.1)
SARS-COV-2 RNA RESP QL NAA+PROBE: NOT DETECTED

## 2022-06-04 PROCEDURE — 99239 HOSP IP/OBS DSCHRG MGMT >30: CPT | Performed by: HOSPITALIST

## 2022-06-04 RX ORDER — SENNA AND DOCUSATE SODIUM 50; 8.6 MG/1; MG/1
2 TABLET, FILM COATED ORAL 2 TIMES DAILY
Qty: 60 TABLET | Refills: 0 | Status: SHIPPED | OUTPATIENT
Start: 2022-06-04

## 2022-06-04 NOTE — PLAN OF CARE
Ok to discharge patient to [facility]. Report given to Iveth Ruvalcaba. Tele and IV discontinued. Patient transported with all belongings.

## 2022-06-04 NOTE — PLAN OF CARE
Patient neuro intact overnight, no complaints. Neuro and V/S done q4 hours. Plan for discharge to [facility] today. Problem: Patient Centered Care  Goal: Patient preferences are identified and integrated in the patient's plan of care  Description: Interventions:  - What would you like us to know as we care for you? I live at home with my .   - Provide timely, complete, and accurate information to patient/family  - Incorporate patient and family knowledge, values, beliefs, and cultural backgrounds into the planning and delivery of care  - Encourage patient/family to participate in care and decision-making at the level they choose  - Honor patient and family perspectives and choices  Outcome: Progressing     Problem: Patient/Family Goals  Goal: Patient/Family Long Term Goal  Description: Patient's Long Term Goal: to go home    Interventions:  - stroke workup  -neuro assessments  -PT/OT  - See additional Care Plan goals for specific interventions  Outcome: Progressing  Goal: Patient/Family Short Term Goal  Description: Patient's Short Term Goal: to feel better    Interventions:   - neuro work up  -PT/OT  -neuro assessments  - See additional Care Plan goals for specific interventions  Outcome: Progressing     Problem: NEUROLOGICAL - ADULT  Goal: Achieves stable or improved neurological status  Description: INTERVENTIONS  - Assess for and report changes in neurological status  - Initiate measures to prevent increased intracranial pressure  - Maintain blood pressure and fluid volume within ordered parameters to optimize cerebral perfusion and minimize risk of hemorrhage  - Monitor temperature, glucose, and sodium.  Initiate appropriate interventions as ordered  Outcome: Progressing  Goal: Absence of seizures  Description: INTERVENTIONS  - Monitor for seizure activity  - Administer anti-seizure medications as ordered  - Monitor neurological status  Outcome: Progressing  Goal: Remains free of injury related to seizure activity  Description: INTERVENTIONS:  - Maintain airway, patient safety  and administer oxygen as ordered  - Monitor patient for seizure activity, document and report duration and description of seizure to MD/LIP  - If seizure occurs, turn patient to side and suction secretions as needed  - Reorient patient post seizure  - Seizure pads on all 4 side rails  - Instruct patient/family to notify RN of any seizure activity  - Instruct patient/family to call for assistance with activity based on assessment  Outcome: Progressing  Goal: Achieves maximal functionality and self care  Description: INTERVENTIONS  - Monitor swallowing and airway patency with patient fatigue and changes in neurological status  - Encourage and assist patient to increase activity and self care with guidance from PT/OT  - Encourage visually impaired, hearing impaired and aphasic patients to use assistive/communication devices  Outcome: Progressing     Problem: Impaired Functional Mobility  Goal: Achieve highest/safest level of mobility/gait  Description: Interventions:  - Assess patient's functional ability and stability  - Promote increasing activity/tolerance for mobility and gait  - Educate and engage patient/family in tolerated activity level and precautions    Outcome: Progressing     Problem: Impaired Cognition  Goal: Patient will exhibit improved attention, thought processing and/or memory  Description: Interventions:    Outcome: Progressing Arthritis

## 2022-06-07 ENCOUNTER — PATIENT OUTREACH (OUTPATIENT)
Dept: CASE MANAGEMENT | Age: 72
End: 2022-06-07

## 2022-06-07 NOTE — PROGRESS NOTES
Soham Blevins NP calling from \[facility]"" [facility] wanting to know if pt has an order for CT of the brain. Soham Blevins was informed no order showing but pt had one completed on 6/3/22. Soham Blevins stated she will be contacting Dr. Vonnie Ron office regarding next steps.

## 2022-06-24 ENCOUNTER — OFFICE VISIT (OUTPATIENT)
Dept: INTERNAL MEDICINE CLINIC | Facility: CLINIC | Age: 72
End: 2022-06-24
Payer: MEDICARE

## 2022-06-24 VITALS
WEIGHT: 157 LBS | OXYGEN SATURATION: 99 % | HEART RATE: 60 BPM | HEIGHT: 65 IN | SYSTOLIC BLOOD PRESSURE: 140 MMHG | TEMPERATURE: 98 F | DIASTOLIC BLOOD PRESSURE: 72 MMHG | BODY MASS INDEX: 26.16 KG/M2

## 2022-06-24 DIAGNOSIS — Z95.818 PRESENCE OF WATCHMAN LEFT ATRIAL APPENDAGE CLOSURE DEVICE: ICD-10-CM

## 2022-06-24 DIAGNOSIS — Z95.0 PRESENCE OF PERMANENT CARDIAC PACEMAKER: ICD-10-CM

## 2022-06-24 DIAGNOSIS — Z87.19 HISTORY OF PANCREATITIS: ICD-10-CM

## 2022-06-24 DIAGNOSIS — I63.9 CEREBROVASCULAR ACCIDENT (CVA), UNSPECIFIED MECHANISM (HCC): Primary | ICD-10-CM

## 2022-06-24 DIAGNOSIS — F03.90 DEMENTIA WITHOUT BEHAVIORAL DISTURBANCE, UNSPECIFIED DEMENTIA TYPE (HCC): ICD-10-CM

## 2022-06-24 DIAGNOSIS — I51.89 DIASTOLIC DYSFUNCTION: ICD-10-CM

## 2022-06-24 DIAGNOSIS — I10 ESSENTIAL HYPERTENSION: ICD-10-CM

## 2022-06-24 DIAGNOSIS — N18.30 STAGE 3 CHRONIC KIDNEY DISEASE, UNSPECIFIED WHETHER STAGE 3A OR 3B CKD (HCC): ICD-10-CM

## 2022-06-24 PROCEDURE — 99215 OFFICE O/P EST HI 40 MIN: CPT | Performed by: INTERNAL MEDICINE

## 2022-06-24 PROCEDURE — 1111F DSCHRG MED/CURRENT MED MERGE: CPT | Performed by: INTERNAL MEDICINE

## 2022-06-24 PROCEDURE — 1126F AMNT PAIN NOTED NONE PRSNT: CPT | Performed by: INTERNAL MEDICINE

## 2022-07-07 NOTE — TELEPHONE ENCOUNTER
To Maryjane Balbuena for dose clarification and possibly new RX. 11/16/21 TE:    \"5. Next step I think is to cut the nadolol that she is taking in half which would give her 10 mg. She used to take this in the past\"     Please advise.

## 2022-07-08 RX ORDER — NADOLOL 20 MG/1
10 TABLET ORAL NIGHTLY
Qty: 45 TABLET | Refills: 3 | Status: SHIPPED | OUTPATIENT
Start: 2022-07-08

## 2022-08-13 ENCOUNTER — TELEPHONE (OUTPATIENT)
Dept: INTERNAL MEDICINE CLINIC | Facility: CLINIC | Age: 72
End: 2022-08-13

## 2022-08-14 NOTE — TELEPHONE ENCOUNTER
Patient called with low blood pressure symptoms as of today, she has been recently started on a new medication, ACE inhibitor by her cardiology team, she was instructed to hold this medication stay well-hydrated, and follow-up with her cardiac team on Monday.   She verbalized understanding

## 2022-08-15 RX ORDER — SENNA PLUS 8.6 MG/1
2 TABLET ORAL DAILY
Qty: 60 TABLET | Refills: 0 | Status: SHIPPED | OUTPATIENT
Start: 2022-08-15

## 2022-08-15 NOTE — TELEPHONE ENCOUNTER
To Dr. Jeff Alcazar, please see messages below. Spoke with pt, who reports Dr. Milton Sanford recommended she get a prescription for constipation. Pt reports she has been having more constipation on and off for the last year or so. She has been having BM's maybe 1-2 times per week which is abnormal for her. RN also inquired about BP's. Pt reports this morning BP up to 163/80, then rechecked and it was 148/76.  Pt has been holding ACE inhibitor as instructed by Dr. Buster Gallardo

## 2022-08-15 NOTE — TELEPHONE ENCOUNTER
Patient spoke to Dr Bao Suh office they recommend she get a rx called in for constipation Sennas 8.6 50mg  Will Dr Hayder Solis prescribe?   Valentino Polk    Please call patient [phone]

## 2022-08-15 NOTE — TELEPHONE ENCOUNTER
She can get Senokot which is over-the-counter. She can take 2 tablets a day for constipation.     I did prep a prescription if she wishes a prescription for her to be sent to her pharmacy

## 2022-08-30 ENCOUNTER — HOSPITAL ENCOUNTER (EMERGENCY)
Facility: HOSPITAL | Age: 72
Discharge: HOME | End: 2022-08-30
Attending: EMERGENCY MEDICINE
Payer: MEDICARE

## 2022-08-30 ENCOUNTER — APPOINTMENT (OUTPATIENT)
Dept: GENERAL RADIOLOGY | Facility: HOSPITAL | Age: 72
End: 2022-08-30
Attending: EMERGENCY MEDICINE
Payer: MEDICARE

## 2022-08-30 ENCOUNTER — TELEPHONE (OUTPATIENT)
Dept: INTERNAL MEDICINE CLINIC | Facility: CLINIC | Age: 72
End: 2022-08-30

## 2022-08-30 VITALS
OXYGEN SATURATION: 94 % | DIASTOLIC BLOOD PRESSURE: 98 MMHG | SYSTOLIC BLOOD PRESSURE: 145 MMHG | HEIGHT: 65 IN | TEMPERATURE: 97 F | RESPIRATION RATE: 15 BRPM | WEIGHT: 150 LBS | BODY MASS INDEX: 24.99 KG/M2 | HEART RATE: 60 BPM

## 2022-08-30 DIAGNOSIS — I10 PRIMARY HYPERTENSION: Primary | ICD-10-CM

## 2022-08-30 LAB
ALBUMIN SERPL-MCNC: 3.9 G/DL (ref 3.4–5)
ALP LIVER SERPL-CCNC: 64 U/L
ALT SERPL-CCNC: 20 U/L
ANION GAP SERPL CALC-SCNC: 7 MMOL/L (ref 0–18)
AST SERPL-CCNC: 18 U/L (ref 15–37)
BASOPHILS # BLD AUTO: 0.03 X10(3) UL (ref 0–0.2)
BASOPHILS NFR BLD AUTO: 0.6 %
BILIRUB DIRECT SERPL-MCNC: 0.2 MG/DL (ref 0–0.2)
BILIRUB SERPL-MCNC: 0.5 MG/DL (ref 0.1–2)
BUN BLD-MCNC: 26 MG/DL (ref 7–18)
BUN/CREAT SERPL: 23 (ref 10–20)
CALCIUM BLD-MCNC: 9.1 MG/DL (ref 8.5–10.1)
CHLORIDE SERPL-SCNC: 109 MMOL/L (ref 98–112)
CO2 SERPL-SCNC: 25 MMOL/L (ref 21–32)
CREAT BLD-MCNC: 1.13 MG/DL
D DIMER PPP FEU-MCNC: 0.72 UG/ML FEU (ref ?–0.73)
DEPRECATED RDW RBC AUTO: 42.4 FL (ref 35.1–46.3)
EOSINOPHIL # BLD AUTO: 0.23 X10(3) UL (ref 0–0.7)
EOSINOPHIL NFR BLD AUTO: 4.3 %
ERYTHROCYTE [DISTWIDTH] IN BLOOD BY AUTOMATED COUNT: 12.7 % (ref 11–15)
GFR SERPLBLD BASED ON 1.73 SQ M-ARVRAT: 51 ML/MIN/1.73M2 (ref 60–?)
GLUCOSE BLD-MCNC: 96 MG/DL (ref 70–99)
HCT VFR BLD AUTO: 40.4 %
HGB BLD-MCNC: 13.3 G/DL
IMM GRANULOCYTES # BLD AUTO: 0.02 X10(3) UL (ref 0–1)
IMM GRANULOCYTES NFR BLD: 0.4 %
LYMPHOCYTES # BLD AUTO: 0.96 X10(3) UL (ref 1–4)
LYMPHOCYTES NFR BLD AUTO: 18 %
MCH RBC QN AUTO: 30.1 PG (ref 26–34)
MCHC RBC AUTO-ENTMCNC: 32.9 G/DL (ref 31–37)
MCV RBC AUTO: 91.4 FL
MONOCYTES # BLD AUTO: 0.38 X10(3) UL (ref 0.1–1)
MONOCYTES NFR BLD AUTO: 7.1 %
NEUTROPHILS # BLD AUTO: 3.71 X10 (3) UL (ref 1.5–7.7)
NEUTROPHILS # BLD AUTO: 3.71 X10(3) UL (ref 1.5–7.7)
NEUTROPHILS NFR BLD AUTO: 69.6 %
OSMOLALITY SERPL CALC.SUM OF ELEC: 297 MOSM/KG (ref 275–295)
PLATELET # BLD AUTO: 179 10(3)UL (ref 150–450)
POTASSIUM SERPL-SCNC: 4 MMOL/L (ref 3.5–5.1)
PROT SERPL-MCNC: 7.4 G/DL (ref 6.4–8.2)
RBC # BLD AUTO: 4.42 X10(6)UL
SARS-COV-2 RNA RESP QL NAA+PROBE: NOT DETECTED
SODIUM SERPL-SCNC: 141 MMOL/L (ref 136–145)
TROPONIN I HIGH SENSITIVITY: 5 NG/L
TROPONIN I HIGH SENSITIVITY: 5 NG/L
WBC # BLD AUTO: 5.3 X10(3) UL (ref 4–11)

## 2022-08-30 PROCEDURE — 71045 X-RAY EXAM CHEST 1 VIEW: CPT | Performed by: EMERGENCY MEDICINE

## 2022-08-30 PROCEDURE — 80048 BASIC METABOLIC PNL TOTAL CA: CPT | Performed by: EMERGENCY MEDICINE

## 2022-08-30 PROCEDURE — 99284 EMERGENCY DEPT VISIT MOD MDM: CPT

## 2022-08-30 PROCEDURE — 36415 COLL VENOUS BLD VENIPUNCTURE: CPT

## 2022-08-30 PROCEDURE — 93005 ELECTROCARDIOGRAM TRACING: CPT

## 2022-08-30 PROCEDURE — 80076 HEPATIC FUNCTION PANEL: CPT | Performed by: EMERGENCY MEDICINE

## 2022-08-30 PROCEDURE — 84484 ASSAY OF TROPONIN QUANT: CPT | Performed by: EMERGENCY MEDICINE

## 2022-08-30 PROCEDURE — 85025 COMPLETE CBC W/AUTO DIFF WBC: CPT | Performed by: EMERGENCY MEDICINE

## 2022-08-30 PROCEDURE — 99285 EMERGENCY DEPT VISIT HI MDM: CPT

## 2022-08-30 PROCEDURE — 93010 ELECTROCARDIOGRAM REPORT: CPT | Performed by: EMERGENCY MEDICINE

## 2022-08-30 PROCEDURE — 85379 FIBRIN DEGRADATION QUANT: CPT | Performed by: EMERGENCY MEDICINE

## 2022-08-30 NOTE — ED INITIAL ASSESSMENT (HPI)
Pt A&Ox4 from home via [facility] for hypertension. Pt also states vision problem \"feels like its wet\", sob with exertion.

## 2022-08-30 NOTE — TELEPHONE ENCOUNTER
Patient is calling to speak with a nurse. Patient states her blood pressure has very high today. Patient gave  readings of:  214/96  158/101  214/89  199/108    Patient is feeing dizzy and everything seems to be farther away from her than they really are and feeling very nauseous. Patient had two stroke two months ago. Patient is wondering if she should call 911 or if her friend/neighbor can take her to the ER.       # [phone]

## 2022-08-30 NOTE — TELEPHONE ENCOUNTER
Spoke with patient and she states that blood pressure reading over the past hours have been as follows (see readings below). Patient confirmed that she has a slight tightness in her chest, nausea, sweating, dizziness, and vision changes-- states, \"things seem like they're farther away than normal\". Patient states, \"I just feel off\". Patient advised to hang up with me and call 911 right away. Patient verbalized an understanding to this and states that she feels okay enough to call herself. She will call 911 right away. Will follow up accordingly.

## 2022-08-30 NOTE — TELEPHONE ENCOUNTER
Spoke with patient and she states that paramedics are currently at her home. FYI to Dr. Isabella Stern.

## 2022-09-04 ENCOUNTER — APPOINTMENT (OUTPATIENT)
Dept: GENERAL RADIOLOGY | Facility: HOSPITAL | Age: 72
End: 2022-09-04
Attending: STUDENT IN AN ORGANIZED HEALTH CARE EDUCATION/TRAINING PROGRAM
Payer: MEDICARE

## 2022-09-04 ENCOUNTER — HOSPITAL ENCOUNTER (EMERGENCY)
Facility: HOSPITAL | Age: 72
Discharge: HOME | End: 2022-09-04
Attending: STUDENT IN AN ORGANIZED HEALTH CARE EDUCATION/TRAINING PROGRAM
Payer: MEDICARE

## 2022-09-04 VITALS
RESPIRATION RATE: 16 BRPM | TEMPERATURE: 98 F | SYSTOLIC BLOOD PRESSURE: 151 MMHG | DIASTOLIC BLOOD PRESSURE: 92 MMHG | OXYGEN SATURATION: 97 % | HEIGHT: 65 IN | HEART RATE: 60 BPM | WEIGHT: 150 LBS | BODY MASS INDEX: 24.99 KG/M2

## 2022-09-04 DIAGNOSIS — I10 HYPERTENSION, UNSPECIFIED TYPE: Primary | ICD-10-CM

## 2022-09-04 LAB
ANION GAP SERPL CALC-SCNC: 6 MMOL/L (ref 0–18)
BASOPHILS # BLD AUTO: 0.02 X10(3) UL (ref 0–0.2)
BASOPHILS NFR BLD AUTO: 0.4 %
BUN BLD-MCNC: 23 MG/DL (ref 7–18)
BUN/CREAT SERPL: 23 (ref 10–20)
CALCIUM BLD-MCNC: 9.1 MG/DL (ref 8.5–10.1)
CHLORIDE SERPL-SCNC: 110 MMOL/L (ref 98–112)
CO2 SERPL-SCNC: 26 MMOL/L (ref 21–32)
CREAT BLD-MCNC: 1 MG/DL
DEPRECATED RDW RBC AUTO: 43.2 FL (ref 35.1–46.3)
EOSINOPHIL # BLD AUTO: 0.23 X10(3) UL (ref 0–0.7)
EOSINOPHIL NFR BLD AUTO: 5.1 %
ERYTHROCYTE [DISTWIDTH] IN BLOOD BY AUTOMATED COUNT: 12.9 % (ref 11–15)
GFR SERPLBLD BASED ON 1.73 SQ M-ARVRAT: 59 ML/MIN/1.73M2 (ref 60–?)
GLUCOSE BLD-MCNC: 81 MG/DL (ref 70–99)
HCT VFR BLD AUTO: 41.7 %
HGB BLD-MCNC: 13.4 G/DL
IMM GRANULOCYTES # BLD AUTO: 0.02 X10(3) UL (ref 0–1)
IMM GRANULOCYTES NFR BLD: 0.4 %
LYMPHOCYTES # BLD AUTO: 1.09 X10(3) UL (ref 1–4)
LYMPHOCYTES NFR BLD AUTO: 24.4 %
MCH RBC QN AUTO: 29.5 PG (ref 26–34)
MCHC RBC AUTO-ENTMCNC: 32.1 G/DL (ref 31–37)
MCV RBC AUTO: 91.6 FL
MONOCYTES # BLD AUTO: 0.39 X10(3) UL (ref 0.1–1)
MONOCYTES NFR BLD AUTO: 8.7 %
NEUTROPHILS # BLD AUTO: 2.72 X10 (3) UL (ref 1.5–7.7)
NEUTROPHILS # BLD AUTO: 2.72 X10(3) UL (ref 1.5–7.7)
NEUTROPHILS NFR BLD AUTO: 61 %
OSMOLALITY SERPL CALC.SUM OF ELEC: 297 MOSM/KG (ref 275–295)
PLATELET # BLD AUTO: 166 10(3)UL (ref 150–450)
POTASSIUM SERPL-SCNC: 3.9 MMOL/L (ref 3.5–5.1)
RBC # BLD AUTO: 4.55 X10(6)UL
SODIUM SERPL-SCNC: 142 MMOL/L (ref 136–145)
TROPONIN I HIGH SENSITIVITY: 5 NG/L
WBC # BLD AUTO: 4.5 X10(3) UL (ref 4–11)

## 2022-09-04 PROCEDURE — 80048 BASIC METABOLIC PNL TOTAL CA: CPT | Performed by: STUDENT IN AN ORGANIZED HEALTH CARE EDUCATION/TRAINING PROGRAM

## 2022-09-04 PROCEDURE — 99284 EMERGENCY DEPT VISIT MOD MDM: CPT

## 2022-09-04 PROCEDURE — 85025 COMPLETE CBC W/AUTO DIFF WBC: CPT | Performed by: STUDENT IN AN ORGANIZED HEALTH CARE EDUCATION/TRAINING PROGRAM

## 2022-09-04 PROCEDURE — 84484 ASSAY OF TROPONIN QUANT: CPT | Performed by: STUDENT IN AN ORGANIZED HEALTH CARE EDUCATION/TRAINING PROGRAM

## 2022-09-04 PROCEDURE — 93010 ELECTROCARDIOGRAM REPORT: CPT | Performed by: STUDENT IN AN ORGANIZED HEALTH CARE EDUCATION/TRAINING PROGRAM

## 2022-09-04 PROCEDURE — 93005 ELECTROCARDIOGRAM TRACING: CPT

## 2022-09-04 PROCEDURE — 71045 X-RAY EXAM CHEST 1 VIEW: CPT | Performed by: STUDENT IN AN ORGANIZED HEALTH CARE EDUCATION/TRAINING PROGRAM

## 2022-09-04 NOTE — ED INITIAL ASSESSMENT (HPI)
Pt arrive in ER per [facility] with c/o high blood pressure reading at home, denies any headache/dizziness

## 2022-09-08 ENCOUNTER — OFFICE VISIT (OUTPATIENT)
Dept: INTERNAL MEDICINE CLINIC | Facility: CLINIC | Age: 72
End: 2022-09-08
Payer: MEDICARE

## 2022-09-08 ENCOUNTER — TELEPHONE (OUTPATIENT)
Dept: INTERNAL MEDICINE CLINIC | Facility: CLINIC | Age: 72
End: 2022-09-08

## 2022-09-08 VITALS
HEIGHT: 65 IN | DIASTOLIC BLOOD PRESSURE: 90 MMHG | SYSTOLIC BLOOD PRESSURE: 180 MMHG | BODY MASS INDEX: 24.66 KG/M2 | TEMPERATURE: 98 F | HEART RATE: 64 BPM | WEIGHT: 148 LBS

## 2022-09-08 DIAGNOSIS — I63.9 CEREBROVASCULAR ACCIDENT (CVA), UNSPECIFIED MECHANISM (HCC): ICD-10-CM

## 2022-09-08 DIAGNOSIS — Z95.818 PRESENCE OF WATCHMAN LEFT ATRIAL APPENDAGE CLOSURE DEVICE: ICD-10-CM

## 2022-09-08 DIAGNOSIS — N18.30 STAGE 3 CHRONIC KIDNEY DISEASE, UNSPECIFIED WHETHER STAGE 3A OR 3B CKD (HCC): ICD-10-CM

## 2022-09-08 DIAGNOSIS — I10 PRIMARY HYPERTENSION: Primary | ICD-10-CM

## 2022-09-08 PROCEDURE — 99214 OFFICE O/P EST MOD 30 MIN: CPT | Performed by: INTERNAL MEDICINE

## 2022-09-08 PROCEDURE — 1126F AMNT PAIN NOTED NONE PRSNT: CPT | Performed by: INTERNAL MEDICINE

## 2022-09-08 RX ORDER — NADOLOL 20 MG/1
20 TABLET ORAL DAILY
Qty: 90 TABLET | Refills: 3 | COMMUNITY
Start: 2022-09-08

## 2022-09-08 RX ORDER — PANTOPRAZOLE SODIUM 40 MG/1
40 TABLET, DELAYED RELEASE ORAL
Qty: 30 TABLET | Refills: 1 | Status: SHIPPED | OUTPATIENT
Start: 2022-09-08

## 2022-09-08 RX ORDER — AMLODIPINE BESYLATE 2.5 MG/1
2.5 TABLET ORAL DAILY
Qty: 30 TABLET | Refills: 11 | Status: SHIPPED | OUTPATIENT
Start: 2022-09-08

## 2022-09-08 NOTE — TELEPHONE ENCOUNTER
Pt dropped off card for Dr Umesh Duncan for:  [address], [place], DPT, [employer]    [phone]  She works with patient and patient wanted to share this contact should Dr Umesh Duncan want to reach out  Tasked to Dr Umesh Duncan as Marylene Goring

## 2022-09-20 ENCOUNTER — OFFICE VISIT (OUTPATIENT)
Dept: INTERNAL MEDICINE CLINIC | Facility: CLINIC | Age: 72
End: 2022-09-20

## 2022-09-20 VITALS
WEIGHT: 149 LBS | HEIGHT: 65 IN | BODY MASS INDEX: 24.83 KG/M2 | HEART RATE: 66 BPM | TEMPERATURE: 98 F | SYSTOLIC BLOOD PRESSURE: 162 MMHG | OXYGEN SATURATION: 99 % | DIASTOLIC BLOOD PRESSURE: 82 MMHG

## 2022-09-20 DIAGNOSIS — Z95.818 PRESENCE OF WATCHMAN LEFT ATRIAL APPENDAGE CLOSURE DEVICE: ICD-10-CM

## 2022-09-20 DIAGNOSIS — N18.30 STAGE 3 CHRONIC KIDNEY DISEASE, UNSPECIFIED WHETHER STAGE 3A OR 3B CKD (HCC): ICD-10-CM

## 2022-09-20 DIAGNOSIS — I10 PRIMARY HYPERTENSION: Primary | ICD-10-CM

## 2022-09-20 DIAGNOSIS — F03.90 DEMENTIA WITHOUT BEHAVIORAL DISTURBANCE, UNSPECIFIED DEMENTIA TYPE: ICD-10-CM

## 2022-09-20 DIAGNOSIS — I63.9 CEREBROVASCULAR ACCIDENT (CVA), UNSPECIFIED MECHANISM (HCC): ICD-10-CM

## 2022-09-20 PROBLEM — Z86.73 HISTORY OF CEREBROVASCULAR ACCIDENT (CVA) IN ADULTHOOD: Status: ACTIVE | Noted: 2022-09-20

## 2022-09-20 PROCEDURE — 99214 OFFICE O/P EST MOD 30 MIN: CPT | Performed by: INTERNAL MEDICINE

## 2022-09-20 PROCEDURE — 1126F AMNT PAIN NOTED NONE PRSNT: CPT | Performed by: INTERNAL MEDICINE

## 2022-09-20 RX ORDER — AMLODIPINE BESYLATE 5 MG/1
5 TABLET ORAL DAILY
Qty: 30 TABLET | Refills: 11 | Status: SHIPPED | OUTPATIENT
Start: 2022-09-20 | End: 2023-09-15

## 2022-09-27 RX ORDER — ROPINIROLE 1 MG/1
TABLET, FILM COATED ORAL
Qty: 180 TABLET | Refills: 3 | Status: SHIPPED | OUTPATIENT
Start: 2022-09-27

## 2022-10-01 RX ORDER — ATORVASTATIN CALCIUM 20 MG/1
TABLET, FILM COATED ORAL
Qty: 90 TABLET | Refills: 3 | Status: SHIPPED | OUTPATIENT
Start: 2022-10-01

## 2022-10-10 ENCOUNTER — ORDER TRANSCRIPTION (OUTPATIENT)
Dept: PHYSICAL THERAPY | Facility: HOSPITAL | Age: 72
End: 2022-10-10

## 2022-10-10 DIAGNOSIS — R32 UNSPECIFIED URINARY INCONTINENCE: Primary | ICD-10-CM

## 2022-10-11 ENCOUNTER — LAB ENCOUNTER (OUTPATIENT)
Dept: LAB | Age: 72
End: 2022-10-11
Attending: INTERNAL MEDICINE
Payer: MEDICARE

## 2022-10-11 ENCOUNTER — NURSE ONLY (OUTPATIENT)
Dept: LAB | Age: 72
End: 2022-10-11
Attending: INTERNAL MEDICINE
Payer: MEDICARE

## 2022-10-11 DIAGNOSIS — Z01.818 PRE-OP TESTING: ICD-10-CM

## 2022-10-11 LAB
ANION GAP SERPL CALC-SCNC: 7 MMOL/L (ref 0–18)
BUN BLD-MCNC: 22 MG/DL (ref 7–18)
BUN/CREAT SERPL: 20.4 (ref 10–20)
CALCIUM BLD-MCNC: 9.7 MG/DL (ref 8.5–10.1)
CHLORIDE SERPL-SCNC: 109 MMOL/L (ref 98–112)
CO2 SERPL-SCNC: 26 MMOL/L (ref 21–32)
CREAT BLD-MCNC: 1.08 MG/DL
DEPRECATED RDW RBC AUTO: 41.1 FL (ref 35.1–46.3)
ERYTHROCYTE [DISTWIDTH] IN BLOOD BY AUTOMATED COUNT: 12.5 % (ref 11–15)
FASTING STATUS PATIENT QL REPORTED: YES
GFR SERPLBLD BASED ON 1.73 SQ M-ARVRAT: 54 ML/MIN/1.73M2 (ref 60–?)
GLUCOSE BLD-MCNC: 97 MG/DL (ref 70–99)
HCT VFR BLD AUTO: 40.7 %
HGB BLD-MCNC: 13.6 G/DL
MCH RBC QN AUTO: 30.2 PG (ref 26–34)
MCHC RBC AUTO-ENTMCNC: 33.4 G/DL (ref 31–37)
MCV RBC AUTO: 90.4 FL
OSMOLALITY SERPL CALC.SUM OF ELEC: 297 MOSM/KG (ref 275–295)
PLATELET # BLD AUTO: 165 10(3)UL (ref 150–450)
POTASSIUM SERPL-SCNC: 4.3 MMOL/L (ref 3.5–5.1)
RBC # BLD AUTO: 4.5 X10(6)UL
SODIUM SERPL-SCNC: 142 MMOL/L (ref 136–145)
WBC # BLD AUTO: 4.5 X10(3) UL (ref 4–11)

## 2022-10-11 PROCEDURE — 36415 COLL VENOUS BLD VENIPUNCTURE: CPT

## 2022-10-11 PROCEDURE — 85027 COMPLETE CBC AUTOMATED: CPT

## 2022-10-11 PROCEDURE — 80048 BASIC METABOLIC PNL TOTAL CA: CPT

## 2022-10-12 LAB — SARS-COV-2 RNA RESP QL NAA+PROBE: NOT DETECTED

## 2022-10-14 ENCOUNTER — HOSPITAL ENCOUNTER (OUTPATIENT)
Dept: INTERVENTIONAL RADIOLOGY/VASCULAR | Facility: HOSPITAL | Age: 72
Discharge: HOME | End: 2022-10-14
Attending: INTERNAL MEDICINE | Admitting: INTERNAL MEDICINE
Payer: MEDICARE

## 2022-10-14 VITALS
HEART RATE: 60 BPM | WEIGHT: 152 LBS | DIASTOLIC BLOOD PRESSURE: 70 MMHG | OXYGEN SATURATION: 97 % | RESPIRATION RATE: 11 BRPM | BODY MASS INDEX: 25.33 KG/M2 | SYSTOLIC BLOOD PRESSURE: 163 MMHG | HEIGHT: 65 IN | TEMPERATURE: 98 F

## 2022-10-14 DIAGNOSIS — R94.39 ABNORMAL NUCLEAR STRESS TEST: ICD-10-CM

## 2022-10-14 DIAGNOSIS — Z01.818 PRE-OP TESTING: Primary | ICD-10-CM

## 2022-10-14 DIAGNOSIS — R07.9 CHEST PAIN: ICD-10-CM

## 2022-10-14 PROCEDURE — 99152 MOD SED SAME PHYS/QHP 5/>YRS: CPT

## 2022-10-14 PROCEDURE — B2111ZZ FLUOROSCOPY OF MULTIPLE CORONARY ARTERIES USING LOW OSMOLAR CONTRAST: ICD-10-PCS | Performed by: INTERNAL MEDICINE

## 2022-10-14 PROCEDURE — 4A023N7 MEASUREMENT OF CARDIAC SAMPLING AND PRESSURE, LEFT HEART, PERCUTANEOUS APPROACH: ICD-10-PCS | Performed by: INTERNAL MEDICINE

## 2022-10-14 PROCEDURE — 36415 COLL VENOUS BLD VENIPUNCTURE: CPT

## 2022-10-14 PROCEDURE — 93458 L HRT ARTERY/VENTRICLE ANGIO: CPT

## 2022-10-14 RX ORDER — SODIUM CHLORIDE 9 MG/ML
INJECTION, SOLUTION INTRAVENOUS
Status: COMPLETED | OUTPATIENT
Start: 2022-10-14 | End: 2022-10-14

## 2022-10-14 RX ORDER — HEPARIN SODIUM 1000 [USP'U]/ML
INJECTION, SOLUTION INTRAVENOUS; SUBCUTANEOUS
Status: COMPLETED
Start: 2022-10-14 | End: 2022-10-14

## 2022-10-14 RX ORDER — LIDOCAINE HYDROCHLORIDE 20 MG/ML
INJECTION, SOLUTION EPIDURAL; INFILTRATION; INTRACAUDAL; PERINEURAL
Status: COMPLETED
Start: 2022-10-14 | End: 2022-10-14

## 2022-10-14 RX ORDER — NITROGLYCERIN 20 MG/100ML
INJECTION INTRAVENOUS
Status: COMPLETED
Start: 2022-10-14 | End: 2022-10-14

## 2022-10-14 RX ORDER — VERAPAMIL HYDROCHLORIDE 2.5 MG/ML
INJECTION, SOLUTION INTRAVENOUS
Status: COMPLETED
Start: 2022-10-14 | End: 2022-10-14

## 2022-10-14 RX ORDER — MIDAZOLAM HYDROCHLORIDE 1 MG/ML
INJECTION INTRAMUSCULAR; INTRAVENOUS
Status: COMPLETED
Start: 2022-10-14 | End: 2022-10-14

## 2022-10-14 RX ADMIN — SODIUM CHLORIDE: 9 INJECTION, SOLUTION INTRAVENOUS at 08:20:00

## 2022-10-14 NOTE — PROCEDURES
[facility]    Cardiac Cath Procedure Note  Maritza Mo Patient Status:  Outpatient in a Bed    3/28/1950 MRN [id number]   Location [facility] Attending No att. providers found    Day # 0 PCP Kenya Patrick MD       Cardiologist: Maira Real MD  Primary Proceduralist: Maira Real MD  Procedure Performed: LHC and LV  Date of Procedure: 10/14/2022   Indication: Positive stress test    Summary of procedure: False-positive stress test, normal coronary anatomy especially the lateral wall    Noncardiac shortness of breath, normal LVEDP      Left Ventriculography and hemodynamics:   LV EF not done  LV EDP 3 mmHg, 1 L fluid bolus given  No gradient across aortic valve        Coronary Angiography  RCA:  Dominant and free of obstructive disease, supplies PDA and PL    Left main:  Free of obstructive disease    Left anterior descending:  Free of obstructive disease, supplies multiple diagonals which are non-obstructive    Circumflex:  Free of obstructive disease, supplies multiple OM branches which are patent      Assessment:  False-positive stress test, normal coronary anatomy especially the lateral wall    Noncardiac shortness of breath, normal LVEDP      Recommendations:  Continue aggressive risk factor modification      Summary of Case: After written informed consent was obtained from the patient, patient was brought to the cardiac catheterization laboratory. Patient was prepped and draped in the usual sterile fashion. Lidocaine 1% was used to infiltrate the right radial artery for local anesthesia and a 6 Lithuanian introducer sheath was inserted into the right radial artery. Selective coronary angiography performed with JR4 catheter for RCA and JL3.5 catheter for LCA. Angiography performed in standard projections. 6 Azeri JR4 catheter placed in LV for hemodynamics.     Specimen sent to: No specimen collected  Estimated blood loss: 10 cc  Closure:  TR band      IV was maintained by RN and moderate conscious sedation of versed and fentanyl was given. Patient was assessed and monitoring of oxygen, heart rate and blood pressure by nurse and myself during the exam 35 minutes.       Denis Weathers MD  10/14/22

## 2022-10-14 NOTE — INTERVAL H&P NOTE
Pre-op Diagnosis: * No pre-op diagnosis entered *    The above referenced H&P was reviewed by Sara Diaz MD on 10/14/2022, the patient was examined and no significant changes have occurred in the patient's condition since the H&P was performed. I discussed with the patient and/or legal representative the potential benefits, risks and side effects of this procedure; the likelihood of the patient achieving goals; and potential problems that might occur during recuperation. I discussed reasonable alternatives to the procedure, including risks, benefits and side effects related to the alternatives and risks related to not receiving this procedure. We will proceed with procedure as planned.

## 2022-10-14 NOTE — IVS NOTE
DISCHARGE NOTE     Pt is able to sit up and ambulate without difficulty. Pt voided and tolerated fluids. Right wrist procedural site remains dry and intact with good circulation, motion, and sensation. No signs and symptoms of bleeding/hematoma noted. Pt denies any pain or discomfort at this time. IV access removed  Instruction provided, patient/family verbalizes understanding. Dr. Libby Alexander spoke with patient/family post procedure. Pt discharge via wheelchair to [address] [place] [address] B     Follow up Appointment: October 19, 2022 @ 3:00pm with Dr. Milda Gowers.     New Prescription: n/a

## 2022-10-24 ENCOUNTER — OFFICE VISIT (OUTPATIENT)
Dept: INTERNAL MEDICINE CLINIC | Facility: CLINIC | Age: 72
End: 2022-10-24
Payer: MEDICARE

## 2022-10-24 VITALS
SYSTOLIC BLOOD PRESSURE: 156 MMHG | TEMPERATURE: 98 F | DIASTOLIC BLOOD PRESSURE: 86 MMHG | WEIGHT: 147.81 LBS | HEART RATE: 53 BPM | BODY MASS INDEX: 24.63 KG/M2 | HEIGHT: 65 IN | OXYGEN SATURATION: 98 %

## 2022-10-24 DIAGNOSIS — Z95.0 PRESENCE OF PERMANENT CARDIAC PACEMAKER: ICD-10-CM

## 2022-10-24 DIAGNOSIS — N18.30 STAGE 3 CHRONIC KIDNEY DISEASE, UNSPECIFIED WHETHER STAGE 3A OR 3B CKD (HCC): ICD-10-CM

## 2022-10-24 DIAGNOSIS — Z87.898 HISTORY OF SEIZURES: ICD-10-CM

## 2022-10-24 DIAGNOSIS — F03.90 DEMENTIA, UNSPECIFIED DEMENTIA SEVERITY, UNSPECIFIED DEMENTIA TYPE, UNSPECIFIED WHETHER BEHAVIORAL, PSYCHOTIC, OR MOOD DISTURBANCE OR ANXIETY (HCC): ICD-10-CM

## 2022-10-24 DIAGNOSIS — I63.9 CEREBROVASCULAR ACCIDENT (CVA), UNSPECIFIED MECHANISM (HCC): ICD-10-CM

## 2022-10-24 DIAGNOSIS — Z87.898 HISTORY OF SEIZURE: ICD-10-CM

## 2022-10-24 DIAGNOSIS — I10 PRIMARY HYPERTENSION: Primary | ICD-10-CM

## 2022-10-24 DIAGNOSIS — K64.9 BLEEDING HEMORRHOIDS: ICD-10-CM

## 2022-10-24 DIAGNOSIS — Z95.818 PRESENCE OF WATCHMAN LEFT ATRIAL APPENDAGE CLOSURE DEVICE: ICD-10-CM

## 2022-10-24 DIAGNOSIS — K59.01 SLOW TRANSIT CONSTIPATION: ICD-10-CM

## 2022-10-24 DIAGNOSIS — R10.13 EPIGASTRIC ABDOMINAL PAIN: ICD-10-CM

## 2022-10-24 PROCEDURE — 1111F DSCHRG MED/CURRENT MED MERGE: CPT | Performed by: INTERNAL MEDICINE

## 2022-10-24 PROCEDURE — 99215 OFFICE O/P EST HI 40 MIN: CPT | Performed by: INTERNAL MEDICINE

## 2022-10-24 PROCEDURE — 1125F AMNT PAIN NOTED PAIN PRSNT: CPT | Performed by: INTERNAL MEDICINE

## 2022-10-24 RX ORDER — ONDANSETRON 4 MG/1
4 TABLET, ORALLY DISINTEGRATING ORAL EVERY 8 HOURS PRN
Qty: 20 TABLET | Refills: 0 | Status: SHIPPED | OUTPATIENT
Start: 2022-10-24

## 2022-10-31 RX ORDER — PANTOPRAZOLE SODIUM 40 MG/1
40 TABLET, DELAYED RELEASE ORAL
Qty: 90 TABLET | Refills: 3 | Status: SHIPPED | OUTPATIENT
Start: 2022-10-31

## 2022-11-13 ENCOUNTER — LAB ENCOUNTER (OUTPATIENT)
Dept: LAB | Facility: HOSPITAL | Age: 72
End: 2022-11-13
Attending: INTERNAL MEDICINE
Payer: MEDICARE

## 2022-11-13 DIAGNOSIS — Z01.818 PRE-OP TESTING: ICD-10-CM

## 2022-11-14 LAB — SARS-COV-2 RNA RESP QL NAA+PROBE: NOT DETECTED

## 2022-11-16 ENCOUNTER — HOSPITAL ENCOUNTER (OUTPATIENT)
Facility: HOSPITAL | Age: 72
Setting detail: HOSPITAL OUTPATIENT SURGERY
Discharge: HOME | End: 2022-11-16
Attending: INTERNAL MEDICINE | Admitting: INTERNAL MEDICINE
Payer: MEDICARE

## 2022-11-16 ENCOUNTER — ANESTHESIA EVENT (OUTPATIENT)
Dept: ENDOSCOPY | Facility: HOSPITAL | Age: 72
End: 2022-11-16
Payer: MEDICARE

## 2022-11-16 ENCOUNTER — ANESTHESIA (OUTPATIENT)
Dept: ENDOSCOPY | Facility: HOSPITAL | Age: 72
End: 2022-11-16
Payer: MEDICARE

## 2022-11-16 VITALS
RESPIRATION RATE: 18 BRPM | HEIGHT: 65 IN | DIASTOLIC BLOOD PRESSURE: 112 MMHG | SYSTOLIC BLOOD PRESSURE: 144 MMHG | TEMPERATURE: 98 F | WEIGHT: 145 LBS | OXYGEN SATURATION: 96 % | HEART RATE: 60 BPM | BODY MASS INDEX: 24.16 KG/M2

## 2022-11-16 DIAGNOSIS — K62.5 RECTAL BLEEDING: ICD-10-CM

## 2022-11-16 DIAGNOSIS — Z01.818 PRE-OP TESTING: Primary | ICD-10-CM

## 2022-11-16 DIAGNOSIS — K59.09 CHRONIC CONSTIPATION: ICD-10-CM

## 2022-11-16 DIAGNOSIS — R10.84 GENERALIZED ABDOMINAL PAIN: ICD-10-CM

## 2022-11-16 DIAGNOSIS — R10.13 DYSPEPSIA: ICD-10-CM

## 2022-11-16 PROCEDURE — 0DBM8ZX EXCISION OF DESCENDING COLON, VIA NATURAL OR ARTIFICIAL OPENING ENDOSCOPIC, DIAGNOSTIC: ICD-10-PCS | Performed by: INTERNAL MEDICINE

## 2022-11-16 PROCEDURE — 0DBP8ZX EXCISION OF RECTUM, VIA NATURAL OR ARTIFICIAL OPENING ENDOSCOPIC, DIAGNOSTIC: ICD-10-PCS | Performed by: INTERNAL MEDICINE

## 2022-11-16 PROCEDURE — 0DBE8ZX EXCISION OF LARGE INTESTINE, VIA NATURAL OR ARTIFICIAL OPENING ENDOSCOPIC, DIAGNOSTIC: ICD-10-PCS | Performed by: INTERNAL MEDICINE

## 2022-11-16 PROCEDURE — 88305 TISSUE EXAM BY PATHOLOGIST: CPT | Performed by: INTERNAL MEDICINE

## 2022-11-16 PROCEDURE — 0DB98ZX EXCISION OF DUODENUM, VIA NATURAL OR ARTIFICIAL OPENING ENDOSCOPIC, DIAGNOSTIC: ICD-10-PCS | Performed by: INTERNAL MEDICINE

## 2022-11-16 PROCEDURE — 0DBN8ZX EXCISION OF SIGMOID COLON, VIA NATURAL OR ARTIFICIAL OPENING ENDOSCOPIC, DIAGNOSTIC: ICD-10-PCS | Performed by: INTERNAL MEDICINE

## 2022-11-16 PROCEDURE — 0DB78ZX EXCISION OF STOMACH, PYLORUS, VIA NATURAL OR ARTIFICIAL OPENING ENDOSCOPIC, DIAGNOSTIC: ICD-10-PCS | Performed by: INTERNAL MEDICINE

## 2022-11-16 PROCEDURE — 88312 SPECIAL STAINS GROUP 1: CPT | Performed by: INTERNAL MEDICINE

## 2022-11-16 RX ORDER — SODIUM CHLORIDE, SODIUM LACTATE, POTASSIUM CHLORIDE, CALCIUM CHLORIDE 600; 310; 30; 20 MG/100ML; MG/100ML; MG/100ML; MG/100ML
INJECTION, SOLUTION INTRAVENOUS CONTINUOUS
Status: DISCONTINUED | OUTPATIENT
Start: 2022-11-16 | End: 2022-11-16

## 2022-11-16 RX ORDER — LIDOCAINE HYDROCHLORIDE 10 MG/ML
INJECTION, SOLUTION EPIDURAL; INFILTRATION; INTRACAUDAL; PERINEURAL AS NEEDED
Status: DISCONTINUED | OUTPATIENT
Start: 2022-11-16 | End: 2022-11-22 | Stop reason: SURG

## 2022-11-16 RX ADMIN — SODIUM CHLORIDE, SODIUM LACTATE, POTASSIUM CHLORIDE, CALCIUM CHLORIDE: 600; 310; 30; 20 INJECTION, SOLUTION INTRAVENOUS at 14:42:00

## 2022-11-16 RX ADMIN — LIDOCAINE HYDROCHLORIDE 50 MG: 10 INJECTION, SOLUTION EPIDURAL; INFILTRATION; INTRACAUDAL; PERINEURAL at 14:44:00

## 2022-11-16 RX ADMIN — LIDOCAINE HYDROCHLORIDE 50 MG: 10 INJECTION, SOLUTION EPIDURAL; INFILTRATION; INTRACAUDAL; PERINEURAL at 14:45:00

## 2022-11-16 NOTE — OPERATIVE REPORT
ENDOSCOPY OPERATIVE REPORT  Patient Name: Rene Burger Record #: [id number]  YOB: 1950  Date of Procedure: 11/16/2022    Preoperative Diagnosis: abdominal pain; intermittent BRBPR. Postoperative Diagnosis:       1.) Mild pangastritis -- biopsies obtained. 2.) 5 mm descending colon, 3 - 4 mm x 4 rectosigmoid polyps = removed. 3.) Suggestion of mild early left sided diverticulosis. 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     5.) Normal terminal ileum. Procedure Performed: Esophagogastroduodenoscopy with biopsy and Colonoscopy with biopsy. Withdrawal time: 17 minutes. ASA Class: 3. Anesthesia Given: MAC. Moderate sedation time: NA. Deep sedation was provided by anesthesiologist.    Endoscopist: Fransisco Malhotra MD  Procedure: After the patient was interviewed and the procedure again discussed and questions addressed, the patient was brought to the GI Lab and monitoring of the B/P, pulse, and pulse oximetry was performed. The patient was then placed in the left lateral decubitus position and sedated with divided doses of IV medication; continuous vital signs were monitored throughout the procedure. A time-out procedure was performed, history and physical were reviewed, medical reconciliation was complete, informed consent was obtained. The Olympus videogastroscope was intubated into the esophagus and advanced into the duodenum under directed vision without difficulty. Then withdrawn. The patient was repositioned and prepared for the colonoscopy. The scope was inserted through the rectum and advanced to the cecum as identified by the appendiceal orifice and ileocecal valve. The terminal ileum was intubated and was normal. The quality of the preparation was Aronchick score 1. A thorough exam was performed throughout the procedures. The patient tolerated the procedures well.    Aronchick Bowel Prep:  Score:  1 = Excellent (>95% mucosa seen)   2 = Good (clear liquid up to 25% of mucosa, 90% mucosa seen)   3 = fair (semisolid stool not suctioned, but 90% mucosa seen)   4 = poor (semisolid stool not suctioned, <90% mucosa seen)   5 = inadequate (repeat prep needed). FINDINGS: The esophagus mucosa had an overall normal appearance. The gastric lumen was then entered and views of the gastric mucosa as well as retroverted views of the fundus. Mild pangastritis was noted. A normal pylorus was passed and the duodenal bulb entered, the duodenal bulb and post-bulbar duodenum were unremarkable. Biopsies from the second part of duodenum were obtained. Gastric antral and body biopsies were obtained. The scope was then withdrawn and the procedure terminated. A colonoscopy was then performed. Five small polyps were noted as above and removed using cold biopsy forceps. Suggestion of mild early left sided diverticulosis. A large but faint AVM was noted in cecum without active bleeding -- do not believe responsible for patient's mild intermittent rectal bleeding. The terminal ileum was intubated and was normal.  Random colon biopsies were obtained from the Right and Left colon. The overall visualized mucosal pattern of the colon was unremarkable. At the anal verge the endoscope was retroverted, internal hemorrhoids, no other abnormalities were indentified. The procedure was tolerated well and upon completion and throughtout the vital signs were stable. COMPLICATIONS: None. PLAN: Patient is to follow a high fiber, low fat diet and followup with primary care for further care. Await biopsy results. I have requested the patient to follow-up with me in the clinic. The patient and  were informed of the endoscopic findings and was also given a copy of the findings, postoperative instructions, and postoperative precautions. Martha Clark MD  [facility].

## 2022-11-16 NOTE — ANESTHESIA POSTPROCEDURE EVALUATION
Patient: Rajendra Duty    Procedure Summary     Date: 11/16/22 Room / Location: [address] [place] [address] ENDOSCOPY 05 / [address] [place] [address] ENDOSCOPY    Anesthesia Start: 0380 Anesthesia Stop:     Procedures:       COLONOSCOPY/ESOPHAGOGASTRODUODENOSCOPY (EGD)      ESOPHAGOGASTRODUODENOSCOPY (EGD) Diagnosis:       Rectal bleeding      Generalized abdominal pain      Dyspepsia      Chronic constipation      (gastritis; colon polyps; diverticulosis; hemorrhoids)    Surgeons: Milady Ayala MD Anesthesiologist: Osbaldo Park CRNA    Anesthesia Type: MAC ASA Status: 3          Anesthesia Type: MAC    Vitals Value Taken Time   /78 11/16/22 1522   Temp 37.0 11/16/22 1522   Pulse 75 11/16/22 1522   Resp 18 11/16/22 1522   SpO2 100 11/16/22 1522       [facility] AN Post Evaluation:   Patient Evaluated in PACU  Patient Participation: complete - patient participated  Level of Consciousness: awake and alert  Pain Score: 0  Pain Management: adequate  Airway Patency:patent  Yes    Cardiovascular Status: acceptable  Respiratory Status: acceptable  Postoperative Hydration acceptable      Anay Zamora CRNA  11/16/2022 3:22 PM

## 2022-11-16 NOTE — DISCHARGE INSTRUCTIONS

## 2022-11-22 NOTE — TELEPHONE ENCOUNTER
Refill request has failed the Ambulatory Medication Refill Standing Order and is routed to the primary physician to review the following:    Requested Prescriptions     Refused Prescriptions Disp Refills   • ROPINIROLE HCL 1 MG Oral Tab [Pharmacy Med Name: No

## 2023-01-04 NOTE — TELEPHONE ENCOUNTER
"  Subjective:     Interval History: No acute issues. Stable in isolette.      Scheduled Meds:   pediatric multivitamin with iron  0.5 mL Oral Daily     Continuous Infusions:  PRN Meds:    Nutritional Support: Enteral: Donor Breast milk 24 KCal    Objective:     Vital Signs (Most Recent):  Temp: 98.5 °F (36.9 °C) (01/04/23 0800)  Pulse: (!) 168 (01/04/23 0816)  Resp: (!) 32 (01/04/23 0816)  BP: (!) 59/32 (01/03/23 2000)  SpO2: (!) 98 % (01/04/23 0816)   Vital Signs (24h Range):  Temp:  [98.3 °F (36.8 °C)-98.9 °F (37.2 °C)] 98.5 °F (36.9 °C)  Pulse:  [135-168] 168  Resp:  [20-65] 32  SpO2:  [92 %-100 %] 98 %  BP: (59)/(32) 59/32     Anthropometrics:  Head Circumference: 28.2 cm (Verified by 2 RNs)  Weight: 1640 g (3 lb 9.9 oz) 23 %ile (Z= -0.74) based on [place] (Girls, 22-50 Weeks) weight-for-age data using vitals from 1/3/2023.  Height: 43 cm (16.93") 58 %ile (Z= 0.21) based on [place] (Girls, 22-50 Weeks) Length-for-age data based on Length recorded on 1/1/2023.    Intake/Output - Last 3 Shifts         01/02 0700 01/03 0659 01/03 0700 01/04 0659 01/04 0700 01/05 0659    NG/ 262 33    Total Intake(mL/kg) 252 (153.7) 262 (159.8) 33 (20.1)    Urine (mL/kg/hr) 149 (3.8) 145 (3.7) 46 (7.9)    Stool 0 0 0    Total Output 149 145 46    Net +103 +117 -13           Stool Occurrence 5 x 3 x 1 x            Physical Exam  Vitals and nursing note reviewed.   Constitutional:       General: She is sleeping.      Appearance: Normal appearance. She is well-developed.   HENT:      Head: Normocephalic. Anterior fontanelle is flat.      Nose: Nose normal.      Comments: HF NC in place     Mouth/Throat:      Comments: Orogastric feeding tube in place  Cardiovascular:      Rate and Rhythm: Normal rate and regular rhythm.      Pulses: Normal pulses.      Heart sounds: Normal heart sounds. No murmur heard.  Pulmonary:      Effort: Pulmonary effort is normal. No respiratory distress.      Breath sounds: Normal breath sounds. " Spoke with patient and relayed Dr. Gloria Hernandez message. Patient states that she is still having loose/watery bowel movements (not worse, but the same). BM's occur 4 times daily and over night.   Starting yesterday she developed weakness and lightheadedness (w   Abdominal:      General: Bowel sounds are normal.      Palpations: There is no mass.      Comments: Soft/round abdomen   Genitourinary:     General: Normal vulva.   Musculoskeletal:         General: Normal range of motion.      Cervical back: Normal range of motion.   Skin:     General: Skin is warm.      Capillary Refill: Capillary refill takes less than 2 seconds.      Turgor: Normal.      Coloration: Skin is not pale.   Neurological:      General: No focal deficit present.      Motor: No abnormal muscle tone.       Ventilator Data (Last 24H):     Oxygen Concentration (%):  [21] 21    No results for input(s): PH, PCO2, PO2, HCO3, POCSATURATED, BE in the last 72 hours.     Lines/Drains:  Lines/Drains/Airways       Drain  Duration                  NG/OG Tube 12/20/22 1810 orogastric 5 Fr. Center mouth 14 days                      Laboratory:       Diagnostic Results:

## 2023-02-07 ENCOUNTER — APPOINTMENT (OUTPATIENT)
Dept: CT IMAGING | Facility: HOSPITAL | Age: 73
End: 2023-02-07
Attending: EMERGENCY MEDICINE
Payer: MEDICARE

## 2023-02-07 ENCOUNTER — HOSPITAL ENCOUNTER (INPATIENT)
Facility: HOSPITAL | Age: 73
LOS: 2 days | Discharge: HOME | End: 2023-02-09
Attending: EMERGENCY MEDICINE | Admitting: HOSPITALIST
Payer: MEDICARE

## 2023-02-07 DIAGNOSIS — R29.898 LEFT ARM WEAKNESS: Primary | ICD-10-CM

## 2023-02-07 DIAGNOSIS — M79.2 NEUROPATHIC PAIN: ICD-10-CM

## 2023-02-07 PROBLEM — Z86.69 HISTORY OF MIGRAINE WITH AURA: Status: ACTIVE | Noted: 2018-01-09

## 2023-02-07 LAB
ANION GAP SERPL CALC-SCNC: 8 MMOL/L (ref 0–18)
APTT PPP: 33.5 SECONDS (ref 23.3–35.6)
ATRIAL RATE: 61 BPM
BASOPHILS # BLD AUTO: 0.02 X10(3) UL (ref 0–0.2)
BASOPHILS NFR BLD AUTO: 0.4 %
BILIRUB UR QL: NEGATIVE
BUN BLD-MCNC: 19 MG/DL (ref 7–18)
BUN/CREAT SERPL: 18.6 (ref 10–20)
CALCIUM BLD-MCNC: 9.6 MG/DL (ref 8.5–10.1)
CHLORIDE SERPL-SCNC: 108 MMOL/L (ref 98–112)
CLARITY UR: CLEAR
CO2 SERPL-SCNC: 27 MMOL/L (ref 21–32)
COLOR UR: YELLOW
CREAT BLD-MCNC: 1.02 MG/DL
DEPRECATED RDW RBC AUTO: 42.7 FL (ref 35.1–46.3)
EOSINOPHIL # BLD AUTO: 0.1 X10(3) UL (ref 0–0.7)
EOSINOPHIL NFR BLD AUTO: 2.1 %
ERYTHROCYTE [DISTWIDTH] IN BLOOD BY AUTOMATED COUNT: 13.2 % (ref 11–15)
GFR SERPLBLD BASED ON 1.73 SQ M-ARVRAT: 58 ML/MIN/1.73M2 (ref 60–?)
GLUCOSE BLD-MCNC: 99 MG/DL (ref 70–99)
GLUCOSE UR-MCNC: NEGATIVE MG/DL
HCT VFR BLD AUTO: 39.4 %
HGB BLD-MCNC: 13.5 G/DL
HGB UR QL STRIP.AUTO: NEGATIVE
IMM GRANULOCYTES # BLD AUTO: 0.01 X10(3) UL (ref 0–1)
IMM GRANULOCYTES NFR BLD: 0.2 %
INR BLD: 1.06 (ref 0.85–1.16)
KETONES UR-MCNC: NEGATIVE MG/DL
LYMPHOCYTES # BLD AUTO: 1.04 X10(3) UL (ref 1–4)
LYMPHOCYTES NFR BLD AUTO: 22.1 %
MCH RBC QN AUTO: 30.3 PG (ref 26–34)
MCHC RBC AUTO-ENTMCNC: 34.3 G/DL (ref 31–37)
MCV RBC AUTO: 88.3 FL
MONOCYTES # BLD AUTO: 0.3 X10(3) UL (ref 0.1–1)
MONOCYTES NFR BLD AUTO: 6.4 %
NEUTROPHILS # BLD AUTO: 3.23 X10 (3) UL (ref 1.5–7.7)
NEUTROPHILS # BLD AUTO: 3.23 X10(3) UL (ref 1.5–7.7)
NEUTROPHILS NFR BLD AUTO: 68.8 %
NITRITE UR QL STRIP.AUTO: NEGATIVE
OSMOLALITY SERPL CALC.SUM OF ELEC: 298 MOSM/KG (ref 275–295)
P AXIS: 86 DEGREES
P-R INTERVAL: 236 MS
PH UR: 7 [PH] (ref 5–8)
PLATELET # BLD AUTO: 181 10(3)UL (ref 150–450)
POTASSIUM SERPL-SCNC: 3.8 MMOL/L (ref 3.5–5.1)
PROT UR-MCNC: NEGATIVE MG/DL
PROTHROMBIN TIME: 13.7 SECONDS (ref 11.6–14.8)
Q-T INTERVAL: 432 MS
QRS DURATION: 96 MS
QTC CALCULATION (BEZET): 434 MS
R AXIS: 65 DEGREES
RBC # BLD AUTO: 4.46 X10(6)UL
SARS-COV-2 RNA RESP QL NAA+PROBE: NOT DETECTED
SODIUM SERPL-SCNC: 143 MMOL/L (ref 136–145)
SP GR UR STRIP: 1.01 (ref 1–1.03)
T AXIS: 64 DEGREES
TROPONIN I HIGH SENSITIVITY: 6 NG/L
UROBILINOGEN UR STRIP-ACNC: <2
VENTRICULAR RATE: 61 BPM
VIT C UR-MCNC: 20 MG/DL
WBC # BLD AUTO: 4.7 X10(3) UL (ref 4–11)

## 2023-02-07 PROCEDURE — 99223 1ST HOSP IP/OBS HIGH 75: CPT | Performed by: HOSPITALIST

## 2023-02-07 PROCEDURE — 70496 CT ANGIOGRAPHY HEAD: CPT | Performed by: EMERGENCY MEDICINE

## 2023-02-07 PROCEDURE — 70498 CT ANGIOGRAPHY NECK: CPT | Performed by: EMERGENCY MEDICINE

## 2023-02-07 PROCEDURE — 99223 1ST HOSP IP/OBS HIGH 75: CPT | Performed by: OTHER

## 2023-02-07 PROCEDURE — G0316 PROLNG IP/OBS E/M EA ADDL 15 MIN: HCPCS | Performed by: OTHER

## 2023-02-07 RX ORDER — ACETAMINOPHEN 650 MG/1
650 SUPPOSITORY RECTAL EVERY 4 HOURS PRN
Status: DISCONTINUED | OUTPATIENT
Start: 2023-02-07 | End: 2023-02-09

## 2023-02-07 RX ORDER — ALLOPURINOL 100 MG/1
100 TABLET ORAL 2 TIMES DAILY
Status: DISCONTINUED | OUTPATIENT
Start: 2023-02-07 | End: 2023-02-09

## 2023-02-07 RX ORDER — DONEPEZIL HYDROCHLORIDE 10 MG/1
10 TABLET, FILM COATED ORAL NIGHTLY
Status: DISCONTINUED | OUTPATIENT
Start: 2023-02-07 | End: 2023-02-09

## 2023-02-07 RX ORDER — CLOPIDOGREL BISULFATE 75 MG/1
75 TABLET ORAL DAILY
Status: DISCONTINUED | OUTPATIENT
Start: 2023-02-08 | End: 2023-02-09

## 2023-02-07 RX ORDER — PANTOPRAZOLE SODIUM 40 MG/1
40 TABLET, DELAYED RELEASE ORAL
Status: DISCONTINUED | OUTPATIENT
Start: 2023-02-08 | End: 2023-02-09

## 2023-02-07 RX ORDER — HYDRALAZINE HYDROCHLORIDE 20 MG/ML
10 INJECTION INTRAMUSCULAR; INTRAVENOUS EVERY 2 HOUR PRN
Status: DISCONTINUED | OUTPATIENT
Start: 2023-02-07 | End: 2023-02-09

## 2023-02-07 RX ORDER — LABETALOL HYDROCHLORIDE 5 MG/ML
10 INJECTION, SOLUTION INTRAVENOUS EVERY 10 MIN PRN
Status: DISCONTINUED | OUTPATIENT
Start: 2023-02-07 | End: 2023-02-09

## 2023-02-07 RX ORDER — AMLODIPINE BESYLATE 5 MG/1
5 TABLET ORAL DAILY
Status: DISCONTINUED | OUTPATIENT
Start: 2023-02-07 | End: 2023-02-09

## 2023-02-07 RX ORDER — LAMOTRIGINE 150 MG/1
150 TABLET ORAL 2 TIMES DAILY
Status: DISCONTINUED | OUTPATIENT
Start: 2023-02-07 | End: 2023-02-09

## 2023-02-07 RX ORDER — ACETAMINOPHEN 325 MG/1
650 TABLET ORAL EVERY 4 HOURS PRN
Status: DISCONTINUED | OUTPATIENT
Start: 2023-02-07 | End: 2023-02-09

## 2023-02-07 RX ORDER — ONDANSETRON 2 MG/ML
4 INJECTION INTRAMUSCULAR; INTRAVENOUS EVERY 6 HOURS PRN
Status: DISCONTINUED | OUTPATIENT
Start: 2023-02-07 | End: 2023-02-07

## 2023-02-07 RX ORDER — ROPINIROLE 1 MG/1
1 TABLET, FILM COATED ORAL DAILY
Status: DISCONTINUED | OUTPATIENT
Start: 2023-02-08 | End: 2023-02-09

## 2023-02-07 RX ORDER — TEMAZEPAM 15 MG/1
15 CAPSULE ORAL NIGHTLY PRN
Status: DISCONTINUED | OUTPATIENT
Start: 2023-02-07 | End: 2023-02-09

## 2023-02-07 RX ORDER — METOCLOPRAMIDE HYDROCHLORIDE 5 MG/ML
10 INJECTION INTRAMUSCULAR; INTRAVENOUS EVERY 8 HOURS PRN
Status: DISCONTINUED | OUTPATIENT
Start: 2023-02-07 | End: 2023-02-08

## 2023-02-07 RX ORDER — ONDANSETRON 2 MG/ML
4 INJECTION INTRAMUSCULAR; INTRAVENOUS EVERY 6 HOURS PRN
Status: DISCONTINUED | OUTPATIENT
Start: 2023-02-07 | End: 2023-02-09

## 2023-02-07 RX ORDER — HEPARIN SODIUM 5000 [USP'U]/ML
5000 INJECTION, SOLUTION INTRAVENOUS; SUBCUTANEOUS EVERY 12 HOURS SCHEDULED
Status: DISCONTINUED | OUTPATIENT
Start: 2023-02-07 | End: 2023-02-09

## 2023-02-07 RX ORDER — ROPINIROLE 1 MG/1
2 TABLET, FILM COATED ORAL NIGHTLY
Status: DISCONTINUED | OUTPATIENT
Start: 2023-02-07 | End: 2023-02-09

## 2023-02-07 RX ORDER — CLOPIDOGREL BISULFATE 75 MG/1
75 TABLET ORAL ONCE
Status: COMPLETED | OUTPATIENT
Start: 2023-02-07 | End: 2023-02-07

## 2023-02-07 RX ORDER — ATORVASTATIN CALCIUM 20 MG/1
20 TABLET, FILM COATED ORAL NIGHTLY
Status: DISCONTINUED | OUTPATIENT
Start: 2023-02-07 | End: 2023-02-09

## 2023-02-07 RX ORDER — ASPIRIN 81 MG/1
81 TABLET, CHEWABLE ORAL ONCE
Status: COMPLETED | OUTPATIENT
Start: 2023-02-07 | End: 2023-02-07

## 2023-02-07 RX ORDER — METOCLOPRAMIDE HYDROCHLORIDE 5 MG/ML
10 INJECTION INTRAMUSCULAR; INTRAVENOUS EVERY 8 HOURS PRN
Status: DISCONTINUED | OUTPATIENT
Start: 2023-02-07 | End: 2023-02-07

## 2023-02-07 RX ORDER — ASPIRIN 81 MG/1
81 TABLET, CHEWABLE ORAL DAILY
Status: DISCONTINUED | OUTPATIENT
Start: 2023-02-07 | End: 2023-02-09

## 2023-02-07 RX ORDER — ACETAMINOPHEN 500 MG
500 TABLET ORAL EVERY 4 HOURS PRN
Status: DISCONTINUED | OUTPATIENT
Start: 2023-02-07 | End: 2023-02-07

## 2023-02-07 RX ORDER — POTASSIUM CHLORIDE 20 MEQ/1
40 TABLET, EXTENDED RELEASE ORAL ONCE
Status: COMPLETED | OUTPATIENT
Start: 2023-02-07 | End: 2023-02-07

## 2023-02-07 RX ORDER — ROPINIROLE 1 MG/1
1 TABLET, FILM COATED ORAL DAILY
COMMUNITY

## 2023-02-07 NOTE — H&P
[facility]    PATIENT'S NAME:  Has A   ATTENDING PHYSICIAN: Pamela Mosquera. Mare Farrar MD   PATIENT ACCOUNT#:   [id number]    LOCATION:  [address]  MEDICAL RECORD #:   [id number]       YOB: 1950  ADMISSION DATE:       02/07/2023    HISTORY AND PHYSICAL EXAMINATION    CHIEF COMPLAINT:  Left upper extremity weakness, tingling, and numbness. HISTORY OF PRESENT ILLNESS:  The patient is a 69-year-old  female who came into the emergency department for evaluation after she started developing tingling and paresthesia in her left upper extremity with weakness in her left hand . Upon arrival to the emergency room, the patient was noted to have systolic blood pressure of 184. She did not take her aspirin and Plavix earlier today, and she was given both in the emergency room. CBC and chemistry were unremarkable. CT angiogram of the brain showed no proximal intracranial flow abnormality and no hemodynamically significant stenosis or dissection involving the carotid arteries or vertebral arteries. The patient will be admitted to the hospital for further management. PAST MEDICAL HISTORY:  The patient has history of paroxysmal atrial fibrillation, status post Watchman device deployment, off anticoagulation. She had sick sinus syndrome, status post dual chamber pacemaker, generalized osteoarthritis, obstructive sleep apnea, diverticulosis, gout, hypertension, hyperlipidemia, seizure disorder, chronic kidney disease stage 3, transient ischemic attack, currently on aspirin and Plavix. She had a recent cardiac angiogram which showed no obstructive coronary artery disease. PAST SURGICAL HISTORY:  Hysterectomy, total knee arthroplasty, cholecystectomy, cataract procedure, carpal tunnel release, nasal septoplasty, left distal radius open reduction and internal fixation.     MEDICATIONS:  Please see medication reconciliation list.      ALLERGIES:  No known drug allergies. FAMILY HISTORY:  Both parents had coronary artery disease. SOCIAL HISTORY:  No tobacco, alcohol, or drug use. Lives with her family. Independent for basic activities of daily living. REVIEW OF SYSTEMS:  The patient said symptoms started earlier today with tingling and numbness in her left upper extremity with left hand  weakness. She said the tingling and numbness goes all the way to her shoulder starting at the tip of her fingers. She denies any visual problems. No chest pain. No abdominal pain. Other 12-point review of systems negative. PHYSICAL EXAMINATION:    GENERAL:  Alert and oriented to time, place, and person, no acute distress. VITAL SIGNS:  Temperature 98.0, pulse 60, respiratory rate 11, blood pressure 154/83, pulse ox 96% on room air. HEENT:  Atraumatic. Oropharynx clear. Dry mucous membranes. Normal hard and soft palate. Eyes:  Anicteric sclerae. NECK:  Supple. No lymphadenopathy. Trachea midline. Full range of motion. LUNGS:  Clear to auscultation bilaterally. Normal respiratory effort. HEART:  Regular rate and rhythm. S1 and S2 auscultated. No murmur. ABDOMEN:  Soft, nondistended. No tenderness. Positive bowel sounds. EXTREMITIES:  No edema, clubbing, or cyanosis. NEUROLOGIC:  Left hand  weakness and altered sensation of left upper extremity. Otherwise, no focal findings. ASSESSMENT AND PLAN:    1. Left upper extremity weakness, possible recurrent cerebrovascular accident. The patient's symptoms started suddenly earlier today, which speaks against cervical radiculopathy. No significant pain involved. 2.   History of atrial fibrillation, Watchman device. 3.   Hypertension. 4.   Hyperlipidemia. The patient will be admitted to telemetry floor. Continue to monitor her rhythm. Continue aspirin and Plavix. Physical Therapy. Neurology consult. Monitor her neurological status. Further recommendations to follow.     Dictated By Gosia Hutton MD  d: 02/07/2023 15:31:05  t: 02/07/2023 15:32:49  [facility] [id number]  /

## 2023-02-07 NOTE — ED QUICK NOTES
Orders for admission, patient is aware of plan and ready to go upstairs. Any questions, please call ED RN Heike Prado at [phone].      Patient Covid vaccination status: Fully vaccinated     COVID Test Ordered in ED: Rapid SARS-CoV-2 by PCR    COVID Suspicion at Admission: N/A    Running Infusions:  None    Mental Status/LOC at time of transport: x4    Other pertinent information:   CIWA score: N/A   NIH score:  N/A

## 2023-02-07 NOTE — ED INITIAL ASSESSMENT (HPI)
Patient arrives via [facility] from home with complaints of left hand tingling that travels up her arm, started at 8:30/9am when she woke up. Patient also endorses hypertension at home that worried her, states highest was 213/97. Per EMS, 4mg of IV Zofran given for nausea. Accucheck 118. BP en route 190/96. Patient denies any chest pain, denies any headache.

## 2023-02-08 ENCOUNTER — APPOINTMENT (OUTPATIENT)
Dept: CT IMAGING | Facility: HOSPITAL | Age: 73
End: 2023-02-08
Attending: HOSPITALIST
Payer: MEDICARE

## 2023-02-08 LAB
ANION GAP SERPL CALC-SCNC: 6 MMOL/L (ref 0–18)
BASOPHILS # BLD AUTO: 0.02 X10(3) UL (ref 0–0.2)
BASOPHILS NFR BLD AUTO: 0.3 %
BUN BLD-MCNC: 16 MG/DL (ref 7–18)
BUN/CREAT SERPL: 15.2 (ref 10–20)
CALCIUM BLD-MCNC: 9.4 MG/DL (ref 8.5–10.1)
CHLORIDE SERPL-SCNC: 110 MMOL/L (ref 98–112)
CO2 SERPL-SCNC: 25 MMOL/L (ref 21–32)
CREAT BLD-MCNC: 1.05 MG/DL
DEPRECATED RDW RBC AUTO: 43 FL (ref 35.1–46.3)
EOSINOPHIL # BLD AUTO: 0.11 X10(3) UL (ref 0–0.7)
EOSINOPHIL NFR BLD AUTO: 1.8 %
ERYTHROCYTE [DISTWIDTH] IN BLOOD BY AUTOMATED COUNT: 13.2 % (ref 11–15)
GFR SERPLBLD BASED ON 1.73 SQ M-ARVRAT: 56 ML/MIN/1.73M2 (ref 60–?)
GLUCOSE BLD-MCNC: 112 MG/DL (ref 70–99)
HCT VFR BLD AUTO: 39 %
HGB BLD-MCNC: 13.3 G/DL
IMM GRANULOCYTES # BLD AUTO: 0.02 X10(3) UL (ref 0–1)
IMM GRANULOCYTES NFR BLD: 0.3 %
LYMPHOCYTES # BLD AUTO: 1.15 X10(3) UL (ref 1–4)
LYMPHOCYTES NFR BLD AUTO: 18.6 %
MCH RBC QN AUTO: 30.2 PG (ref 26–34)
MCHC RBC AUTO-ENTMCNC: 34.1 G/DL (ref 31–37)
MCV RBC AUTO: 88.6 FL
MONOCYTES # BLD AUTO: 0.45 X10(3) UL (ref 0.1–1)
MONOCYTES NFR BLD AUTO: 7.3 %
NEUTROPHILS # BLD AUTO: 4.42 X10 (3) UL (ref 1.5–7.7)
NEUTROPHILS # BLD AUTO: 4.42 X10(3) UL (ref 1.5–7.7)
NEUTROPHILS NFR BLD AUTO: 71.7 %
OSMOLALITY SERPL CALC.SUM OF ELEC: 294 MOSM/KG (ref 275–295)
PLATELET # BLD AUTO: 176 10(3)UL (ref 150–450)
POTASSIUM SERPL-SCNC: 3.9 MMOL/L (ref 3.5–5.1)
POTASSIUM SERPL-SCNC: 3.9 MMOL/L (ref 3.5–5.1)
RBC # BLD AUTO: 4.4 X10(6)UL
SODIUM SERPL-SCNC: 141 MMOL/L (ref 136–145)
VIT B12 SERPL-MCNC: 525 PG/ML (ref 193–986)
WBC # BLD AUTO: 6.2 X10(3) UL (ref 4–11)

## 2023-02-08 PROCEDURE — 99232 SBSQ HOSP IP/OBS MODERATE 35: CPT | Performed by: HOSPITALIST

## 2023-02-08 PROCEDURE — 72125 CT NECK SPINE W/O DYE: CPT | Performed by: HOSPITALIST

## 2023-02-08 PROCEDURE — 70450 CT HEAD/BRAIN W/O DYE: CPT | Performed by: HOSPITALIST

## 2023-02-08 RX ORDER — PROCHLORPERAZINE MALEATE 5 MG/1
5 TABLET ORAL EVERY 6 HOURS PRN
Status: DISCONTINUED | OUTPATIENT
Start: 2023-02-08 | End: 2023-02-09

## 2023-02-08 RX ORDER — ECHINACEA PURPUREA EXTRACT 125 MG
1 TABLET ORAL
Status: DISCONTINUED | OUTPATIENT
Start: 2023-02-08 | End: 2023-02-09

## 2023-02-08 NOTE — CM/SW NOTE
02/08/23 1000   CM/SW Referral Data   Referral Source Physician   Reason for Referral Protocol order set   Specify order set Stroke  [facility] CARE AT [facility] Evaluation for stroke w/up)   Informant Patient   Pertinent Medical Hx   Does patient have an established PCP? Yes  Gayla Pena)   Patient [address]   Number of Levels in Home 2   Number of Stair in Home 16   Patient lives with Spouse/Significant other   Patient Status Prior to Admission   Independent with ADLs and Mobility No   Pt. requires assistance with Ambulating   Services in place prior to admission DME/Supplies at home   Type of DME/Supplies Straight Cane;Standard Justin Schlatter; Wheelchair; Other  (hand crutch)   Discharge Needs   Anticipated D/C needs Home health care   Choice of Post-Acute Provider   Informed patient of right to choose their preferred provider Yes   List of appropriate post-acute services provided to patient/family with quality data   ([facility] refs in [facility] - needs f/up)     Received protocol MDO for [facility] Evaluation due to stroke w/up. SW met w/ pt in her room. Above assessment completed. Pt confirmed using a \"hand crutch\" for ambulation but having a cane, walker & wheelchair if needed. Pt only drives short distances due to fear of getting confused and lost - \"the dementia. \"    Pt confirmed hx w/ HH services and [facility] approx last summer. JUNIOR discussed possible DC recommendations. Pt is agreeable to [facility] services at [place]. She is open to rehab but would prefer DC home. JUNIOR requested [address] [place] [address] to send [facility] referrals in [facility]. F2F entered. Request cancelled due to below:    Received update from PT/Kamari - recommendation for Outpatient PT.    JUNIOR sent secure chat to Dr. Caroline Moura and RN/Opal to request MD write Outpatient PT Rx prior to pt's DC. PLAN: Home w/ Outpatient PT - pending med clear     SW/CM to remain available for support and/or discharge planning.            Celia Pike, MSW, [address] [facility]

## 2023-02-08 NOTE — PLAN OF CARE
RA. Afebrile. Neuro checks/Vital signs q2h. Plan for PT/OT/SLP to eval. Patient does not know home meds,  bringing list from home. Patient gets around independently in room. Difficulty finding her words at times.      Problem: Impaired Cognition  Goal: Patient will exhibit improved attention, thought processing and/or memory  Description: Interventions:  - Minimize distractions in the room when full attention is required  - Allow additional time for processing after asking questions or providing instructions  Outcome: Progressing     Problem: Impaired Communication  Goal: Patient will achieve maximal communication potential  Description: Interventions:  - Allow additional time for processing after asking questions or providing instructions  Outcome: Progressing

## 2023-02-09 VITALS
BODY MASS INDEX: 23.16 KG/M2 | RESPIRATION RATE: 18 BRPM | SYSTOLIC BLOOD PRESSURE: 158 MMHG | DIASTOLIC BLOOD PRESSURE: 64 MMHG | HEART RATE: 61 BPM | HEIGHT: 65 IN | TEMPERATURE: 98 F | OXYGEN SATURATION: 96 % | WEIGHT: 139 LBS

## 2023-02-09 LAB
ANION GAP SERPL CALC-SCNC: 7 MMOL/L (ref 0–18)
BUN BLD-MCNC: 14 MG/DL (ref 7–18)
BUN/CREAT SERPL: 13.6 (ref 10–20)
CALCIUM BLD-MCNC: 9.2 MG/DL (ref 8.5–10.1)
CHLORIDE SERPL-SCNC: 108 MMOL/L (ref 98–112)
CO2 SERPL-SCNC: 27 MMOL/L (ref 21–32)
CREAT BLD-MCNC: 1.03 MG/DL
DEPRECATED RDW RBC AUTO: 42.5 FL (ref 35.1–46.3)
ERYTHROCYTE [DISTWIDTH] IN BLOOD BY AUTOMATED COUNT: 13 % (ref 11–15)
GFR SERPLBLD BASED ON 1.73 SQ M-ARVRAT: 57 ML/MIN/1.73M2 (ref 60–?)
GLUCOSE BLD-MCNC: 104 MG/DL (ref 70–99)
HCT VFR BLD AUTO: 38.7 %
HGB BLD-MCNC: 13.2 G/DL
LIPASE SERPL-CCNC: 133 U/L (ref 13–75)
LIPASE SERPL-CCNC: 531 U/L (ref 73–393)
MCH RBC QN AUTO: 30.2 PG (ref 26–34)
MCHC RBC AUTO-ENTMCNC: 34.1 G/DL (ref 31–37)
MCV RBC AUTO: 88.6 FL
OSMOLALITY SERPL CALC.SUM OF ELEC: 295 MOSM/KG (ref 275–295)
PLATELET # BLD AUTO: 155 10(3)UL (ref 150–450)
POTASSIUM SERPL-SCNC: 3.5 MMOL/L (ref 3.5–5.1)
RBC # BLD AUTO: 4.37 X10(6)UL
SODIUM SERPL-SCNC: 142 MMOL/L (ref 136–145)
WBC # BLD AUTO: 3.7 X10(3) UL (ref 4–11)

## 2023-02-09 PROCEDURE — 99233 SBSQ HOSP IP/OBS HIGH 50: CPT | Performed by: OTHER

## 2023-02-09 PROCEDURE — 99239 HOSP IP/OBS DSCHRG MGMT >30: CPT | Performed by: HOSPITALIST

## 2023-02-09 RX ORDER — POTASSIUM CHLORIDE 20 MEQ/1
40 TABLET, EXTENDED RELEASE ORAL EVERY 4 HOURS
Status: COMPLETED | OUTPATIENT
Start: 2023-02-09 | End: 2023-02-09

## 2023-02-09 NOTE — DISCHARGE INSTRUCTIONS
FU with neuro Dr Julio C Ann in 2 weeks. FU with PCP in 1 week.    You will have an outpatient nerve test done by Dr Julio C Ann to fu on neuropathy

## 2023-02-09 NOTE — PLAN OF CARE
Problem: Patient Centered Care  Goal: Patient preferences are identified and integrated in the patient's plan of care  Description: Interventions:  - What would you like us to know as we care for you? I have history of migraines  - Provide timely, complete, and accurate information to patient/family  - Incorporate patient and family knowledge, values, beliefs, and cultural backgrounds into the planning and delivery of care  - Encourage patient/family to participate in care and decision-making at the level they choose  - Honor patient and family perspectives and choices  Outcome: Progressing     Problem: Patient/Family Goals  Goal: Patient/Family Long Term Goal  Description: Patient's Long Term Goal: Get stronger     Interventions:  - See additional Care Plan goals for specific interventions  Outcome: Progressing  Goal: Patient/Family Short Term Goal  Description: Patient's Short Term Goal: Go home     Interventions:   - See additional Care Plan goals for specific interventions  Outcome: Progressing     Problem: Impaired Cognition  Goal: Patient will exhibit improved attention, thought processing and/or memory  Description: Interventions:  Outcome: Progressing     Problem: Impaired Communication  Goal: Patient will achieve maximal communication potential  Description: Interventions:  Outcome: Progressing     Problem: NEUROLOGICAL - ADULT  Goal: Achieves stable or improved neurological status  Description: INTERVENTIONS  - Assess for and report changes in neurological status  - Initiate measures to prevent increased intracranial pressure  - Maintain blood pressure and fluid volume within ordered parameters to optimize cerebral perfusion and minimize risk of hemorrhage  - Monitor temperature, glucose, and sodium.  Initiate appropriate interventions as ordered  Outcome: Progressing  Goal: Achieves maximal functionality and self care  Description: INTERVENTIONS  - Monitor swallowing and airway patency with patient fatigue and changes in neurological status  - Encourage and assist patient to increase activity and self care with guidance from PT/OT  - Encourage visually impaired, hearing impaired and aphasic patients to use assistive/communication devices  Outcome: Progressing

## 2023-02-09 NOTE — PLAN OF CARE
Patient is cleared to discharge per MD and neuro. Patient sent home with  and wheeled to vehicle. IV removed. AVS given to patient and educated by this RN with  present. Patient given paper copy of outpatient PT script. Problem: Patient Centered Care  Goal: Patient preferences are identified and integrated in the patient's plan of care  Description: Interventions:  - What would you like us to know as we care for you?  I have history of migraines  - Provide timely, complete, and accurate information to patient/family  - Incorporate patient and family knowledge, values, beliefs, and cultural backgrounds into the planning and delivery of care  - Encourage patient/family to participate in care and decision-making at the level they choose  - Honor patient and family perspectives and choices  Outcome: Adequate for Discharge     Problem: Patient/Family Goals  Goal: Patient/Family Long Term Goal  Description: Patient's Long Term Goal:     Interventions:  -   - See additional Care Plan goals for specific interventions  Outcome: Adequate for Discharge  Goal: Patient/Family Short Term Goal  Description: Patient's Short Term Goal:     Interventions:   -   - See additional Care Plan goals for specific interventions  Outcome: Adequate for Discharge     Problem: Impaired Cognition  Goal: Patient will exhibit improved attention, thought processing and/or memory  Description: Interventions:    Outcome: Adequate for Discharge     Problem: Impaired Communication  Goal: Patient will achieve maximal communication potential  Description: Interventions:    Outcome: Adequate for Discharge     Problem: NEUROLOGICAL - ADULT  Goal: Achieves stable or improved neurological status  Description: INTERVENTIONS  - Assess for and report changes in neurological status  - Initiate measures to prevent increased intracranial pressure  - Maintain blood pressure and fluid volume within ordered parameters to optimize cerebral perfusion and minimize risk of hemorrhage  - Monitor temperature, glucose, and sodium.  Initiate appropriate interventions as ordered  Outcome: Adequate for Discharge  Goal: Achieves maximal functionality and self care  Description: INTERVENTIONS  - Monitor swallowing and airway patency with patient fatigue and changes in neurological status  - Encourage and assist patient to increase activity and self care with guidance from PT/OT  - Encourage visually impaired, hearing impaired and aphasic patients to use assistive/communication devices  Outcome: Adequate for Discharge

## 2023-02-09 NOTE — PLAN OF CARE
Problem: Patient Centered Care  Goal: Patient preferences are identified and integrated in the patient's plan of care  Description: Interventions:  - What would you like us to know as we care for you? I have history of migraines  - Provide timely, complete, and accurate information to patient/family  - Incorporate patient and family knowledge, values, beliefs, and cultural backgrounds into the planning and delivery of care  - Encourage patient/family to participate in care and decision-making at the level they choose  - Honor patient and family perspectives and choices  2/8/2023 1937 by Abram Arthur RN  Outcome: Progressing  2/8/2023 1936 by Abram Arthur RN  Outcome: Progressing    Problem: Impaired Communication  Goal: Patient will achieve maximal communication potential  Description: Interventions:    2/8/2023 1937 by Abram Arthur RN  Outcome: Progressing  2/8/2023 1936 by Abram Arthur RN  Outcome: Progressing     Problem: NEUROLOGICAL - ADULT  Goal: Achieves stable or improved neurological status  Description: INTERVENTIONS  - Assess for and report changes in neurological status  - Initiate measures to prevent increased intracranial pressure  - Maintain blood pressure and fluid volume within ordered parameters to optimize cerebral perfusion and minimize risk of hemorrhage  - Monitor temperature, glucose, and sodium.  Initiate appropriate interventions as ordered  Outcome: Progressing  Goal: Achieves maximal functionality and self care  Description: INTERVENTIONS  - Monitor swallowing and airway patency with patient fatigue and changes in neurological status  - Encourage and assist patient to increase activity and self care with guidance from PT/OT  - Encourage visually impaired, hearing impaired and aphasic patients to use assistive/communication devices  Outcome: Progressing     No neuro changes noted  Tylenol given for head and neck pain  Repeat Ct brain and spine tonight

## 2023-02-09 NOTE — CM/SW NOTE
02/09/23 1200   Discharge disposition   Expected discharge disposition Home or Self   Outpatient services Outpatient rehab services   Discharge transportation Private car     Per chart review, pt has DC order for today. JUNIOR reviewed Epic - no Outpatient PT Rx present in chart. JUNIOR sent secure chat to WILLIAM/[facility] and Dr. Melida Johnson requesting MD write Outpatient PT Rx. Pt is cleared from SW/JOSE stand point.        PLAN: Home w/ Outpatient PT         Vignesh Lainez MSW, [address] [facility]

## 2023-02-10 ENCOUNTER — PATIENT OUTREACH (OUTPATIENT)
Dept: CASE MANAGEMENT | Age: 73
End: 2023-02-10

## 2023-02-10 DIAGNOSIS — Z02.9 ENCOUNTERS FOR UNSPECIFIED ADMINISTRATIVE PURPOSE: ICD-10-CM

## 2023-02-10 DIAGNOSIS — R29.898 LEFT ARM WEAKNESS: ICD-10-CM

## 2023-02-10 PROCEDURE — 1111F DSCHRG MED/CURRENT MED MERGE: CPT

## 2023-02-10 RX ORDER — ESCITALOPRAM OXALATE 5 MG/1
5 TABLET ORAL
COMMUNITY
Start: 2022-12-21

## 2023-02-10 RX ORDER — OXYBUTYNIN CHLORIDE 5 MG/1
5 TABLET, EXTENDED RELEASE ORAL DAILY
COMMUNITY
Start: 2023-01-10

## 2023-02-10 RX ORDER — MIRABEGRON 25 MG/1
25 TABLET, FILM COATED, EXTENDED RELEASE ORAL DAILY
COMMUNITY
Start: 2022-10-11

## 2023-02-10 RX ORDER — LINACLOTIDE 72 UG/1
1 CAPSULE, GELATIN COATED ORAL DAILY
COMMUNITY
Start: 2022-10-01

## 2023-02-13 PROBLEM — R20.2 ARM PARESTHESIA, LEFT: Status: ACTIVE | Noted: 2023-02-13

## 2023-02-14 ENCOUNTER — OFFICE VISIT (OUTPATIENT)
Dept: INTERNAL MEDICINE CLINIC | Facility: CLINIC | Age: 73
End: 2023-02-14

## 2023-02-14 ENCOUNTER — LAB ENCOUNTER (OUTPATIENT)
Dept: LAB | Age: 73
End: 2023-02-14
Attending: INTERNAL MEDICINE
Payer: MEDICARE

## 2023-02-14 ENCOUNTER — TELEPHONE (OUTPATIENT)
Dept: INTERNAL MEDICINE CLINIC | Facility: CLINIC | Age: 73
End: 2023-02-14

## 2023-02-14 VITALS
OXYGEN SATURATION: 97 % | SYSTOLIC BLOOD PRESSURE: 158 MMHG | HEIGHT: 65 IN | HEART RATE: 63 BPM | TEMPERATURE: 98 F | WEIGHT: 141.38 LBS | BODY MASS INDEX: 23.56 KG/M2 | DIASTOLIC BLOOD PRESSURE: 80 MMHG

## 2023-02-14 DIAGNOSIS — D12.6 TUBULAR ADENOMA OF COLON: ICD-10-CM

## 2023-02-14 DIAGNOSIS — K29.60 OTHER GASTRITIS WITHOUT HEMORRHAGE, UNSPECIFIED CHRONICITY: ICD-10-CM

## 2023-02-14 DIAGNOSIS — N18.30 STAGE 3 CHRONIC KIDNEY DISEASE, UNSPECIFIED WHETHER STAGE 3A OR 3B CKD (HCC): ICD-10-CM

## 2023-02-14 DIAGNOSIS — Z87.898 HISTORY OF SEIZURES: ICD-10-CM

## 2023-02-14 DIAGNOSIS — R20.2 ARM PARESTHESIA, LEFT: Primary | ICD-10-CM

## 2023-02-14 DIAGNOSIS — I63.9 CEREBROVASCULAR ACCIDENT (CVA), UNSPECIFIED MECHANISM (HCC): ICD-10-CM

## 2023-02-14 DIAGNOSIS — Z95.0 PRESENCE OF PERMANENT CARDIAC PACEMAKER: ICD-10-CM

## 2023-02-14 DIAGNOSIS — Z95.818 PRESENCE OF WATCHMAN LEFT ATRIAL APPENDAGE CLOSURE DEVICE: ICD-10-CM

## 2023-02-14 DIAGNOSIS — R74.8 ELEVATED LIPASE: ICD-10-CM

## 2023-02-14 DIAGNOSIS — K55.20 AVM (ARTERIOVENOUS MALFORMATION) OF COLON: ICD-10-CM

## 2023-02-14 DIAGNOSIS — I10 PRIMARY HYPERTENSION: ICD-10-CM

## 2023-02-14 DIAGNOSIS — R63.4 WEIGHT LOSS: ICD-10-CM

## 2023-02-14 DIAGNOSIS — R10.84 GENERALIZED ABDOMINAL PAIN: ICD-10-CM

## 2023-02-14 LAB
ALBUMIN SERPL-MCNC: 4.1 G/DL (ref 3.4–5)
ALBUMIN/GLOB SERPL: 1.1 {RATIO} (ref 1–2)
ALP LIVER SERPL-CCNC: 66 U/L
ALT SERPL-CCNC: 18 U/L
ANION GAP SERPL CALC-SCNC: 5 MMOL/L (ref 0–18)
AST SERPL-CCNC: 17 U/L (ref 15–37)
BASOPHILS # BLD AUTO: 0.04 X10(3) UL (ref 0–0.2)
BASOPHILS NFR BLD AUTO: 0.9 %
BILIRUB SERPL-MCNC: 0.7 MG/DL (ref 0.1–2)
BUN BLD-MCNC: 24 MG/DL (ref 7–18)
BUN/CREAT SERPL: 22 (ref 10–20)
CALCIUM BLD-MCNC: 9.6 MG/DL (ref 8.5–10.1)
CHLORIDE SERPL-SCNC: 108 MMOL/L (ref 98–112)
CO2 SERPL-SCNC: 29 MMOL/L (ref 21–32)
CREAT BLD-MCNC: 1.09 MG/DL
DEPRECATED RDW RBC AUTO: 42.5 FL (ref 35.1–46.3)
EOSINOPHIL # BLD AUTO: 0.18 X10(3) UL (ref 0–0.7)
EOSINOPHIL NFR BLD AUTO: 4 %
ERYTHROCYTE [DISTWIDTH] IN BLOOD BY AUTOMATED COUNT: 12.9 % (ref 11–15)
FASTING STATUS PATIENT QL REPORTED: YES
GFR SERPLBLD BASED ON 1.73 SQ M-ARVRAT: 54 ML/MIN/1.73M2 (ref 60–?)
GLOBULIN PLAS-MCNC: 3.7 G/DL (ref 2.8–4.4)
GLUCOSE BLD-MCNC: 96 MG/DL (ref 70–99)
HCT VFR BLD AUTO: 40.6 %
HGB BLD-MCNC: 13.3 G/DL
IMM GRANULOCYTES # BLD AUTO: 0.01 X10(3) UL (ref 0–1)
IMM GRANULOCYTES NFR BLD: 0.2 %
LIPASE SERPL-CCNC: 102 U/L (ref 13–75)
LIPASE SERPL-CCNC: 412 U/L (ref 73–393)
LYMPHOCYTES # BLD AUTO: 1.09 X10(3) UL (ref 1–4)
LYMPHOCYTES NFR BLD AUTO: 24.4 %
MCH RBC QN AUTO: 29.6 PG (ref 26–34)
MCHC RBC AUTO-ENTMCNC: 32.8 G/DL (ref 31–37)
MCV RBC AUTO: 90.4 FL
MONOCYTES # BLD AUTO: 0.33 X10(3) UL (ref 0.1–1)
MONOCYTES NFR BLD AUTO: 7.4 %
NEUTROPHILS # BLD AUTO: 2.81 X10 (3) UL (ref 1.5–7.7)
NEUTROPHILS # BLD AUTO: 2.81 X10(3) UL (ref 1.5–7.7)
NEUTROPHILS NFR BLD AUTO: 63.1 %
OSMOLALITY SERPL CALC.SUM OF ELEC: 298 MOSM/KG (ref 275–295)
PLATELET # BLD AUTO: 187 10(3)UL (ref 150–450)
POTASSIUM SERPL-SCNC: 4.4 MMOL/L (ref 3.5–5.1)
PROT SERPL-MCNC: 7.8 G/DL (ref 6.4–8.2)
RBC # BLD AUTO: 4.49 X10(6)UL
SODIUM SERPL-SCNC: 142 MMOL/L (ref 136–145)
WBC # BLD AUTO: 4.5 X10(3) UL (ref 4–11)

## 2023-02-14 PROCEDURE — 1126F AMNT PAIN NOTED NONE PRSNT: CPT | Performed by: INTERNAL MEDICINE

## 2023-02-14 PROCEDURE — 80053 COMPREHEN METABOLIC PANEL: CPT

## 2023-02-14 PROCEDURE — 36415 COLL VENOUS BLD VENIPUNCTURE: CPT

## 2023-02-14 PROCEDURE — 1111F DSCHRG MED/CURRENT MED MERGE: CPT | Performed by: INTERNAL MEDICINE

## 2023-02-14 PROCEDURE — 83690 ASSAY OF LIPASE: CPT

## 2023-02-14 PROCEDURE — 85025 COMPLETE CBC W/AUTO DIFF WBC: CPT

## 2023-02-14 PROCEDURE — 99215 OFFICE O/P EST HI 40 MIN: CPT | Performed by: INTERNAL MEDICINE

## 2023-02-14 NOTE — TELEPHONE ENCOUNTER
Please let patient know that I was satisfied with her test results from today. Kidney function stable. The one blood test called lipase number is improved and almost back to normal.    We talked about getting a CT scan of her abdomen and pelvis. Please remind her not to have any intravenous dye given to her. I put this in the x-ray order.

## 2023-02-16 ENCOUNTER — OFFICE VISIT (OUTPATIENT)
Dept: NEUROLOGY | Facility: CLINIC | Age: 73
End: 2023-02-16
Payer: MEDICARE

## 2023-02-16 VITALS
SYSTOLIC BLOOD PRESSURE: 158 MMHG | HEIGHT: 65 IN | DIASTOLIC BLOOD PRESSURE: 80 MMHG | BODY MASS INDEX: 23.49 KG/M2 | WEIGHT: 141 LBS | HEART RATE: 63 BPM

## 2023-02-16 DIAGNOSIS — G25.81 RESTLESS LEG: ICD-10-CM

## 2023-02-16 DIAGNOSIS — G40.909 NONINTRACTABLE EPILEPSY WITHOUT STATUS EPILEPTICUS, UNSPECIFIED EPILEPSY TYPE (HCC): Primary | ICD-10-CM

## 2023-02-16 PROCEDURE — 1111F DSCHRG MED/CURRENT MED MERGE: CPT | Performed by: OTHER

## 2023-02-16 PROCEDURE — 99215 OFFICE O/P EST HI 40 MIN: CPT | Performed by: OTHER

## 2023-02-22 ENCOUNTER — HOSPITAL ENCOUNTER (OUTPATIENT)
Dept: CT IMAGING | Facility: HOSPITAL | Age: 73
Discharge: HOME | End: 2023-02-22
Attending: INTERNAL MEDICINE
Payer: MEDICARE

## 2023-02-22 DIAGNOSIS — R10.84 GENERALIZED ABDOMINAL PAIN: ICD-10-CM

## 2023-02-22 DIAGNOSIS — R74.8 ELEVATED LIPASE: ICD-10-CM

## 2023-02-22 PROCEDURE — 74176 CT ABD & PELVIS W/O CONTRAST: CPT | Performed by: INTERNAL MEDICINE

## 2023-02-23 ENCOUNTER — TELEPHONE (OUTPATIENT)
Dept: INTERNAL MEDICINE CLINIC | Facility: CLINIC | Age: 73
End: 2023-02-23

## 2023-02-23 NOTE — TELEPHONE ENCOUNTER
Hematology/Oncology Outpatient Follow Up    Christiane Heck  1951    Primary Care Physician: Gloria Barron APRN  Referring Physician: No ref. provider found  Chief Complaint:   Pancreatic neuroendocrine tumor with metastatic disease to the lung  History of Present Illness:   · Ms. Heck was diagnosed with pulmonary nodules dominant on the left side in 2015.  · December 2017 patient underwent PET CT scan which showed dominant lingular mass that was seen on the prior chest CT on January 8, 2018 which was hypermetabolic consistent with malignancy.  · January 2018 left-sided lung nodule was biopsied and was positive for synaptophysin and focally positive for chromogranin.  · 2/15/2018 PET/CT [facility] revealed focal gallium-68 dotatate uptake within the mid body of the pancreas and bilateral pulmonary nodules including a 3.5 cm left lower lobe more consistent with metastatic disease.  · 2/20/2018 patient was initiated on lanreotide monthly at the [facility] under the care of Dr. Ivis Manrique  · Patient has been noncompliant with her appointments secondary to travel issues and social issues and patient was sent to the [facility] as a transfer from [facility] and seen initially on 3/19/2020.  · 1/31/2020 patient underwent PET CT scan for follow-up of CT scan of the chest that was done at [facility].  PET/CT was done at the [facility] which revealed an nodule in the thyroid gland in the left lobe with an SUV of 2.3 of 2 x 1.5 x 2.5 cm.  In the chest and the right upper lobe pulmonary nodule 1.2 x 1 x 1.5 cm with activity of 1.97.  There are multiple additional tiny and small pulmonary metastatic lesions scattered elsewhere throughout the right lung.  There are areas of non-cavitary pulmonary nodularity identified in the lingula and the left lower lobe are also mild and moderately hypermetabolic with a maximum SUV of 6 which corresponds to the 4.25 x 3.5 x 4 cm  Message noted. We can tell patient that I have to defer to [facility] in regards to her bowels. She is on Linzess. I do not know any other therapeutics to give her. However she can call , her gastroenterologist regarding any thoughts he may have her her abdominal discomforts. pulmonary mass.  There are other tiny non-cavitary lesions in the left upper lobe.  Enlarged mildly metabolic adenopathy left side of the mediastinum 1.75 x 1.75 x 1.5 cm with SUV of 4.42.  Metastatic lymph node at the left.  Pulmonary ligament within a severe 5.83 and size of 5.75 x 4.5 x 7 cm.  Stable metastatic adenopathy in the subcarinal distribution 3.25 x 2.25 x 2.5 cm with an SUV of 3.29.  Abdomen and pelvis scan was benign.  · 4/8/20/2020-patient reinitiated on lanreotide 120 mg to be given every 4 weeks    Past Medical History:   Diagnosis Date   • CAD (coronary artery disease)    • GERD (gastroesophageal reflux disease)    • HLD (hyperlipidemia)    • Hypertension    • Muscle ache     Muscle ache/cramps   • Neuropathy    • Spinal stenosis     Chronic back pain       Past Surgical History:   Procedure Laterality Date   • CORONARY STENT PLACEMENT           Current Outpatient Medications:   •  amitriptyline (ELAVIL) 25 MG tablet, Take 25 mg by mouth every night at bedtime., Disp: , Rfl:   •  amoxicillin-clavulanate (AUGMENTIN) 875-125 MG per tablet, Take 1 tablet by mouth 2 (Two) Times a Day., Disp: 20 tablet, Rfl: 0  •  azithromycin (ZITHROMAX) 250 MG tablet, Take 2 tablets the first day, then 1 tablet daily for 4 days., Disp: 1 tablet, Rfl: 0  •  baclofen (LIORESAL) 20 MG tablet, Take 40 mg by mouth Daily., Disp: , Rfl:   •  clopidogrel (PLAVIX) 75 MG tablet, Take 75 mg by mouth Daily., Disp: , Rfl:   •  glipizide (GLUCOTROL) 5 MG tablet, Take 5 mg by mouth Daily., Disp: , Rfl:   •  JANUMET XR  MG tablet, Take 1 tablet by mouth Daily., Disp: , Rfl:   •  lisinopril-hydrochlorothiazide (PRINZIDE,ZESTORETIC) 10-12.5 MG per tablet, , Disp: , Rfl:   •  meloxicam (MOBIC) 7.5 MG tablet, Take 7.5 mg by mouth Daily., Disp: , Rfl:   •  oxyCODONE-acetaminophen (PERCOCET)  MG per tablet, TAKE ONE TABLET BY MOUTH EVERY 4 TO 6 HOURS AS NEEDED, Disp: , Rfl:   •  rosuvastatin (CRESTOR) 10 MG tablet, , Disp: ,  Rfl:   •  sucralfate (CARAFATE) 1 g tablet, Take 1 g by mouth 4 (Four) Times a Day., Disp: , Rfl:   •  temazepam (RESTORIL) 7.5 MG capsule, Take 4 capsules by mouth At Night As Needed for Sleep., Disp: 30 capsule, Rfl: 3  No current facility-administered medications for this visit.     No Known Allergies    Family History   Problem Relation Age of Onset   • Lung cancer Mother    • Lung cancer Sister        Cancer-related family history includes Lung cancer in her mother and sister.    Social History     Tobacco Use   • Smoking status: Never Smoker   • Smokeless tobacco: Never Used   Substance Use Topics   • Alcohol use: Never     Frequency: Never   • Drug use: Never       I have reviewed the history of present illness, past medical history, family history, social history, lab results, all notes and other records since the patient was last seen at the cancer care center.      SUBJECTIVE:      Patient is my office for follow-up of her neuroendocrine tumor.  Reports pain in her left side of the chest is still there but is controlled..  She is very emotional as she always. reports to being stressed out at home.  Has neuropathy symptoms she is taking the oxycodone which is helping with her pain diarrhea is controlled anxiety persists.  Did not lose weight.  Stressed out about the home situation.  Reports to pain in both breasts now.  Reports to pain in the left jaw.  No fevers.    ROS:      Review of Systems   Constitutional: Negative for fever.   HENT: Negative for nosebleeds and trouble swallowing.    Eyes: Negative for visual disturbance.   Respiratory: Negative for cough, shortness of breath and wheezing.    Cardiovascular: Negative for chest pain.   Gastrointestinal: Negative for abdominal pain and blood in stool.   Endocrine: Negative for cold intolerance.   Genitourinary: Negative for dysuria and hematuria.   Musculoskeletal: Negative for joint swelling.   Skin: Negative for rash.   Allergic/Immunologic: Negative  for environmental allergies.   Neurological: Negative for seizures.   Hematological: Does not bruise/bleed easily.   Psychiatric/Behavioral: The patient is not nervous/anxious.      MD performed ROS and are negative except as mentioned in Subjective.    Objective:          There were no vitals taken for this visit.      PHYSICAL EXAM:      Physical Exam   Constitutional: She is oriented to person, place, and time. No distress.   Moderately built morbidly obese   HENT:   Head: Normocephalic and atraumatic.   Eyes: Conjunctivae and EOM are normal. Right eye exhibits no discharge. Left eye exhibits no discharge. No scleral icterus.   Neck: Normal range of motion. Neck supple. No thyromegaly present.   Cardiovascular: Normal rate, regular rhythm and normal heart sounds. Exam reveals no gallop and no friction rub.   Pulmonary/Chest: Effort normal. No stridor. No respiratory distress. She has no wheezes.   Distant breath sounds bilaterally   Abdominal: Soft. Bowel sounds are normal. She exhibits no mass. There is no tenderness. There is no rebound and no guarding.   Musculoskeletal: Normal range of motion. She exhibits no tenderness.   1+ lower extremity edema   Lymphadenopathy:     She has no cervical adenopathy.   Neurological: She is alert and oriented to person, place, and time. She exhibits normal muscle tone.   Skin: Skin is warm. No rash noted. She is not diaphoretic. No erythema.   Psychiatric: She has a normal mood and affect. Her behavior is normal.   Nursing note and vitals reviewed.     Physical exam done by MD.    RECENT LABS:     WBC   Date Value Ref Range Status   06/03/2020 8.44 3.40 - 10.80 10*3/mm3 Final   04/15/2020 7.6 3.4 - 10.8 x10E3/uL Final     RBC   Date Value Ref Range Status   06/03/2020 4.58 3.77 - 5.28 10*6/mm3 Final   04/15/2020 4.91 3.77 - 5.28 x10E6/uL Final     Hemoglobin   Date Value Ref Range Status   06/03/2020 13.1 12.0 - 15.9 g/dL Final     Hematocrit   Date Value Ref Range Status    06/03/2020 40.5 34.0 - 46.6 % Final     MCV   Date Value Ref Range Status   06/03/2020 88.4 79.0 - 97.0 fL Final     MCH   Date Value Ref Range Status   06/03/2020 28.6 26.6 - 33.0 pg Final     MCHC   Date Value Ref Range Status   06/03/2020 32.3 31.5 - 35.7 g/dL Final     RDW   Date Value Ref Range Status   06/03/2020 14.6 12.3 - 15.4 % Final     RDW-SD   Date Value Ref Range Status   06/03/2020 46.4 37.0 - 54.0 fl Final     MPV   Date Value Ref Range Status   06/03/2020 10.7 6.0 - 12.0 fL Final     Platelets   Date Value Ref Range Status   06/03/2020 151 140 - 450 10*3/mm3 Final     Neutrophil %   Date Value Ref Range Status   06/03/2020 50.2 42.7 - 76.0 % Final     Lymphocyte %   Date Value Ref Range Status   06/03/2020 26.2 19.6 - 45.3 % Final     Monocyte %   Date Value Ref Range Status   06/03/2020 10.1 5.0 - 12.0 % Final     Eosinophil %   Date Value Ref Range Status   06/03/2020 12.8 (H) 0.3 - 6.2 % Final     Basophil %   Date Value Ref Range Status   06/03/2020 0.7 0.0 - 1.5 % Final     Neutrophils, Absolute   Date Value Ref Range Status   06/03/2020 4.24 1.70 - 7.00 10*3/mm3 Final     Lymphocytes, Absolute   Date Value Ref Range Status   06/03/2020 2.21 0.70 - 3.10 10*3/mm3 Final     Monocytes, Absolute   Date Value Ref Range Status   06/03/2020 0.85 0.10 - 0.90 10*3/mm3 Final     Eosinophils, Absolute   Date Value Ref Range Status   06/03/2020 1.08 (H) 0.00 - 0.40 10*3/mm3 Final     Basophils, Absolute   Date Value Ref Range Status   06/03/2020 0.06 0.00 - 0.20 10*3/mm3 Final     nRBC   Date Value Ref Range Status   03/19/2020 0.0 0.0 - 0.2 /100 WBC Final       Lab Results   Component Value Date    GLUCOSE 239 (H) 06/03/2020    BUN  06/03/2020      Comment:      Testing performed by alternate method    BUN 15 06/03/2020    CREATININE 0.84 06/03/2020    EGFRIFNONA 67 06/03/2020    BCR  06/03/2020      Comment:      Testing not performed.    K 3.5 06/03/2020    CO2 23.0 06/03/2020    CALCIUM 10.2  06/03/2020    ALBUMIN 4.30 06/03/2020    AST 19 06/03/2020    ALT 15 06/03/2020         Assessment/Plan      ASSESSMENT:     1. Metastatic pancreatic neuroendocrine tumor  2. Lung metastatic disease  3. Thyroid nodule  4. Coronary artery disease and CHF  5. Type 2 diabetes  6. Medical noncompliance  7. Chest pain  8. Insomnia  9. Left jaw pain  10. ECOG 2    PLAN:      11. She is on treatment with with lanreotide.  It is 120 mcg that will be administered every 4 weeks.  She receivedtwo injections last week.  12. Recorder for her chest pain related to malignancy  13. For the ongoing anxiety she was instructed to be on Elavil  14. Prescription of Restoril 30 mg was sent to pharmacy  15. we will check CMP and a CEA when she comes for the injection and also check  urine for 24 hours for 5-HIAA results are pending.   16. Discussed with her to continue diuretics lack of which may cause fluid overload  17. Discussed with her about blood sugar control check hemoglobin A1c.  18. Had medical noncompliance mainly related to transportation.    Transportation issues have been resolved   19. I will see her back in my office in 4 weeks with prior CBC and CMP.  20. Patient is being seen by our  from the cancer center.  21. I will obtain CT chest abdomen and pelvis.  I will see her back in my office after the scan is done in [facility].  CT scans are scheduled for this week.  22. I started her on Augmentin for her jaw pain  23. Home situation was discussed with our .    I have reviewed labs results, imaging, vitals, and medications with the patient today.   Patient verbalized understanding and is in agreement of the above plan.  Electronically signed by Brandon Arroyo MD, 06/08/20, 7:16 PM.            This report was compiled using Dragon voice recognition software. I have made every effort to proof read this document; however, typographical errors may persist.

## 2023-02-23 NOTE — TELEPHONE ENCOUNTER
I spoke with Vanda Graves and relayed Dr. Erendira Colon message. She verbalized understanding. She says this has been ongoing for five years. Explained that I would relay her concern to Dr. Rufino Jacques.

## 2023-02-23 NOTE — TELEPHONE ENCOUNTER
Please let patient know that her CAT scan looked good. There is no major abnormalities to account for the abdominal discomfort she is having. Hopefully it will resolve on its own.

## 2023-03-01 ENCOUNTER — OFFICE VISIT (OUTPATIENT)
Dept: DERMATOLOGY CLINIC | Facility: CLINIC | Age: 73
End: 2023-03-01

## 2023-03-01 DIAGNOSIS — B07.9 VERRUCA VULGARIS: Primary | ICD-10-CM

## 2023-03-01 DIAGNOSIS — L82.1 SEBORRHEIC KERATOSES: ICD-10-CM

## 2023-03-01 PROCEDURE — 99202 OFFICE O/P NEW SF 15 MIN: CPT | Performed by: STUDENT IN AN ORGANIZED HEALTH CARE EDUCATION/TRAINING PROGRAM

## 2023-03-01 PROCEDURE — 1111F DSCHRG MED/CURRENT MED MERGE: CPT | Performed by: STUDENT IN AN ORGANIZED HEALTH CARE EDUCATION/TRAINING PROGRAM

## 2023-03-01 PROCEDURE — 17110 DESTRUCTION B9 LES UP TO 14: CPT | Performed by: STUDENT IN AN ORGANIZED HEALTH CARE EDUCATION/TRAINING PROGRAM

## 2023-03-01 RX ORDER — SENNA PLUS 8.6 MG/1
2 TABLET ORAL DAILY
COMMUNITY
Start: 2022-08-15

## 2023-03-15 ENCOUNTER — TELEPHONE (OUTPATIENT)
Dept: INTERNAL MEDICINE CLINIC | Facility: CLINIC | Age: 73
End: 2023-03-15

## 2023-03-15 ENCOUNTER — HOSPITAL ENCOUNTER (OUTPATIENT)
Age: 73
Discharge: HOME | End: 2023-03-15
Payer: MEDICARE

## 2023-03-15 VITALS
DIASTOLIC BLOOD PRESSURE: 76 MMHG | OXYGEN SATURATION: 95 % | RESPIRATION RATE: 18 BRPM | HEART RATE: 76 BPM | SYSTOLIC BLOOD PRESSURE: 139 MMHG | TEMPERATURE: 98 F

## 2023-03-15 DIAGNOSIS — S60.419A ABRASION OF FINGER, INITIAL ENCOUNTER: Primary | ICD-10-CM

## 2023-03-15 PROCEDURE — 99213 OFFICE O/P EST LOW 20 MIN: CPT | Performed by: NURSE PRACTITIONER

## 2023-03-15 NOTE — ED INITIAL ASSESSMENT (HPI)
Laceration to left 2nd finger, which occurred 1 week ago. Here for wound evaluation.  Unknown last tetanus date

## 2023-03-15 NOTE — DISCHARGE INSTRUCTIONS
Please keep the area clean and dry. You may apply bacitracin. Follow-up with your primary care provider.

## 2023-03-15 NOTE — TELEPHONE ENCOUNTER
Please call patient, she left a voicemail message  She has a cut, where should she go?   Tasked to nursing

## 2023-03-15 NOTE — TELEPHONE ENCOUNTER
Called patient who cut left index finger on nuckle , was not deep but states\" my arthritis is coming through\" - Rn advised to be evaluated at [facility] - she will go to [place]  F/U 3/16

## 2023-03-15 NOTE — TELEPHONE ENCOUNTER
Patient was seen in [address] [place] [address] for Laceration /Abrasion to the left second digit, up to date with Tetanus vaccine- bacitracin and finger splint  F/U as needed

## 2023-03-16 ENCOUNTER — NURSE ONLY (OUTPATIENT)
Dept: INTERNAL MEDICINE CLINIC | Facility: CLINIC | Age: 73
End: 2023-03-16

## 2023-03-16 VITALS — DIASTOLIC BLOOD PRESSURE: 72 MMHG | SYSTOLIC BLOOD PRESSURE: 132 MMHG

## 2023-03-16 DIAGNOSIS — I10 PRIMARY HYPERTENSION: Primary | ICD-10-CM

## 2023-03-16 PROCEDURE — 99211 OFF/OP EST MAY X REQ PHY/QHP: CPT

## 2023-03-16 RX ORDER — NADOLOL 20 MG/1
20 TABLET ORAL DAILY
Qty: 90 TABLET | Refills: 3 | Status: SHIPPED | OUTPATIENT
Start: 2023-03-16

## 2023-03-16 RX ORDER — AMLODIPINE BESYLATE 5 MG/1
5 TABLET ORAL 2 TIMES DAILY
Qty: 1 TABLET | Refills: 11 | COMMUNITY
Start: 2023-03-16

## 2023-03-16 NOTE — PROGRESS NOTES
Angela López is a 67year old female, with her granddtr present, who has an elevated blood pressure reading earlier this week at home. Amlodipine was increase to 5 mg BID. She denies chest pain, dizziness, edema and headaches. She presented with all her meds today. She reports she does use a pill box with a phone reminder for daily meds. She uses the bottles for SIG (not papers). She has 2 bottles of lamotrigine I condensed. She has an empty bottle of nadolol and I think pt still takes nadolol 10 mg daily instead of 20 mg (based on date/qty). There are 2 yr old ABXs instructed pt to throw away. Pt has bottles of oxybutynin and myrbetriq sample; She will only continue oxybutynin,     Pt sets up her own meds. We discussed bottles are not the best and pt should try to follow AVS when given by MDs. We discussed having a family member to assist with medications. Blood Pressure and Cardiac Medications          amLODIPine 5 MG Oral Tab    nadolol 20 MG Oral Tab           BP Readings from Last 3 Encounters:  03/16/23 : 132/72  03/15/23 : 139/76  02/16/23 : 158/80     There is no height or weight on file to calculate BMI. ASSESSMENT:   HTN improved  Did not bring home machine    PLAN:   Compliance is questionable, likely due to pt memory issues. Plan to have pt ask dtr to help with pill box. All medication bottles labelled properly and sent in a new prescription for Nadolol with correct strength.   Continue Amlodipine 5 mg BID, Nadolol 20 daily    Follow up  one month  Shaheen Prieto, PharmD, 3/16/2023, 4:56 PM

## 2023-03-17 ENCOUNTER — MED REC SCAN ONLY (OUTPATIENT)
Dept: INTERNAL MEDICINE CLINIC | Facility: CLINIC | Age: 73
End: 2023-03-17

## 2023-03-24 ENCOUNTER — APPOINTMENT (OUTPATIENT)
Dept: PHYSICAL THERAPY | Facility: HOSPITAL | Age: 73
End: 2023-03-24
Attending: UROLOGY
Payer: MEDICARE

## 2023-03-31 ENCOUNTER — APPOINTMENT (OUTPATIENT)
Dept: PHYSICAL THERAPY | Facility: HOSPITAL | Age: 73
End: 2023-03-31
Attending: UROLOGY
Payer: MEDICARE

## 2023-04-07 ENCOUNTER — APPOINTMENT (OUTPATIENT)
Dept: PHYSICAL THERAPY | Facility: HOSPITAL | Age: 73
End: 2023-04-07
Attending: UROLOGY
Payer: MEDICARE

## 2023-04-14 ENCOUNTER — APPOINTMENT (OUTPATIENT)
Dept: PHYSICAL THERAPY | Facility: HOSPITAL | Age: 73
End: 2023-04-14
Attending: UROLOGY
Payer: MEDICARE

## 2023-04-21 ENCOUNTER — APPOINTMENT (OUTPATIENT)
Dept: PHYSICAL THERAPY | Facility: HOSPITAL | Age: 73
End: 2023-04-21
Attending: UROLOGY
Payer: MEDICARE

## 2023-04-27 ENCOUNTER — LAB ENCOUNTER (OUTPATIENT)
Dept: LAB | Age: 73
End: 2023-04-27
Attending: INTERNAL MEDICINE
Payer: MEDICARE

## 2023-04-27 DIAGNOSIS — Z41.9 ENCOUNTER FOR PROCEDURE FOR PURPOSES OTHER THAN REMEDYING HEALTH STATE, UNSPECIFIED: ICD-10-CM

## 2023-04-27 DIAGNOSIS — I48.0 AF (PAROXYSMAL ATRIAL FIBRILLATION) (HCC): Primary | ICD-10-CM

## 2023-04-27 DIAGNOSIS — G40.909 NONINTRACTABLE EPILEPSY WITHOUT STATUS EPILEPTICUS, UNSPECIFIED EPILEPSY TYPE (HCC): ICD-10-CM

## 2023-04-27 DIAGNOSIS — Z95.0 PACEMAKER: ICD-10-CM

## 2023-04-27 PROCEDURE — 36415 COLL VENOUS BLD VENIPUNCTURE: CPT

## 2023-04-27 PROCEDURE — 80175 DRUG SCREEN QUAN LAMOTRIGINE: CPT

## 2023-04-28 ENCOUNTER — APPOINTMENT (OUTPATIENT)
Dept: PHYSICAL THERAPY | Facility: HOSPITAL | Age: 73
End: 2023-04-28
Attending: UROLOGY
Payer: MEDICARE

## 2023-05-01 LAB — LAMOTRIGINE LVL: 10.5 UG/ML

## 2023-05-04 ENCOUNTER — HOSPITAL ENCOUNTER (OUTPATIENT)
Dept: GENERAL RADIOLOGY | Age: 73
Discharge: HOME | End: 2023-05-04
Attending: UROLOGY
Payer: MEDICARE

## 2023-05-04 ENCOUNTER — LAB ENCOUNTER (OUTPATIENT)
Dept: LAB | Age: 73
End: 2023-05-04
Attending: UROLOGY
Payer: MEDICARE

## 2023-05-04 ENCOUNTER — HOSPITAL ENCOUNTER (OUTPATIENT)
Dept: GENERAL RADIOLOGY | Age: 73
Discharge: HOME | End: 2023-05-04
Attending: INTERNAL MEDICINE
Payer: MEDICARE

## 2023-05-04 ENCOUNTER — HOSPITAL ENCOUNTER (OUTPATIENT)
Dept: ULTRASOUND IMAGING | Age: 73
Discharge: HOME | End: 2023-05-04
Attending: UROLOGY
Payer: MEDICARE

## 2023-05-04 DIAGNOSIS — Z95.0 PACEMAKER: ICD-10-CM

## 2023-05-04 DIAGNOSIS — Z41.9 ENCOUNTER FOR PROCEDURE FOR PURPOSES OTHER THAN REMEDYING HEALTH STATE, UNSPECIFIED: ICD-10-CM

## 2023-05-04 DIAGNOSIS — N20.0 KIDNEY STONE: ICD-10-CM

## 2023-05-04 DIAGNOSIS — I48.91 ATRIAL FIBRILLATION, UNSPECIFIED TYPE (HCC): ICD-10-CM

## 2023-05-04 DIAGNOSIS — N39.41 URGE INCONTINENCE OF URINE: ICD-10-CM

## 2023-05-04 DIAGNOSIS — I48.91 A-FIB (HCC): Primary | ICD-10-CM

## 2023-05-04 DIAGNOSIS — I48.0 PAROXYSMAL A-FIB (HCC): ICD-10-CM

## 2023-05-04 LAB
ANION GAP SERPL CALC-SCNC: 9 MMOL/L (ref 0–18)
BASOPHILS # BLD AUTO: 0.03 X10(3) UL (ref 0–0.2)
BASOPHILS NFR BLD AUTO: 0.7 %
BUN BLD-MCNC: 24 MG/DL (ref 7–18)
BUN/CREAT SERPL: 25.3 (ref 10–20)
CALCIUM BLD-MCNC: 8.9 MG/DL (ref 8.5–10.1)
CHLORIDE SERPL-SCNC: 110 MMOL/L (ref 98–112)
CO2 SERPL-SCNC: 23 MMOL/L (ref 21–32)
CREAT BLD-MCNC: 0.95 MG/DL
DEPRECATED RDW RBC AUTO: 42.5 FL (ref 35.1–46.3)
EOSINOPHIL # BLD AUTO: 0.18 X10(3) UL (ref 0–0.7)
EOSINOPHIL NFR BLD AUTO: 4 %
ERYTHROCYTE [DISTWIDTH] IN BLOOD BY AUTOMATED COUNT: 13 % (ref 11–15)
FASTING STATUS PATIENT QL REPORTED: YES
GFR SERPLBLD BASED ON 1.73 SQ M-ARVRAT: 63 ML/MIN/1.73M2 (ref 60–?)
GLUCOSE BLD-MCNC: 91 MG/DL (ref 70–99)
HCT VFR BLD AUTO: 38.2 %
HGB BLD-MCNC: 12.6 G/DL
IMM GRANULOCYTES # BLD AUTO: 0.01 X10(3) UL (ref 0–1)
IMM GRANULOCYTES NFR BLD: 0.2 %
LYMPHOCYTES # BLD AUTO: 1.05 X10(3) UL (ref 1–4)
LYMPHOCYTES NFR BLD AUTO: 23.4 %
MCH RBC QN AUTO: 29.8 PG (ref 26–34)
MCHC RBC AUTO-ENTMCNC: 33 G/DL (ref 31–37)
MCV RBC AUTO: 90.3 FL
MONOCYTES # BLD AUTO: 0.37 X10(3) UL (ref 0.1–1)
MONOCYTES NFR BLD AUTO: 8.3 %
NEUTROPHILS # BLD AUTO: 2.84 X10 (3) UL (ref 1.5–7.7)
NEUTROPHILS # BLD AUTO: 2.84 X10(3) UL (ref 1.5–7.7)
NEUTROPHILS NFR BLD AUTO: 63.4 %
OSMOLALITY SERPL CALC.SUM OF ELEC: 298 MOSM/KG (ref 275–295)
PLATELET # BLD AUTO: 161 10(3)UL (ref 150–450)
POTASSIUM SERPL-SCNC: 4.1 MMOL/L (ref 3.5–5.1)
RBC # BLD AUTO: 4.23 X10(6)UL
SODIUM SERPL-SCNC: 142 MMOL/L (ref 136–145)
WBC # BLD AUTO: 4.5 X10(3) UL (ref 4–11)

## 2023-05-04 PROCEDURE — 71046 X-RAY EXAM CHEST 2 VIEWS: CPT | Performed by: INTERNAL MEDICINE

## 2023-05-04 PROCEDURE — 36415 COLL VENOUS BLD VENIPUNCTURE: CPT

## 2023-05-04 PROCEDURE — 80048 BASIC METABOLIC PNL TOTAL CA: CPT

## 2023-05-04 PROCEDURE — 87641 MR-STAPH DNA AMP PROBE: CPT

## 2023-05-04 PROCEDURE — 74018 RADEX ABDOMEN 1 VIEW: CPT | Performed by: UROLOGY

## 2023-05-04 PROCEDURE — 85025 COMPLETE CBC W/AUTO DIFF WBC: CPT

## 2023-05-04 PROCEDURE — 76770 US EXAM ABDO BACK WALL COMP: CPT | Performed by: UROLOGY

## 2023-05-05 ENCOUNTER — APPOINTMENT (OUTPATIENT)
Dept: PHYSICAL THERAPY | Facility: HOSPITAL | Age: 73
End: 2023-05-05
Attending: UROLOGY
Payer: MEDICARE

## 2023-05-05 LAB — MRSA DNA SPEC QL NAA+PROBE: NEGATIVE

## 2023-05-09 ENCOUNTER — HOSPITAL ENCOUNTER (OUTPATIENT)
Dept: INTERVENTIONAL RADIOLOGY/VASCULAR | Facility: HOSPITAL | Age: 73
Discharge: HOME | End: 2023-05-09
Attending: INTERNAL MEDICINE | Admitting: INTERNAL MEDICINE
Payer: MEDICARE

## 2023-05-09 VITALS
OXYGEN SATURATION: 97 % | HEIGHT: 65 IN | HEART RATE: 60 BPM | WEIGHT: 140 LBS | DIASTOLIC BLOOD PRESSURE: 63 MMHG | SYSTOLIC BLOOD PRESSURE: 157 MMHG | BODY MASS INDEX: 23.32 KG/M2 | RESPIRATION RATE: 14 BRPM | TEMPERATURE: 97 F

## 2023-05-09 DIAGNOSIS — I49.5 SSS (SICK SINUS SYNDROME) (HCC): ICD-10-CM

## 2023-05-09 DIAGNOSIS — Z95.0 PRESENCE OF CARDIAC PACEMAKER: ICD-10-CM

## 2023-05-09 DIAGNOSIS — Z45.010 PACEMAKER BATTERY DEPLETION: ICD-10-CM

## 2023-05-09 DIAGNOSIS — Z01.818 PRE-OP TESTING: Primary | ICD-10-CM

## 2023-05-09 PROCEDURE — 0JH606Z INSERTION OF PACEMAKER, DUAL CHAMBER INTO CHEST SUBCUTANEOUS TISSUE AND FASCIA, OPEN APPROACH: ICD-10-PCS | Performed by: INTERNAL MEDICINE

## 2023-05-09 PROCEDURE — 36415 COLL VENOUS BLD VENIPUNCTURE: CPT

## 2023-05-09 PROCEDURE — 99152 MOD SED SAME PHYS/QHP 5/>YRS: CPT | Performed by: INTERNAL MEDICINE

## 2023-05-09 PROCEDURE — 0JPT0PZ REMOVAL OF CARDIAC RHYTHM RELATED DEVICE FROM TRUNK SUBCUTANEOUS TISSUE AND FASCIA, OPEN APPROACH: ICD-10-PCS | Performed by: INTERNAL MEDICINE

## 2023-05-09 PROCEDURE — 33228 REMV&REPLC PM GEN DUAL LEAD: CPT | Performed by: INTERNAL MEDICINE

## 2023-05-09 RX ORDER — CEFAZOLIN SODIUM/WATER 2 G/20 ML
2 SYRINGE (ML) INTRAVENOUS
Status: DISCONTINUED | OUTPATIENT
Start: 2023-05-09 | End: 2023-05-09

## 2023-05-09 RX ORDER — CEFAZOLIN SODIUM/WATER 2 G/20 ML
SYRINGE (ML) INTRAVENOUS
Status: COMPLETED
Start: 2023-05-09 | End: 2023-05-09

## 2023-05-09 RX ORDER — SODIUM CHLORIDE 9 MG/ML
INJECTION, SOLUTION INTRAVENOUS
Status: COMPLETED | OUTPATIENT
Start: 2023-05-09 | End: 2023-05-09

## 2023-05-09 RX ORDER — LIDOCAINE HYDROCHLORIDE AND EPINEPHRINE 10; 10 MG/ML; UG/ML
INJECTION, SOLUTION INFILTRATION; PERINEURAL
Status: COMPLETED
Start: 2023-05-09 | End: 2023-05-09

## 2023-05-09 RX ORDER — MIDAZOLAM HYDROCHLORIDE 1 MG/ML
INJECTION INTRAMUSCULAR; INTRAVENOUS
Status: COMPLETED
Start: 2023-05-09 | End: 2023-05-09

## 2023-05-09 RX ORDER — CHLORHEXIDINE GLUCONATE 4 G/100ML
30 SOLUTION TOPICAL
Status: DISCONTINUED | OUTPATIENT
Start: 2023-05-09 | End: 2023-05-09

## 2023-05-09 RX ADMIN — SODIUM CHLORIDE: 9 INJECTION, SOLUTION INTRAVENOUS at 07:30:00

## 2023-05-09 NOTE — DISCHARGE INSTRUCTIONS
[facility] Discharge Instructions Pacemaker / ICD Generator Change    c Call the [address] [place] [address] office ([address] [place] [address], [address]) at [phone] to make an appointment for a wound check in 7-10 days. OR       X Your appointment for a wound check is on 5/16/23 with [address] at 3:30. You may shower 5 days after surgery. Blot the incision gently with a towel. The paper strips over the incision may fall off after a few days. This is normal.    If you develop a fever of 100 degrees or greater or experience chills, increased pain, swelling redness or drainage at the incision site call Dr. Chuy Vogt at [phone]. For pacemaker/ICD site pain, you may take extra strength tylenol (500mg), 1 or 2 tabs/caps every 4-6 hours as needed. Do not exceed maximum of 6 tabs/caps in 24 hours. Take off the bandage in 5. Leave the paper strips on until your post-op appointment. Do no do any heavy lifting, no more than 10 lbs for 4 weeks post surgery or strenuous exercise for 5 days. X Do not drive until after your follow-up appointment. OR        c Do not drive until cleared by your physician. Carry your Temporary ID Card at all times. Your permanent ID card will be mailed to your home from the  2-4 weeks after surgery. Screening mammography SHOULD NOT BE DONE until 3 months after device implant. Please consult with your ordering physician if diagnostic mammography is needed prior to 3 months from device implant.

## 2023-05-09 NOTE — IVS NOTE
DISCHARGE NOTE    1, PATIENT AWAKE AND ALERT X 4    2. LEUNG, VSS, NO PONV, NO PAIN    3. INSTRUCTIONS GIVEN TO PATIENT AND FAMILY    4. IV ACCESS WAS REMOVED BEFORE DISCHARGE    5. DR. Julia Bauman     SPOKE WITH PATIENT AND FAMILY POST PROCEDURE    6. PATIENT ABLE TO DRINK, [address], VOID    7. PROCEDURAL SITE REMAINS DRY, INTACT WITH GOOD CIRCULATION,    MOVEMENT AND SENSATION    8. FOLLOW UP [address] cLINIC nURSE    9. PATIENT DISCHARGED VIA WHEEL CHAIR TO MAIN ENTRANCE WITH vOLUNTER TRANSPORTATION    10.  NEW PRESCRIPTION: N/A

## 2023-05-09 NOTE — PROCEDURES
OPERATION(S) PERFORMED:   1. Pacemaker implant. 2. Pacemaker generator removal.   3. Conscious sedation    : Vignesh Bal MD  INDICATION: Device at [facility]  COMPLICATIONS: None   SEDATION: IV and local anesthesia  IV was maintained by RN and moderate conscious sedation of versed and fentanyl was given. Patient was assessed and monitoring of oxygen, heart rate and blood pressure by nurse and myself during the exam from 833 to 849. ESTIMATED BLOOD LOSS: Minimal.    METHODS: The patient was brought to the outpatient cardiac telemetry unit in a fasting and nonsedated state after providing informed consent. IV sedation was administered during continuous ECG, pulse oximeter, and noninvasive hemodynamic monitoring. After administering 1% lidocaine for local anesthesia, an incision was adjacent to the previous incision. The plane of the incision was extended to expose the old generator and leads. The generator was removed from the pocket. The leads were visually inspected, there was no evidence of lead breakdown, the leads appeared to be intact. The pocket was irrigated with antibiotic solution. Bleeding was sought for until hemostasis was achieved. The leads were connected to a pulse generator. The leads were coiled and placed into the pocket along with the pulse generator. The incision was closed in 2 layers using 0-0 and 4-0 Vicryl. Steri-Strips were applied followed by a sterile dressing. The left arm was placed in a sling and the patient was transported to telemetry in stable condition. There were no apparent intraoperative complications. MEASURED DATA: RA/RV leads were tested; normal impedence, sensing and threshold. CONCLUSIONS:   1. Status post successful generator change of a dual-chamber pacemaker. 2. Stable RA/RV pacing, sensing and thresholds.     3. Conscious sedation    Vignesh Bal, [facility]  Cardiac Electrophysiology  5/9/2023

## 2023-05-12 ENCOUNTER — APPOINTMENT (OUTPATIENT)
Dept: PHYSICAL THERAPY | Facility: HOSPITAL | Age: 73
End: 2023-05-12
Attending: UROLOGY
Payer: MEDICARE

## 2023-05-19 ENCOUNTER — APPOINTMENT (OUTPATIENT)
Dept: PHYSICAL THERAPY | Facility: HOSPITAL | Age: 73
End: 2023-05-19
Attending: UROLOGY
Payer: MEDICARE

## 2023-05-25 ENCOUNTER — OFFICE VISIT (OUTPATIENT)
Dept: NEUROLOGY | Facility: CLINIC | Age: 73
End: 2023-05-25
Payer: MEDICARE

## 2023-05-25 VITALS — HEART RATE: 59 BPM | OXYGEN SATURATION: 100 % | SYSTOLIC BLOOD PRESSURE: 126 MMHG | DIASTOLIC BLOOD PRESSURE: 60 MMHG

## 2023-05-25 DIAGNOSIS — Z86.73 HISTORY OF CEREBROVASCULAR ACCIDENT (CVA) IN ADULTHOOD: ICD-10-CM

## 2023-05-25 DIAGNOSIS — G47.52 DREAM ENACTMENT BEHAVIOR: ICD-10-CM

## 2023-05-25 DIAGNOSIS — G40.909 NONINTRACTABLE EPILEPSY WITHOUT STATUS EPILEPTICUS, UNSPECIFIED EPILEPSY TYPE (HCC): Primary | ICD-10-CM

## 2023-05-25 PROCEDURE — 99214 OFFICE O/P EST MOD 30 MIN: CPT | Performed by: OTHER

## 2023-05-25 RX ORDER — MELATONIN
Qty: 113 TABLET | Refills: 0 | Status: SHIPPED | OUTPATIENT
Start: 2023-05-25 | End: 2023-07-24

## 2023-05-26 ENCOUNTER — APPOINTMENT (OUTPATIENT)
Dept: PHYSICAL THERAPY | Facility: HOSPITAL | Age: 73
End: 2023-05-26
Attending: UROLOGY
Payer: MEDICARE

## 2023-07-25 ENCOUNTER — OFFICE VISIT (OUTPATIENT)
Dept: INTERNAL MEDICINE CLINIC | Facility: CLINIC | Age: 73
End: 2023-07-25

## 2023-07-25 ENCOUNTER — TELEPHONE (OUTPATIENT)
Dept: INTERNAL MEDICINE CLINIC | Facility: CLINIC | Age: 73
End: 2023-07-25

## 2023-07-25 VITALS
HEIGHT: 65 IN | DIASTOLIC BLOOD PRESSURE: 68 MMHG | WEIGHT: 133.5 LBS | BODY MASS INDEX: 22.24 KG/M2 | HEART RATE: 68 BPM | OXYGEN SATURATION: 97 % | SYSTOLIC BLOOD PRESSURE: 108 MMHG

## 2023-07-25 DIAGNOSIS — F03.90 DEMENTIA, UNSPECIFIED DEMENTIA SEVERITY, UNSPECIFIED DEMENTIA TYPE, UNSPECIFIED WHETHER BEHAVIORAL, PSYCHOTIC, OR MOOD DISTURBANCE OR ANXIETY (HCC): ICD-10-CM

## 2023-07-25 DIAGNOSIS — Z00.00 ROUTINE HEALTH MAINTENANCE: ICD-10-CM

## 2023-07-25 DIAGNOSIS — N18.30 STAGE 3 CHRONIC KIDNEY DISEASE, UNSPECIFIED WHETHER STAGE 3A OR 3B CKD (HCC): ICD-10-CM

## 2023-07-25 DIAGNOSIS — Z95.818 PRESENCE OF WATCHMAN LEFT ATRIAL APPENDAGE CLOSURE DEVICE: ICD-10-CM

## 2023-07-25 DIAGNOSIS — I63.9 CEREBROVASCULAR ACCIDENT (CVA), UNSPECIFIED MECHANISM (HCC): ICD-10-CM

## 2023-07-25 DIAGNOSIS — I10 PRIMARY HYPERTENSION: Primary | ICD-10-CM

## 2023-07-25 DIAGNOSIS — Z87.898 HISTORY OF SEIZURES: ICD-10-CM

## 2023-07-25 DIAGNOSIS — Z12.31 VISIT FOR SCREENING MAMMOGRAM: ICD-10-CM

## 2023-07-25 DIAGNOSIS — Z95.0 PRESENCE OF PERMANENT CARDIAC PACEMAKER: ICD-10-CM

## 2023-07-25 PROCEDURE — 1125F AMNT PAIN NOTED PAIN PRSNT: CPT | Performed by: INTERNAL MEDICINE

## 2023-07-25 PROCEDURE — 99215 OFFICE O/P EST HI 40 MIN: CPT | Performed by: INTERNAL MEDICINE

## 2023-07-25 RX ORDER — OXYBUTYNIN CHLORIDE 5 MG/1
5 TABLET, EXTENDED RELEASE ORAL DAILY
COMMUNITY
Start: 2023-06-15 | End: 2023-07-25

## 2023-07-25 RX ORDER — NITROFURANTOIN 25; 75 MG/1; MG/1
100 CAPSULE ORAL 2 TIMES DAILY
COMMUNITY
Start: 2023-07-21

## 2023-07-25 RX ORDER — ESCITALOPRAM OXALATE 10 MG/1
10 TABLET ORAL DAILY
COMMUNITY
Start: 2023-07-04

## 2023-07-25 NOTE — TELEPHONE ENCOUNTER
0800 [facility] desk ,please change today's appointment just for a 30-minute visit. NOT a Medicare annual as we will not have enough time.   We can schedule Medicare annual later this year

## 2023-08-09 ENCOUNTER — HOSPITAL ENCOUNTER (OUTPATIENT)
Dept: MRI IMAGING | Facility: HOSPITAL | Age: 73
Discharge: HOME | End: 2023-08-09
Attending: Other
Payer: MEDICARE

## 2023-08-09 VITALS
DIASTOLIC BLOOD PRESSURE: 87 MMHG | HEART RATE: 70 BPM | OXYGEN SATURATION: 93 % | RESPIRATION RATE: 18 BRPM | SYSTOLIC BLOOD PRESSURE: 162 MMHG

## 2023-08-09 DIAGNOSIS — G40.909 NONINTRACTABLE EPILEPSY WITHOUT STATUS EPILEPTICUS, UNSPECIFIED EPILEPSY TYPE (HCC): ICD-10-CM

## 2023-08-09 DIAGNOSIS — Z86.73 HISTORY OF CEREBROVASCULAR ACCIDENT (CVA) IN ADULTHOOD: ICD-10-CM

## 2023-08-09 PROCEDURE — 70553 MRI BRAIN STEM W/O & W/DYE: CPT | Performed by: OTHER

## 2023-08-09 PROCEDURE — A9575 INJ GADOTERATE MEGLUMI 0.1ML: HCPCS | Performed by: OTHER

## 2023-08-09 RX ORDER — GADOTERATE MEGLUMINE 376.9 MG/ML
20 INJECTION INTRAVENOUS
Status: COMPLETED | OUTPATIENT
Start: 2023-08-09 | End: 2023-08-09

## 2023-08-09 RX ADMIN — GADOTERATE MEGLUMINE 18 ML: 376.9 INJECTION INTRAVENOUS at 13:04:00

## 2023-08-09 NOTE — IMAGING NOTE
Patient here for MRI and has a TrueFacet device. Representative from [address] placed device in MRI safe mode. Pt tolerated procedure and following MRI device was placed back in original settings by representative. Pt released.

## 2023-09-14 RX ORDER — PANTOPRAZOLE SODIUM 40 MG/1
TABLET, DELAYED RELEASE ORAL
Qty: 90 TABLET | Refills: 3 | Status: SHIPPED | OUTPATIENT
Start: 2023-09-14

## 2023-09-14 RX ORDER — ATORVASTATIN CALCIUM 20 MG/1
TABLET, FILM COATED ORAL
Qty: 90 TABLET | Refills: 3 | Status: SHIPPED | OUTPATIENT
Start: 2023-09-14

## 2023-09-19 ENCOUNTER — LAB ENCOUNTER (OUTPATIENT)
Dept: LAB | Age: 73
End: 2023-09-19
Attending: INTERNAL MEDICINE
Payer: MEDICARE

## 2023-09-19 ENCOUNTER — EKG ENCOUNTER (OUTPATIENT)
Dept: LAB | Age: 73
End: 2023-09-19
Attending: INTERNAL MEDICINE
Payer: MEDICARE

## 2023-09-19 ENCOUNTER — OFFICE VISIT (OUTPATIENT)
Dept: INTERNAL MEDICINE CLINIC | Facility: CLINIC | Age: 73
End: 2023-09-19

## 2023-09-19 VITALS
HEART RATE: 80 BPM | OXYGEN SATURATION: 96 % | WEIGHT: 136 LBS | SYSTOLIC BLOOD PRESSURE: 122 MMHG | HEIGHT: 65 IN | BODY MASS INDEX: 22.66 KG/M2 | DIASTOLIC BLOOD PRESSURE: 66 MMHG

## 2023-09-19 DIAGNOSIS — Z87.898 HISTORY OF SEIZURES: ICD-10-CM

## 2023-09-19 DIAGNOSIS — Z00.00 ROUTINE HEALTH MAINTENANCE: ICD-10-CM

## 2023-09-19 DIAGNOSIS — Z95.0 PACEMAKER: ICD-10-CM

## 2023-09-19 DIAGNOSIS — N18.30 STAGE 3 CHRONIC KIDNEY DISEASE, UNSPECIFIED WHETHER STAGE 3A OR 3B CKD (HCC): ICD-10-CM

## 2023-09-19 DIAGNOSIS — I10 PRIMARY HYPERTENSION: Primary | ICD-10-CM

## 2023-09-19 DIAGNOSIS — F03.90 DEMENTIA, UNSPECIFIED DEMENTIA SEVERITY, UNSPECIFIED DEMENTIA TYPE, UNSPECIFIED WHETHER BEHAVIORAL, PSYCHOTIC, OR MOOD DISTURBANCE OR ANXIETY (HCC): ICD-10-CM

## 2023-09-19 DIAGNOSIS — Z95.818 PRESENCE OF WATCHMAN LEFT ATRIAL APPENDAGE CLOSURE DEVICE: ICD-10-CM

## 2023-09-19 DIAGNOSIS — I63.9 CEREBROVASCULAR ACCIDENT (CVA), UNSPECIFIED MECHANISM (HCC): ICD-10-CM

## 2023-09-19 DIAGNOSIS — Z41.9 ENCOUNTER FOR PROCEDURE FOR PURPOSES OTHER THAN REMEDYING HEALTH STATE, UNSPECIFIED: ICD-10-CM

## 2023-09-19 DIAGNOSIS — I10 PRIMARY HYPERTENSION: ICD-10-CM

## 2023-09-19 DIAGNOSIS — I48.0 AF (PAROXYSMAL ATRIAL FIBRILLATION) (HCC): ICD-10-CM

## 2023-09-19 DIAGNOSIS — R07.9 CHEST PAIN, UNSPECIFIED TYPE: ICD-10-CM

## 2023-09-19 DIAGNOSIS — Z95.0 PRESENCE OF PERMANENT CARDIAC PACEMAKER: ICD-10-CM

## 2023-09-19 LAB
ANION GAP SERPL CALC-SCNC: 6 MMOL/L (ref 0–18)
ATRIAL RATE: 60 BPM
BASOPHILS # BLD AUTO: 0.01 X10(3) UL (ref 0–0.2)
BASOPHILS NFR BLD AUTO: 0.2 %
BUN BLD-MCNC: 21 MG/DL (ref 7–18)
BUN/CREAT SERPL: 17.1 (ref 10–20)
CALCIUM BLD-MCNC: 9.2 MG/DL (ref 8.5–10.1)
CHLORIDE SERPL-SCNC: 109 MMOL/L (ref 98–112)
CHOLEST SERPL-MCNC: 136 MG/DL (ref ?–200)
CO2 SERPL-SCNC: 28 MMOL/L (ref 21–32)
CREAT BLD-MCNC: 1.23 MG/DL
DEPRECATED RDW RBC AUTO: 43.9 FL (ref 35.1–46.3)
EGFRCR SERPLBLD CKD-EPI 2021: 46 ML/MIN/1.73M2 (ref 60–?)
EOSINOPHIL # BLD AUTO: 0.23 X10(3) UL (ref 0–0.7)
EOSINOPHIL NFR BLD AUTO: 5 %
ERYTHROCYTE [DISTWIDTH] IN BLOOD BY AUTOMATED COUNT: 13.2 % (ref 11–15)
FASTING PATIENT LIPID ANSWER: YES
FASTING STATUS PATIENT QL REPORTED: YES
GLUCOSE BLD-MCNC: 96 MG/DL (ref 70–99)
HCT VFR BLD AUTO: 38.5 %
HDLC SERPL-MCNC: 54 MG/DL (ref 40–59)
HGB BLD-MCNC: 12.6 G/DL
IMM GRANULOCYTES # BLD AUTO: 0.01 X10(3) UL (ref 0–1)
IMM GRANULOCYTES NFR BLD: 0.2 %
LDLC SERPL CALC-MCNC: 60 MG/DL (ref ?–100)
LYMPHOCYTES # BLD AUTO: 0.52 X10(3) UL (ref 1–4)
LYMPHOCYTES NFR BLD AUTO: 11.3 %
MCH RBC QN AUTO: 29.9 PG (ref 26–34)
MCHC RBC AUTO-ENTMCNC: 32.7 G/DL (ref 31–37)
MCV RBC AUTO: 91.2 FL
MONOCYTES # BLD AUTO: 0.37 X10(3) UL (ref 0.1–1)
MONOCYTES NFR BLD AUTO: 8 %
NEUTROPHILS # BLD AUTO: 3.46 X10 (3) UL (ref 1.5–7.7)
NEUTROPHILS # BLD AUTO: 3.46 X10(3) UL (ref 1.5–7.7)
NEUTROPHILS NFR BLD AUTO: 75.3 %
NONHDLC SERPL-MCNC: 82 MG/DL (ref ?–130)
OSMOLALITY SERPL CALC.SUM OF ELEC: 299 MOSM/KG (ref 275–295)
P AXIS: 85 DEGREES
P-R INTERVAL: 234 MS
PLATELET # BLD AUTO: 163 10(3)UL (ref 150–450)
POTASSIUM SERPL-SCNC: 4.5 MMOL/L (ref 3.5–5.1)
Q-T INTERVAL: 426 MS
QRS DURATION: 96 MS
QTC CALCULATION (BEZET): 426 MS
R AXIS: 48 DEGREES
RBC # BLD AUTO: 4.22 X10(6)UL
SODIUM SERPL-SCNC: 143 MMOL/L (ref 136–145)
T AXIS: 65 DEGREES
TRIGL SERPL-MCNC: 127 MG/DL (ref 30–149)
TSI SER-ACNC: 1.4 MIU/ML (ref 0.36–3.74)
VENTRICULAR RATE: 60 BPM
VLDLC SERPL CALC-MCNC: 19 MG/DL (ref 0–30)
WBC # BLD AUTO: 4.6 X10(3) UL (ref 4–11)

## 2023-09-19 PROCEDURE — 99214 OFFICE O/P EST MOD 30 MIN: CPT | Performed by: INTERNAL MEDICINE

## 2023-09-19 PROCEDURE — 93010 ELECTROCARDIOGRAM REPORT: CPT | Performed by: INTERNAL MEDICINE

## 2023-09-19 PROCEDURE — 84443 ASSAY THYROID STIM HORMONE: CPT

## 2023-09-19 PROCEDURE — 85025 COMPLETE CBC W/AUTO DIFF WBC: CPT

## 2023-09-19 PROCEDURE — 36415 COLL VENOUS BLD VENIPUNCTURE: CPT

## 2023-09-19 PROCEDURE — 80061 LIPID PANEL: CPT

## 2023-09-19 PROCEDURE — 80048 BASIC METABOLIC PNL TOTAL CA: CPT

## 2023-09-19 PROCEDURE — 93005 ELECTROCARDIOGRAM TRACING: CPT

## 2023-09-22 ENCOUNTER — TELEPHONE (OUTPATIENT)
Dept: INTERNAL MEDICINE CLINIC | Facility: CLINIC | Age: 73
End: 2023-09-22

## 2023-09-22 DIAGNOSIS — N18.30 STAGE 3 CHRONIC KIDNEY DISEASE, UNSPECIFIED WHETHER STAGE 3A OR 3B CKD (HCC): Primary | ICD-10-CM

## 2023-09-22 NOTE — TELEPHONE ENCOUNTER
Hi Dr. Neymar Galloway. Thank you so very much for having seeing aJmi De Los Santos in the past after she was hospitalized with questionable stroke. You have seen her February 16 and May 25. I was going over her medication list with her and I did note that she is on aspirin 81 mg plus Plavix 75 mg. Per your note it appears that she should only be on Plavix. I wanted to make sure that I should tell her to stop her baby aspirin. Thank you in advance.  Jose Martin Chavarria

## 2023-10-04 ENCOUNTER — OFFICE VISIT (OUTPATIENT)
Dept: NEUROLOGY | Facility: CLINIC | Age: 73
End: 2023-10-04
Payer: MEDICARE

## 2023-10-04 VITALS — WEIGHT: 136 LBS | BODY MASS INDEX: 22.66 KG/M2 | HEIGHT: 65 IN

## 2023-10-04 DIAGNOSIS — G40.909 NONINTRACTABLE EPILEPSY WITHOUT STATUS EPILEPTICUS, UNSPECIFIED EPILEPSY TYPE (HCC): Primary | ICD-10-CM

## 2023-10-04 PROCEDURE — 99214 OFFICE O/P EST MOD 30 MIN: CPT | Performed by: OTHER

## 2023-10-04 RX ORDER — LAMOTRIGINE 150 MG/1
150 TABLET ORAL 2 TIMES DAILY
Qty: 180 TABLET | Refills: 1 | Status: SHIPPED | OUTPATIENT
Start: 2023-10-04 | End: 2024-04-01

## 2023-10-06 NOTE — TELEPHONE ENCOUNTER
Hi Dr. Julio C Ann. Thank you very much for seeing Anayeli Conti October 4. She had aspirin 81 mg on her list and I think that per your office visit note she only needs Plavix 75 mg a day. Please confirm and I can let her know to stop the aspirin. Thank you in advance. Murali Cortez.

## 2023-10-06 NOTE — TELEPHONE ENCOUNTER
Hi Dr. Remedios Delgado,  I am sorry for my lack of clarity/specificity. 1.  It is unclear if she has had a stroke in the past due to small vessel disease vs. a cardioembolic event. I do not see a burden of lesions that would fit with these prior events that would support this diagnosis. Therefore, I would be okay with her being on a single antiplatelet agent whether or not it is aspirin or Plavix is up to you. 2.  If he has a cardiac history that warrants dual antiplatelets then it is appropriate. I think she may have been started on dual antiplatelets given the concern she was having multiple cerebrovascular events in spite of treating her atrial fibrillation and already being on an antiplatelet. 3.  I have no strong preference between aspirin and Plavix if there is no cardiac indication for both. I tend to favor aspirin 81 mg because it is over-the-counter, although some neurologist would argue there is evidence for clopidogrel. 4.  I will addend my note accordingly.   Kind regards,  Evangelina Pace

## 2023-10-10 NOTE — TELEPHONE ENCOUNTER
Spoke with patient and relayed MD's message. Verbalized understanding and agreement with plan. Patient confirmed that she stopped taking Xarelto. I also provided the generic name, so that she can verify against her Rx bottles. She repeated back that she will stop Plavix and takes ASA 81 mg daily. Contact information provided for Dr. Raquel Ford and patient was asked to schedule an appt. Patient write all instructions down and message was reviewed x2.

## 2023-10-10 NOTE — TELEPHONE ENCOUNTER
Please call patient and let her know the following regarding her medication    Please confirm that she has stopped her Xarelto. During last office visit she brought in her medication list and Xarelto was on her medication list but I asked her to stop that. 2.   In addition please let her know that I discussed with Dr. Arthur Brown her neurologist regarding her aspirin and Plavix. 3.   She can stop the Plavix and just continue aspirin. And the aspirin can be 81 mg a day. 4.    Please have her right this down as she does have memory issues and this way she can be clear about her medications. 5.    Also please ask her to follow-up with her kidney doctor Gunnar Casillas.   She is to see  in the past.  I left a referral.  She does have some mild kidney weakness and it would be important for her to follow-up with Norris Reyes

## 2023-10-17 RX ORDER — OXYBUTYNIN CHLORIDE 5 MG/1
5 TABLET ORAL DAILY
COMMUNITY

## 2023-10-17 RX ORDER — ALLOPURINOL 100 MG/1
100 TABLET ORAL DAILY
COMMUNITY

## 2023-10-17 RX ORDER — PANCRELIPASE 30000; 6000; 19000 [USP'U]/1; [USP'U]/1; [USP'U]/1
2 CAPSULE, DELAYED RELEASE PELLETS ORAL
COMMUNITY

## 2023-10-17 RX ORDER — AMLODIPINE BESYLATE 2.5 MG/1
2.5 TABLET ORAL DAILY
COMMUNITY

## 2023-10-18 ENCOUNTER — ANESTHESIA EVENT (OUTPATIENT)
Dept: ENDOSCOPY | Facility: HOSPITAL | Age: 73
End: 2023-10-18
Payer: MEDICARE

## 2023-10-19 ENCOUNTER — HOSPITAL ENCOUNTER (OUTPATIENT)
Facility: HOSPITAL | Age: 73
Setting detail: HOSPITAL OUTPATIENT SURGERY
Discharge: HOME | End: 2023-10-19
Attending: INTERNAL MEDICINE | Admitting: INTERNAL MEDICINE

## 2023-10-19 ENCOUNTER — ANESTHESIA (OUTPATIENT)
Dept: ENDOSCOPY | Facility: HOSPITAL | Age: 73
End: 2023-10-19
Payer: MEDICARE

## 2023-10-19 VITALS
DIASTOLIC BLOOD PRESSURE: 75 MMHG | OXYGEN SATURATION: 95 % | WEIGHT: 135 LBS | HEART RATE: 60 BPM | RESPIRATION RATE: 16 BRPM | TEMPERATURE: 98 F | HEIGHT: 65 IN | SYSTOLIC BLOOD PRESSURE: 134 MMHG | BODY MASS INDEX: 22.49 KG/M2

## 2023-10-19 DIAGNOSIS — R63.0 ANOREXIA: ICD-10-CM

## 2023-10-19 DIAGNOSIS — R68.81 EARLY SATIETY: ICD-10-CM

## 2023-10-19 DIAGNOSIS — R63.4 WEIGHT LOSS: ICD-10-CM

## 2023-10-19 DIAGNOSIS — R19.7 DIARRHEA, UNSPECIFIED TYPE: ICD-10-CM

## 2023-10-19 PROCEDURE — 88305 TISSUE EXAM BY PATHOLOGIST: CPT | Performed by: INTERNAL MEDICINE

## 2023-10-19 PROCEDURE — 0DB68ZX EXCISION OF STOMACH, VIA NATURAL OR ARTIFICIAL OPENING ENDOSCOPIC, DIAGNOSTIC: ICD-10-PCS | Performed by: INTERNAL MEDICINE

## 2023-10-19 PROCEDURE — 0DB98ZX EXCISION OF DUODENUM, VIA NATURAL OR ARTIFICIAL OPENING ENDOSCOPIC, DIAGNOSTIC: ICD-10-PCS | Performed by: INTERNAL MEDICINE

## 2023-10-19 PROCEDURE — 88312 SPECIAL STAINS GROUP 1: CPT | Performed by: INTERNAL MEDICINE

## 2023-10-19 PROCEDURE — 0DB58ZX EXCISION OF ESOPHAGUS, VIA NATURAL OR ARTIFICIAL OPENING ENDOSCOPIC, DIAGNOSTIC: ICD-10-PCS | Performed by: INTERNAL MEDICINE

## 2023-10-19 RX ORDER — ONDANSETRON 2 MG/ML
4 INJECTION INTRAMUSCULAR; INTRAVENOUS ONCE AS NEEDED
Status: DISCONTINUED | OUTPATIENT
Start: 2023-10-19 | End: 2023-10-19

## 2023-10-19 RX ORDER — SODIUM CHLORIDE, SODIUM LACTATE, POTASSIUM CHLORIDE, CALCIUM CHLORIDE 600; 310; 30; 20 MG/100ML; MG/100ML; MG/100ML; MG/100ML
INJECTION, SOLUTION INTRAVENOUS CONTINUOUS
Status: DISCONTINUED | OUTPATIENT
Start: 2023-10-19 | End: 2023-10-19

## 2023-10-19 RX ORDER — NALOXONE HYDROCHLORIDE 0.4 MG/ML
80 INJECTION, SOLUTION INTRAMUSCULAR; INTRAVENOUS; SUBCUTANEOUS AS NEEDED
Status: DISCONTINUED | OUTPATIENT
Start: 2023-10-19 | End: 2023-10-19

## 2023-10-19 RX ORDER — LIDOCAINE HYDROCHLORIDE 10 MG/ML
INJECTION, SOLUTION EPIDURAL; INFILTRATION; INTRACAUDAL; PERINEURAL AS NEEDED
Status: DISCONTINUED | OUTPATIENT
Start: 2023-10-19 | End: 2023-10-19 | Stop reason: SURG

## 2023-10-19 RX ADMIN — SODIUM CHLORIDE, SODIUM LACTATE, POTASSIUM CHLORIDE, CALCIUM CHLORIDE: 600; 310; 30; 20 INJECTION, SOLUTION INTRAVENOUS at 09:22:00

## 2023-10-19 RX ADMIN — LIDOCAINE HYDROCHLORIDE 50 MG: 10 INJECTION, SOLUTION EPIDURAL; INFILTRATION; INTRACAUDAL; PERINEURAL at 09:25:00

## 2023-10-19 RX ADMIN — LIDOCAINE HYDROCHLORIDE 50 MG: 10 INJECTION, SOLUTION EPIDURAL; INFILTRATION; INTRACAUDAL; PERINEURAL at 09:26:00

## 2023-10-19 NOTE — H&P
History & Physical Examination    Patient Name: Kj Mccartney  MRN: [id number]  [facility]: [id number]  YOB: 1950    Diagnosis: nausea; abdominal pain. Present Illness:   nausea; abdominal pain. amLODIPine 2.5 MG Oral Tab, Take 1 tablet (2.5 mg total) by mouth daily. , Disp: , Rfl: , 10/18/2023  oxybutynin 5 MG Oral Tab, Take 1 tablet (5 mg total) by mouth daily. , Disp: , Rfl: , 10/18/2023  Pancrelipase, Lip-Prot-Amyl, (CREON) 6000-57994 units Oral Cap DR Particles, Take 2 capsules by mouth 3 (three) times daily with meals. , Disp: , Rfl: , 10/18/2023  allopurinol 100 MG Oral Tab, Take 1 tablet (100 mg total) by mouth daily. , Disp: , Rfl: , 10/18/2023 at prn  lamoTRIgine 150 MG Oral Tab, Take 1 tablet (150 mg total) by mouth 2 (two) times daily. , Disp: 180 tablet, Rfl: 1, 10/18/2023 at   atorvastatin 20 MG Oral Tab, TAKE ONE TABLET BY MOUTH ONE TIME DAILY AT NIGHT, Disp: 90 tablet, Rfl: 3, 10/18/2023 at   pantoprazole 40 MG Oral Tab EC, TAKE ONE TABLET BY MOUTH IN THE MORNING BEFORE BREAKFAST FOR THE STOMACH, Disp: 90 tablet, Rfl: 3, 10/18/2023  escitalopram 10 MG Oral Tab, Take 1 tablet (10 mg total) by mouth daily. , Disp: , Rfl: , 10/18/2023  nadolol 20 MG Oral Tab, Take 1 tablet (20 mg total) by mouth daily. , Disp: 90 tablet, Rfl: 3, 10/18/2023  ondansetron 4 MG Oral Tablet Dispersible, Take 1 tablet (4 mg total) by mouth every 8 (eight) hours as needed for Nausea., Disp: 20 tablet, Rfl: 0,  at prn  aspirin 81 MG Oral Tab EC, Take 1 tablet (81 mg total) by mouth daily. , Disp: 1 tablet, Rfl: 0, 10/18/2023 at 2030  Donepezil HCl 10 MG Oral Tab, Take 1 tablet (10 mg total) by mouth nightly., Disp: , Rfl: , 10/18/2023 at 2030  [] melatonin 3 MG Oral Tab, Take 1 tablet (3 mg total) by mouth nightly for 7 days, THEN 2 tablets (6 mg total) nightly. Take 1 hour before bedtime.  (Patient not taking: Reported on 10/17/2023), Disp: 113 tablet, Rfl: 0, Not Taking  Mirabegron ER (MYRBETRIQ) 25 MG Oral Tablet 24 Hr, Take 1 tablet (25 mg total) by mouth daily. (Patient not taking: Reported on 10/17/2023), Disp: , Rfl: , Not Taking  ROPINIROLE 1 MG Oral Tab, TAKE TWO TABLETS BY MOUTH NIGHTLY (Patient taking differently: 2 tablets (2 mg total) nightly. 1 in am + 2 HS), Disp: 180 tablet, Rfl: 3,  at hasn't taken in a while      lactated ringers infusion, , Intravenous, Continuous        Allergies:   Hydrocodone             ITCHING    Comment:ITCHES WITH 2 TABS, O.K. WITH ONE    Past Medical History:   Diagnosis Date    Abnormal PET scan of head 05/13/2016    Patient had abnormal PET scan of brain May 6, 2016 showing bilateral temporoparietal beta amyloid deposition in the gray matter consistent with Alzheimer's disease. Alzheimer disease ([address]     Anxiety 2018    Mild    Aortic atherosclerosis ([address] 03/11/2016    Aortic atherosclerosis seen on CT scan October 2014. Arrhythmia     A-Fib    Arthritis 10 years    Mild    Atrophic kidney 03/11/2016    CT scan October 8, 2014 shows left kidney smaller than the right. Bilateral areas of renal parenchymal scar. Symmetric nephrograms. Moderate calcific atherosclerosis of aorta. Back problem     spinal stenosis    Carpal tunnel syndrome 2011    as per NG    Cataract     Chronic atrial fibrillation (HCC)     Crohn disease ([address]     Depression     Diverticulosis     as per NG    Esophageal reflux 2018    Mild    Family history of colon cancer     Maternal grandmother had colon cancer in her 76s.     Gout     Hepatic steatosis 03/11/2016    High blood pressure     High cholesterol     as per NG    History of colonic polyps 07/15/2011    as per NG    History of migraine headaches     as per NG    History of total hysterectomy with removal of both tubes and ovaries     as per NG    Hyperlipidemia     Incontinence     DUE TO CYSTOCELE/RECTOCELE    Lymphocytic colitis 2011    as per NG    Migraine 09/21/2012    Mild cognitive impairment     as per NG    Obesity     25 lbs overweight    Obstructive sleep apnea 2013    Pacemaker     Pseudobulbar affect 2017    Renal disorder     stage 3    Restless legs syndrome 2013    Seizure disorder ([address]     last one 2019    Sleep apnea     Tendon tear     Surgically repaired; as per NG    Unspecified essential hypertension     as per NG    Visual impairment     reading glasses     Past Surgical History:   Procedure Laterality Date    ARTHROSCOPY OF JOINT UNLISTED      BILAT. KNEES    CARDIAC PACEMAKER PLACEMENT      as per NG    CARPAL TUNNEL RELEASE      as per NG    CATARACT Left 07/10/2015    CATARACT Right late 2015    CHOLECYSTECTOMY      as per NG    COLONOSCOPY N/A 2018    Procedure: COLONOSCOPY;  Surgeon: Cain Beckett MD;  Location: [address] [place] [address] ENDOSCOPY    COLONOSCOPY N/A 2022    Procedure: COLONOSCOPY/ESOPHAGOGASTRODUODENOSCOPY (EGD);   Surgeon: Cosmo Willoughby MD;  Location: [address] [place] [address] ENDOSCOPY    ELECTROCARDIOGRAM, COMPLETE  2013    SCANNED TO MEDIA TAB - 2013    HYSTERECTOMY      & BSO; as per NG    KNEE ARTHROSCOPY Right     as per NG    KNEE SURGERY  01/2015    x3     NASAL SCOPY,REMV TOTL ETHMOID  2006    \"Ethmoidectomy, max antrostomies\"; as per NG      5021,1755 & 1973    Normal    OTHER   or 2015    removal of pins and plate from right wrist (fracture)    OTHER SURGICAL HISTORY Left     L knee scoped--3 torn meniscus--biocartilage done    OTHER SURGICAL HISTORY Right 2016    R knee scoped for torn miniscus    OTHER SURGICAL HISTORY Left     wrist surgery    PACEMAKER/DEFIBRILLATOR      RECONSTR NOSE+JEAN SEPTAL REPAIR      as per NG    REMOVAL GALLBLADDER      TOTAL ABDOM HYSTERECTOMY      TOTAL KNEE REPLACEMENT       Family History   Problem Relation Age of Onset    Hypertension Father         4 way bypass    Heart Disease Father         as per NG    Migraines Father         as per NG    Cancer Father         Prostate ()    Neurological Disorder Mother         Alzheimer's disease; as per     Hypertension Mother         Valve replacement    Heart Disease Mother         as per     Migraines Mother         as per     Dementia Mother         Dementia ()    Cancer Sister         Left kidney cancer ()    Hypertension Brother         4 way bypass    Cancer Brother         Prostate and throat ()    Heart Disease Brother         CAD; as per     Cancer Sister         Left kidney    Cancer Other         Thyroid Cancer; as per     Cancer Maternal Grandmother         Colon    Dementia Maternal Grandmother         Dementia ()     Social History    Tobacco Use      Smoking status: Former        Packs/day: 1.00        Years: 40.00        Additional pack years: 0.00        Total pack years: 40.00        Types: Cigarettes        Quit date: 1998        Years since quittin.8      Smokeless tobacco: Never    Alcohol use: Yes      Comment: 2 - 3 drinks a year      SYSTEM Check if Review is Normal Check if Physical Exam is Normal If not normal, please explain:   HEENT [ ] [ ]    Waqar Moran [ ] [ ]    HEART [x ] [x ]    LUNGS [ x] [ x]    ABDOMEN [ ]x [ ]x    UROGENITAL [ ] [ ]    EXTREMITIES [ ] [ ]    OTHER        [ x ] I have discussed the risks and benefits and alternatives with the patient/family. They understand and agree to proceed with plan of care. [ x ] I have reviewed the History and Physical done within the last 30 days. Any changes noted above.     Treasure Sloan MD  10/19/2023  9:07 AM

## 2023-10-19 NOTE — OPERATIVE REPORT
ENDOSCOPY OPERATIVE REPORT    Patient Name: Estrella Mcclain Record #: [id number]  YOB: 1950  Date of Procedure: 10/19/2023    Preoperative Diagnosis: nausea; abdominal pain. Postoperative Diagnosis:    1.) Mild pangastritis -- biopsies obtained. 2.) Normal EGD otherwise. Procedure Performed: Esophagogastroduodenoscopy with biopsy. Anesthesia Given: MAC. ASA: 3. Moderate sedation time: NA. Deep sedation was provided by anesthesiologist.   Endoscopist: Cony Ramirez MD  Procedure:  After the patient was interviewed and the procedure again discussed and questions addressed, the patient was brought to the GI Lab and monitoring of the B/P, pulse, and pulse oximetry was performed. A time-out procedure was performed, history and physical were reviewed, medical reconciliation was complete, informed consent was obtained. The patient was then placed in the left lateral decubitus position and sedated with divided doses of IV medication; continuous vital signs were monitored throughout the procedure. The Olympus videogastroscope was intubated into the esophagus and advanced into the 2nd part of duodenum under directed vision without difficulty. Then withdrawn. A thorough exam was performed. The patient tolerated the procedure well. FINDINGS: The esophagus mucosa had an overall normal appearance. The gastric lumen was then entered and views of the gastric mucosa as well as retroverted views of the fundus. Mild pangastritis was noted. A normal pylorus was passed and the duodenal bulb entered, the duodenal bulb and post-bulbar duodenum were unremarkable. Biopsies from the second part of duodenum were obtained. Gastric antral and body biopsies were obtained. Esophageal biopsies also obtained. The procedure was tolerated well and upon completion and throughtout the vital signs were stable. COMPLICATIONS: None. PLAN: Await biopsy results.   The patient and  were informed of the endoscopic findings and was also given a copy of the findings, postoperative instructions, and postoperative precautions.   Mae Vanegas MD  [facility]

## 2023-10-19 NOTE — DISCHARGE INSTRUCTIONS
Home Care Instructions for Gastroscopy with Sedation    Diet:  - Resume your regular diet as tolerated unless otherwise instructed. - Start with light meals to minimize bloating.  - Do not drink alcohol today. Medication:  - If you have questions about resuming your normal medications, please contact your Primary Care Physician. Activities:  - Take it easy today. Do not return to work today. - Do not drive today. - Do not operate any machinery today (including kitchen equipment). Gastroscopy:  - You may have a sore throat for 2-3 days following the exam. This is normal. Gargling with warm salt water (1/2 tsp salt to 1 glass warm water) or using throat lozenges will help. - If you experience any sharp pain in your neck, abdomen or chest, vomiting of blood, oral temperature over 100 degrees Fahrenheit, light-headedness or dizziness, or any other problems, contact your doctor. **If unable to reach your doctor, please go to the [facility] Emergency Room**    - Your referring physician will receive a full report of your examination.  - If you do not hear from your doctor's office within two weeks of your biopsy, please call them for your results. You may be able to see your laboratory results in [facility] between 4 and 7 business days. In some cases, your physician may not have viewed the results before they are released to [address] If you have questions regarding your results contact the physician who ordered the test/exam by phone or via [address] by choosing \"Ask a Medical Question. \"

## 2023-10-26 ENCOUNTER — OFFICE VISIT (OUTPATIENT)
Facility: CLINIC | Age: 73
End: 2023-10-26

## 2023-10-26 ENCOUNTER — LAB ENCOUNTER (OUTPATIENT)
Dept: LAB | Age: 73
End: 2023-10-26
Attending: INTERNAL MEDICINE

## 2023-10-26 ENCOUNTER — LAB ENCOUNTER (OUTPATIENT)
Dept: LAB | Facility: HOSPITAL | Age: 73
End: 2023-10-26
Attending: INTERNAL MEDICINE

## 2023-10-26 VITALS
HEART RATE: 64 BPM | DIASTOLIC BLOOD PRESSURE: 62 MMHG | SYSTOLIC BLOOD PRESSURE: 124 MMHG | WEIGHT: 137 LBS | BODY MASS INDEX: 22.82 KG/M2 | HEIGHT: 65 IN

## 2023-10-26 DIAGNOSIS — F03.90 DEMENTIA, UNSPECIFIED DEMENTIA SEVERITY, UNSPECIFIED DEMENTIA TYPE, UNSPECIFIED WHETHER BEHAVIORAL, PSYCHOTIC, OR MOOD DISTURBANCE OR ANXIETY (HCC): ICD-10-CM

## 2023-10-26 DIAGNOSIS — I12.9 HYPERTENSIVE KIDNEY DISEASE WITH STAGE 3 CHRONIC KIDNEY DISEASE, UNSPECIFIED WHETHER STAGE 3A OR 3B CKD (HCC): ICD-10-CM

## 2023-10-26 DIAGNOSIS — N18.30 STAGE 3 CHRONIC KIDNEY DISEASE, UNSPECIFIED WHETHER STAGE 3A OR 3B CKD (HCC): ICD-10-CM

## 2023-10-26 DIAGNOSIS — N18.30 STAGE 3 CHRONIC KIDNEY DISEASE, UNSPECIFIED WHETHER STAGE 3A OR 3B CKD (HCC): Primary | ICD-10-CM

## 2023-10-26 DIAGNOSIS — I48.91 ATRIAL FIBRILLATION, UNSPECIFIED TYPE (HCC): ICD-10-CM

## 2023-10-26 DIAGNOSIS — N18.30 HYPERTENSIVE KIDNEY DISEASE WITH STAGE 3 CHRONIC KIDNEY DISEASE, UNSPECIFIED WHETHER STAGE 3A OR 3B CKD (HCC): ICD-10-CM

## 2023-10-26 LAB
ALBUMIN SERPL-MCNC: 3.9 G/DL (ref 3.4–5)
ANION GAP SERPL CALC-SCNC: 7 MMOL/L (ref 0–18)
BILIRUB UR QL: NEGATIVE
BUN BLD-MCNC: 17 MG/DL (ref 7–18)
BUN/CREAT SERPL: 14 (ref 10–20)
CALCIUM BLD-MCNC: 9.2 MG/DL (ref 8.5–10.1)
CHLORIDE SERPL-SCNC: 111 MMOL/L (ref 98–112)
CLARITY UR: CLEAR
CO2 SERPL-SCNC: 25 MMOL/L (ref 21–32)
COLOR UR: YELLOW
CREAT BLD-MCNC: 1.21 MG/DL
EGFRCR SERPLBLD CKD-EPI 2021: 47 ML/MIN/1.73M2 (ref 60–?)
GLUCOSE BLD-MCNC: 86 MG/DL (ref 70–99)
GLUCOSE UR-MCNC: NORMAL MG/DL
HGB UR QL STRIP.AUTO: NEGATIVE
KETONES UR-MCNC: NEGATIVE MG/DL
LEUKOCYTE ESTERASE UR QL STRIP.AUTO: NEGATIVE
NITRITE UR QL STRIP.AUTO: NEGATIVE
OSMOLALITY SERPL CALC.SUM OF ELEC: 297 MOSM/KG (ref 275–295)
PH UR: 6 [PH] (ref 5–8)
PHOSPHATE SERPL-MCNC: 3.2 MG/DL (ref 2.5–4.9)
POTASSIUM SERPL-SCNC: 4.2 MMOL/L (ref 3.5–5.1)
PROT UR-MCNC: 18.7 MG/DL
PTH-INTACT SERPL-MCNC: 106.8 PG/ML (ref 18.5–88)
SODIUM SERPL-SCNC: 143 MMOL/L (ref 136–145)
SP GR UR STRIP: 1.02 (ref 1–1.03)
UROBILINOGEN UR STRIP-ACNC: NORMAL

## 2023-10-26 PROCEDURE — 80069 RENAL FUNCTION PANEL: CPT

## 2023-10-26 PROCEDURE — 83970 ASSAY OF PARATHORMONE: CPT

## 2023-10-26 PROCEDURE — 84156 ASSAY OF PROTEIN URINE: CPT

## 2023-10-26 PROCEDURE — 36415 COLL VENOUS BLD VENIPUNCTURE: CPT

## 2023-10-26 PROCEDURE — 99203 OFFICE O/P NEW LOW 30 MIN: CPT | Performed by: INTERNAL MEDICINE

## 2023-10-26 PROCEDURE — 81003 URINALYSIS AUTO W/O SCOPE: CPT

## 2023-10-26 NOTE — PROGRESS NOTES
[facility]    Progress Note     Nadiya Felix    67 yr old lady with CKD 3 a, HTN, afib and dementia  here for follow up visit. No h/o dysuria, hematuria or recurrent UTI. Family h/o kidney cancer one sister  & one sister had a nephrectomy . She has seen urologist in past for urinary issues,  Recently had EGD and unremarkable    Weight has been stable    HISTORY:  Past Medical History:   Diagnosis Date    Abnormal PET scan of head 2016    Patient had abnormal PET scan of brain May 6, 2016 showing bilateral temporoparietal beta amyloid deposition in the gray matter consistent with Alzheimer's disease. Alzheimer disease ([address]     Anxiety 2018    Mild    Aortic atherosclerosis ([address] 2016    Aortic atherosclerosis seen on CT scan 2014. Arrhythmia     A-Fib    Arthritis 10 years    Mild    Atrophic kidney 2016    CT scan 2014 shows left kidney smaller than the right. Bilateral areas of renal parenchymal scar. Symmetric nephrograms. Moderate calcific atherosclerosis of aorta. Back problem     spinal stenosis    Carpal tunnel syndrome     as per NG    Cataract     Chronic atrial fibrillation (HCC)     Crohn disease ([address]     Depression     Diverticulosis     as per NG    Esophageal reflux     Mild    Family history of colon cancer     Maternal grandmother had colon cancer in her 76s.     Gout     Hepatic steatosis 2016    High blood pressure     High cholesterol     as per NG    History of colonic polyps 07/15/2011    as per NG    History of migraine headaches     as per NG    History of total hysterectomy with removal of both tubes and ovaries     as per NG    Hyperlipidemia     Incontinence     DUE TO CYSTOCELE/RECTOCELE    Lymphocytic colitis     as per NG    Migraine 2012    Mild cognitive impairment     as per NG    Obesity 2018    25 lbs overweight    Obstructive sleep apnea 2013    Pacemaker     Pseudobulbar affect 2017    Renal disorder     stage 3    Restless legs syndrome 2013    Seizure disorder ([facility]     last one 2019    Sleep apnea     Tendon tear     Surgically repaired; as per NG    Unspecified essential hypertension     as per NG    Visual impairment     reading glasses      Past Surgical History:   Procedure Laterality Date    ARTHROSCOPY OF JOINT UNLISTED      BILAT. KNEES    CARDIAC PACEMAKER PLACEMENT      as per NG    CARPAL TUNNEL RELEASE      as per NG    CATARACT Left 07/10/2015    CATARACT Right late 2015    CHOLECYSTECTOMY      as per     COLONOSCOPY N/A 2018    Procedure: COLONOSCOPY;  Surgeon: Nick Jacobo MD;  Location: [address] [place] [address] ENDOSCOPY    COLONOSCOPY N/A 2022    Procedure: COLONOSCOPY/ESOPHAGOGASTRODUODENOSCOPY (EGD); Surgeon: Rodriguez Cervantes MD;  Location: [address] [place] [address] ENDOSCOPY    ELECTROCARDIOGRAM, COMPLETE  2013    SCANNED TO MEDIA TAB - 2013    HYSTERECTOMY      & BSO; as per NG    KNEE ARTHROSCOPY Right     as per NG    KNEE SURGERY  01/2015    x3     NASAL SCOPY,REMV TOTL ETHMOID      \"Ethmoidectomy, max antrostomies\"; as per NG      3290,0829 & 1973    Normal    OTHER   or 2015    removal of pins and plate from right wrist (fracture)    OTHER SURGICAL HISTORY Left     L knee scoped--3 torn meniscus--biocartilage done    OTHER SURGICAL HISTORY Right 2016    R knee scoped for torn miniscus    OTHER SURGICAL HISTORY Left     wrist surgery    PACEMAKER/DEFIBRILLATOR      RECONSTR NOSE+JEAN SEPTAL REPAIR      as per NG    REMOVAL GALLBLADDER      TOTAL ABDOM HYSTERECTOMY      TOTAL KNEE REPLACEMENT             Medications (Active prior to today's visit):  Current Outpatient Medications   Medication Sig Dispense Refill    amLODIPine 2.5 MG Oral Tab Take 1 tablet (2.5 mg total) by mouth daily. oxybutynin 5 MG Oral Tab Take 1 tablet (5 mg total) by mouth daily.       Pancrelipase, Lip-Prot-Amyl, (CREON) 6000-46897 units Oral Cap DR Particles Take 2 capsules by mouth 3 (three) times daily with meals. allopurinol 100 MG Oral Tab Take 1 tablet (100 mg total) by mouth daily. lamoTRIgine 150 MG Oral Tab Take 1 tablet (150 mg total) by mouth 2 (two) times daily. 180 tablet 1    atorvastatin 20 MG Oral Tab TAKE ONE TABLET BY MOUTH ONE TIME DAILY AT NIGHT 90 tablet 3    pantoprazole 40 MG Oral Tab EC TAKE ONE TABLET BY MOUTH IN THE MORNING BEFORE BREAKFAST FOR THE STOMACH 90 tablet 3    escitalopram 10 MG Oral Tab Take 1 tablet (10 mg total) by mouth daily. nadolol 20 MG Oral Tab Take 1 tablet (20 mg total) by mouth daily. 90 tablet 3    Mirabegron ER (MYRBETRIQ) 25 MG Oral Tablet 24 Hr Take 1 tablet (25 mg total) by mouth daily. ondansetron 4 MG Oral Tablet Dispersible Take 1 tablet (4 mg total) by mouth every 8 (eight) hours as needed for Nausea. 20 tablet 0    ROPINIROLE 1 MG Oral Tab TAKE TWO TABLETS BY MOUTH NIGHTLY (Patient taking differently: 2 tablets (2 mg total) nightly. 1 in am + 2 HS) 180 tablet 3    aspirin 81 MG Oral Tab EC Take 1 tablet (81 mg total) by mouth daily. 1 tablet 0    Donepezil HCl 10 MG Oral Tab Take 1 tablet (10 mg total) by mouth nightly.          Allergies:    Hydrocodone             ITCHING    Comment:ITCHES WITH 2 TABS, O.K. WITH ONE      ROS:     Constitutional:  Negative for decreased activity, fever, irritability and lethargy  ENMT:  Negative for ear drainage, hearing loss and nasal drainage  Eyes:  Negative for eye discharge and vision loss  Cardiovascular:  Negative for chest pain, sob  Respiratory:  Negative for cough, dyspnea and wheezing  Gastrointestinal:  Negative for abdominal pain, constipation  Genitourinary:  Negative for dysuria and hematuria  Endocrine:  Negative for abnormal sleep patterns  Hema/Lymph:  Negative for easy bleeding and easy bruising  Integumentary:  Negative for pruritus and rash  Musculoskeletal:  Negative for bone/joint symptoms  Neurological:  Negative for gait disturbance  Psychiatric:  Negative for inappropriate interaction and psychiatric symptoms       10/26/23  1020   BP: 124/62   Pulse: 64       PHYSICAL EXAM:   Constitutional: appears well hydrated alert and responsive   Head/Face: normocephalic  Eyes/Vision: normal extraocular motion is intact  Nose/Mouth/Throat:mucous membranes are moist   Neck/Thyroid: neck is supple without adenopathy  Respiratory:  lungs are clear to auscultation bilaterally  Cardiovascular: regular rate and rhythm   Abdomen: soft, non-tender, non-distended, BS normal  Skin/Hair: no unusual rashes present, no abnormal bruising noted  Musculoskeletal: no deformities  Extremities: no edema  Neurological:  Grossly normal      Lab Results   Component Value Date     09/19/2023     05/04/2023     02/14/2023    K 4.5 09/19/2023    K 4.1 05/04/2023    K 4.4 02/14/2023     09/19/2023     05/04/2023     02/14/2023    CO2 28.0 09/19/2023    CO2 23.0 05/04/2023    CO2 29.0 02/14/2023    BUN 21 (H) 09/19/2023    BUN 24 (H) 05/04/2023    BUN 24 (H) 02/14/2023    CREATSERUM 1.23 (H) 09/19/2023    CREATSERUM 0.95 05/04/2023    CREATSERUM 1.09 (H) 02/14/2023    CA 9.2 09/19/2023    CA 8.9 05/04/2023    CA 9.6 02/14/2023    EGFRCR 46 (L) 09/19/2023    EGFRCR 63 05/04/2023    EGFRCR 54 (L) 02/14/2023    ALB 4.1 02/14/2023    ALB 3.9 08/30/2022    ALB 3.8 05/21/2022    PHOS 4.0 04/22/2022    PHOS 3.5 08/23/2021    PHOS 4.0 02/23/2021          ASSESSMENT/PLAN:   Assessment   1. Stage 3 chronic kidney disease, unspecified whether stage 3a or 3b CKD ([facility]  - Renal Function Panel; Future  - PTH, Intact; Future  - Protein/Creatinine Ratio, Urine Random; Future  - Urinalysis with Culture Reflex; Future    2. Hypertensive kidney disease with stage 3 chronic kidney disease, unspecified whether stage 3a or 3b CKD ([facility]    3. Atrial fibrillation, unspecified type ([facility]    4.  Dementia, unspecified dementia severity, unspecified dementia type, unspecified whether behavioral, psychotic, or mood disturbance or anxiety (HCC)       1 CKD III  Likely sec to HTN/age  Slight drop in her eGFR from 50's--> 40 ml/min  Will repeat bmp/ ua    2 HTN   well controlled    3 afib   S/p watchman  Not on xarelto    4.  Dementia  Follows with urology    PLAN   Labs today  And labs and fu in 1 yr             Orders This Visit:  Orders Placed This Encounter      Renal Function Panel      PTH, Intact      Protein/Creatinine Ratio, Urine Random      Urinalysis with Culture Reflex      Meds This Visit:  Requested Prescriptions      No prescriptions requested or ordered in this encounter       Imaging & Referrals:  None       Rayna Joseph MD  10/26/2023

## 2023-10-30 ENCOUNTER — TELEPHONE (OUTPATIENT)
Facility: CLINIC | Age: 73
End: 2023-10-30

## 2023-10-30 DIAGNOSIS — N18.30 STAGE 3 CHRONIC KIDNEY DISEASE, UNSPECIFIED WHETHER STAGE 3A OR 3B CKD (HCC): Primary | ICD-10-CM

## 2023-10-30 NOTE — TELEPHONE ENCOUNTER
Called and reviewed lab  Cr stable  eGFR slightly lower than her b/l likely progression  UA is bland  Labs and fu in 1 yr

## 2023-10-31 ENCOUNTER — HOSPITAL ENCOUNTER (OUTPATIENT)
Facility: HOSPITAL | Age: 73
Setting detail: OBSERVATION
LOS: 1 days | Discharge: HOME | End: 2023-11-01
Attending: EMERGENCY MEDICINE | Admitting: INTERNAL MEDICINE
Payer: MEDICARE

## 2023-10-31 ENCOUNTER — TELEPHONE (OUTPATIENT)
Dept: INTERNAL MEDICINE CLINIC | Facility: CLINIC | Age: 73
End: 2023-10-31

## 2023-10-31 ENCOUNTER — APPOINTMENT (OUTPATIENT)
Dept: GENERAL RADIOLOGY | Facility: HOSPITAL | Age: 73
End: 2023-10-31
Payer: MEDICARE

## 2023-10-31 ENCOUNTER — APPOINTMENT (OUTPATIENT)
Dept: CT IMAGING | Facility: HOSPITAL | Age: 73
End: 2023-10-31
Attending: EMERGENCY MEDICINE
Payer: MEDICARE

## 2023-10-31 DIAGNOSIS — R29.898 HAND WEAKNESS: Primary | ICD-10-CM

## 2023-10-31 LAB
ALBUMIN SERPL-MCNC: 4.6 G/DL (ref 3.2–4.8)
ALBUMIN/GLOB SERPL: 1.6 {RATIO} (ref 1–2)
ALP LIVER SERPL-CCNC: 59 U/L
ALT SERPL-CCNC: 11 U/L
ANION GAP SERPL CALC-SCNC: 7 MMOL/L (ref 0–18)
APTT PPP: 29.6 SECONDS (ref 23.3–35.6)
AST SERPL-CCNC: 21 U/L (ref ?–34)
BASOPHILS # BLD AUTO: 0.03 X10(3) UL (ref 0–0.2)
BASOPHILS NFR BLD AUTO: 0.6 %
BILIRUB SERPL-MCNC: 0.7 MG/DL (ref 0.2–1.1)
BUN BLD-MCNC: 17 MG/DL (ref 9–23)
BUN/CREAT SERPL: 13.2 (ref 10–20)
CALCIUM BLD-MCNC: 9.8 MG/DL (ref 8.7–10.4)
CHLORIDE SERPL-SCNC: 100 MMOL/L (ref 98–112)
CO2 SERPL-SCNC: 30 MMOL/L (ref 21–32)
CREAT BLD-MCNC: 1.29 MG/DL
DEPRECATED RDW RBC AUTO: 41.1 FL (ref 35.1–46.3)
EGFRCR SERPLBLD CKD-EPI 2021: 44 ML/MIN/1.73M2 (ref 60–?)
EOSINOPHIL # BLD AUTO: 0.17 X10(3) UL (ref 0–0.7)
EOSINOPHIL NFR BLD AUTO: 3.3 %
ERYTHROCYTE [DISTWIDTH] IN BLOOD BY AUTOMATED COUNT: 12.5 % (ref 11–15)
GLOBULIN PLAS-MCNC: 2.9 G/DL (ref 2.8–4.4)
GLUCOSE BLD-MCNC: 88 MG/DL (ref 70–99)
GLUCOSE BLDC GLUCOMTR-MCNC: 89 MG/DL (ref 70–99)
HCT VFR BLD AUTO: 41 %
HGB BLD-MCNC: 13.9 G/DL
IMM GRANULOCYTES # BLD AUTO: 0.01 X10(3) UL (ref 0–1)
IMM GRANULOCYTES NFR BLD: 0.2 %
INR BLD: 0.99 (ref 0.8–1.2)
LYMPHOCYTES # BLD AUTO: 1.4 X10(3) UL (ref 1–4)
LYMPHOCYTES NFR BLD AUTO: 27.5 %
MCH RBC QN AUTO: 30.4 PG (ref 26–34)
MCHC RBC AUTO-ENTMCNC: 33.9 G/DL (ref 31–37)
MCV RBC AUTO: 89.7 FL
MONOCYTES # BLD AUTO: 0.52 X10(3) UL (ref 0.1–1)
MONOCYTES NFR BLD AUTO: 10.2 %
NEUTROPHILS # BLD AUTO: 2.96 X10 (3) UL (ref 1.5–7.7)
NEUTROPHILS # BLD AUTO: 2.96 X10(3) UL (ref 1.5–7.7)
NEUTROPHILS NFR BLD AUTO: 58.2 %
OSMOLALITY SERPL CALC.SUM OF ELEC: 285 MOSM/KG (ref 275–295)
PLATELET # BLD AUTO: 216 10(3)UL (ref 150–450)
POTASSIUM SERPL-SCNC: 4.2 MMOL/L (ref 3.5–5.1)
PROT SERPL-MCNC: 7.5 G/DL (ref 5.7–8.2)
PROTHROMBIN TIME: 13.7 SECONDS (ref 11.6–14.8)
RBC # BLD AUTO: 4.57 X10(6)UL
SODIUM SERPL-SCNC: 137 MMOL/L (ref 136–145)
TROPONIN I SERPL HS-MCNC: 12 NG/L
WBC # BLD AUTO: 5.1 X10(3) UL (ref 4–11)

## 2023-10-31 PROCEDURE — 99222 1ST HOSP IP/OBS MODERATE 55: CPT | Performed by: HOSPITALIST

## 2023-10-31 PROCEDURE — 71045 X-RAY EXAM CHEST 1 VIEW: CPT

## 2023-10-31 PROCEDURE — 70450 CT HEAD/BRAIN W/O DYE: CPT | Performed by: EMERGENCY MEDICINE

## 2023-10-31 PROCEDURE — 70496 CT ANGIOGRAPHY HEAD: CPT | Performed by: EMERGENCY MEDICINE

## 2023-10-31 NOTE — ED INITIAL ASSESSMENT (HPI)
Patient complains of chest pain since today, states was writing something down and felt like \"someone else\" was writing, states both hands \"feel different\" and are tingling, a&ox3, patient admits she is a jennifer

## 2023-10-31 NOTE — TELEPHONE ENCOUNTER
Pt thinks she just had a seizure or stroke  Felt odd in her head   She was writing when this happened, writing became gibberish    What should she do

## 2023-10-31 NOTE — TELEPHONE ENCOUNTER
Spoke with patient states she was trying to write an appointment on the calendar for her , and suddenly she couldn't write. States she couldn't control what was happening, could only make a line. States her head felt weird, and she could not write her name. States she was a little confused, but has early Alzheimer's. Denies headache, hemiparesis, speech changes, dizziness. Does not think there is a facial droop.  Patient able to speak clearly to this RN. States she thinks it has gotten better, but her head still feels weird, and she feels unsteady.      States this occurred about 4:05pm  States she takes marijuana for back pain and smoked a little after this incidence occurred.    Advised ED for evaluation of possible stroke/TIA. Patient not really wanting to go, states she will go if it comes back. Advised patient go to ED now, and that her  can take her instead of ambulance. Patient agreeable with ED.     OZI to Dr. Yin    Nursing  11/1

## 2023-11-01 ENCOUNTER — APPOINTMENT (OUTPATIENT)
Dept: CV DIAGNOSTICS | Facility: HOSPITAL | Age: 73
End: 2023-11-01
Attending: Other
Payer: MEDICARE

## 2023-11-01 ENCOUNTER — TELEPHONE (OUTPATIENT)
Dept: NEUROLOGY | Facility: CLINIC | Age: 73
End: 2023-11-01

## 2023-11-01 VITALS
HEIGHT: 65 IN | TEMPERATURE: 99 F | SYSTOLIC BLOOD PRESSURE: 116 MMHG | HEART RATE: 60 BPM | RESPIRATION RATE: 16 BRPM | BODY MASS INDEX: 22.38 KG/M2 | DIASTOLIC BLOOD PRESSURE: 77 MMHG | WEIGHT: 134.31 LBS | OXYGEN SATURATION: 95 %

## 2023-11-01 LAB
ANION GAP SERPL CALC-SCNC: 8 MMOL/L (ref 0–18)
ATRIAL RATE: 60 BPM
BASOPHILS # BLD AUTO: 0.02 X10(3) UL (ref 0–0.2)
BASOPHILS NFR BLD AUTO: 0.5 %
BUN BLD-MCNC: 16 MG/DL (ref 9–23)
BUN/CREAT SERPL: 12.7 (ref 10–20)
CALCIUM BLD-MCNC: 9.3 MG/DL (ref 8.7–10.4)
CHLORIDE SERPL-SCNC: 103 MMOL/L (ref 98–112)
CHOLEST SERPL-MCNC: 148 MG/DL (ref ?–200)
CO2 SERPL-SCNC: 26 MMOL/L (ref 21–32)
CREAT BLD-MCNC: 1.26 MG/DL
DEPRECATED RDW RBC AUTO: 41.4 FL (ref 35.1–46.3)
EGFRCR SERPLBLD CKD-EPI 2021: 45 ML/MIN/1.73M2 (ref 60–?)
EOSINOPHIL # BLD AUTO: 0.18 X10(3) UL (ref 0–0.7)
EOSINOPHIL NFR BLD AUTO: 4.5 %
ERYTHROCYTE [DISTWIDTH] IN BLOOD BY AUTOMATED COUNT: 12.6 % (ref 11–15)
EST. AVERAGE GLUCOSE BLD GHB EST-MCNC: 103 MG/DL (ref 68–126)
GLUCOSE BLD-MCNC: 115 MG/DL (ref 70–99)
HBA1C MFR BLD: 5.2 % (ref ?–5.7)
HCT VFR BLD AUTO: 38.1 %
HDLC SERPL-MCNC: 37 MG/DL (ref 40–59)
HGB BLD-MCNC: 12.7 G/DL
IMM GRANULOCYTES # BLD AUTO: 0.01 X10(3) UL (ref 0–1)
IMM GRANULOCYTES NFR BLD: 0.2 %
LDLC SERPL CALC-MCNC: 84 MG/DL (ref ?–100)
LYMPHOCYTES # BLD AUTO: 1.31 X10(3) UL (ref 1–4)
LYMPHOCYTES NFR BLD AUTO: 32.6 %
MCH RBC QN AUTO: 30.2 PG (ref 26–34)
MCHC RBC AUTO-ENTMCNC: 33.3 G/DL (ref 31–37)
MCV RBC AUTO: 90.7 FL
MONOCYTES # BLD AUTO: 0.43 X10(3) UL (ref 0.1–1)
MONOCYTES NFR BLD AUTO: 10.7 %
NEUTROPHILS # BLD AUTO: 2.07 X10 (3) UL (ref 1.5–7.7)
NEUTROPHILS # BLD AUTO: 2.07 X10(3) UL (ref 1.5–7.7)
NEUTROPHILS NFR BLD AUTO: 51.5 %
NONHDLC SERPL-MCNC: 111 MG/DL (ref ?–130)
NT-PROBNP SERPL-MCNC: 19 PG/ML (ref ?–125)
OSMOLALITY SERPL CALC.SUM OF ELEC: 286 MOSM/KG (ref 275–295)
P AXIS: 32 DEGREES
P-R INTERVAL: 228 MS
PLATELET # BLD AUTO: 172 10(3)UL (ref 150–450)
POTASSIUM SERPL-SCNC: 3.5 MMOL/L (ref 3.5–5.1)
Q-T INTERVAL: 436 MS
QRS DURATION: 100 MS
QTC CALCULATION (BEZET): 436 MS
R AXIS: 35 DEGREES
RBC # BLD AUTO: 4.2 X10(6)UL
SODIUM SERPL-SCNC: 137 MMOL/L (ref 136–145)
T AXIS: 46 DEGREES
TRIGL SERPL-MCNC: 155 MG/DL (ref 30–149)
VENTRICULAR RATE: 60 BPM
VLDLC SERPL CALC-MCNC: 25 MG/DL (ref 0–30)
WBC # BLD AUTO: 4 X10(3) UL (ref 4–11)

## 2023-11-01 PROCEDURE — 93306 TTE W/DOPPLER COMPLETE: CPT | Performed by: OTHER

## 2023-11-01 PROCEDURE — 99239 HOSP IP/OBS DSCHRG MGMT >30: CPT | Performed by: HOSPITALIST

## 2023-11-01 PROCEDURE — 99223 1ST HOSP IP/OBS HIGH 75: CPT | Performed by: OTHER

## 2023-11-01 RX ORDER — ATORVASTATIN CALCIUM 40 MG/1
40 TABLET, FILM COATED ORAL NIGHTLY
Status: DISCONTINUED | OUTPATIENT
Start: 2023-11-01 | End: 2023-11-01

## 2023-11-01 RX ORDER — ONDANSETRON 2 MG/ML
4 INJECTION INTRAMUSCULAR; INTRAVENOUS EVERY 6 HOURS PRN
Status: DISCONTINUED | OUTPATIENT
Start: 2023-11-01 | End: 2023-11-01

## 2023-11-01 RX ORDER — POTASSIUM CHLORIDE 20 MEQ/1
40 TABLET, EXTENDED RELEASE ORAL EVERY 4 HOURS
Status: COMPLETED | OUTPATIENT
Start: 2023-11-01 | End: 2023-11-01

## 2023-11-01 RX ORDER — METOCLOPRAMIDE HYDROCHLORIDE 5 MG/ML
10 INJECTION INTRAMUSCULAR; INTRAVENOUS EVERY 8 HOURS PRN
Status: DISCONTINUED | OUTPATIENT
Start: 2023-11-01 | End: 2023-11-01

## 2023-11-01 RX ORDER — ESCITALOPRAM OXALATE 10 MG/1
10 TABLET ORAL DAILY
Status: DISCONTINUED | OUTPATIENT
Start: 2023-11-01 | End: 2023-11-01

## 2023-11-01 RX ORDER — MAGNESIUM HYDROXIDE/ALUMINUM HYDROXICE/SIMETHICONE 120; 1200; 1200 MG/30ML; MG/30ML; MG/30ML
30 SUSPENSION ORAL 4 TIMES DAILY PRN
Status: DISCONTINUED | OUTPATIENT
Start: 2023-11-01 | End: 2023-11-01

## 2023-11-01 RX ORDER — ACETAMINOPHEN 650 MG/1
650 SUPPOSITORY RECTAL EVERY 4 HOURS PRN
Status: DISCONTINUED | OUTPATIENT
Start: 2023-11-01 | End: 2023-11-01

## 2023-11-01 RX ORDER — LORAZEPAM 2 MG/ML
1 INJECTION INTRAMUSCULAR EVERY 10 MIN PRN
Status: DISCONTINUED | OUTPATIENT
Start: 2023-11-01 | End: 2023-11-01

## 2023-11-01 RX ORDER — METOCLOPRAMIDE HYDROCHLORIDE 5 MG/ML
5 INJECTION INTRAMUSCULAR; INTRAVENOUS EVERY 8 HOURS PRN
Status: DISCONTINUED | OUTPATIENT
Start: 2023-11-01 | End: 2023-11-01

## 2023-11-01 RX ORDER — PANTOPRAZOLE SODIUM 40 MG/1
40 TABLET, DELAYED RELEASE ORAL
Status: DISCONTINUED | OUTPATIENT
Start: 2023-11-01 | End: 2023-11-01

## 2023-11-01 RX ORDER — ZOLPIDEM TARTRATE 5 MG/1
5 TABLET ORAL NIGHTLY PRN
Status: DISCONTINUED | OUTPATIENT
Start: 2023-11-01 | End: 2023-11-01

## 2023-11-01 RX ORDER — ACETAMINOPHEN 325 MG/1
650 TABLET ORAL EVERY 6 HOURS PRN
Status: DISCONTINUED | OUTPATIENT
Start: 2023-11-01 | End: 2023-11-01

## 2023-11-01 RX ORDER — LABETALOL HYDROCHLORIDE 5 MG/ML
10 INJECTION, SOLUTION INTRAVENOUS EVERY 10 MIN PRN
Status: DISCONTINUED | OUTPATIENT
Start: 2023-11-01 | End: 2023-11-01

## 2023-11-01 RX ORDER — SODIUM CHLORIDE 9 MG/ML
INJECTION, SOLUTION INTRAVENOUS CONTINUOUS
Status: DISCONTINUED | OUTPATIENT
Start: 2023-11-01 | End: 2023-11-01

## 2023-11-01 RX ORDER — AMLODIPINE BESYLATE 2.5 MG/1
2.5 TABLET ORAL DAILY
Status: DISCONTINUED | OUTPATIENT
Start: 2023-11-01 | End: 2023-11-01

## 2023-11-01 RX ORDER — CLOPIDOGREL BISULFATE 75 MG/1
75 TABLET ORAL DAILY
Status: DISCONTINUED | OUTPATIENT
Start: 2023-11-02 | End: 2023-11-01

## 2023-11-01 RX ORDER — HEPARIN SODIUM 5000 [USP'U]/ML
5000 INJECTION, SOLUTION INTRAVENOUS; SUBCUTANEOUS EVERY 12 HOURS SCHEDULED
Status: DISCONTINUED | OUTPATIENT
Start: 2023-11-01 | End: 2023-11-01

## 2023-11-01 RX ORDER — HYDRALAZINE HYDROCHLORIDE 20 MG/ML
10 INJECTION INTRAMUSCULAR; INTRAVENOUS EVERY 2 HOUR PRN
Status: DISCONTINUED | OUTPATIENT
Start: 2023-11-01 | End: 2023-11-01

## 2023-11-01 RX ORDER — ASPIRIN 81 MG/1
81 TABLET, CHEWABLE ORAL DAILY
Status: DISCONTINUED | OUTPATIENT
Start: 2023-11-01 | End: 2023-11-01

## 2023-11-01 RX ORDER — ACETAMINOPHEN 325 MG/1
650 TABLET ORAL EVERY 4 HOURS PRN
Status: DISCONTINUED | OUTPATIENT
Start: 2023-11-01 | End: 2023-11-01

## 2023-11-01 RX ORDER — ROPINIROLE 1 MG/1
2 TABLET, FILM COATED ORAL NIGHTLY
Status: DISCONTINUED | OUTPATIENT
Start: 2023-11-01 | End: 2023-11-01

## 2023-11-01 RX ORDER — HALOPERIDOL 5 MG/ML
2 INJECTION INTRAMUSCULAR EVERY 6 HOURS PRN
Status: DISCONTINUED | OUTPATIENT
Start: 2023-11-01 | End: 2023-11-01

## 2023-11-01 RX ORDER — LAMOTRIGINE 150 MG/1
150 TABLET ORAL 2 TIMES DAILY
Status: DISCONTINUED | OUTPATIENT
Start: 2023-11-01 | End: 2023-11-01

## 2023-11-01 RX ORDER — DONEPEZIL HYDROCHLORIDE 10 MG/1
10 TABLET, FILM COATED ORAL NIGHTLY
Status: DISCONTINUED | OUTPATIENT
Start: 2023-11-01 | End: 2023-11-01

## 2023-11-01 RX ORDER — ALLOPURINOL 100 MG/1
100 TABLET ORAL DAILY
Status: DISCONTINUED | OUTPATIENT
Start: 2023-11-01 | End: 2023-11-01

## 2023-11-01 RX ORDER — CLOPIDOGREL BISULFATE 75 MG/1
300 TABLET ORAL ONCE
Status: COMPLETED | OUTPATIENT
Start: 2023-11-01 | End: 2023-11-01

## 2023-11-01 NOTE — CM/SW NOTE
Patient failed inpatient criteria. Second level of review completed and supports observation. UR committee in agreement. Discussed with Dr. Remy Parker  who approves observation status. Observation status and  MOON  notice explained  to the patient . Copy placed in chart  Order for observation in place.     Gil MESA, Utilization Review   Ext [phone]

## 2023-11-01 NOTE — SLP NOTE
ADULT SWALLOWING EVALUATION    ASSESSMENT    ASSESSMENT/OVERALL IMPRESSION:    SLP BSSE orders received and acknowledged. A swallow evaluation warranted per stroke protocol. The pt was admitted with diagnosis of hand weakness. Chart reviewed and collaborated with RN,Priscilla. Swallowing evaluation approved and RN reports pt tolerated RN dysphagia screening. Per MD noted, \"Nadiya Knapp is a(n) 75year old female, a past medical history significant for chronic atrial fibrillation, seizure disorder hypertension hyperlipidemia and dementia presents with a complaint of inability to control her right arm for a brief period of time. Patient claims she was writing down an appointment for her  on the calendar when the arm started to move on its own, claims she was holding on to the pencil at the time however unable to write which she intended to and ended up to [address] on it. Started feel anxious, took marijuana which calmed her down 10 minutes later was able to write without difficulty. \"  Pt seen at bedside and agreeable to participate in BSSE. Pt denies any pain, but reports a tingling sensation in her fingertips on both hands. Pt is alert O x 4, afebrile with clear vocal quality, tolerating room air with oxygen saturation at 97% prior to the start of po trials. No family was present at the time of testing. Pt with hx of dysphagia at [address] [place] [address], last seen for BSSE on 2/8/23, recommending regular solids and thin liquids. Pt reports an occasional slowing of food in throat. Pt positioned upright to 90 degrees in bedside chair. Oral [facility] examination completed. Face symmetrical at rest with functional ROM/strength with labial, lingual, and buccal movements. The pt denies any numbness in the face, labial, or lingual.  Assessed pt with po trials of puree, soft/hard solids, and thin liquids via open cup/straw. Pt with functional oral acceptance and bilabial seal across all trials.  Oral phase of swallow appears timely with adequate bolus control and propulsion. Pt with intact bite, mastication of solids, and timely A-P transit with solid bolus. Minimal oral stasis was cleared with a multiple dry swallow. Pharyngeal phase of the swallow appears timely with adequate hyolaryngeal elevation/excursion. Oxygen status remained >96% throughout the entire evaluation. No overt clinical signs of aspiration observed with any consistency (no coughing, no throat clearing, and vocal quality remains dry). Pt denies any globus sensation post the swallow during this test.  The pt was left at bedside sitting in the chair watching television with call light in reach. At this time, pt presents with a functional oropharyngeal swallow with known history of reflux. Recommend a regular solid diet and thin liquids/may use straw with adherence to safe swallowing compensatory/reflux strategies. Pt reports preferred learning style of verbal education. Results and recommendations reviewed with pt and RN. Provided education via verbal and written on results of BSSE. The pt acknowledged understanding of the education and was able to verbalize/demonstrate precautions at the end of the session. SLP collaborated with RN for diet orders. Diet recommendations and swallowing precautions/strategies posted on white board at bedside. FCM SCORE: 7/7       Plan: Will follow up 1-2x to ensure safety with diet and educate pt on compensatory strategies/swallow precautions. Video swallow study to be completed if CXR declines, increase in clinical signs of aspiration, and/or MD desires. SLE as needed pending MRI. RECOMMENDATIONS   Diet Recommendations - Solids: Regular  Diet Recommendations - Liquids: Thin Liquids                        Compensatory Strategies Recommended: Slow rate; Alternate consistencies;Small bites and sips;Multiple swallows  Aspiration Precautions: Upright position; Slow rate;Small bites and sips  Medication Administration Recommendations: One pill at a time  Treatment Plan/Recommendations: Dysphagia therapy; Aspiration precautions  Discharge Recommendations/Plan: Undetermined    HISTORY   MEDICAL HISTORY  Reason for Referral: Stroke protocol;R/O aspiration    Problem List  Principal Problem:    Hand weakness      Past Medical History  Past Medical History:   Diagnosis Date    Abnormal PET scan of head 05/13/2016    Patient had abnormal PET scan of brain May 6, 2016 showing bilateral temporoparietal beta amyloid deposition in the gray matter consistent with Alzheimer's disease. Alzheimer disease ([address]     Anxiety 2018    Mild    Aortic atherosclerosis ([address] 03/11/2016    Aortic atherosclerosis seen on CT scan October 2014. Arrhythmia     A-Fib    Arthritis 10 years    Mild    Atrophic kidney 03/11/2016    CT scan October 8, 2014 shows left kidney smaller than the right. Bilateral areas of renal parenchymal scar. Symmetric nephrograms. Moderate calcific atherosclerosis of aorta. Back problem     spinal stenosis    Carpal tunnel syndrome 2011    as per NG    Cataract     Chronic atrial fibrillation (HCC)     Crohn disease ([address]     Depression     Diverticulosis     as per NG    Esophageal reflux 2018    Mild    Family history of colon cancer     Maternal grandmother had colon cancer in her 76s.     Gout     Hepatic steatosis 03/11/2016    High blood pressure     High cholesterol     as per NG    History of colonic polyps 07/15/2011    as per NG    History of migraine headaches     as per NG    History of total hysterectomy with removal of both tubes and ovaries     as per NG    Hyperlipidemia     Incontinence     DUE TO CYSTOCELE/RECTOCELE    Lymphocytic colitis 2011    as per NG    Migraine 09/21/2012    Mild cognitive impairment     as per NG    Obesity 2018    25 lbs overweight    Obstructive sleep apnea 12/30/2013    Pacemaker     Pseudobulbar affect 02/24/2017    Renal disorder     stage 3    Restless legs syndrome 05/16/2013    Seizure disorder ([facility]     last one 2/2019    Sleep apnea     Tendon tear 2012    Surgically repaired; as per NG    Unspecified essential hypertension     as per NG    Visual impairment     reading glasses       Prior Living Situation: Home with spouse  Diet Prior to Admission: Regular; Thin liquids  Precautions: Aspiration; Reflux    Patient/Family Goals: To eat    SWALLOWING HISTORY  Current Diet Consistency: Regular; Thin liquids  Dysphagia History: Pt with hx of dysphagia at [address] [place] [address], last seen for BSSE on 2/8/23, recommending regular solids and thin liquids. Pt reports an occasional slowing of food in throat. Imaging Results:   CXR 10/31/23:  CONCLUSION:   Negative for radiographically evident acute intrathoracic process. Left chest wall dual lead pacemaker. Dictated by (CST): Jacqueline Dotson MD on 10/31/2023 at 5:45 PM       Finalized by (CST): Jacqueline Dotson MD on 10/31/2023 at 5:46 PM     CT Brain 10/31/23:  CONCLUSION:   1. No acute intracranial process by noncontrast CT technique. 2. Intracranial atherosclerosis. 3. Partially imaged probable left functional endoscopic sinus surgery. There are morphologic changes of low-grade chronic left maxillary sinusitis. 4. Lesser incidental findings as above. Dictated by (CST): Jacqueline Dotson MD on 10/31/2023 at 7:14 PM       Finalized by (CST): aJcqueline Dotson MD on 10/31/2023 at 7:17 PM   SUBJECTIVE   The pt is alert and O x 4. OBJECTIVE   ORAL MOTOR EXAMINATION  Dentition: Upper partials; Lower partials  Symmetry: Within Functional Limits  Strength: Within Functional Limits  Tone: Within Functional Limits  Range of Motion: Within Functional Limits  Rate of Motion: Within Functional Limits    Voice Quality: Clear  Respiratory Status: Unlabored  Consistencies Trialed: Thin liquids;Puree;Hard solid  Method of Presentation: Self presentation;Spoon;Cup;Straw;Single sips  Patient Positioning: Upright;Midline    Oral Phase of Swallow:  Within Functional Limits                      Pharyngeal Phase of Swallow: Within Functional Limits           (Please note: Silent aspiration cannot be evaluated clinically. Videofluoroscopic Swallow Study is required to rule-out silent aspiration.)                     GOALS  Goal #1 The patient will tolerate regular solid consistency and thin liquids without overt signs or symptoms of aspiration with 100 % accuracy over 1 session(s). In Progress   Goal #2 The patient will utilize compensatory strategies as outlined by  BSSE (clinical evaluation) including Slow rate, Small bites, Small sips, Multiple swallows, Alternate liquids/solids, Upright 90 degrees, Upright 90 degrees 30 mins after meal, extra sauce/gravy with dry foods with mild assistance 95 % of the time across 1 sessions. In Progress     FOLLOW UP  Treatment Plan/Recommendations: Dysphagia therapy; Aspiration precautions  Number of Visits to Meet Established Goals: 1  Follow Up Needed (Documentation Required): Yes  SLP Follow-up Date: 11/02/23    Thank you for your referral.   If you have any questions, please contact     Angeles Lawler MS/MEHUL-SLP  Speech Language Pathologist  [address] [facility]  EXT.  [id number]

## 2023-11-01 NOTE — CM/SW NOTE
MD protocol Order for [place] eval.  PT/OT recommending outpt PT. / to remain available for support and/or discharge planning. Jaun Epps.  Yasmine Blount RN, BSN  Nurse   [phone]

## 2023-11-01 NOTE — PLAN OF CARE
Patient was admitted from ED for hand weakness, and rule out for stroke. History of dementia and Crohns. Has Watchman pacemaker device. Denies pain. Ambulates well.seizure precautions in place. NIH assessment performed by CV . Passed swallow test , NPO order was discontinued.     Problem: Patient Centered Care  Goal: Patient preferences are identified and integrated in the patient's plan of care  Description: Interventions:  - What would you like us to know as we care for you?   - Provide timely, complete, and accurate information to patient/family  - Incorporate patient and family knowledge, values, beliefs, and cultural backgrounds into the planning and delivery of care  - Encourage patient/family to participate in care and decision-making at the level they choose  - Honor patient and family perspectives and choices  Outcome: Progressing     Problem: PAIN - ADULT  Goal: Verbalizes/displays adequate comfort level or patient's stated pain goal  Description: INTERVENTIONS:  - Encourage pt to monitor pain and request assistance  - Assess pain using appropriate pain scale  - Administer analgesics based on type and severity of pain and evaluate response  - Implement non-pharmacological measures as appropriate and evaluate response  - Consider cultural and social influences on pain and pain management  - Manage/alleviate anxiety  - Utilize distraction and/or relaxation techniques  - Monitor for opioid side effects  - Notify MD/LIP if interventions unsuccessful or patient reports new pain  - Anticipate increased pain with activity and pre-medicate as appropriate  Outcome: Progressing

## 2023-11-01 NOTE — PLAN OF CARE
Patient alert and oriented x 4. Patient denied pain. Q2 hour neuro assessment. Up independently. Voiding freely. Patients diet is cardiac. SCDs, heparin and AsA for VTE prophylaxis. Vital signs monitored. Cough and deep breathe. Bed in lowest position, call light within reach, and non skid socks in place for fall precautions. All needs within reach. Rounding done by nursing staff. Continued to monitor for signs and symptoms of stoke. Plan is to discharge once 2D echo is done. 17:33- Patient cleared for discharge. After visit summary reviewed with patient. Aware to  medications at pharmacy on file. Personal belongings sent with patient. Transported via private car. Problem: Patient Centered Care  Goal: Patient preferences are identified and integrated in the patient's plan of care  Description: Interventions:  - What would you like us to know as we care for you?  Patient has pacemaker that's not compatible with MRI  - Provide timely, complete, and accurate information to patient/family  - Incorporate patient and family knowledge, values, beliefs, and cultural backgrounds into the planning and delivery of care  - Encourage patient/family to participate in care and decision-making at the level they choose  - Honor patient and family perspectives and choices  Outcome: Progressing     Problem: PAIN - ADULT  Goal: Verbalizes/displays adequate comfort level or patient's stated pain goal  Description: INTERVENTIONS:  - Encourage pt to monitor pain and request assistance  - Assess pain using appropriate pain scale  - Administer analgesics based on type and severity of pain and evaluate response  - Implement non-pharmacological measures as appropriate and evaluate response  - Consider cultural and social influences on pain and pain management  - Manage/alleviate anxiety  - Utilize distraction and/or relaxation techniques  - Monitor for opioid side effects  - Notify MD/LIP if interventions unsuccessful or patient reports new pain  - Anticipate increased pain with activity and pre-medicate as appropriate  Outcome: Progressing     Problem: Patient/Family Goals  Goal: Patient/Family Long Term Goal  Description: Patient's Long Term Goal: To be discharged     Interventions:  - Get clearance   - See additional Care Plan goals for specific interventions  Outcome: Progressing  Goal: Patient/Family Short Term Goal  Description: Patient's Short Term Goal: Monitor for symptoms of stroke     Interventions:   - assess patient every 2 hours for signs and symptoms of stroke   - See additional Care Plan goals for specific interventions  Outcome: Progressing

## 2023-11-01 NOTE — ED QUICK NOTES
MRI called, made writer aware that they are unable to complete MRI at this time r/t pacemaker. MD made aware.

## 2023-11-01 NOTE — TELEPHONE ENCOUNTER
Patient was admitted to for R hand weakness and advised to follow up in 4-6 weeks.  Please review Dr. Liriano's schedule and advise.  Thanks.  Reviewed and electronically signed by: ARRON Diop

## 2023-11-01 NOTE — PHYSICAL THERAPY NOTE
PHYSICAL THERAPY EVALUATION - INPATIENT     Room Number: 428/428-A  Evaluation Date: 11/1/2023  Type of Evaluation: Initial   Physician Order: PT Eval and Treat    Presenting Problem: R hand weakness  Co-Morbidities : chronic atrial fibrillation, seizure disorder hypertension hyperlipidemia and dementia. Per neuro: complex neuro history- active with   Reason for Therapy: Mobility Dysfunction and Discharge Planning    PHYSICAL THERAPY ASSESSMENT     Patient is a 75year old female admitted 10/31/2023 for R hand weakness. Medical history significant for seizure disorder, HTN and dementia. Per neuro EMR: pt with complex neuro history and active with . Patient's current functional deficits include impaired bed mobility, transfers, ambulation, UE and LE coordination, balance and stair negotiation, which are below the patient's pre-admission status. Patient received supine in bed. RN approved activity. Therapist educated pt on POC and physiological benefits of mobilization. Patient agreeable to participate. Mildly distracted requiring cues to attend for safety. Denies pain. Reports new onset tingling in fingertips of bilateral Ue's. Denies RUE weakness at this time, \"I felt like someone else was controlling my hand yesterday. \" Although coordination impaired bilaterally (? Baseline), strength WNL. Bed mobility: Supervision supine>sit. Transfers: SBA with and without use of RW. Cues for pacing upon standing to allow body time to acclimate to change in position. Ambulation: Min without use of DME 250ft. Unsteady, ataxic gait pattern with intermittent scissoring of RLE in front of left. High guard position. Improved fluidity and stability of ambulation with trial use of RW. Continued to encourage use of DME - pt with good carryover 50ft. Patient in chair at end of session with needs in reach and neurologist at bedside. Chair alarm activated.     The patient's Approx Degree of Impairment: 41.77% has been calculated based on documentation in the [facility] '6 clicks' Inpatient Basic Mobility Short Form. Research supports that patients with this level of impairment may benefit from home with spouse supervision and Outpatient PT- neuro. Patient will benefit from continued IP PT services to address these deficits in preparation for discharge. DISCHARGE RECOMMENDATIONS  PT Discharge Recommendations: Outpatient PT    PLAN  PT Treatment Plan: Body mechanics; Bed mobility; Endurance; Energy conservation; Family education;Patient education;Gait training;Neuromuscular re-educate;Range of motion;Stoop training;Strengthening;Stair training;Transfer training;Balance training  Rehab Potential : Good  Frequency (Obs): 5x/week       PHYSICAL THERAPY MEDICAL/SOCIAL HISTORY     Problem List  Principal Problem:    Hand weakness      HOME SITUATION  Home Situation  Type of Home: House  Home Layout: Two level;Bed/bath upstairs (16 stairs)  Stairs to Enter : 1  Railing: Yes  Stairs to Bedroom: 16  Railing: Yes  Lives With: Spouse  Drives: Yes  Patient Owned Equipment: Wheelchair;Rolling walker;Cane (did not use prior to admission)  Patient Regularly Uses: Reading glasses     Prior Level of [place]: Independent in ADL's and IADL's +driving. SUBJECTIVE  \"I still drive but it's starting to scare me a little.  I have dementia\"     PHYSICAL THERAPY EXAMINATION     OBJECTIVE  Precautions: Seizure  Fall Risk: Standard fall risk      PAIN ASSESSMENT  Ratin          COGNITION  Overall Cognitive Status:  WFL - within functional limits  Following Commands:  follows multistep commands with increased time  Awareness of Deficits:  decreased awareness of deficits    RANGE OF MOTION AND STRENGTH ASSESSMENT  Upper extremity ROM and strength are within functional limits   Lower extremity ROM is within functional limits   Lower extremity strength is within functional limits     BALANCE  Static Sitting: Good  Dynamic Sitting: Fair +  Static Standing: Poor +  Dynamic Standing: Poor     NEUROLOGICAL FINDINGS  Coordination - Finger to Nose: Left decreased accuracy; Right decreased accuracy  Coordination - Heel to Shin: Left decreased accuracy; Right decreased accuracy  Coordination - Rapid Alternating Movement: Left decreased accuracy; Right decreased accuracy  Sensation: reports tingling on tips of fingers bilaterally          ACTIVITY TOLERANCE  Pulse: 60  Heart Rate Source: Monitor     BP: 119/67  BP Location: Right arm  BP Method: Automatic  Patient Position: Lying    O2 WALK  Oxygen Therapy  SPO2% on Room Air at Rest: 100  SPO2% Ambulation on Room Air: 94    AM-PAC '6-Clicks' INPATIENT SHORT FORM - BASIC MOBILITY  How much difficulty does the patient currently have. .. Patient Difficulty: Turning over in bed (including adjusting bedclothes, sheets and blankets)?: None   Patient Difficulty: Sitting down on and standing up from a chair with arms (e.g., wheelchair, bedside commode, etc.): A Little   Patient Difficulty: Moving from lying on back to sitting on the side of the bed?: A Little   How much help from another person does the patient currently need. .. Help from Another: Moving to and from a bed to a chair (including a wheelchair)?: A Little   Help from Another: Need to walk in hospital room?: A Little   Help from Another: Climbing 3-5 steps with a railing?: A Little     AM-PAC Score:  Raw Score: 19   Approx Degree of Impairment: 41.77%   Standardized Score (AM-PAC Scale): 45.44   CMS Modifier (G-Code): CK    FUNCTIONAL ABILITY STATUS  Functional Mobility/Gait Assessment  Gait Assistance: Contact guard assist;Minimum assistance  Distance (ft): 250ft  Assistive Device: Rolling walker;None  Pattern: Ataxic;Scissoring (unsteady)    Exercise/Education Provided:  Bed mobility  Body mechanics  Energy conservation  Functional activity tolerated  Gait training  Posture  Strengthening  Transfer training    Patient End of Session: Up in chair; With [facility] staff;Needs met;Call light within reach;RN aware of session/findings; Alarm set    CURRENT GOALS    Goals to be met by: 11/15/23  Patient Goal Patient's self-stated goal is: return to PLOF   Goal #1 Patient is able to demonstrate supine - sit EOB @ level: independent     Goal #1   Current Status    Goal #2 Patient is able to demonstrate transfers Sit to/from Stand at assistance level: modified independent with  LRAD     Goal #2  Current Status    Goal #3 Patient is able to ambulate 300 feet with assist device:  LRAD  at assistance level: modified independent   Goal #3   Current Status    Goal #4 Patient will negotiate 12 stairs/one curb w/ assistive device and supervision   Goal #4   Current Status    Goal #5 Patient to demonstrate independence with home activity/exercise instructions provided to patient in preparation for discharge.    Goal #5   Current Status      Patient Evaluation Complexity Level:  History Moderate - 1 or 2 personal factors and/or co-morbidities   Examination of body systems Moderate - addressing a total of 3 or more elements   Clinical Presentation Moderate - Evolving   Clinical Decision Making Moderate Complexity       Gait Training: 15 minutes  Therapeutic Activity: 10 minutes

## 2023-11-01 NOTE — DISCHARGE SUMMARY
[facility]    Discharge Summary    Adelita New Patient Status:  Inpatient    3/28/1950 MRN [id number]   Location [facility] 4W/SW/SE Attending Keila Medina MD   Hosp Day # 0 PCP Stephenie Rodríguez MD     Date of Admission: 10/31/2023   Date of Discharge: 2023    Hospital Discharge Diagnoses: Acute right hand weakness     Lace+ Score: 72  59-90 High Risk  29-58 Medium Risk  0-28   Low Risk. TCM Follow-Up Recommendation:  LACE 29-58: Moderate Risk of readmission after discharge from the hospital.        Admitting Diagnosis: Hand weakness [R29.898]    Disposition: Home    Discharge Diagnosis: . Principal Problem:    Hand weakness      Hospital Course:   Reason for Admission:   Per Dr. Bundy Anson is a(n) 75year old female, a past medical history significant for chronic atrial fibrillation, seizure disorder hypertension hyperlipidemia and dementia presents with a complaint of inability to control her right arm for a brief period of time. Patient claims she was writing down an appointment for her  on the calendar when the arm started to move on its own, claims she was holding on to the pencil at the time however unable to write which she intended to and ended up to [id number] on it. Started feel anxious, took marijuana which calmed her down 10 minutes later was able to write without difficulty. Denies any associated blurry vision slurred speech headache dizziness focal numbness weakness or tingling. Claims to have a seizure disorder however has never experienced something similar in the past.    Discharge Physical Exam:   Physical Exam:    General: No acute distress. Respiratory: Clear to auscultation bilaterally. No wheezes. No rhonchi. Cardiovascular: S1, S2. Regular rate and rhythm. No murmurs, rubs or gallops. Abdomen: Soft, nontender, nondistended. Positive bowel sounds. No rebound or guarding. Neurologic: No focal neurological deficits. Musculoskeletal: Moves all extremities. Hospital Course:     Acute TIA  Neuro on consult  CT scan negative for acute findings   Cannot get MRI due to pacemaker- will have it scheduled as an outpatient at [facility] home meds  After echo patient an be discharged  Discussed with Neurology   Follow up with Dr. Gurjit Morataya in 1-2 weeks     Chronic Afib   Has watchman micae     HTN  Continue home meds    Seizure disorder  Continue home meds     Dementia Chronic  On Aricept       Complications: none    Consultants         Provider   Role Specialty     Kae Lovett MD  Consulting Physician NEUROLOGY                Discharge Plan:   Discharge Condition: Stable    Current Discharge Medication List    Home Meds - Unchanged    amLODIPine 2.5 MG Oral Tab  Take 1 tablet (2.5 mg total) by mouth daily. oxybutynin 5 MG Oral Tab  Take 1 tablet (5 mg total) by mouth daily. Pancrelipase, Lip-Prot-Amyl, (CREON) 6000-07913 units Oral Cap DR Particles  Take 2 capsules by mouth 3 (three) times daily with meals. allopurinol 100 MG Oral Tab  Take 1 tablet (100 mg total) by mouth daily. lamoTRIgine 150 MG Oral Tab  Take 1 tablet (150 mg total) by mouth 2 (two) times daily. atorvastatin 20 MG Oral Tab  TAKE ONE TABLET BY MOUTH ONE TIME DAILY AT NIGHT    pantoprazole 40 MG Oral Tab EC  TAKE ONE TABLET BY MOUTH IN THE MORNING BEFORE BREAKFAST FOR THE STOMACH    escitalopram 10 MG Oral Tab  Take 1 tablet (10 mg total) by mouth daily. nadolol 20 MG Oral Tab  Take 1 tablet (20 mg total) by mouth daily. Mirabegron ER (MYRBETRIQ) 25 MG Oral Tablet 24 Hr  Take 1 tablet (25 mg total) by mouth daily. ondansetron 4 MG Oral Tablet Dispersible  Take 1 tablet (4 mg total) by mouth every 8 (eight) hours as needed for Nausea. ROPINIROLE 1 MG Oral Tab  TAKE TWO TABLETS BY MOUTH NIGHTLY    aspirin 81 MG Oral Tab EC  Take 1 tablet (81 mg total) by mouth daily.     Donepezil HCl 10 MG Oral Tab  Take 1 tablet (10 mg total) by mouth nightly. Discharge Diet: As tolerated    Discharge Activity: As tolerated       Discharge Medications        CHANGE how you take these medications        Instructions Prescription details   rOPINIRole 1 MG Tabs  Commonly known as: Requip  What changed: additional instructions      TAKE TWO TABLETS BY MOUTH NIGHTLY   Quantity: 180 tablet  Refills: 3            CONTINUE taking these medications        Instructions Prescription details   allopurinol 100 MG Tabs  Commonly known as: Zyloprim      Take 1 tablet (100 mg total) by mouth daily. Refills: 0     amLODIPine 2.5 MG Tabs  Commonly known as: Norvasc      Take 1 tablet (2.5 mg total) by mouth daily. Refills: 0     aspirin 81 MG Tbec      Take 1 tablet (81 mg total) by mouth daily. Quantity: 1 tablet  Refills: 0     atorvastatin 20 MG Tabs  Commonly known as: Lipitor      TAKE ONE TABLET BY MOUTH ONE TIME DAILY AT NIGHT   Quantity: 90 tablet  Refills: 3     Creon 6000-11222 units Cpep  Generic drug: Pancrelipase (Lip-Prot-Amyl)      Take 2 capsules by mouth 3 (three) times daily with meals. Refills: 0     donepezil 10 MG Tabs  Commonly known as: Aricept      Take 1 tablet (10 mg total) by mouth nightly. Refills: 0     escitalopram 10 MG Tabs  Commonly known as: Lexapro      Take 1 tablet (10 mg total) by mouth daily. Refills: 0     lamoTRIgine 150 MG Tabs  Commonly known as: LaMICtal      Take 1 tablet (150 mg total) by mouth 2 (two) times daily. Quantity: 180 tablet  Refills: 1     Myrbetriq 25 MG Tb24  Generic drug: Mirabegron ER      Take 1 tablet (25 mg total) by mouth daily. Refills: 0     nadolol 20 MG Tabs  Commonly known as: Corgard      Take 1 tablet (20 mg total) by mouth daily. Quantity: 90 tablet  Refills: 3     ondansetron 4 MG Tbdp  Commonly known as: Zofran-ODT      Take 1 tablet (4 mg total) by mouth every 8 (eight) hours as needed for Nausea.    Quantity: 20 tablet  Refills: 0     oxybutynin 5 MG Tabs  Commonly known as: Ditropan      Take 1 tablet (5 mg total) by mouth daily. Refills: 0     pantoprazole 40 MG Tbec  Commonly known as: Protonix      TAKE ONE TABLET BY MOUTH IN THE MORNING BEFORE BREAKFAST FOR THE STOMACH   Quantity: 90 tablet  Refills: 3              Follow up: Follow-up Information       Imtiaz Rangel DO. Schedule an appointment as soon as possible for a visit in 1 month(s). Specialty: NEUROLOGY  Why: Your provider Dr. Gurjit Morataya stated that they will give you a call to schedule your Neuro follow-up appointment once you are home. If you do not hear from them within a day or two, please call them to schedule your appointment for 4-6 weeks.   Contact information:  [address] [facility]  [phone]                             Follow up Labs and imaging:         Time spent:  > 30 minutes    Lauren Barrera MD  11/1/2023

## 2023-11-02 ENCOUNTER — PATIENT OUTREACH (OUTPATIENT)
Dept: CASE MANAGEMENT | Age: 73
End: 2023-11-02

## 2023-11-02 ENCOUNTER — TELEPHONE (OUTPATIENT)
Dept: INTERNAL MEDICINE CLINIC | Facility: CLINIC | Age: 73
End: 2023-11-02

## 2023-11-02 DIAGNOSIS — Z02.9 ENCOUNTERS FOR UNSPECIFIED ADMINISTRATIVE PURPOSE: Primary | ICD-10-CM

## 2023-11-02 DIAGNOSIS — I48.20 CHRONIC ATRIAL FIBRILLATION (HCC): ICD-10-CM

## 2023-11-02 DIAGNOSIS — G40.909 SEIZURE DISORDER (HCC): ICD-10-CM

## 2023-11-02 DIAGNOSIS — F03.90 DEMENTIA, UNSPECIFIED DEMENTIA SEVERITY, UNSPECIFIED DEMENTIA TYPE, UNSPECIFIED WHETHER BEHAVIORAL, PSYCHOTIC, OR MOOD DISTURBANCE OR ANXIETY (HCC): ICD-10-CM

## 2023-11-02 DIAGNOSIS — R29.898 RIGHT HAND WEAKNESS: ICD-10-CM

## 2023-11-02 PROCEDURE — 1111F DSCHRG MED/CURRENT MED MERGE: CPT

## 2023-11-02 NOTE — PROGRESS NOTES
Initial Post Discharge Follow Up   Discharge Date: 11/1/23  Contact Date: 11/2/2023    Consent Verification:  Assessment Completed With: Patient  HIPAA Verified? Yes    Discharge Dx:   Acute Right Hand weakness  Chronic A-Fib, has watchman device  HTN  Seizure disorder  Dementia    General:   How have you been since your discharge from the hospital? Patient states that she still gets a tight feeling in the middle of her chest, feels like it is a slow tightness and then releases. States it comes and goes, lasts 5-10 seconds. Patient reports that this was occurring while she was in the hospital. States, chest tightness usually comes with walking up the stairs or even just sitting working on something. Patient states that she has a little bit of nausea as well when this pain occurs. Patient reports she has had these episode about 15 times a day for the last two weeks. Patient seemed to loose her train of thought half way through explaining what was going on, NCM would have to cue patient to what was being discussed, she was then able to continue. Patient denies fever/chills, no shortness of breath, no abdominal pain, no nausea/vomiting at present. Patient denies any seizure activity, however, states that she does not have an aura so she does not know when she is going to have a seizure. Patient reports that when she stands too long she feels dizzy and weak and has to sit down. Patient reports she is using a cane to ambulate in the house. Condition update sent to MD's office. NCM instructed patient to change positions frequently, walk as tolerated, rest when needed, stay hydrated and continue to take up to ten deep breaths an hour while awake, or if watching television take a deep breath with every commercial. NCM reviewed discharge instructions, medications, S&S of infection/blood clots with patient, she verbalized understanding of these. Patient denies any further questions or needs at this time.    Do you have any pain since discharge? Yes  Where: Center of chest with radiation to collar bones accompanied by shortness of breath   Rating on pain scale 1-10, 10 being the worst pain you have ever experienced, what is current pain: 2.5/10  Alleviating factors: Patient states that she presses on her chest  Aggravating factors: no  Is the pain manageable at home? Yes  When you were leaving the hospital were your discharge instructions reviewed with you? Yes  How well were your discharge instructions explained to you? On a scale of 1-5   1- Very Poor and 5- Very well   5  Do you have any questions about your discharge instructions? No  Before leaving the hospital was your diagnoses explained to you? Yes  Do you have any questions about your diagnoses? No  Are you able to perform normal daily activities of living as you have prior to your hospital stay (dressing, bathing, ambulating to the bathroom, etc)? yes  (NCM) Was patient given a different diet per AVS? no      Medications:   Current Outpatient Medications   Medication Sig Dispense Refill    amLODIPine 2.5 MG Oral Tab Take 1 tablet (2.5 mg total) by mouth daily. oxybutynin 5 MG Oral Tab Take 1 tablet (5 mg total) by mouth daily. Pancrelipase, Lip-Prot-Amyl, (CREON) 6000-20563 units Oral Cap DR Particles Take 2 capsules by mouth 3 (three) times daily with meals. allopurinol 100 MG Oral Tab Take 1 tablet (100 mg total) by mouth daily. lamoTRIgine 150 MG Oral Tab Take 1 tablet (150 mg total) by mouth 2 (two) times daily. 180 tablet 1    atorvastatin 20 MG Oral Tab TAKE ONE TABLET BY MOUTH ONE TIME DAILY AT NIGHT 90 tablet 3    pantoprazole 40 MG Oral Tab EC TAKE ONE TABLET BY MOUTH IN THE MORNING BEFORE BREAKFAST FOR THE STOMACH 90 tablet 3    escitalopram 10 MG Oral Tab Take 1 tablet (10 mg total) by mouth daily. nadolol 20 MG Oral Tab Take 1 tablet (20 mg total) by mouth daily.  90 tablet 3    Mirabegron ER (MYRBETRIQ) 25 MG Oral Tablet 24 Hr Take 1 tablet (25 mg total) by mouth daily. ondansetron 4 MG Oral Tablet Dispersible Take 1 tablet (4 mg total) by mouth every 8 (eight) hours as needed for Nausea. 20 tablet 0    ROPINIROLE 1 MG Oral Tab TAKE TWO TABLETS BY MOUTH NIGHTLY (Patient taking differently: Take 2 tablets (2 mg total) by mouth nightly. 1 in am + 2 HS) 180 tablet 3    aspirin 81 MG Oral Tab EC Take 1 tablet (81 mg total) by mouth daily. 1 tablet 0    Donepezil HCl 10 MG Oral Tab Take 1 tablet (10 mg total) by mouth nightly. Were there any changes to your current medication(s) noted on the AVS? Yes  If so, were these medication changes discussed with you prior to leaving the hospital? Yes  If a new medication was prescribed:    Was the new medication's purpose & side effects reviewed? No  Do you have any questions about your new medication? No  Did you  your discharge medications when you left the hospital? Yes  Let's go over your medications together to make sure we are not missing anything. Medications Reviewed  Are there any reasons that keep you from taking your medication as prescribed? No  Are you having any concerns with constipation? No  Did patient receive their flu shot (Sept-March)? Yes, at [facility] in [place], along with COVID vaccine, RSV, and Pneumonia shot    Discharge medications reviewed/discussed/and reconciled against outpatient medications with patient. Any changes or updates to medications sent to PCP. Patient Acknowledged     Referrals/orders at D/C:  Referrals/orders placed at D/C? no    DME ordered at D/C? No, patient states she uses a cane for stability. Patient reports she has a cane and       Discharge orders, AVS reviewed and discussed with patient. Any changes or updates to orders sent to PCP.   Patient Acknowledged      SDOH:   Transportation:  Transportation Needs: No Transportation Needs (11/1/2023)      Transportation Needs          Lack of Transportation: No    Financial Strain:  Financial Resource Strain: Low Risk  (11/2/2023)      Financial Resource Strain          Difficulty of Paying Living Expenses: Not on file          Med Affordability: No       Diagnosis specifics:   S&S of Stroke  Remember FAST (F= facial droop, A=arm or leg weakness,  S=changes in speech, T= Terrible headache/time to call 911)    Follow up appointments:      Your appointments       Date & Time Appointment Department [facility])    Dec 06, 2023  2:30 PM CST Exam - Established with Jeremy Bartholomew,  [address], [address], [facility])              [address] [facility] [address] [facility], TOMMIE GOMEZ OF [facility]  [address] [facility]          TCC  Was TCC ordered: No    PCP (If no TCC appointment)  Does patient already have a PCP appointment scheduled? No  NCM Attempted to schedule PCP office TCM appointment with patient   If no appointment scheduled: Explain, NCM attempted to schedule a TCM appointment, however, due to MD's full schedule NCM sent a message to MD's office requesting assistance with scheduling the appointment. Specialist    Does the patient have any other follow up appointment(s) needing to be scheduled? No, NCM confirmed with patient that she has a HFU with Dr. Mayank Carver, neuro on 12/6/23. Is there any reason as to why you cannot make your appointment(s)? No     Needs post D/C:   Now that you are home, are there any needs or concerns you need addressed before your next visit with your PCP?  (DME, meds, questions, etc.): No    Interventions by NCM:   NCM reviewed medications, discharge instructions, S&S of infection/blood clots. Patient instructed to report any new or worsening symptoms, when to call the doctor and when to call 911. Patient verbalized understanding of these. NCM instructed patient to call PCP with any questions or needs, she states she will. NCM sent condition update to PCP.       CCM referral placed:    No, not at this time.     BOOK BY DATE: 11/15/2023

## 2023-11-03 NOTE — TELEPHONE ENCOUNTER
Called and spoke with patient.  Scheduled appointment for 11/10 at 10am.
Discharge Dx:   Acute Right Hand weakness  Chronic A-Fib, has watchman device  HTN  Seizure disorder  Dementia    Condition update:  Patient states that she still gets a tight feeling in the middle of her chest, feels like it is a slow tightness and then releases. States it comes and goes, lasts 5-10 seconds. Patient reports that this was occurring while she was in the hospital. States, chest tightness usually comes with walking up the stairs or even just sitting working on something. Patient states that she has a little bit of nausea as well when this pain occurs. Patient reports she has had these episode about 15 times a day for the last two weeks. Patient seemed to loose her train of thought half way through explaining what was going on, NCM would have to cue patient to what was being discussed, she was then able to continue. 2. Spoke to patient for TCM today. Patient does not have an appointment scheduled at this time. TCM appointment recommended by 11/15/2023 as patient is a Moderate risk for readmission. Please advise. LUDIN attempted to schedule a TCM appointment, however, due to MD's full schedule LUDIN sent a message to MD's office requesting assistance with scheduling the appointment,    BOOK BY DATE (last date for TCM): 11/15/2023    Clinical staff:  Please Notify Dr Gabino Silva of the above and ask him when he can fit patient in for a TCM appointment. Then please call the patient to schedule a TCM appointment within the TCM timeframe, and to advise of any further orders. Thank you!
Phone went to busy signal. Try again later.
To Dr. Victoriano Laughlin:  Please see below message from Care Manager RN. Please advise when you'd like to see her for TCM f/up.
We can give Nadiya appointment next Friday, November 10 at 10 AM.  Okay to use that time slot
show

## 2023-11-10 ENCOUNTER — TELEPHONE (OUTPATIENT)
Dept: INTERNAL MEDICINE CLINIC | Facility: CLINIC | Age: 73
End: 2023-11-10

## 2023-11-10 ENCOUNTER — OFFICE VISIT (OUTPATIENT)
Dept: INTERNAL MEDICINE CLINIC | Facility: CLINIC | Age: 73
End: 2023-11-10
Payer: MEDICARE

## 2023-11-10 VITALS
SYSTOLIC BLOOD PRESSURE: 132 MMHG | DIASTOLIC BLOOD PRESSURE: 80 MMHG | HEART RATE: 60 BPM | WEIGHT: 131 LBS | TEMPERATURE: 98 F | HEIGHT: 65 IN | BODY MASS INDEX: 21.83 KG/M2

## 2023-11-10 DIAGNOSIS — I63.9 CEREBROVASCULAR ACCIDENT (CVA), UNSPECIFIED MECHANISM (HCC): ICD-10-CM

## 2023-11-10 DIAGNOSIS — I10 PRIMARY HYPERTENSION: ICD-10-CM

## 2023-11-10 DIAGNOSIS — Z00.00 ROUTINE HEALTH MAINTENANCE: ICD-10-CM

## 2023-11-10 DIAGNOSIS — Z95.818 PRESENCE OF WATCHMAN LEFT ATRIAL APPENDAGE CLOSURE DEVICE: ICD-10-CM

## 2023-11-10 DIAGNOSIS — Z95.0 PRESENCE OF PERMANENT CARDIAC PACEMAKER: ICD-10-CM

## 2023-11-10 DIAGNOSIS — R27.8 LIMB INCOORDINATION: Primary | ICD-10-CM

## 2023-11-10 DIAGNOSIS — J32.0 LEFT MAXILLARY SINUSITIS: ICD-10-CM

## 2023-11-10 DIAGNOSIS — N18.30 STAGE 3 CHRONIC KIDNEY DISEASE, UNSPECIFIED WHETHER STAGE 3A OR 3B CKD (HCC): ICD-10-CM

## 2023-11-10 DIAGNOSIS — Z87.898 HISTORY OF SEIZURES: ICD-10-CM

## 2023-11-10 DIAGNOSIS — R07.9 CHEST PAIN, UNSPECIFIED TYPE: ICD-10-CM

## 2023-11-10 PROCEDURE — 99214 OFFICE O/P EST MOD 30 MIN: CPT | Performed by: INTERNAL MEDICINE

## 2023-11-10 PROCEDURE — 1111F DSCHRG MED/CURRENT MED MERGE: CPT | Performed by: INTERNAL MEDICINE

## 2023-11-10 PROCEDURE — 1125F AMNT PAIN NOTED PAIN PRSNT: CPT | Performed by: INTERNAL MEDICINE

## 2023-11-10 NOTE — TELEPHONE ENCOUNTER
Please call Demetria in [place] on [address] [facility]. Patient indicated that she had flu vaccine COVID-vaccine RSV and pneumonia. Please confirm. Also please ask which pneumonia vaccine she received. Question if it was the  PCV 20 for the pneumonia vaccine.

## 2023-12-07 ENCOUNTER — HOSPITAL ENCOUNTER (EMERGENCY)
Facility: HOSPITAL | Age: 73
Discharge: HOME | End: 2023-12-07
Attending: EMERGENCY MEDICINE
Payer: MEDICARE

## 2023-12-07 ENCOUNTER — APPOINTMENT (OUTPATIENT)
Dept: GENERAL RADIOLOGY | Facility: HOSPITAL | Age: 73
End: 2023-12-07
Attending: EMERGENCY MEDICINE
Payer: MEDICARE

## 2023-12-07 ENCOUNTER — ANESTHESIA EVENT (OUTPATIENT)
Dept: ENDOSCOPY | Facility: HOSPITAL | Age: 73
End: 2023-12-07
Payer: MEDICARE

## 2023-12-07 ENCOUNTER — ANESTHESIA (OUTPATIENT)
Dept: ENDOSCOPY | Facility: HOSPITAL | Age: 73
End: 2023-12-07
Payer: MEDICARE

## 2023-12-07 ENCOUNTER — TELEPHONE (OUTPATIENT)
Dept: INTERNAL MEDICINE CLINIC | Facility: CLINIC | Age: 73
End: 2023-12-07

## 2023-12-07 VITALS
SYSTOLIC BLOOD PRESSURE: 123 MMHG | TEMPERATURE: 98 F | DIASTOLIC BLOOD PRESSURE: 98 MMHG | HEART RATE: 60 BPM | RESPIRATION RATE: 16 BRPM | OXYGEN SATURATION: 97 %

## 2023-12-07 DIAGNOSIS — T18.2XXA FOREIGN BODY IN STOMACH, INITIAL ENCOUNTER: Primary | ICD-10-CM

## 2023-12-07 PROBLEM — T18.9XXA FOREIGN BODY IN ALIMENTARY TRACT: Status: ACTIVE | Noted: 2023-12-07

## 2023-12-07 LAB
ANION GAP SERPL CALC-SCNC: 1 MMOL/L (ref 0–18)
BASOPHILS # BLD AUTO: 0.02 X10(3) UL (ref 0–0.2)
BASOPHILS NFR BLD AUTO: 0.4 %
BUN BLD-MCNC: 16 MG/DL (ref 9–23)
BUN/CREAT SERPL: 13.9 (ref 10–20)
CALCIUM BLD-MCNC: 9.6 MG/DL (ref 8.7–10.4)
CHLORIDE SERPL-SCNC: 109 MMOL/L (ref 98–112)
CO2 SERPL-SCNC: 28 MMOL/L (ref 21–32)
CREAT BLD-MCNC: 1.15 MG/DL
DEPRECATED RDW RBC AUTO: 39.7 FL (ref 35.1–46.3)
EGFRCR SERPLBLD CKD-EPI 2021: 50 ML/MIN/1.73M2 (ref 60–?)
EOSINOPHIL # BLD AUTO: 0.17 X10(3) UL (ref 0–0.7)
EOSINOPHIL NFR BLD AUTO: 3.8 %
ERYTHROCYTE [DISTWIDTH] IN BLOOD BY AUTOMATED COUNT: 12.1 % (ref 11–15)
GLUCOSE BLD-MCNC: 88 MG/DL (ref 70–99)
HCT VFR BLD AUTO: 39.5 %
HGB BLD-MCNC: 13.1 G/DL
IMM GRANULOCYTES # BLD AUTO: 0.01 X10(3) UL (ref 0–1)
IMM GRANULOCYTES NFR BLD: 0.2 %
LYMPHOCYTES # BLD AUTO: 1.05 X10(3) UL (ref 1–4)
LYMPHOCYTES NFR BLD AUTO: 23.5 %
MAGNESIUM SERPL-MCNC: 2.2 MG/DL (ref 1.6–2.6)
MCH RBC QN AUTO: 29.7 PG (ref 26–34)
MCHC RBC AUTO-ENTMCNC: 33.2 G/DL (ref 31–37)
MCV RBC AUTO: 89.6 FL
MONOCYTES # BLD AUTO: 0.4 X10(3) UL (ref 0.1–1)
MONOCYTES NFR BLD AUTO: 9 %
NEUTROPHILS # BLD AUTO: 2.81 X10 (3) UL (ref 1.5–7.7)
NEUTROPHILS # BLD AUTO: 2.81 X10(3) UL (ref 1.5–7.7)
NEUTROPHILS NFR BLD AUTO: 63.1 %
OSMOLALITY SERPL CALC.SUM OF ELEC: 287 MOSM/KG (ref 275–295)
PLATELET # BLD AUTO: 184 10(3)UL (ref 150–450)
POTASSIUM SERPL-SCNC: 4.4 MMOL/L (ref 3.5–5.1)
RBC # BLD AUTO: 4.41 X10(6)UL
SODIUM SERPL-SCNC: 138 MMOL/L (ref 136–145)
WBC # BLD AUTO: 4.5 X10(3) UL (ref 4–11)

## 2023-12-07 PROCEDURE — 0DC68ZZ EXTIRPATION OF MATTER FROM STOMACH, VIA NATURAL OR ARTIFICIAL OPENING ENDOSCOPIC: ICD-10-PCS | Performed by: INTERNAL MEDICINE

## 2023-12-07 PROCEDURE — 43247 EGD REMOVE FOREIGN BODY: CPT | Performed by: INTERNAL MEDICINE

## 2023-12-07 PROCEDURE — 99204 OFFICE O/P NEW MOD 45 MIN: CPT | Performed by: INTERNAL MEDICINE

## 2023-12-07 PROCEDURE — 74018 RADEX ABDOMEN 1 VIEW: CPT | Performed by: EMERGENCY MEDICINE

## 2023-12-07 PROCEDURE — 71045 X-RAY EXAM CHEST 1 VIEW: CPT | Performed by: EMERGENCY MEDICINE

## 2023-12-07 RX ORDER — NALOXONE HYDROCHLORIDE 0.4 MG/ML
0.08 INJECTION, SOLUTION INTRAMUSCULAR; INTRAVENOUS; SUBCUTANEOUS AS NEEDED
Status: DISCONTINUED | OUTPATIENT
Start: 2023-12-07 | End: 2023-12-07 | Stop reason: HOSPADM

## 2023-12-07 RX ORDER — EPHEDRINE SULFATE 50 MG/ML
INJECTION, SOLUTION INTRAVENOUS AS NEEDED
Status: DISCONTINUED | OUTPATIENT
Start: 2023-12-07 | End: 2023-12-07 | Stop reason: SURG

## 2023-12-07 RX ORDER — ONDANSETRON 2 MG/ML
4 INJECTION INTRAMUSCULAR; INTRAVENOUS EVERY 6 HOURS PRN
Status: DISCONTINUED | OUTPATIENT
Start: 2023-12-07 | End: 2023-12-07 | Stop reason: HOSPADM

## 2023-12-07 RX ORDER — GLYCOPYRROLATE 0.2 MG/ML
INJECTION, SOLUTION INTRAMUSCULAR; INTRAVENOUS AS NEEDED
Status: DISCONTINUED | OUTPATIENT
Start: 2023-12-07 | End: 2023-12-07 | Stop reason: SURG

## 2023-12-07 RX ORDER — LIDOCAINE HYDROCHLORIDE 10 MG/ML
INJECTION, SOLUTION EPIDURAL; INFILTRATION; INTRACAUDAL; PERINEURAL AS NEEDED
Status: DISCONTINUED | OUTPATIENT
Start: 2023-12-07 | End: 2023-12-07 | Stop reason: SURG

## 2023-12-07 RX ORDER — SODIUM CHLORIDE, SODIUM LACTATE, POTASSIUM CHLORIDE, CALCIUM CHLORIDE 600; 310; 30; 20 MG/100ML; MG/100ML; MG/100ML; MG/100ML
INJECTION, SOLUTION INTRAVENOUS CONTINUOUS
Status: DISCONTINUED | OUTPATIENT
Start: 2023-12-07 | End: 2023-12-07 | Stop reason: HOSPADM

## 2023-12-07 RX ORDER — ROCURONIUM BROMIDE 10 MG/ML
INJECTION, SOLUTION INTRAVENOUS AS NEEDED
Status: DISCONTINUED | OUTPATIENT
Start: 2023-12-07 | End: 2023-12-07 | Stop reason: SURG

## 2023-12-07 RX ORDER — DEXAMETHASONE SODIUM PHOSPHATE 4 MG/ML
VIAL (ML) INJECTION AS NEEDED
Status: DISCONTINUED | OUTPATIENT
Start: 2023-12-07 | End: 2023-12-07 | Stop reason: SURG

## 2023-12-07 RX ORDER — METOCLOPRAMIDE HYDROCHLORIDE 5 MG/ML
10 INJECTION INTRAMUSCULAR; INTRAVENOUS EVERY 8 HOURS PRN
Status: DISCONTINUED | OUTPATIENT
Start: 2023-12-07 | End: 2023-12-07 | Stop reason: HOSPADM

## 2023-12-07 RX ORDER — SODIUM CHLORIDE 9 MG/ML
INJECTION, SOLUTION INTRAVENOUS CONTINUOUS PRN
Status: DISCONTINUED | OUTPATIENT
Start: 2023-12-07 | End: 2023-12-07 | Stop reason: SURG

## 2023-12-07 RX ORDER — ONDANSETRON 2 MG/ML
INJECTION INTRAMUSCULAR; INTRAVENOUS AS NEEDED
Status: DISCONTINUED | OUTPATIENT
Start: 2023-12-07 | End: 2023-12-07 | Stop reason: SURG

## 2023-12-07 RX ADMIN — SODIUM CHLORIDE: 9 INJECTION, SOLUTION INTRAVENOUS at 13:29:00

## 2023-12-07 RX ADMIN — EPHEDRINE SULFATE 5 MG: 50 INJECTION, SOLUTION INTRAVENOUS at 14:03:00

## 2023-12-07 RX ADMIN — ONDANSETRON 4 MG: 2 INJECTION INTRAMUSCULAR; INTRAVENOUS at 13:36:00

## 2023-12-07 RX ADMIN — DEXAMETHASONE SODIUM PHOSPHATE 4 MG: 4 MG/ML VIAL (ML) INJECTION at 13:36:00

## 2023-12-07 RX ADMIN — EPHEDRINE SULFATE 5 MG: 50 INJECTION, SOLUTION INTRAVENOUS at 14:08:00

## 2023-12-07 RX ADMIN — LIDOCAINE HYDROCHLORIDE 50 MG: 10 INJECTION, SOLUTION EPIDURAL; INFILTRATION; INTRACAUDAL; PERINEURAL at 13:36:00

## 2023-12-07 RX ADMIN — ROCURONIUM BROMIDE 10 MG: 10 INJECTION, SOLUTION INTRAVENOUS at 13:36:00

## 2023-12-07 RX ADMIN — GLYCOPYRROLATE 0.2 MG: 0.2 INJECTION, SOLUTION INTRAMUSCULAR; INTRAVENOUS at 13:36:00

## 2023-12-07 NOTE — OPERATIVE REPORT
ESOPHAGOGASTRODUODENOSCOPY (EGD) REPORT    Daniel Arreguin     3/28/1950 Age 75year old   PCP Julien Woody MD Endoscopist Raymond Schuster MD     Date of procedure: 23    Procedure: EGD w/foreign body retrieval    Pre-operative diagnosis: accidental foreign body ingestion of a 3.8 cm metal nail    Post-operative diagnosis: see impression    Medications: general anesthesia    Complications: none    Procedure:  Informed consent was obtained from the patient after the risks of the procedure were discussed, including but not limited to bleeding, perforation, aspiration, infection, or possibility of a missed lesion. After discussions of the risks/benefits and alternatives to this procedure, as well as the planned sedation, the patient was placed in the left lateral decubitus position and begun on continuous blood pressure pulse oximetry and EKG monitoring and this was maintained throughout the procedure. Once an adequate level of sedation was obtained a bite block was placed. Then the lubricated tip of the [id number] diagnostic video upper endoscope was inserted and advanced using direct visualization into the posterior pharynx and ultimately into the esophagus, stomach, and duodenum. Complications: None    Findings:      A distal attachment cap was attached to tip of scope prior to insertion. 1. Esophagus: Normal appearing esophagus     2. Stomach: We then entered the stomach. There was a long metal nail with blunt tip end measuring 3.8 cm as seen on recent KUB, laying in the antrum. A snare was used to grasp the blunt nail tip and bring it up into the cap which allowed the nail to be positioned vertically and parallel to esophagus. The scope was then slowly withdrawn and nail retrieved, photodocumented. The scope was then reinserted into esophagus. No mucosal injury seen in esophagus. We then entered the stomach.  Gastric mucosa appeared normal in the forward view with no evidence of erythema, erosions, or ulcerations. There was no evidence of any luminal or submucosal masses. A retroflexed view allowed examination of the angularis, cardia and fundus and these areas also appeared normal with a non-patulous cardia. No hiatal hernia seen. 3. Duodenum: The duodenal mucosa appeared normal in the 1st and 2nd portion of the duodenum. Bilious effluent seen    We then withdrew the instrument from the patient who tolerated the procedure well. Impression:   1. Successful retrieval of near 4 cm metal nail from stomach   2. Otherwise normal EGD    Recommend:  1. Ok to discharge home  2. Refrain from holding nails in mouth    >>>If tissue was sampled/removed and you have not received your pathology results either by phone or letter within 2 weeks, please call our office at [phone].     Specimens:  none    Blood loss: <1 ml      Meli Herrera MD  [address] [facility]

## 2023-12-07 NOTE — TELEPHONE ENCOUNTER
Spoke with patient states she was hanging jeff lights and was holding two nails in her mouth when she accidentally swallowed one. Nail was 1 1/2 inch long. Patient in no distress at this time. Denies pain, difficulty breathing, speaking, swallowing, bleeding. Advised immediate ED for evaluation. Patient agreeable with recommendation. States she will have someone take her now.      Nursing FU 12/8

## 2023-12-07 NOTE — OR PREOP
Luz Godienz called in regards to pacemaker. Magnet forces device into synchronous setting at 100 bmp. Once magnet is removed no changes can be made to device.  Short burst for cautery is best.

## 2023-12-07 NOTE — ED INITIAL ASSESSMENT (HPI)
Pt to ED swallowed construction nail 1.5inches long about an hour ago; denies any pain, was holding 2 nails between lips while decorating and must have took a deep breath and was stuck in throat and could feel it on back of tongue, airway intact at this time. GCS 15, AXOX4.

## 2023-12-07 NOTE — INTERVAL H&P NOTE
Pre-op Diagnosis: foreign body    The above referenced H&P was reviewed by Vishnu Zapata MD on 12/7/2023, the patient was examined and no significant changes have occurred in the patient's condition since the H&P was performed. I discussed with the patient and/or legal representative the potential benefits, risks and side effects of this procedure; the likelihood of the patient achieving goals; and potential problems that might occur during recuperation. I discussed reasonable alternatives to the procedure, including risks, benefits and side effects related to the alternatives and risks related to not receiving this procedure. We will proceed with procedure as planned.

## 2023-12-07 NOTE — ANESTHESIA PROCEDURE NOTES
Airway  Date/Time: 12/7/2023 1:37 PM  Airway not difficult    General Information and Staff    Patient location during procedure: endo  Anesthesiologist: Bernadine Lawrence MD  Resident/CRNA: Baron Gray, REY  Performed: CRNA   Performed by: Baron Gray, CRNA  Authorized by: Baron Gray, REY      Indications and Patient Condition  Indications for airway management: anesthesia  Preoxygenated: yes  Patient position: sniffing  Mask difficulty assessment: 0 - not attempted    Final Airway Details  Final airway type: endotracheal airway      Successful airway: ETT  Cuffed: yes   Successful intubation technique: Video laryngoscopy  Facilitating devices/methods: intubating stylet, rapid sequence intubation and cricoid pressure  Endotracheal tube insertion site: oral  Blade type: Del Castillo MAC. Blade size: #3  ETT size (mm): 7.0    Cormack-Lehane Classification: grade I - full view of glottis  Placement verified by: capnometry   Cuff volume (mL): 7  Measured from: teeth  ETT to teeth (cm): 21    Additional Comments  Atraumatic.  Endo bite block placed

## 2023-12-07 NOTE — DISCHARGE INSTRUCTIONS
Home Care Instructions for Gastroscopy with Sedation    Diet:  - Resume your regular diet as tolerated unless otherwise instructed. - Start with light meals to minimize bloating.  - Do not drink alcohol today. Medication:  - If you have questions about resuming your normal medications, please contact your Primary Care Physician. Activities:  - Take it easy today. Do not return to work today. - Do not drive today. - Do not operate any machinery today (including kitchen equipment). Gastroscopy:  - You may have a sore throat for 2-3 days following the exam. This is normal. Gargling with warm salt water (1/2 tsp salt to 1 glass warm water) or using throat lozenges will help. - If you experience any sharp pain in your neck, abdomen or chest, vomiting of blood, oral temperature over 100 degrees Fahrenheit, light-headedness or dizziness, or any other problems, contact your doctor. **If unable to reach your doctor, please go to the [facility] Emergency Room**    - Your referring physician will receive a full report of your examination.  - If you do not hear from your doctor's office within two weeks of your biopsy, please call them for your results. You may be able to see your laboratory results in [facility] between 4 and 7 business days. In some cases, your physician may not have viewed the results before they are released to [address] If you have questions regarding your results contact the physician who ordered the test/exam by phone or via [address] by choosing \"Ask a Medical Question. \"

## 2023-12-18 ENCOUNTER — TELEPHONE (OUTPATIENT)
Facility: CLINIC | Age: 73
End: 2023-12-18

## 2023-12-18 NOTE — TELEPHONE ENCOUNTER
Thank you  I did call and talk to her.   Recent blood work was normal . If she contiues to not urinate through the day, will gi to ER to r/o retention

## 2023-12-18 NOTE — TELEPHONE ENCOUNTER
Pt states that she has been very nauseous and not urinating a lot for last 2 weeks. Urine is very yellow. Please call.

## 2023-12-18 NOTE — TELEPHONE ENCOUNTER
Dr Cartwright Man please see note below reported ,pt has leakage seeing Dr Conrad Kirby  was advised to go bathroom every 2 hours due to leakage,concerned is urinating slow ,feeling tired all the time,nauseous ,drinking enough fluids,if cannot eat pt will take the boost supplement. Advised pt to keep hydrated,to also update Dr Almaraz Shows verbalized understanding.

## 2023-12-19 ENCOUNTER — MED REC SCAN ONLY (OUTPATIENT)
Facility: CLINIC | Age: 73
End: 2023-12-19

## 2023-12-19 ENCOUNTER — TELEPHONE (OUTPATIENT)
Dept: INTERNAL MEDICINE CLINIC | Facility: CLINIC | Age: 73
End: 2023-12-19

## 2023-12-19 RX ORDER — ONDANSETRON 4 MG/1
4 TABLET, ORALLY DISINTEGRATING ORAL EVERY 8 HOURS PRN
Qty: 20 TABLET | Refills: 0 | Status: SHIPPED | OUTPATIENT
Start: 2023-12-19

## 2023-12-19 NOTE — TELEPHONE ENCOUNTER
Message noted. I did send over a refill of Zofran. Please ask patient to see if she can get a hold of her gastroenterologist Ellen Daly who she has seen before as he may build to help her figure out why her symptoms seem to be persisting.

## 2023-12-19 NOTE — TELEPHONE ENCOUNTER
Called patient and relayed MD's message. Patient stated she has already had too many CT scans. Patient states she is feeling discouraged as she has had this problem for 4 years. Patient asking for a refill of zofran to [facility]. There is a refill encounter on this. Ok to refill?     To Dr. Isabella Stern--

## 2023-12-19 NOTE — TELEPHONE ENCOUNTER
Please advise - called patient who was in ER for swallowing a nail on 12/7/23. One week after she started with nausea as soon as she is standing up. She has gnawing stomach pain , constant and also nausea. Yesterday she vomited once . Had to bend over to get some relief. She also suffers from constipation and takes miralax daily  , had BM 12/17 with fleet enema . She doesn,t eat , she drinks boost , can hold liquids ,denies fever , denies blood in stool - to DR. FLEMING

## 2023-12-19 NOTE — TELEPHONE ENCOUNTER
Pt. States over the last week she has been nauseated. Started out felling nauseated once or twice a day. Now as soon as she wakes up she has to run to the bathroom. Can not stand up at all without feeling very nauseated. She said it is actually painful. She mentions that she swallowed a 1 1/2 inch nail last week. She went to the ED and they removed it. Now wondering if that may have anything to do with her nausea now? Asking Dr. Heydi Correa if he can give her some medication for the nausea? She has some dissolvable at home, but they are to old to take.

## 2023-12-19 NOTE — TELEPHONE ENCOUNTER
Message noted. Please let patient know I am not quite sure what that represents. She is already on Protonix for her stomach and from the report of the endoscopy the nail was able to be retrieved without injury    She has been to the emergency room several times with different problems but it might be wise for her to at least be seen in the [facility] urgent care. They do have ability to see her. Check her vital signs. And if needed check a CT scan of her abdomen. If she gets a CT scan she should let them know that she does have chronic kidney disease and not to use dye.     This way we can be a little bit clear on what is going on and make sure that she is not running into any trouble that needs immediate attention

## 2023-12-21 RX ORDER — ONDANSETRON 4 MG/1
4 TABLET, ORALLY DISINTEGRATING ORAL EVERY 8 HOURS PRN
Qty: 20 TABLET | Refills: 0 | OUTPATIENT
Start: 2023-12-21

## 2023-12-21 NOTE — TELEPHONE ENCOUNTER
Current refill request refused due to refill is either a duplicate request or has active refills at the pharmacy. Check previous templates. Requested Prescriptions     Refused Prescriptions Disp Refills    ONDANSETRON 4 MG Oral Tablet Dispersible [Pharmacy Med Name: Ondansetron Odt 4 Mg Tab Nort] 20 tablet 0     Sig: Take 1 tablet (4 mg total) by mouth every 8 (eight) hours as needed for Nausea.      Refused By: Augusta Gross     Reason for Refusal: Request already responded to by other means (e.g. phone or fax)

## 2024-03-26 ENCOUNTER — LAB ENCOUNTER (OUTPATIENT)
Dept: LAB | Age: 74
End: 2024-03-26
Attending: STUDENT IN AN ORGANIZED HEALTH CARE EDUCATION/TRAINING PROGRAM
Payer: MEDICARE

## 2024-03-26 ENCOUNTER — TELEPHONE (OUTPATIENT)
Facility: CLINIC | Age: 74
End: 2024-03-26

## 2024-03-26 DIAGNOSIS — N39.0 URINARY TRACT INFECTION WITHOUT HEMATURIA, SITE UNSPECIFIED: ICD-10-CM

## 2024-03-26 DIAGNOSIS — R39.198 SLOW URINARY STREAM: ICD-10-CM

## 2024-03-26 PROCEDURE — 87086 URINE CULTURE/COLONY COUNT: CPT

## 2024-03-26 NOTE — TELEPHONE ENCOUNTER
Rn spoke to patient ( verified)reported everytime pt need to urinate needs to go right away almost like no control of it happens anytime of the day,yellow or clear color  urine no pain when urinating, lower back pain .Advised to call pt urologist or PCP for further evaluation and treatment ,pt verbalized understanding.

## 2024-03-26 NOTE — TELEPHONE ENCOUNTER
Patient is worried about flow of Urine and was wondering if this has to do with her kidneys.  Patient states urine comes out slow.  Patient also states she feels intermittent pain in lower back where kidneys are located at .  Please call.  Thank you.

## 2024-03-31 DIAGNOSIS — G40.909 NONINTRACTABLE EPILEPSY WITHOUT STATUS EPILEPTICUS, UNSPECIFIED EPILEPSY TYPE (HCC): ICD-10-CM

## 2024-04-01 ENCOUNTER — TELEPHONE (OUTPATIENT)
Dept: INTERNAL MEDICINE CLINIC | Facility: CLINIC | Age: 74
End: 2024-04-01

## 2024-04-01 NOTE — TELEPHONE ENCOUNTER
Requested Prescriptions     Pending Prescriptions Disp Refills    LAMOTRIGINE 150 MG Oral Tab [Pharmacy Med Name: Lamotrigine 150 Mg Tab Nort] 180 tablet 0     Sig: TAKE ONE TABLET BY MOUTH TWICE DAILY       Last OV: 10/4/23  Next OV: None  Last refilled: 10/4/23 #180//1 refill

## 2024-04-02 RX ORDER — ONDANSETRON 4 MG/1
4 TABLET, ORALLY DISINTEGRATING ORAL EVERY 8 HOURS PRN
Qty: 20 TABLET | Refills: 0 | Status: SHIPPED | OUTPATIENT
Start: 2024-04-02

## 2024-04-02 RX ORDER — LAMOTRIGINE 150 MG/1
150 TABLET ORAL 2 TIMES DAILY
Qty: 180 TABLET | Refills: 0 | Status: SHIPPED | OUTPATIENT
Start: 2024-04-02

## 2024-04-02 NOTE — TELEPHONE ENCOUNTER
The following prescription was reviewed, authorized, and electronically sent to the pharmacy.    Requested Prescriptions     Signed Prescriptions Disp Refills    ONDANSETRON 4 MG Oral Tablet Dispersible 20 tablet 0     Sig: Take 1 tablet (4 mg total) by mouth every 8 (eight) hours as needed for Nausea.     Authorizing Provider: ROCK OLMSTEAD

## 2024-04-09 ENCOUNTER — TELEPHONE (OUTPATIENT)
Dept: INTERNAL MEDICINE CLINIC | Facility: CLINIC | Age: 74
End: 2024-04-09

## 2024-04-09 ENCOUNTER — OFFICE VISIT (OUTPATIENT)
Dept: INTERNAL MEDICINE CLINIC | Facility: CLINIC | Age: 74
End: 2024-04-09
Payer: MEDICARE

## 2024-04-09 ENCOUNTER — LAB ENCOUNTER (OUTPATIENT)
Dept: LAB | Age: 74
End: 2024-04-09
Attending: INTERNAL MEDICINE
Payer: MEDICARE

## 2024-04-09 VITALS
DIASTOLIC BLOOD PRESSURE: 80 MMHG | WEIGHT: 140 LBS | SYSTOLIC BLOOD PRESSURE: 122 MMHG | HEART RATE: 63 BPM | TEMPERATURE: 98 F | OXYGEN SATURATION: 98 % | HEIGHT: 65 IN | BODY MASS INDEX: 23.32 KG/M2

## 2024-04-09 DIAGNOSIS — I10 PRIMARY HYPERTENSION: ICD-10-CM

## 2024-04-09 DIAGNOSIS — K92.1 BLOOD IN STOOL: ICD-10-CM

## 2024-04-09 DIAGNOSIS — Z95.0 PRESENCE OF PERMANENT CARDIAC PACEMAKER: ICD-10-CM

## 2024-04-09 DIAGNOSIS — Z95.818 PRESENCE OF WATCHMAN LEFT ATRIAL APPENDAGE CLOSURE DEVICE: ICD-10-CM

## 2024-04-09 DIAGNOSIS — N18.30 STAGE 3 CHRONIC KIDNEY DISEASE, UNSPECIFIED WHETHER STAGE 3A OR 3B CKD (HCC): ICD-10-CM

## 2024-04-09 DIAGNOSIS — T14.8XXA BRUISING: Primary | ICD-10-CM

## 2024-04-09 DIAGNOSIS — I63.9 CEREBROVASCULAR ACCIDENT (CVA), UNSPECIFIED MECHANISM (HCC): ICD-10-CM

## 2024-04-09 DIAGNOSIS — T14.8XXA BRUISING: ICD-10-CM

## 2024-04-09 LAB
ALBUMIN SERPL-MCNC: 4.6 G/DL (ref 3.2–4.8)
ALBUMIN/GLOB SERPL: 1.4 {RATIO} (ref 1–2)
ALP LIVER SERPL-CCNC: 66 U/L
ALT SERPL-CCNC: 13 U/L
ANION GAP SERPL CALC-SCNC: 6 MMOL/L (ref 0–18)
APTT PPP: 32.3 SECONDS (ref 23.3–35.6)
AST SERPL-CCNC: 22 U/L (ref ?–34)
BASOPHILS # BLD AUTO: 0.03 X10(3) UL (ref 0–0.2)
BASOPHILS NFR BLD AUTO: 0.7 %
BILIRUB SERPL-MCNC: 0.6 MG/DL (ref 0.2–1.1)
BUN BLD-MCNC: 19 MG/DL (ref 9–23)
BUN/CREAT SERPL: 17.1 (ref 10–20)
CALCIUM BLD-MCNC: 9.8 MG/DL (ref 8.7–10.4)
CHLORIDE SERPL-SCNC: 107 MMOL/L (ref 98–112)
CO2 SERPL-SCNC: 28 MMOL/L (ref 21–32)
CREAT BLD-MCNC: 1.11 MG/DL
DEPRECATED RDW RBC AUTO: 39.9 FL (ref 35.1–46.3)
EGFRCR SERPLBLD CKD-EPI 2021: 52 ML/MIN/1.73M2 (ref 60–?)
EOSINOPHIL # BLD AUTO: 0.21 X10(3) UL (ref 0–0.7)
EOSINOPHIL NFR BLD AUTO: 4.6 %
ERYTHROCYTE [DISTWIDTH] IN BLOOD BY AUTOMATED COUNT: 12.1 % (ref 11–15)
FASTING STATUS PATIENT QL REPORTED: YES
GLOBULIN PLAS-MCNC: 3.3 G/DL (ref 2.8–4.4)
GLUCOSE BLD-MCNC: 87 MG/DL (ref 70–99)
HCT VFR BLD AUTO: 39.3 %
HGB BLD-MCNC: 13.7 G/DL
IMM GRANULOCYTES # BLD AUTO: 0.01 X10(3) UL (ref 0–1)
IMM GRANULOCYTES NFR BLD: 0.2 %
INR BLD: 0.99 (ref 0.8–1.2)
LYMPHOCYTES # BLD AUTO: 1.04 X10(3) UL (ref 1–4)
LYMPHOCYTES NFR BLD AUTO: 22.8 %
MCH RBC QN AUTO: 31.2 PG (ref 26–34)
MCHC RBC AUTO-ENTMCNC: 34.9 G/DL (ref 31–37)
MCV RBC AUTO: 89.5 FL
MONOCYTES # BLD AUTO: 0.43 X10(3) UL (ref 0.1–1)
MONOCYTES NFR BLD AUTO: 9.4 %
NEUTROPHILS # BLD AUTO: 2.85 X10 (3) UL (ref 1.5–7.7)
NEUTROPHILS # BLD AUTO: 2.85 X10(3) UL (ref 1.5–7.7)
NEUTROPHILS NFR BLD AUTO: 62.3 %
OSMOLALITY SERPL CALC.SUM OF ELEC: 294 MOSM/KG (ref 275–295)
PLATELET # BLD AUTO: 199 10(3)UL (ref 150–450)
POTASSIUM SERPL-SCNC: 4.5 MMOL/L (ref 3.5–5.1)
PROT SERPL-MCNC: 7.9 G/DL (ref 5.7–8.2)
PROTHROMBIN TIME: 13.7 SECONDS (ref 11.6–14.8)
RBC # BLD AUTO: 4.39 X10(6)UL
SODIUM SERPL-SCNC: 141 MMOL/L (ref 136–145)
WBC # BLD AUTO: 4.6 X10(3) UL (ref 4–11)

## 2024-04-09 PROCEDURE — 99214 OFFICE O/P EST MOD 30 MIN: CPT | Performed by: INTERNAL MEDICINE

## 2024-04-09 PROCEDURE — 85025 COMPLETE CBC W/AUTO DIFF WBC: CPT

## 2024-04-09 PROCEDURE — 85610 PROTHROMBIN TIME: CPT

## 2024-04-09 PROCEDURE — 85730 THROMBOPLASTIN TIME PARTIAL: CPT

## 2024-04-09 PROCEDURE — 80053 COMPREHEN METABOLIC PANEL: CPT

## 2024-04-09 PROCEDURE — 36415 COLL VENOUS BLD VENIPUNCTURE: CPT

## 2024-04-09 NOTE — TELEPHONE ENCOUNTER
Please let patient know that her labs look good.  There is nothing in the labs to suggest what could be contributing to her bruising.    However with that said I would like her to see hematology.  Dr. Hilaria Castelan.  I gave her contact information.  I placed referral.  She can call.    I looked back in terms of the possibility of stroke and back May 31, 2022 she was admitted to the hospital and I did read consultation by  at that time who felt that she did have a stroke even though it did not show up on any of the brain images.    Therefore I am  not quite sure what to do with the aspirin.  Until she sees Dr. Castelan it may be pham to continue it however if she wishes to stop it because of the bruising that may not be unreasonable.    I copy and pasted below for reference hospitalization May 2022 regarding probable stroke.    Discharge Summary  Aurelio Arroyo MD (Physician)  Hospitalist     [facility]     Discharge Summary           Nadiya Felix Patient Status:  Inpatient    3/28/1950 MRN [id number]   Location [facility]5W Attending Aurelio Arroyo MD   Hosp Day # 1 PCP Donell Yin MD      Date of Admission: 2022   Disposition: Home or Self Care      Date of Discharge: 22        Admitting Diagnosis: Ataxia [R27.0]  Vertigo [R42]     Hospital Discharge Diagnoses: CVA     Lace+ Score: 79  59-90 High Risk  29-58 Medium Risk  0-28   Low Risk.     TCM Follow-Up Recommendation:  LACE > 58: High Risk of readmission after discharge from the hospital.              Problem List: Patient Active Problem List:     JENNIFER (obstructive sleep apnea)     Family history of colon cancer     CKD (chronic kidney disease)     Alzheimer disease (HCC)     Paroxysmal A-fib (HCC)     Arthritis, gouty     Elevated LFTs     AVM (arteriovenous malformation) of colon     Altered mental status     Altered mental status, unspecified altered mental status type     Acute cystitis without hematuria      Adverse effect of drug, initial encounter     Ataxia     Epilepsy (HCC)     Essential hypertension     History of total hysterectomy with bilateral salpingo-oophorectomy (BSO)     Presence of Watchman left atrial appendage closure device     Acute chest pain     Dyspnea, unspecified type     Chest pain of uncertain etiology     Closed fracture of left distal radius     Parkinson's disease (HCC)     Hyperglycemia     Acute pancreatitis     Acute pancreatitis, unspecified complication status, unspecified pancreatitis type     Vertigo        Reason for Admission:   Ataxia  Posterior Fossa CVA   Ch Constipation  Hx. A. Fib s/p watchmans device  HTN  HL  Hx. Alzheimers Dementia  Hx. Of restless leg syndrome     Physical Exam:   General appearance: alert, appears stated age and cooperative  Pulmonary:  clear to auscultation bilaterally  Cardiovascular: S1, S2 normal, no murmur, click, rub or gallop, regular rate and rhythm  Abdominal: soft, non-tender; bowel sounds normal; no masses,  no organomegaly  Extremities: extremities normal, atraumatic, no cyanosis or edema  Psychiatric: calm           History of Present Illness:   Per Dr. Foster  CHIEF COMPLAINT:  Intractable vertigo.   HISTORY OF PRESENT ILLNESS:  Patient is a 72-year-old  female who woke up with a spinning sensation since this morning every time she moves her head or tries to walk around.  Came into the emergency department for evaluation.  CBC, chemistry, troponin were unremarkable.  EKG showed paced rhythm.  CT scan of the brain and chest x-ray were unremarkable.  MRI could not be performed because of her pacemaker.  Patient had road testing and she could not walk around without severe vertigo symptoms.  She will be admitted to the hospital for further observation.     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  CP  -troponin neg, ekg no acute changes  -cardiology consult  -echo as above  -Plan continue dual antiplatelet drugs with statin  -Plan repeat CT of the head as outpatient planned  -per cardiology cva not embolic, no indication for noac     Ch Constipation     Other medical issues:   Hx. A. Fib s/p watchmans device  HTN  HL  Hx. Alzheimers Dementia  Hx. Of restless leg syndrome     Consultations:   Neurology  Cardiology     Procedures: YURIY     Complications: n/a     Discharge Condition: Good           Medication List            START taking these medications         clopidogrel 75 MG Tabs  Commonly known as: Plavix  Take 1 tablet (75 mg total) by mouth daily.                    CHANGE how you take these medications         rOPINIRole 1 MG Tabs  Commonly known as: Requip  TAKE TWO TABLETS BY MOUTH NIGHTLY  What changed:   how much to take  how to take this  when to take this  additional instructions      senna-docusate 8.6-50 MG Tabs  Commonly known as: Senokot-S  Take 2 tablets by mouth 2 (two) times a day.  What changed: when to take this                    CONTINUE taking these medications         allopurinol 100 MG Tabs  Commonly known as: Zyloprim  TAKE ONE TABLET BY MOUTH TWICE DAILY      aspirin 81 MG Tbec      atorvastatin 20 MG Tabs  Commonly known as: Lipitor  TAKE ONE TABLET BY MOUTH ONE TIME DAILY AT NIGHT      Diclofenac Sodium 1 % Gel  Commonly known as: Voltaren  Apply 2 g topically 3 (three) times daily as needed.      dicyclomine 10 MG Caps  Commonly known as: Bentyl  Take 1 capsule (10 mg total) by mouth 3 (three) times daily  as needed.      donepezil 10 MG Tabs  Commonly known as: Aricept      lamoTRIgine 150 MG Tabs  Commonly known as: LaMICtal      nadolol 20 MG Tabs  Commonly known as: Corgard      ondansetron 4 MG Tbdp  Commonly known as: Zofran-ODT  Take 1 tablet (4 mg total) by mouth every 8 (eight) hours as needed for Nausea.      Polyethylene Glycol 3350 17 g Pack  Commonly known as: MIRALAX  Take 17 g by mouth daily.                        Where to Get Your Medications            These medications were [facility] to [id number] - [address] [phone], [phone]  [facility] [address]     Phone: [phone]   clopidogrel 75 MG Tabs  senna-docusate 8.6-50 MG Tabs            Follow up Visits: Follow-up with PCP in 1 week after discharge from rehab     Follow up Labs: n/a      Other Discharge Instructions: f/u with neurology ~3 weeks     MAXIM TODD MD  06/04/22        > 35 min

## 2024-04-09 NOTE — TELEPHONE ENCOUNTER
Please advise -called patient who states she wakes up every day for a week and has bruises all over body, some lighter then turn darker - this has been happening for about 1 week - she is on a low dose Aspirin - to

## 2024-04-09 NOTE — TELEPHONE ENCOUNTER
Spoke to patient, relayed MD message. Verbalized understanding. Stated she will call to make appt with Dr. Casetlan tomorrow.

## 2024-04-09 NOTE — TELEPHONE ENCOUNTER
Patient is calling for the last 1 week when she wakes everyday she has new black and blue marks on her body.    The areas are on the shoulders, back, inner thighs down to the knees.  She has counted 17 spots and only 1 is slightly tender all of the others she doesn't feel anything.    Please call patient [phone]

## 2024-04-10 ENCOUNTER — TELEPHONE (OUTPATIENT)
Dept: HEMATOLOGY/ONCOLOGY | Facility: HOSPITAL | Age: 74
End: 2024-04-10

## 2024-04-10 NOTE — TELEPHONE ENCOUNTER
Transferred patient to [facility] for an appointment with Dr Castelan for bruising.  Referred by Dr Yin

## 2024-05-03 ENCOUNTER — OFFICE VISIT (OUTPATIENT)
Dept: HEMATOLOGY/ONCOLOGY | Facility: HOSPITAL | Age: 74
End: 2024-05-03
Attending: INTERNAL MEDICINE

## 2024-05-03 VITALS
TEMPERATURE: 98 F | WEIGHT: 142.63 LBS | OXYGEN SATURATION: 99 % | DIASTOLIC BLOOD PRESSURE: 81 MMHG | BODY MASS INDEX: 23.76 KG/M2 | RESPIRATION RATE: 16 BRPM | HEIGHT: 65 IN | HEART RATE: 62 BPM | SYSTOLIC BLOOD PRESSURE: 165 MMHG

## 2024-05-03 DIAGNOSIS — T14.8XXA BRUISING: Primary | ICD-10-CM

## 2024-05-03 PROCEDURE — 99204 OFFICE O/P NEW MOD 45 MIN: CPT | Performed by: INTERNAL MEDICINE

## 2024-05-03 NOTE — PATIENT INSTRUCTIONS
Hold aspirin for 10 days once you receive my message that OK to do so.    Then call to schedule blood draw:  Schedule with Lab. Draw between 7AM-10AM Monday - Friday only. Do Not draw on Holidays.     Specimen collection must be scheduled at least 24 hours in advance. Call lab at [phone] for scheduling.     I will then call with results and if need further management and follow up.

## 2024-05-03 NOTE — CONSULTS
BLUE Felix is a 74 year old female here at the request of Donell Yin MD for evaluation of Bruising.    Patient was seen on 4/9/2024 by her primary care doctor with complaint of bruising and blood in the stool.  Patient has been on aspirin due to history of stroke in the past.    On 4/9/2024 she complained of having noticed bruising 1 week prior to that appointment.  She was noted to have these large ecchymosis which were about 3 cm of the right upper arm and some the size of a dime.  On the left arm these were more the size of a dime about 3 or 4 of these ecchymosis.  In addition, she had 3 to 4 cm ecchymosis on the right inner thigh, as well as on the left lower extremity 1 to 2 cm ecchymosis about 3-4 total.  She did not have any in her torso.  This was reported to have occurred without any trauma.    The day prior to the visit with Dr. Yin she reported blood in the stool of which the patient took a picture and showed Dr. Yin, who described streaks of redness on the stool itself.  However the patient did not describe any blood on the toilet paper when she had wiped.  The patient had been following with GI several times for weight loss and different GI complaints, which Dr. Yin and recommended she follow-up with her gastroenterologist again.  She did have an EGD on 10/19/2023 which showed mild pan gastritis.  Biopsies were done which were negative, and she did not have H. pylori.    She did have a colonoscopy on 11/16/2022, which was performed due to intermittent bright red blood per rectum.  At that time she had a 5 mm descending colon polyp, and 3 to 4 mm rectosigmoid polyps about 4 of them.  There is also suggestion of mild early left-sided diverticulosis.  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    Patient having previously seen for elevated INR in 2017, however at that time she was on Xarelto for atrial fibrillation.  Since then the patient had a Watchman device placed and she  is no longer on a DOAC.    Patient had workup with a CBC, PT and PTT, which showed a normal CBC and coagulation parameters.    She states that the bruises last week resolved.  But states gets new ones overnight.  She had a pictures she shared of the ecchymosis and the stool.  States has internal hemorrhoids. States she has been bruising for about a year now.      Review of Systems:   Review of Systems   Constitutional:  Positive for fatigue. Negative for appetite change and unexpected weight change.        Only eats one meal a day and takes supplements.    Respiratory:  Positive for cough (states from marijuana). Negative for shortness of breath.    Cardiovascular:  Negative for chest pain.   Gastrointestinal:  Positive for blood in stool and nausea (states gags with food.). Negative for abdominal pain (not all the time), constipation and diarrhea.   Genitourinary:  Negative for dysuria and frequency.    Musculoskeletal:  Positive for arthralgias and back pain.   Neurological:  Positive for dizziness. Negative for headaches.        Dementia   Hematological:  Negative for adenopathy. Bruises/bleeds easily.   Psychiatric/Behavioral:  Positive for sleep disturbance.          Current Outpatient Medications   Medication Sig Dispense Refill    ONDANSETRON 4 MG Oral Tablet Dispersible Take 1 tablet (4 mg total) by mouth every 8 (eight) hours as needed for Nausea. 20 tablet 0    amLODIPine 2.5 MG Oral Tab Take 1 tablet (2.5 mg total) by mouth daily.      oxybutynin 5 MG Oral Tab Take 1 tablet (5 mg total) by mouth daily.      allopurinol 100 MG Oral Tab Take 1 tablet (100 mg total) by mouth daily.      atorvastatin 20 MG Oral Tab TAKE ONE TABLET BY MOUTH ONE TIME DAILY AT NIGHT 90 tablet 3    pantoprazole 40 MG Oral Tab EC TAKE ONE TABLET BY MOUTH IN THE MORNING BEFORE BREAKFAST FOR THE STOMACH 90 tablet 3    escitalopram 10 MG Oral Tab Take 1 tablet (10 mg total) by mouth daily.      nadolol 20 MG Oral Tab Take 1 tablet  (20 mg total) by mouth daily. 90 tablet 3    Mirabegron ER (MYRBETRIQ) 25 MG Oral Tablet 24 Hr Take 1 tablet (25 mg total) by mouth daily.      ROPINIROLE 1 MG Oral Tab TAKE TWO TABLETS BY MOUTH NIGHTLY (Patient taking differently: Take 2 tablets (2 mg total) by mouth nightly. 1 in am + 2 HS) 180 tablet 3    aspirin 81 MG Oral Tab EC Take 1 tablet (81 mg total) by mouth daily. 1 tablet 0    Donepezil HCl 10 MG Oral Tab Take 1 tablet (10 mg total) by mouth nightly.      lamoTRIgine 150 MG Oral Tab Take 1 tablet (150 mg total) by mouth 2 (two) times daily. APPOINTMENT NEEDED FOR FURTHER REFILLS (Patient not taking: Reported on 5/3/2024) 180 tablet 0    Pancrelipase, Lip-Prot-Amyl, (CREON) 6000-13065 units Oral Cap DR Particles Take 2 capsules by mouth 3 (three) times daily with meals. (Patient not taking: Reported on 5/3/2024)       Allergies:   Allergies   Allergen Reactions    Hydrocodone ITCHING     ITCHES WITH 2 TABS, O.K. WITH ONE       Past Medical History:    Abnormal PET scan of head    Patient had abnormal PET scan of brain May 6, 2016 showing bilateral temporoparietal beta amyloid deposition in the gray matter consistent with Alzheimer's disease.     Alzheimer disease (HCC)    Anxiety    Mild    Aortic atherosclerosis (HCC)    Aortic atherosclerosis seen on CT scan October 2014.     Arrhythmia    A-Fib    Arthritis    Mild    Atrophic kidney    CT scan October 8, 2014 shows left kidney smaller than the right.  Bilateral areas of renal parenchymal scar.  Symmetric nephrograms.  Moderate calcific atherosclerosis of aorta.     Back problem    spinal stenosis    Carpal tunnel syndrome    as per NG    Cataract    Chronic atrial fibrillation (HCC)    Crohn disease (HCC)    Depression    Diverticulosis    as per NG    Esophageal reflux    Mild    Family history of colon cancer    Maternal grandmother had colon cancer in her 70s.    Gout    Hepatic steatosis    High blood pressure    High cholesterol    as per NG     History of colonic polyps    as per NG    History of migraine headaches    as per NG    History of total hysterectomy with removal of both tubes and ovaries    as per NG    Hyperlipidemia    Incontinence    DUE TO CYSTOCELE/RECTOCELE    Lymphocytic colitis    as per NG    Migraine    Mild cognitive impairment    as per NG    Obesity    25 lbs overweight    Obstructive sleep apnea    Pacemaker    Pseudobulbar affect    Renal disorder    stage 3    Restless legs syndrome    Seizure disorder (HCC)    last one 2019    Sleep apnea    Tendon tear    Surgically repaired; as per NG    Unspecified essential hypertension    as per NG    Visual impairment    reading glasses     Past Surgical History:   Procedure Laterality Date    Arthroscopy of joint unlisted      BILAT. KNEES    Cardiac pacemaker placement      as per NG    Carpal tunnel release      as per NG    Cataract Left 07/10/2015    Cataract Right late 2015    Cholecystectomy      as per NG    Colonoscopy N/A 2018    Procedure: COLONOSCOPY;  Surgeon: Mohamud Sawyer MD;  Location: [facility] ENDOSCOPY    Colonoscopy N/A 2022    Procedure: COLONOSCOPY/ESOPHAGOGASTRODUODENOSCOPY (EGD);  Surgeon: Genaro Osman MD;  Location: [facility] ENDOSCOPY    Electrocardiogram, complete  2013    SCANNED TO MEDIA TAB - 2013    Hysterectomy      & BSO; as per NG    Knee arthroscopy Right     as per NG    Knee surgery  01/2015    x3     Nasal scopy,remv totl ethmoid      \"Ethmoidectomy, max antrostomies\"; as per NG      1966,1969 & 1973    Normal    Other   or 2015    removal of pins and plate from right wrist (fracture)    Other surgical history Left     L knee scoped--3 torn meniscus--biocartilage done    Other surgical history Right 2016    R knee scoped for torn miniscus    Other surgical history Left     wrist surgery    Pacemaker/defibrillator      Reconstr nose+eamon septal repair      as per NG    Removal gallbladder       Total abdom hysterectomy      Total knee replacement       Social History     Socioeconomic History    Marital status:    Tobacco Use    Smoking status: Former     Current packs/day: 0.00     Average packs/day: 1 pack/day for 40.0 years (40.0 ttl pk-yrs)     Types: Cigarettes     Start date: 1958     Quit date: 1998     Years since quittin.3    Smokeless tobacco: Never   Vaping Use    Vaping status: Every Day    Substances: THC, cannabis   Substance and Sexual Activity    Alcohol use: Yes     Comment: 2 - 3 drinks a year    Drug use: Yes     Frequency: 7.0 times per week     Types: Cannabis     Comment: Medical Edible marijauna and occasional vape   Other Topics Concern    Caffeine Concern No     Comment: 4 cups tea daily    Exercise Yes     Comment: walking 2 blks 2 times week    Pt has a pacemaker Yes    Pt has a defibrillator No    Reaction to local anesthetic No   Social History Narrative    ** Merged History Encounter **         The patient does not use an assistive device..    The patient does live in a home with stairs. 16 stairs       Social Determinants of Health     Financial Resource Strain: Low Risk  (2023)    Financial Resource Strain     Med Affordability: No   Food Insecurity: No Food Insecurity (2023)    Food Insecurity     Food Insecurity: Never true   Transportation Needs: No Transportation Needs (2023)    Transportation Needs     Lack of Transportation: No   Housing Stability: Low Risk  (2023)    Housing Stability     Housing Instability: No         Family History   Problem Relation Age of Onset    Hypertension Father         4 way bypass    Heart Disease Father         as per NG    Migraines Father         as per NG    Cancer Father         Prostate ()    Neurological Disorder Mother         Alzheimer's disease; as per NG    Hypertension Mother         Valve replacement    Heart Disease Mother         as per NG    Migraines Mother         as per NG     Dementia Mother         Dementia ()    Cancer Sister         Left kidney cancer ()    Hypertension Brother         4 way bypass    Cancer Brother         Prostate and throat ()    Heart Disease Brother         CAD; as per NG    Cancer Sister         Left kidney    Cancer Other         Thyroid Cancer; as per NG    Cancer Maternal Grandmother         Colon    Dementia Maternal Grandmother         Dementia ()         PHYSICAL EXAM:    BP (!) 165/81 (BP Location: Left arm, Patient Position: Sitting, Cuff Size: adult)   Pulse 62   Temp 97.8 °F (36.6 °C) (Oral)   Resp 16   Ht 1.651 m (5' 5\")   Wt 64.7 kg (142 lb 9.6 oz)   SpO2 99%   BMI 23.73 kg/m²   Wt Readings from Last 6 Encounters:   24 64.7 kg (142 lb 9.6 oz)   24 63.5 kg (140 lb)   11/10/23 59.4 kg (131 lb)   23 60.9 kg (134 lb 4.8 oz)   10/26/23 62.1 kg (137 lb)   10/19/23 61.2 kg (135 lb)     Physical Exam  General: Patient is alert, not in acute distress.  HEENT: EOMs intact. PERRL.   Neck: No JVD. No palpable lymphadenopathy. Neck is supple.  Chest: Clear to auscultation.  ICD in chest  Heart: Regular rate and rhythm.   Abdomen: Soft, mildly tender at epigastric region with good bowel sounds.  Extremities: No edema.  Neurological: Grossly intact.   Lymphatics: There is no palpable lymphadenopathy throughout in the cervical, supraclavicular, axillary, or inguinal regions.  Psych/Depression: nl        ASSESSMENT/PLAN:     1. Bruising      Patient with a history of, blood in the stool.  Patient has been on treatment with ASA but h/o CVA or CAD.  She does have history of atrial fibrillation, follows with cardiology.    Discussed with patient that this could be related to aspirin side effect.  Patient had PT and PTT which were normal, and she does not have thrombocytopenia.    Workup ordered to complete evaluation of bruising would be to have a platelet aggregation study, to exclude a qualitative platelet  defect causing increased risk of bruising and bleeding.  Patient is to hold aspirin for at least 7 days prior to proceeding.    Patient will be notified once results are available as to if further workup is necessary and if management necessary.    Return for Will call with results and further follow-up/tests.      Orders Placed This Encounter   Procedures    Platelet Aggregation       MDM low risk    Results From Past 48 Hours:  No results found for this or any previous visit (from the past 48 hour(s)).  Imaging & Referrals:  None   No orders of the defined types were placed in this encounter.    Component      Latest Ref Rn 4/9/2024   WBC      4.0 - 11.0 x10(3) uL 4.6    RBC      3.80 - 5.30 x10(6)uL 4.39    Hemoglobin      12.0 - 16.0 g/dL 13.7    Hematocrit      35.0 - 48.0 % 39.3    MCV      80.0 - 100.0 fL 89.5    MCH      26.0 - 34.0 pg 31.2    MCHC      31.0 - 37.0 g/dL 34.9    RDW-SD      35.1 - 46.3 fL 39.9    RDW      11.0 - 15.0 % 12.1    Platelet Count      150.0 - 450.0 10(3)uL 199.0    Prelim Neutrophil Abs      1.50 - 7.70 x10 (3) uL 2.85    Neutrophils Absolute      1.50 - 7.70 x10(3) uL 2.85    Lymphocytes Absolute      1.00 - 4.00 x10(3) uL 1.04    Monocytes Absolute      0.10 - 1.00 x10(3) uL 0.43    Eosinophils Absolute      0.00 - 0.70 x10(3) uL 0.21    Basophils Absolute      0.00 - 0.20 x10(3) uL 0.03    Immature Granulocyte Absolute      0.00 - 1.00 x10(3) uL 0.01    Neutrophils %      % 62.3    Lymphocytes %      % 22.8    Monocytes %      % 9.4    Eosinophils %      % 4.6    Basophils %      % 0.7    Immature Granulocyte %      % 0.2    Glucose      70 - 99 mg/dL 87    Sodium      136 - 145 mmol/L 141    Potassium      3.5 - 5.1 mmol/L 4.5    Chloride      98 - 112 mmol/L 107    Carbon Dioxide, Total      21.0 - 32.0 mmol/L 28.0    ANION GAP      0 - 18 mmol/L 6    BUN      9 - 23 mg/dL 19    CREATININE      0.55 - 1.02 mg/dL 1.11 (H)    BUN/CREATININE RATIO      10.0 - 20.0  17.1     CALCIUM      8.7 - 10.4 mg/dL 9.8    CALCULATED OSMOLALITY      275 - 295 mOsm/kg 294    EGFR      >=60 mL/min/1.73m2 52 (L)    ALT (SGPT)      10 - 49 U/L 13    AST (SGOT)      <=34 U/L 22    ALKALINE PHOSPHATASE      55 - 142 U/L 66    Total Bilirubin      0.2 - 1.1 mg/dL 0.6    PROTEIN, TOTAL      5.7 - 8.2 g/dL 7.9    Albumin      3.2 - 4.8 g/dL 4.6    Globulin      2.8 - 4.4 g/dL 3.3    A/G Ratio      1.0 - 2.0  1.4    Patient Fasting for CMP? Yes    PT      11.6 - 14.8 seconds 13.7    INR      0.80 - 1.20  0.99    APTT      23.3 - 35.6 seconds 32.3

## 2024-05-07 ENCOUNTER — TELEPHONE (OUTPATIENT)
Dept: HEMATOLOGY/ONCOLOGY | Facility: HOSPITAL | Age: 74
End: 2024-05-07

## 2024-05-07 NOTE — TELEPHONE ENCOUNTER
Returned phone and scheduled lab platelet aggregation 5/14 at 10:30 am. 5/13 will be 10 days holding aspirin. Patient verbalizes understanding.

## 2024-05-07 NOTE — TELEPHONE ENCOUNTER
Patient is calling to find out from Dr. Castelan if she can be off the daily 81 mg Aspirin longer than the 10 days. Her 10 days is up on 5/13/24. Called 5/7/24

## 2024-05-13 ENCOUNTER — LAB ENCOUNTER (OUTPATIENT)
Dept: LAB | Age: 74
End: 2024-05-13
Attending: INTERNAL MEDICINE

## 2024-05-13 ENCOUNTER — OFFICE VISIT (OUTPATIENT)
Dept: INTERNAL MEDICINE CLINIC | Facility: CLINIC | Age: 74
End: 2024-05-13
Payer: MEDICARE

## 2024-05-13 VITALS
BODY MASS INDEX: 23.7 KG/M2 | OXYGEN SATURATION: 98 % | WEIGHT: 138.81 LBS | DIASTOLIC BLOOD PRESSURE: 72 MMHG | TEMPERATURE: 98 F | HEIGHT: 64.2 IN | SYSTOLIC BLOOD PRESSURE: 120 MMHG | HEART RATE: 65 BPM

## 2024-05-13 DIAGNOSIS — R10.13 EPIGASTRIC ABDOMINAL PAIN: ICD-10-CM

## 2024-05-13 DIAGNOSIS — I63.9 CEREBROVASCULAR ACCIDENT (CVA), UNSPECIFIED MECHANISM (HCC): ICD-10-CM

## 2024-05-13 DIAGNOSIS — Z00.00 ROUTINE HEALTH MAINTENANCE: ICD-10-CM

## 2024-05-13 DIAGNOSIS — T14.8XXA BRUISING: ICD-10-CM

## 2024-05-13 DIAGNOSIS — I10 PRIMARY HYPERTENSION: ICD-10-CM

## 2024-05-13 DIAGNOSIS — N18.30 STAGE 3 CHRONIC KIDNEY DISEASE, UNSPECIFIED WHETHER STAGE 3A OR 3B CKD (HCC): ICD-10-CM

## 2024-05-13 DIAGNOSIS — K92.1 BLOOD IN STOOL: ICD-10-CM

## 2024-05-13 DIAGNOSIS — Z95.818 PRESENCE OF WATCHMAN LEFT ATRIAL APPENDAGE CLOSURE DEVICE: ICD-10-CM

## 2024-05-13 DIAGNOSIS — Z95.0 PRESENCE OF PERMANENT CARDIAC PACEMAKER: ICD-10-CM

## 2024-05-13 DIAGNOSIS — Z00.00 MEDICARE ANNUAL WELLNESS VISIT, INITIAL: Primary | ICD-10-CM

## 2024-05-13 DIAGNOSIS — Z87.898 HISTORY OF SEIZURES: ICD-10-CM

## 2024-05-13 PROBLEM — R20.2 ARM PARESTHESIA, LEFT: Status: RESOLVED | Noted: 2023-02-13 | Resolved: 2024-05-13

## 2024-05-13 PROBLEM — N30.00 ACUTE CYSTITIS WITHOUT HEMATURIA: Status: RESOLVED | Noted: 2019-05-02 | Resolved: 2024-05-13

## 2024-05-13 PROBLEM — K55.20 AVM (ARTERIOVENOUS MALFORMATION) OF COLON: Status: RESOLVED | Noted: 2018-03-20 | Resolved: 2024-05-13

## 2024-05-13 PROBLEM — R29.898 HAND WEAKNESS: Status: RESOLVED | Noted: 2023-10-31 | Resolved: 2024-05-13

## 2024-05-13 PROBLEM — R27.0 ATAXIA: Status: RESOLVED | Noted: 2022-05-31 | Resolved: 2024-05-13

## 2024-05-13 PROBLEM — R29.898 LEFT ARM WEAKNESS: Status: RESOLVED | Noted: 2023-02-07 | Resolved: 2024-05-13

## 2024-05-13 PROBLEM — Z90.79 HISTORY OF TOTAL HYSTERECTOMY WITH BILATERAL SALPINGO-OOPHORECTOMY (BSO): Status: RESOLVED | Noted: 2019-06-10 | Resolved: 2024-05-13

## 2024-05-13 PROBLEM — M10.9 ARTHRITIS, GOUTY: Status: RESOLVED | Noted: 2018-01-12 | Resolved: 2024-05-13

## 2024-05-13 PROBLEM — T18.9XXA FOREIGN BODY IN ALIMENTARY TRACT: Status: RESOLVED | Noted: 2023-12-07 | Resolved: 2024-05-13

## 2024-05-13 PROBLEM — Z90.710 HISTORY OF TOTAL HYSTERECTOMY WITH BILATERAL SALPINGO-OOPHORECTOMY (BSO): Status: RESOLVED | Noted: 2019-06-10 | Resolved: 2024-05-13

## 2024-05-13 PROBLEM — R79.89 ELEVATED LFTS: Status: RESOLVED | Noted: 2018-02-26 | Resolved: 2024-05-13

## 2024-05-13 PROBLEM — T18.2XXA FOREIGN BODY IN STOMACH, INITIAL ENCOUNTER: Status: RESOLVED | Noted: 2023-12-07 | Resolved: 2024-05-13

## 2024-05-13 PROBLEM — R42 VERTIGO: Status: RESOLVED | Noted: 2022-05-31 | Resolved: 2024-05-13

## 2024-05-13 PROBLEM — Z90.722 HISTORY OF TOTAL HYSTERECTOMY WITH BILATERAL SALPINGO-OOPHORECTOMY (BSO): Status: RESOLVED | Noted: 2019-06-10 | Resolved: 2024-05-13

## 2024-05-13 PROBLEM — K85.90 ACUTE PANCREATITIS, UNSPECIFIED COMPLICATION STATUS, UNSPECIFIED PANCREATITIS TYPE (HCC): Status: RESOLVED | Noted: 2022-05-21 | Resolved: 2024-05-13

## 2024-05-13 PROBLEM — G20.A1 PARKINSON'S DISEASE (HCC): Status: RESOLVED | Noted: 2022-02-08 | Resolved: 2024-05-13

## 2024-05-13 PROBLEM — K85.90 ACUTE PANCREATITIS (HCC): Status: RESOLVED | Noted: 2022-05-21 | Resolved: 2024-05-13

## 2024-05-13 LAB
ALBUMIN SERPL-MCNC: 4.6 G/DL (ref 3.2–4.8)
ALBUMIN/GLOB SERPL: 1.4 {RATIO} (ref 1–2)
ALP LIVER SERPL-CCNC: 58 U/L
ALT SERPL-CCNC: 12 U/L
ANION GAP SERPL CALC-SCNC: 6 MMOL/L (ref 0–18)
AST SERPL-CCNC: 20 U/L (ref ?–34)
BASOPHILS # BLD AUTO: 0.03 X10(3) UL (ref 0–0.2)
BASOPHILS NFR BLD AUTO: 0.5 %
BILIRUB SERPL-MCNC: 0.9 MG/DL (ref 0.2–1.1)
BUN BLD-MCNC: 18 MG/DL (ref 9–23)
BUN/CREAT SERPL: 13.6 (ref 10–20)
CALCIUM BLD-MCNC: 10.2 MG/DL (ref 8.7–10.4)
CHLORIDE SERPL-SCNC: 105 MMOL/L (ref 98–112)
CO2 SERPL-SCNC: 30 MMOL/L (ref 21–32)
CREAT BLD-MCNC: 1.32 MG/DL
DEPRECATED RDW RBC AUTO: 39.2 FL (ref 35.1–46.3)
EGFRCR SERPLBLD CKD-EPI 2021: 42 ML/MIN/1.73M2 (ref 60–?)
EOSINOPHIL # BLD AUTO: 0.14 X10(3) UL (ref 0–0.7)
EOSINOPHIL NFR BLD AUTO: 2.6 %
ERYTHROCYTE [DISTWIDTH] IN BLOOD BY AUTOMATED COUNT: 11.9 % (ref 11–15)
FASTING STATUS PATIENT QL REPORTED: YES
GLOBULIN PLAS-MCNC: 3.3 G/DL (ref 2–3.5)
GLUCOSE BLD-MCNC: 95 MG/DL (ref 70–99)
HCT VFR BLD AUTO: 42.2 %
HGB BLD-MCNC: 14.3 G/DL
IMM GRANULOCYTES # BLD AUTO: 0.01 X10(3) UL (ref 0–1)
IMM GRANULOCYTES NFR BLD: 0.2 %
LIPASE SERPL-CCNC: 64 U/L (ref 12–53)
LYMPHOCYTES # BLD AUTO: 1.17 X10(3) UL (ref 1–4)
LYMPHOCYTES NFR BLD AUTO: 21.4 %
MCH RBC QN AUTO: 30.5 PG (ref 26–34)
MCHC RBC AUTO-ENTMCNC: 33.9 G/DL (ref 31–37)
MCV RBC AUTO: 90 FL
MONOCYTES # BLD AUTO: 0.44 X10(3) UL (ref 0.1–1)
MONOCYTES NFR BLD AUTO: 8 %
NEUTROPHILS # BLD AUTO: 3.68 X10 (3) UL (ref 1.5–7.7)
NEUTROPHILS # BLD AUTO: 3.68 X10(3) UL (ref 1.5–7.7)
NEUTROPHILS NFR BLD AUTO: 67.3 %
OSMOLALITY SERPL CALC.SUM OF ELEC: 294 MOSM/KG (ref 275–295)
PLATELET # BLD AUTO: 201 10(3)UL (ref 150–450)
POTASSIUM SERPL-SCNC: 4.8 MMOL/L (ref 3.5–5.1)
PROT SERPL-MCNC: 7.9 G/DL (ref 5.7–8.2)
RBC # BLD AUTO: 4.69 X10(6)UL
SODIUM SERPL-SCNC: 141 MMOL/L (ref 136–145)
WBC # BLD AUTO: 5.5 X10(3) UL (ref 4–11)

## 2024-05-13 PROCEDURE — 80053 COMPREHEN METABOLIC PANEL: CPT

## 2024-05-13 PROCEDURE — 83690 ASSAY OF LIPASE: CPT

## 2024-05-13 PROCEDURE — 36415 COLL VENOUS BLD VENIPUNCTURE: CPT

## 2024-05-13 PROCEDURE — 85025 COMPLETE CBC W/AUTO DIFF WBC: CPT

## 2024-05-13 RX ORDER — ONDANSETRON 4 MG/1
4 TABLET, ORALLY DISINTEGRATING ORAL EVERY 8 HOURS PRN
Qty: 20 TABLET | Refills: 0 | Status: SHIPPED | OUTPATIENT
Start: 2024-05-13

## 2024-05-13 NOTE — PROGRESS NOTES
HPI:   Nadiya Felix is a 74 year old female presents with the following problems.    Nadiya is here for follow-up.    Tomorrow she will be getting platelet aggregation test under the direction of Dr. Castelan.  She is off her aspirin.    For about 5 years she has had epigastric pain.  She has been evaluated for this with CT scans and EGD with .  Last EGD was October 2023.  This showed mild pan gastritis.  Normal EGD otherwise.  Pain is constant.  She does not feel its muscular.  Does not seem to change with eating.  She has had episodes of nausea and is requesting refill on Zofran.    I did discuss I am not quite sure why this is happening to her.  She is on Protonix.  Will get labs along with lipase.  A CT scan of abdomen was May 21 on 2022 in the ER that showed moderate to large amount of stool throughout the colon.  Mild distal colonic diverticulosis.  Postcholecystectomy change.  Diffuse pneumobilia however pneumobilia was also noted on prior comparison exams such as March 2018.  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    Another CT scan was February 22, 2023 showing cholecystectomy.  Left greater than right renal cortical scarring.  No CT evidence of acute pancreatitis.  Hysterectomy was noted.  At that time there was mild infrarenal abdominal aortic ectasia but no freddy aneurysm.  Moderate atherosclerosis.    During last office visit April 9,2024 I did ask her to follow-up with .  At that time she showed me pictures of some blood in the stool.  She has had 1 or 2 episodes since.  Last colonoscopy was November 16,022.  This at that time was for intermittent bright red blood per rectum.  There was a 5 mm descending colonic polyp.  There were 4 rectosigmoid polyps.  She has not yet been able to see  but I did encourage her to see him for further GI  evaluation.    Below is copy and pasted a note from AKIKO Og, RN for GI:      Progress Notes  - documented in this encounter  Loly Castillo APN - 12/11/2023 12:00 PM CST  Formatting of this note is different from the original.  Images from the original note were not included.  [facility]    Nadiya Felix  [id number]  3/28/1950    No ref. provider found    THIS IS A TELEMEDICINE VIDEO VISIT    Verbal consent was obtained from the patient to conduct a telephone visit encounter in lieu of face-to-face visit. Time spent consulting and evaluating patient was 25 minutes.    Chief Complaint and History of Present Illness:    chief complaint: constipation    Patient is a 73 year old female with PMH atrial fibrilaltion s/p watchman, HTN, epilepsy, Alzheimers/dementia, gout, fatty liver, JENNIFER, CVA who presents for follow up.    Patient with memory issues  12/7/2023, Accidentally swallowed a nail, had EGD to remove  Had xray of abdomen with moderate stool  Constipation, had BM today marbles and liquid with the hard BM, took fleets enema, last BM one week ago  Does not remember how often having a BM  Does not keep diary of BM's  9/2023 TSH normal  Took linzess 290mcg in the past and senna, had diarrhea  Takes benfiber every other day    EGD 10/2023 normal  Colonoscopy 11/2022    Denies nausea, vomiting, heartburn, dysphagia, odynophagia, abdominal pain, unintentional weight loss, changes in bowel habits, hematochezia, melena.    NSAID use:rare    Prior GI Procedures:  History of Prep or Sedation intolerance:  10/19/2023 -- EGD at [facility] -- Preoperative Diagnosis: nausea; abdominal pain. Postoperative Diagnosis: 1.) Mild pangastritis -- biopsies obtained. 2.) Normal EGD otherwise. Duodenal biopsies = NORMAL; antral biopsies = chronic gastritis with complete intestinal metaplasia; gastric body biopsies negative for dysplasia; negative for H pylori.    11/16/2022 --  EGD/colonoscopy with MAC -- Preoperative Diagnosis: abdominal pain; intermittent BRBPR. Postoperative Diagnosis: 1.) Mild pangastritis -- biopsies obtained. 2.) 5 mm descending colon, 3 - 4 mm x 4 rectosigmoid polyps = removed. 3.) Suggestion of mild early left sided diverticulosis. 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 5.) Normal terminal ileum. Duodenal bx = normal; gastric bx negative for h pylori; random colon bx = normal; descending colon polyp = tubular adenoma.  Operative Report - Bernadine Brothers MD - 2023 1:40 PM CST  Formatting of this note is different from the original.  ESOPHAGOGASTRODUODENOSCOPY (EGD) REPORT    Nadiya Felix  LUDIVINA 3/28/1950 Age 73 year old  PCP Donell Yin MD Endoscopist Bernadine Brothers MD    Date of procedure: 23    Procedure: EGD w/foreign body retrieval    Pre-operative diagnosis: accidental foreign body ingestion of a 3.8 cm metal nail    Post-operative diagnosis: see impression    Medications: general anesthesia    Complications: none    Procedure: Informed consent was obtained from the patient after the risks of the procedure were discussed, including but not limited to bleeding, perforation, aspiration, infection, or possibility of a missed lesion. After discussions of the risks/benefits and alternatives to this procedure, as well as the planned sedation, the patient was placed in the left lateral decubitus position and begun on continuous blood pressure pulse oximetry and EKG monitoring and this was maintained throughout the procedure. Once an adequate level of sedation was obtained a bite block was placed. Then the lubricated tip of the [id number] diagnostic video upper endoscope was inserted and advanced using direct visualization into the posterior pharynx and ultimately into the esophagus, stomach, and duodenum.    Complications: None    Findings:    A distal attachment cap was attached to tip of scope prior to insertion.    1. Esophagus:  Normal appearing esophagus    2. Stomach: We then entered the stomach. There was a long metal nail with blunt tip end measuring 3.8 cm as seen on recent KUB, laying in the antrum. A snare was used to grasp the blunt nail tip and bring it up into the cap which allowed the nail to be positioned vertically and parallel to esophagus. The scope was then slowly withdrawn and nail retrieved, photodocumented.    The scope was then reinserted into esophagus. No mucosal injury seen in esophagus. We then entered the stomach. Gastric mucosa appeared normal in the forward view with no evidence of erythema, erosions, or ulcerations. There was no evidence of any luminal or submucosal masses. A retroflexed view allowed examination of the angularis, cardia and fundus and these areas also appeared normal with a non-patulous cardia. No hiatal hernia seen.    3. Duodenum: The duodenal mucosa appeared normal in the 1st and 2nd portion of the duodenum. Bilious effluent seen    We then withdrew the instrument from the patient who tolerated the procedure well.    Impression:  1. Successful retrieval of near 4 cm metal nail from stomach  2. Otherwise normal EGD    Recommend:  1. Ok to discharge home  2. Refrain from holding nails in mouth    >>>If tissue was sampled/removed and you have not received your pathology results either by phone or letter within 2 weeks, please call our office at [phone].    Specimens:  none    Blood loss: <1 ml    Bernadine Brothers MD  [facility]    Electronically signed by Bernadine Brothers MD at 12/07/2023 2:27 PM CST     She has hypertension.  Blood pressure under good control.    She has gained weight.  She is up 238 pounds.    She does have dementia.  She follows with Dr. Liriano.    There is a remote history of possible CVA.  Although on reading notes this was not a definite diagnosis.  Currently she is off her aspirin to get her platelet aggregation study.  She has a history of  bruising.      Wt Readings from Last 3 Encounters:   05/13/24 138 lb 12.8 oz (63 kg)   05/03/24 142 lb 9.6 oz (64.7 kg)   04/09/24 140 lb (63.5 kg)     Body mass index is 23.68 kg/m².     Current Outpatient Medications   Medication Sig Dispense Refill    ondansetron 4 MG Oral Tablet Dispersible Take 1 tablet (4 mg total) by mouth every 8 (eight) hours as needed for Nausea. 20 tablet 0    amLODIPine 2.5 MG Oral Tab Take 1 tablet (2.5 mg total) by mouth daily.      oxybutynin 5 MG Oral Tab Take 1 tablet (5 mg total) by mouth daily.      allopurinol 100 MG Oral Tab Take 1 tablet (100 mg total) by mouth daily.      atorvastatin 20 MG Oral Tab TAKE ONE TABLET BY MOUTH ONE TIME DAILY AT NIGHT 90 tablet 3    pantoprazole 40 MG Oral Tab EC TAKE ONE TABLET BY MOUTH IN THE MORNING BEFORE BREAKFAST FOR THE STOMACH 90 tablet 3    escitalopram 10 MG Oral Tab Take 1 tablet (10 mg total) by mouth daily.      nadolol 20 MG Oral Tab Take 1 tablet (20 mg total) by mouth daily. 90 tablet 3    ROPINIROLE 1 MG Oral Tab TAKE TWO TABLETS BY MOUTH NIGHTLY (Patient taking differently: Take 2 tablets (2 mg total) by mouth nightly.) 180 tablet 3    aspirin 81 MG Oral Tab EC Take 1 tablet (81 mg total) by mouth daily. 1 tablet 0    Donepezil HCl 10 MG Oral Tab Take 1 tablet (10 mg total) by mouth nightly.        Past Medical History:    Abnormal PET scan of head    Patient had abnormal PET scan of brain May 6, 2016 showing bilateral temporoparietal beta amyloid deposition in the gray matter consistent with Alzheimer's disease.     Alzheimer disease (HCC)    Anxiety    Mild    Aortic atherosclerosis (HCC)    Aortic atherosclerosis seen on CT scan October 2014.     Arrhythmia    A-Fib    Arthritis    Mild    Atrophic kidney    CT scan October 8, 2014 shows left kidney smaller than the right.  Bilateral areas of renal parenchymal scar.  Symmetric nephrograms.  Moderate calcific atherosclerosis of aorta.     Back problem    spinal stenosis     Carpal tunnel syndrome    as per NG    Cataract    Chronic atrial fibrillation (HCC)    Crohn disease (HCC)    Depression    Diverticulosis    as per NG    Esophageal reflux    Mild    Family history of colon cancer    Maternal grandmother had colon cancer in her 70s.    Gout    Hepatic steatosis    High blood pressure    High cholesterol    as per NG    History of colonic polyps    as per NG    History of migraine headaches    as per NG    History of total hysterectomy with removal of both tubes and ovaries    as per NG    Hyperlipidemia    Incontinence    DUE TO CYSTOCELE/RECTOCELE    Lymphocytic colitis    as per NG    Migraine    Mild cognitive impairment    as per NG    Obesity    25 lbs overweight    Obstructive sleep apnea    Pacemaker    Pseudobulbar affect    Renal disorder    stage 3    Restless legs syndrome    Seizure disorder (HCC)    last one 2019    Sleep apnea    Tendon tear    Surgically repaired; as per NG    Unspecified essential hypertension    as per NG    Visual impairment    reading glasses      Past Surgical History:   Procedure Laterality Date    Arthroscopy of joint unlisted      BILAT. KNEES    Cardiac pacemaker placement      as per NG    Carpal tunnel release      as per NG    Cataract Left 07/10/2015    Cataract Right late 2015    Cholecystectomy      as per NG    Colonoscopy N/A 2018    Procedure: COLONOSCOPY;  Surgeon: Mohamud Sawyer MD;  Location: [facility] ENDOSCOPY    Colonoscopy N/A 2022    Procedure: COLONOSCOPY/ESOPHAGOGASTRODUODENOSCOPY (EGD);  Surgeon: Genaro Osman MD;  Location: [facility] ENDOSCOPY    Electrocardiogram, complete  2013    SCANNED TO MEDIA TAB - 2013    Hysterectomy      & BSO; as per NG    Knee arthroscopy Right     as per NG    Knee surgery  01/2015    x3     Nasal scopy,remv totl ethmoid  2006    \"Ethmoidectomy, max antrostomies\"; as per NG      1966,1969 & 1973    Normal    Other   or 2015    removal of pins and  plate from right wrist (fracture)    Other surgical history Left     L knee scoped--3 torn meniscus--biocartilage done    Other surgical history Right 2016    R knee scoped for torn miniscus    Other surgical history Left     wrist surgery    Pacemaker/defibrillator      Reconstr nose+eamon septal repair      as per NG    Removal gallbladder      Total abdom hysterectomy      Total knee replacement        Family History   Problem Relation Age of Onset    Hypertension Father         4 way bypass    Heart Disease Father         as per NG    Migraines Father         as per NG    Cancer Father         Prostate ()    Neurological Disorder Mother         Alzheimer's disease; as per NG    Hypertension Mother         Valve replacement    Heart Disease Mother         as per NG    Migraines Mother         as per NG    Dementia Mother         Dementia ()    Cancer Sister         Left kidney cancer ()    Hypertension Brother         4 way bypass    Cancer Brother         Prostate and throat ()    Heart Disease Brother         CAD; as per NG    Cancer Sister         Left kidney    Cancer Other         Thyroid Cancer; as per NG    Cancer Maternal Grandmother         Colon    Dementia Maternal Grandmother         Dementia ()     Results for orders placed or performed in visit on 24   Comp Metabolic Panel (14)   Result Value Ref Range    Glucose 87 70 - 99 mg/dL    Sodium 141 136 - 145 mmol/L    Potassium 4.5 3.5 - 5.1 mmol/L    Chloride 107 98 - 112 mmol/L    CO2 28.0 21.0 - 32.0 mmol/L    Anion Gap 6 0 - 18 mmol/L    BUN 19 9 - 23 mg/dL    Creatinine 1.11 (H) 0.55 - 1.02 mg/dL    BUN/CREA Ratio 17.1 10.0 - 20.0    Calcium, Total 9.8 8.7 - 10.4 mg/dL    Calculated Osmolality 294 275 - 295 mOsm/kg    eGFR-Cr 52 (L) >=60 mL/min/1.73m2    ALT 13 10 - 49 U/L    AST 22 <=34 U/L    Alkaline Phosphatase 66 55 - 142 U/L    Bilirubin, Total 0.6 0.2 - 1.1 mg/dL    Total Protein 7.9 5.7  - 8.2 g/dL    Albumin 4.6 3.2 - 4.8 g/dL    Globulin  3.3 2.8 - 4.4 g/dL    A/G Ratio 1.4 1.0 - 2.0    Patient Fasting for CMP? Yes    PTT, Activated   Result Value Ref Range    PTT 32.3 23.3 - 35.6 seconds   Prothrombin Time (PT)   Result Value Ref Range    PT 13.7 11.6 - 14.8 seconds    INR 0.99 0.80 - 1.20   CBC W/ DIFFERENTIAL   Result Value Ref Range    WBC 4.6 4.0 - 11.0 x10(3) uL    RBC 4.39 3.80 - 5.30 x10(6)uL    HGB 13.7 12.0 - 16.0 g/dL    HCT 39.3 35.0 - 48.0 %    MCV 89.5 80.0 - 100.0 fL    MCH 31.2 26.0 - 34.0 pg    MCHC 34.9 31.0 - 37.0 g/dL    RDW-SD 39.9 35.1 - 46.3 fL    RDW 12.1 11.0 - 15.0 %    .0 150.0 - 450.0 10(3)uL    Neutrophil Absolute Prelim 2.85 1.50 - 7.70 x10 (3) uL    Neutrophil Absolute 2.85 1.50 - 7.70 x10(3) uL    Lymphocyte Absolute 1.04 1.00 - 4.00 x10(3) uL    Monocyte Absolute 0.43 0.10 - 1.00 x10(3) uL    Eosinophil Absolute 0.21 0.00 - 0.70 x10(3) uL    Basophil Absolute 0.03 0.00 - 0.20 x10(3) uL    Immature Granulocyte Absolute 0.01 0.00 - 1.00 x10(3) uL    Neutrophil % 62.3 %    Lymphocyte % 22.8 %    Monocyte % 9.4 %    Eosinophil % 4.6 %    Basophil % 0.7 %    Immature Granulocyte % 0.2 %     *Note: Due to a large number of results and/or encounters for the requested time period, some results have not been displayed. A complete set of results can be found in Results Review.      Social History:   Social History     Socioeconomic History    Marital status:    Tobacco Use    Smoking status: Former     Current packs/day: 0.00     Average packs/day: 1 pack/day for 40.0 years (40.0 ttl pk-yrs)     Types: Cigarettes     Start date: 1958     Quit date: 1998     Years since quittin.3    Smokeless tobacco: Never   Vaping Use    Vaping status: Every Day    Substances: THC, cannabis   Substance and Sexual Activity    Alcohol use: Yes     Comment: 2 - 3 drinks a year    Drug use: Yes     Frequency: 7.0 times per week     Types: Cannabis     Comment: Medical  Edible marijauna and occasional vape   Other Topics Concern    Caffeine Concern No     Comment: 4 cups tea daily    Exercise Yes     Comment: walking 2 blks 2 times week    Pt has a pacemaker Yes    Pt has a defibrillator No    Reaction to local anesthetic No   Social History Narrative    ** Merged History Encounter **         The patient does not use an assistive device..    The patient does live in a home with stairs. 16 stairs       Social Determinants of Health     Financial Resource Strain: Low Risk  (11/2/2023)    Financial Resource Strain     Med Affordability: No   Food Insecurity: No Food Insecurity (11/1/2023)    Food Insecurity     Food Insecurity: Never true   Transportation Needs: No Transportation Needs (11/1/2023)    Transportation Needs     Lack of Transportation: No   Housing Stability: Low Risk  (11/1/2023)    Housing Stability     Housing Instability: No          REVIEW OF SYSTEMS:   GENERAL:  feels well.  No acute distress.   EYES: Voices no blurred vision or eye pain  HEENT:  Voices no nasal congestion, sinus pain or sore throat  LUNGS:  Voices no shortness of breath or cough  CARDIOVASCULAR:  Voices no chest pain or palpitations  GI: see above  :  Voices no dysuria or frequency  MUSCULOSKELETAL:  Voices no back pain or joint pain  NEURO: History of dementia.      EXAM:   /72 (BP Location: Right arm, Patient Position: Sitting, Cuff Size: adult)   Pulse 65   Temp 98.3 °F (36.8 °C) (Oral)   Ht 5' 4.2\" (1.631 m)   Wt 138 lb 12.8 oz (63 kg)   SpO2 98%   BMI 23.68 kg/m²     GENERAL:  well developed, well nourished.  in no apparent distress  SKIN:  no rashes.   HEENT:  Atraumatic.  pharynx without exudate or erythema  EYES:  PERRL . conjunctiva clear.  NECK:  supple, no adenopathy,  BREAST:  no dominant or suspicious masses, nipple discharge, or axillary adenopathy B/L.  I did ask her if she wishes to proceed with screening mammography's and she does.  I gave her order.  LUNGS:  clear to  auscultation. Effort normal  CARDIO:  RRR without murmur.  S1 and S2 normal.   GI:  good BS's. no masses, organomegaly.  There are some tenderness in the epigastric area but no acute abdominal findings  MUSCULOSKELETAL:  back is not tender, no joint swelling  EXTREMITIES:  no cyanosis, clubbing or edema.   NEURO:  Awake and aware.        General Health     In the past six months, have you lost more than 10 pounds without trying?: 2 - No    Has your appetite been poor?: Yes    Type of Diet: Other    How does the patient maintain a good energy level?: Appropriate Exercise    How would you describe your daily physical activity?: Light         How do you maintain positive mental well-being?: Games         Have you had any immunizations at another office such as Influenza, Hepatitis B, Tetanus, or Pneumococcal?: Yes     Functional Ability     Bathing or Showering: Able without help    Toileting: Able without help    Dressing: Able without help    Eating: Able without help    Driving: Able without help    Preparing your meals: Able without help    Managing money/bills: Able without help    Taking medications as prescribed: Able without help    Are you able to afford your medications?: Yes    Hearing Problems?: No     Functional Status     Hearing Problems?: No    Vision Problems? : No    Difficulty walking?: Yes    Difficulty dressing or bathing?: No    Problems with daily activities? : No    Memory Problems?: Yes      Fall/Risk Assessment                                                              Depression Screening (PHQ-2/PHQ-9): Over the LAST 2 WEEKS                      Advance Directives     Do you have a healthcare power of ?: Yes    Do you have a living will?: Yes     Hearing Assessment (Required for AWV/SWV)      Hearing Screening    Screening Method: Whisper Test  Whisper Test Result: Pass         Visual Acuity     Right Eye Visual Acuity: Uncorrected Left Eye Visual Acuity: Uncorrected   Right Eye  Chart Acuity: 20/20 Left Eye Chart Acuity: 20/20     Cognitive Assessment     What day of the week is this?: Correct    What month is it?: Correct    What year is it?: Correct    Recall \"Ball\": Correct    Recall \"Flag\": Correct    Recall \"Tree\": Incorrect         Nadiya Felix's SCREENING SCHEDULE   Tests on this list are recommended by your physician but may not be covered, or covered at this frequency, by your insurer. Please check with your insurance carrier before scheduling to verify coverage.   PREVENTATIVE SERVICES  INDICATIONS AND SCHEDULE Internal Lab or Procedure External Lab or Procedure   Diabetes Screening      HbgA1C   Annually HgbA1C (%)   Date Value   11/01/2023 5.2         No data to display                Fasting Blood Sugar (FSB)Annually Glucose (mg/dL)   Date Value   04/09/2024 87   01/27/2020 94         No data to display               Cardiovascular Disease Screening     LDL Annually LDL Cholesterol (mg/dL)   Date Value   11/01/2023 84        EKG One Time     Colorectal Cancer Screening      Colonoscopy Screen every 10 years Health Maintenance   Topic Date Due    Colorectal Cancer Screening  11/16/2032    Update Health Maintenance if applicable    Flex Sigmoidoscopy Screen every 5 years No results found. However, due to the size of the patient record, not all encounters were searched. Please check Results Review for a complete set of results.      No data to display                 Fecal Occult Blood Annually No results found for: \"FOB\", \"OCCULTSTOOL\"      No data to display                Glaucoma Screening      Ophthalmology Visit Annually     Bone Density Screening      Dexascan Every two years Last Dexa Scan:    XR DEXA BONE DENSITOMETRY (CPT=77080) 10/07/2020        No data to display               Pap and Pelvic      Pap: Every 3 yrs age 21-65 or Pap+HPV every 5 yrs age 30-65, age 65 and older at high risk   No recommendations at this time Update Health Maintenance if applicable     Chlamydia  Annually if high risk No results found for: \"CHLAMYDIA\"      No data to display                Screening Mammogram      Mammogram  Annually Health Maintenance   Topic Date Due    Mammogram  Discontinued    Update Health Maintenance if applicable   Immunizations      Influenza No results found. However, due to the size of the patient record, not all encounters were searched. Please check Results Review for a complete set of results. Update Immunization Activity if applicable    Pneumococcal No results found. However, due to the size of the patient record, not all encounters were searched. Please check Results Review for a complete set of results. Update Immunization Activity if applicable    Hepatitis B No results found. However, due to the size of the patient record, not all encounters were searched. Please check Results Review for a complete set of results. Update Immunization Activity if applicable    Tetanus No results found. However, due to the size of the patient record, not all encounters were searched. Please check Results Review for a complete set of results. Update Immunization Activity if applicable    Zoster (Not covered by Medicare Part B) No results found. However, due to the size of the patient record, not all encounters were searched. Please check Results Review for a complete set of results. Update Immunization Activity if applicable     SPECIFIC DISEASE MONITORING Internal Lab or Procedure External Lab or Procedure   Annual Monitoring of Persistent     Medications (ACE/ARB, digoxin, diuretics)    Potassium  Annually Potassium (mmol/L)   Date Value   04/09/2024 4.5   01/27/2020 4.30         No data to display                Creatinine  Annually Creatinine (mg/dL)   Date Value   04/09/2024 1.11 (H)   01/27/2020 1.10 (H)         No data to display                Digoxin Serum Conc  Annually No results found for: \"DIGOXIN\"      No data to display                Diabetes      HgbA1C  Annually  HgbA1C (%)   Date Value   11/01/2023 5.2         No data to display                Creat/alb ratio  Annually      LDL  Annually LDL Cholesterol (mg/dL)   Date Value   11/01/2023 84         No data to display                 Dilated Eye exam  Annually      No data to display                   No data to display                COPD      Spirometry Testing Annually No results found. However, due to the size of the patient record, not all encounters were searched. Please check Results Review for a complete set of results.      No data to display                       ASSESSMENT AND PLAN:         1. Medicare annual wellness visit, initial  Medicare annual wellness visit    2. Stage 3 chronic kidney disease, unspecified whether stage 3a or 3b CKD (HCC)  Stable.  Will update labs.    3. Primary hypertension  Stable.  Blood pressure under good control.    4. Blood in stool  I have asked her to follow-up with GI.  .  However colonoscopy last done November 2022.    5. Bruising  Has been followed by Dr. Hilaria Castelan.  She has a platelet aggregation study tomorrow    6. Presence of Watchman left atrial appendage closure device  Watchman device in place.  History of atrial fibrillation    7. Presence of permanent cardiac pacemaker  Permanent pacemaker in place.    8. Cerebrovascular accident (CVA), unspecified mechanism (HCC)  There is a history of CVA but on review of history it is not totally clear that she actually did suffer a CVA.  It was noted that her history was questionable for prior history of stroke.  There was also questionable history of amaurosis fugax June 2023.    9. History of seizures  History of seizures.  On Lamictal    10. Routine health maintenance  Mammogram order given to repeat.  She has had Shingrix x 2.  PCV 20 September 2023    11. Epigastric abdominal pain  Some tenderness to the epigastric area with palpation.  However no rebound.  No peritoneal signs.  Will get labs, liver enzymes and  lipase  - CBC With Differential With Platelet; Future  - Comp Metabolic Panel (14); Future  - Lipase [E]; Future          General Health     In the past six months, have you lost more than 10 pounds without trying?: 2 - No    Has your appetite been poor?: Yes    Type of Diet: Other    How does the patient maintain a good energy level?: Appropriate Exercise    How would you describe your daily physical activity?: Light         How do you maintain positive mental well-being?: Games         Have you had any immunizations at another office such as Influenza, Hepatitis B, Tetanus, or Pneumococcal?: Yes     Functional Ability     Bathing or Showering: Able without help    Toileting: Able without help    Dressing: Able without help    Eating: Able without help    Driving: Able without help    Preparing your meals: Able without help    Managing money/bills: Able without help    Taking medications as prescribed: Able without help    Are you able to afford your medications?: Yes    Hearing Problems?: No     Functional Status     Hearing Problems?: No    Vision Problems? : No    Difficulty walking?: Yes    Difficulty dressing or bathing?: No    Problems with daily activities? : No    Memory Problems?: Yes      Fall/Risk Assessment                                                              Depression Screening (PHQ-2/PHQ-9): Over the LAST 2 WEEKS                      Advance Directives     Do you have a healthcare power of ?: Yes    Do you have a living will?: Yes     Hearing Assessment (Required for AWV/SWV)      Hearing Screening    Screening Method: Whisper Test  Whisper Test Result: Pass         Visual Acuity     Right Eye Visual Acuity: Uncorrected Left Eye Visual Acuity: Uncorrected   Right Eye Chart Acuity: 20/20 Left Eye Chart Acuity: 20/20     Cognitive Assessment     What day of the week is this?: Correct    What month is it?: Correct    What year is it?: Correct    Recall \"Ball\": Correct    Recall \"Flag\":  Correct    Recall \"Tree\": Incorrect         Nadiya Felix's SCREENING SCHEDULE   Tests on this list are recommended by your physician but may not be covered, or covered at this frequency, by your insurer. Please check with your insurance carrier before scheduling to verify coverage.   PREVENTATIVE SERVICES  INDICATIONS AND SCHEDULE Internal Lab or Procedure External Lab or Procedure   Diabetes Screening      HbgA1C   Annually HgbA1C (%)   Date Value   11/01/2023 5.2         No data to display                Fasting Blood Sugar (FSB)Annually Glucose (mg/dL)   Date Value   04/09/2024 87   01/27/2020 94         No data to display               Cardiovascular Disease Screening     LDL Annually LDL Cholesterol (mg/dL)   Date Value   11/01/2023 84        EKG One Time     Colorectal Cancer Screening      Colonoscopy Screen every 10 years Health Maintenance   Topic Date Due    Colorectal Cancer Screening  11/16/2032    Update Health Maintenance if applicable    Flex Sigmoidoscopy Screen every 5 years No results found. However, due to the size of the patient record, not all encounters were searched. Please check Results Review for a complete set of results.      No data to display                 Fecal Occult Blood Annually No results found for: \"FOB\", \"OCCULTSTOOL\"      No data to display                Glaucoma Screening      Ophthalmology Visit Annually     Bone Density Screening      Dexascan Every two years Last Dexa Scan:    XR DEXA BONE DENSITOMETRY (CPT=77080) 10/07/2020        No data to display               Pap and Pelvic      Pap: Every 3 yrs age 21-65 or Pap+HPV every 5 yrs age 30-65, age 65 and older at high risk   No recommendations at this time Update Health Maintenance if applicable    Chlamydia  Annually if high risk No results found for: \"CHLAMYDIA\"      No data to display                Screening Mammogram      Mammogram  Annually Health Maintenance   Topic Date Due    Mammogram  Discontinued    Update  Health Maintenance if applicable   Immunizations      Influenza No results found. However, due to the size of the patient record, not all encounters were searched. Please check Results Review for a complete set of results. Update Immunization Activity if applicable    Pneumococcal No results found. However, due to the size of the patient record, not all encounters were searched. Please check Results Review for a complete set of results. Update Immunization Activity if applicable    Hepatitis B No results found. However, due to the size of the patient record, not all encounters were searched. Please check Results Review for a complete set of results. Update Immunization Activity if applicable    Tetanus No results found. However, due to the size of the patient record, not all encounters were searched. Please check Results Review for a complete set of results. Update Immunization Activity if applicable    Zoster (Not covered by Medicare Part B) No results found. However, due to the size of the patient record, not all encounters were searched. Please check Results Review for a complete set of results. Update Immunization Activity if applicable     SPECIFIC DISEASE MONITORING Internal Lab or Procedure External Lab or Procedure   Annual Monitoring of Persistent     Medications (ACE/ARB, digoxin, diuretics)    Potassium  Annually Potassium (mmol/L)   Date Value   04/09/2024 4.5   01/27/2020 4.30         No data to display                Creatinine  Annually Creatinine (mg/dL)   Date Value   04/09/2024 1.11 (H)   01/27/2020 1.10 (H)         No data to display                Digoxin Serum Conc  Annually No results found for: \"DIGOXIN\"      No data to display                Diabetes      HgbA1C  Annually HgbA1C (%)   Date Value   11/01/2023 5.2         No data to display                Creat/alb ratio  Annually      LDL  Annually LDL Cholesterol (mg/dL)   Date Value   11/01/2023 84         No data to display                  Dilated Eye exam  Annually      No data to display                   No data to display                COPD      Spirometry Testing Annually No results found. However, due to the size of the patient record, not all encounters were searched. Please check Results Review for a complete set of results.      No data to display                  Problem list reviewed as below.  Noted.  Will follow.     Paroxysmal A-fib (HCC)  JENNIFER (obstructive sleep apnea)  Family history of colon cancer  CKD (chronic kidney disease)  Dementia (HCC)  History of migraine with aura  Nonintractable epilepsy without status epilepticus (HCC)  Essential hypertension  Presence of Watchman left atrial appendage closure device  History of cerebrovascular accident (CVA) in adulthood  History of seizures  Bruising       Donell Yin MD  5/13/2024  10:26 AM

## 2024-05-14 ENCOUNTER — NURSE ONLY (OUTPATIENT)
Dept: HEMATOLOGY/ONCOLOGY | Facility: HOSPITAL | Age: 74
End: 2024-05-14
Attending: INTERNAL MEDICINE

## 2024-05-14 DIAGNOSIS — T14.8XXA BRUISING: ICD-10-CM

## 2024-05-14 LAB
PA AA BLD-ACNC: 65 % (ref 65–90)
PA COLL PRP: 79 % (ref 70–100)
PA RIST LO DOSE PRP: 0 % (ref 0–15)
PA RIST PRP QL: 68 % (ref 70–100)
PLATELET AGGREGATION (ADP): 78 % (ref 65–100)
PLATELET AGGREGATION ADP SLOPE: 57 (ref 38–67)
PLATELET AGGREGATION ARACHIDONIC ACID SLOPE: 30 (ref 20–100)
PLATELET AGGREGATION COLLAGEN SLOPE: 58 (ref 35–67)
PLATELET AGGREGATION RISTOCETIN SLOPE LOW: 0 (ref 0–7)
PLATELET AGGREGATION RISTOCETIN SLOPE: 55 (ref 42–65)

## 2024-05-14 PROCEDURE — 85390 FIBRINOLYSINS SCREEN I&R: CPT

## 2024-05-14 PROCEDURE — 36415 COLL VENOUS BLD VENIPUNCTURE: CPT

## 2024-05-15 ENCOUNTER — TELEPHONE (OUTPATIENT)
Dept: INTERNAL MEDICINE CLINIC | Facility: CLINIC | Age: 74
End: 2024-05-15

## 2024-05-15 DIAGNOSIS — K86.1 OTHER CHRONIC PANCREATITIS (HCC): Primary | ICD-10-CM

## 2024-05-15 NOTE — TELEPHONE ENCOUNTER
Discussed with Nadiya.  Discussed with Dr. Rolando Diego from radiology.  Discussed options concerning patient's pancreas imaging.  Lipase 64.  Patient has chronically elevated lipase with lipase 102 February 14, 2003 and 133 February 9, 2023.  In May 21, 2022 her lipase was 3472.  She has had CT scans without evidence of acute endocarditis.    She now has epigastric pain.  However this pain is chronic and on and off for a couple years.    Best modality is an MRI scan.  Patient does have a pacemaker in place.  It appears from what she said that it may be MRI compatible.    Order in place.  Details of pacemaker placed in comment section.  GFR acceptable for MRI.    Will go through the process of seeing if MRI will be approved with her pacemaker.

## 2024-05-15 NOTE — TELEPHONE ENCOUNTER
Called [facility] magnetic resonance imaging Dept Ext # [phone] and left voicemail asking for a sooner appointment for pt. Pt has  magnetic resonance imaging Abdomen w/wo contrast scheduled for 7-9-24 at this time.   Called pt and informed her that I left the voicemail and if  magnetic resonance imaging dept calls her to please call clinic with new date

## 2024-06-21 ENCOUNTER — HOSPITAL ENCOUNTER (OUTPATIENT)
Dept: MAMMOGRAPHY | Age: 74
Discharge: HOME | End: 2024-06-21
Attending: INTERNAL MEDICINE

## 2024-06-21 DIAGNOSIS — Z12.31 VISIT FOR SCREENING MAMMOGRAM: ICD-10-CM

## 2024-06-21 PROCEDURE — 77067 SCR MAMMO BI INCL CAD: CPT | Performed by: INTERNAL MEDICINE

## 2024-06-21 PROCEDURE — 77063 BREAST TOMOSYNTHESIS BI: CPT | Performed by: INTERNAL MEDICINE

## 2024-06-24 ENCOUNTER — TELEPHONE (OUTPATIENT)
Dept: INTERNAL MEDICINE CLINIC | Facility: CLINIC | Age: 74
End: 2024-06-24

## 2024-07-08 ENCOUNTER — TELEPHONE (OUTPATIENT)
Dept: INTERNAL MEDICINE CLINIC | Facility: CLINIC | Age: 74
End: 2024-07-08

## 2024-07-08 NOTE — TELEPHONE ENCOUNTER
Please call patient today.  She has an MRI scheduled at [facility] in [place] tomorrow.  She should arrive at 10:30 AM.  Location is the main hospital at [place].    The reason for the message is to just a reminder that the appointment is tomorrow.  And at [place] NOT [place].

## 2024-07-08 NOTE — TELEPHONE ENCOUNTER
Patient called to request a Sedative be sent to [facility] on [address] in [place] as she is having a full body MRI tomorrow. Patient states she is claustrophobic and is anxious about test.     Please advise.

## 2024-07-08 NOTE — TELEPHONE ENCOUNTER
To Dr FLEMING,    Called pt and relayed your message. Pt aware and will keep MRI appointment as scheduled

## 2024-07-08 NOTE — TELEPHONE ENCOUNTER
Please let patient know that sometimes the sedatives we give can make people more confused.  I am concerned about her dementia.    If we do give a small amount of sedatives she absolutely needs to have a ride home.  She should not drive for 24 hours after sedative.  She would need to have somebody with her for 8 hours after the sedatives to make sure that she is okay.    The MRI is not for the \"whole body\".  It is just of the abdomen.    Her head may not be in the radiology field.

## 2024-07-09 ENCOUNTER — HOSPITAL ENCOUNTER (OUTPATIENT)
Dept: MRI IMAGING | Facility: HOSPITAL | Age: 74
Discharge: HOME | End: 2024-07-09
Attending: INTERNAL MEDICINE
Payer: MEDICARE

## 2024-07-09 VITALS — SYSTOLIC BLOOD PRESSURE: 170 MMHG | DIASTOLIC BLOOD PRESSURE: 77 MMHG | HEART RATE: 70 BPM | OXYGEN SATURATION: 98 %

## 2024-07-09 DIAGNOSIS — K86.1 OTHER CHRONIC PANCREATITIS (HCC): ICD-10-CM

## 2024-07-09 PROCEDURE — 74183 MRI ABD W/O CNTR FLWD CNTR: CPT | Performed by: INTERNAL MEDICINE

## 2024-07-09 PROCEDURE — A9575 INJ GADOTERATE MEGLUMI 0.1ML: HCPCS | Performed by: INTERNAL MEDICINE

## 2024-07-09 RX ORDER — GADOTERATE MEGLUMINE 376.9 MG/ML
15 INJECTION INTRAVENOUS
Status: COMPLETED | OUTPATIENT
Start: 2024-07-09 | End: 2024-07-09

## 2024-07-09 RX ADMIN — GADOTERATE MEGLUMINE 12 ML: 376.9 INJECTION INTRAVENOUS at 12:04:00

## 2024-07-09 NOTE — IMAGING NOTE
RN verified the patient's name, , and allergies upon arrival to MRI. The patient has a not complete MRI conditional pacemaker and is not dependent on the device. The patient's device was placed in MR safe mode by Pamela Bloxsom, [employer] rep assisted by [facility] staff. Continuous monitoring of HR, B/P, and SpO2 throughout MRI scan. After the scan, the patient's device was placed in pre scan settings by Pamela Bloxsom, [employer] rep assisted by [facility] staff.

## 2024-07-10 ENCOUNTER — TELEPHONE (OUTPATIENT)
Dept: INTERNAL MEDICINE CLINIC | Facility: CLINIC | Age: 74
End: 2024-07-10

## 2024-07-10 NOTE — TELEPHONE ENCOUNTER
Please let Nadiya know that her MRI came out good.  There was some kidney cysts but these are of no concern.    The reason for the test was her upper abdominal pain and pancreas.  Those areas all came out normal.  The report was very good.

## 2024-07-29 RX ORDER — NADOLOL 20 MG/1
20 TABLET ORAL DAILY
Qty: 90 TABLET | Refills: 3 | Status: SHIPPED | OUTPATIENT
Start: 2024-07-29

## 2024-07-29 NOTE — TELEPHONE ENCOUNTER
Refill request is for a maintenance medication and has met the criteria specified in the Ambulatory Medication Refill Standing Order for eligibility, visits, laboratory, alerts and was sent to the requested pharmacy.    Requested Prescriptions     Signed Prescriptions Disp Refills    nadolol 20 MG Oral Tab 90 tablet 3     Sig: TAKE ONE TABLET BY MOUTH ONE TIME DAILY     Authorizing Provider: ROCK OLMSTEAD     Ordering User: FLACA STRAUSS

## 2024-08-30 RX ORDER — ATORVASTATIN CALCIUM 20 MG/1
TABLET, FILM COATED ORAL
Qty: 90 TABLET | Refills: 3 | Status: SHIPPED | OUTPATIENT
Start: 2024-08-30

## 2024-08-30 NOTE — TELEPHONE ENCOUNTER
Refill request is for a maintenance medication and has met the criteria specified in the Ambulatory Medication Refill Standing Order for eligibility, visits, laboratory, alerts and was sent to the requested pharmacy.    Requested Prescriptions     Signed Prescriptions Disp Refills    atorvastatin 20 MG Oral Tab 90 tablet 3     Sig: TAKE ONE TABLET BY MOUTH ONE TIME DAILY AT NIGHT     Authorizing Provider: ROCK OLMSTEAD     Ordering User: FLACA STRAUSS

## 2024-09-08 NOTE — TELEPHONE ENCOUNTER
Dr. Guillermo at bedside performing [facility]    Spoke to pt and relayed MD message and instructions. Pt verbalized understanding and agrees with plan.

## 2024-09-09 ENCOUNTER — TELEPHONE (OUTPATIENT)
Dept: INTERNAL MEDICINE CLINIC | Facility: CLINIC | Age: 74
End: 2024-09-09

## 2024-09-09 NOTE — TELEPHONE ENCOUNTER
Please call patient  She is experiencing tightening in front, pain in back between shoulder blades, not sure if acid reflux, can also feel light headed and nauseous, not related to when she eats  This is off and on for six weeks, sometimes last for 4 days  If patient is flat is relieves it, tender to the touch  Please call to advise  Tasked to nursing

## 2024-09-10 ENCOUNTER — TELEPHONE (OUTPATIENT)
Dept: INTERNAL MEDICINE CLINIC | Facility: CLINIC | Age: 74
End: 2024-09-10

## 2024-09-10 ENCOUNTER — LAB ENCOUNTER (OUTPATIENT)
Dept: LAB | Age: 74
End: 2024-09-10
Attending: INTERNAL MEDICINE
Payer: MEDICARE

## 2024-09-10 ENCOUNTER — OFFICE VISIT (OUTPATIENT)
Dept: INTERNAL MEDICINE CLINIC | Facility: CLINIC | Age: 74
End: 2024-09-10
Payer: MEDICARE

## 2024-09-10 VITALS
WEIGHT: 141.38 LBS | BODY MASS INDEX: 24.14 KG/M2 | SYSTOLIC BLOOD PRESSURE: 148 MMHG | HEART RATE: 60 BPM | HEIGHT: 64.2 IN | DIASTOLIC BLOOD PRESSURE: 82 MMHG | TEMPERATURE: 98 F | OXYGEN SATURATION: 99 %

## 2024-09-10 DIAGNOSIS — R07.9 CHEST PAIN, UNSPECIFIED TYPE: ICD-10-CM

## 2024-09-10 DIAGNOSIS — Z95.818 PRESENCE OF WATCHMAN LEFT ATRIAL APPENDAGE CLOSURE DEVICE: ICD-10-CM

## 2024-09-10 DIAGNOSIS — I10 PRIMARY HYPERTENSION: ICD-10-CM

## 2024-09-10 DIAGNOSIS — F03.90 DEMENTIA, UNSPECIFIED DEMENTIA SEVERITY, UNSPECIFIED DEMENTIA TYPE, UNSPECIFIED WHETHER BEHAVIORAL, PSYCHOTIC, OR MOOD DISTURBANCE OR ANXIETY (HCC): ICD-10-CM

## 2024-09-10 DIAGNOSIS — I49.9 IRREGULAR HEART BEAT: ICD-10-CM

## 2024-09-10 DIAGNOSIS — M54.6 MIDLINE THORACIC BACK PAIN, UNSPECIFIED CHRONICITY: ICD-10-CM

## 2024-09-10 DIAGNOSIS — R23.1 CLAMMY SKIN: ICD-10-CM

## 2024-09-10 DIAGNOSIS — N18.30 STAGE 3 CHRONIC KIDNEY DISEASE, UNSPECIFIED WHETHER STAGE 3A OR 3B CKD (HCC): ICD-10-CM

## 2024-09-10 DIAGNOSIS — R53.1 WEAKNESS: ICD-10-CM

## 2024-09-10 DIAGNOSIS — I10 PRIMARY HYPERTENSION: Primary | ICD-10-CM

## 2024-09-10 DIAGNOSIS — Z86.79 HISTORY OF ATRIAL FIBRILLATION: ICD-10-CM

## 2024-09-10 LAB
ALBUMIN SERPL-MCNC: 4.6 G/DL (ref 3.2–4.8)
ALBUMIN/GLOB SERPL: 1.5 {RATIO} (ref 1–2)
ALP LIVER SERPL-CCNC: 60 U/L
ALT SERPL-CCNC: 11 U/L
ANION GAP SERPL CALC-SCNC: 8 MMOL/L (ref 0–18)
AST SERPL-CCNC: 20 U/L (ref ?–34)
BASOPHILS # BLD AUTO: 0.02 X10(3) UL (ref 0–0.2)
BASOPHILS NFR BLD AUTO: 0.3 %
BILIRUB SERPL-MCNC: 0.5 MG/DL (ref 0.2–1.1)
BUN BLD-MCNC: 17 MG/DL (ref 9–23)
BUN/CREAT SERPL: 13.1 (ref 10–20)
CALCIUM BLD-MCNC: 9.7 MG/DL (ref 8.7–10.4)
CHLORIDE SERPL-SCNC: 109 MMOL/L (ref 98–112)
CHOLEST SERPL-MCNC: 162 MG/DL (ref ?–200)
CO2 SERPL-SCNC: 28 MMOL/L (ref 21–32)
CREAT BLD-MCNC: 1.3 MG/DL
DEPRECATED RDW RBC AUTO: 40 FL (ref 35.1–46.3)
EGFRCR SERPLBLD CKD-EPI 2021: 43 ML/MIN/1.73M2 (ref 60–?)
EOSINOPHIL # BLD AUTO: 0.18 X10(3) UL (ref 0–0.7)
EOSINOPHIL NFR BLD AUTO: 2.9 %
ERYTHROCYTE [DISTWIDTH] IN BLOOD BY AUTOMATED COUNT: 12.2 % (ref 11–15)
FASTING PATIENT LIPID ANSWER: NO
FASTING STATUS PATIENT QL REPORTED: NO
GLOBULIN PLAS-MCNC: 3.1 G/DL (ref 2–3.5)
GLUCOSE BLD-MCNC: 94 MG/DL (ref 70–99)
HCT VFR BLD AUTO: 41.6 %
HDLC SERPL-MCNC: 42 MG/DL (ref 40–59)
HGB BLD-MCNC: 13.7 G/DL
IMM GRANULOCYTES # BLD AUTO: 0.02 X10(3) UL (ref 0–1)
IMM GRANULOCYTES NFR BLD: 0.3 %
LDLC SERPL CALC-MCNC: 79 MG/DL (ref ?–100)
LYMPHOCYTES # BLD AUTO: 1.47 X10(3) UL (ref 1–4)
LYMPHOCYTES NFR BLD AUTO: 23.7 %
MCH RBC QN AUTO: 29.7 PG (ref 26–34)
MCHC RBC AUTO-ENTMCNC: 32.9 G/DL (ref 31–37)
MCV RBC AUTO: 90.2 FL
MONOCYTES # BLD AUTO: 0.52 X10(3) UL (ref 0.1–1)
MONOCYTES NFR BLD AUTO: 8.4 %
NEUTROPHILS # BLD AUTO: 3.99 X10 (3) UL (ref 1.5–7.7)
NEUTROPHILS # BLD AUTO: 3.99 X10(3) UL (ref 1.5–7.7)
NEUTROPHILS NFR BLD AUTO: 64.4 %
NONHDLC SERPL-MCNC: 120 MG/DL (ref ?–130)
OSMOLALITY SERPL CALC.SUM OF ELEC: 301 MOSM/KG (ref 275–295)
PLATELET # BLD AUTO: 228 10(3)UL (ref 150–450)
POTASSIUM SERPL-SCNC: 4.1 MMOL/L (ref 3.5–5.1)
PROT SERPL-MCNC: 7.7 G/DL (ref 5.7–8.2)
RBC # BLD AUTO: 4.61 X10(6)UL
SODIUM SERPL-SCNC: 145 MMOL/L (ref 136–145)
TRIGL SERPL-MCNC: 248 MG/DL (ref 30–149)
VLDLC SERPL CALC-MCNC: 39 MG/DL (ref 0–30)
WBC # BLD AUTO: 6.2 X10(3) UL (ref 4–11)

## 2024-09-10 PROCEDURE — 80053 COMPREHEN METABOLIC PANEL: CPT

## 2024-09-10 PROCEDURE — 85025 COMPLETE CBC W/AUTO DIFF WBC: CPT

## 2024-09-10 PROCEDURE — 99214 OFFICE O/P EST MOD 30 MIN: CPT | Performed by: INTERNAL MEDICINE

## 2024-09-10 PROCEDURE — 36415 COLL VENOUS BLD VENIPUNCTURE: CPT

## 2024-09-10 PROCEDURE — 80061 LIPID PANEL: CPT

## 2024-09-10 NOTE — PROGRESS NOTES
Nadiya Felix is a 74 year old female.  HPI:     Chief Complaint   Patient presents with    Back Pain     Pain 4/10 to back that radiates to chest      Nadiya presents with about a month of chest discomfort and mid thoracic back pain.  It is getting worse.  It does not seem to be related to movement but she is tender to press her anterior chest areas and sternal and xiphoid areas and mid thoracic areas.  Discomfort comes and goes.    She does have a monitoring device that she can check her heart rhythm and she wanted to show me some areas where it notified her that it was irregular but she cannot bring it up on her smart phone.    She does have a history of atrial fibrillation.  She has a Watchman device and because of increased risk of falling with her dementia.    She has seen Dr. Cisneros.  She is not known to have coronary artery disease and when she has had other chest pains she did have an angiogram that was unremarkable.  Last angiogram October 14, 2022 showing normal coronary arteries.    She does have dementia.    She sleeps well at night without pain.  However when she wakes up she will have some lower sternal pain that happens when she leans forward.  This can make her dizzy.  She then will have to lay down.    She gets episodes of perspiration.  And clamminess.    No ischemic sounding chest pain.  No pulmonary symptoms.  No coughing or wheezing.      Current Outpatient Medications   Medication Sig Dispense Refill    atorvastatin 20 MG Oral Tab TAKE ONE TABLET BY MOUTH ONE TIME DAILY AT NIGHT 90 tablet 3    nadolol 20 MG Oral Tab TAKE ONE TABLET BY MOUTH ONE TIME DAILY 90 tablet 3    ondansetron 4 MG Oral Tablet Dispersible Take 1 tablet (4 mg total) by mouth every 8 (eight) hours as needed for Nausea. 20 tablet 0    amLODIPine 2.5 MG Oral Tab Take 1 tablet (2.5 mg total) by mouth daily.      oxybutynin 5 MG Oral Tab Take 1 tablet (5 mg total) by mouth daily.      allopurinol 100 MG Oral Tab Take 1 tablet  (100 mg total) by mouth daily.      pantoprazole 40 MG Oral Tab EC TAKE ONE TABLET BY MOUTH IN THE MORNING BEFORE BREAKFAST FOR THE STOMACH 90 tablet 3    escitalopram 10 MG Oral Tab Take 1 tablet (10 mg total) by mouth daily.      ROPINIROLE 1 MG Oral Tab TAKE TWO TABLETS BY MOUTH NIGHTLY (Patient taking differently: Take 2 tablets (2 mg total) by mouth nightly.) 180 tablet 3    aspirin 81 MG Oral Tab EC Take 1 tablet (81 mg total) by mouth daily. 1 tablet 0    Donepezil HCl 10 MG Oral Tab Take 1 tablet (10 mg total) by mouth nightly.        Past Medical History:    Abnormal PET scan of head    Patient had abnormal PET scan of brain May 6, 2016 showing bilateral temporoparietal beta amyloid deposition in the gray matter consistent with Alzheimer's disease.     Acute cystitis without hematuria    Acute pancreatitis (HCC)    Acute pancreatitis, unspecified complication status, unspecified pancreatitis type (HCC)    Alzheimer disease (HCC)    Anxiety    Mild    Aortic atherosclerosis (HCC)    Aortic atherosclerosis seen on CT scan October 2014.     Arm paresthesia, left    Arrhythmia    A-Fib    Arthritis    Mild    Arthritis, gouty    Ataxia    Atrophic kidney    CT scan October 8, 2014 shows left kidney smaller than the right.  Bilateral areas of renal parenchymal scar.  Symmetric nephrograms.  Moderate calcific atherosclerosis of aorta.     AVM (arteriovenous malformation) of colon    Large nonbleeding cecal AVM found on colonoscopy March 16 2018.  At that time patient was found to have microscopic/lymphocytic colitis.      Back problem    spinal stenosis    Carpal tunnel syndrome    as per NG    Cataract    Chronic atrial fibrillation (HCC)    Closed fracture of left distal radius    Crohn disease (HCC)    Depression    Diverticulosis    as per NG    Elevated LFTs    Esophageal reflux    Mild    Family history of colon cancer    Maternal grandmother had colon cancer in her 70s.    Foreign body in alimentary tract     Foreign body in stomach, initial encounter    Gout    Hand weakness    Hepatic steatosis    High blood pressure    High cholesterol    as per NG    History of colonic polyps    as per NG    History of migraine headaches    as per NG    History of total hysterectomy with bilateral salpingo-oophorectomy (BSO)    History of total hysterectomy with removal of both tubes and ovaries    as per NG    Hyperlipidemia    Incontinence    DUE TO CYSTOCELE/RECTOCELE    Left arm weakness    Lymphocytic colitis    as per NG    Migraine    Mild cognitive impairment    as per NG    Obesity    25 lbs overweight    Obstructive sleep apnea    Pacemaker    Parkinson's disease (HCC)    Pseudobulbar affect    Renal disorder    stage 3    Restless legs syndrome    Seizure disorder (HCC)    last one 2019    Sleep apnea    Tendon tear    Surgically repaired; as per NG    Unspecified essential hypertension    as per NG    Vertigo    Visual impairment    reading glasses      Social History:  Social History     Socioeconomic History    Marital status:    Tobacco Use    Smoking status: Former     Current packs/day: 0.00     Average packs/day: 1 pack/day for 40.0 years (40.0 ttl pk-yrs)     Types: Cigarettes     Start date: 1958     Quit date: 1998     Years since quittin.7    Smokeless tobacco: Never   Vaping Use    Vaping status: Every Day    Substances: THC, cannabis   Substance and Sexual Activity    Alcohol use: Yes     Comment: 2 - 3 drinks a year    Drug use: Yes     Frequency: 7.0 times per week     Types: Cannabis     Comment: Medical Edible marijauna and occasional vape   Other Topics Concern    Caffeine Concern No     Comment: 4 cups tea daily    Exercise Yes     Comment: walking 2 blks 2 times week    Pt has a pacemaker Yes    Pt has a defibrillator No    Reaction to local anesthetic No   Social History Narrative    ** Merged History Encounter **         The patient does not use an assistive device..    The  patient does live in a home with stairs. 16 stairs       Social Determinants of Health     Financial Resource Strain: Low Risk  (11/2/2023)    Financial Resource Strain     Med Affordability: No   Food Insecurity: No Food Insecurity (11/1/2023)    Food Insecurity     Food Insecurity: Never true   Transportation Needs: No Transportation Needs (11/1/2023)    Transportation Needs     Lack of Transportation: No   Housing Stability: Low Risk  (11/1/2023)    Housing Stability     Housing Instability: No        REVIEW OF SYSTEMS:   GENERAL HEALTH:  feels well otherwise  RESPIRATORY:  Voices no shortness of breath with exertion or cough  CARDIOVASCULAR:  Voices no chest pain on exertion or shortness of breath. See above  GI:   Voices no abdominal pain or changes of bowels   :Viices no urning or frequency of urination.  NEURO:  Voices no  headaches or dizziness    EXAM:   /82   Pulse 60   Temp 98.2 °F (36.8 °C)   Ht 5' 4.2\" (1.631 m)   Wt 141 lb 6.4 oz (64.1 kg)   SpO2 99%   BMI 24.12 kg/m²     Blood pressure standing 120/70.  Also she did have an episode of feeling \"clammy\" and her blood pressure was 130/70 sitting.  GENERAL:  well developed, well nourished, in no apparent distress  SKIN:  no rashes   EYES:  PERRL. Sclera anicteric.  NECK:  Supple,  no adenopathy,    LUNGS:  clear to auscultation.  Effort normal  CARDIO:  RRR without murmur.   S1 and S2 normal  GI:  good BS's,  no masses,   HSM or tenderness  EXTREMITIES : no cyanosis, clubbing or edema    ASSESSMENT AND PLAN:     1. Primary hypertension  Blood pressure under satisfactory control for now.  Continue current therapy  - CBC With Differential With Platelet; Future  - Comp Metabolic Panel (14); Future  - Lipid Panel; Future    2. Weakness  Weakness.  I am not sure why.  Will get CBC CMP and lipids.    3. Clammy skin  Episodes of clammy skin.  Does not seem to be related to a cardiac event or low blood pressure.    4. Irregular heart beat  She noted  irregular heartbeat on a device that links up with her smart phone.  Unfortunately she was not able to find the irregular heartbeats on her smart phone.  I asked her to see Dr. Cisneros.  She does have a history of pacemaker.  She has Dr. Cisneros's number.    5. History of atrial fibrillation  History of atrial fibrillation.  She has a Watchman device in place.    6. Presence of Watchman left atrial appendage closure device  Presence of Watchman device.    7. Chest pain, unspecified type  Noncardiac chest pain.  She is tender to palpate her anterior chest wall, sternum and xiphoid area.  There is also tenderness along her thoracic spine.    8. Midline thoracic back pain, unspecified chronicity  No deformity.  I do not think an x-ray would help.  She did have a chest x-ray last December 2023    9. Stage 3 chronic kidney disease, unspecified whether stage 3a or 3b CKD (HCC)  Stable.  Will update labs    10. Dementia, unspecified dementia severity, unspecified dementia type, unspecified whether behavioral, psychotic, or mood disturbance or anxiety (HCC)  Stable.  She follows with neurology.    This visit was 30 minutes.  I spent 10 minutes before visit preparing and reviewing old records.  Greater than 50% of the visit was engaged in counseling and review of past data.     The patient indicates understanding of these issues and agrees to the plan.    Donell Yin MD  9/10/2024  1:23 PM

## 2024-10-02 DIAGNOSIS — G40.909 NONINTRACTABLE EPILEPSY WITHOUT STATUS EPILEPTICUS, UNSPECIFIED EPILEPSY TYPE (HCC): ICD-10-CM

## 2024-10-02 RX ORDER — LAMOTRIGINE 150 MG/1
150 TABLET ORAL 2 TIMES DAILY
Qty: 180 TABLET | Refills: 0 | Status: SHIPPED | OUTPATIENT
Start: 2024-10-02

## 2024-10-02 NOTE — TELEPHONE ENCOUNTER
Pt requesting a refill of LTG 150mg tablets.    Last office visit: 10/4/23    Next office visit: None scheduled. Message sent to  to help the patient set up a follow-up appointment.     Neurology Clinic Follow Up Note     Chief Complaint:  Seizure Disorder (LOV 5/25/23 - The pt presents to f/u on her seizures, RLS, and MCI. The patient states that she has been seizure free. States her short term memory is worsening - neuropsych testing has not been completed - confirms taking aricept. The pt is in need of a refill of the lamotrigine.)        HPI:   Nadiya Felix is a 73 year old right handed woman w/ a pmhx sig. for  ?Prior stroke (most recently admitted on 5/31/2022 for dizziness diagnosed with posterior circulation stroke), ?amarousis fugax in June 2023, epilepsy, hx of atrial fibrillation s/p watchman,  migraines with visual aura, MCI/Alzhiemer's dementia, pseudobulbar affect, prior complaint of left-sided hemisensory loss in February 2017, hepatic steatosis, atrophic kidney, carpal tunnel,  RLS, and JENNIFER not on CPAP who presented to [facility] on 2/7/2023 w/ progressive/ascending left hand/arm dysesthesias in the setting of ?prior stroke.     She is now seen in close hospital discharge follow-up.     LKW: 1030 PM     Her fingertips on her L hand were cold no other sx  When she woke up at 9 am her hand was already numb  Felt something was not right and it  Progressed over at least 1 hour  + nausea  She reports she was dropping things out of her left hand      Reports her entire left arm was :\"feeling kind of numb.\"  This was a couple of days ago.  These sx were descending      Reports it was like mucous over her entire left visual field  Felt like a film      Reviewing the history she states that she had painful dysesthesias/pain that began in her left fingertips and radiated proximally through her palm and towards her shoulder.  She describes a sensation as a squeezing or compressive sensation  \"like a blood pressure cuff.\"     On repeat exam on 2/9/2023 she reports decree sensation to temperature and vibration in her left face, arm, and leg.  She reports patchy loss to pinprick in her proximal left arm/deltoid.  However, distally she reports her pinprick is symmetric.  Pinprick is symmetric in her lower extremities.  She has giveaway weakness in her left arm.     Nadiya has had multiple head CTs over the years and not have demonstrated significant hyperdensities consistent with small vessel disease/white matter disease or chronic infarcts.  It is possible that she has had ischemia/infarction below level of detection of CT, but one would expect a cumulative burden of cerebrovascular disease on her serial head CTs. and comparing her head CT from 2011 to her head CT from yesterday, there is global atrophy and widening of the sulci and gyri but there is not a significant interval increase in the burden of cerebrovascular disease.     Etiology of the symptoms remains unclear.  She is already on 2 antiplatelets, high intensity statin, and has maximal medical management for high blood pressure and diabetes.  She is on antiseizure medications that can also be used for headache prophylaxis.  She is going to go to outpatient physical therapy for her variable left-sided weakness.     She had a MRI in the summer of 2023 which did not demonstrate multiple prior infarcts, which would suggest an alternative diagnosis aside from cerebrovascular disease for her many prior events.           Prior stroke/TIAs (date, description):        Date                   Topography/symptoms                                       Etiology  6/2023               ?amarousis fugax                                     ?  5/31/2022          worsening balance, dizziness                  ? Posterior circulation stroke                            Blurred vision, horizontal diplopia                            ?corpus callosum abnormality   2/7/17                 ?  Left-sided hemisensory symptoms     Vascular Risk Factors:   Atrial fibrillation  Dyslipidemia  Hypertension  Prior history of smoking  Prior history of TIA  Prior history of stroke  Migraine with aura           Date of first seizure:  2010  Presence of aura:  Visual hallucinations  Ictal manifestation:  GTC  Diurnal variation:  none  Triggering factors:  Unclear;  Per 2010 [place] neurology note \"I suspect that the acute neurological event was secondary to bilateral cerebral ischemia secondary migraine. The seizure was likely acute reactive, and there is no clear evidence at this point for a diagnosis of epilepsy. Seems reasonable to keep the patient of valproate for migraine prophylaxis. Patient should avoid Imitrex from now on. Might benefit from low-dose aspirin. She'll return to the care of Dr. Kaur. \"  Number of seizures in past 1 month:  ? None vs. One (her admitting symptoms)  Number of seizures in past 6 months:   ? None vs. One (her admitting symptoms)  Seizure frequency: Unclear  Maximum seizure free period:?  Years  Seizure types: Focal with retained awareness, focal with generalization  Injuries related to seizures: None  Frequency of visits to the Emergency Department: Unclear  AEDS used in the past: Depakote, lamotrigine 150 mg twice daily*  Current AEDS: Lamotrigine 150 mg twice daily     10/04/23 interval history/subjective:  She feels like she is getting worse. She feels frustrated that others don't appreciate her decline.  When she cooks she freezes b/c she does not know the  next steps. She wonders if she is doing it right. Reports the food taste hs been off lately.     +admits to leaving the gas on. She has told her  she has left the gas on.  Has left food in the microwave.     The pt states \"it is like sundowning. It is like around  3 o'clock. I get more agitated. I  don't want to see my . I don't want to be with ppl. I want to stay at home.\"      Neuropsychiatric  questionnaire  Severity level  1 = Mild (noticeable, but not a significant change) 2 = Moderate (significant, but not a dramatic change) 3 = Severe (very marked or prominent, a dramatic change)     Distress level  0 = Not distressing at all 1 = Minimal (slightly distressing, not a problem to cope with) 2 = Mild (not very distressing, generally easy to cope with) 3 = Moderate (fairly distressing, not always easy to cope with) 4 = Severe (very distressing, difficult to cope with) 5 = Extreme or Very Severe (extremely distressing, unable to cope with)     Over the last month:  -Delusions (false beliefs, such as thinking others are stealing from him/her, planning to harm him/her?) Y/N   severity=? Distress=?  -Hallucinations (false visions or voices? Seem to hear or see things that are not present?)   She sees ppl running past her door on  daily basis. Still sees shadows walking through the house. She can see a red or a yellow woosh by the doors. Once she called the police. She took the a photo.she did this last summer.   severity=? Distress=?  -agitation/aggression (is the patient resistant to help from others or hard to handle?)  Yes . She says that. Reports she now uses profanity w/ her . She feels guilty about using profanity.   severity=? Distress=?  -Depression/Dysphoria (does the patient seem sad or say that he/she is depressed?)  Yes . Depression is much worse.  severity=? Distress=?  -Anxiety (Does the patient become upset when  from you? Doeshe/she have any other signs of nervousness such as shortness of breath, sighing, being unable to relax, or feeling excessively tense?)  Yes   severity=? Distress=?  Elation/Euphoria Does the patient appear to feel too good or act excessively happy?     severity=? Distress=?  Apathy/Indifference Does the patient seem less interested in his/her usual activities or in the activities and plans of others?   yees  severity=? Distress=?  Disinhibition Does the  patient seem to act impulsively, for example, talking to strangers as if he/she knows them, or saying things that may hurt people's feelings?     Irritability/Lability Is the patient impatient and cranky? Does he/she have difficulty coping with delays or waiting for planned activities?  Yes   severity=? Distress=?  Motor Disturbance Does the patient engage in repetitive activities such as pacing around the house, handling buttons, wrapping string, or doing other things repeatedly?  No   severity=? Distress=?  Nightime Behaviors Does the patient awaken you during the night, rise too early in the morning, or take excessive naps during the day?  No   severity=? Distress=?  Appetite/Eating Has the patient lost or gained weight, or had a change in the type of food he/she likes?  No   severity=? Distress=?     No plan to end her life.        05/25/23 interval history/subjective:  Presents today alone.     She had a ambulatory 72 hr EEG done by Dr. Dayami Henning, neurology, who said her EEG was normal.     Her short term memory is worse.  She is misplacing her belongings. She misplaced her belongings  She is sleeping poorly.  Her anxiety is worse. She is \"building things up in my head. One time I was thinking there was someone who was going to come into my house and kill me. I started sleep walking.\" She obesses if the doors are locked.  She has a strong feeling/urge to wake up in the middle of the night and go for a walk.   Dreams are more vivid. She is talking in her sleep.        She had her pacemaker replaced. It is MRI compatible/conditional  per her cardiologist.             02/16/23 Interval History/Subjective :   Presents today with her .  No new symptoms.  Doing well.  No new weakness.  All of her neurological symptoms resolved 2 days ago.  The main she was symptomatic from 2/7/2023 until 2/14/2023.  This is exactly 2 weeks.  Seen with her .  I reviewed all her imaging.  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     I reviewed all the prior head CT's.  I explained that I would expect to see for white matter changes on her head CT if she had a significant burden of cerebrovascular disease.     ROS: Pertinent positive and negatives per HPI.  All others were reviewed and negative.      Medications:       Current Outpatient Medications   Medication Instructions    ALLOPURINOL 100 MG Oral Tab TAKE ONE TABLET BY MOUTH TWICE DAILY    amLODIPine (NORVASC) 5 mg, Oral, 2 times daily    aspirin 81 mg, Oral, Daily    ATORVASTATIN 20 MG Oral Tab TAKE ONE TABLET BY MOUTH ONE TIME DAILY AT NIGHT    clopidogrel (PLAVIX) 75 mg, Oral, Daily    donepezil (ARICEPT) 10 mg, Oral, Nightly    escitalopram (LEXAPRO) 5 mg, Oral, Daily with dinner    lamoTRIgine (LAMICTAL) 150 mg, Oral, 2 times daily    LINZESS 72 MCG Oral Cap 1 capsule, Oral, Daily    Myrbetriq 25 mg, Oral, Daily    nadolol (CORGARD) 20 mg, Oral, Daily    ondansetron (ZOFRAN-ODT) 4 mg, Oral, Every 8 hours PRN    pantoprazole (PROTONIX) 40 mg, Oral, Every morning before breakfast, For stomach.    ROPINIROLE 1 MG Oral Tab TAKE TWO TABLETS BY MOUTH NIGHTLY    sennosides 8.6 MG Oral Tab 2 tablets, Oral, Daily, As needed            Reviewed and assessed        Objective:  Last vitals and weight :  Body mass index is 22.63 kg/m².   There were no vitals filed for this visit.   Height 65\", weight 136 lb (61.7 kg), not currently breastfeeding.  Ht 65\"   Wt 136 lb (61.7 kg)   BMI 22.63 kg/m²   Exam:  - General: appears stated age and no distress  - CV:            Carotids:   - Pulmonary: Normal excursion of the chest.  No signs of respiratory distress.  Neurologic Exam  - Mental Status: Alert and attentive.  Oriented x4.  She knew the date day of the week month and the year.  Recent memory was intact.     Speech is spontaneous, fluent, and prosodic. Comprehension and repetition intact. Phrase length and  rate are normal. No paraphasic errors, neologisms, anomia, acalculia, apraxia, anosognosia, or R/L confusion.   - Cranial Nerves: No gaze preference. Visual fields: Full pupils are 4mm briskly constricting to 3mm and equally round and reactive to light  in a well lit room. No rAPD. EOMI. No nystagmus. No ptosis. V1-V3 intact B/L to light touch.No pathological facial asymmetry. No flattening of the nasolabial fold. .  Hearing grossly intact.  Tongue midline. No atrophy or fasiculations of the tongue noted. Palate and uvula elevate symmetrically.  Shoulder shrug symmetric.  - Fundoscopic exam:normal w/o hemorrhages, exudates, or papilledema.No attenuation. No pallor.  - Motor:  normal tone/bulk. No interosseous wasting. No flattening of hypothenar eminences.   Motor Strength                                       Pronator drift: No pronator drift           Arm Rolling: No orbiting.           Finger Taps: Finger taps are symmetric in rate and amplitude.            Rapid movements: symmetric. No fatiguing.                                                        Right                  Left     Motor Strength                           Deltoids                           5                         5  Triceps                            5                         5  Biceps                             5                         5                                    5                         5               Hip Flexors                      4                         5   Knee extensors              5                         5  Knee flexors                    5                         5                      Foot Taps:            Asterixis: No asterixis noted.           Tremor:                      Reflexes:     C5 C6 C7  L4 S1   R 2+ 2+   2+ 2+   L 2+ 2+   2+ 2+    Frontal release signs:Not assessed.    Jaw Jerk:    Ronald's sign:absent   Nonsustained clonus: Absent   Sustained clonus: Absent            - Sensory:   Light touch:  normal  Temperature:normal  Pinprick:   Vibration: normal  Proprioception:      Sensory level:      Romberg:   - Cerebellum: No truncal ataxia. No titubations. No dysmetria, no dysdiadochokinesis. No rebound.   - Gait/station: Normal gait and station. Symmetric arm swing.   - Toe walking: She cannot toe walk well in the room today.  - Heel walking: normal         prior PET scan report:  5/6/16  Abnormal isotope accumulation within the gray matter in the temporoparietal regions bilaterally.the findings are felt to be compatiable with alzheimer's disease.  Positive for beta amyloid in the gray matter of the b/l temporal parietal region this suggestive of alzheimer's disease      Diagnostic studies:  Performed an independent visualization of  Head CTs from 2023, compared to 2007.  Imaging revealed:   Very similar scans. No significant interval atrophy.         Reviewed the head CT from 6/3/2022 in comparison to her most recent head CTs from February 2023.  I spoke to Dr. Franks from radiology regarding the hypodensities that were mentioned in her corpus callosum.  He agrees that there are not chronic hypodensities on later head CT's.  These hypodensities were also apparent on the May 2022 head CT.  Given that the slice thickness is 4 mm and that there is volume averaging with the adjacent ventricles the hypodensities may be artifact related to volume averaging.     Component      Latest Ref Rng 4/27/2023   Lamotrigine Lvl      2.0 - 20.0 ug/mL 10.5                     Assessment  Nadiya Felix is a 73 year old  right handed woman w/ a pmhx sig. for  ?Prior stroke (most recently admitted on 5/31/2022 for dizziness diagnosed with posterior circulation stroke), ?amarousis fugax in June 2023, epilepsy, hx of atrial fibrillation s/p watchman,  migraines with visual aura, MCI/Alzhiemer's dementia, pseudobulbar affect, prior complaint of left-sided hemisensory loss in February 2017, hepatic steatosis, atrophic kidney, carpal  tunnel,  RLS, and JENNIFER not on CPAP who presented to [facility] on 2/7/2023 w/ progressive/ascending left hand/arm dysesthesias in the setting of ?prior stroke.     Etiology of this most recent event remains unclear.  It is possible that she is having infarcts/cerebrovascular disease below the level of detection.  However, if she had such significant cerebrovascular disease one would expect to see a chronic and progressively worsening burden of changes due to cerebrovascular disease.  She lacks this.     She may be having migraine equivalents/migraine auras without headaches.  In patients with hemiplegic migraine they can have migraine aura symptoms for weeks, and have been sent to acute rehab because of her symptoms.  Therefore the duration of her symptoms does not exclude migraine.  Per chart review the patient also complained of similar left-sided symptoms back in 2017.  Her 71 ambulatory EEG was negative.     She endorses worsening short term memory, intrusive thoughts, obsessions, somnambulism, and a strong urge to leave her home. She  reported she will have neuropsychological testing again soon. Would like to review the results.     Her mood is worse. Reports her cognition is worse.                 Plan  1.  Left-sided hemisensory symptoms that lasted for approximately 2 weeks  Diagnostics:  Completed MRI of the brain.   EMG ordered. Pt was not aware an EMG was ordered.   Therapeutics  Cont antithrombotics, Plavix 75 and atorvastatin 10.  Patient already has a watchman.  Home BP monitoring. Pt instructed to check BP at home in the AM and PM, and bring values to future clinic visits. BP goal is for normotension, < 130/80.  HbA1c goal <7.0  LDL-C goal  <70 mg/dL.   Frequent exercise as per AHA/ASA recs (30 min of aerobic exercise, 5 times per week).  Recommend DASH diet. A diet that is rich in fruits and vegetables and thereby high in potassium is beneficial. Recommend reduced intake of sodium and increased  intake of potassium, increase consumption of fruits, vegetables, and low-fat dairy products and  decreased consumption of saturated fat.         2.  Multi domain mild cognitive impairment w/ PET scan positive for amyloid   Diagnostics:  Repeat neuropsychological testing is scheduled for the spring per patient and her . Will follow up.  Therapeutics  Continue Aricept.  Previously held because of colitis.  Avoid anticholinergic medications in patients with mild cognitive impairment.  In particular the following medications which have a high anticholinergic activity:  Antihistamines including doxylamine, hydroxyzine, meclizine.  TCAs: amitriptyline, clomipramine, desipramine, doxepin, imipramine, nortriptyline.  First generation antipsychotics and clozapine.  Muscle relaxants: Tizanidine (lower anticholinergic affect is seen w/ cyclobenzaprine, baclofen, and methocarbamol.)  Antiemetics:  hydroxyzine, meclizine, promethazine, scopolamine.  Antispasmodics including atropine, clozapine, hycoasmine, glycopyrrolate.  Medications for overactive bladder including darifenacin oxybutynin, solifenacin (vesicare), tolterodine  Consider stopping other neurologic agents that have low anticholinergic activity including carbamazepine, lithium, nefazodone, oxcarbazepine, phenelzine, and trazodone.  She is on oxybutynin.  However, her cognitive impairment does not appear to be Profound.  Therefore we will monitor at this time.                                   Education Provided  Education/Instructions given to: patient   Barriers to Learning: None for the .  Dementia/cognitive impairment for the patient  Content: Refer to note above   Evaluation/Outcome: Verbalized understanding     This document is not intended to support charting by exception.  Sections left blank in a completed note should be presumed not to have been done.        Disclaimer:   This record was dictated using  Dragon software. There may be errors due  to voice recognition problems that were not realized and corrected during the completion of the note.            Thank you for allowing me to participate in the care of your patient.     Robi Liriano DO  10/04/23

## 2024-11-19 ENCOUNTER — OFFICE VISIT (OUTPATIENT)
Dept: INTERNAL MEDICINE CLINIC | Facility: CLINIC | Age: 74
End: 2024-11-19

## 2024-11-19 VITALS
TEMPERATURE: 98 F | WEIGHT: 149.19 LBS | HEIGHT: 64 IN | OXYGEN SATURATION: 97 % | HEART RATE: 60 BPM | DIASTOLIC BLOOD PRESSURE: 78 MMHG | BODY MASS INDEX: 25.47 KG/M2 | SYSTOLIC BLOOD PRESSURE: 132 MMHG

## 2024-11-19 DIAGNOSIS — K59.09 OTHER CONSTIPATION: ICD-10-CM

## 2024-11-19 DIAGNOSIS — R07.89 OTHER CHEST PAIN: ICD-10-CM

## 2024-11-19 DIAGNOSIS — F03.90 DEMENTIA, UNSPECIFIED DEMENTIA SEVERITY, UNSPECIFIED DEMENTIA TYPE, UNSPECIFIED WHETHER BEHAVIORAL, PSYCHOTIC, OR MOOD DISTURBANCE OR ANXIETY (HCC): ICD-10-CM

## 2024-11-19 DIAGNOSIS — I10 PRIMARY HYPERTENSION: Primary | ICD-10-CM

## 2024-11-19 DIAGNOSIS — N18.30 STAGE 3 CHRONIC KIDNEY DISEASE, UNSPECIFIED WHETHER STAGE 3A OR 3B CKD (HCC): ICD-10-CM

## 2024-11-19 DIAGNOSIS — Z95.818 PRESENCE OF WATCHMAN LEFT ATRIAL APPENDAGE CLOSURE DEVICE: ICD-10-CM

## 2024-11-19 DIAGNOSIS — Z86.79 HISTORY OF ATRIAL FIBRILLATION: ICD-10-CM

## 2024-11-19 PROCEDURE — 99214 OFFICE O/P EST MOD 30 MIN: CPT | Performed by: INTERNAL MEDICINE

## 2024-11-19 NOTE — PROGRESS NOTES
Nadiya Felix is a 74 year old female.  HPI:     Chief Complaint   Patient presents with    Checkup     6 month follow,  dermatology questions, chest discomfort- cardiologist aware       Nadiya presents for follow-up    She continues to have some anterior chest pain.  However I believe this is noncardiac.  It comes and goes.  She may get it when she bends over and feels some lightheadedness.  I did look back on records and there is no evidence of hiatal hernia.  I did tell her not quite sure why she has this but I did reassure her that it was not her heart.  She indicated Dr. Cisneros knows about it.  She has had a angiogram in the past that was normal without any coronary artery disease.  She does not seem to have any classic GERD.  This may be muscular.    She does follow with Dr. Liriano for dementia.  She continues to drive safely.  She does know the date is the 19th.  She did think it was Wednesday.  She does know its November and now it is 2024.  She is able to remember 1 out of 3 words.    She has had her flu vaccine and COVID booster    She does have chronic kidney disease and is up-to-date with her labs.  Her last renal function done September 10 showed BUN 17 and creatinine 1.30.  GFR 43 which is stable.    She does have hypertension which is controlled.  Her last lipids show cholesterol 162, HDL 42, triglycerides 248 and LDL 79.    She does have a device that monitors her heart rate called CodeStreeta.  But she did show me a rhythm strip but it was too artifactual to give a reading.     She did have a normal PET stress test October 22, 2024    Current Outpatient Medications   Medication Sig Dispense Refill    LAMOTRIGINE 150 MG Oral Tab TAKE ONE TABLET BY MOUTH TWICE DAILY 180 tablet 0    atorvastatin 20 MG Oral Tab TAKE ONE TABLET BY MOUTH ONE TIME DAILY AT NIGHT 90 tablet 3    nadolol 20 MG Oral Tab TAKE ONE TABLET BY MOUTH ONE TIME DAILY 90 tablet 3    ondansetron 4 MG Oral Tablet Dispersible Take 1 tablet (4  mg total) by mouth every 8 (eight) hours as needed for Nausea. 20 tablet 0    amLODIPine 2.5 MG Oral Tab Take 1 tablet (2.5 mg total) by mouth daily.      oxybutynin 5 MG Oral Tab Take 1 tablet (5 mg total) by mouth daily.      allopurinol 100 MG Oral Tab Take 1 tablet (100 mg total) by mouth daily.      pantoprazole 40 MG Oral Tab EC TAKE ONE TABLET BY MOUTH IN THE MORNING BEFORE BREAKFAST FOR THE STOMACH 90 tablet 3    escitalopram 10 MG Oral Tab Take 1 tablet (10 mg total) by mouth daily.      aspirin 81 MG Oral Tab EC Take 1 tablet (81 mg total) by mouth daily. 1 tablet 0    Donepezil HCl 10 MG Oral Tab Take 1 tablet (10 mg total) by mouth nightly.      ROPINIROLE 1 MG Oral Tab TAKE TWO TABLETS BY MOUTH NIGHTLY (Patient not taking: Reported on 11/19/2024) 180 tablet 3      Past Medical History:    Abnormal PET scan of head    Patient had abnormal PET scan of brain May 6, 2016 showing bilateral temporoparietal beta amyloid deposition in the gray matter consistent with Alzheimer's disease.     Acute cystitis without hematuria    Acute pancreatitis (HCC)    Acute pancreatitis, unspecified complication status, unspecified pancreatitis type (HCC)    Alzheimer disease (HCC)    Anxiety    Mild    Aortic atherosclerosis (HCC)    Aortic atherosclerosis seen on CT scan October 2014.     Arm paresthesia, left    Arrhythmia    A-Fib    Arthritis    Mild    Arthritis, gouty    Ataxia    Atrophic kidney    CT scan October 8, 2014 shows left kidney smaller than the right.  Bilateral areas of renal parenchymal scar.  Symmetric nephrograms.  Moderate calcific atherosclerosis of aorta.     AVM (arteriovenous malformation) of colon    Large nonbleeding cecal AVM found on colonoscopy March 16 2018.  At that time patient was found to have microscopic/lymphocytic colitis.      Back problem    spinal stenosis    Carpal tunnel syndrome    as per NG    Cataract    Chronic atrial fibrillation (HCC)    Closed fracture of left distal  radius    Crohn disease (HCC)    Depression    Diverticulosis    as per NG    Elevated LFTs    Esophageal reflux    Mild    Family history of colon cancer    Maternal grandmother had colon cancer in her 70s.    Foreign body in alimentary tract    Foreign body in stomach, initial encounter    Gout    Hand weakness    Hepatic steatosis    High blood pressure    High cholesterol    as per NG    History of colonic polyps    as per NG    History of migraine headaches    as per NG    History of total hysterectomy with bilateral salpingo-oophorectomy (BSO)    History of total hysterectomy with removal of both tubes and ovaries    as per NG    Hyperlipidemia    Incontinence    DUE TO CYSTOCELE/RECTOCELE    Left arm weakness    Lymphocytic colitis    as per NG    Migraine    Mild cognitive impairment    as per NG    Obesity    25 lbs overweight    Obstructive sleep apnea    Pacemaker    Parkinson's disease (HCC)    Pseudobulbar affect    Renal disorder    stage 3    Restless legs syndrome    Seizure disorder (HCC)    last one 2019    Sleep apnea    Tendon tear    Surgically repaired; as per NG    Unspecified essential hypertension    as per NG    Vertigo    Visual impairment    reading glasses      Social History:  Social History     Socioeconomic History    Marital status:    Tobacco Use    Smoking status: Former     Current packs/day: 0.00     Average packs/day: 1 pack/day for 40.0 years (40.0 ttl pk-yrs)     Types: Cigarettes     Start date: 1958     Quit date: 1998     Years since quittin.9    Smokeless tobacco: Never   Vaping Use    Vaping status: Every Day    Substances: THC, cannabis   Substance and Sexual Activity    Alcohol use: Yes     Comment: 2 - 3 drinks a year    Drug use: Yes     Frequency: 7.0 times per week     Types: Cannabis     Comment: Medical Edible marijauna and occasional vape   Other Topics Concern    Caffeine Concern No     Comment: 4 cups tea daily    Exercise Yes     Comment:  walking 2 blks 2 times week    Pt has a pacemaker Yes    Pt has a defibrillator No    Reaction to local anesthetic No   Social History Narrative    ** Merged History Encounter **         The patient does not use an assistive device..    The patient does live in a home with stairs. 16 stairs       Social Drivers of Health     Financial Resource Strain: Low Risk  (11/2/2023)    Financial Resource Strain     Med Affordability: No   Food Insecurity: No Food Insecurity (11/1/2023)    Food Insecurity     Food Insecurity: Never true   Transportation Needs: No Transportation Needs (11/1/2023)    Transportation Needs     Lack of Transportation: No   Housing Stability: Low Risk  (11/1/2023)    Housing Stability     Housing Instability: No        REVIEW OF SYSTEMS:   GENERAL HEALTH:  feels well otherwise  RESPIRATORY:  Voices no shortness of breath with exertion or cough  CARDIOVASCULAR:  Voices no chest pain on exertion or shortness of breath  GI:   Voices no abdominal pain or changes of bowels   :Viices no urning or frequency of urination.  NEURO:  Voices no  headaches or dizziness    EXAM:   /78   Pulse 60   Temp 97.8 °F (36.6 °C)   Ht 5' 4\" (1.626 m)   Wt 149 lb 3.2 oz (67.7 kg)   SpO2 97%   BMI 25.61 kg/m²     GENERAL:  well developed, well nourished, in no apparent distress  SKIN:  no rashes ,   HEENT: atraumatic.   Pharynx normal without exudate.  EYES:  PERRL. Sclera anicteric.  NECK:  Supple,  no adenopathy,   LUNGS:  clear to auscultation.  Effort normal  CARDIO:  RRR without murmur.   S1 and S2 normal  GI:  good BS's,  no masses,   HSM or tenderness  EXTREMITIES : no cyanosis, clubbing or edema    ASSESSMENT AND PLAN:     1. Primary hypertension  Blood pressure under good control    2. History of atrial fibrillation  History of atrial fibrillation.  She does have a Watchman device in place.  Therefore no anticoagulation.    3. Presence of Watchman left atrial appendage closure device  Watchman device  in place    4. Stage 3 chronic kidney disease, unspecified whether stage 3a or 3b CKD (HCC)  Stable.  Will follow.    5. Dementia, unspecified dementia severity, unspecified dementia type, unspecified whether behavioral, psychotic, or mood disturbance or anxiety (HCC)  Stable.  She indicates she follows with her neurologist Dr. Liriano.  No new complaints today.    6. Other chest pain  Noncardiac chest pain.  We talked about this.  For now we will monitor    7. Other constipation  Discussed with Nadiya.  Senna caused to loose stools.  Colace by itself has not helped.  MiraLAX by itself has not helped so I did suggest Colace plus MiraLAX.  She will monitor her stools and let me know if she has any continuing problems.  She did have some intermittent rectal bleeding but she is up-to-date with her colonoscopy which she had November 2022.  Therefore no colonoscopy needed.    This visit was 30 minutes.  I spent 10 minutes before visit preparing and reviewing old records.  Greater than 50% of the visit was engaged in counseling and review of past data.    Follow-up in 3 months    The patient indicates understanding of these issues and agrees to the plan.    Donell Yin MD  11/19/2024  12:33 PM

## 2024-12-06 NOTE — TELEPHONE ENCOUNTER
Pt. Is calling to increase her dosage of Zofran pt. States she has nausea everyday all day she also spoke with Dr. Song Briceño and was told to stop using the medical marijuana for two weeks she did and still has nausea, dry heaves, vomiting  ph.  # [phone]   Seng Lentz Plan: Location: chest\\nTreatment: 5FUK40\\n\\nPt has scar on chest today.\\n\\nToday we will inject with 5FUK40.\\n\\nDiscussed with patient future IPL treatment for residual redness in the winter.\\n\\nF/u in 4 months. Detail Level: Zone Plan: Location: face\\nTreatment: IPL: 515, 15j, 10ms, 60% cooling\\n\\n[---- If redness]\\nPt has erythema on face today.\\nDiscussed with pt that an IPL treatment can help reduce redness associated with rosacea.\\n\\n[---- If hyperpigmentation]\\nPt has hyperpigmentation on face today.\\nDiscussed with pt that an IPL treatment can help reduce darkness associated with hyperpigmentation.\\n\\nDiscussed with pt that the laser/IPL device emits a range of lightwaves that are then tuned and targeted at hemoglobin (the red blood cells in the blood vessels) or melanin (the brown pigment in freckles and age spots). \\nThe light beam passes through the skin and is absorbed by either hemoglobin or melanin resulting in damage to the vessel wall or fragmenting of melanin pigment. \\nThese tiny vessels and the melanin pigment are then absorbed by the body, rendering them less visible.\\nPicture taken today.\\n\\nF/u as needed.

## 2025-01-09 ENCOUNTER — LAB ENCOUNTER (OUTPATIENT)
Dept: LAB | Age: 75
End: 2025-01-09
Attending: UROLOGY
Payer: MEDICARE

## 2025-01-09 DIAGNOSIS — R39.198 SLOW URINARY STREAM: ICD-10-CM

## 2025-01-09 DIAGNOSIS — N39.0 URINARY TRACT INFECTION WITHOUT HEMATURIA, SITE UNSPECIFIED: ICD-10-CM

## 2025-01-09 PROCEDURE — 87086 URINE CULTURE/COLONY COUNT: CPT

## 2025-01-09 PROCEDURE — 87186 SC STD MICRODIL/AGAR DIL: CPT

## 2025-01-09 PROCEDURE — 87088 URINE BACTERIA CULTURE: CPT

## 2025-01-26 ENCOUNTER — HOSPITAL ENCOUNTER (INPATIENT)
Facility: HOSPITAL | Age: 75
LOS: 7 days | Discharge: HOME HEALTH | End: 2025-02-02
Attending: EMERGENCY MEDICINE | Admitting: INTERNAL MEDICINE
Payer: MEDICARE

## 2025-01-26 ENCOUNTER — HOSPITAL ENCOUNTER (INPATIENT)
Facility: HOSPITAL | Age: 75
LOS: 7 days | Discharge: HOME | End: 2025-02-02
Attending: EMERGENCY MEDICINE | Admitting: INTERNAL MEDICINE
Payer: MEDICARE

## 2025-01-26 ENCOUNTER — APPOINTMENT (OUTPATIENT)
Dept: CT IMAGING | Facility: HOSPITAL | Age: 75
End: 2025-01-26
Attending: EMERGENCY MEDICINE
Payer: MEDICARE

## 2025-01-26 ENCOUNTER — APPOINTMENT (OUTPATIENT)
Dept: GENERAL RADIOLOGY | Facility: HOSPITAL | Age: 75
End: 2025-01-26
Attending: EMERGENCY MEDICINE
Payer: MEDICARE

## 2025-01-26 DIAGNOSIS — R41.82 ALTERED MENTAL STATUS, UNSPECIFIED ALTERED MENTAL STATUS TYPE: ICD-10-CM

## 2025-01-26 DIAGNOSIS — Z87.898 HISTORY OF SEIZURES: ICD-10-CM

## 2025-01-26 DIAGNOSIS — R41.0 DELIRIUM, ACUTE: ICD-10-CM

## 2025-01-26 DIAGNOSIS — R56.9 WITNESSED SEIZURE (HCC): Primary | ICD-10-CM

## 2025-01-26 DIAGNOSIS — T14.8XXA BRUISING: ICD-10-CM

## 2025-01-26 PROBLEM — R50.9 FEVER: Status: ACTIVE | Noted: 2025-01-26

## 2025-01-26 LAB
ALBUMIN SERPL-MCNC: 4.6 G/DL (ref 3.2–4.8)
ALBUMIN/GLOB SERPL: 1.4 {RATIO} (ref 1–2)
ALP LIVER SERPL-CCNC: 61 U/L
ALT SERPL-CCNC: 16 U/L
ANION GAP SERPL CALC-SCNC: 18 MMOL/L (ref 0–18)
AST SERPL-CCNC: 20 U/L (ref ?–34)
BASOPHILS # BLD AUTO: 0.03 X10(3) UL (ref 0–0.2)
BASOPHILS NFR BLD AUTO: 0.4 %
BILIRUB SERPL-MCNC: 0.7 MG/DL (ref 0.2–1.1)
BILIRUB UR QL: NEGATIVE
BUN BLD-MCNC: 23 MG/DL (ref 9–23)
BUN/CREAT SERPL: 19 (ref 10–20)
CALCIUM BLD-MCNC: 9.9 MG/DL (ref 8.7–10.4)
CHLORIDE SERPL-SCNC: 109 MMOL/L (ref 98–112)
CLARITY UR: CLEAR
CO2 SERPL-SCNC: 15 MMOL/L (ref 21–32)
COLOR UR: YELLOW
CREAT BLD-MCNC: 1.21 MG/DL
DEPRECATED RDW RBC AUTO: 39.8 FL (ref 35.1–46.3)
EGFRCR SERPLBLD CKD-EPI 2021: 47 ML/MIN/1.73M2 (ref 60–?)
EOSINOPHIL # BLD AUTO: 0.09 X10(3) UL (ref 0–0.7)
EOSINOPHIL NFR BLD AUTO: 1.3 %
ERYTHROCYTE [DISTWIDTH] IN BLOOD BY AUTOMATED COUNT: 12.2 % (ref 11–15)
FLUAV + FLUBV RNA SPEC NAA+PROBE: NEGATIVE
FLUAV + FLUBV RNA SPEC NAA+PROBE: NEGATIVE
GLOBULIN PLAS-MCNC: 3.2 G/DL (ref 2–3.5)
GLUCOSE BLD-MCNC: 180 MG/DL (ref 70–99)
GLUCOSE BLDC GLUCOMTR-MCNC: 124 MG/DL (ref 70–99)
GLUCOSE BLDC GLUCOMTR-MCNC: 172 MG/DL (ref 70–99)
GLUCOSE UR-MCNC: NEGATIVE MG/DL
HCT VFR BLD AUTO: 41.6 %
HGB BLD-MCNC: 14.3 G/DL
HYALINE CASTS #/AREA URNS AUTO: PRESENT /LPF
HYALINE CASTS #/AREA URNS AUTO: PRESENT /LPF
IMM GRANULOCYTES # BLD AUTO: 0.05 X10(3) UL (ref 0–1)
IMM GRANULOCYTES NFR BLD: 0.7 %
KETONES UR-MCNC: NEGATIVE MG/DL
LACTATE SERPL-SCNC: 1.9 MMOL/L (ref 0.5–2)
LACTATE SERPL-SCNC: 3.5 MMOL/L (ref 0.5–2)
LACTATE SERPL-SCNC: 9.3 MMOL/L (ref 0.5–2)
LEUKOCYTE ESTERASE UR QL STRIP.AUTO: NEGATIVE
LYMPHOCYTES # BLD AUTO: 2.05 X10(3) UL (ref 1–4)
LYMPHOCYTES NFR BLD AUTO: 30.4 %
MCH RBC QN AUTO: 31 PG (ref 26–34)
MCHC RBC AUTO-ENTMCNC: 34.4 G/DL (ref 31–37)
MCV RBC AUTO: 90.2 FL
MONOCYTES # BLD AUTO: 0.3 X10(3) UL (ref 0.1–1)
MONOCYTES NFR BLD AUTO: 4.5 %
NEUTROPHILS # BLD AUTO: 4.22 X10 (3) UL (ref 1.5–7.7)
NEUTROPHILS # BLD AUTO: 4.22 X10(3) UL (ref 1.5–7.7)
NEUTROPHILS NFR BLD AUTO: 62.7 %
NITRITE UR QL STRIP.AUTO: NEGATIVE
OSMOLALITY SERPL CALC.SUM OF ELEC: 302 MOSM/KG (ref 275–295)
PH UR: 5.5 [PH] (ref 5–8)
PLATELET # BLD AUTO: 221 10(3)UL (ref 150–450)
POTASSIUM SERPL-SCNC: 4.3 MMOL/L (ref 3.5–5.1)
PROT SERPL-MCNC: 7.8 G/DL (ref 5.7–8.2)
RBC # BLD AUTO: 4.61 X10(6)UL
RBC #/AREA URNS AUTO: >10 /HPF
RBC #/AREA URNS AUTO: >10 /HPF
RSV RNA SPEC NAA+PROBE: NEGATIVE
SARS-COV-2 RNA RESP QL NAA+PROBE: NOT DETECTED
SODIUM SERPL-SCNC: 142 MMOL/L (ref 136–145)
SP GR UR STRIP: >=1.03 (ref 1–1.03)
UROBILINOGEN UR STRIP-ACNC: 0.2
WBC # BLD AUTO: 6.7 X10(3) UL (ref 4–11)

## 2025-01-26 PROCEDURE — 99291 CRITICAL CARE FIRST HOUR: CPT | Performed by: OTHER

## 2025-01-26 PROCEDURE — 99223 1ST HOSP IP/OBS HIGH 75: CPT | Performed by: INTERNAL MEDICINE

## 2025-01-26 PROCEDURE — 71045 X-RAY EXAM CHEST 1 VIEW: CPT | Performed by: EMERGENCY MEDICINE

## 2025-01-26 PROCEDURE — 70450 CT HEAD/BRAIN W/O DYE: CPT | Performed by: EMERGENCY MEDICINE

## 2025-01-26 RX ORDER — LORAZEPAM 2 MG/ML
1 INJECTION INTRAMUSCULAR ONCE
Status: DISCONTINUED | OUTPATIENT
Start: 2025-01-26 | End: 2025-01-26

## 2025-01-26 RX ORDER — HEPARIN SODIUM 5000 [USP'U]/ML
5000 INJECTION, SOLUTION INTRAVENOUS; SUBCUTANEOUS EVERY 8 HOURS SCHEDULED
Status: DISCONTINUED | OUTPATIENT
Start: 2025-01-26 | End: 2025-02-02

## 2025-01-26 RX ORDER — LORAZEPAM 2 MG/ML
4 INJECTION INTRAMUSCULAR EVERY 5 MIN PRN
Status: DISCONTINUED | OUTPATIENT
Start: 2025-01-26 | End: 2025-02-02

## 2025-01-26 RX ORDER — LORAZEPAM 2 MG/ML
2 INJECTION INTRAMUSCULAR EVERY 10 MIN PRN
Status: DISCONTINUED | OUTPATIENT
Start: 2025-01-26 | End: 2025-01-30

## 2025-01-26 RX ORDER — AMLODIPINE BESYLATE 2.5 MG/1
2.5 TABLET ORAL DAILY
Status: DISCONTINUED | OUTPATIENT
Start: 2025-01-26 | End: 2025-01-28

## 2025-01-26 RX ORDER — ASPIRIN 81 MG/1
81 TABLET ORAL DAILY
Status: DISCONTINUED | OUTPATIENT
Start: 2025-01-26 | End: 2025-02-02

## 2025-01-26 RX ORDER — PANTOPRAZOLE SODIUM 40 MG/1
40 TABLET, DELAYED RELEASE ORAL
Status: DISCONTINUED | OUTPATIENT
Start: 2025-01-27 | End: 2025-01-27

## 2025-01-26 RX ORDER — LORAZEPAM 2 MG/ML
0.1 INJECTION INTRAMUSCULAR ONCE
Status: DISCONTINUED | OUTPATIENT
Start: 2025-01-26 | End: 2025-01-26

## 2025-01-26 RX ORDER — ACETAMINOPHEN 500 MG
500 TABLET ORAL EVERY 4 HOURS PRN
Status: DISCONTINUED | OUTPATIENT
Start: 2025-01-26 | End: 2025-02-02

## 2025-01-26 RX ORDER — DONEPEZIL HYDROCHLORIDE 10 MG/1
10 TABLET, FILM COATED ORAL DAILY
Status: DISCONTINUED | OUTPATIENT
Start: 2025-01-26 | End: 2025-01-27

## 2025-01-26 RX ORDER — LEVETIRACETAM 500 MG/5ML
500 INJECTION, SOLUTION, CONCENTRATE INTRAVENOUS EVERY 12 HOURS
Status: DISCONTINUED | OUTPATIENT
Start: 2025-01-27 | End: 2025-01-30

## 2025-01-26 RX ORDER — MIDAZOLAM HYDROCHLORIDE 1 MG/ML
2 INJECTION INTRAMUSCULAR; INTRAVENOUS ONCE
Status: COMPLETED | OUTPATIENT
Start: 2025-01-26 | End: 2025-01-26

## 2025-01-26 RX ORDER — LEVETIRACETAM 500 MG/5ML
1000 INJECTION, SOLUTION, CONCENTRATE INTRAVENOUS ONCE
Status: COMPLETED | OUTPATIENT
Start: 2025-01-26 | End: 2025-01-26

## 2025-01-26 RX ORDER — ACETAMINOPHEN 10 MG/ML
1000 INJECTION, SOLUTION INTRAVENOUS EVERY 8 HOURS PRN
Status: DISCONTINUED | OUTPATIENT
Start: 2025-01-26 | End: 2025-02-02

## 2025-01-26 RX ORDER — VANCOMYCIN HYDROCHLORIDE
25 ONCE
Status: COMPLETED | OUTPATIENT
Start: 2025-01-27 | End: 2025-01-27

## 2025-01-26 RX ORDER — ESCITALOPRAM OXALATE 10 MG/1
10 TABLET ORAL DAILY
Status: DISCONTINUED | OUTPATIENT
Start: 2025-01-26 | End: 2025-02-02

## 2025-01-26 RX ORDER — ATORVASTATIN CALCIUM 20 MG/1
20 TABLET, FILM COATED ORAL NIGHTLY
Status: DISCONTINUED | OUTPATIENT
Start: 2025-01-26 | End: 2025-02-02

## 2025-01-26 RX ORDER — ONDANSETRON 2 MG/ML
4 INJECTION INTRAMUSCULAR; INTRAVENOUS EVERY 6 HOURS PRN
Status: DISCONTINUED | OUTPATIENT
Start: 2025-01-26 | End: 2025-02-02

## 2025-01-26 RX ORDER — LACOSAMIDE 10 MG/ML
200 INJECTION, SOLUTION INTRAVENOUS ONCE
Status: COMPLETED | OUTPATIENT
Start: 2025-01-27 | End: 2025-01-27

## 2025-01-26 RX ORDER — ACETAMINOPHEN 650 MG/1
650 SUPPOSITORY RECTAL ONCE
Status: COMPLETED | OUTPATIENT
Start: 2025-01-26 | End: 2025-01-26

## 2025-01-26 RX ORDER — LACOSAMIDE 10 MG/ML
50 INJECTION, SOLUTION INTRAVENOUS EVERY 12 HOURS
Status: DISCONTINUED | OUTPATIENT
Start: 2025-01-27 | End: 2025-01-31

## 2025-01-26 RX ORDER — SODIUM CHLORIDE 9 MG/ML
INJECTION, SOLUTION INTRAVENOUS CONTINUOUS
Status: DISCONTINUED | OUTPATIENT
Start: 2025-01-26 | End: 2025-02-01

## 2025-01-26 RX ORDER — HALOPERIDOL 5 MG/ML
5 INJECTION INTRAMUSCULAR ONCE
Status: COMPLETED | OUTPATIENT
Start: 2025-01-26 | End: 2025-01-26

## 2025-01-26 RX ORDER — HYDRALAZINE HYDROCHLORIDE 20 MG/ML
10 INJECTION INTRAMUSCULAR; INTRAVENOUS EVERY 4 HOURS PRN
Status: DISCONTINUED | OUTPATIENT
Start: 2025-01-26 | End: 2025-02-02

## 2025-01-26 RX ORDER — ACETAMINOPHEN 650 MG/1
SUPPOSITORY RECTAL
Status: COMPLETED
Start: 2025-01-26 | End: 2025-01-26

## 2025-01-26 NOTE — ED INITIAL ASSESSMENT (HPI)
Pt to the ed via ems following 15 min witnessed seizure   Hx of seizures  Was given 2mg of versed by medics en route

## 2025-01-26 NOTE — H&P
[facility], [place]  part of [facility]  HISTORY AND PHYSICAL       Nadiya MIREYA Felix Patient Status:  Emergency    3/28/1950  74 year old CSN [id number]   Location  Attending Pamela Robles DO     PCP Donell Yin MD         DATE OF ADMISSION: 2025     CHIEF COMPLAINT: Altered mental status, seizure activity    HISTORY OF PRESENT ILLNESS  This is a 74 year oldfemale who was brought in after patient had witnessed seizure activity and subsequent confusion.  History unable to be provided from patient due to acute of condition.  Further history from family at bedside.   states patient was in her normal state of health yesterday.   noted patient was late to get up on the day of admission.  When he went to check on her she stated she was feeling fine.  Later he went back to check on her and noted she was having generalized shaking, stiffness to body, and unable to respond.  EMS was called.   believes episode lasted around 5 minutes.   states patient has a known history of seizure disorder.  He believes she has been taking her medications appropriately.  Further history from daughter at bedside, daughter states patient was recently diagnosed with urinary tract infection has been on an antibiotic.  At time of interview, patient remains confused and agitated.  Attempted to get out of bed.  Not following commands.    PAST MEDICAL HISTORY  Past Medical History:    Abnormal PET scan of head    Patient had abnormal PET scan of brain May 6, 2016 showing bilateral temporoparietal beta amyloid deposition in the gray matter consistent with Alzheimer's disease.     Acute cystitis without hematuria    Acute pancreatitis (HCC)    Acute pancreatitis, unspecified complication status, unspecified pancreatitis type (HCC)    Alzheimer disease (HCC)    Anxiety    Mild    Aortic atherosclerosis (HCC)    Aortic atherosclerosis seen on CT scan 2014.     Arm paresthesia, left     Arrhythmia    A-Fib    Arthritis    Mild    Arthritis, gouty    Ataxia    Atrophic kidney    CT scan October 8, 2014 shows left kidney smaller than the right.  Bilateral areas of renal parenchymal scar.  Symmetric nephrograms.  Moderate calcific atherosclerosis of aorta.     AVM (arteriovenous malformation) of colon    Large nonbleeding cecal AVM found on colonoscopy March 16 2018.  At that time patient was found to have microscopic/lymphocytic colitis.      Back problem    spinal stenosis    Carpal tunnel syndrome    as per NG    Cataract    Chronic atrial fibrillation (HCC)    Closed fracture of left distal radius    Crohn disease (HCC)    Depression    Diverticulosis    as per NG    Elevated LFTs    Esophageal reflux    Mild    Family history of colon cancer    Maternal grandmother had colon cancer in her 70s.    Foreign body in alimentary tract    Foreign body in stomach, initial encounter    Gout    Hand weakness    Hepatic steatosis    High blood pressure    High cholesterol    as per NG    History of colonic polyps    as per NG    History of migraine headaches    as per NG    History of total hysterectomy with bilateral salpingo-oophorectomy (BSO)    History of total hysterectomy with removal of both tubes and ovaries    as per NG    Hyperlipidemia    Incontinence    DUE TO CYSTOCELE/RECTOCELE    Left arm weakness    Lymphocytic colitis    as per NG    Migraine    Mild cognitive impairment    as per NG    Obesity    25 lbs overweight    Obstructive sleep apnea    Pacemaker    Parkinson's disease (HCC)    Pseudobulbar affect    Renal disorder    stage 3    Restless legs syndrome    Seizure disorder ([facility])    last one 2/2019    Sleep apnea    Tendon tear    Surgically repaired; as per NG    Unspecified essential hypertension    as per NG    Vertigo    Visual impairment    reading glasses        PAST SURGICAL HISTORY  Past Surgical History:   Procedure Laterality Date    Arthroscopy of joint unlisted      BILAT.  KNEES    Cardiac pacemaker placement      as per NG    Carpal tunnel release      as per NG    Cataract Left 07/10/2015    Cataract Right late 2015    Cholecystectomy      as per     Colonoscopy N/A 2018    Procedure: COLONOSCOPY;  Surgeon: Mohamud Sawyer MD;  Location: [facility] ENDOSCOPY    Colonoscopy N/A 2022    Procedure: COLONOSCOPY/ESOPHAGOGASTRODUODENOSCOPY (EGD);  Surgeon: Genaro Osman MD;  Location: [facility] ENDOSCOPY    Electrocardiogram, complete  2013    SCANNED TO MEDIA TAB - 2013    Hysterectomy      & BSO; as per     Knee arthroscopy Right 2013    as per NG    Knee surgery  01/2015    x3     Nasal scopy,remv totl ethmoid  2006    \"Ethmoidectomy, max antrostomies\"; as per       1966,1969 & 1973    Normal    Other   or 2015    removal of pins and plate from right wrist (fracture)    Other surgical history Left     L knee scoped--3 torn meniscus--biocartilage done    Other surgical history Right 2016    R knee scoped for torn miniscus    Other surgical history Left     wrist surgery    Pacemaker/defibrillator      Reconstr nose+eamon septal repair      as per NG    Removal gallbladder      Total abdom hysterectomy      Total knee replacement         ALLERGIES   Hydrocodone    MEDICATIONS  Patient's Medications   New Prescriptions    No medications on file   Previous Medications    ALLOPURINOL 100 MG ORAL TAB    Take 1 tablet (100 mg total) by mouth daily.    AMLODIPINE 2.5 MG ORAL TAB    Take 1 tablet (2.5 mg total) by mouth daily.    ASPIRIN 81 MG ORAL TAB EC    Take 1 tablet (81 mg total) by mouth daily.    ATORVASTATIN 20 MG ORAL TAB    TAKE ONE TABLET BY MOUTH ONE TIME DAILY AT NIGHT    DONEPEZIL 10 MG ORAL TAB    Take 1 tablet (10 mg total) by mouth daily.    ESCITALOPRAM 10 MG ORAL TAB    Take 1 tablet (10 mg total) by mouth daily.    LAMOTRIGINE 150 MG ORAL TAB    Take 1 tablet (150 mg total) by mouth 2 (two) times daily.    MELATONIN 10 MG ORAL  TAB    Take by mouth daily.    NADOLOL 20 MG ORAL TAB    TAKE ONE TABLET BY MOUTH ONE TIME DAILY    ONDANSETRON 4 MG ORAL TABLET DISPERSIBLE    Take 1 tablet (4 mg total) by mouth every 8 (eight) hours as needed for Nausea.    OXYBUTYNIN 5 MG ORAL TAB    Take 1 tablet (5 mg total) by mouth daily.    PANTOPRAZOLE 40 MG ORAL TAB EC    TAKE ONE TABLET BY MOUTH IN THE MORNING BEFORE BREAKFAST FOR THE STOMACH    ROPINIROLE 1 MG ORAL TAB    TAKE TWO TABLETS BY MOUTH NIGHTLY   Modified Medications    No medications on file   Discontinued Medications    No medications on file       SOCIAL HISTORY  Social History     Socioeconomic History    Marital status:    Tobacco Use    Smoking status: Former     Current packs/day: 0.00     Average packs/day: 1 pack/day for 40.0 years (40.0 ttl pk-yrs)     Types: Cigarettes     Start date: 1958     Quit date: 1998     Years since quittin.0    Smokeless tobacco: Never   Vaping Use    Vaping status: Every Day    Substances: THC, cannabis   Substance and Sexual Activity    Alcohol use: Yes     Comment: 2 - 3 drinks a year    Drug use: Yes     Frequency: 7.0 times per week     Types: Cannabis     Comment: Medical Edible marijauna and occasional vape   Other Topics Concern    Caffeine Concern No     Comment: 4 cups tea daily    Exercise Yes     Comment: walking 2 blks 2 times week    Pt has a pacemaker Yes    Pt has a defibrillator No    Reaction to local anesthetic No       FAMILY HISTORY  Family History   Problem Relation Age of Onset    Hypertension Father         4 way bypass    Heart Disease Father         as per     Migraines Father         as per     Cancer Father         Prostate ()    Neurological Disorder Mother         Alzheimer's disease; as per NG    Hypertension Mother         Valve replacement    Heart Disease Mother         as per     Migraines Mother         as per     Dementia Mother         Dementia ()    Cancer Sister          Left kidney cancer ()    Hypertension Brother         4 way bypass    Cancer Brother         Prostate and throat ()    Heart Disease Brother         CAD; as per     Cancer Sister         Left kidney    Cancer Other         Thyroid Cancer; as per     Cancer Maternal Grandmother         Colon    Dementia Maternal Grandmother         Dementia ()       PHYSICAL EXAM  Vital signs:  BP (!) 170/89   Pulse 70   Temp 100.4 °F (38 °C) (Rectal)   Resp 19   SpO2 94%      GENERAL:  Awake and alert, in no acute distress.  HEART:  Regular rhythm.  Regular rate   LUNGS:  Air entry was decreased.  No increased work of breathing or wheezes   ABDOMEN: Soft and non-tender.    NEUROLOGICAL: Awake and alert, confused.  Able to state her name.  Not following commands.  Moving extremities spontaneously.  Attempting to remove medical devices and get out of bed.    SKIN:  Warm and well perfused  PSYCHIATRIC: Agitated mood      IMAGING    No results found.     Data:  Recent Labs   Lab 25  1504   RBC 4.61   HGB 14.3   HCT 41.6   MCV 90.2   MCH 31.0   MCHC 34.4   RDW 12.2   NEPRELIM 4.22   WBC 6.7   .0     Recent Labs   Lab 25  1504   *   BUN 23   CREATSERUM 1.21*   CA 9.9   ALB 4.6      K 4.3      CO2 15.0*   ALKPHO 61   AST 20   ALT 16   BILT 0.7   TP 7.8       ASSESSMENT/PLAN    Seizure activity  -Witnessed by  on day of admission.  -Self resolved after around 5 minutes per family report.  -Family states patient with known history of seizure disorder.  Believes she has been taking her medications appropriately.  -Check CT brain  -Loaded with Keppra in ED.,  Continue.  -Neurology consulted, appreciate further recommendations.      Altered mental status  -Likely metabolic encephalopathy/postictal state from seizure activity as above  -Patient also with ELIJAH.  -Treating underlying conditions.  -Supportive care    Acute Kidney Injury   - Starting IVF  - Avoiding  Nephrotoxic agents  - Cont to monitor    Hypertension  -Elevations noted  -Restarting home regimen once passes swallow evaluation.  -As needed IV hydralazine  -Continue to monitor      Plan of care discussed with patient at bedside.  Discussed with ED physician and RN. Decision made that pt needs hospitalization for further management/monitoring.      Syed Silva MD    This note was prepared using Dragon Medical voice recognition dictation software. As a result errors may occur. When identified these errors have been corrected. While every attempt is made to correct errors during dictation discrepancies may still exist

## 2025-01-26 NOTE — ED QUICK NOTES
Patient pt agitation improving, vital signs stable  Therapeutic environment provided  Cardiac monitor and pulse ox in place  Bed locked and in lowest position

## 2025-01-26 NOTE — ED QUICK NOTES
Patient continues to remain agitated  Attempting to climb out of bed  Nonverbal and unable to redirect at this time  Additional prn medications administered  Family supportive of plan  Cardiac monitor and pulse ox in place  Seizure precautions in place  Continue to monitor closely

## 2025-01-26 NOTE — ED QUICK NOTES
Patient agitated, attempting to get out of bed  Seizure precautions in place  Family at bedside, updated on plan of care  Bilateral wrist restraints placed for patients safety  Medications given as ordered  Labs sent

## 2025-01-26 NOTE — ED QUICK NOTES
Linens changed   Leelee care provided, placed in depends  Patient resisting and restless  Gown changed, unable to place sheet on bed  Patient kicking and removing blankets  Attempting to cover patient

## 2025-01-26 NOTE — ED QUICK NOTES
Patient pt escorted out of dept on monitor with RN to CT  Pt became agitated during scan  Additional 1mg of ativan given  Scan completed  Patients linens changed, ni care provided  Patient brought back to room  Family at bedside  EKG obtained  Awaiting further results prior to transfer to floor

## 2025-01-26 NOTE — ED QUICK NOTES
Patient remains again agitated  Pulling at restraints  Orders obtained for prn ativan  Medications administered

## 2025-01-26 NOTE — CONSULTS
[facility]  [address] [place] [address], [address]  [facility] [address]  [phone]          NEUROLOGY CONSULTATION NOTE    [facility]  part of [facility]    Report of Consultation  Nadiya Felix Patient Status:  Emergency     3/28/1950 MRN [id number]    Location [facility] EMERGENCY DEPARTMENT Attending Pamela Robles DO    Hosp Day # 0 PCP Donell Yin MD      Date of Admission:  2025  Date of Consult:  2025  Reason for Admission/Consultation: Seizure in the setting of fever  Requested by: Pamela Robles DO  _________________________________________________________________________________________  Chief Complaint:   Chief Complaint   Patient presents with    Seizure Disorder     HPI:  Nadiya Felix is a 74 year old  right handed woman who follows with this author in neurology clinic w/ a pmhx sig. for seizures,?Prior stroke (most recently admitted on 2022 for dizziness diagnosed with posterior circulation stroke), ?amarousis fugax in 2023, epilepsy, hx of atrial fibrillation s/p watchman,  migraines with visual aura, MCI/Alzhiemer's dementia, pseudobulbar affect, prior complaint of left-sided hemisensory loss in 2017, hepatic steatosis, atrophic kidney, carpal tunnel,  RLS, and JENNIFER not on CPAP who presents for breakthrough seizure in the setting of fever.  She has been persistently encephalopathic and postictal for at least 3 hours after she reportedly had a seizure.      Last known well was 1:30 PM when she woke up   She does sleep late but not that late. Went to bed late the night before  Soon after she woke up she was calling for her   She said she was okay  Tried to call him on the phone  He checked on her; she was incoherent. She was mumbling. She  she tensed up a made a first w the right arm. She shook her right arm.  ?tonic movements. After ems arrived and took her downstairs she went limp.     In the ER  she is febrile.She has a rectal temp of exactly 100.4 but otherwise hemodynamically stable, has been waking up more but still confused and not following commands as she typically does. She's apparently being treated for a UTI right now. Labs show no leukocytosis, Bicarb of 15 likely due to her lactic of  9.3 from her seizure, cov flu rsv neg.   No nuchal rigidity.  She was incontinent of urine and stool.  She is still postictal.  She is mute.  She is agitated.  She required soft restraints.  Repeatedly trying to get out of her bed.  Daughter at the bedside continues to try to reassure her.    Review and summation of prior records      ROS:  Pertinent positive and negatives per HPI.  All others were reviewed and negative.    Past Medical History:    Abnormal PET scan of head    Patient had abnormal PET scan of brain May 6, 2016 showing bilateral temporoparietal beta amyloid deposition in the gray matter consistent with Alzheimer's disease.     Acute cystitis without hematuria    Acute pancreatitis (HCC)    Acute pancreatitis, unspecified complication status, unspecified pancreatitis type (HCC)    Alzheimer disease (HCC)    Anxiety    Mild    Aortic atherosclerosis (HCC)    Aortic atherosclerosis seen on CT scan October 2014.     Arm paresthesia, left    Arrhythmia    A-Fib    Arthritis    Mild    Arthritis, gouty    Ataxia    Atrophic kidney    CT scan October 8, 2014 shows left kidney smaller than the right.  Bilateral areas of renal parenchymal scar.  Symmetric nephrograms.  Moderate calcific atherosclerosis of aorta.     AVM (arteriovenous malformation) of colon    Large nonbleeding cecal AVM found on colonoscopy March 16 2018.  At that time patient was found to have microscopic/lymphocytic colitis.      Back problem    spinal stenosis    Carpal tunnel syndrome    as per NG    Cataract    Chronic atrial fibrillation (HCC)    Closed fracture of left distal radius    Crohn disease (HCC)    Depression     Diverticulosis    as per NG    Elevated LFTs    Esophageal reflux    Mild    Family history of colon cancer    Maternal grandmother had colon cancer in her 70s.    Foreign body in alimentary tract    Foreign body in stomach, initial encounter    Gout    Hand weakness    Hepatic steatosis    High blood pressure    High cholesterol    as per NG    History of colonic polyps    as per NG    History of migraine headaches    as per NG    History of total hysterectomy with bilateral salpingo-oophorectomy (BSO)    History of total hysterectomy with removal of both tubes and ovaries    as per NG    Hyperlipidemia    Incontinence    DUE TO CYSTOCELE/RECTOCELE    Left arm weakness    Lymphocytic colitis    as per NG    Migraine    Mild cognitive impairment    as per NG    Obesity    25 lbs overweight    Obstructive sleep apnea    Pacemaker    Parkinson's disease (HCC)    Pseudobulbar affect    Renal disorder    stage 3    Restless legs syndrome    Seizure disorder (HCC)    last one 2/2019    Sleep apnea    Tendon tear    Surgically repaired; as per NG    Unspecified essential hypertension    as per NG    Vertigo    Visual impairment    reading glasses       Past Surgical History:   Procedure Laterality Date    Arthroscopy of joint unlisted      BILAT. KNEES    Cardiac pacemaker placement      as per NG    Carpal tunnel release  2011    as per NG    Cataract Left 07/10/2015    Cataract Right late July 2015    Cholecystectomy      as per NG    Colonoscopy N/A 03/16/2018    Procedure: COLONOSCOPY;  Surgeon: Mohamud Sawyer MD;  Location: [facility] ENDOSCOPY    Colonoscopy N/A 11/16/2022    Procedure: COLONOSCOPY/ESOPHAGOGASTRODUODENOSCOPY (EGD);  Surgeon: Genaro Osman MD;  Location: [facility] ENDOSCOPY    Electrocardiogram, complete  02/06/2013    SCANNED TO MEDIA TAB - 02-    Hysterectomy      & BSO; as per NG    Knee arthroscopy Right 2013    as per NG    Knee surgery  01/2015    x3     Nasal scopy,remv totl ethmoid  2006     \"Ethmoidectomy, max antrostomies\"; as per NG      1966,1969 & 1973    Normal    Other   or 2015    removal of pins and plate from right wrist (fracture)    Other surgical history Left     L knee scoped--3 torn meniscus--biocartilage done    Other surgical history Right 2016    R knee scoped for torn miniscus    Other surgical history Left     wrist surgery    Pacemaker/defibrillator      Reconstr nose+eamon septal repair      as per NG    Removal gallbladder      Total abdom hysterectomy      Total knee replacement         Family History   Problem Relation Age of Onset    Hypertension Father         4 way bypass    Heart Disease Father         as per NG    Migraines Father         as per NG    Cancer Father         Prostate ()    Neurological Disorder Mother         Alzheimer's disease; as per NG    Hypertension Mother         Valve replacement    Heart Disease Mother         as per NG    Migraines Mother         as per NG    Dementia Mother         Dementia ()    Cancer Sister         Left kidney cancer ()    Hypertension Brother         4 way bypass    Cancer Brother         Prostate and throat ()    Heart Disease Brother         CAD; as per NG    Cancer Sister         Left kidney    Cancer Other         Thyroid Cancer; as per NG    Cancer Maternal Grandmother         Colon    Dementia Maternal Grandmother         Dementia ()       Social History     Socioeconomic History    Marital status:      Spouse name: Not on file    Number of children: Not on file    Years of education: Not on file    Highest education level: Not on file   Occupational History    Not on file   Tobacco Use    Smoking status: Former     Current packs/day: 0.00     Average packs/day: 1 pack/day for 40.0 years (40.0 ttl pk-yrs)     Types: Cigarettes     Start date: 1958     Quit date: 1998     Years since quittin.0    Smokeless tobacco: Never   Vaping Use     Vaping status: Every Day    Substances: THC, cannabis   Substance and Sexual Activity    Alcohol use: Yes     Comment: 2 - 3 drinks a year    Drug use: Yes     Frequency: 7.0 times per week     Types: Cannabis     Comment: Medical Edible marijauna and occasional vape    Sexual activity: Not on file   Other Topics Concern     Service Not Asked    Blood Transfusions Not Asked    Caffeine Concern No     Comment: 4 cups tea daily    Occupational Exposure Not Asked    Hobby Hazards Not Asked    Sleep Concern Not Asked    Stress Concern Not Asked    Weight Concern Not Asked    Special Diet Not Asked    Back Care Not Asked    Exercise Yes     Comment: walking 2 blks 2 times week    Bike Helmet Not Asked    Seat Belt Not Asked    Self-Exams Not Asked    Grew up on a farm Not Asked    History of tanning Not Asked    Outdoor occupation Not Asked    Pt has a pacemaker Yes    Pt has a defibrillator No    Breast feeding Not Asked    Reaction to local anesthetic No   Social History Narrative    ** Merged History Encounter **         The patient does not use an assistive device..    The patient does live in a home with stairs. 16 stairs       Social Drivers of Health     Financial Resource Strain: Low Risk  (11/2/2023)    Financial Resource Strain     Difficulty of Paying Living Expenses: Not on file     Med Affordability: No   Food Insecurity: No Food Insecurity (11/1/2023)    Food Insecurity     Food Insecurity: Never true   Transportation Needs: No Transportation Needs (11/1/2023)    Transportation Needs     Lack of Transportation: No   Physical Activity: Not on file   Stress: Not on file   Social Connections: Not on file   Housing Stability: Low Risk  (11/1/2023)    Housing Stability     Housing Instability: No     Housing Instability Emergency: Not on file     Crib or Bassinette: Not on file       Objective:    Last vitals and weight :  Vitals:    01/26/25 1600   BP: (!) 170/89   Pulse: 70   Resp: 19   Temp:       @[facility](14)@    Exam:  General-no acute distress  Respiratory no signs of respiratory distress  Neurological  Mental status: Immediate.  She does not regard.  She appears to be postictal.  Does not follow commands.  She repeatedly keeps trying to get out of her bed.  She has soft restraints on.  She will briefly be redirected and will lay back down but then within a matter of moments attempt to get out of bed again.  Cranial nerves: No gaze preference.  Briefly regards.  She is mute.  No obvious facial asymmetry.  Horizontal gaze appears to be intact.  There is no forced gaze deviation.  She does not participate in the exam.  Motor: Moving all 4 extremities symmetrically and equally.  Withdraws to noxious stimuli.  - Cerebellum: No truncal ataxia. No titubations. No dysmetria, no dysdiadochokinesis. No overshoot.          Inpatient Medications   levETIRAcetam  2,000 mg Intravenous Once              Home Medications  Current Outpatient Medications   Medication Instructions    allopurinol (ZYLOPRIM) 100 mg, Daily    amLODIPine (NORVASC) 2.5 mg, Daily    aspirin 81 mg, Daily    atorvastatin 20 MG Oral Tab TAKE ONE TABLET BY MOUTH ONE TIME DAILY AT NIGHT    donepezil (ARICEPT) 10 mg, Oral, Daily    escitalopram (LEXAPRO) 10 mg, Daily    lamoTRIgine (LAMICTAL) 150 mg, Oral, 2 times daily    Melatonin 10 MG Oral Tab Daily    nadolol (CORGARD) 20 mg, Oral, Daily    ondansetron (ZOFRAN-ODT) 4 mg, Oral, Every 8 hours PRN    oxybutynin (DITROPAN) 5 mg, Daily    pantoprazole 40 MG Oral Tab EC TAKE ONE TABLET BY MOUTH IN THE MORNING BEFORE BREAKFAST FOR THE STOMACH    ROPINIROLE 1 MG Oral Tab TAKE TWO TABLETS BY MOUTH NIGHTLY        Data reviewed  Laboratory Data:  Lab Results   Component Value Date    WBC 6.7 01/26/2025    HGB 14.3 01/26/2025    HCT 41.6 01/26/2025    .0 01/26/2025    CREATSERUM 1.21 (H) 01/26/2025    BUN 23 01/26/2025     01/26/2025    K 4.3 01/26/2025     01/26/2025    CO2 15.0 (L)  01/26/2025     (H) 01/26/2025    CA 9.9 01/26/2025    ALB 4.6 01/26/2025    ALKPHO 61 01/26/2025    TP 7.8 01/26/2025    AST 20 01/26/2025    ALT 16 01/26/2025    PTT 32.3 04/09/2024    INR 0.99 04/09/2024    PTP 13.7 04/09/2024    T4F 1.07 07/09/2018    TSH 1.400 09/19/2023    LIP 64 (H) 05/13/2024    DDIMER 0.72 08/30/2022    CRP <0.5 03/15/2018    MG 2.2 12/07/2023    PHOS 3.2 10/26/2023    TROP <0.045 06/19/2021    CK 87 05/25/2017    B12 525 02/08/2023    ETOH <3 04/23/2019     Recent Results (from the past 72 hours)   POCT Glucose    Collection Time: 01/26/25  2:34 PM   Result Value Ref Range    POC Glucose  172 (H) 70 - 99 mg/dL   SARS-CoV-2/Flu A and B/RSV by PCR (GeneXpert)    Collection Time: 01/26/25  3:04 PM    Specimen: Nares; Other   Result Value Ref Range    SARS-CoV-2 (COVID-19) - (GeneXpert) Not Detected Not Detected    Influenza A by PCR Negative Negative    Influenza B by PCR Negative Negative    RSV by PCR Negative Negative   CBC With Differential With Platelet    Collection Time: 01/26/25  3:04 PM   Result Value Ref Range    WBC 6.7 4.0 - 11.0 x10(3) uL    RBC 4.61 3.80 - 5.30 x10(6)uL    HGB 14.3 12.0 - 16.0 g/dL    HCT 41.6 35.0 - 48.0 %    MCV 90.2 80.0 - 100.0 fL    MCH 31.0 26.0 - 34.0 pg    MCHC 34.4 31.0 - 37.0 g/dL    RDW-SD 39.8 35.1 - 46.3 fL    RDW 12.2 11.0 - 15.0 %    .0 150.0 - 450.0 10(3)uL    Neutrophil Absolute Prelim 4.22 1.50 - 7.70 x10 (3) uL    Neutrophil Absolute 4.22 1.50 - 7.70 x10(3) uL    Lymphocyte Absolute 2.05 1.00 - 4.00 x10(3) uL    Monocyte Absolute 0.30 0.10 - 1.00 x10(3) uL    Eosinophil Absolute 0.09 0.00 - 0.70 x10(3) uL    Basophil Absolute 0.03 0.00 - 0.20 x10(3) uL    Immature Granulocyte Absolute 0.05 0.00 - 1.00 x10(3) uL    Neutrophil % 62.7 %    Lymphocyte % 30.4 %    Monocyte % 4.5 %    Eosinophil % 1.3 %    Basophil % 0.4 %    Immature Granulocyte % 0.7 %   Comp Metabolic Panel (14)    Collection Time: 01/26/25  3:04 PM   Result Value  Ref Range    Glucose 180 (H) 70 - 99 mg/dL    Sodium 142 136 - 145 mmol/L    Potassium 4.3 3.5 - 5.1 mmol/L    Chloride 109 98 - 112 mmol/L    CO2 15.0 (L) 21.0 - 32.0 mmol/L    Anion Gap 18 0 - 18 mmol/L    BUN 23 9 - 23 mg/dL    Creatinine 1.21 (H) 0.55 - 1.02 mg/dL    BUN/CREA Ratio 19.0 10.0 - 20.0    Calcium, Total 9.9 8.7 - 10.4 mg/dL    Calculated Osmolality 302 (H) 275 - 295 mOsm/kg    eGFR-Cr 47 (L) >=60 mL/min/1.73m2    ALT 16 10 - 49 U/L    AST 20 <34 U/L    Alkaline Phosphatase 61 55 - 142 U/L    Bilirubin, Total 0.7 0.2 - 1.1 mg/dL    Total Protein 7.8 5.7 - 8.2 g/dL    Albumin 4.6 3.2 - 4.8 g/dL    Globulin  3.2 2.0 - 3.5 g/dL    A/G Ratio 1.4 1.0 - 2.0   Urinalysis with Culture Reflex    Collection Time: 01/26/25  3:04 PM    Specimen: Urine, clean catch   Result Value Ref Range    Urine Color Yellow Yellow    Clarity Urine Clear Clear    Spec Gravity >=1.030 1.005 - 1.030    Glucose Urine Negative Negative mg/dL    Bilirubin Urine Negative Negative    Ketones Urine Negative Negative mg/dL    Blood Urine Moderate (A) Negative    pH Urine 5.5 5.0 - 8.0    Protein Urine 100 mg/dL (A) Negative mg/dL    Urobilinogen Urine 0.2 0.2    Nitrite Urine Negative Negative    Leukocyte Esterase Urine Negative Negative    WBC Urine 1-5 0 - 5 /HPF    RBC Urine >10 (A) 0 - 2 /HPF    Bacteria Urine Rare (A) None Seen /HPF    Squamous Epi. Cells Few (A) None Seen /HPF    Renal Tubular Epithelial Cells None Seen None Seen /HPF    Transitional Cells Moderate (A) None Seen /HPF    Hyaline Casts Present (A) None Seen /LPF    Yeast Urine None Seen None Seen /HPF   Lactic Acid, Plasma    Collection Time: 01/26/25  3:04 PM   Result Value Ref Range    Lactic Acid 9.3 (HH) 0.5 - 2.0 mmol/L   UA Microscopic only, urine    Collection Time: 01/26/25  3:04 PM   Result Value Ref Range    WBC Urine 1-5 0 - 5 /HPF    RBC Urine >10 (A) 0 - 2 /HPF    Bacteria Urine Rare (A) None Seen /HPF    Squamous Epi. Cells Few (A) None Seen /HPF     Renal Tubular Epithelial Cells None Seen None Seen /HPF    Transitional Cells Moderate (A) None Seen /HPF    Hyaline Casts Present (A) None Seen /LPF    Yeast Urine None Seen None Seen /HPF        HGBA1C:   Lab Results   Component Value Date    A1C 5.2 11/01/2023    A1C 5.4 05/03/2019    A1C 5.4 03/14/2018     11/01/2023        Test results/Imaging:   No results found.      No results found.    Performed an independent visualization of head CT from this admission on 1/26/2025  Imaging revealed: Agree with read        Concerned that the patient may have had a secondary cause for seizure given that she is been persistently febrile, has leukocytosis, and has been persistently postictal.  Low suspicion for meningitis is low will empirically start on acyclovir.  She is continue to remain febrile in the hospital.  She is getting IV Tylenol.  Will also empirically start her on ceftriaxone and vancomycin.  EEG ordered to exclude status epilepticus.  Empirically loaded with Keppra at a max dose of 60 mg/kg.  Maintenance dose of 5 mg ordered 12 hours later.  Will also consider MRI.  May need lumbar puncture.  She cannot tolerate any p.o. medications.  For right now we will hold her donepezil given that it can lower the seizure threshold.     Breakthrough seizure  Differential Diagnosis:  Provoked seizure due to?  Infection  ?  UTI; UA unimpressive.  Reportedly being treated  CNS infection patient has a high fevers; most recent temperature was 102.  Provoked seizure leading to?  Status epilepticus given prolonged postictal state  Diagnostics:  Stat EEG  Blood cultures  Urine culture pending  Head CT negative for any acute process  MRI brain; will need device manufacture clearance given that she has a pacemaker  Telemetry  Therapeutics:  Neuro checks Q4.   Will consider transfer to ICU/PCU if there is no improvement in neurological status.  Called the patient's RN again for update.  They report that she has not had any  significant improvement compared to her exam in the ER hours earlier.  Will continue to monitor closely  Seizure precautions  Telemetry  4mg Ativan for status epilepticus or  seizures lasting longer than 5 minutes, or multiple seizures without return to baseline in the interim. Max daily dose  of ativan is 0.1 mg/kg.  Can continue home dose of Lamictal because she cannot tolerate p.o.  Patient given additional 4 mg dose of Ativan in ER  Loaded with Keppra at 60 mg/kg.  Started on maintenance Keppra 500 twice daily  Load w/ vimpat 200mg once then 50mg bid starting 12 hours later.  IV vancomycin for possible bacterial meningitis dosed  by pharmacy  IV ceftriaxone 2 g every 12      2.  Dementia  Therapeutics:  Hold donepezil given risk of lowering seizure threshold          Education/Instructions given to: spouse and daughter   Barriers to Learning:None  Content: Refer to note above. Evaluation/Outcome: Verbalized understanding    This document is not intended to support charting by exception.  Sections left blank in a completed note should be presumed not to have been done.    Disclaimer:   This record was dictated using Dragon software. There may be errors due to voice recognition problems that were not realized and corrected during the completion of the note.        Upon my evaluation, this patient had a high probability of imminent or life-threatening deterioration due to central nervous system failure and seizure, which required my direct attention, intervention, and personal management.    I have personally provided 35 minutes of critical care time, exclusive of time spent on separately billable procedures.  Time includes review of all pertinent laboratory/radiology results, discussion with consultants, and monitoring for potential decompensation.  Performed interventions included IV antiseizure medications for possible status epilepticus, IV antibiotics, and IV antivirals, treatment of possible sepsis including  treating fever,.       Thank you.  Robi Liriano D.O.   Vascular & General Neurology  1/26/2025  4:35 PM

## 2025-01-26 NOTE — ED PROVIDER NOTES
Patient Seen in: [facility] Emergency Department    History     Chief Complaint   Patient presents with    Seizure Disorder     Stated Complaint: seizure    HPI    Patient is a 74 year old female hx of alzheimer disease, possible prior stroke (had been admitted in 2022 due to posterior circ stroke), afib s/p watchman, htn hld, seizure disorder on lamictal, RLS, presents via EMS with seizure like activity.  Per EMS, pt has been feeling fatigued throughout the day, and when her  went to check on her PTA, she was having a generalized, grand mal seizure. Per paramedics this lasted for approximately 15 minutes before resolving after they gave 2 of versed IV. Pt now lethargic and not responding to verbal stimuli. Further history currently limited 2/2 pt clinical condition.           Past Medical History:    Abnormal PET scan of head    Patient had abnormal PET scan of brain May 6, 2016 showing bilateral temporoparietal beta amyloid deposition in the gray matter consistent with Alzheimer's disease.     Acute cystitis without hematuria    Acute pancreatitis (HCC)    Acute pancreatitis, unspecified complication status, unspecified pancreatitis type (HCC)    Alzheimer disease (HCC)    Anxiety    Mild    Aortic atherosclerosis (HCC)    Aortic atherosclerosis seen on CT scan October 2014.     Arm paresthesia, left    Arrhythmia    A-Fib    Arthritis    Mild    Arthritis, gouty    Ataxia    Atrophic kidney    CT scan October 8, 2014 shows left kidney smaller than the right.  Bilateral areas of renal parenchymal scar.  Symmetric nephrograms.  Moderate calcific atherosclerosis of aorta.     AVM (arteriovenous malformation) of colon    Large nonbleeding cecal AVM found on colonoscopy March 16 2018.  At that time patient was found to have microscopic/lymphocytic colitis.      Back problem    spinal stenosis    Carpal tunnel syndrome    as per NG    Cataract    Chronic atrial fibrillation (HCC)    Closed fracture of  left distal radius    Crohn disease (HCC)    Depression    Diverticulosis    as per NG    Elevated LFTs    Esophageal reflux    Mild    Family history of colon cancer    Maternal grandmother had colon cancer in her 70s.    Foreign body in alimentary tract    Foreign body in stomach, initial encounter    Gout    Hand weakness    Hepatic steatosis    High blood pressure    High cholesterol    as per NG    History of colonic polyps    as per NG    History of migraine headaches    as per NG    History of total hysterectomy with bilateral salpingo-oophorectomy (BSO)    History of total hysterectomy with removal of both tubes and ovaries    as per NG    Hyperlipidemia    Incontinence    DUE TO CYSTOCELE/RECTOCELE    Left arm weakness    Lymphocytic colitis    as per NG    Migraine    Mild cognitive impairment    as per NG    Obesity    25 lbs overweight    Obstructive sleep apnea    Pacemaker    Parkinson's disease (HCC)    Pseudobulbar affect    Renal disorder    stage 3    Restless legs syndrome    Seizure disorder (HCC)    last one 2/2019    Sleep apnea    Tendon tear    Surgically repaired; as per NG    Unspecified essential hypertension    as per NG    Vertigo    Visual impairment    reading glasses       Past Surgical History:   Procedure Laterality Date    Arthroscopy of joint unlisted      BILAT. KNEES    Cardiac pacemaker placement      as per NG    Carpal tunnel release  2011    as per NG    Cataract Left 07/10/2015    Cataract Right late July 2015    Cholecystectomy      as per NG    Colonoscopy N/A 03/16/2018    Procedure: COLONOSCOPY;  Surgeon: Mohamud Sawyer MD;  Location: [facility] ENDOSCOPY    Colonoscopy N/A 11/16/2022    Procedure: COLONOSCOPY/ESOPHAGOGASTRODUODENOSCOPY (EGD);  Surgeon: Genaro Osman MD;  Location: [facility] ENDOSCOPY    Electrocardiogram, complete  02/06/2013    SCANNED TO MEDIA TAB - 02-    Hysterectomy      & BSO; as per NG    Knee arthroscopy Right 2013    as per NG    Knee surgery   01/2015    x3     Nasal scopy,remv totl ethmoid  2006    \"Ethmoidectomy, max antrostomies\"; as per NG      1966,1969 & 1973    Normal    Other   or 2015    removal of pins and plate from right wrist (fracture)    Other surgical history Left     L knee scoped--3 torn meniscus--biocartilage done    Other surgical history Right 2016    R knee scoped for torn miniscus    Other surgical history Left     wrist surgery    Pacemaker/defibrillator      Reconstr nose+eamon septal repair      as per NG    Removal gallbladder      Total abdom hysterectomy      Total knee replacement              Family History   Problem Relation Age of Onset    Hypertension Father         4 way bypass    Heart Disease Father         as per NG    Migraines Father         as per NG    Cancer Father         Prostate ()    Neurological Disorder Mother         Alzheimer's disease; as per NG    Hypertension Mother         Valve replacement    Heart Disease Mother         as per NG    Migraines Mother         as per NG    Dementia Mother         Dementia ()    Cancer Sister         Left kidney cancer ()    Hypertension Brother         4 way bypass    Cancer Brother         Prostate and throat ()    Heart Disease Brother         CAD; as per NG    Cancer Sister         Left kidney    Cancer Other         Thyroid Cancer; as per NG    Cancer Maternal Grandmother         Colon    Dementia Maternal Grandmother         Dementia ()       Social History     Socioeconomic History    Marital status:    Tobacco Use    Smoking status: Former     Current packs/day: 0.00     Average packs/day: 1 pack/day for 40.0 years (40.0 ttl pk-yrs)     Types: Cigarettes     Start date: 1958     Quit date: 1998     Years since quittin.0    Smokeless tobacco: Never   Vaping Use    Vaping status: Every Day    Substances: THC, cannabis   Substance and Sexual Activity    Alcohol use: Yes     Comment: 2  - 3 drinks a year    Drug use: Yes     Frequency: 7.0 times per week     Types: Cannabis     Comment: Medical Edible marijauna and occasional vape   Other Topics Concern    Caffeine Concern No     Comment: 4 cups tea daily    Exercise Yes     Comment: walking 2 blks 2 times week    Pt has a pacemaker Yes    Pt has a defibrillator No    Reaction to local anesthetic No   Social History Narrative    ** Merged History Encounter **         The patient does not use an assistive device..    The patient does live in a home with stairs. 16 stairs       Social Drivers of Health     Financial Resource Strain: Low Risk  (11/2/2023)    Financial Resource Strain     Med Affordability: No   Food Insecurity: No Food Insecurity (11/1/2023)    Food Insecurity     Food Insecurity: Never true   Transportation Needs: No Transportation Needs (11/1/2023)    Transportation Needs     Lack of Transportation: No   Housing Stability: Low Risk  (11/1/2023)    Housing Stability     Housing Instability: No       Review of Systems    Positive for stated complaint: seizure  Other systems are as noted in HPI.  Constitutional and vital signs reviewed.      All other systems reviewed and negative except as noted above.    PSFH elements reviewed from today and agreed except as otherwise stated in HPI.    Physical Exam     ED Triage Vitals   BP 01/26/25 1457 (!) 171/90   Pulse 01/26/25 1457 79   Resp 01/26/25 1457 22   Temp 01/26/25 1450 100.4 °F (38 °C)   Temp src 01/26/25 1450 Rectal   SpO2 01/26/25 1457 99 %   O2 Device 01/26/25 1457 None (Room air)       Current:/74   Pulse 70   Temp 100.4 °F (38 °C) (Rectal)   Resp 20   SpO2 99%   PULSE OX 96% on RA   GENERAL: somnolent but in no distress, nontoxic in appearance   HEAD: normocephalic, atraumatic   EYES: PERRLA  THROAT: mm dry   NECK: supple, no meningeal signs  LUNGS: no resp distress, cta bilateral no wheezes or crackles. Mildly tachypneic.   CARDIO: RRR without murmur, +2 radial and dp  pulses bilaterally, no jvd   GI: abdomen is soft and non tender, no palpable masses, nl bowel sounds   EXTREMITIES: no gross deformity of upper or lower extremities, no leg edema   NEURO: somnolent. GCS 8 (+5 motor, +1 eye, +2 verbal). Spontaneously moving left arm.   SKIN: good skin turgor, no obvious rashes  PSYCH: somnolent     ED Course     Labs Reviewed   COMP METABOLIC PANEL (14) - Abnormal; Notable for the following components:       Result Value    Glucose 180 (*)     CO2 15.0 (*)     Creatinine 1.21 (*)     Calculated Osmolality 302 (*)     eGFR-Cr 47 (*)     All other components within normal limits   URINALYSIS WITH CULTURE REFLEX - Abnormal; Notable for the following components:    Blood Urine Moderate (*)     Protein Urine 100 mg/dL (*)     RBC Urine >10 (*)     Bacteria Urine Rare (*)     Squamous Epi. Cells Few (*)     Transitional Cells Moderate (*)     Hyaline Casts Present (*)     All other components within normal limits   LACTIC ACID, PLASMA - Abnormal; Notable for the following components:    Lactic Acid 9.3 (*)     All other components within normal limits   UA MICROSCOPIC ONLY, URINE - Abnormal; Notable for the following components:    RBC Urine >10 (*)     Bacteria Urine Rare (*)     Squamous Epi. Cells Few (*)     Transitional Cells Moderate (*)     Hyaline Casts Present (*)     All other components within normal limits   POCT GLUCOSE - Abnormal; Notable for the following components:    POC Glucose  172 (*)     All other components within normal limits   SARS-COV-2/FLU A AND B/RSV BY PCR (GENEXPERT) - Normal    Narrative:     This test is intended for the qualitative detection and differentiation of SARS-CoV-2, influenza A, influenza B, and respiratory syncytial virus (RSV) viral RNA in nasopharyngeal or nares swabs from individuals suspected of respiratory viral infection consistent with COVID-19 by their healthcare provider. Signs and symptoms of respiratory viral infection due to  SARS-CoV-2, influenza, and RSV can be similar.    Test performed using the Xpert Xpress SARS-CoV-2/FLU/RSV (real time RT-PCR)  assay on the [employer] instrument, [employer], [place] [address].   This test is being used under the Food and Drug Administration's Emergency Use Authorization.    The authorized Fact Sheet for Healthcare Providers for this assay is available upon request from the laboratory.   CBC WITH DIFFERENTIAL WITH PLATELET   LAMOTRIGINE (LAMICTAL), SERUM   LACTIC ACID REFLEX POST POSTIVE   RAINBOW DRAW LAVENDER   RAINBOW DRAW LIGHT GREEN   RAINBOW DRAW BLUE   RAINBOW DRAW GOLD   BLOOD CULTURE   BLOOD CULTURE   URINE CULTURE, ROUTINE     EKG    My interpretation: nsr rate 71 bpm, normal axis, normal intervals, no stemi, no ectopy, qtc 443 ms         MDM     Monitor Interpretation:  Sinus rhythm        MDM     This patient presents with symptoms consistent with acute seizure, most likely due to underlying seizure disorder. On arrival pt initially more somnolent but becoming more awake over time though no organized verbal response. Hypertensive with rectal temp 100.4F. POCT glucose 172 on arrival.     Ddx includes viral syndrome, electrolyte abnormality, UTI, pneumonia.  I considered, but think less likely, secondary etiologies of epileptic seizures to include drug / toxin etiologies (ETOH, stimulants, medication side effects), acute CNS infections (meningitis, encephalitis, abscess), ICH / tumor / CVA. Per report low suspicion for syncope or impact seizure related to head trauma, vertebrobasilar insufficiency, movement disorder, migraine.     Plan: cbc, cmp, bcx, lactic acid, ua/ucx, ct head, cxr, ecg, monitor on telemetry. Given borderline febrile will give rectal tylenol. Pt attempting to exit bed and concern for harm to self/staff, placed in soft restraints will give haldol IM.       ED Course as of 01/26/25 1819  ------------------------------------------------------------  Time: 01/26  5852  Comment: Patient's  and daughter at [facility].  They note the patient is typically alert and oriented x 3 though she does have a history of dementia.  They state she has been in her normal state of health and had a normal dinner with them last night.  They deny any recent cough, fever, vomiting, diarrhea but do note she is on her second round of antibiotics for UTI.  They states she has not had a seizure in years.   states he witnessed the patient have generalized shaking activity at home and felt like it seems like a seizure to him.  Discussed with them that we are still waiting on labs and workup but particular if patient continues to be encephalopathic she will need to be admitted to the hospital.  ------------------------------------------------------------  Time: 01/26 1527  Comment: Cbc unremarkable   ------------------------------------------------------------  Time: 01/26 1528  Comment: Pt still agitated after haldol. Will trial versed as she had good sedation with this earlier after her seizure.   ------------------------------------------------------------  Time: 01/26 1544  Value: LACTIC ACID(!!): 9.3  Comment: Suspect likely 2/2 seizure like activity.   ------------------------------------------------------------  Time: 01/26 1545  Value: CREATININE(!): 1.21  Comment: Baseline for patient   ------------------------------------------------------------  Time: 01/26 1545  Value: Carbon Dioxide, Total(!): 15.0  Comment: Suspect 2/2 seizure, but will give full 30 ml/kg of fluids for now. Low suspicion for sepsis clinically   ------------------------------------------------------------  Time: 01/26 1633  Comment: D/w Dr. Liriano agrees with keppra load and will see on consult. Case d/w Dr. Silva accepts admission pending CTH.   ------------------------------------------------------------  Time: 01/26 1833  Value: CT BRAIN OR HEAD (CPT=70450)  Comment:   Impression  CONCLUSION:     No  acute intracranial abnormality.          ------------------------------------------------------------  Time: 01/26 1818  Comment: My interpetation of cxr: pacemaker in place. No cardiomegaly or focal lung consolidation            Disposition and Plan     Clinical Impression:  1. Witnessed seizure (HCC)    2. History of seizures    3. Altered mental status, unspecified altered mental status type    4. Delirium, acute         Disposition:  Admit  1/26/2025  5:16 pm        Supplementary Documentation:         Hospital Problems       Present on Admission  Date Reviewed: 12/12/2024            ICD-10-CM Noted POA    * (Principal) Witnessed seizure (HCC) R56.9 1/26/2025 Unknown    Altered mental status R41.82 1/26/2025 Unknown                                    Pamela Robles DO  Attending Physician  Emergency Medicine

## 2025-01-27 LAB
ALBUMIN SERPL-MCNC: 3.8 G/DL (ref 3.2–4.8)
ALBUMIN/GLOB SERPL: 1.5 {RATIO} (ref 1–2)
ALP LIVER SERPL-CCNC: 43 U/L
ALT SERPL-CCNC: 20 U/L
ANION GAP SERPL CALC-SCNC: 13 MMOL/L (ref 0–18)
AST SERPL-CCNC: 42 U/L (ref ?–34)
BASOPHILS # BLD AUTO: 0.01 X10(3) UL (ref 0–0.2)
BASOPHILS NFR BLD AUTO: 0.1 %
BILIRUB SERPL-MCNC: 1.4 MG/DL (ref 0.2–1.1)
BUN BLD-MCNC: 20 MG/DL (ref 9–23)
BUN/CREAT SERPL: 19.2 (ref 10–20)
C DIFF TOX B STL QL: NEGATIVE
CALCIUM BLD-MCNC: 8.9 MG/DL (ref 8.7–10.4)
CHLORIDE SERPL-SCNC: 113 MMOL/L (ref 98–112)
CO2 SERPL-SCNC: 21 MMOL/L (ref 21–32)
CREAT BLD-MCNC: 1.04 MG/DL
DEPRECATED RDW RBC AUTO: 41.3 FL (ref 35.1–46.3)
EGFRCR SERPLBLD CKD-EPI 2021: 56 ML/MIN/1.73M2 (ref 60–?)
EOSINOPHIL # BLD AUTO: 0 X10(3) UL (ref 0–0.7)
EOSINOPHIL NFR BLD AUTO: 0 %
ERYTHROCYTE [DISTWIDTH] IN BLOOD BY AUTOMATED COUNT: 12.6 % (ref 11–15)
GLOBULIN PLAS-MCNC: 2.5 G/DL (ref 2–3.5)
GLUCOSE BLD-MCNC: 118 MG/DL (ref 70–99)
HCT VFR BLD AUTO: 37.8 %
HGB BLD-MCNC: 12.7 G/DL
IMM GRANULOCYTES # BLD AUTO: 0.03 X10(3) UL (ref 0–1)
IMM GRANULOCYTES NFR BLD: 0.3 %
LYMPHOCYTES # BLD AUTO: 0.98 X10(3) UL (ref 1–4)
LYMPHOCYTES NFR BLD AUTO: 9.5 %
MAGNESIUM SERPL-MCNC: 1.8 MG/DL (ref 1.6–2.6)
MCH RBC QN AUTO: 30.5 PG (ref 26–34)
MCHC RBC AUTO-ENTMCNC: 33.6 G/DL (ref 31–37)
MCV RBC AUTO: 90.9 FL
MONOCYTES # BLD AUTO: 0.66 X10(3) UL (ref 0.1–1)
MONOCYTES NFR BLD AUTO: 6.4 %
NEUTROPHILS # BLD AUTO: 8.6 X10 (3) UL (ref 1.5–7.7)
NEUTROPHILS # BLD AUTO: 8.6 X10(3) UL (ref 1.5–7.7)
NEUTROPHILS NFR BLD AUTO: 83.7 %
OSMOLALITY SERPL CALC.SUM OF ELEC: 308 MOSM/KG (ref 275–295)
PLATELET # BLD AUTO: 148 10(3)UL (ref 150–450)
POTASSIUM SERPL-SCNC: 3.6 MMOL/L (ref 3.5–5.1)
PROT SERPL-MCNC: 6.3 G/DL (ref 5.7–8.2)
RBC # BLD AUTO: 4.16 X10(6)UL
SODIUM SERPL-SCNC: 147 MMOL/L (ref 136–145)
WBC # BLD AUTO: 10.3 X10(3) UL (ref 4–11)

## 2025-01-27 PROCEDURE — 95819 EEG AWAKE AND ASLEEP: CPT | Performed by: INTERNAL MEDICINE

## 2025-01-27 PROCEDURE — 99233 SBSQ HOSP IP/OBS HIGH 50: CPT | Performed by: OTHER

## 2025-01-27 PROCEDURE — 99233 SBSQ HOSP IP/OBS HIGH 50: CPT | Performed by: INTERNAL MEDICINE

## 2025-01-27 RX ORDER — MAGNESIUM SULFATE HEPTAHYDRATE 40 MG/ML
2 INJECTION, SOLUTION INTRAVENOUS ONCE
Status: COMPLETED | OUTPATIENT
Start: 2025-01-27 | End: 2025-01-27

## 2025-01-27 NOTE — PROGRESS NOTES
[facility] Pharmacy Dosing Service      Initial Pharmacokinetic Consult for Vancomycin Dosing     Nadiya Felix is a 74 year old female who is being initiated on vancomycin therapy for CNS infection.  Pharmacy has been asked to dose vancomycin by Dr Liriano.  The initial treatment and monitoring approach will be steady state AUC strategy.        Weight and Temperature:    Wt Readings from Last 1 Encounters:   25 57.4 kg (126 lb 8 oz)        Temp Readings from Last 1 Encounters:   25 (!) 102.3 °F (39.1 °C) (Axillary)      Labs:   Recent Labs   Lab 25  1504   CREATSERUM 1.21*      Estimated Creatinine Clearance: 36.1 mL/min (A) (based on SCr of 1.21 mg/dL (H)).     Recent Labs   Lab 25  1504   WBC 6.7          The Pharmacokinetic Target is:     to 600 mg-h/L and trough <=15 mg/L    Renal Dosing Considerations:    None     Assessment/Plan:   Initial/Loading dose: Will receive 1500 mg IV (25 mg/kg, capped at 2250 mg) x 1 loading dose.      Maintenance dose: Pharmacy will dose vancomycin 1gm ivpb q24hr    Monitorin) Plan for vancomycin peak and trough to be obtained at steady state    2) Pharmacy will order SCr as clinically indicated to assess renal function.    3) Pharmacy will monitor for toxicity and efficacy, adjust vancomycin dose and/or frequency, and order vancomycin levels as appropriate per the Pharmacy and Therapeutics Committee approved protocol until discontinuation of the medication.       We appreciate the opportunity to assist in the care of this patient.     Rafal Richardson, PharmD  2025  11:02 PM  [facility]  Pharmacy Extension: [phone]

## 2025-01-27 NOTE — PHYSICAL THERAPY NOTE
PHYSICAL THERAPY EVALUATION - INPATIENT     Room Number: 526/526-A  Evaluation Date: 1/27/2025  Type of Evaluation: Initial   Physician Order: PT Eval and Treat    Presenting Problem: witnessed seizure  Co-Morbidities : hx CVA  Reason for Therapy: Mobility Dysfunction and Discharge Planning    PHYSICAL THERAPY ASSESSMENT   Patient is a 74 year old female admitted 1/26/2025 for witnessed seizure.  Prior to admission, patient's baseline is independent with ADLs and functional mobility without assistive device.  Patient is currently functioning below baseline with bed mobility, transfers, gait, stair negotiation, maintaining seated position, and performing household tasks.  Patient is requiring contact guard assist as a result of the following impairments: decreased functional strength, pain, decreased muscular endurance, cognitive deficits (command following, memory), and medical status.  Physical Therapy will continue to follow for duration of hospitalization.    Patient will benefit from continued skilled PT Services upon discharge to promote return to prior level of function and safety with continuous assistance and gradual rehabilitative therapy .    PLAN DURING HOSPITALIZATION  Nursing Mobility Recommendation : Lift Equipment  PT Device Recommendation: Rolling walker;Gait belt;Mechanical lift  PT Treatment Plan: Bed mobility;Body mechanics;Endurance;Energy conservation;Patient education;Family education;Gait training;Strengthening;Stair training;Transfer training;Balance training  Rehab Potential : Fair  Frequency (Obs): 3-5x/week     PHYSICAL THERAPY MEDICAL/SOCIAL HISTORY     Problem List  Principal Problem:    Witnessed seizure (HCC)  Active Problems:    History of seizures    Altered mental status    Altered mental status, unspecified altered mental status type    Delirium, acute    Fever    HOME SITUATION  Type of Home: House  Home Layout: Two level;Bed/bath upstairs  Stairs to Bedroom:  (flight)    Railing:  Yes    Lives With: Spouse     Patient Regularly Uses: None     Prior Level of [place] [facility]: independent     SUBJECTIVE  \"Hospital\" when asked orientation questions    PHYSICAL THERAPY EXAMINATION   OBJECTIVE  Precautions: Seizure;Bed/chair alarm;Restraints  Fall Risk: High fall risk    PAIN ASSESSMENT  Rating:  (unable to state/rate; demonstrated pain behaviors with pericares)  Location: buttocks  Management Techniques: Relaxation;Repositioning    COGNITION  Overall Cognitive Status:  Impaired  Arousal/Alertness:  generalized responses and inconsistent responses to stimuli  Orientation Level:  oriented to place  Following Commands:  follows one-step commands inconsistently, follows one step commands with increased time, and follows one step commands with repetition    RANGE OF MOTION AND STRENGTH ASSESSMENT  Upper extremity ROM and strength are within functional limits.  Lower extremity ROM is within functional limits.  Lower extremity strength is within functional limits.    BALANCE  Static Sitting: Not tested  Dynamic Sitting: Not tested  Static Standing: Not tested  Dynamic Standing: Not tested    ACTIVITY TOLERANCE  Pulse: 73  Heart Rate Source: Monitor    AM-PAC '6-Clicks' INPATIENT SHORT FORM - BASIC MOBILITY  How much difficulty does the patient currently have...  Patient Difficulty: Turning over in bed (including adjusting bedclothes, sheets and blankets)?: A Little   Patient Difficulty: Sitting down on and standing up from a chair with arms (e.g., wheelchair, bedside commode, etc.): A Lot   Patient Difficulty: Moving from lying on back to sitting on the side of the bed?: A Little   How much help from another person does the patient currently need...   Help from Another: Moving to and from a bed to a chair (including a wheelchair)?: A Lot   Help from Another: Need to walk in hospital room?: A Lot   Help from Another: Climbing 3-5 steps with a railing?: A Lot     AM-PAC Score:  Raw Score: 14   Approx Degree  of Impairment: 61.29%   Standardized Score (AM-PAC Scale): 38.1   CMS Modifier (G-Code): CL    FUNCTIONAL ABILITY STATUS  Functional Mobility/Gait Assessment  Gait Assistance: Not tested  Rolling: contact guard assist    Exercise/Education Provided:  Education Provided To: Patient;Family/Caregiver     Patient Education: Role of Physical Therapy;Posture/Positioning (bed mobility)     Patient's Response to Education: Does Not Demonstrate Skills Needed for Learning;Demonstrates Poor Carry Over to Information     Skilled Therapy Provided: Pt received resting in bed with wrist restraints donned, with  at bedside. Introduced self and role. Pt minimally verbal this date, was able to stated \"hospital\"when asked for place (orientation). Very restless, constant movement of BUE and BLE, kicking, reaching, and pulling. Found to be incontinent of bowel. Able to roll and bridge hips to facilitate pericares and replace linens. Deferred removal of restrains/oob mobility due to restlessness and not safely following commands. Pt was left resting in bed with restraints in place, alarm on, need within reach, handoff to RN complete.     The patient's Approx Degree of Impairment: 61.29% has been calculated based on documentation in the [facility] '6 clicks' Inpatient Basic Mobility Short Form.  Research supports that patients with this level of impairment may benefit from rehab facility.  Final disposition will be made by interdisciplinary medical team.    Patient End of Session: In bed;Needs met;Call light within reach;RN aware of session/findings;All patient questions and concerns addressed;Hospital anti-slip socks;Restraints;Alarm set;Family present    CURRENT GOALS  Goals to be met by: 2/6/25  Patient Goal Patient's self-stated goal is: none stated   Goal #1 Patient is able to demonstrate supine - sit EOB @ level: supervision     Goal #1   Current Status    Goal #2 Patient is able to demonstrate transfers Sit to/from Stand at  assistance level: supervision with walker - rolling     Goal #2  Current Status    Goal #3 Patient is able to ambulate at least 50 feet with assist device: walker - rolling at assistance level: supervision   Goal #3   Current Status    Goal #4 Stair goal TBD   Goal #4   Current Status    Goal #5 Patient to demonstrate independence with home activity/exercise instructions provided to patient in preparation for discharge.   Goal #5   Current Status    Goal #6    Goal #6  Current Status      Patient Evaluation Complexity Level:  History Moderate - 1 or 2 personal factors and/or co-morbidities   Examination of body systems Moderate - addressing a total of 3 or more elements   Clinical Presentation  Moderate - Evolving   Clinical Decision Making  Moderate Complexity     Therapeutic Activity:  10 minutes

## 2025-01-27 NOTE — RESPIRATORY THERAPY NOTE
CPAP/BIPAP EVALUATION: Done    NOTES: Patient was evaluated for JENNIFER, per family patient has been diagnosed with JENNIFER but hasn't been wearing their CPAP. Family/Patient refused to use CPAP during this admission. RN aware.

## 2025-01-27 NOTE — OCCUPATIONAL THERAPY NOTE
OCCUPATIONAL THERAPY EVALUATION - INPATIENT     Room Number: 526/526-A  Evaluation Date: 1/27/2025  Type of Evaluation: Initial       Physician Order: IP Consult to Occupational Therapy  Reason for Therapy: ADL/IADL Dysfunction and Discharge Planning    OCCUPATIONAL THERAPY ASSESSMENT   RN cleared pt for participation in OT session, which was completed in collaboration with PT. Upon arrival, pt was supine in bed and agreeable to activity.  present during session. Pt was left in bed and alarm was activated. Call light and all needs left in reach. Handoff given to RN.    Patient is a 74 year old female admitted 1/26/2025 for seizure.  Prior to admission, pt was independent.  Patient is currently requiring up to total A  for ADLs overall along with mod-max A for rolling and bridging and total A for incontinent bowel hygiene. Restraints left on as pt was kicking, pushing/pulling, not safe to undo. Pt only able to verbalize pain and \"hospital.\" At this time, recommend GR.    ACTIVITY TOLERANCE  Pulse: 73                         Education provided  Educated pt about role of OT and hospital therapy process as well as proper safety techniques including proper hand placement, body mechanics, safety techniques . Pt verbalized/demonstrated poor carryover.    Patient will benefit from continued skilled OT Services to promote return to prior level of function and safety with continuous assistance and gradual rehabilitative therapy    PLAN  OT Treatment Plan: Balance activities;Energy conservation/work simplification techniques;Continued evaluation;Compensatory technique education;Fine motor coordination activities;Neuromuscluar reeducation;Equipment eval/education;Patient/Family training;Patient/Family education;Cognitive reorientation;Endurance training;UE strengthening/ROM;Functional transfer training;Visual perceptual training;IADL training;ADL training      OCCUPATIONAL THERAPY MEDICAL/SOCIAL HISTORY     Problem  List  Principal Problem:    Witnessed seizure (HCC)  Active Problems:    History of seizures    Altered mental status    Altered mental status, unspecified altered mental status type    Delirium, acute    Fever      Past Medical History  Past Medical History:    Abnormal PET scan of head    Patient had abnormal PET scan of brain May 6, 2016 showing bilateral temporoparietal beta amyloid deposition in the gray matter consistent with Alzheimer's disease.     Acute cystitis without hematuria    Acute pancreatitis (HCC)    Acute pancreatitis, unspecified complication status, unspecified pancreatitis type (HCC)    Alzheimer disease (HCC)    Anxiety    Mild    Aortic atherosclerosis    Aortic atherosclerosis seen on CT scan October 2014.     Arm paresthesia, left    Arrhythmia    A-Fib    Arthritis    Mild    Arthritis, gouty    Ataxia    Atrophic kidney    CT scan October 8, 2014 shows left kidney smaller than the right.  Bilateral areas of renal parenchymal scar.  Symmetric nephrograms.  Moderate calcific atherosclerosis of aorta.     AVM (arteriovenous malformation) of colon    Large nonbleeding cecal AVM found on colonoscopy March 16 2018.  At that time patient was found to have microscopic/lymphocytic colitis.      Back problem    spinal stenosis    Carpal tunnel syndrome    as per NG    Cataract    Chronic atrial fibrillation (HCC)    Closed fracture of left distal radius    Crohn disease (HCC)    Depression    Diverticulosis    as per NG    Elevated LFTs    Esophageal reflux    Mild    Family history of colon cancer    Maternal grandmother had colon cancer in her 70s.    Foreign body in alimentary tract    Foreign body in stomach, initial encounter    Gout    Hand weakness    Hepatic steatosis    High blood pressure    High cholesterol    as per NG    History of colonic polyps    as per NG    History of migraine headaches    as per NG    History of total hysterectomy with bilateral salpingo-oophorectomy (BSO)     History of total hysterectomy with removal of both tubes and ovaries    as per NG    Hyperlipidemia    Incontinence    DUE TO CYSTOCELE/RECTOCELE    Left arm weakness    Lymphocytic colitis    as per NG    Migraine    Mild cognitive impairment    as per NG    Obesity    25 lbs overweight    Obstructive sleep apnea    Pacemaker    Parkinson's disease (HCC)    Pseudobulbar affect    Renal disorder    stage 3    Restless legs syndrome    Seizure disorder (HCC)    last one 2019    Sleep apnea    Tendon tear    Surgically repaired; as per NG    Unspecified essential hypertension    as per NG    Vertigo    Visual impairment    reading glasses       Past Surgical History  Past Surgical History:   Procedure Laterality Date    Arthroscopy of joint unlisted      BILAT. KNEES    Cardiac pacemaker placement      as per NG    Carpal tunnel release      as per NG    Cataract Left 07/10/2015    Cataract Right late 2015    Cholecystectomy      as per NG    Colonoscopy N/A 2018    Procedure: COLONOSCOPY;  Surgeon: Mohamud Sawyer MD;  Location: [facility] ENDOSCOPY    Colonoscopy N/A 2022    Procedure: COLONOSCOPY/ESOPHAGOGASTRODUODENOSCOPY (EGD);  Surgeon: Genaro Osman MD;  Location: [facility] ENDOSCOPY    Electrocardiogram, complete  2013    SCANNED TO MEDIA TAB - 2013    Hysterectomy      & BSO; as per NG    Knee arthroscopy Right     as per NG    Knee surgery  01/2015    x3     Nasal scopy,remv totl ethmoid      \"Ethmoidectomy, max antrostomies\"; as per NG      1966,1969 & 1973    Normal    Other   or 2015    removal of pins and plate from right wrist (fracture)    Other surgical history Left     L knee scoped--3 torn meniscus--biocartilage done    Other surgical history Right 2016    R knee scoped for torn miniscus    Other surgical history Left     wrist surgery    Pacemaker/defibrillator      Reconstr nose+eamon septal repair      as per NG    Removal gallbladder      Total  abdom hysterectomy      Total knee replacement         HOME SITUATION  Type of Home: House  Home Layout: Two level;Bed/bath upstairs  Lives With: Spouse                         Patient Regularly Uses: None    SUBJECTIVE  \"Hospital.\"    OCCUPATIONAL THERAPY EXAMINATION      OBJECTIVE  Precautions: Seizure;Bed/chair alarm;Restraints  Fall Risk: High fall risk    PAIN ASSESSMENT     Location: does not rate; but screams in pain with bowel hygiene          COORDINATION  Gross Motor: WFL   Fine Motor: WFL     ACTIVITIES OF DAILY LIVING ASSESSMENT  AM-PAC ‘6-Clicks’ Inpatient Daily Activity Short Form  How much help from another person does the patient currently need…  -   Putting on and taking off regular lower body clothing?: Total  -   Bathing (including washing, rinsing, drying)?: Total  -   Toileting, which includes using toilet, bedpan or urinal? : Total  -   Putting on and taking off regular upper body clothing?: Total  -   Taking care of personal grooming such as brushing teeth?: Total  -   Eating meals?: A Lot    AM-PAC Score:  Score: 7  Approx Degree of Impairment: 92.44%  Standardized Score (AM-PAC Scale): 20.13  CMS Modifier (G-Code): CM      FUNCTIONAL TRANSFER ASSESSMENT  Rolling: mod-max A    FUNCTIONAL ADL ASSESSMENT  Overall total A    The patient's Approx Degree of Impairment: 92.44% has been calculated based on documentation in the [facility] '6 clicks' Inpatient Daily Activity Short Form.  Research supports that patients with this level of impairment may benefit from LTAC. Final disposition will be made by interdisciplinary medical team.    OT Goals  Patients self stated goal is: unable to state     Patient will complete functional transfer with mod A  Comment:     Patient will complete toileting with mod A  Comment:     Patient will tolerate standing for 1 minutes in prep for adls with mod A   Comment:               Goals  on:   Frequency: 3-5x/wk    Patient Evaluation Complexity Level:    Occupational Profile/Medical History HIGH - Extensive review of history including review of medical or therapy records    Specific performance deficits impacting engagement in ADL/IADL HIGH  5+ performance deficits    Client Assessment/Performance Deficits HIGH - Comorbidities and significant modifications of tasks    Clinical Decision Making HIGH - Analysis of occupational profile, comprehensive assessments, multiple treatment options    Overall Complexity HIGH     OT Session Time: 20 minutes  Self-Care Home Management: 10 minutes  \        Judith Pettit OT  [facility]  Inpatient Rehabilitation  Occupational Therapy  [phone]

## 2025-01-27 NOTE — PROGRESS NOTES
[facility]  part of [facility]     Hospitalist Progress Note     Nadiya Felix Patient Status:  Inpatient    3/28/1950 MRN [id number]   Location [facility] 5SW/SE Attending Syed Silva MD   Hosp Day # 1 PCP Donell Yin MD     Chief Complaint:   Chief Complaint   Patient presents with    Seizure Disorder        Subjective:     Patient seen lying in bed.  Fairly lethargic this a.m.  Much less agitated than previously.  Family at bedside.  States patient was fairly restless last night, but improving today.  At times is able to be more conversive.    Objective:      Vital signs:  Vitals:    25 0409 25 0506 25 0854 25 0900   BP: 135/62   121/70   BP Location: Right arm   Right arm   Pulse: 71  73 68   Resp: 18   18   Temp: (!) 101.9 °F (38.8 °C) 99.8 °F (37.7 °C)  97.9 °F (36.6 °C)   TempSrc: Axillary Oral  Axillary   SpO2: 95%   96%   Weight:       Height:           Intake/Output Summary (Last 24 hours) at 2025 0946  Last data filed at 2025 0600  Gross per 24 hour   Intake 1140 ml   Output --   Net 1140 ml           Physical Exam:    GENERAL: Lethargic, in no acute distress.  HEART:  Regular rhythm.  Regular rate   LUNGS:  Air entry was decreased.  No increased work of breathing or wheezes   ABDOMEN: Soft and non-tender.    NEUROLOGICAL: Less agitated today, more lethargic.  Remains confused.  Only briefly awakens to voice.  Not following commands.  Moving extremities spontaneously.    SKIN:  Warm and well perfused      Diagnostic Data:    Labs:    Recent Labs   Lab 25  1504 25  0627   WBC 6.7 10.3   HGB 14.3 12.7   MCV 90.2 90.9   .0 148.0*       Recent Labs   Lab 25  1504 25  0627   * 118*   BUN 23 20   CREATSERUM 1.21* 1.04*   CA 9.9 8.9   ALB 4.6 3.8    147*   K 4.3 3.6    113*   CO2 15.0* 21.0   ALKPHO 61 43*   AST 20 42*   ALT 16 20   BILT 0.7 1.4*   TP 7.8 6.3           Estimated  Creatinine Clearance: 42.1 mL/min (A) (based on SCr of 1.04 mg/dL (H)).    No results for input(s): \"PTP\", \"INR\" in the last 168 hours.         COVID-19  Lab Results   Component Value Date    COVID19 Not Detected 01/26/2025    COVID19 Not Detected 02/07/2023    COVID19 Not Detected 11/13/2022       Pro-Calcitonin  No results for input(s): \"PCT\" in the last 168 hours.    Cardiac  No results for input(s): \"TROP\", \"PBNP\" in the last 168 hours.    Inflammatory Markers  No results for input(s): \"CRP\", \"BRYANT\", \"LDH\", \"DDIMER\" in the last 168 hours.    Culture:  No results found for this visit on 01/26/25.    XR CHEST AP PORTABLE  (CPT=71045)    Result Date: 1/26/2025  CONCLUSION: No acute cardiopulmonary disease.    Dictated by (CST): Pino Hicks MD on 1/26/2025 at 6:23 PM     Finalized by (CST): Pino Hicks MD on 1/26/2025 at 6:24 PM          CT BRAIN OR HEAD (CPT=70450)    Result Date: 1/26/2025  CONCLUSION:   No acute intracranial abnormality.    Dictated by (CST): Pino Hicks MD on 1/26/2025 at 5:42 PM     Finalized by (CST): Pino Hicks MD on 1/26/2025 at 5:44 PM           EKG 12 Lead    Result Date: 1/27/2025  Normal sinus rhythm Normal ECG When compared with ECG of 31-OCT-2023 16:51, Sinus rhythm has replaced Electronic atrial pacemaker     Medications:    pantoprazole  40 mg Intravenous Before breakfast    magnesium sulfate  2 g Intravenous Once    amLODIPine  2.5 mg Oral Daily    aspirin  81 mg Oral Daily    atorvastatin  20 mg Oral Nightly    donepezil  10 mg Oral Daily    escitalopram  10 mg Oral Daily    lamoTRIgine  150 mg Oral BID    heparin  5,000 Units Subcutaneous Q8H CLAUDETTE    acyclovir  10 mg/kg (Ideal) Intravenous q12h    levETIRAcetam  500 mg Intravenous Q12H    cefTRIAXone  2 g Intravenous Q12H    [START ON 1/28/2025] vancomycin  1,000 mg Intravenous Q24H    lacosamide  50 mg Intravenous Q12H       Assessment & Plan:       Seizure disorder  Breakthrough seizure  -Witnessed by   on day of admission.  -Self resolved after around 5 minutes per family report.  -Family states patient with known history of seizure disorder.  Believes she has been taking her medications appropriately.  -CT brain with no acute intracranial abnormalities.  -Loaded with Keppra in ED.,  Continue 500 mg twice daily.  -Continue Vimpat.  -Neurology consulted, appreciate further recommendations.       Altered mental status  -Likely metabolic encephalopathy  -Possible postictal state from seizure activity as above  -Some concern for possible infectious causes   -Family reports recent UTI.  Current UA less concerning for active infection.    -Continuing empiric antibiotics including coverage for possible bacterial meningitis.  -Follow-up MRI  -Continue empiric acyclovir.  -Patient also with ELIJAH.  -Treating underlying conditions.  -Supportive care     Acute Kidney Injury   -Continue IVF  - Avoiding Nephrotoxic agents  - Cont to monitor     Hypertension  -Improved control  -As needed IV hydralazine  -Continue to monitor    Reported history of dementia  -Holding donepezil due to risk of lowering seizure threshold.      Plan of care discussed with patient and family at bedside . Discussed management/test result(s) with Rn     Addendum: Discussed with radiology.  Unable to complete MRI at [place] due to nonconditional pacemaker device.  Will discuss with neurology.  Potentially could transfer to [place] to complete there if need be.    Quality:  DVT Prophylaxis: Heparin  CODE status: Full  Estimated date of discharge: TBD  Discharge is dependent on: clinical stability    Syed Silva MD          This note was prepared using Dragon Medical voice recognition dictation software. As a result errors may occur. When identified these errors have been corrected. While every attempt is made to correct errors during dictation discrepancies may still exist

## 2025-01-27 NOTE — PROGRESS NOTES
01/27/25 1208   VISIT TYPE   SLP Inpatient Visit Type (Documentation Required) Attempted Evaluation  (BSE order received and acknowledged, chart reviewed. Pt not alert enough for po trials. Will f/u at a later time. D/W RN and pt's family.)   FOLLOW UP/PLAN   Follow Up Needed (Documentation Required) Yes   SLP Follow-up Date 01/28/25     Marina Garcia M.S. CCC-SLP  Speech Language Pathologist  Phone Number Ext. [phone]

## 2025-01-27 NOTE — PROGRESS NOTES
[facility]  [address] [place] [address], [address]  [facility] [address]  [phone]        INPATIENT NEUROLOGY   FOLLOW UP PROGRESS NOTE  [facility]  part of [facility]    Nadiya Felix Patient Status:  Inpatient     3/28/1950 MRN [id number]    Location [facility] 5SW/SE Attending Syed Silva MD    Hosp Day # 1 PCP Donell Yin MD    Date of Admission:  2025  Date of Consult Follow Up:  2025     Assessment and Plan:   Nadiya Felix is a 74 year old    right handed woman who follows with this author in neurology clinic w/ a pmhx sig. for seizures,?Prior stroke (most recently admitted on 2022 for dizziness diagnosed with posterior circulation stroke), ?amarousis fugax in 2023, epilepsy, hx of atrial fibrillation s/p watchman,  migraines with visual aura, MCI/Alzhiemer's dementia, pseudobulbar affect, prior complaint of left-sided hemisensory loss in 2017, hepatic steatosis, atrophic kidney, carpal tunnel,  RLS, and JENNIFER not on CPAP who presents for breakthrough seizure in the setting of fever.  She has been persistently encephalopathic and postictal for at least 3 hours after she  had a seizure.     She was persistently postictal.  She remained mute and combative throughout 2025 and 2025.  However her exam improved on repeat evaluation on the afternoon of 2025.  Her routine EEG was negative for any ictal or interictal discharges.  Patient was less combative, pleasant and interactive when evaluated on the afternoon of 2025.  She is continues to have significant diarrhea.  Has been afebrile recently.  Overall suspect provoked seizure due to her diarrhea.  However the patient also improved after she was put on acyclovir, vancomycin, and ceftriaxone.  Her antiseizure medications were escalated.  Keppra and lacosamide were added.  She was not getting her p.o. Lamictal because she could not tolerate p.o.  earlier.    Greater suspicion is that her symptoms are due to diarrhea and not meningitis but will have infectious disease,.  Continue supportive treatment for diarrhea.    Patient was previously on Aricept  for cognitive impairment/dementia.  Although she may have had a breakthrough seizure due to infection, will likely discontinue it long-term even though she has significant cognitive impairment.  Aricept can lower seizure threshold.    Breakthrough seizure  Differential Diagnosis:  Provoked due to diarrhea  Lower suspicion for CNS infection but will have infectious disease provider input.  Status epilepticus due to breakthrough seizure has been excluded.     Plan    Diagnostics:  Stat EEG completed; Could not be done earlier because she was combative  MRI brain canceled  Blood cultures  UCx pending, telemetry  Therapeutics:   Seizure precautions  Telemetry  4mg Ativan for status epilepticus or  seizures lasting longer than 5 minutes, or multiple seizures without return to baseline in the interim. Max daily dose  of ativan is 0.1 mg/kg.  Can continue home dose of Lamictal because she cannot tolerate p.o.  Patient given additional 4 mg dose of Ativan in ER  Loaded with Keppra at 60 mg/kg.  Started on maintenance Keppra 500 twice daily  Load w/ vimpat 200mg once then 50mg bid starting 12 hours later.  IV vancomycin for possible bacterial meningitis dosed  by pharmacy  IV ceftriaxone 2 g every 12             INTERVAL EVENTS  01/27/25:  was still combative/agitat spoke with the patient's  and the patient. ed/confused this am; improved this afternoon. Still has diarrhea.       SUBJECTIVE:   Spoke with patient and  at the bedside.  Patient is doing better.  She still in soft restraints.  She was still struggling.  However with redirection she was able to remain calm.  She was able to follow commands.  She was still not completely oriented.  She did not report significant pain.  She still having diarrhea.   Denies any headaches.  Denies any other neurological deficits.    Pertinent positive and negatives per HPI.  All others were reviewed and negative.       Objective   OBJECTIVE:   Last vitals and weight :  Blood pressure 127/60, pulse 71, temperature 97.6 °F (36.4 °C), temperature source Axillary, resp. rate 18, height 65\", weight 126 lb 8 oz (57.4 kg), SpO2 95%, not currently breastfeeding.   Vitals:    01/27/25 0854 01/27/25 0900 01/27/25 1100 01/27/25 1237   BP:  121/70  127/60   BP Location:  Right arm  Right arm   Pulse: 73 68 62 71   Resp:  18  18   Temp:  97.9 °F (36.6 °C)  97.6 °F (36.4 °C)   TempSrc:  Axillary  Axillary   SpO2:  96%  95%   Weight:       Height:          Exam:  - General: appears stated age and no distress; initially she was struggling answer soft restraints.  However she was able to be redirected and stay calm.  - C   - Pulmonary: no sign of respiratory distress.   Neurologic Exam  - Mental Status: Alert and attentive.  Regards.  Much more interactive than prior exam on 1/26/2025.  Speaks in short phrases.  She is cooperative.  She can follow axial and appendicular commands.  Oriented to   self.  She knew the month was January.  She did not know the year.  This is not that far from her baseline given her history of cognitive impairment. .  Speech is spontaneous, fluent, and prosodic. Comprehension intact. Repetition intact. Phrase length and rate are normal. No paraphasic errors, neologisms, anomia, neglect, apraxia, or R/L confusion.   - Cranial Nerves: No gaze preference. Visual fields:normal  Pupils are 4mm briskly constricting to 3mm and equally round and reactive to light  in a well lit room. EOMI. No nystagmus. No ptosis. V1-V3 intact B/L to light touch.No pathological facial asymmetry. No flattening of the nasolabial fold. .  Hearing grossly intact.  Tongue midline. No atrophy or fasiculations of the tongue noted. Palate and uvula elevate symmetrically.  Shoulder shrug symmetric.  -  Motor:  normal tone, normal bulk. No interosseous wasting. No flattening of hypothenar eminences.  She is moving all 4 extremities symmetrically and equally.  Her hands are still in soft restraints.  Able to keep her legs antigravity.  Her knee extensors are at least 3 out of 5 bilaterally.       Asterixis: No asterixis noted.   Tremor:   - Sensory:   Light touch:normal   Temperature: normal     - Cerebellum: No truncal ataxia. No titubations. No dysmetria, no dysdiadochokinesis. No overshoot.  Reflexes:    C5 C6 C7  L4 S1   R 2+ 2+  2+ 2+   L 2+ 2+  2+ 2+   Adductor Spread: No adductor spread noted.      Ronald's sign:absent   Nonsustained clonus: Absent   Sustained clonus: Absent   - Plantar response: flexor bilaterally    Medications:   pantoprazole  40 mg Intravenous Before breakfast    amLODIPine  2.5 mg Oral Daily    aspirin  81 mg Oral Daily    atorvastatin  20 mg Oral Nightly    escitalopram  10 mg Oral Daily    lamoTRIgine  150 mg Oral BID    heparin  5,000 Units Subcutaneous Q8H CLAUDETTE    acyclovir  10 mg/kg (Ideal) Intravenous q12h    levETIRAcetam  500 mg Intravenous Q12H    cefTRIAXone  2 g Intravenous Q12H    [START ON 1/28/2025] vancomycin  1,000 mg Intravenous Q24H    lacosamide  50 mg Intravenous Q12H       PRNS:   LORazepam    hydrALAzine    acetaminophen    ondansetron    LORazepam    acetaminophen    Infusions:    sodium chloride 100 mL/hr at 01/27/25 0719            Results:   Laboratory Data:  Lab Results   Component Value Date    WBC 10.3 01/27/2025    HGB 12.7 01/27/2025    HCT 37.8 01/27/2025    .0 (L) 01/27/2025    CREATSERUM 1.04 (H) 01/27/2025    BUN 20 01/27/2025     (H) 01/27/2025    K 3.6 01/27/2025     (H) 01/27/2025    CO2 21.0 01/27/2025     (H) 01/27/2025    CA 8.9 01/27/2025    ALB 3.8 01/27/2025    ALKPHO 43 (L) 01/27/2025    TP 6.3 01/27/2025    AST 42 (H) 01/27/2025    ALT 20 01/27/2025    PTT 32.3 04/09/2024    INR 0.99 04/09/2024    PTP 13.7  04/09/2024    T4F 1.07 07/09/2018    TSH 1.400 09/19/2023    LIP 64 (H) 05/13/2024    DDIMER 0.72 08/30/2022    CRP <0.5 03/15/2018    MG 1.8 01/27/2025    PHOS 3.2 10/26/2023    TROP <0.045 06/19/2021    CK 87 05/25/2017    B12 525 02/08/2023    ETOH <3 04/23/2019     Recent Results (from the past 72 hours)   POCT Glucose    Collection Time: 01/26/25  2:34 PM   Result Value Ref Range    POC Glucose  172 (H) 70 - 99 mg/dL   RAINBOW DRAW LAVENDER    Collection Time: 01/26/25  3:04 PM   Result Value Ref Range    Hold Lavender Auto Resulted    RAINBOW DRAW LIGHT GREEN    Collection Time: 01/26/25  3:04 PM   Result Value Ref Range    Hold Lt Green Auto Resulted    RAINBOW DRAW BLUE    Collection Time: 01/26/25  3:04 PM   Result Value Ref Range    Hold Blue Auto Resulted    RAINBOW DRAW GOLD    Collection Time: 01/26/25  3:04 PM   Result Value Ref Range    Hold Gold Auto Resulted    SARS-CoV-2/Flu A and B/RSV by PCR (GeneXpert)    Collection Time: 01/26/25  3:04 PM    Specimen: Nares; Other   Result Value Ref Range    SARS-CoV-2 (COVID-19) - (GeneXpert) Not Detected Not Detected    Influenza A by PCR Negative Negative    Influenza B by PCR Negative Negative    RSV by PCR Negative Negative   CBC With Differential With Platelet    Collection Time: 01/26/25  3:04 PM   Result Value Ref Range    WBC 6.7 4.0 - 11.0 x10(3) uL    RBC 4.61 3.80 - 5.30 x10(6)uL    HGB 14.3 12.0 - 16.0 g/dL    HCT 41.6 35.0 - 48.0 %    MCV 90.2 80.0 - 100.0 fL    MCH 31.0 26.0 - 34.0 pg    MCHC 34.4 31.0 - 37.0 g/dL    RDW-SD 39.8 35.1 - 46.3 fL    RDW 12.2 11.0 - 15.0 %    .0 150.0 - 450.0 10(3)uL    Neutrophil Absolute Prelim 4.22 1.50 - 7.70 x10 (3) uL    Neutrophil Absolute 4.22 1.50 - 7.70 x10(3) uL    Lymphocyte Absolute 2.05 1.00 - 4.00 x10(3) uL    Monocyte Absolute 0.30 0.10 - 1.00 x10(3) uL    Eosinophil Absolute 0.09 0.00 - 0.70 x10(3) uL    Basophil Absolute 0.03 0.00 - 0.20 x10(3) uL    Immature Granulocyte Absolute 0.05 0.00 -  1.00 x10(3) uL    Neutrophil % 62.7 %    Lymphocyte % 30.4 %    Monocyte % 4.5 %    Eosinophil % 1.3 %    Basophil % 0.4 %    Immature Granulocyte % 0.7 %   Comp Metabolic Panel (14)    Collection Time: 01/26/25  3:04 PM   Result Value Ref Range    Glucose 180 (H) 70 - 99 mg/dL    Sodium 142 136 - 145 mmol/L    Potassium 4.3 3.5 - 5.1 mmol/L    Chloride 109 98 - 112 mmol/L    CO2 15.0 (L) 21.0 - 32.0 mmol/L    Anion Gap 18 0 - 18 mmol/L    BUN 23 9 - 23 mg/dL    Creatinine 1.21 (H) 0.55 - 1.02 mg/dL    BUN/CREA Ratio 19.0 10.0 - 20.0    Calcium, Total 9.9 8.7 - 10.4 mg/dL    Calculated Osmolality 302 (H) 275 - 295 mOsm/kg    eGFR-Cr 47 (L) >=60 mL/min/1.73m2    ALT 16 10 - 49 U/L    AST 20 <34 U/L    Alkaline Phosphatase 61 55 - 142 U/L    Bilirubin, Total 0.7 0.2 - 1.1 mg/dL    Total Protein 7.8 5.7 - 8.2 g/dL    Albumin 4.6 3.2 - 4.8 g/dL    Globulin  3.2 2.0 - 3.5 g/dL    A/G Ratio 1.4 1.0 - 2.0   Urinalysis with Culture Reflex    Collection Time: 01/26/25  3:04 PM    Specimen: Urine, clean catch   Result Value Ref Range    Urine Color Yellow Yellow    Clarity Urine Clear Clear    Spec Gravity >=1.030 1.005 - 1.030    Glucose Urine Negative Negative mg/dL    Bilirubin Urine Negative Negative    Ketones Urine Negative Negative mg/dL    Blood Urine Moderate (A) Negative    pH Urine 5.5 5.0 - 8.0    Protein Urine 100 mg/dL (A) Negative mg/dL    Urobilinogen Urine 0.2 0.2    Nitrite Urine Negative Negative    Leukocyte Esterase Urine Negative Negative    WBC Urine 1-5 0 - 5 /HPF    RBC Urine >10 (A) 0 - 2 /HPF    Bacteria Urine Rare (A) None Seen /HPF    Squamous Epi. Cells Few (A) None Seen /HPF    Renal Tubular Epithelial Cells None Seen None Seen /HPF    Transitional Cells Moderate (A) None Seen /HPF    Hyaline Casts Present (A) None Seen /LPF    Yeast Urine None Seen None Seen /HPF   Lactic Acid, Plasma    Collection Time: 01/26/25  3:04 PM   Result Value Ref Range    Lactic Acid 9.3 (HH) 0.5 - 2.0 mmol/L   UA  Microscopic only, urine    Collection Time: 01/26/25  3:04 PM   Result Value Ref Range    WBC Urine 1-5 0 - 5 /HPF    RBC Urine >10 (A) 0 - 2 /HPF    Bacteria Urine Rare (A) None Seen /HPF    Squamous Epi. Cells Few (A) None Seen /HPF    Renal Tubular Epithelial Cells None Seen None Seen /HPF    Transitional Cells Moderate (A) None Seen /HPF    Hyaline Casts Present (A) None Seen /LPF    Yeast Urine None Seen None Seen /HPF   Urine Culture, Routine    Collection Time: 01/26/25  3:04 PM    Specimen: Urine, clean catch   Result Value Ref Range    Urine Culture       <10,000 cfu/ml Multiple species present- probable contamination.   EKG 12 Lead    Collection Time: 01/26/25  5:47 PM   Result Value Ref Range    Ventricular rate 71 BPM    Atrial rate 71 BPM    P-R Interval 158 ms    QRS Duration 96 ms    Q-T Interval 408 ms    QTC Calculation (Bezet) 443 ms    P Axis 57 degrees    R Axis 64 degrees    T Axis 63 degrees   Lactic Acid Reflex Post Positive    Collection Time: 01/26/25  6:03 PM   Result Value Ref Range    Lactic Acid 3.5 (H) 0.5 - 2.0 mmol/L   POCT Glucose    Collection Time: 01/26/25  7:54 PM   Result Value Ref Range    POC Glucose  124 (H) 70 - 99 mg/dL   Lactic Acid Post Positive    Collection Time: 01/26/25  9:16 PM   Result Value Ref Range    Lactic Acid 1.9 0.5 - 2.0 mmol/L   Comp Metabolic Panel (14)    Collection Time: 01/27/25  6:27 AM   Result Value Ref Range    Glucose 118 (H) 70 - 99 mg/dL    Sodium 147 (H) 136 - 145 mmol/L    Potassium 3.6 3.5 - 5.1 mmol/L    Chloride 113 (H) 98 - 112 mmol/L    CO2 21.0 21.0 - 32.0 mmol/L    Anion Gap 13 0 - 18 mmol/L    BUN 20 9 - 23 mg/dL    Creatinine 1.04 (H) 0.55 - 1.02 mg/dL    BUN/CREA Ratio 19.2 10.0 - 20.0    Calcium, Total 8.9 8.7 - 10.4 mg/dL    Calculated Osmolality 308 (H) 275 - 295 mOsm/kg    eGFR-Cr 56 (L) >=60 mL/min/1.73m2    ALT 20 10 - 49 U/L    AST 42 (H) <34 U/L    Alkaline Phosphatase 43 (L) 55 - 142 U/L    Bilirubin, Total 1.4 (H) 0.2 - 1.1  mg/dL    Total Protein 6.3 5.7 - 8.2 g/dL    Albumin 3.8 3.2 - 4.8 g/dL    Globulin  2.5 2.0 - 3.5 g/dL    A/G Ratio 1.5 1.0 - 2.0   Magnesium    Collection Time: 01/27/25  6:27 AM   Result Value Ref Range    Magnesium 1.8 1.6 - 2.6 mg/dL   CBC With Differential With Platelet    Collection Time: 01/27/25  6:27 AM   Result Value Ref Range    WBC 10.3 4.0 - 11.0 x10(3) uL    RBC 4.16 3.80 - 5.30 x10(6)uL    HGB 12.7 12.0 - 16.0 g/dL    HCT 37.8 35.0 - 48.0 %    MCV 90.9 80.0 - 100.0 fL    MCH 30.5 26.0 - 34.0 pg    MCHC 33.6 31.0 - 37.0 g/dL    RDW-SD 41.3 35.1 - 46.3 fL    RDW 12.6 11.0 - 15.0 %    .0 (L) 150.0 - 450.0 10(3)uL    Neutrophil Absolute Prelim 8.60 (H) 1.50 - 7.70 x10 (3) uL    Neutrophil Absolute 8.60 (H) 1.50 - 7.70 x10(3) uL    Lymphocyte Absolute 0.98 (L) 1.00 - 4.00 x10(3) uL    Monocyte Absolute 0.66 0.10 - 1.00 x10(3) uL    Eosinophil Absolute 0.00 0.00 - 0.70 x10(3) uL    Basophil Absolute 0.01 0.00 - 0.20 x10(3) uL    Immature Granulocyte Absolute 0.03 0.00 - 1.00 x10(3) uL    Neutrophil % 83.7 %    Lymphocyte % 9.5 %    Monocyte % 6.4 %    Eosinophil % 0.0 %    Basophil % 0.1 %    Immature Granulocyte % 0.3 %         Test results/Imaging:   CT BRAIN OR HEAD (CPT=70450)    Result Date: 1/26/2025  CONCLUSION:   No acute intracranial abnormality.    Dictated by (CST): Pino Hicks MD on 1/26/2025 at 5:42 PM     Finalized by (CST): Pino Hicks MD on 1/26/2025 at 5:44 PM          EKG 12 Lead    Result Date: 1/27/2025  Normal sinus rhythm Normal ECG When compared with ECG of 31-OCT-2023 16:51, Sinus rhythm has replaced Electronic atrial pacemaker     Performed an independent visualization of:  CT brain WO   Imaging revealed: Agree with radiology read.    Education/Instructions given to: patient   Barriers to Learning:None  Content: Refer to note above. Evaluation/Outcome: Verbalized understanding    Disclaimer:   This record was dictated using Dragon software. There may be errors  due to voice recognition problems that were not realized and corrected during the completion of the note.      This document is not intended to support charting by exception.  Sections left blank in a completed note should be presumed not to have been done.      [id number]  Discussion with other consultants:  Yes hospitalist  Discussion with patient and family: yes  Review of other notes:  yes   Obtain old records / summarize old records)  no  (New studies ordered)  EEG completed, ID consult  (Code status review)  no  (IV controlled therapy)  yes  (Drugs being monitored for toxicity)  Anti seizure medications (tele for vimpat for ND interval prolongation/arrhythmia ), vancomycin       Thank you.  Robi Liriano D.O.   Vascular & General Neurology    1/27/2025  4:08 PM

## 2025-01-27 NOTE — PLAN OF CARE
Problem: Safety Risk - Non-Violent Restraints  Goal: Patient will remain free from self-harm  Description: INTERVENTIONS:  - Apply the least restrictive restraint to prevent harm  - Notify patient and family of reasons restraints applied  - Assess for any contributing factors to confusion (electrolyte disturbances, delirium, medications)  - Discontinue any unnecessary medical devices as soon as possible  - Assess the patient's physical comfort, circulation, skin condition, hydration, nutrition and elimination needs   - Reorient and redirection as needed  - Assess for the need to continue restraints  Outcome: Progressing     Problem: Patient Centered Care  Goal: Patient preferences are identified and integrated in the patient's plan of care  Description: Interventions:  - What would you like us to know as we care for you? I am from home with my   - Provide timely, complete, and accurate information to patient/family  - Incorporate patient and family knowledge, values, beliefs, and cultural backgrounds into the planning and delivery of care  - Encourage patient/family to participate in care and decision-making at the level they choose  - Honor patient and family perspectives and choices  Outcome: Progressing     Problem: Patient/Family Goals  Goal: Patient/Family Long Term Goal  Description: Patient's Long Term Goal: Discharge    Interventions:  - Monitor vitals  - Monitor appropriate labs  - Administer medications as ordered  - Follow MD's orders  - Update patient on plan of care   - Discharge planning     - See additional Care Plan goals for specific interventions  Outcome: Progressing  Goal: Patient/Family Short Term Goal  Description: Patient's Short Term Goal:     Interventions:   -   - See additional Care Plan goals for specific interventions  Outcome: Progressing     Problem: SKIN/TISSUE INTEGRITY - ADULT  Goal: Skin integrity remains intact  Description: INTERVENTIONS  - Assess and document risk  factors for pressure ulcer development  - Assess and document skin integrity  - Monitor for areas of redness and/or skin breakdown  - Initiate interventions, skin care algorithm/standards of care as needed  Outcome: Progressing     Problem: NEUROLOGICAL - ADULT  Goal: Achieves stable or improved neurological status  Description: INTERVENTIONS  - Assess for and report changes in neurological status  - Initiate measures to prevent increased intracranial pressure  - Maintain blood pressure and fluid volume within ordered parameters to optimize cerebral perfusion and minimize risk of hemorrhage  - Monitor temperature, glucose, and sodium. Initiate appropriate interventions as ordered  Outcome: Progressing  Goal: Absence of seizures  Description: INTERVENTIONS  - Monitor for seizure activity  - Administer anti-seizure medications as ordered  - Monitor neurological status  Outcome: Progressing  Goal: Remains free of injury related to seizure activity  Description: INTERVENTIONS:  - Maintain airway, patient safety  and administer oxygen as ordered  - Monitor patient for seizure activity, document and report duration and description of seizure to MD/LIP  - If seizure occurs, turn patient to side and suction secretions as needed  - Reorient patient post seizure  - Seizure pads on all 4 side rails  - Instruct patient/family to notify RN of any seizure activity  - Instruct patient/family to call for assistance with activity based on assessment  Outcome: Progressing  Goal: Achieves maximal functionality and self care  Description: INTERVENTIONS  - Monitor swallowing and airway patency with patient fatigue and changes in neurological status  - Encourage and assist patient to increase activity and self care with guidance from PT/OT  - Encourage visually impaired, hearing impaired and aphasic patients to use assistive/communication devices  Outcome: Progressing     Problem: PAIN - ADULT  Goal: Verbalizes/displays adequate comfort  level or patient's stated pain goal  Description: INTERVENTIONS:  - Encourage pt to monitor pain and request assistance  - Assess pain using appropriate pain scale  - Administer analgesics based on type and severity of pain and evaluate response  - Implement non-pharmacological measures as appropriate and evaluate response  - Consider cultural and social influences on pain and pain management  - Manage/alleviate anxiety  - Utilize distraction and/or relaxation techniques  - Monitor for opioid side effects  - Notify MD/LIP if interventions unsuccessful or patient reports new pain  - Anticipate increased pain with activity and pre-medicate as appropriate  Outcome: Progressing     Problem: RISK FOR INFECTION - ADULT  Goal: Absence of fever/infection during anticipated neutropenic period  Description: INTERVENTIONS  - Monitor WBC  - Administer growth factors as ordered  - Implement neutropenic guidelines  Outcome: Progressing     Problem: SAFETY ADULT - FALL  Goal: Free from fall injury  Description: INTERVENTIONS:  - Assess pt frequently for physical needs  - Identify cognitive and physical deficits and behaviors that affect risk of falls.  - [place] fall precautions as indicated by assessment.  - Educate pt/family on patient safety including physical limitations  - Instruct pt to call for assistance with activity based on assessment  - Modify environment to reduce risk of injury  - Provide assistive devices as appropriate  - Consider OT/PT consult to assist with strengthening/mobility  - Encourage toileting schedule  Outcome: Progressing     Problem: DISCHARGE PLANNING  Goal: Discharge to home or other facility with appropriate resources  Description: INTERVENTIONS:  - Identify barriers to discharge w/pt and caregiver  - Include patient/family/discharge partner in discharge planning  - Arrange for needed discharge resources and transportation as appropriate  - Identify discharge learning needs (meds, wound care,  etc)  - Arrange for interpreters to assist at discharge as needed  - Consider post-discharge preferences of patient/family/discharge partner  - Complete POLST form as appropriate  - Assess patient's ability to be responsible for managing their own health  - Refer to Case Management Department for coordinating discharge planning if the patient needs post-hospital services based on physician/LIP order or complex needs related to functional status, cognitive ability or social support system  Outcome: Progressing

## 2025-01-27 NOTE — PROCEDURES
Inpatient Video EEG report    REFERRING PHYSICIAN: Syed Silva MD      DURATION: 20:56  TECHNIQUE: 21 channels of EEG, 2 channels of EOG, and 1 channel of EKG were recorded utilizing the 10-20 International System of Electrode Placement. The recording was performed in a digitized monopolar referential format and playback was reformatted into various referential and bipolar montages utilizing appropriate filter settings. Automatic seizure and spike detection programs were utilized throughout the recording. Video was recorded during the study  CLINICAL DATA:  Patient is sent for the evaluation of possible seizures.    MEDICATION:  Continuous Medications:   sodium chloride 100 mL/hr at 01/27/25 0719     Scheduled Medications:  No current outpatient medications on file.  PRN Medications:    LORazepam    hydrALAzine    acetaminophen    ondansetron    LORazepam    acetaminophen    BACKGROUND:  The posterior dominant rhythm consisted of well-organized 9-10 Hz rhythmic waveforms, symmetrically distributed over both posterior quadrants and was reactive to eye opening.    Sleep: Stage II sleep was captured and appeared bilaterally synchronous and symmetrical.      EEG ABNORMALITY:  Slowing: None  Epileptiform activity: None  Seizures: None  Abnormal movements: None    IMPRESSION:  This is a normal wake and sleep EEG.   No epileptiform activity, focal slowing, nor seizures were seen throughout the study    Haley Young M.D.  Neurology

## 2025-01-28 LAB
ANION GAP SERPL CALC-SCNC: 10 MMOL/L (ref 0–18)
ATRIAL RATE: 71 BPM
BUN BLD-MCNC: 14 MG/DL (ref 9–23)
BUN/CREAT SERPL: 15.7 (ref 10–20)
CALCIUM BLD-MCNC: 8.5 MG/DL (ref 8.7–10.4)
CHLORIDE SERPL-SCNC: 115 MMOL/L (ref 98–112)
CO2 SERPL-SCNC: 19 MMOL/L (ref 21–32)
CREAT BLD-MCNC: 0.89 MG/DL
EGFRCR SERPLBLD CKD-EPI 2021: 68 ML/MIN/1.73M2 (ref 60–?)
GLUCOSE BLD-MCNC: 87 MG/DL (ref 70–99)
MAGNESIUM SERPL-MCNC: 2 MG/DL (ref 1.6–2.6)
OSMOLALITY SERPL CALC.SUM OF ELEC: 298 MOSM/KG (ref 275–295)
P AXIS: 57 DEGREES
P-R INTERVAL: 158 MS
POTASSIUM SERPL-SCNC: 3.1 MMOL/L (ref 3.5–5.1)
Q-T INTERVAL: 408 MS
QRS DURATION: 96 MS
QTC CALCULATION (BEZET): 443 MS
R AXIS: 64 DEGREES
SODIUM SERPL-SCNC: 144 MMOL/L (ref 136–145)
T AXIS: 63 DEGREES
VENTRICULAR RATE: 71 BPM

## 2025-01-28 PROCEDURE — 99233 SBSQ HOSP IP/OBS HIGH 50: CPT | Performed by: INTERNAL MEDICINE

## 2025-01-28 RX ORDER — AMLODIPINE BESYLATE 5 MG/1
5 TABLET ORAL DAILY
Status: DISCONTINUED | OUTPATIENT
Start: 2025-01-29 | End: 2025-02-02

## 2025-01-28 NOTE — CONSULTS
[facility]  part of [facility] ID CONSULT NOTE    Nadiya Felix Patient Status:  Inpatient    3/28/1950 MRN [id number]   Location [facility] 5SW/SE Attending Syed Silva MD   Hosp Day # 2 PCP Donell Yin MD       Reason for Consultation:  AMS, fever    ASSESSMENT:    Antibiotics: Vancomycin, ceftriaxone, acyclovir    # Acute fever with AMS and seizure ?R/o meningitis     -Flu/COVID/RSV negative  # Diarrhea, CDPCR negative  # Seizure  # Alzheimers   # HTN    PLAN:  -  Continue on vancomycin, ceftriaxone, and acyclovir. Will plan for LP.  -  Check GI stool panel.  -  Follow fever curve, wbc.  -  Reviewed labs, micro, imaging reports, available old records.  -  Case d/w patient, , RN.    History of Present Illness:  Nadiya Felix is a 74 year old female with a history of seizure disorder, Alzheimer's, recurrent UTIs, who presented to [facility] ED on  after having a possible seizure.  states that she was fatigued most of the day and had diarrhea the day before. Notes a sore throat and states her neck is sore.  said she just finished an antibiotic for a UTI. On arrival, Tmax 102.5, wbc 6.7, CTH negative, CXR without infiltrate, UA negative, blood cx NGTD, urine cx no growth, CDPCR  negative, started on vancomycin, acyclovir, and ceftriaxone. Tmax 101.4 last night.  reports she is closer back to her baseline. ID consulted.    History:  Past Medical History:    Abnormal PET scan of head    Patient had abnormal PET scan of brain May 6, 2016 showing bilateral temporoparietal beta amyloid deposition in the gray matter consistent with Alzheimer's disease.     Acute cystitis without hematuria    Acute pancreatitis (HCC)    Acute pancreatitis, unspecified complication status, unspecified pancreatitis type (HCC)    Alzheimer disease (HCC)    Anxiety    Mild    Aortic atherosclerosis    Aortic atherosclerosis seen on CT scan 2014.     Arm  paresthesia, left    Arrhythmia    A-Fib    Arthritis    Mild    Arthritis, gouty    Ataxia    Atrophic kidney    CT scan October 8, 2014 shows left kidney smaller than the right.  Bilateral areas of renal parenchymal scar.  Symmetric nephrograms.  Moderate calcific atherosclerosis of aorta.     AVM (arteriovenous malformation) of colon    Large nonbleeding cecal AVM found on colonoscopy March 16 2018.  At that time patient was found to have microscopic/lymphocytic colitis.      Back problem    spinal stenosis    Carpal tunnel syndrome    as per NG    Cataract    Chronic atrial fibrillation (HCC)    Closed fracture of left distal radius    Crohn disease (HCC)    Depression    Diverticulosis    as per NG    Elevated LFTs    Esophageal reflux    Mild    Family history of colon cancer    Maternal grandmother had colon cancer in her 70s.    Foreign body in alimentary tract    Foreign body in stomach, initial encounter    Gout    Hand weakness    Hepatic steatosis    High blood pressure    High cholesterol    as per NG    History of colonic polyps    as per NG    History of migraine headaches    as per NG    History of total hysterectomy with bilateral salpingo-oophorectomy (BSO)    History of total hysterectomy with removal of both tubes and ovaries    as per NG    Hyperlipidemia    Incontinence    DUE TO CYSTOCELE/RECTOCELE    Left arm weakness    Lymphocytic colitis    as per NG    Migraine    Mild cognitive impairment    as per NG    Obesity    25 lbs overweight    Obstructive sleep apnea    Pacemaker    Parkinson's disease (HCC)    Pseudobulbar affect    Renal disorder    stage 3    Restless legs syndrome    Seizure disorder ([facility])    last one 2/2019    Sleep apnea    Tendon tear    Surgically repaired; as per NG    Unspecified essential hypertension    as per NG    Vertigo    Visual impairment    reading glasses     Past Surgical History:   Procedure Laterality Date    Arthroscopy of joint unlisted      BILAT. KNEES     Cardiac pacemaker placement      as per NG    Carpal tunnel release      as per NG    Cataract Left 07/10/2015    Cataract Right late 2015    Cholecystectomy      as per NG    Colonoscopy N/A 2018    Procedure: COLONOSCOPY;  Surgeon: Mohamud Sawyer MD;  Location: [facility] ENDOSCOPY    Colonoscopy N/A 2022    Procedure: COLONOSCOPY/ESOPHAGOGASTRODUODENOSCOPY (EGD);  Surgeon: Genaro Osman MD;  Location: [facility] ENDOSCOPY    Electrocardiogram, complete  2013    SCANNED TO MEDIA TAB - 2013    Hysterectomy      & BSO; as per NG    Knee arthroscopy Right     as per NG    Knee surgery  01/2015    x3     Nasal scopy,remv totl ethmoid  2006    \"Ethmoidectomy, max antrostomies\"; as per NG      1966,1969 & 1973    Normal    Other   or 2015    removal of pins and plate from right wrist (fracture)    Other surgical history Left     L knee scoped--3 torn meniscus--biocartilage done    Other surgical history Right 2016    R knee scoped for torn miniscus    Other surgical history Left     wrist surgery    Pacemaker/defibrillator      Reconstr nose+eamon septal repair      as per NG    Removal gallbladder      Total abdom hysterectomy      Total knee replacement       Family History   Problem Relation Age of Onset    Hypertension Father         4 way bypass    Heart Disease Father         as per NG    Migraines Father         as per NG    Cancer Father         Prostate ()    Neurological Disorder Mother         Alzheimer's disease; as per NG    Hypertension Mother         Valve replacement    Heart Disease Mother         as per NG    Migraines Mother         as per NG    Dementia Mother         Dementia ()    Cancer Sister         Left kidney cancer ()    Hypertension Brother         4 way bypass    Cancer Brother         Prostate and throat ()    Heart Disease Brother         CAD; as per NG    Cancer Sister         Left kidney    Cancer Other          Thyroid Cancer; as per NG    Cancer Maternal Grandmother         Colon    Dementia Maternal Grandmother         Dementia ()      reports that she quit smoking about 27 years ago. Her smoking use included cigarettes. She started smoking about 67 years ago. She has a 40 pack-year smoking history. She has never used smokeless tobacco. She reports current alcohol use. She reports current drug use. Frequency: 7.00 times per week. Drug: Cannabis.    Allergies:  Allergies[1]    Medications:    Current Facility-Administered Medications:     potassium chloride 40 mEq in 250mL sodium chloride 0.9% IVPB premix, 40 mEq, Intravenous, Once    pantoprazole (Protonix) 40 mg in sodium chloride 0.9% PF 10 mL IV push, 40 mg, Intravenous, Before breakfast    LORazepam (Ativan) 2 mg/mL injection 2 mg, 2 mg, Intravenous, Q10 Min PRN    amLODIPine (Norvasc) tab 2.5 mg, 2.5 mg, Oral, Daily    aspirin DR tab 81 mg, 81 mg, Oral, Daily    atorvastatin (Lipitor) tab 20 mg, 20 mg, Oral, Nightly    escitalopram (Lexapro) tab 10 mg, 10 mg, Oral, Daily    lamoTRIgine (LaMICtal) tab 150 mg, 150 mg, Oral, BID    hydrALAzine (Apresoline) 20 mg/mL injection 10 mg, 10 mg, Intravenous, Q4H PRN    sodium chloride 0.9% infusion, , Intravenous, Continuous    heparin (Porcine) 5000 UNIT/ML injection 5,000 Units, 5,000 Units, Subcutaneous, Q8H CLAUDETTE    acetaminophen (Tylenol Extra Strength) tab 500 mg, 500 mg, Oral, Q4H PRN    ondansetron (Zofran) 4 MG/2ML injection 4 mg, 4 mg, Intravenous, Q6H PRN    LORazepam (Ativan) 2 mg/mL injection 4 mg, 4 mg, Intravenous, Q5 Min PRN    acetaminophen (Ofirmev) 10 mg/mL infusion premix 1,000 mg, 1,000 mg, Intravenous, Q8H PRN    acyclovir (Zovirax) 600 mg in sodium chloride 0.9% 100 mL IVPB, 10 mg/kg (Ideal), Intravenous, q12h    levETIRAcetam (Keppra) 500 mg/5mL injection 500 mg, 500 mg, Intravenous, Q12H    cefTRIAXone (Rocephin) 2 g in sodium chloride 0.9% 100 mL IVPB-ADDV, 2 g, Intravenous, Q12H     vancomycin (Vancocin) 1,000 mg in sodium chloride 0.9% 250 mL IVPB-ADDV, 1,000 mg, Intravenous, Q24H    [COMPLETED] lacosamide (Vimpat) 200 mg/20mL injection 200 mg, 200 mg, Intravenous, Once **AND** lacosamide (Vimpat) 200 mg/20mL injection 50 mg, 50 mg, Intravenous, Q12H    Review of Systems:  ROS limited due to mental status.    Physical Exam:  Vital signs: Blood pressure 146/72, pulse 67, temperature (!) 75.2 °F (24 °C), resp. rate 18, height 5' 5\" (1.651 m), weight 148 lb 4.8 oz (67.3 kg), SpO2 95%, not currently breastfeeding.    General: Awake, in bed  HEENT: Moist mucous membranes. EOMI  Neck: No lymphadenopathy.  Supple.  Cardiovascular: Regular rate and rhythm  Respiratory: CTAB  Abdomen: Soft, no TTP  Musculoskeletal: No edema noted  Integument: No lesions. No erythema.  Lines: PIV+    Laboratory Data:  Recent Labs   Lab 01/27/25  0627   RBC 4.16   HGB 12.7   HCT 37.8   MCV 90.9   MCH 30.5   MCHC 33.6   RDW 12.6   NEPRELIM 8.60*   WBC 10.3   .0*     Recent Labs   Lab 01/26/25  1504 01/27/25  0627 01/28/25  0824   * 118* 87   BUN 23 20 14   CREATSERUM 1.21* 1.04* 0.89   CA 9.9 8.9 8.5*   ALB 4.6 3.8  --     147* 144   K 4.3 3.6 3.1*    113* 115*   CO2 15.0* 21.0 19.0*   ALKPHO 61 43*  --    AST 20 42*  --    ALT 16 20  --    BILT 0.7 1.4*  --    TP 7.8 6.3  --        Microbiology: Reviewed in EMR    Radiology: Reviewed    Thank you for allowing us to participate in the care of this patient. Please do not hesitate to call if you have any questions.   We will continue to follow with you and will make further recommendations based on his progress.    RUTH ANN [facility]  [phone]  1/28/2025         [1]   Allergies  Allergen Reactions    Hydrocodone ITCHING     ITCHES WITH 2 TABS, O.K. WITH ONE

## 2025-01-28 NOTE — PLAN OF CARE
Pt more alert today. Q4 neuro checks stable. Vitals stable.   Problem: Safety Risk - Non-Violent Restraints  Goal: Patient will remain free from self-harm  Description: INTERVENTIONS:  - Apply the least restrictive restraint to prevent harm  - Notify patient and family of reasons restraints applied  - Assess for any contributing factors to confusion (electrolyte disturbances, delirium, medications)  - Discontinue any unnecessary medical devices as soon as possible  - Assess the patient's physical comfort, circulation, skin condition, hydration, nutrition and elimination needs   - Reorient and redirection as needed  - Assess for the need to continue restraints  Outcome: Progressing     Problem: Patient Centered Care  Goal: Patient preferences are identified and integrated in the patient's plan of care  Description: Interventions:  - What would you like us to know as we care for you? I am from home with my   - Provide timely, complete, and accurate information to patient/family  - Incorporate patient and family knowledge, values, beliefs, and cultural backgrounds into the planning and delivery of care  - Encourage patient/family to participate in care and decision-making at the level they choose  - Honor patient and family perspectives and choices  Outcome: Progressing     Problem: Patient/Family Goals  Goal: Patient/Family Long Term Goal  Description: Patient's Long Term Goal: Discharge    Interventions:  - Monitor vitals  - Monitor appropriate labs  - Administer medications as ordered  - Follow MD's orders  - Update patient on plan of care   - Discharge planning     - See additional Care Plan goals for specific interventions  Outcome: Progressing  Goal: Patient/Family Short Term Goal  Description: Patient's Short Term Goal:     Interventions:   -   - See additional Care Plan goals for specific interventions  Outcome: Progressing     Problem: SKIN/TISSUE INTEGRITY - ADULT  Goal: Skin integrity remains  intact  Description: INTERVENTIONS  - Assess and document risk factors for pressure ulcer development  - Assess and document skin integrity  - Monitor for areas of redness and/or skin breakdown  - Initiate interventions, skin care algorithm/standards of care as needed  Outcome: Progressing     Problem: NEUROLOGICAL - ADULT  Goal: Achieves stable or improved neurological status  Description: INTERVENTIONS  - Assess for and report changes in neurological status  - Initiate measures to prevent increased intracranial pressure  - Maintain blood pressure and fluid volume within ordered parameters to optimize cerebral perfusion and minimize risk of hemorrhage  - Monitor temperature, glucose, and sodium. Initiate appropriate interventions as ordered  Outcome: Progressing  Goal: Absence of seizures  Description: INTERVENTIONS  - Monitor for seizure activity  - Administer anti-seizure medications as ordered  - Monitor neurological status  Outcome: Progressing  Goal: Remains free of injury related to seizure activity  Description: INTERVENTIONS:  - Maintain airway, patient safety  and administer oxygen as ordered  - Monitor patient for seizure activity, document and report duration and description of seizure to MD/LIP  - If seizure occurs, turn patient to side and suction secretions as needed  - Reorient patient post seizure  - Seizure pads on all 4 side rails  - Instruct patient/family to notify RN of any seizure activity  - Instruct patient/family to call for assistance with activity based on assessment  Outcome: Progressing  Goal: Achieves maximal functionality and self care  Description: INTERVENTIONS  - Monitor swallowing and airway patency with patient fatigue and changes in neurological status  - Encourage and assist patient to increase activity and self care with guidance from PT/OT  - Encourage visually impaired, hearing impaired and aphasic patients to use assistive/communication devices  Outcome: Progressing      Problem: PAIN - ADULT  Goal: Verbalizes/displays adequate comfort level or patient's stated pain goal  Description: INTERVENTIONS:  - Encourage pt to monitor pain and request assistance  - Assess pain using appropriate pain scale  - Administer analgesics based on type and severity of pain and evaluate response  - Implement non-pharmacological measures as appropriate and evaluate response  - Consider cultural and social influences on pain and pain management  - Manage/alleviate anxiety  - Utilize distraction and/or relaxation techniques  - Monitor for opioid side effects  - Notify MD/LIP if interventions unsuccessful or patient reports new pain  - Anticipate increased pain with activity and pre-medicate as appropriate  Outcome: Progressing     Problem: RISK FOR INFECTION - ADULT  Goal: Absence of fever/infection during anticipated neutropenic period  Description: INTERVENTIONS  - Monitor WBC  - Administer growth factors as ordered  - Implement neutropenic guidelines  Outcome: Progressing

## 2025-01-28 NOTE — PLAN OF CARE
Problem: Safety Risk - Non-Violent Restraints  Goal: Patient will remain free from self-harm  Description: INTERVENTIONS:  - Apply the least restrictive restraint to prevent harm  - Notify patient and family of reasons restraints applied  - Assess for any contributing factors to confusion (electrolyte disturbances, delirium, medications)  - Discontinue any unnecessary medical devices as soon as possible  - Assess the patient's physical comfort, circulation, skin condition, hydration, nutrition and elimination needs   - Reorient and redirection as needed  - Assess for the need to continue restraints  Outcome: Progressing     Problem: Patient Centered Care  Goal: Patient preferences are identified and integrated in the patient's plan of care  Description: Interventions:  - What would you like us to know as we care for you? I am from home with my   - Provide timely, complete, and accurate information to patient/family  - Incorporate patient and family knowledge, values, beliefs, and cultural backgrounds into the planning and delivery of care  - Encourage patient/family to participate in care and decision-making at the level they choose  - Honor patient and family perspectives and choices  Outcome: Progressing     Problem: Patient/Family Goals  Goal: Patient/Family Long Term Goal  Description: Patient's Long Term Goal: Discharge    Interventions:  - Monitor vitals  - Monitor appropriate labs  - Administer medications as ordered  - Follow MD's orders  - Update patient on plan of care   - Discharge planning     - See additional Care Plan goals for specific interventions  Outcome: Progressing  Goal: Patient/Family Short Term Goal  Description: Patient's Short Term Goal:     Interventions:   -   - See additional Care Plan goals for specific interventions  Outcome: Progressing     Problem: SKIN/TISSUE INTEGRITY - ADULT  Goal: Skin integrity remains intact  Description: INTERVENTIONS  - Assess and document risk  factors for pressure ulcer development  - Assess and document skin integrity  - Monitor for areas of redness and/or skin breakdown  - Initiate interventions, skin care algorithm/standards of care as needed  Outcome: Progressing     Problem: NEUROLOGICAL - ADULT  Goal: Achieves stable or improved neurological status  Description: INTERVENTIONS  - Assess for and report changes in neurological status  - Initiate measures to prevent increased intracranial pressure  - Maintain blood pressure and fluid volume within ordered parameters to optimize cerebral perfusion and minimize risk of hemorrhage  - Monitor temperature, glucose, and sodium. Initiate appropriate interventions as ordered  Outcome: Progressing  Goal: Absence of seizures  Description: INTERVENTIONS  - Monitor for seizure activity  - Administer anti-seizure medications as ordered  - Monitor neurological status  Outcome: Progressing  Goal: Remains free of injury related to seizure activity  Description: INTERVENTIONS:  - Maintain airway, patient safety  and administer oxygen as ordered  - Monitor patient for seizure activity, document and report duration and description of seizure to MD/LIP  - If seizure occurs, turn patient to side and suction secretions as needed  - Reorient patient post seizure  - Seizure pads on all 4 side rails  - Instruct patient/family to notify RN of any seizure activity  - Instruct patient/family to call for assistance with activity based on assessment  Outcome: Progressing  Goal: Achieves maximal functionality and self care  Description: INTERVENTIONS  - Monitor swallowing and airway patency with patient fatigue and changes in neurological status  - Encourage and assist patient to increase activity and self care with guidance from PT/OT  - Encourage visually impaired, hearing impaired and aphasic patients to use assistive/communication devices  Outcome: Progressing     Problem: PAIN - ADULT  Goal: Verbalizes/displays adequate comfort  level or patient's stated pain goal  Description: INTERVENTIONS:  - Encourage pt to monitor pain and request assistance  - Assess pain using appropriate pain scale  - Administer analgesics based on type and severity of pain and evaluate response  - Implement non-pharmacological measures as appropriate and evaluate response  - Consider cultural and social influences on pain and pain management  - Manage/alleviate anxiety  - Utilize distraction and/or relaxation techniques  - Monitor for opioid side effects  - Notify MD/LIP if interventions unsuccessful or patient reports new pain  - Anticipate increased pain with activity and pre-medicate as appropriate  Outcome: Progressing     Problem: RISK FOR INFECTION - ADULT  Goal: Absence of fever/infection during anticipated neutropenic period  Description: INTERVENTIONS  - Monitor WBC  - Administer growth factors as ordered  - Implement neutropenic guidelines  Outcome: Progressing     Problem: SAFETY ADULT - FALL  Goal: Free from fall injury  Description: INTERVENTIONS:  - Assess pt frequently for physical needs  - Identify cognitive and physical deficits and behaviors that affect risk of falls.  - [place] fall precautions as indicated by assessment.  - Educate pt/family on patient safety including physical limitations  - Instruct pt to call for assistance with activity based on assessment  - Modify environment to reduce risk of injury  - Provide assistive devices as appropriate  - Consider OT/PT consult to assist with strengthening/mobility  - Encourage toileting schedule  Outcome: Progressing     Problem: DISCHARGE PLANNING  Goal: Discharge to home or other facility with appropriate resources  Description: INTERVENTIONS:  - Identify barriers to discharge w/pt and caregiver  - Include patient/family/discharge partner in discharge planning  - Arrange for needed discharge resources and transportation as appropriate  - Identify discharge learning needs (meds, wound care,  etc)  - Arrange for interpreters to assist at discharge as needed  - Consider post-discharge preferences of patient/family/discharge partner  - Complete POLST form as appropriate  - Assess patient's ability to be responsible for managing their own health  - Refer to Case Management Department for coordinating discharge planning if the patient needs post-hospital services based on physician/LIP order or complex needs related to functional status, cognitive ability or social support system  Outcome: Progressing

## 2025-01-28 NOTE — PROGRESS NOTES
[facility]  part of [facility]     Hospitalist Progress Note     Nadiya Felix Patient Status:  Inpatient    3/28/1950 MRN [id number]   Location [facility] 5SW/SE Attending Syed Silva MD   Hosp Day # 2 PCP Donell Yin MD     Chief Complaint:   Chief Complaint   Patient presents with    Seizure Disorder        Subjective:     Patient seen lying in bed.   at bedside.  Patient sleepy, but easily awakens to voice.  Remains with some mild confusion but much improved from previous.  Able to state her name and [facility].  Patient is describing some diffuse abdominal discomfort and intermittent nausea.  Feeling generally tired.     Objective:      Vital signs:  Vitals:    25 1700 25 2131 25 2300 25 0553   BP:  141/61  135/71   BP Location:  Right arm  Right arm   Pulse: 77 79  70   Resp: 18 18  18   Temp: 99.4 °F (37.4 °C) (!) 101.4 °F (38.6 °C) 98.6 °F (37 °C) 97.9 °F (36.6 °C)   TempSrc: Oral Axillary Axillary Oral   SpO2: 95% 96%  97%   Weight:    148 lb 4.8 oz (67.3 kg)   Height:           Intake/Output Summary (Last 24 hours) at 2025 0958  Last data filed at 2025 0600  Gross per 24 hour   Intake 2750 ml   Output --   Net 2750 ml           Physical Exam:    GENERAL: Sleepy, but easily awakens to voice, in no acute distress.  HEART:  Regular rhythm.  Regular rate   LUNGS:  Air entry was decreased.  No increased work of breathing or wheezes   ABDOMEN: Soft and non-tender.    NEUROLOGICAL: Sleepy, but easily awakens to voice.  Alert and oriented to person and place.  Confusion resolving.     SKIN:  Warm and well perfused      Diagnostic Data:    Labs:    Recent Labs   Lab 25  1504 25  0627   WBC 6.7 10.3   HGB 14.3 12.7   MCV 90.2 90.9   .0 148.0*       Recent Labs   Lab 25  1504 25  0627 25  0824   * 118* 87   BUN 23 20 14   CREATSERUM 1.21* 1.04* 0.89   CA 9.9 8.9 8.5*   ALB 4.6 3.8  --      147* 144   K 4.3 3.6 3.1*    113* 115*   CO2 15.0* 21.0 19.0*   ALKPHO 61 43*  --    AST 20 42*  --    ALT 16 20  --    BILT 0.7 1.4*  --    TP 7.8 6.3  --            Estimated Creatinine Clearance: 49.1 mL/min (based on SCr of 0.89 mg/dL).    No results for input(s): \"PTP\", \"INR\" in the last 168 hours.         COVID-19  Lab Results   Component Value Date    COVID19 Not Detected 01/26/2025    COVID19 Not Detected 02/07/2023    COVID19 Not Detected 11/13/2022       Pro-Calcitonin  No results for input(s): \"PCT\" in the last 168 hours.    Cardiac  No results for input(s): \"TROP\", \"PBNP\" in the last 168 hours.    Inflammatory Markers  No results for input(s): \"CRP\", \"BRYANT\", \"LDH\", \"DDIMER\" in the last 168 hours.    Culture:  Hospital Encounter on 01/26/25   1. Blood Culture     Status: None (Preliminary result)    Collection Time: 01/26/25  3:09 PM    Specimen: Blood,peripheral   Result Value Ref Range    Blood Culture Result No Growth 1 Day N/A   2. Urine Culture, Routine     Status: None    Collection Time: 01/26/25  3:04 PM    Specimen: Urine, clean catch   Result Value Ref Range    Urine Culture  N/A     <10,000 cfu/ml Multiple species present- probable contamination.       XR CHEST AP PORTABLE  (CPT=71045)    Result Date: 1/26/2025  CONCLUSION: No acute cardiopulmonary disease.    Dictated by (CST): Pino Hicks MD on 1/26/2025 at 6:23 PM     Finalized by (CST): Pino Hicks MD on 1/26/2025 at 6:24 PM          CT BRAIN OR HEAD (CPT=70450)    Result Date: 1/26/2025  CONCLUSION:   No acute intracranial abnormality.    Dictated by (CST): Pino Hicks MD on 1/26/2025 at 5:42 PM     Finalized by (CST): Pino Hicks MD on 1/26/2025 at 5:44 PM           EKG 12 Lead    Result Date: 1/28/2025  Normal sinus rhythm Normal ECG When compared with ECG of 31-OCT-2023 16:51, Sinus rhythm has replaced Electronic atrial pacemaker Confirmed by Kwesi Meza ([id number]) on 1/28/2025 6:53:57 AM      Medications:    pantoprazole  40 mg Intravenous Before breakfast    amLODIPine  2.5 mg Oral Daily    aspirin  81 mg Oral Daily    atorvastatin  20 mg Oral Nightly    escitalopram  10 mg Oral Daily    lamoTRIgine  150 mg Oral BID    heparin  5,000 Units Subcutaneous Q8H CLAUDETTE    acyclovir  10 mg/kg (Ideal) Intravenous q12h    levETIRAcetam  500 mg Intravenous Q12H    cefTRIAXone  2 g Intravenous Q12H    vancomycin  1,000 mg Intravenous Q24H    lacosamide  50 mg Intravenous Q12H       Assessment & Plan:       Seizure disorder  Breakthrough seizure  -Witnessed by  on day of admission.  -Self resolved after around 5 minutes per family report.  -Family states patient with known history of seizure disorder.  Believes she has been taking her medications appropriately.  -CT brain with no acute intracranial abnormalities.  -Loaded with Keppra in ED.,  Continue 500 mg twice daily.  -Continue Vimpat.  -Neurology consulted, appreciate further recommendations.       Altered mental status  -Improving  -Likely metabolic encephalopathy  -Possible postictal state from seizure activity as above  -Some concern for possible infectious causes   -Family reports recent UTI.  Current UA less concerning for active infection.    -Continuing empiric antibiotics including coverage for possible bacterial meningitis.  -Unable to obtain MRI at this time.  -Continue empiric acyclovir.  -ID consulted, recommending further evaluation with lumbar puncture to rule out meningitis.  Continuing empiric treatment.  Appreciate further recommendations.  -Patient also with ELIJAH.  -Treating underlying conditions.  -Supportive care  -PT/OT/SLP     Acute Kidney Injury   -Weaning IVF  -Encouraging p.o. intake when cleared by SLP.  - Avoiding Nephrotoxic agents  - Cont to monitor     Hypertension  -Improved control  -Increase amlodipine  -As needed IV hydralazine  -Continue to monitor    Reported history of dementia  -Holding donepezil due to risk of  lowering seizure threshold.      Plan of care discussed with patient and family at bedside . Discussed management/test result(s) with Rn and ID consultant.    Quality:  DVT Prophylaxis: Heparin  CODE status: Full  Estimated date of discharge: TBD  Discharge is dependent on: clinical stability    Syed Silva MD          This note was prepared using Dragon Medical voice recognition dictation software. As a result errors may occur. When identified these errors have been corrected. While every attempt is made to correct errors during dictation discrepancies may still exist

## 2025-01-28 NOTE — SLP NOTE
ADULT SWALLOWING EVALUATION    ASSESSMENT    ASSESSMENT/OVERALL IMPRESSION:      Proper PPE worn. Hands sanitized upon entrance/exit Pt room.       BSE ordered 2/2 PMH of dysphagia. Pt admitted 1/26/25 with witnesses seizure. Pt on solid/thin liquids at home, with spouse. Pt reports \"on occasion liquids come up when drinking fast\". Pt indicated  frequently use straw at home. PMH also includes Alzheimer's, HTN, Seizures. Currently, Pt NPO (reduced LASHAWN 1/27/25).    PMH of dysphagia E-EHC:  Most recent swallow note BSE 11/01/23 rec solid/thin liquids.    CXR 1/26/25:  CONCLUSION: No acute cardiopulmonary disease.         Pt alert, on room air, afebrile and assessed sitting upright in bed (after consulting with RN). Pt cooperative. Spouse present. Learning preference verbal. No verbal or non-verbal indication of pain. Vocal quality/intensity adequate. Volitional swallow and cough present; considered functional. Oral motor exam unremarkable. Functional dentition. Pt self-fed solid, pureed and thin liquid trials. Bilabial seal adequate; no anterior loss. Lingual skills adequate for bolus formation, preparation and control of all consistencies. Bite reflex of solid functional in strength. Grossly coordinated mastication skills. Oral cavity clear post swallows. Pt observed to initiate large bolus size.     Pharyngeal response appeared delayed per hyolaryngeal elevation to completion (considered reduced in strength/rise to palpation). No overt CSA on solid, pureed nor thin liquids. Overall, swallow function appeared coordinated. Sp02 ~97% during this assessment.        IMPRESSIONS:    Pt presents with functional oral swallow and possible pharyngeal dysfunction. Collaborated with RN regarding Pt's swallowing plan of care. BSE results/recommendations discussed with Pt/spouse. Spouse v/u. Pt with fair understanding; would benefit from additional reinforcement. Swallowing precautions written on white board in room for  reinforcement/carry-over of skills for Pt, family and staff. Call light within Pt's reach upon SLP discharge from room.          PLAN:    To f/u x1-2 sessions to ensure safe intake of diet and reinforce swallowing/aspiration precautions. To monitor for new CXR results. Family education to be continued as available.        RECOMMENDATIONS   Diet Recommendations - Solids: Regular  Diet Recommendations - Liquids: Thin Liquids    Aspiration Precautions: Upright position;Slow rate;Small bites;Small sips;No straw  Medication Administration Recommendations: Whole in puree  Treatment Plan/Recommendations: Aspiration precautions    HISTORY   MEDICAL HISTORY  Reason for Referral:  (hx of dysphagia)    Problem List  Principal Problem:    Witnessed seizure (HCC)  Active Problems:    History of seizures    Altered mental status    Altered mental status, unspecified altered mental status type    Delirium, acute    Fever      Past Medical History  Past Medical History:    Abnormal PET scan of head    Patient had abnormal PET scan of brain May 6, 2016 showing bilateral temporoparietal beta amyloid deposition in the gray matter consistent with Alzheimer's disease.     Acute cystitis without hematuria    Acute pancreatitis (HCC)    Acute pancreatitis, unspecified complication status, unspecified pancreatitis type (HCC)    Alzheimer disease (HCC)    Anxiety    Mild    Aortic atherosclerosis    Aortic atherosclerosis seen on CT scan October 2014.     Arm paresthesia, left    Arrhythmia    A-Fib    Arthritis    Mild    Arthritis, gouty    Ataxia    Atrophic kidney    CT scan October 8, 2014 shows left kidney smaller than the right.  Bilateral areas of renal parenchymal scar.  Symmetric nephrograms.  Moderate calcific atherosclerosis of aorta.     AVM (arteriovenous malformation) of colon    Large nonbleeding cecal AVM found on colonoscopy March 16 2018.  At that time patient was found to have microscopic/lymphocytic colitis.      Back  problem    spinal stenosis    Carpal tunnel syndrome    as per NG    Cataract    Chronic atrial fibrillation (HCC)    Closed fracture of left distal radius    Crohn disease (HCC)    Depression    Diverticulosis    as per NG    Elevated LFTs    Esophageal reflux    Mild    Family history of colon cancer    Maternal grandmother had colon cancer in her 70s.    Foreign body in alimentary tract    Foreign body in stomach, initial encounter    Gout    Hand weakness    Hepatic steatosis    High blood pressure    High cholesterol    as per NG    History of colonic polyps    as per NG    History of migraine headaches    as per NG    History of total hysterectomy with bilateral salpingo-oophorectomy (BSO)    History of total hysterectomy with removal of both tubes and ovaries    as per NG    Hyperlipidemia    Incontinence    DUE TO CYSTOCELE/RECTOCELE    Left arm weakness    Lymphocytic colitis    as per NG    Migraine    Mild cognitive impairment    as per NG    Obesity    25 lbs overweight    Obstructive sleep apnea    Pacemaker    Parkinson's disease (HCC)    Pseudobulbar affect    Renal disorder    stage 3    Restless legs syndrome    Seizure disorder ([facility])    last one 2/2019    Sleep apnea    Tendon tear    Surgically repaired; as per NG    Unspecified essential hypertension    as per NG    Vertigo    Visual impairment    reading glasses       Prior Living Situation: Home with spouse  Diet Prior to Admission: Regular;Thin liquids  Precautions: Aspiration    Patient/Family Goals: to eat    SWALLOWING HISTORY  Current Diet Consistency: NPO    OBJECTIVE   ORAL MOTOR EXAMINATION  Dentition: Functional;Lower partials;Upper partials  Symmetry: Within Functional Limits  Strength: Within Functional Limits  Range of Motion: Within Functional Limits  Rate of Motion: Within Functional Limits    Voice Quality: Clear  Respiratory Status: Unlabored  Consistencies Trialed: Thin liquids;Hard solid;Puree  Method of Presentation: Self  presentation;Straw;Cup;Spoon  Patient Positioned: Upright;Midline    Oral Phase of Swallow: Within Functional Limits  Pharyngeal Phase of Swallow: Appears Impaired  Laryngeal Elevation Timing: Appears impaired  Laryngeal Elevation Strength: Appears impaired     (Please note: Silent aspiration cannot be evaluated clinically. Videofluoroscopic Swallow Study is required to rule-out silent aspiration.)    GOALS  Goal #1 The patient will tolerate solid consistency and thin liquids without overt signs or symptoms of aspiration with 100 % accuracy over 1-2 session(s).  In Progress   Goal #2 The patient will utilize compensatory strategies as outlined by  BSSE (clinical evaluation) including Slow rate, Small bites, Small sips, No straws, Upright 90 degrees with no feeding assistance 95 % of the time across 1-2 sessions.    In Progress   FOLLOW UP  Treatment Plan/Recommendations: Aspiration precautions  Duration: 1 week  Follow Up Needed (Documentation Required): Yes  SLP Follow-up Date: 01/29/25    Thank you for your referral.   If you have any questions, please contact   Azul Bustos M.S. [facility]/SLP  Speech-Language Pathologist  [facility]  #[id number]

## 2025-01-29 LAB
ANION GAP SERPL CALC-SCNC: 7 MMOL/L (ref 0–18)
BUN BLD-MCNC: 10 MG/DL (ref 9–23)
BUN/CREAT SERPL: 12 (ref 10–20)
CALCIUM BLD-MCNC: 8.4 MG/DL (ref 8.7–10.4)
CHLORIDE SERPL-SCNC: 115 MMOL/L (ref 98–112)
CO2 SERPL-SCNC: 24 MMOL/L (ref 21–32)
CREAT BLD-MCNC: 0.83 MG/DL
EGFRCR SERPLBLD CKD-EPI 2021: 73 ML/MIN/1.73M2 (ref 60–?)
GLUCOSE BLD-MCNC: 88 MG/DL (ref 70–99)
GLUCOSE BLDC GLUCOMTR-MCNC: 110 MG/DL (ref 70–99)
LAMOTRIGINE LVL: 3.6 UG/ML
OSMOLALITY SERPL CALC.SUM OF ELEC: 300 MOSM/KG (ref 275–295)
POTASSIUM SERPL-SCNC: 3.4 MMOL/L (ref 3.5–5.1)
POTASSIUM SERPL-SCNC: 3.4 MMOL/L (ref 3.5–5.1)
SODIUM SERPL-SCNC: 146 MMOL/L (ref 136–145)

## 2025-01-29 PROCEDURE — 99233 SBSQ HOSP IP/OBS HIGH 50: CPT | Performed by: HOSPITALIST

## 2025-01-29 RX ORDER — POTASSIUM CHLORIDE 1.5 G/1.58G
40 POWDER, FOR SOLUTION ORAL ONCE
Status: COMPLETED | OUTPATIENT
Start: 2025-01-29 | End: 2025-01-29

## 2025-01-29 NOTE — PROGRESS NOTES
[facility]  part of [facility]    Progress Note    Nadiya Felix Patient Status:  Inpatient    3/28/1950 MRN [id number]   Location [facility] 5SW/SE Attending Chetna Amaral MD   Hosp Day # 3 PCP Donell Yin MD     Chief Complaint:     Witnessed Seizure.    Subjective:   Subjective:    Patient seen and examined   Afebrile,   Endorsing diffuse abdominal pain  Confusion improved.    Objective:   Blood pressure 145/82, pulse 67, temperature 98.5 °F (36.9 °C), temperature source Oral, resp. rate 17, height 5' 5\" (1.651 m), weight 152 lb (68.9 kg), SpO2 95%, not currently breastfeeding.  Physical Exam    General: Patient is alert and oriented x3 does not appear to be in acute distress at this time  HEENT: EOMI PERRLA, atraumatic normocephalic  Cardiac: S1-S2 appreciated  Lungs: Good air entry bilaterally clear to auscultation  Abdomen: Soft nontender nondistended positive bowel sounds  Ext: Peripheral pulses are positive  Neuro: No focal deficits noted  Psych: Normal mood  Skin: No rashes noted  MSK: Full range of motion intact      Results:   Lab Results   Component Value Date    WBC 10.3 2025    HGB 12.7 2025    HCT 37.8 2025    .0 (L) 2025    CREATSERUM 0.83 2025    BUN 10 2025     (H) 2025    K 3.4 (L) 2025    K 3.4 (L) 2025     (H) 2025    CO2 24.0 2025    GLU 88 2025    CA 8.4 (L) 2025    ALB 3.8 2025    ALKPHO 43 (L) 2025    BILT 1.4 (H) 2025    TP 6.3 2025    AST 42 (H) 2025    ALT 20 2025    PTT 32.3 2024    INR 0.99 2024    T4F 1.07 2018    TSH 1.400 2023    LIP 64 (H) 2024    DDIMER 0.72 2022    CRP <0.5 03/15/2018    MG 2.0 2025    PHOS 3.2 10/26/2023    TROP <0.045 2021    TROPHS 12 10/31/2023    CK 87 2017    B12 525 2023    ETOH <3 2019       No results found.         Assessment & Plan:      Seizure disorder  Breakthrough seizure  -Witnessed by  on day of admission.  -Self resolved after around 5 minutes per family report.  -Family states patient with known history of seizure disorder.  Believes she has been taking her medications appropriately.  -CT brain with no acute intracranial abnormalities.  -Loaded with Keppra in ED.,  Continue 500 mg twice daily.  -Continue Vimpat.  -Neurology consulted, appreciate further recommendations.       Altered mental status  -Improving  -Likely metabolic encephalopathy  -Possible postictal state from seizure activity as above  -Some concern for possible infectious causes   -Family reports recent UTI.  Current UA less concerning for active infection.    -Continuing empiric antibiotics including coverage for possible bacterial meningitis.  -Unable to obtain MRI at this time.  -Continue empiric acyclovir.  -ID consulted, recommending further evaluation with lumbar puncture to rule out meningitis.  Continuing empiric treatment.  Appreciate further recommendations.  -Patient also with ELIJAH.  -Treating underlying conditions.  -Supportive care  -PT/OT/SLP     Acute Kidney Injury   -Weaning IVF  -Encouraging p.o. intake when cleared by SLP.  - Avoiding Nephrotoxic agents  - Cont to monitor     Hypertension  -Improved control  -Increase amlodipine  -As needed IV hydralazine  -Continue to monitor     Reported history of dementia  -Holding donepezil due to risk of lowering seizure threshold.        Plan of care discussed with patient and family at bedside . Discussed management/test result(s) with Rn and ID consultant.     Quality:  DVT Prophylaxis: Heparin  CODE status: Full  Estimated date of discharge: TBD  Discharge is dependent on: clinical stability    Global A/P  -GI panel pending  -hypokalemia - continue electrolyte replacement protocol  -hypernatremia  -Acute fevers R/o Meningitis  -continue IV vancomycin, Ceftriaxone, acyclovir  -plan  for LP tomorrow  -appreciate ID and Neurology recs  -Reviewed previous consultant notes  -Reviewed CBC, BMP, Mag, and Phos  -Reviewed tests ordered  -Repeat labs in am  -MDM: High, severe exacerbation of chronic illness posing a threat to life. IV medications requiring close inpatient monitoring.         **Certification      PHYSICIAN Certification of Need for Inpatient Hospitalization - Initial Certification    Patient will require inpatient services that will reasonably be expected to span two midnight's based on the clinical documentation in H+P.   Based on patients current state of illness, I anticipate that, after discharge, patient will require TBD.      Chetna Amaral MD

## 2025-01-29 NOTE — PLAN OF CARE
Problem: Patient Centered Care  Goal: Patient preferences are identified and integrated in the patient's plan of care  Description: Interventions:  - What would you like us to know as we care for you? I am from home with my   - Provide timely, complete, and accurate information to patient/family  - Incorporate patient and family knowledge, values, beliefs, and cultural backgrounds into the planning and delivery of care  - Encourage patient/family to participate in care and decision-making at the level they choose  - Honor patient and family perspectives and choices  Outcome: Progressing     Problem: Patient/Family Goals  Goal: Patient/Family Long Term Goal  Description: Patient's Long Term Goal: Discharge    Interventions:  - Monitor vitals  - Monitor appropriate labs  - Administer medications as ordered  - Follow MD's orders  - Update patient on plan of care   - Discharge planning     - See additional Care Plan goals for specific interventions  Outcome: Progressing  Goal: Patient/Family Short Term Goal  Description: Patient's Short Term Goal:     Interventions:   -   - See additional Care Plan goals for specific interventions  Outcome: Progressing     Problem: SKIN/TISSUE INTEGRITY - ADULT  Goal: Skin integrity remains intact  Description: INTERVENTIONS  - Assess and document risk factors for pressure ulcer development  - Assess and document skin integrity  - Monitor for areas of redness and/or skin breakdown  - Initiate interventions, skin care algorithm/standards of care as needed  Outcome: Progressing     Problem: NEUROLOGICAL - ADULT  Goal: Achieves stable or improved neurological status  Description: INTERVENTIONS  - Assess for and report changes in neurological status  - Initiate measures to prevent increased intracranial pressure  - Maintain blood pressure and fluid volume within ordered parameters to optimize cerebral perfusion and minimize risk of hemorrhage  - Monitor temperature, glucose, and  sodium. Initiate appropriate interventions as ordered  Outcome: Progressing  Goal: Absence of seizures  Description: INTERVENTIONS  - Monitor for seizure activity  - Administer anti-seizure medications as ordered  - Monitor neurological status  Outcome: Progressing  Goal: Remains free of injury related to seizure activity  Description: INTERVENTIONS:  - Maintain airway, patient safety  and administer oxygen as ordered  - Monitor patient for seizure activity, document and report duration and description of seizure to MD/LIP  - If seizure occurs, turn patient to side and suction secretions as needed  - Reorient patient post seizure  - Seizure pads on all 4 side rails  - Instruct patient/family to notify RN of any seizure activity  - Instruct patient/family to call for assistance with activity based on assessment  Outcome: Progressing  Goal: Achieves maximal functionality and self care  Description: INTERVENTIONS  - Monitor swallowing and airway patency with patient fatigue and changes in neurological status  - Encourage and assist patient to increase activity and self care with guidance from PT/OT  - Encourage visually impaired, hearing impaired and aphasic patients to use assistive/communication devices  Outcome: Progressing     Problem: PAIN - ADULT  Goal: Verbalizes/displays adequate comfort level or patient's stated pain goal  Description: INTERVENTIONS:  - Encourage pt to monitor pain and request assistance  - Assess pain using appropriate pain scale  - Administer analgesics based on type and severity of pain and evaluate response  - Implement non-pharmacological measures as appropriate and evaluate response  - Consider cultural and social influences on pain and pain management  - Manage/alleviate anxiety  - Utilize distraction and/or relaxation techniques  - Monitor for opioid side effects  - Notify MD/LIP if interventions unsuccessful or patient reports new pain  - Anticipate increased pain with activity and  pre-medicate as appropriate  Outcome: Progressing     Problem: RISK FOR INFECTION - ADULT  Goal: Absence of fever/infection during anticipated neutropenic period  Description: INTERVENTIONS  - Monitor WBC  - Administer growth factors as ordered  - Implement neutropenic guidelines  Outcome: Progressing     Problem: SAFETY ADULT - FALL  Goal: Free from fall injury  Description: INTERVENTIONS:  - Assess pt frequently for physical needs  - Identify cognitive and physical deficits and behaviors that affect risk of falls.  - [place] fall precautions as indicated by assessment.  - Educate pt/family on patient safety including physical limitations  - Instruct pt to call for assistance with activity based on assessment  - Modify environment to reduce risk of injury  - Provide assistive devices as appropriate  - Consider OT/PT consult to assist with strengthening/mobility  - Encourage toileting schedule  Outcome: Progressing     Problem: DISCHARGE PLANNING  Goal: Discharge to home or other facility with appropriate resources  Description: INTERVENTIONS:  - Identify barriers to discharge w/pt and caregiver  - Include patient/family/discharge partner in discharge planning  - Arrange for needed discharge resources and transportation as appropriate  - Identify discharge learning needs (meds, wound care, etc)  - Arrange for interpreters to assist at discharge as needed  - Consider post-discharge preferences of patient/family/discharge partner  - Complete POLST form as appropriate  - Assess patient's ability to be responsible for managing their own health  - Refer to Case Management Department for coordinating discharge planning if the patient needs post-hospital services based on physician/LIP order or complex needs related to functional status, cognitive ability or social support system  Outcome: Progressing

## 2025-01-29 NOTE — PLAN OF CARE
Patient had IV infiltration this morning, notified Dr [facility], applied warm compression and helped with infiltration site. Consent done for lumbar puncture for tomorrow.     Problem: Safety Risk - Non-Violent Restraints  Goal: Patient will remain free from self-harm  Description: INTERVENTIONS:  - Apply the least restrictive restraint to prevent harm  - Notify patient and family of reasons restraints applied  - Assess for any contributing factors to confusion (electrolyte disturbances, delirium, medications)  - Discontinue any unnecessary medical devices as soon as possible  - Assess the patient's physical comfort, circulation, skin condition, hydration, nutrition and elimination needs   - Reorient and redirection as needed  - Assess for the need to continue restraints  Outcome: Progressing     Problem: Patient Centered Care  Goal: Patient preferences are identified and integrated in the patient's plan of care  Description: Interventions:  - What would you like us to know as we care for you? I am from home with my   - Provide timely, complete, and accurate information to patient/family  - Incorporate patient and family knowledge, values, beliefs, and cultural backgrounds into the planning and delivery of care  - Encourage patient/family to participate in care and decision-making at the level they choose  - Honor patient and family perspectives and choices  Outcome: Progressing     Problem: Patient/Family Goals  Goal: Patient/Family Long Term Goal  Description: Patient's Long Term Goal: Discharge    Interventions:  - Monitor vitals  - Monitor appropriate labs  - Administer medications as ordered  - Follow MD's orders  - Update patient on plan of care   - Discharge planning     - See additional Care Plan goals for specific interventions  Outcome: Progressing  Goal: Patient/Family Short Term Goal  Description: Patient's Short Term Goal:     Interventions:   -   - See additional Care Plan goals for  specific interventions  Outcome: Progressing     Problem: SKIN/TISSUE INTEGRITY - ADULT  Goal: Skin integrity remains intact  Description: INTERVENTIONS  - Assess and document risk factors for pressure ulcer development  - Assess and document skin integrity  - Monitor for areas of redness and/or skin breakdown  - Initiate interventions, skin care algorithm/standards of care as needed  Outcome: Progressing     Problem: NEUROLOGICAL - ADULT  Goal: Achieves stable or improved neurological status  Description: INTERVENTIONS  - Assess for and report changes in neurological status  - Initiate measures to prevent increased intracranial pressure  - Maintain blood pressure and fluid volume within ordered parameters to optimize cerebral perfusion and minimize risk of hemorrhage  - Monitor temperature, glucose, and sodium. Initiate appropriate interventions as ordered  Outcome: Progressing  Goal: Absence of seizures  Description: INTERVENTIONS  - Monitor for seizure activity  - Administer anti-seizure medications as ordered  - Monitor neurological status  Outcome: Progressing  Goal: Remains free of injury related to seizure activity  Description: INTERVENTIONS:  - Maintain airway, patient safety  and administer oxygen as ordered  - Monitor patient for seizure activity, document and report duration and description of seizure to MD/LIP  - If seizure occurs, turn patient to side and suction secretions as needed  - Reorient patient post seizure  - Seizure pads on all 4 side rails  - Instruct patient/family to notify RN of any seizure activity  - Instruct patient/family to call for assistance with activity based on assessment  Outcome: Progressing  Goal: Achieves maximal functionality and self care  Description: INTERVENTIONS  - Monitor swallowing and airway patency with patient fatigue and changes in neurological status  - Encourage and assist patient to increase activity and self care with guidance from PT/OT  - Encourage  visually impaired, hearing impaired and aphasic patients to use assistive/communication devices  Outcome: Progressing     Problem: PAIN - ADULT  Goal: Verbalizes/displays adequate comfort level or patient's stated pain goal  Description: INTERVENTIONS:  - Encourage pt to monitor pain and request assistance  - Assess pain using appropriate pain scale  - Administer analgesics based on type and severity of pain and evaluate response  - Implement non-pharmacological measures as appropriate and evaluate response  - Consider cultural and social influences on pain and pain management  - Manage/alleviate anxiety  - Utilize distraction and/or relaxation techniques  - Monitor for opioid side effects  - Notify MD/LIP if interventions unsuccessful or patient reports new pain  - Anticipate increased pain with activity and pre-medicate as appropriate  Outcome: Progressing     Problem: RISK FOR INFECTION - ADULT  Goal: Absence of fever/infection during anticipated neutropenic period  Description: INTERVENTIONS  - Monitor WBC  - Administer growth factors as ordered  - Implement neutropenic guidelines  Outcome: Progressing     Problem: SAFETY ADULT - FALL  Goal: Free from fall injury  Description: INTERVENTIONS:  - Assess pt frequently for physical needs  - Identify cognitive and physical deficits and behaviors that affect risk of falls.  - [place] fall precautions as indicated by assessment.  - Educate pt/family on patient safety including physical limitations  - Instruct pt to call for assistance with activity based on assessment  - Modify environment to reduce risk of injury  - Provide assistive devices as appropriate  - Consider OT/PT consult to assist with strengthening/mobility  - Encourage toileting schedule  Outcome: Progressing     Problem: DISCHARGE PLANNING  Goal: Discharge to home or other facility with appropriate resources  Description: INTERVENTIONS:  - Identify barriers to discharge w/pt and caregiver  - Include  patient/family/discharge partner in discharge planning  - Arrange for needed discharge resources and transportation as appropriate  - Identify discharge learning needs (meds, wound care, etc)  - Arrange for interpreters to assist at discharge as needed  - Consider post-discharge preferences of patient/family/discharge partner  - Complete POLST form as appropriate  - Assess patient's ability to be responsible for managing their own health  - Refer to Case Management Department for coordinating discharge planning if the patient needs post-hospital services based on physician/LIP order or complex needs related to functional status, cognitive ability or social support system  Outcome: Progressing

## 2025-01-29 NOTE — PLAN OF CARE
Patient is a&ox4. Has trouble orienting to time. Has pacemaker. Tele discontinued today, still on continuous pulse ox. Multiple liquid bowel movements today. Voiding via purewick. Worked with speech today, tolerating general diet-no straws. Restraints discontinued this morning. Bed in lowest position and alarm is on. Patient educated on using call light to ask for help before trying to get out of bed. Uses call light appropriately. Q4 neuro checks. IV abx to R ac PIV. Plan for lumbar puncture on Thursday. Gi stool panel pending.     Problem: Safety Risk - Non-Violent Restraints  Goal: Patient will remain free from self-harm  Description: INTERVENTIONS:  - Apply the least restrictive restraint to prevent harm  - Notify patient and family of reasons restraints applied  - Assess for any contributing factors to confusion (electrolyte disturbances, delirium, medications)  - Discontinue any unnecessary medical devices as soon as possible  - Assess the patient's physical comfort, circulation, skin condition, hydration, nutrition and elimination needs   - Reorient and redirection as needed  - Assess for the need to continue restraints  Outcome: Progressing     Problem: Patient Centered Care  Goal: Patient preferences are identified and integrated in the patient's plan of care  Description: Interventions:  - What would you like us to know as we care for you? [place].  Problem: SKIN/TISSUE INTEGRITY - ADULT  Goal: Skin integrity remains intact  Description: INTERVENTIONS  - Assess and document risk factors for pressure ulcer development  - Assess and document skin integrity  - Monitor for areas of redness and/or skin breakdown  - Initiate interventions, skin care algorithm/standards of care as needed  Outcome: Progressing     - Provide timely, complete, and accurate information to patient/family  - Incorporate patient and family knowledge, values, beliefs, and cultural backgrounds into the planning and delivery of  care  - Encourage patient/family to participate in care and decision-making at the level they choose  - Honor patient and family perspectives and choices  Outcome: Progressing

## 2025-01-29 NOTE — PROGRESS NOTES
INFECTIOUS DISEASE PROGRESS NOTE  [facility]  part of [facility] ID PROGRESS NOTE    Nadiya Felix Patient Status:  Inpatient    3/28/1950 MRN [id number]   Location [facility] 5SW/SE Attending Chetna Amaral MD   Hosp Day # 3 PCP Donell Yin MD     Subjective:  ROS reviewed. Tmax 99.3. IV infiltrated this AM. Still with diarrhea.    ASSESSMENT:    Antibiotics: Vancomycin, ceftriaxone, acyclovir     # Acute fever with AMS and seizure ?R/o meningitis                       -Flu/COVID/RSV negative  # Diarrhea, CDPCR negative  # Seizure  # Alzheimers   # HTN     PLAN:  -  Continue on vancomycin, ceftriaxone, and acyclovir. Awaiting LP tomorrow.  -  Follow fever curve, wbc.  -  Reviewed labs, micro, imaging reports, available old records.  -  Case d/w patient, , RN.     History of Present Illness:  Nadiya Felix is a 74 year old female with a history of seizure disorder, Alzheimer's, recurrent UTIs, who presented to [facility] ED on  after having a possible seizure.  states that she was fatigued most of the day and had diarrhea the day before. Notes a sore throat and states her neck is sore.  said she just finished an antibiotic for a UTI. On arrival, Tmax 102.5, wbc 6.7, CTH negative, CXR without infiltrate, UA negative, blood cx NGTD, urine cx no growth, CDPCR  negative, started on vancomycin, acyclovir, and ceftriaxone. Tmax 101.4 last night.  reports she is closer back to her baseline. ID consulted.    Physical Exam:  /82 (BP Location: Right arm)   Pulse 67   Temp 98.5 °F (36.9 °C) (Oral)   Resp 17   Ht 5' 5\" (1.651 m)   Wt 152 lb (68.9 kg)   SpO2 95%   BMI 25.32 kg/m²     Gen:   Awake, in bed  HEENT:  EOMI, neck supple  CV/lungs:  RRR, CTAB  Abdom:  Soft, NT/ND, +BS  Skin/extrem:  No rashes, no c/c/e  Lines:  PIV+    Laboratory Data: Reviewed    Microbiology: Reviewed    Radiology: Reviewed      RUTH ANN Hammond  [facility]  [phone]  1/29/2025

## 2025-01-30 ENCOUNTER — APPOINTMENT (OUTPATIENT)
Dept: GENERAL RADIOLOGY | Facility: HOSPITAL | Age: 75
End: 2025-01-30
Attending: STUDENT IN AN ORGANIZED HEALTH CARE EDUCATION/TRAINING PROGRAM
Payer: MEDICARE

## 2025-01-30 LAB
ANION GAP SERPL CALC-SCNC: 9 MMOL/L (ref 0–18)
BASOPHILS # BLD AUTO: 0.02 X10(3) UL (ref 0–0.2)
BASOPHILS NFR BLD AUTO: 0.6 %
BUN BLD-MCNC: <5 MG/DL (ref 9–23)
CALCIUM BLD-MCNC: 8.4 MG/DL (ref 8.7–10.4)
CHLORIDE SERPL-SCNC: 114 MMOL/L (ref 98–112)
CO2 SERPL-SCNC: 24 MMOL/L (ref 21–32)
COLOR CSF: COLORLESS
CREAT BLD-MCNC: 0.86 MG/DL
CYTOMEGALOVIRUS (CMV): NOT DETECTED
CYTOMEGALOVIRUS (CMV): NOT DETECTED
DEPRECATED RDW RBC AUTO: 41.9 FL (ref 35.1–46.3)
EGFRCR SERPLBLD CKD-EPI 2021: 70 ML/MIN/1.73M2 (ref 60–?)
ENTEROVIRUS (EV): NOT DETECTED
ENTEROVIRUS (EV): NOT DETECTED
EOSINOPHIL # BLD AUTO: 0.17 X10(3) UL (ref 0–0.7)
EOSINOPHIL NFR BLD AUTO: 4.8 %
ERYTHROCYTE [DISTWIDTH] IN BLOOD BY AUTOMATED COUNT: 12.8 % (ref 11–15)
ESCHERICHIA COLI K1: NOT DETECTED
ESCHERICHIA COLI K1: NOT DETECTED
GLUCOSE BLD-MCNC: 102 MG/DL (ref 70–99)
GLUCOSE CSF-MCNC: 75 MG/DL (ref 40–70)
HAEMOPHILUS INFLUENZAE: NOT DETECTED
HAEMOPHILUS INFLUENZAE: NOT DETECTED
HCT VFR BLD AUTO: 31.2 %
HERPES SIMPLEX VIRUS 1 (HSV-1): NOT DETECTED
HERPES SIMPLEX VIRUS 1 (HSV-1): NOT DETECTED
HERPES SIMPLEX VIRUS 2 (HSV-2): NOT DETECTED
HERPES SIMPLEX VIRUS 2 (HSV-2): NOT DETECTED
HGB BLD-MCNC: 10.4 G/DL
HUMAN HERPESVIRUS 6 (HHV-6): NOT DETECTED
HUMAN HERPESVIRUS 6 (HHV-6): NOT DETECTED
HUMAN PARECHOVIRUS (HPEV): NOT DETECTED
HUMAN PARECHOVIRUS (HPEV): NOT DETECTED
IMM GRANULOCYTES # BLD AUTO: 0.02 X10(3) UL (ref 0–1)
IMM GRANULOCYTES NFR BLD: 0.6 %
LISTERIA MONOCYTOGENES: NOT DETECTED
LISTERIA MONOCYTOGENES: NOT DETECTED
LYMPHOCYTES # BLD AUTO: 0.61 X10(3) UL (ref 1–4)
LYMPHOCYTES NFR BLD AUTO: 17.3 %
MAGNESIUM SERPL-MCNC: 1.6 MG/DL (ref 1.6–2.6)
MCH RBC QN AUTO: 30.1 PG (ref 26–34)
MCHC RBC AUTO-ENTMCNC: 33.3 G/DL (ref 31–37)
MCV RBC AUTO: 90.4 FL
MONOCYTES # BLD AUTO: 0.35 X10(3) UL (ref 0.1–1)
MONOCYTES NFR BLD AUTO: 9.9 %
NEISSERIA MENINGIDITIS: NOT DETECTED
NEISSERIA MENINGIDITIS: NOT DETECTED
NEUTROPHILS # BLD AUTO: 2.35 X10 (3) UL (ref 1.5–7.7)
NEUTROPHILS # BLD AUTO: 2.35 X10(3) UL (ref 1.5–7.7)
NEUTROPHILS NFR BLD AUTO: 66.8 %
NS CRYPTOCOCCUS (C. NEOFORMANS/C. GATTII): NOT DETECTED
PHOSPHATE SERPL-MCNC: 2.6 MG/DL (ref 2.4–5.1)
PLATELET # BLD AUTO: 150 10(3)UL (ref 150–450)
POTASSIUM SERPL-SCNC: 3 MMOL/L (ref 3.5–5.1)
POTASSIUM SERPL-SCNC: 3 MMOL/L (ref 3.5–5.1)
POTASSIUM SERPL-SCNC: 3.5 MMOL/L (ref 3.5–5.1)
PROT PATTERN CSF ELPH-IMP: 30.9 MG/DL (ref 15–45)
RBC # BLD AUTO: 3.45 X10(6)UL
RBC # CSF: 8 /CUMM (ref ?–1)
SODIUM SERPL-SCNC: 147 MMOL/L (ref 136–145)
STREPTOCOCCUS AGALACTIAE: NOT DETECTED
STREPTOCOCCUS AGALACTIAE: NOT DETECTED
STREPTOCOCCUS PNEUMONIAE: NOT DETECTED
STREPTOCOCCUS PNEUMONIAE: NOT DETECTED
TOTAL CELLS COUNTED CSF: 1 /CUMM (ref 0–5)
TOTAL VOLUME CSF: 10 ML
TUBE # CSF: 3
TURBIDITY CSF QL: CLEAR
VANCOMYCIN PEAK SERPL-MCNC: 23.3 UG/ML (ref 30–50)
VARICELLA ZOSTER VIRUS (VZV): NOT DETECTED
VARICELLA ZOSTER VIRUS (VZV): NOT DETECTED
WBC # BLD AUTO: 3.5 X10(3) UL (ref 4–11)

## 2025-01-30 PROCEDURE — 009U3ZX DRAINAGE OF SPINAL CANAL, PERCUTANEOUS APPROACH, DIAGNOSTIC: ICD-10-PCS | Performed by: INTERNAL MEDICINE

## 2025-01-30 PROCEDURE — 99232 SBSQ HOSP IP/OBS MODERATE 35: CPT | Performed by: OTHER

## 2025-01-30 PROCEDURE — 62328 DX LMBR SPI PNXR W/FLUOR/CT: CPT | Performed by: STUDENT IN AN ORGANIZED HEALTH CARE EDUCATION/TRAINING PROGRAM

## 2025-01-30 RX ORDER — LORAZEPAM 2 MG/ML
2 INJECTION INTRAMUSCULAR EVERY 4 HOURS PRN
Status: DISCONTINUED | OUTPATIENT
Start: 2025-01-30 | End: 2025-02-02

## 2025-01-30 RX ORDER — POTASSIUM CHLORIDE 14.9 MG/ML
20 INJECTION INTRAVENOUS ONCE
Status: COMPLETED | OUTPATIENT
Start: 2025-01-30 | End: 2025-01-30

## 2025-01-30 RX ORDER — AMLODIPINE BESYLATE 2.5 MG/1
2.5 TABLET ORAL DAILY
COMMUNITY
End: 2025-02-02

## 2025-01-30 RX ORDER — ESTRADIOL 0.1 MG/G
1 CREAM VAGINAL
COMMUNITY

## 2025-01-30 RX ORDER — PANTOPRAZOLE SODIUM 40 MG/1
40 TABLET, DELAYED RELEASE ORAL
COMMUNITY
End: 2025-02-11

## 2025-01-30 RX ORDER — MAGNESIUM SULFATE HEPTAHYDRATE 40 MG/ML
2 INJECTION, SOLUTION INTRAVENOUS ONCE
Status: COMPLETED | OUTPATIENT
Start: 2025-01-30 | End: 2025-01-30

## 2025-01-30 NOTE — CM/SW NOTE
01/30/25 1600   CM/SW Referral Data   Referral Source Social Work (self-referral)   Reason for Referral Discharge planning   Informant Patient;Spouse/Significant Other;EMR;Clinical Staff Member   Medical Hx   Does patient have an established PCP? Yes  (Dr. Donell Yin)   Significant Past Medical/Mental Health Hx Hx of seizures; CVA; Alzheimers   Patient Info   Advanced directives? No   Patient's Current Mental Status at Time of Assessment Confused or unable to complete assessment   Patient's Home Environment House   Number of Levels in Home 2   Number of Stair in Home 2 SOPHIA; 16 to 2nd floor   Patient lives with Spouse/Significant other   Patient Status Prior to Admission   Independent with ADLs and Mobility Yes   Services in place prior to admission DME/Supplies at home   Type of DME/Supplies Straight Cane;Wheeled Walker;Wheelchair   Discharge Needs   Anticipated D/C needs Home health care;Subacute rehab;To be determined     SW self-referred to this case for discharge planning.    Pt admitted for witnessed seizure. ID and neuro following. Pt had lumbar puncture 1/30 to r/o meningitis.     JUNIOR met with pt and  [facility] bedside to complete assessment.    Pt seen in bed, lethargic.    SW confirmed address and PCP on file.    At baseline, pt is independent with ADLs and mobility.  She is still driving.    Pt has hx with home health care and acute rehab- no prior [facility] hx.    [facility] is open to home health care for patient.    Therapy continues to follow case.    [facility]  Face to face uploaded.    PLAN: TBD pending clinical course    / to remain available for support and/or discharge planning.     Delia LYNN, LSW  Discharge Planner

## 2025-01-30 NOTE — PROGRESS NOTES
[facility]  [address] [place] [address], [address]  [facility] [address]  [phone]        INPATIENT NEUROLOGY   FOLLOW UP PROGRESS NOTE  [facility]  part of [facility]    Nadiya Felix Patient Status:  Inpatient     3/28/1950 MRN [id number]    Location [facility] 5SW/SE Attending Chetna Amaral MD    Hosp Day # 4 PCP Donell Yin MD    Date of Admission:  2025  Date of Consult Follow Up:  2025     Assessment and Plan:   Nadiya Felix is a 74 year old {NEU_HANDEDNESS:840::\"right handed\"} female w/ a pmhx sig. for  *** who ***. On exam ****.     ***  Differential Diagnosis:  ***  ***     Plan    Diagnostics:  ***  ***  Therapeutics:  ***  Neurochecks Q4    1. Witnessed seizure (HCC)    2. History of seizures    3. Altered mental status, unspecified altered mental status type    4. Delirium, acute         Procedures    CBC With Differential With Platelet    Comp Metabolic Panel (14)    Urinalysis with Culture Reflex    Lamotrigine (Lamictal), Serum    Lactic Acid, Plasma    UA Microscopic only, urine    Lactic Acid Reflex Post Positive    Comp Metabolic Panel (14)    Magnesium    CBC With Differential With Platelet    Lactic Acid Post Positive    Basic Metabolic Panel (8)    Magnesium    Potassium    Protein, Total, Csf    Glucose, Cerebrospinal Fluid    Cell Count, CSF    Meningitis/Encephalitis Panel by PCR    Basic Metabolic Panel (8)    Potassium    Basic Metabolic Panel (8)    CBC With Differential With Platelet    Phosphorus    Magnesium    Vancomycin Peak, Serum    Vancomycin Trough, Serum    Magnesium    Basic Metabolic Panel (8)    CBC With Differential With Platelet    Phosphorus    Magnesium    Potassium    CT BRAIN OR HEAD (CPT=70450)    XR CHEST AP PORTABLE  (CPT=71045)    XR LUMBAR PUNCTURE DIAG, INCLD IMG (CPT=62328)           INTERVAL EVENTS  25: ***       SUBJECTIVE:     ***  Pertinent positive and negatives per HPI.   All others were reviewed and negative.       Objective   OBJECTIVE:   Last vitals and weight :  Blood pressure 131/71, pulse 68, temperature 98.9 °F (37.2 °C), temperature source Oral, resp. rate 17, height 65\", weight 153 lb 9.6 oz (69.7 kg), SpO2 96%, not currently breastfeeding.   Vitals:    01/29/25 0800 01/29/25 2052 01/30/25 0543 01/30/25 0800   BP: 145/82 (!) 161/78 (!) 162/81 131/71   BP Location: Right arm Right arm Right arm Right arm   Pulse: 67 72 71 68   Resp: 17 17 16 17   Temp: 98.5 °F (36.9 °C) 99.4 °F (37.4 °C) 99.1 °F (37.3 °C) 98.9 °F (37.2 °C)   TempSrc: Oral Oral Oral Oral   SpO2: 95% 97% 95% 96%   Weight: 152 lb (68.9 kg)   153 lb 9.6 oz (69.7 kg)   Height:          Exam:  - General: {Exam; general:37591::\"appears stated age\",\"no distress\"}  - CV:    Carotids: {[facility] AMB CAROTID BRUIT:[id number]}  - Pulmonary: no sign of respiratory distress.   Neurologic Exam  - Mental Status: {neuro icu mental status (Optional):46411::\"Alert and attentive. \"} Oriented to  {orientation:06068::\"***\"}.  Speech is spontaneous, fluent, and prosodic. Comprehension intact. Repetition intact. Phrase length and rate are normal. No paraphasic errors, neologisms, anomia, neglect, apraxia, or R/L confusion.   - Cranial Nerves: No gaze preference. Visual fields:{FINDINGS; EYE EXAM VISUAL FIELD DEFECTS:04953::\"normal\"}  Pupils are {Numbers; 0-6:34034::\"4\"}mm briskly constricting to {Numbers; 0-6:84949::\"3\"}mm and equally round and reactive to light  in a well lit room. EOMI. No nystagmus. No ptosis. V1-V3 intact B/L to light touch.{trace subtle:89642::\"No pathological facial asymmetry.\"} {nasolabial fold:21197::\"No flattening of the nasolabial fold. \"}.  Hearing grossly intact.  Tongue midline. No atrophy or fasiculations of the tongue noted. Palate and uvula elevate symmetrically.  Shoulder shrug symmetric.  - Motor:  normal tone, normal bulk. No interosseous wasting. No flattening of hypothenar eminences.    Right Left      Motor Strength    5 5   Knee extensors 5 5    Pronator drift: {Pronator drift:21302::\"No pronator drift\"}   Index finger rolling: No orbiting.   Finger Taps: {finger taps:21303::\"Finger taps are symmetric in rate and amplitude. \"}.    Rapid movements: {rapid movements:9369::\"Rapid/fine movements are symmetric. As expected their dominant hand is slightly faster.\"}   Leg Drift: none    Asterixis: No asterixis noted.   Tremor:   - Sensory:   Light touch:normal   Temperature: normal  Vibration: normal   - Cerebellum: No truncal ataxia. No titubations. No dysmetria, no dysdiadochokinesis. No overshoot.  Reflexes:    C5 C6 C7  L4 S1   R 2+ 2+  2+ 2+   L 2+ 2+  2+ 2+   Adductor Spread: {adductor spread:9371::\"No adductor spread noted.\"}      Ronald's sign:{Responses; absent/present cva tenderness:707::\"absent\"}   Nonsustained clonus: Absent   Sustained clonus: Absent   - Plantar response: {EXAM; NEURO PED BABINSKI:12193::\"flexor bilaterally\"}    Medications:   potassium chloride  20 mEq Intravenous Once    acyclovir  10 mg/kg (Ideal) Intravenous Q8H    amLODIPine  5 mg Oral Daily    pantoprazole  40 mg Intravenous Before breakfast    [Held by provider] aspirin  81 mg Oral Daily    atorvastatin  20 mg Oral Nightly    escitalopram  10 mg Oral Daily    lamoTRIgine  150 mg Oral BID    heparin  5,000 Units Subcutaneous Q8H CLAUDETTE    levETIRAcetam  500 mg Intravenous Q12H    cefTRIAXone  2 g Intravenous Q12H    vancomycin  1,000 mg Intravenous Q24H    lacosamide  50 mg Intravenous Q12H       PRNS:   LORazepam    hydrALAzine    acetaminophen    ondansetron    LORazepam    acetaminophen    Infusions:    sodium chloride 100 mL/hr at 01/30/25 1002            Results:   Laboratory Data:  Lab Results   Component Value Date    WBC 3.5 (L) 01/30/2025    HGB 10.4 (L) 01/30/2025    HCT 31.2 (L) 01/30/2025    .0 01/30/2025    CREATSERUM 0.86 01/30/2025    BUN <5 (L) 01/30/2025     (H) 01/30/2025    K 3.0 (L) 01/30/2025     K 3.0 (L) 01/30/2025     (H) 01/30/2025    CO2 24.0 01/30/2025     (H) 01/30/2025    CA 8.4 (L) 01/30/2025    ALB 3.8 01/27/2025    ALKPHO 43 (L) 01/27/2025    TP 6.3 01/27/2025    AST 42 (H) 01/27/2025    ALT 20 01/27/2025    PTT 32.3 04/09/2024    INR 0.99 04/09/2024    PTP 13.7 04/09/2024    T4F 1.07 07/09/2018    TSH 1.400 09/19/2023    LIP 64 (H) 05/13/2024    DDIMER 0.72 08/30/2022    CRP <0.5 03/15/2018    MG 1.6 01/30/2025    PHOS 2.6 01/30/2025    TROP <0.045 06/19/2021    CK 87 05/25/2017    B12 525 02/08/2023    ETOH <3 04/23/2019     Recent Results (from the past 72 hours)   Clostridium difficile(toxigenic)PCR    Collection Time: 01/27/25  4:11 PM    Specimen: Stool   Result Value Ref Range    C. Difficile Toxin B Gene Negative Negative   Basic Metabolic Panel (8)    Collection Time: 01/28/25  8:24 AM   Result Value Ref Range    Glucose 87 70 - 99 mg/dL    Sodium 144 136 - 145 mmol/L    Potassium 3.1 (L) 3.5 - 5.1 mmol/L    Chloride 115 (H) 98 - 112 mmol/L    CO2 19.0 (L) 21.0 - 32.0 mmol/L    Anion Gap 10 0 - 18 mmol/L    BUN 14 9 - 23 mg/dL    Creatinine 0.89 0.55 - 1.02 mg/dL    BUN/CREA Ratio 15.7 10.0 - 20.0    Calcium, Total 8.5 (L) 8.7 - 10.4 mg/dL    Calculated Osmolality 298 (H) 275 - 295 mOsm/kg    eGFR-Cr 68 >=60 mL/min/1.73m2   Magnesium    Collection Time: 01/28/25  8:24 AM   Result Value Ref Range    Magnesium 2.0 1.6 - 2.6 mg/dL   RAINBOW DRAW LAVENDER    Collection Time: 01/28/25  8:24 AM   Result Value Ref Range    Hold Lavender Auto Resulted    Potassium    Collection Time: 01/29/25  5:14 AM   Result Value Ref Range    Potassium 3.4 (L) 3.5 - 5.1 mmol/L   Basic Metabolic Panel (8)    Collection Time: 01/29/25  5:14 AM   Result Value Ref Range    Glucose 88 70 - 99 mg/dL    Sodium 146 (H) 136 - 145 mmol/L    Potassium 3.4 (L) 3.5 - 5.1 mmol/L    Chloride 115 (H) 98 - 112 mmol/L    CO2 24.0 21.0 - 32.0 mmol/L    Anion Gap 7 0 - 18 mmol/L    BUN 10 9 - 23 mg/dL    Creatinine  0.83 0.55 - 1.02 mg/dL    BUN/CREA Ratio 12.0 10.0 - 20.0    Calcium, Total 8.4 (L) 8.7 - 10.4 mg/dL    Calculated Osmolality 300 (H) 275 - 295 mOsm/kg    eGFR-Cr 73 >=60 mL/min/1.73m2   RAINBOW DRAW LAVENDER    Collection Time: 01/29/25  5:38 AM   Result Value Ref Range    Hold Lavender Auto Resulted    POCT Glucose    Collection Time: 01/29/25 12:16 PM   Result Value Ref Range    POC Glucose  110 (H) 70 - 99 mg/dL   Potassium    Collection Time: 01/30/25  5:32 AM   Result Value Ref Range    Potassium 3.0 (L) 3.5 - 5.1 mmol/L   Basic Metabolic Panel (8)    Collection Time: 01/30/25  5:32 AM   Result Value Ref Range    Glucose 102 (H) 70 - 99 mg/dL    Sodium 147 (H) 136 - 145 mmol/L    Potassium 3.0 (L) 3.5 - 5.1 mmol/L    Chloride 114 (H) 98 - 112 mmol/L    CO2 24.0 21.0 - 32.0 mmol/L    Anion Gap 9 0 - 18 mmol/L    BUN <5 (L) 9 - 23 mg/dL    Creatinine 0.86 0.55 - 1.02 mg/dL    BUN/CREA Ratio      Calcium, Total 8.4 (L) 8.7 - 10.4 mg/dL    Calculated Osmolality      eGFR-Cr 70 >=60 mL/min/1.73m2   CBC With Differential With Platelet    Collection Time: 01/30/25  5:32 AM   Result Value Ref Range    WBC 3.5 (L) 4.0 - 11.0 x10(3) uL    RBC 3.45 (L) 3.80 - 5.30 x10(6)uL    HGB 10.4 (L) 12.0 - 16.0 g/dL    HCT 31.2 (L) 35.0 - 48.0 %    MCV 90.4 80.0 - 100.0 fL    MCH 30.1 26.0 - 34.0 pg    MCHC 33.3 31.0 - 37.0 g/dL    RDW-SD 41.9 35.1 - 46.3 fL    RDW 12.8 11.0 - 15.0 %    .0 150.0 - 450.0 10(3)uL    Neutrophil Absolute Prelim 2.35 1.50 - 7.70 x10 (3) uL    Neutrophil Absolute 2.35 1.50 - 7.70 x10(3) uL    Lymphocyte Absolute 0.61 (L) 1.00 - 4.00 x10(3) uL    Monocyte Absolute 0.35 0.10 - 1.00 x10(3) uL    Eosinophil Absolute 0.17 0.00 - 0.70 x10(3) uL    Basophil Absolute 0.02 0.00 - 0.20 x10(3) uL    Immature Granulocyte Absolute 0.02 0.00 - 1.00 x10(3) uL    Neutrophil % 66.8 %    Lymphocyte % 17.3 %    Monocyte % 9.9 %    Eosinophil % 4.8 %    Basophil % 0.6 %    Immature Granulocyte % 0.6 %   Phosphorus     Collection Time: 25  5:32 AM   Result Value Ref Range    Phosphorus 2.6 2.4 - 5.1 mg/dL   Magnesium    Collection Time: 25  5:32 AM   Result Value Ref Range    Magnesium 1.6 1.6 - 2.6 mg/dL   Vancomycin Peak, Serum    Collection Time: 25  5:32 AM   Result Value Ref Range    Vancomycin Peak 23.3 (L) 30.0 - 50.0 ug/mL   Protein, Total, Csf    Collection Time: 25 11:56 AM   Result Value Ref Range    Total Protein CSF 30.9 15.0 - 45.0 mg/dL   Glucose, Cerebrospinal Fluid    Collection Time: 25 11:56 AM   Result Value Ref Range    Glucose CSF 75 (H) 40 - 70 mg/dL   Cell Count, CSF    Collection Time: 25 11:56 AM   Result Value Ref Range    Total Volume CSF 10.0 mL    CSF TUBE # 3     Color CSF Colorless Colorless    CSF Turbidity Clear Clear    TNC, CSF 1 0 - 5 /CUMM    RBC CSF 8 (H) <1 /CUMM   CSF culture    Collection Time: 25 11:56 AM    Specimen: CSF, lumbar puncture; Cerebral spinal fluid   Result Value Ref Range    CSF Smear No organisms seen     CSF Smear No WBCs seen     CSF Smear This is a cytocentrifuged smear.          Test results/Imaging:   CT BRAIN OR HEAD (CPT=70450)    Result Date: 2025  CONCLUSION:   No acute intracranial abnormality.    Dictated by (CST): Pino Hicks MD on 2025 at 5:42 PM     Finalized by (CST): Pino Hicks MD on 2025 at 5:44 PM                Performed an independent visualization of:  {[facility] neuroimagin}   Imaging revealed: {imaging results (normal):9636::\"Agree with radiology read.\"}    Education/Instructions given to: {Persons; family members:575008}   Barriers to Learning:{BARRIERS TO EDUCATION:3997::\"None\"}  Content: Refer to note above. Evaluation/Outcome: {Verbalized understandin::\"Verbalized understanding\"}    Disclaimer:   This record was dictated using Dragon software. There may be errors due to voice recognition problems that were not realized and corrected during the completion of the note.       This document is not intended to support charting by exception.  Sections left blank in a completed note should be presumed not to have been done.     Total face to face time was ***, more than 50% of the time was spent in counseling and/or coordination of care related to ***.    Thank you.  Robi Liriano D.O.   Vascular & General Neurology    1/30/2025  2:24 PM

## 2025-01-30 NOTE — PROGRESS NOTES
[facility]  part of [facility]    Progress Note    Nadiya Felix Patient Status:  Inpatient    3/28/1950 MRN [id number]   Location [facility] 5SW/SE Attending Chetna Amaral MD   Hosp Day # 4 PCP Donell Yin MD     Chief Complaint:     Witnessed Seizure.    Subjective:   Subjective:    Patient seen and examined   Afebrile,   Endorsing diffuse abdominal pain  Confusion improved.  Did endorse some neck stiffness    Objective:   Blood pressure 131/71, pulse 68, temperature 98.9 °F (37.2 °C), temperature source Oral, resp. rate 17, height 5' 5\" (1.651 m), weight 153 lb 9.6 oz (69.7 kg), SpO2 96%, not currently breastfeeding.  Physical Exam    General: Patient is alert and oriented x3 does not appear to be in acute distress at this time  HEENT: EOMI PERRLA, atraumatic normocephalic  Cardiac: S1-S2 appreciated  Lungs: Good air entry bilaterally clear to auscultation  Abdomen: Soft nontender nondistended positive bowel sounds  Ext: Peripheral pulses are positive  Neuro: No focal deficits noted  Psych: Normal mood  Skin: No rashes noted  MSK: Full range of motion intact      Results:   Lab Results   Component Value Date    WBC 3.5 (L) 2025    HGB 10.4 (L) 2025    HCT 31.2 (L) 2025    .0 2025    CREATSERUM 0.86 2025    BUN <5 (L) 2025     (H) 2025    K 3.0 (L) 2025    K 3.0 (L) 2025     (H) 2025    CO2 24.0 2025     (H) 2025    CA 8.4 (L) 2025    ALB 3.8 2025    ALKPHO 43 (L) 2025    BILT 1.4 (H) 2025    TP 6.3 2025    AST 42 (H) 2025    ALT 20 2025    PTT 32.3 2024    INR 0.99 2024    T4F 1.07 2018    TSH 1.400 2023    LIP 64 (H) 2024    DDIMER 0.72 2022    CRP <0.5 03/15/2018    MG 1.6 2025    PHOS 2.6 2025    TROP <0.045 2021    TROPHS 12 10/31/2023    CK 87 2017    B12 525 2023     ETOH <3 04/23/2019       No results found.        Assessment & Plan:      Seizure disorder  Breakthrough seizure  -Witnessed by  on day of admission.  -Self resolved after around 5 minutes per family report.  -Family states patient with known history of seizure disorder.  Believes she has been taking her medications appropriately.  -CT brain with no acute intracranial abnormalities.  -Loaded with Keppra in ED.,  Continue 500 mg twice daily.  -Continue Vimpat.  -Neurology consulted, appreciate further recommendations.       Altered mental status  -Improving  -Likely metabolic encephalopathy  -Possible postictal state from seizure activity as above  -Some concern for possible infectious causes   -Family reports recent UTI.  Current UA less concerning for active infection.    -Continuing empiric antibiotics including coverage for possible bacterial meningitis.  -Unable to obtain MRI at this time.  -Continue empiric acyclovir.  -ID consulted, recommending further evaluation with lumbar puncture to rule out meningitis.  Continuing empiric treatment.  Appreciate further recommendations.  -Patient also with ELIJAH.  -Treating underlying conditions.  -Supportive care  -PT/OT/SLP     Acute Kidney Injury   -Weaning IVF  -Encouraging p.o. intake when cleared by SLP.  - Avoiding Nephrotoxic agents  - Cont to monitor     Hypertension  -Improved control  -Increase amlodipine  -As needed IV hydralazine  -Continue to monitor     Reported history of dementia  -Holding donepezil due to risk of lowering seizure threshold.        Plan of care discussed with patient and family at bedside . Discussed management/test result(s) with Rn and ID consultant.     Quality:  DVT Prophylaxis: Heparin  CODE status: Full  Estimated date of discharge: TBD  Discharge is dependent on: clinical stability    Global A/P  -GI panel pending  -hypokalemia - continue electrolyte replacement protocol  -hypernatremia - will monitor  -Acute fevers R/o  Meningitis  -continue IV vancomycin, Ceftriaxone, acyclovir  -plan for LP today  -appreciate ID and Neurology recs  -Reviewed previous consultant notes  -Reviewed CBC, BMP, Mag, and Phos  -Reviewed tests ordered  -Repeat labs in am  -MDM: High, severe exacerbation of chronic illness posing a threat to life. IV medications requiring close inpatient monitoring.           Chetna Amaral MD

## 2025-01-30 NOTE — PLAN OF CARE
Problem: Patient Centered Care  Goal: Patient preferences are identified and integrated in the patient's plan of care  Description: Interventions:  - What would you like us to know as we care for you? I am from home with my   - Provide timely, complete, and accurate information to patient/family  - Incorporate patient and family knowledge, values, beliefs, and cultural backgrounds into the planning and delivery of care  - Encourage patient/family to participate in care and decision-making at the level they choose  - Honor patient and family perspectives and choices  Outcome: Progressing     Problem: Patient/Family Goals  Goal: Patient/Family Long Term Goal  Description: Patient's Long Term Goal: Discharge    Interventions:  - Monitor vitals  - Monitor appropriate labs  - Administer medications as ordered  - Follow MD's orders  - Update patient on plan of care   - Discharge planning     - See additional Care Plan goals for specific interventions  Outcome: Progressing  Goal: Patient/Family Short Term Goal  Description: Patient's Short Term Goal:     Interventions:   -   - See additional Care Plan goals for specific interventions  Outcome: Progressing     Problem: SKIN/TISSUE INTEGRITY - ADULT  Goal: Skin integrity remains intact  Description: INTERVENTIONS  - Assess and document risk factors for pressure ulcer development  - Assess and document skin integrity  - Monitor for areas of redness and/or skin breakdown  - Initiate interventions, skin care algorithm/standards of care as needed  Outcome: Progressing     Problem: NEUROLOGICAL - ADULT  Goal: Achieves stable or improved neurological status  Description: INTERVENTIONS  - Assess for and report changes in neurological status  - Initiate measures to prevent increased intracranial pressure  - Maintain blood pressure and fluid volume within ordered parameters to optimize cerebral perfusion and minimize risk of hemorrhage  - Monitor temperature, glucose, and  sodium. Initiate appropriate interventions as ordered  Outcome: Progressing  Goal: Absence of seizures  Description: INTERVENTIONS  - Monitor for seizure activity  - Administer anti-seizure medications as ordered  - Monitor neurological status  Outcome: Progressing  Goal: Remains free of injury related to seizure activity  Description: INTERVENTIONS:  - Maintain airway, patient safety  and administer oxygen as ordered  - Monitor patient for seizure activity, document and report duration and description of seizure to MD/LIP  - If seizure occurs, turn patient to side and suction secretions as needed  - Reorient patient post seizure  - Seizure pads on all 4 side rails  - Instruct patient/family to notify RN of any seizure activity  - Instruct patient/family to call for assistance with activity based on assessment  Outcome: Progressing  Goal: Achieves maximal functionality and self care  Description: INTERVENTIONS  - Monitor swallowing and airway patency with patient fatigue and changes in neurological status  - Encourage and assist patient to increase activity and self care with guidance from PT/OT  - Encourage visually impaired, hearing impaired and aphasic patients to use assistive/communication devices  Outcome: Progressing     Problem: PAIN - ADULT  Goal: Verbalizes/displays adequate comfort level or patient's stated pain goal  Description: INTERVENTIONS:  - Encourage pt to monitor pain and request assistance  - Assess pain using appropriate pain scale  - Administer analgesics based on type and severity of pain and evaluate response  - Implement non-pharmacological measures as appropriate and evaluate response  - Consider cultural and social influences on pain and pain management  - Manage/alleviate anxiety  - Utilize distraction and/or relaxation techniques  - Monitor for opioid side effects  - Notify MD/LIP if interventions unsuccessful or patient reports new pain  - Anticipate increased pain with activity and  pre-medicate as appropriate  Outcome: Progressing     Problem: RISK FOR INFECTION - ADULT  Goal: Absence of fever/infection during anticipated neutropenic period  Description: INTERVENTIONS  - Monitor WBC  - Administer growth factors as ordered  - Implement neutropenic guidelines  Outcome: Progressing     Problem: SAFETY ADULT - FALL  Goal: Free from fall injury  Description: INTERVENTIONS:  - Assess pt frequently for physical needs  - Identify cognitive and physical deficits and behaviors that affect risk of falls.  - [place] fall precautions as indicated by assessment.  - Educate pt/family on patient safety including physical limitations  - Instruct pt to call for assistance with activity based on assessment  - Modify environment to reduce risk of injury  - Provide assistive devices as appropriate  - Consider OT/PT consult to assist with strengthening/mobility  - Encourage toileting schedule  Outcome: Progressing     Problem: DISCHARGE PLANNING  Goal: Discharge to home or other facility with appropriate resources  Description: INTERVENTIONS:  - Identify barriers to discharge w/pt and caregiver  - Include patient/family/discharge partner in discharge planning  - Arrange for needed discharge resources and transportation as appropriate  - Identify discharge learning needs (meds, wound care, etc)  - Arrange for interpreters to assist at discharge as needed  - Consider post-discharge preferences of patient/family/discharge partner  - Complete POLST form as appropriate  - Assess patient's ability to be responsible for managing their own health  - Refer to Case Management Department for coordinating discharge planning if the patient needs post-hospital services based on physician/LIP order or complex needs related to functional status, cognitive ability or social support system  Outcome: Progressing

## 2025-01-31 LAB
ANION GAP SERPL CALC-SCNC: 7 MMOL/L (ref 0–18)
BASOPHILS # BLD AUTO: 0.01 X10(3) UL (ref 0–0.2)
BASOPHILS NFR BLD AUTO: 0.3 %
BUN BLD-MCNC: <5 MG/DL (ref 9–23)
CALCIUM BLD-MCNC: 8.5 MG/DL (ref 8.7–10.4)
CHLORIDE SERPL-SCNC: 111 MMOL/L (ref 98–112)
CO2 SERPL-SCNC: 27 MMOL/L (ref 21–32)
CREAT BLD-MCNC: 0.82 MG/DL
DEPRECATED RDW RBC AUTO: 41 FL (ref 35.1–46.3)
EGFRCR SERPLBLD CKD-EPI 2021: 75 ML/MIN/1.73M2 (ref 60–?)
EOSINOPHIL # BLD AUTO: 0.16 X10(3) UL (ref 0–0.7)
EOSINOPHIL NFR BLD AUTO: 5.1 %
ERYTHROCYTE [DISTWIDTH] IN BLOOD BY AUTOMATED COUNT: 12.9 % (ref 11–15)
GLUCOSE BLD-MCNC: 97 MG/DL (ref 70–99)
HCT VFR BLD AUTO: 30.8 %
HGB BLD-MCNC: 10.5 G/DL
IMM GRANULOCYTES # BLD AUTO: 0.03 X10(3) UL (ref 0–1)
IMM GRANULOCYTES NFR BLD: 1 %
LYMPHOCYTES # BLD AUTO: 0.77 X10(3) UL (ref 1–4)
LYMPHOCYTES NFR BLD AUTO: 24.7 %
MAGNESIUM SERPL-MCNC: 1.9 MG/DL (ref 1.6–2.6)
MCH RBC QN AUTO: 29.9 PG (ref 26–34)
MCHC RBC AUTO-ENTMCNC: 34.1 G/DL (ref 31–37)
MCV RBC AUTO: 87.7 FL
MONOCYTES # BLD AUTO: 0.39 X10(3) UL (ref 0.1–1)
MONOCYTES NFR BLD AUTO: 12.5 %
NEUTROPHILS # BLD AUTO: 1.76 X10 (3) UL (ref 1.5–7.7)
NEUTROPHILS # BLD AUTO: 1.76 X10(3) UL (ref 1.5–7.7)
NEUTROPHILS NFR BLD AUTO: 56.4 %
PHOSPHATE SERPL-MCNC: 2.9 MG/DL (ref 2.4–5.1)
PLATELET # BLD AUTO: 160 10(3)UL (ref 150–450)
POTASSIUM SERPL-SCNC: 3.2 MMOL/L (ref 3.5–5.1)
RBC # BLD AUTO: 3.51 X10(6)UL
SODIUM SERPL-SCNC: 145 MMOL/L (ref 136–145)
VANCOMYCIN TROUGH SERPL-MCNC: 7.4 UG/ML (ref 10–20)
WBC # BLD AUTO: 3.1 X10(3) UL (ref 4–11)

## 2025-01-31 PROCEDURE — 99233 SBSQ HOSP IP/OBS HIGH 50: CPT | Performed by: HOSPITALIST

## 2025-01-31 RX ORDER — LEVETIRACETAM 500 MG/1
500 TABLET ORAL 2 TIMES DAILY
Status: DISCONTINUED | OUTPATIENT
Start: 2025-01-31 | End: 2025-02-02

## 2025-01-31 RX ORDER — POTASSIUM CHLORIDE 1500 MG/1
40 TABLET, EXTENDED RELEASE ORAL ONCE
Status: COMPLETED | OUTPATIENT
Start: 2025-01-31 | End: 2025-01-31

## 2025-01-31 RX ORDER — VANCOMYCIN HYDROCHLORIDE
1250 EVERY 24 HOURS
Status: DISCONTINUED | OUTPATIENT
Start: 2025-02-01 | End: 2025-01-31

## 2025-01-31 RX ORDER — LEVETIRACETAM 500 MG/1
500 TABLET ORAL 2 TIMES DAILY
Qty: 180 TABLET | Refills: 1 | Status: SHIPPED | OUTPATIENT
Start: 2025-01-31 | End: 2025-02-17

## 2025-01-31 NOTE — SLP NOTE
SPEECH DAILY NOTE - INPATIENT    ASSESSMENT & PLAN   ASSESSMENT    Proper PPE worn. Hands sanitized upon entrance/exit Pt room.       Pt alert, afebrile and on room air. Pt seen for swallow analysis per BSE recommendations (after consulting with RN). Pt agreed to participate. Pt's preferred method of learning verbal. Pt upright in bed; observed with current diet of solid/thin liquids for monitoring diet tolerance. Swallowing precautions/strategies discussed; Pt self-cued for execution. Functional bite reflex/mastication/lingual skills on current diet. No significant oral retention.  Pharyngeal response appeared to trigger within 1-2 sec per hyolaryngeal elevation to completion (functional rise/strength per palpation). No overt CSA on solid nor thin liquids. Collaborated with RN regarding Pt's swallowing plan of care. Per RN, Pt with good tolerance of current diet. No report of difficulty taking meds. No new CXR completed. Sp02 ~96% during this session. Call light within Pt's reach upon SLP discharge from room.      CXR 1/26/25  CONCLUSION: No acute cardiopulmonary disease.       PLAN: Pt is discharged from skilled swallowing intervention secondary to functional swallowing skills on least restrictive safest diet, family education completed (spouse during BSE) and goal achievement.       Diet Recommendations - Solids: Regular  Diet Recommendations - Liquids: Thin Liquids     Aspiration Precautions: Upright position;Slow rate;Small bites;Small sips;No straw  Medication Administration Recommendations: Whole in puree    Patient Experiencing Pain: No    Treatment Plan  Treatment Plan/Recommendations: Aspiration precautions  Interdisciplinary Communication: Discussed with RN  Plan posted at bedside    GOALS  Goal #1 The patient will tolerate solid consistency and thin liquids without overt signs or symptoms of aspiration with 100 % accuracy over 1-2 session(s).    No CSA on current diet; functional oral skills.       GOAL  MET     Goal #2 The patient will utilize compensatory strategies as outlined by  BSSE (clinical evaluation) including Slow rate, Small bites, Small sips, No straws, Upright 90 degrees with no feeding assistance 95 % of the time across 1-2 sessions.    Pt self-cued for use of all swallow precautions.      GOAL MET     FOLLOW UP  Follow Up Needed (Documentation Required): No  SLP Follow-up Date: 01/31/25  Duration: 1 week    Session: 1    If you have any questions, please contact   Azul Bustos M.S. [facility]/SLP  Speech-Language Pathologist  [facility]  #[phone]

## 2025-01-31 NOTE — PLAN OF CARE
Patient had Lumbar puncture today.     Problem: Safety Risk - Non-Violent Restraints  Goal: Patient will remain free from self-harm  Description: INTERVENTIONS:  - Apply the least restrictive restraint to prevent harm  - Notify patient and family of reasons restraints applied  - Assess for any contributing factors to confusion (electrolyte disturbances, delirium, medications)  - Discontinue any unnecessary medical devices as soon as possible  - Assess the patient's physical comfort, circulation, skin condition, hydration, nutrition and elimination needs   - Reorient and redirection as needed  - Assess for the need to continue restraints  Outcome: Progressing     Problem: Patient Centered Care  Goal: Patient preferences are identified and integrated in the patient's plan of care  Description: Interventions:  - What would you like us to know as we care for you? I am from home with my   - Provide timely, complete, and accurate information to patient/family  - Incorporate patient and family knowledge, values, beliefs, and cultural backgrounds into the planning and delivery of care  - Encourage patient/family to participate in care and decision-making at the level they choose  - Honor patient and family perspectives and choices  Outcome: Progressing     Problem: Patient/Family Goals  Goal: Patient/Family Long Term Goal  Description: Patient's Long Term Goal: Discharge    Interventions:  - Monitor vitals  - Monitor appropriate labs  - Administer medications as ordered  - Follow MD's orders  - Update patient on plan of care   - Discharge planning     - See additional Care Plan goals for specific interventions  Outcome: Progressing  Goal: Patient/Family Short Term Goal  Description: Patient's Short Term Goal:     Interventions:   -   - See additional Care Plan goals for specific interventions  Outcome: Progressing     Problem: SKIN/TISSUE INTEGRITY - ADULT  Goal: Skin integrity remains intact  Description:  INTERVENTIONS  - Assess and document risk factors for pressure ulcer development  - Assess and document skin integrity  - Monitor for areas of redness and/or skin breakdown  - Initiate interventions, skin care algorithm/standards of care as needed  Outcome: Progressing     Problem: NEUROLOGICAL - ADULT  Goal: Achieves stable or improved neurological status  Description: INTERVENTIONS  - Assess for and report changes in neurological status  - Initiate measures to prevent increased intracranial pressure  - Maintain blood pressure and fluid volume within ordered parameters to optimize cerebral perfusion and minimize risk of hemorrhage  - Monitor temperature, glucose, and sodium. Initiate appropriate interventions as ordered  Outcome: Progressing  Goal: Absence of seizures  Description: INTERVENTIONS  - Monitor for seizure activity  - Administer anti-seizure medications as ordered  - Monitor neurological status  Outcome: Progressing  Goal: Remains free of injury related to seizure activity  Description: INTERVENTIONS:  - Maintain airway, patient safety  and administer oxygen as ordered  - Monitor patient for seizure activity, document and report duration and description of seizure to MD/LIP  - If seizure occurs, turn patient to side and suction secretions as needed  - Reorient patient post seizure  - Seizure pads on all 4 side rails  - Instruct patient/family to notify RN of any seizure activity  - Instruct patient/family to call for assistance with activity based on assessment  Outcome: Progressing  Goal: Achieves maximal functionality and self care  Description: INTERVENTIONS  - Monitor swallowing and airway patency with patient fatigue and changes in neurological status  - Encourage and assist patient to increase activity and self care with guidance from PT/OT  - Encourage visually impaired, hearing impaired and aphasic patients to use assistive/communication devices  Outcome: Progressing     Problem: PAIN -  ADULT  Goal: Verbalizes/displays adequate comfort level or patient's stated pain goal  Description: INTERVENTIONS:  - Encourage pt to monitor pain and request assistance  - Assess pain using appropriate pain scale  - Administer analgesics based on type and severity of pain and evaluate response  - Implement non-pharmacological measures as appropriate and evaluate response  - Consider cultural and social influences on pain and pain management  - Manage/alleviate anxiety  - Utilize distraction and/or relaxation techniques  - Monitor for opioid side effects  - Notify MD/LIP if interventions unsuccessful or patient reports new pain  - Anticipate increased pain with activity and pre-medicate as appropriate  Outcome: Progressing     Problem: RISK FOR INFECTION - ADULT  Goal: Absence of fever/infection during anticipated neutropenic period  Description: INTERVENTIONS  - Monitor WBC  - Administer growth factors as ordered  - Implement neutropenic guidelines  Outcome: Progressing     Problem: SAFETY ADULT - FALL  Goal: Free from fall injury  Description: INTERVENTIONS:  - Assess pt frequently for physical needs  - Identify cognitive and physical deficits and behaviors that affect risk of falls.  - [place] fall precautions as indicated by assessment.  - Educate pt/family on patient safety including physical limitations  - Instruct pt to call for assistance with activity based on assessment  - Modify environment to reduce risk of injury  - Provide assistive devices as appropriate  - Consider OT/PT consult to assist with strengthening/mobility  - Encourage toileting schedule  Outcome: Progressing     Problem: DISCHARGE PLANNING  Goal: Discharge to home or other facility with appropriate resources  Description: INTERVENTIONS:  - Identify barriers to discharge w/pt and caregiver  - Include patient/family/discharge partner in discharge planning  - Arrange for needed discharge resources and transportation as appropriate  -  Identify discharge learning needs (meds, wound care, etc)  - Arrange for interpreters to assist at discharge as needed  - Consider post-discharge preferences of patient/family/discharge partner  - Complete POLST form as appropriate  - Assess patient's ability to be responsible for managing their own health  - Refer to Case Management Department for coordinating discharge planning if the patient needs post-hospital services based on physician/LIP order or complex needs related to functional status, cognitive ability or social support system  Outcome: Progressing

## 2025-01-31 NOTE — PLAN OF CARE
Monitoring vital signs, stable at this time. No acute changes at this moment. Fall precautions in place-- bed alarm on, bed locked in lowest position, call light within reach. Frequent rounding by nursing staff.    Problem: Safety Risk - Non-Violent Restraints  Goal: Patient will remain free from self-harm  Description: INTERVENTIONS:  - Apply the least restrictive restraint to prevent harm  - Notify patient and family of reasons restraints applied  - Assess for any contributing factors to confusion (electrolyte disturbances, delirium, medications)  - Discontinue any unnecessary medical devices as soon as possible  - Assess the patient's physical comfort, circulation, skin condition, hydration, nutrition and elimination needs   - Reorient and redirection as needed  - Assess for the need to continue restraints  Outcome: Progressing     Problem: Patient Centered Care  Goal: Patient preferences are identified and integrated in the patient's plan of care  Description: Interventions:  - What would you like us to know as we care for you? I am from home with my   - Provide timely, complete, and accurate information to patient/family  - Incorporate patient and family knowledge, values, beliefs, and cultural backgrounds into the planning and delivery of care  - Encourage patient/family to participate in care and decision-making at the level they choose  - Honor patient and family perspectives and choices  Outcome: Progressing     Problem: Patient/Family Goals  Goal: Patient/Family Long Term Goal  Description: Patient's Long Term Goal: Discharge    Interventions:  - Monitor vitals  - Monitor appropriate labs  - Administer medications as ordered  - Follow MD's orders  - Update patient on plan of care   - Discharge planning     - See additional Care Plan goals for specific interventions  Outcome: Progressing  Goal: Patient/Family Short Term Goal  Description: Patient's Short Term Goal:     Interventions:   -   - See  additional Care Plan goals for specific interventions  Outcome: Progressing     Problem: SKIN/TISSUE INTEGRITY - ADULT  Goal: Skin integrity remains intact  Description: INTERVENTIONS  - Assess and document risk factors for pressure ulcer development  - Assess and document skin integrity  - Monitor for areas of redness and/or skin breakdown  - Initiate interventions, skin care algorithm/standards of care as needed  Outcome: Progressing     Problem: NEUROLOGICAL - ADULT  Goal: Achieves stable or improved neurological status  Description: INTERVENTIONS  - Assess for and report changes in neurological status  - Initiate measures to prevent increased intracranial pressure  - Maintain blood pressure and fluid volume within ordered parameters to optimize cerebral perfusion and minimize risk of hemorrhage  - Monitor temperature, glucose, and sodium. Initiate appropriate interventions as ordered  Outcome: Progressing  Goal: Absence of seizures  Description: INTERVENTIONS  - Monitor for seizure activity  - Administer anti-seizure medications as ordered  - Monitor neurological status  Outcome: Progressing  Goal: Remains free of injury related to seizure activity  Description: INTERVENTIONS:  - Maintain airway, patient safety  and administer oxygen as ordered  - Monitor patient for seizure activity, document and report duration and description of seizure to MD/LIP  - If seizure occurs, turn patient to side and suction secretions as needed  - Reorient patient post seizure  - Seizure pads on all 4 side rails  - Instruct patient/family to notify RN of any seizure activity  - Instruct patient/family to call for assistance with activity based on assessment  Outcome: Progressing  Goal: Achieves maximal functionality and self care  Description: INTERVENTIONS  - Monitor swallowing and airway patency with patient fatigue and changes in neurological status  - Encourage and assist patient to increase activity and self care with guidance  from PT/OT  - Encourage visually impaired, hearing impaired and aphasic patients to use assistive/communication devices  Outcome: Progressing     Problem: PAIN - ADULT  Goal: Verbalizes/displays adequate comfort level or patient's stated pain goal  Description: INTERVENTIONS:  - Encourage pt to monitor pain and request assistance  - Assess pain using appropriate pain scale  - Administer analgesics based on type and severity of pain and evaluate response  - Implement non-pharmacological measures as appropriate and evaluate response  - Consider cultural and social influences on pain and pain management  - Manage/alleviate anxiety  - Utilize distraction and/or relaxation techniques  - Monitor for opioid side effects  - Notify MD/LIP if interventions unsuccessful or patient reports new pain  - Anticipate increased pain with activity and pre-medicate as appropriate  Outcome: Progressing     Problem: RISK FOR INFECTION - ADULT  Goal: Absence of fever/infection during anticipated neutropenic period  Description: INTERVENTIONS  - Monitor WBC  - Administer growth factors as ordered  - Implement neutropenic guidelines  Outcome: Progressing     Problem: SAFETY ADULT - FALL  Goal: Free from fall injury  Description: INTERVENTIONS:  - Assess pt frequently for physical needs  - Identify cognitive and physical deficits and behaviors that affect risk of falls.  - [place] fall precautions as indicated by assessment.  - Educate pt/family on patient safety including physical limitations  - Instruct pt to call for assistance with activity based on assessment  - Modify environment to reduce risk of injury  - Provide assistive devices as appropriate  - Consider OT/PT consult to assist with strengthening/mobility  - Encourage toileting schedule  Outcome: Progressing     Problem: DISCHARGE PLANNING  Goal: Discharge to home or other facility with appropriate resources  Description: INTERVENTIONS:  - Identify barriers to discharge w/pt and  caregiver  - Include patient/family/discharge partner in discharge planning  - Arrange for needed discharge resources and transportation as appropriate  - Identify discharge learning needs (meds, wound care, etc)  - Arrange for interpreters to assist at discharge as needed  - Consider post-discharge preferences of patient/family/discharge partner  - Complete POLST form as appropriate  - Assess patient's ability to be responsible for managing their own health  - Refer to Case Management Department for coordinating discharge planning if the patient needs post-hospital services based on physician/LIP order or complex needs related to functional status, cognitive ability or social support system  Outcome: Progressing

## 2025-01-31 NOTE — PROGRESS NOTES
[facility] Pharmacy Dosing Service      Follow Up Pharmacokinetic Consult for Vancomycin Dosing     Nadiya Felix is a 74 year old female who is receiving vancomycin therapy for CNS infection. Patient is on day 5 of vancomycin and is currently receiving 1000 mg IV every 24 hours. The current treatment and monitoring approach is steady state AUC strategy.        Weight and Temperature:    Wt Readings from Last 1 Encounters:   25 69.7 kg (153 lb 9.6 oz)         Temp Readings from Last 1 Encounters:   25 97.8 °F (36.6 °C) (Oral)      Labs:   Recent Labs   Lab 25  0514 25  0532 25  0204   CREATSERUM 0.83 0.86 0.82      Estimated Creatinine Clearance: 53.3 mL/min (based on SCr of 0.82 mg/dL).     Recent Labs   Lab 25  0627 25  0532 25  0204   WBC 10.3 3.5* 3.1*        Vancomycin Levels:  Lab Results   Component Value Date/Time    VANCT 7.4 (L) 2025 02:04 AM    VANCP 23.3 (L) 2025 05:32 AM       Corresponding 24 h-AUC:  341 mg-h/L     The Pharmacokinetic Target is:     to 600 mg-h/L and trough <=15 mg/L    Renal Dosing Considerations:    None     Assessment/Plan:   Maintenance Regimen: Adjust vancomycin to 1250 mg IV every 24 hours.  The predicted AUC and trough with this new regimen are 426 mg-h/L and 8.81 mg/L, respectively    Monitorin) Plan for vancomycin trough to be obtained at steady state    2) Pharmacy will order SCr as clinically indicated to assess renal function.    3) Pharmacy will monitor for toxicity and efficacy, adjust vancomycin dose and/or frequency, and order vancomycin levels as appropriate per the Pharmacy and Therapeutics Committee approved protocol until discontinuation of the medication.       We appreciate the opportunity to assist in the care of this patient.     Paulo Argueta, PharmD  2025  7:10 AM  [facility] Extension: [phone]

## 2025-01-31 NOTE — PROGRESS NOTES
INFECTIOUS DISEASE PROGRESS NOTE  [facility]  part of [facility] ID PROGRESS NOTE    Nadiya Felix Patient Status:  Inpatient    3/28/1950 MRN [id number]   Location [facility] 5SW/SE Attending Chetna Amaral MD   Hosp Day # 5 PCP Donell Yin MD     Subjective:  ROS reviewed. Afebrile. States she still has headache but appears to be in good spirits.   at bedside    ASSESSMENT:    Antibiotics: Vancomycin, ceftriaxone, acyclovir     # Acute fever with AMS and seizure                    -Flu/COVID/RSV negative  - S/p LP . CSF with RBC 8, TNC 1 glucose 75, protein 30.  Meningitis/encephalitis PCR panel negative.  CSF culture so far no growth.    # Diarrhea, CDPCR negative  # Seizure  # Alzheimers   # HTN     PLAN:  -  ok to dc vancomycin, ceftriaxone, and acyclovir monitor off antibiotics/antiviral.  Low suspicion for meningitis at this time.   -  Follow fever curve, wbc.  -  Reviewed labs, micro, imaging reports, available old records.  -  Case d/w patient, , RN.     Will follow peripherally.     History of Present Illness:  Nadiya Felix is a 74 year old female with a history of seizure disorder, Alzheimer's, recurrent UTIs, who presented to [facility] ED on  after having a possible seizure.  states that she was fatigued most of the day and had diarrhea the day before. Notes a sore throat and states her neck is sore.  said she just finished an antibiotic for a UTI. On arrival, Tmax 102.5, wbc 6.7, CTH negative, CXR without infiltrate, UA negative, blood cx NGTD, urine cx no growth, CDPCR  negative, started on vancomycin, acyclovir, and ceftriaxone. Tmax 101.4 last night.  reports she is closer back to her baseline. ID consulted.    Physical Exam:  /64 (BP Location: Right arm)   Pulse 69   Temp 99.5 °F (37.5 °C) (Oral)   Resp 18   Ht 5' 5\" (1.651 m)   Wt 157 lb 9.6 oz (71.5 kg)   SpO2 96%   BMI 26.26 kg/m²     Gen:    Awake, in bed  HEENT:  EOMI, neck supple  CV/lungs:  RRR, CTAB  Abdom:  Soft, NT/ND, +BS  Skin/extrem:  No rashes, no c/c/e  Lines:  PIV+    Laboratory Data: Reviewed    Microbiology: Reviewed    Radiology: Reviewed      [facility]  [phone]  1/31/2025

## 2025-01-31 NOTE — PROGRESS NOTES
[facility]  part of [facility]    Progress Note    Nadiya Felix Patient Status:  Inpatient    3/28/1950 MRN [id number]   Location [facility] 5SW/SE Attending Chetna Amaral MD   Hosp Day # 5 PCP Donell Yin MD     Chief Complaint:     Witnessed Seizure.    Subjective:   Subjective:    Patient seen and examined   Afebrile,   Endorsing diffuse abdominal pain  Confusion improved.  Hypertensive   Did endorse some neck stiffness    Objective:   Blood pressure (!) 178/83, pulse 67, temperature 99.4 °F (37.4 °C), temperature source Oral, resp. rate 18, height 5' 5\" (1.651 m), weight 157 lb 9.6 oz (71.5 kg), SpO2 96%, not currently breastfeeding.  Physical Exam    General: Patient is alert and oriented x3 does not appear to be in acute distress at this time  HEENT: EOMI PERRLA, atraumatic normocephalic  Cardiac: S1-S2 appreciated  Lungs: Good air entry bilaterally clear to auscultation  Abdomen: Soft nontender nondistended positive bowel sounds  Ext: Peripheral pulses are positive  Neuro: No focal deficits noted  Psych: Normal mood  Skin: No rashes noted  MSK: Full range of motion intact      Results:   Lab Results   Component Value Date    WBC 3.1 (L) 2025    HGB 10.5 (L) 2025    HCT 30.8 (L) 2025    .0 2025    CREATSERUM 0.82 2025    BUN <5 (L) 2025     2025    K 3.2 (L) 2025     2025    CO2 27.0 2025    GLU 97 2025    CA 8.5 (L) 2025    ALB 3.8 2025    ALKPHO 43 (L) 2025    BILT 1.4 (H) 2025    TP 6.3 2025    AST 42 (H) 2025    ALT 20 2025    PTT 32.3 2024    INR 0.99 2024    T4F 1.07 2018    TSH 1.400 2023    LIP 64 (H) 2024    DDIMER 0.72 2022    CRP <0.5 03/15/2018    MG 1.9 2025    PHOS 2.9 2025    TROP <0.045 2021    TROPHS 12 10/31/2023    CK 87 2017    B12 525 2023    ETOH <3  04/23/2019       XR LUMBAR PUNCTURE DIAG, INCLD IMG (CPT=62328)    Result Date: 1/30/2025  CONCLUSION:  1. Successful fluoroscopic guided lumbar puncture at the L3 level. 2. Approximately 10 cc of cerebral spinal fluid collected for laboratory evaluation.    Dictated by (CST): Donell Roldan MD on 1/30/2025 at 1:05 PM     Finalized by (CST): Donell Roldan MD on 1/30/2025 at 1:16 PM               Assessment & Plan:      Seizure disorder  Breakthrough seizure  -Witnessed by  on day of admission.  -Self resolved after around 5 minutes per family report.  -Family states patient with known history of seizure disorder.  Believes she has been taking her medications appropriately.  -CT brain with no acute intracranial abnormalities.  -Loaded with Keppra in ED.,  Continue 500 mg twice daily.  -Continue Vimpat.  -Neurology consulted, appreciate further recommendations.       Altered mental status  -Improving  -Likely metabolic encephalopathy  -Possible postictal state from seizure activity as above  -Some concern for possible infectious causes   -Family reports recent UTI.  Current UA less concerning for active infection.    -Continuing empiric antibiotics including coverage for possible bacterial meningitis.  -Unable to obtain MRI at this time.  -Continue empiric acyclovir.  -ID consulted, recommending further evaluation with lumbar puncture to rule out meningitis.  Continuing empiric treatment.  Appreciate further recommendations.  -Patient also with ELIJAH.  -Treating underlying conditions.  -Supportive care  -PT/OT/SLP     Acute Kidney Injury   -Weaning IVF  -Encouraging p.o. intake when cleared by SLP.  - Avoiding Nephrotoxic agents  - Cont to monitor     Hypertension  -Improved control  -Increase amlodipine  -As needed IV hydralazine  -Continue to monitor     Reported history of dementia  -Holding donepezil due to risk of lowering seizure threshold.        Plan of care discussed with patient and family at  bedside . Discussed management/test result(s) with Rn and ID consultant.     Quality:  DVT Prophylaxis: Heparin  CODE status: Full  Estimated date of discharge: TBD  Discharge is dependent on: clinical stability    Global A/P  -GI panel pending  -hypertensive - will decrease fluid rate.  -s/p LP 01/30/2025  -hypokalemia - continue electrolyte replacement protocol  -hypernatremia - will monitor  -Acute fevers R/o Meningitis  -continue IV vancomycin, Ceftriaxone, acyclovir  -appreciate ID and Neurology recs  -Reviewed previous consultant notes  -Reviewed CBC, BMP, Mag, and Phos  -Reviewed tests ordered  -Repeat labs in am  -MDM: High, severe exacerbation of chronic illness posing a threat to life. IV medications requiring close inpatient monitoring.           Chetna Amaral MD

## 2025-02-01 LAB
ANION GAP SERPL CALC-SCNC: 10 MMOL/L (ref 0–18)
BASOPHILS # BLD AUTO: 0.02 X10(3) UL (ref 0–0.2)
BASOPHILS NFR BLD AUTO: 0.4 %
BUN BLD-MCNC: <5 MG/DL (ref 9–23)
CALCIUM BLD-MCNC: 8.4 MG/DL (ref 8.7–10.4)
CHLORIDE SERPL-SCNC: 109 MMOL/L (ref 98–112)
CO2 SERPL-SCNC: 27 MMOL/L (ref 21–32)
CREAT BLD-MCNC: 0.85 MG/DL
DEPRECATED RDW RBC AUTO: 42.1 FL (ref 35.1–46.3)
EGFRCR SERPLBLD CKD-EPI 2021: 71 ML/MIN/1.73M2 (ref 60–?)
EOSINOPHIL # BLD AUTO: 0.17 X10(3) UL (ref 0–0.7)
EOSINOPHIL NFR BLD AUTO: 3.8 %
ERYTHROCYTE [DISTWIDTH] IN BLOOD BY AUTOMATED COUNT: 12.9 % (ref 11–15)
GLUCOSE BLD-MCNC: 102 MG/DL (ref 70–99)
HCT VFR BLD AUTO: 30.2 %
HGB BLD-MCNC: 10.6 G/DL
IMM GRANULOCYTES # BLD AUTO: 0.06 X10(3) UL (ref 0–1)
IMM GRANULOCYTES NFR BLD: 1.3 %
LYMPHOCYTES # BLD AUTO: 0.91 X10(3) UL (ref 1–4)
LYMPHOCYTES NFR BLD AUTO: 20.2 %
MAGNESIUM SERPL-MCNC: 1.7 MG/DL (ref 1.6–2.6)
MCH RBC QN AUTO: 31 PG (ref 26–34)
MCHC RBC AUTO-ENTMCNC: 35.1 G/DL (ref 31–37)
MCV RBC AUTO: 88.3 FL
MONOCYTES # BLD AUTO: 0.48 X10(3) UL (ref 0.1–1)
MONOCYTES NFR BLD AUTO: 10.6 %
NEUTROPHILS # BLD AUTO: 2.87 X10 (3) UL (ref 1.5–7.7)
NEUTROPHILS # BLD AUTO: 2.87 X10(3) UL (ref 1.5–7.7)
NEUTROPHILS NFR BLD AUTO: 63.7 %
PHOSPHATE SERPL-MCNC: 3.4 MG/DL (ref 2.4–5.1)
PLATELET # BLD AUTO: 166 10(3)UL (ref 150–450)
POTASSIUM SERPL-SCNC: 3.4 MMOL/L (ref 3.5–5.1)
POTASSIUM SERPL-SCNC: 3.4 MMOL/L (ref 3.5–5.1)
RBC # BLD AUTO: 3.42 X10(6)UL
SODIUM SERPL-SCNC: 146 MMOL/L (ref 136–145)
WBC # BLD AUTO: 4.5 X10(3) UL (ref 4–11)

## 2025-02-01 PROCEDURE — 99233 SBSQ HOSP IP/OBS HIGH 50: CPT | Performed by: HOSPITALIST

## 2025-02-01 RX ORDER — DIPHENHYDRAMINE HYDROCHLORIDE 50 MG/ML
25 INJECTION INTRAMUSCULAR; INTRAVENOUS ONCE
Status: COMPLETED | OUTPATIENT
Start: 2025-02-01 | End: 2025-02-01

## 2025-02-01 RX ORDER — MAGNESIUM SULFATE HEPTAHYDRATE 40 MG/ML
2 INJECTION, SOLUTION INTRAVENOUS ONCE
Status: COMPLETED | OUTPATIENT
Start: 2025-02-01 | End: 2025-02-01

## 2025-02-01 RX ORDER — POTASSIUM CHLORIDE 1.5 G/1.58G
40 POWDER, FOR SOLUTION ORAL ONCE
Status: COMPLETED | OUTPATIENT
Start: 2025-02-01 | End: 2025-02-01

## 2025-02-01 NOTE — PLAN OF CARE
Problem: Safety Risk - Non-Violent Restraints  Goal: Patient will remain free from self-harm  Description: INTERVENTIONS:  - Apply the least restrictive restraint to prevent harm  - Notify patient and family of reasons restraints applied  - Assess for any contributing factors to confusion (electrolyte disturbances, delirium, medications)  - Discontinue any unnecessary medical devices as soon as possible  - Assess the patient's physical comfort, circulation, skin condition, hydration, nutrition and elimination needs   - Reorient and redirection as needed  - Assess for the need to continue restraints  Outcome: Progressing     Problem: Patient Centered Care  Goal: Patient preferences are identified and integrated in the patient's plan of care  Description: Interventions:  - What would you like us to know as we care for you? I am from home with my   - Provide timely, complete, and accurate information to patient/family  - Incorporate patient and family knowledge, values, beliefs, and cultural backgrounds into the planning and delivery of care  - Encourage patient/family to participate in care and decision-making at the level they choose  - Honor patient and family perspectives and choices  Outcome: Progressing     Problem: Patient/Family Goals  Goal: Patient/Family Long Term Goal  Description: Patient's Long Term Goal: Discharge    Interventions:  - Monitor vitals  - Monitor appropriate labs  - Administer medications as ordered  - Follow MD's orders  - Update patient on plan of care   - Discharge planning     - See additional Care Plan goals for specific interventions  Outcome: Progressing  Goal: Patient/Family Short Term Goal  Description: Patient's Short Term Goal:     Interventions:   -   - See additional Care Plan goals for specific interventions  Outcome: Progressing     Problem: SKIN/TISSUE INTEGRITY - ADULT  Goal: Skin integrity remains intact  Description: INTERVENTIONS  - Assess and document risk  factors for pressure ulcer development  - Assess and document skin integrity  - Monitor for areas of redness and/or skin breakdown  - Initiate interventions, skin care algorithm/standards of care as needed  Outcome: Progressing     Problem: NEUROLOGICAL - ADULT  Goal: Achieves stable or improved neurological status  Description: INTERVENTIONS  - Assess for and report changes in neurological status  - Initiate measures to prevent increased intracranial pressure  - Maintain blood pressure and fluid volume within ordered parameters to optimize cerebral perfusion and minimize risk of hemorrhage  - Monitor temperature, glucose, and sodium. Initiate appropriate interventions as ordered  Outcome: Progressing  Goal: Absence of seizures  Description: INTERVENTIONS  - Monitor for seizure activity  - Administer anti-seizure medications as ordered  - Monitor neurological status  Outcome: Progressing  Goal: Remains free of injury related to seizure activity  Description: INTERVENTIONS:  - Maintain airway, patient safety  and administer oxygen as ordered  - Monitor patient for seizure activity, document and report duration and description of seizure to MD/LIP  - If seizure occurs, turn patient to side and suction secretions as needed  - Reorient patient post seizure  - Seizure pads on all 4 side rails  - Instruct patient/family to notify RN of any seizure activity  - Instruct patient/family to call for assistance with activity based on assessment  Outcome: Progressing  Goal: Achieves maximal functionality and self care  Description: INTERVENTIONS  - Monitor swallowing and airway patency with patient fatigue and changes in neurological status  - Encourage and assist patient to increase activity and self care with guidance from PT/OT  - Encourage visually impaired, hearing impaired and aphasic patients to use assistive/communication devices  Outcome: Progressing     Problem: PAIN - ADULT  Goal: Verbalizes/displays adequate comfort  level or patient's stated pain goal  Description: INTERVENTIONS:  - Encourage pt to monitor pain and request assistance  - Assess pain using appropriate pain scale  - Administer analgesics based on type and severity of pain and evaluate response  - Implement non-pharmacological measures as appropriate and evaluate response  - Consider cultural and social influences on pain and pain management  - Manage/alleviate anxiety  - Utilize distraction and/or relaxation techniques  - Monitor for opioid side effects  - Notify MD/LIP if interventions unsuccessful or patient reports new pain  - Anticipate increased pain with activity and pre-medicate as appropriate  Outcome: Progressing     Problem: RISK FOR INFECTION - ADULT  Goal: Absence of fever/infection during anticipated neutropenic period  Description: INTERVENTIONS  - Monitor WBC  - Administer growth factors as ordered  - Implement neutropenic guidelines  Outcome: Progressing     Problem: SAFETY ADULT - FALL  Goal: Free from fall injury  Description: INTERVENTIONS:  - Assess pt frequently for physical needs  - Identify cognitive and physical deficits and behaviors that affect risk of falls.  - [place] fall precautions as indicated by assessment.  - Educate pt/family on patient safety including physical limitations  - Instruct pt to call for assistance with activity based on assessment  - Modify environment to reduce risk of injury  - Provide assistive devices as appropriate  - Consider OT/PT consult to assist with strengthening/mobility  - Encourage toileting schedule  Outcome: Progressing     Problem: DISCHARGE PLANNING  Goal: Discharge to home or other facility with appropriate resources  Description: INTERVENTIONS:  - Identify barriers to discharge w/pt and caregiver  - Include patient/family/discharge partner in discharge planning  - Arrange for needed discharge resources and transportation as appropriate  - Identify discharge learning needs (meds, wound care,  etc)  - Arrange for interpreters to assist at discharge as needed  - Consider post-discharge preferences of patient/family/discharge partner  - Complete POLST form as appropriate  - Assess patient's ability to be responsible for managing their own health  - Refer to Case Management Department for coordinating discharge planning if the patient needs post-hospital services based on physician/LIP order or complex needs related to functional status, cognitive ability or social support system  Outcome: Progressing

## 2025-02-01 NOTE — PLAN OF CARE
Monitoring vital signs, stable at this time. No acute changes at this moment. Fall precautions in place-- bed alarm on, bed locked in lowest position, call light within reach. Frequent rounding by nursing staff.    Problem: Safety Risk - Non-Violent Restraints  Goal: Patient will remain free from self-harm  Description: INTERVENTIONS:  - Apply the least restrictive restraint to prevent harm  - Notify patient and family of reasons restraints applied  - Assess for any contributing factors to confusion (electrolyte disturbances, delirium, medications)  - Discontinue any unnecessary medical devices as soon as possible  - Assess the patient's physical comfort, circulation, skin condition, hydration, nutrition and elimination needs   - Reorient and redirection as needed  - Assess for the need to continue restraints  Outcome: Progressing     Problem: Patient Centered Care  Goal: Patient preferences are identified and integrated in the patient's plan of care  Description: Interventions:  - What would you like us to know as we care for you? I am from home with my   - Provide timely, complete, and accurate information to patient/family  - Incorporate patient and family knowledge, values, beliefs, and cultural backgrounds into the planning and delivery of care  - Encourage patient/family to participate in care and decision-making at the level they choose  - Honor patient and family perspectives and choices  Outcome: Progressing     Problem: Patient/Family Goals  Goal: Patient/Family Long Term Goal  Description: Patient's Long Term Goal: Discharge    Interventions:  - Monitor vitals  - Monitor appropriate labs  - Administer medications as ordered  - Follow MD's orders  - Update patient on plan of care   - Discharge planning     - See additional Care Plan goals for specific interventions  Outcome: Progressing  Goal: Patient/Family Short Term Goal  Description: Patient's Short Term Goal:     Interventions:   -   - See  additional Care Plan goals for specific interventions  Outcome: Progressing     Problem: SKIN/TISSUE INTEGRITY - ADULT  Goal: Skin integrity remains intact  Description: INTERVENTIONS  - Assess and document risk factors for pressure ulcer development  - Assess and document skin integrity  - Monitor for areas of redness and/or skin breakdown  - Initiate interventions, skin care algorithm/standards of care as needed  Outcome: Progressing     Problem: NEUROLOGICAL - ADULT  Goal: Achieves stable or improved neurological status  Description: INTERVENTIONS  - Assess for and report changes in neurological status  - Initiate measures to prevent increased intracranial pressure  - Maintain blood pressure and fluid volume within ordered parameters to optimize cerebral perfusion and minimize risk of hemorrhage  - Monitor temperature, glucose, and sodium. Initiate appropriate interventions as ordered  Outcome: Progressing  Goal: Absence of seizures  Description: INTERVENTIONS  - Monitor for seizure activity  - Administer anti-seizure medications as ordered  - Monitor neurological status  Outcome: Progressing  Goal: Remains free of injury related to seizure activity  Description: INTERVENTIONS:  - Maintain airway, patient safety  and administer oxygen as ordered  - Monitor patient for seizure activity, document and report duration and description of seizure to MD/LIP  - If seizure occurs, turn patient to side and suction secretions as needed  - Reorient patient post seizure  - Seizure pads on all 4 side rails  - Instruct patient/family to notify RN of any seizure activity  - Instruct patient/family to call for assistance with activity based on assessment  Outcome: Progressing  Goal: Achieves maximal functionality and self care  Description: INTERVENTIONS  - Monitor swallowing and airway patency with patient fatigue and changes in neurological status  - Encourage and assist patient to increase activity and self care with guidance  from PT/OT  - Encourage visually impaired, hearing impaired and aphasic patients to use assistive/communication devices  Outcome: Progressing     Problem: PAIN - ADULT  Goal: Verbalizes/displays adequate comfort level or patient's stated pain goal  Description: INTERVENTIONS:  - Encourage pt to monitor pain and request assistance  - Assess pain using appropriate pain scale  - Administer analgesics based on type and severity of pain and evaluate response  - Implement non-pharmacological measures as appropriate and evaluate response  - Consider cultural and social influences on pain and pain management  - Manage/alleviate anxiety  - Utilize distraction and/or relaxation techniques  - Monitor for opioid side effects  - Notify MD/LIP if interventions unsuccessful or patient reports new pain  - Anticipate increased pain with activity and pre-medicate as appropriate  Outcome: Progressing     Problem: RISK FOR INFECTION - ADULT  Goal: Absence of fever/infection during anticipated neutropenic period  Description: INTERVENTIONS  - Monitor WBC  - Administer growth factors as ordered  - Implement neutropenic guidelines  Outcome: Progressing     Problem: SAFETY ADULT - FALL  Goal: Free from fall injury  Description: INTERVENTIONS:  - Assess pt frequently for physical needs  - Identify cognitive and physical deficits and behaviors that affect risk of falls.  - [place] fall precautions as indicated by assessment.  - Educate pt/family on patient safety including physical limitations  - Instruct pt to call for assistance with activity based on assessment  - Modify environment to reduce risk of injury  - Provide assistive devices as appropriate  - Consider OT/PT consult to assist with strengthening/mobility  - Encourage toileting schedule  Outcome: Progressing     Problem: DISCHARGE PLANNING  Goal: Discharge to home or other facility with appropriate resources  Description: INTERVENTIONS:  - Identify barriers to discharge w/pt and  caregiver  - Include patient/family/discharge partner in discharge planning  - Arrange for needed discharge resources and transportation as appropriate  - Identify discharge learning needs (meds, wound care, etc)  - Arrange for interpreters to assist at discharge as needed  - Consider post-discharge preferences of patient/family/discharge partner  - Complete POLST form as appropriate  - Assess patient's ability to be responsible for managing their own health  - Refer to Case Management Department for coordinating discharge planning if the patient needs post-hospital services based on physician/LIP order or complex needs related to functional status, cognitive ability or social support system  Outcome: Progressing

## 2025-02-01 NOTE — PROGRESS NOTES
[facility]  part of [facility]    Progress Note    Nadiya Felix Patient Status:  Inpatient    3/28/1950 MRN [id number]   Location [facility] 5SW/SE Attending Chetna Amaral MD   Hosp Day # 6 PCP Donell Yin MD     Chief Complaint:     Witnessed Seizure.    Subjective:   Subjective:    Patient seen and examined   Afebrile,   Endorsing diffuse abdominal pain  Confusion improved.  Hypertensive   Did endorse some neck stiffness and headache had an icepack on her head when I walked in    Objective:   Blood pressure 134/70, pulse 68, temperature 98.3 °F (36.8 °C), temperature source Oral, resp. rate 18, height 5' 5\" (1.651 m), weight 150 lb 12.8 oz (68.4 kg), SpO2 97%, not currently breastfeeding.  Physical Exam    General: Patient is alert and oriented x3 does not appear to be in acute distress at this time  HEENT: EOMI PERRLA, atraumatic normocephalic  Cardiac: S1-S2 appreciated  Lungs: Good air entry bilaterally clear to auscultation  Abdomen: Soft nontender nondistended positive bowel sounds  Ext: Peripheral pulses are positive  Neuro: No focal deficits noted  Psych: Normal mood  Skin: No rashes noted  MSK: Full range of motion intact      Results:   Lab Results   Component Value Date    WBC 4.5 2025    HGB 10.6 (L) 2025    HCT 30.2 (L) 2025    .0 2025    CREATSERUM 0.85 2025    BUN <5 (L) 2025     (H) 2025    K 3.4 (L) 2025    K 3.4 (L) 2025     2025    CO2 27.0 2025     (H) 2025    CA 8.4 (L) 2025    ALB 3.8 2025    ALKPHO 43 (L) 2025    BILT 1.4 (H) 2025    TP 6.3 2025    AST 42 (H) 2025    ALT 20 2025    PTT 32.3 2024    INR 0.99 2024    T4F 1.07 2018    TSH 1.400 2023    LIP 64 (H) 2024    DDIMER 0.72 2022    CRP <0.5 03/15/2018    MG 1.7 2025    PHOS 3.4 2025    TROP <0.045 2021     TROPHS 12 10/31/2023    CK 87 05/25/2017    B12 525 02/08/2023    ETOH <3 04/23/2019       XR LUMBAR PUNCTURE DIAG, INCLD IMG (CPT=62328)    Result Date: 1/30/2025  CONCLUSION:  1. Successful fluoroscopic guided lumbar puncture at the L3 level. 2. Approximately 10 cc of cerebral spinal fluid collected for laboratory evaluation.    Dictated by (CST): Donell Roldan MD on 1/30/2025 at 1:05 PM     Finalized by (CST): Donell Roldan MD on 1/30/2025 at 1:16 PM               Assessment & Plan:      Seizure disorder  Breakthrough seizure  -Witnessed by  on day of admission.  -Self resolved after around 5 minutes per family report.  -Family states patient with known history of seizure disorder.  Believes she has been taking her medications appropriately.  -CT brain with no acute intracranial abnormalities.  -Loaded with Keppra in ED.,  Continue 500 mg twice daily.  -Continue Vimpat.  -Neurology consulted, appreciate further recommendations.       Altered mental status  -Improving  -Likely metabolic encephalopathy  -Possible postictal state from seizure activity as above  -Some concern for possible infectious causes   -Family reports recent UTI.  Current UA less concerning for active infection.    -Continuing empiric antibiotics including coverage for possible bacterial meningitis.  -Unable to obtain MRI at this time.  -Continue empiric acyclovir.  -ID consulted, recommending further evaluation with lumbar puncture to rule out meningitis.  Continuing empiric treatment.  Appreciate further recommendations.  -Patient also with ELIJAH.  -Treating underlying conditions.  -Supportive care  -PT/OT/SLP     Acute Kidney Injury   -Weaning IVF  -Encouraging p.o. intake when cleared by SLP.  - Avoiding Nephrotoxic agents  - Cont to monitor     Hypertension  -Improved control  -Increase amlodipine  -As needed IV hydralazine  -Continue to monitor     Reported history of dementia  -Holding donepezil due to risk of  lowering seizure threshold.        Plan of care discussed with patient and family at bedside . Discussed management/test result(s) with Rn and ID consultant.     Quality:  DVT Prophylaxis: Heparin  CODE status: Full  Estimated date of discharge: TBD  Discharge is dependent on: clinical stability    Global A/P  -GI panel pending  -hypertensive - will decrease fluid rate.  -s/p LP 01/30/2025  -appreciate ID recs - monitoring off of IV abx, and IV antivirals  -plan for IV benadryl x1.   -DC planning  -hypokalemia - continue electrolyte replacement protocol  -hypernatremia - will monitor  -appreciate ID and Neurology recs  -Reviewed previous consultant notes  -Reviewed CBC, BMP, Mag, and Phos  -Reviewed tests ordered  -Repeat labs in am  -MDM: High, severe exacerbation of chronic illness posing a threat to life. IV medications requiring close inpatient monitoring.           Chetna Aamral MD

## 2025-02-01 NOTE — PLAN OF CARE
Safety precautions in place. Call light within reach. Patient care needs addressed.     Problem: Patient Centered Care  Goal: Patient preferences are identified and integrated in the patient's plan of care  Description: Interventions:  - Provide timely, complete, and accurate information to patient/family  - Incorporate patient and family knowledge, values, beliefs, and cultural backgrounds into the planning and delivery of care  - Encourage patient/family to participate in care and decision-making at the level they choose  - Honor patient and family perspectives and choices  Outcome: Progressing     Problem: SKIN/TISSUE INTEGRITY - ADULT  Goal: Skin integrity remains intact  Description: INTERVENTIONS  - Assess and document risk factors for pressure ulcer development  - Assess and document skin integrity  - Monitor for areas of redness and/or skin breakdown  - Initiate interventions, skin care algorithm/standards of care as needed  Outcome: Progressing     Problem: NEUROLOGICAL - ADULT  Goal: Achieves stable or improved neurological status  Description: INTERVENTIONS  - Assess for and report changes in neurological status  - Initiate measures to prevent increased intracranial pressure  - Maintain blood pressure and fluid volume within ordered parameters to optimize cerebral perfusion and minimize risk of hemorrhage  - Monitor temperature, glucose, and sodium. Initiate appropriate interventions as ordered  Outcome: Progressing  Goal: Absence of seizures  Description: INTERVENTIONS  - Monitor for seizure activity  - Administer anti-seizure medications as ordered  - Monitor neurological status  Outcome: Progressing  Goal: Remains free of injury related to seizure activity  Description: INTERVENTIONS:  - Maintain airway, patient safety  and administer oxygen as ordered  - Monitor patient for seizure activity, document and report duration and description of seizure to MD/LIP  - If seizure occurs, turn patient to side  and suction secretions as needed  - Reorient patient post seizure  - Seizure pads on all 4 side rails  - Instruct patient/family to notify RN of any seizure activity  - Instruct patient/family to call for assistance with activity based on assessment  Outcome: Progressing  Goal: Achieves maximal functionality and self care  Description: INTERVENTIONS  - Monitor swallowing and airway patency with patient fatigue and changes in neurological status  - Encourage and assist patient to increase activity and self care with guidance from PT/OT  - Encourage visually impaired, hearing impaired and aphasic patients to use assistive/communication devices  Outcome: Progressing     Problem: PAIN - ADULT  Goal: Verbalizes/displays adequate comfort level or patient's stated pain goal  Description: INTERVENTIONS:  - Encourage pt to monitor pain and request assistance  - Assess pain using appropriate pain scale  - Administer analgesics based on type and severity of pain and evaluate response  - Implement non-pharmacological measures as appropriate and evaluate response  - Consider cultural and social influences on pain and pain management  - Manage/alleviate anxiety  - Utilize distraction and/or relaxation techniques  - Monitor for opioid side effects  - Notify MD/LIP if interventions unsuccessful or patient reports new pain  - Anticipate increased pain with activity and pre-medicate as appropriate  Outcome: Progressing     Problem: SAFETY ADULT - FALL  Goal: Free from fall injury  Description: INTERVENTIONS:  - Assess pt frequently for physical needs  - Identify cognitive and physical deficits and behaviors that affect risk of falls.  - [place] fall precautions as indicated by assessment.  - Educate pt/family on patient safety including physical limitations  - Instruct pt to call for assistance with activity based on assessment  - Modify environment to reduce risk of injury  - Provide assistive devices as appropriate  - Consider  OT/PT consult to assist with strengthening/mobility  - Encourage toileting schedule  Outcome: Progressing     Problem: DISCHARGE PLANNING  Goal: Discharge to home or other facility with appropriate resources  Description: INTERVENTIONS:  - Identify barriers to discharge w/pt and caregiver  - Include patient/family/discharge partner in discharge planning  - Arrange for needed discharge resources and transportation as appropriate  - Identify discharge learning needs (meds, wound care, etc)  - Arrange for interpreters to assist at discharge as needed  - Consider post-discharge preferences of patient/family/discharge partner  - Complete POLST form as appropriate  - Assess patient's ability to be responsible for managing their own health  - Refer to Case Management Department for coordinating discharge planning if the patient needs post-hospital services based on physician/LIP order or complex needs related to functional status, cognitive ability or social support system  Outcome: Progressing

## 2025-02-02 VITALS
BODY MASS INDEX: 25.58 KG/M2 | OXYGEN SATURATION: 92 % | HEIGHT: 65 IN | TEMPERATURE: 99 F | DIASTOLIC BLOOD PRESSURE: 64 MMHG | HEART RATE: 64 BPM | WEIGHT: 153.5 LBS | RESPIRATION RATE: 18 BRPM | SYSTOLIC BLOOD PRESSURE: 146 MMHG

## 2025-02-02 LAB
ANION GAP SERPL CALC-SCNC: 8 MMOL/L (ref 0–18)
BASOPHILS # BLD AUTO: 0.02 X10(3) UL (ref 0–0.2)
BASOPHILS NFR BLD AUTO: 0.5 %
BUN BLD-MCNC: <5 MG/DL (ref 9–23)
CALCIUM BLD-MCNC: 8.7 MG/DL (ref 8.7–10.4)
CHLORIDE SERPL-SCNC: 110 MMOL/L (ref 98–112)
CO2 SERPL-SCNC: 29 MMOL/L (ref 21–32)
CREAT BLD-MCNC: 0.97 MG/DL
DEPRECATED RDW RBC AUTO: 43.5 FL (ref 35.1–46.3)
EGFRCR SERPLBLD CKD-EPI 2021: 61 ML/MIN/1.73M2 (ref 60–?)
EOSINOPHIL # BLD AUTO: 0.21 X10(3) UL (ref 0–0.7)
EOSINOPHIL NFR BLD AUTO: 5.2 %
ERYTHROCYTE [DISTWIDTH] IN BLOOD BY AUTOMATED COUNT: 13.2 % (ref 11–15)
GLUCOSE BLD-MCNC: 98 MG/DL (ref 70–99)
HCT VFR BLD AUTO: 33.1 %
HGB BLD-MCNC: 11 G/DL
IMM GRANULOCYTES # BLD AUTO: 0.05 X10(3) UL (ref 0–1)
IMM GRANULOCYTES NFR BLD: 1.2 %
LYMPHOCYTES # BLD AUTO: 0.85 X10(3) UL (ref 1–4)
LYMPHOCYTES NFR BLD AUTO: 20.9 %
MAGNESIUM SERPL-MCNC: 2.1 MG/DL (ref 1.6–2.6)
MCH RBC QN AUTO: 30 PG (ref 26–34)
MCHC RBC AUTO-ENTMCNC: 33.2 G/DL (ref 31–37)
MCV RBC AUTO: 90.2 FL
MONOCYTES # BLD AUTO: 0.44 X10(3) UL (ref 0.1–1)
MONOCYTES NFR BLD AUTO: 10.8 %
NEUTROPHILS # BLD AUTO: 2.49 X10 (3) UL (ref 1.5–7.7)
NEUTROPHILS # BLD AUTO: 2.49 X10(3) UL (ref 1.5–7.7)
NEUTROPHILS NFR BLD AUTO: 61.4 %
PLATELET # BLD AUTO: 199 10(3)UL (ref 150–450)
POTASSIUM SERPL-SCNC: 3.7 MMOL/L (ref 3.5–5.1)
POTASSIUM SERPL-SCNC: 3.7 MMOL/L (ref 3.5–5.1)
RBC # BLD AUTO: 3.67 X10(6)UL
SODIUM SERPL-SCNC: 147 MMOL/L (ref 136–145)
WBC # BLD AUTO: 4.1 X10(3) UL (ref 4–11)

## 2025-02-02 PROCEDURE — 99239 HOSP IP/OBS DSCHRG MGMT >30: CPT | Performed by: HOSPITALIST

## 2025-02-02 RX ORDER — BUTALBITAL, ACETAMINOPHEN AND CAFFEINE 50; 325; 40 MG/1; MG/1; MG/1
1 TABLET ORAL EVERY 4 HOURS PRN
Status: DISCONTINUED | OUTPATIENT
Start: 2025-02-02 | End: 2025-02-02

## 2025-02-02 RX ORDER — AMLODIPINE BESYLATE 5 MG/1
5 TABLET ORAL DAILY
Qty: 30 TABLET | Refills: 0 | Status: SHIPPED | OUTPATIENT
Start: 2025-02-03 | End: 2025-02-13

## 2025-02-02 RX ORDER — BUTALBITAL, ACETAMINOPHEN AND CAFFEINE 50; 325; 40 MG/1; MG/1; MG/1
1 TABLET ORAL EVERY 6 HOURS PRN
Qty: 10 TABLET | Refills: 0 | Status: SHIPPED | OUTPATIENT
Start: 2025-02-02

## 2025-02-02 NOTE — DISCHARGE SUMMARY
[facility]  part of [facility]     Discharge Summary    Nadiya Felix Patient Status:  Inpatient    3/28/1950 MRN [id number]   Location [facility] 5SW/SE Attending Chetna Amaral MD   Hosp Day # 7 PCP Donell Yin MD     Date of Admission: 2025    Date of Discharge: 2025    Admitting Diagnosis: Delirium, acute [R41.0]  Witnessed seizure (HCC) [R56.9]  History of seizures [Z87.898]  Altered mental status, unspecified altered mental status type [R41.82]    Discharge Diagnosis:   Patient Active Problem List   Diagnosis    JENNIFER (obstructive sleep apnea)    Family history of colon cancer    CKD (chronic kidney disease)    Major neurocognitive disorder (HCC)    Paroxysmal A-fib (HCC)    History of migraine with aura    Nonintractable epilepsy without status epilepticus (HCC)    Essential hypertension    Presence of Watchman left atrial appendage closure device    History of cerebrovascular accident (CVA) in adulthood    History of seizures    Bruising    Altered mental status    Witnessed seizure (HCC)    Altered mental status, unspecified altered mental status type    Delirium, acute    Fever       Reason for Admission:     Witnessed Seizure     Physical Exam:     General: Patient is alert and oriented x3 does not appear to be in acute distress at this time  HEENT: EOMI PERRLA, atraumatic normocephalic  Cardiac: S1-S2 appreciated  Lungs: Good air entry bilaterally clear to auscultation  Abdomen: Soft nontender nondistended positive bowel sounds  Ext: Peripheral pulses are positive  Neuro: No focal deficits noted  Psych: Normal mood  Skin: No rashes noted  MSK: Full range of motion intact      Hospital Course:     eizure disorder  Breakthrough seizure  -Witnessed by  on day of admission.  -Self resolved after around 5 minutes per family report.  -Family states patient with known history of seizure disorder.  Believes she has been taking her medications  appropriately.  -CT brain with no acute intracranial abnormalities.  -Loaded with Keppra in ED.,  Continue 500 mg twice daily.  -Continue Vimpat.  -Neurology consulted, appreciate further recommendations.       Altered mental status  -Improving  -Likely metabolic encephalopathy  -Possible postictal state from seizure activity as above  -Some concern for possible infectious causes   -Family reports recent UTI.  Current UA less concerning for active infection.    -Continuing empiric antibiotics including coverage for possible bacterial meningitis.  -Unable to obtain MRI at this time.  -Continue empiric acyclovir.  -ID consulted, recommending further evaluation with lumbar puncture to rule out meningitis.  Continuing empiric treatment.  Appreciate further recommendations.  -Patient also with ELIJAH.  -Treating underlying conditions.  -Supportive care  -PT/OT/SLP     Acute Kidney Injury   -Weaning IVF  -Encouraging p.o. intake when cleared by SLP.  - Avoiding Nephrotoxic agents  - Cont to monitor    History of Present Illness:     Per admitting   his is a 74 year oldfemale who was brought in after patient had witnessed seizure activity and subsequent confusion.  History unable to be provided from patient due to acute of condition.  Further history from family at bedside.   states patient was in her normal state of health yesterday.   noted patient was late to get up on the day of admission.  When he went to check on her she stated she was feeling fine.  Later he went back to check on her and noted she was having generalized shaking, stiffness to body, and unable to respond.  EMS was called.   believes episode lasted around 5 minutes.   states patient has a known history of seizure disorder.  He believes she has been taking her medications appropriately.  Further history from daughter at bedside, daughter states patient was recently diagnosed with urinary tract infection has been on an  antibiotic.  At time of interview, patient remains confused and agitated.  Attempted to get out of bed.  Not following commands.     Disposition: Home or Self Care    Discharge Condition: Stable    Discharge Medications:   Current Discharge Medication List        START taking these medications    Details   butalbital-acetaminophen-caffeine -40 MG Oral Tab Take 1 tablet by mouth every 6 (six) hours as needed for Headaches.  Qty: 10 tablet, Refills: 0    Associated Diagnoses: History of seizures      levETIRAcetam 500 MG Oral Tab Take 1 tablet (500 mg total) by mouth 2 (two) times daily.  Qty: 180 tablet, Refills: 1           CONTINUE these medications which have CHANGED    Details   amLODIPine 5 MG Oral Tab Take 1 tablet (5 mg total) by mouth daily.  Qty: 30 tablet, Refills: 0           CONTINUE these medications which have NOT CHANGED    Details   estradiol 0.1 MG/GM Vaginal Cream Place 1 g vaginally 3 (three) times a week.      pantoprazole 40 MG Oral Tab EC Take 1 tablet (40 mg total) by mouth every morning before breakfast.      Melatonin 10 MG Oral Tab Take 10 mg by mouth at bedtime.      lamoTRIgine 150 MG Oral Tab Take 1 tablet (150 mg total) by mouth 2 (two) times daily.  Qty: 180 tablet, Refills: 3    Associated Diagnoses: Nonintractable epilepsy without status epilepticus, unspecified epilepsy type (HCC)      donepezil 10 MG Oral Tab Take 1 tablet (10 mg total) by mouth daily.  Qty: 90 tablet, Refills: 3    Associated Diagnoses: Major neurocognitive disorder (HCC)      atorvastatin 20 MG Oral Tab TAKE ONE TABLET BY MOUTH ONE TIME DAILY AT NIGHT  Qty: 90 tablet, Refills: 3      nadolol 20 MG Oral Tab TAKE ONE TABLET BY MOUTH ONE TIME DAILY  Qty: 90 tablet, Refills: 3      escitalopram 10 MG Oral Tab Take 1 tablet (10 mg total) by mouth daily.      aspirin 81 MG Oral Tab EC Take 1 tablet (81 mg total) by mouth daily.  Qty: 1 tablet, Refills: 0             Total dc time > 30 min    Chetna Amaral  MD  2/2/2025  3:02 PM     Hospital Discharge Diagnoses:  Seizure    Lace+ Score: 68  59-90 High Risk  29-58 Medium Risk  0-28   Low Risk.    TCM Follow-Up Recommendation:  LACE > 58: High Risk of readmission after discharge from the hospital.

## 2025-02-02 NOTE — PLAN OF CARE
Pt A&Ox3, forgetful. Vital signs stable. Stool sample still needed. Pt up stand and pivot to rolling chair to bathroom. Pt denies headache at this time. Bed low and locked. Fall precautions in place.   Problem: Safety Risk - Non-Violent Restraints  Goal: Patient will remain free from self-harm  Description: INTERVENTIONS:  - Apply the least restrictive restraint to prevent harm  - Notify patient and family of reasons restraints applied  - Assess for any contributing factors to confusion (electrolyte disturbances, delirium, medications)  - Discontinue any unnecessary medical devices as soon as possible  - Assess the patient's physical comfort, circulation, skin condition, hydration, nutrition and elimination needs   - Reorient and redirection as needed  - Assess for the need to continue restraints  Outcome: Progressing     Problem: Patient Centered Care  Goal: Patient preferences are identified and integrated in the patient's plan of care  Description: Interventions:  - What would you like us to know as we care for you? I am from home with my   - Provide timely, complete, and accurate information to patient/family  - Incorporate patient and family knowledge, values, beliefs, and cultural backgrounds into the planning and delivery of care  - Encourage patient/family to participate in care and decision-making at the level they choose  - Honor patient and family perspectives and choices  Outcome: Progressing     Problem: Patient/Family Goals  Goal: Patient/Family Long Term Goal  Description: Patient's Long Term Goal: Discharge    Interventions:  - Monitor vitals  - Monitor appropriate labs  - Administer medications as ordered  - Follow MD's orders  - Update patient on plan of care   - Discharge planning     - See additional Care Plan goals for specific interventions  Outcome: Progressing  Goal: Patient/Family Short Term Goal  Description: Patient's Short Term Goal:     Interventions:   -   - See additional Care  Plan goals for specific interventions  Outcome: Progressing     Problem: SKIN/TISSUE INTEGRITY - ADULT  Goal: Skin integrity remains intact  Description: INTERVENTIONS  - Assess and document risk factors for pressure ulcer development  - Assess and document skin integrity  - Monitor for areas of redness and/or skin breakdown  - Initiate interventions, skin care algorithm/standards of care as needed  Outcome: Progressing     Problem: NEUROLOGICAL - ADULT  Goal: Achieves stable or improved neurological status  Description: INTERVENTIONS  - Assess for and report changes in neurological status  - Initiate measures to prevent increased intracranial pressure  - Maintain blood pressure and fluid volume within ordered parameters to optimize cerebral perfusion and minimize risk of hemorrhage  - Monitor temperature, glucose, and sodium. Initiate appropriate interventions as ordered  Outcome: Progressing  Goal: Absence of seizures  Description: INTERVENTIONS  - Monitor for seizure activity  - Administer anti-seizure medications as ordered  - Monitor neurological status  Outcome: Progressing  Goal: Remains free of injury related to seizure activity  Description: INTERVENTIONS:  - Maintain airway, patient safety  and administer oxygen as ordered  - Monitor patient for seizure activity, document and report duration and description of seizure to MD/LIP  - If seizure occurs, turn patient to side and suction secretions as needed  - Reorient patient post seizure  - Seizure pads on all 4 side rails  - Instruct patient/family to notify RN of any seizure activity  - Instruct patient/family to call for assistance with activity based on assessment  Outcome: Progressing  Goal: Achieves maximal functionality and self care  Description: INTERVENTIONS  - Monitor swallowing and airway patency with patient fatigue and changes in neurological status  - Encourage and assist patient to increase activity and self care with guidance from PT/OT  -  Encourage visually impaired, hearing impaired and aphasic patients to use assistive/communication devices  Outcome: Progressing     Problem: PAIN - ADULT  Goal: Verbalizes/displays adequate comfort level or patient's stated pain goal  Description: INTERVENTIONS:  - Encourage pt to monitor pain and request assistance  - Assess pain using appropriate pain scale  - Administer analgesics based on type and severity of pain and evaluate response  - Implement non-pharmacological measures as appropriate and evaluate response  - Consider cultural and social influences on pain and pain management  - Manage/alleviate anxiety  - Utilize distraction and/or relaxation techniques  - Monitor for opioid side effects  - Notify MD/LIP if interventions unsuccessful or patient reports new pain  - Anticipate increased pain with activity and pre-medicate as appropriate  Outcome: Progressing     Problem: RISK FOR INFECTION - ADULT  Goal: Absence of fever/infection during anticipated neutropenic period  Description: INTERVENTIONS  - Monitor WBC  - Administer growth factors as ordered  - Implement neutropenic guidelines  Outcome: Progressing     Problem: SAFETY ADULT - FALL  Goal: Free from fall injury  Description: INTERVENTIONS:  - Assess pt frequently for physical needs  - Identify cognitive and physical deficits and behaviors that affect risk of falls.  - [place] fall precautions as indicated by assessment.  - Educate pt/family on patient safety including physical limitations  - Instruct pt to call for assistance with activity based on assessment  - Modify environment to reduce risk of injury  - Provide assistive devices as appropriate  - Consider OT/PT consult to assist with strengthening/mobility  - Encourage toileting schedule  Outcome: Progressing     Problem: DISCHARGE PLANNING  Goal: Discharge to home or other facility with appropriate resources  Description: INTERVENTIONS:  - Identify barriers to discharge w/pt and caregiver  -  Include patient/family/discharge partner in discharge planning  - Arrange for needed discharge resources and transportation as appropriate  - Identify discharge learning needs (meds, wound care, etc)  - Arrange for interpreters to assist at discharge as needed  - Consider post-discharge preferences of patient/family/discharge partner  - Complete POLST form as appropriate  - Assess patient's ability to be responsible for managing their own health  - Refer to Case Management Department for coordinating discharge planning if the patient needs post-hospital services based on physician/LIP order or complex needs related to functional status, cognitive ability or social support system  Outcome: Progressing

## 2025-02-02 NOTE — PROGRESS NOTES
[facility]  part of [facility]    Progress Note    Nadiya Felix Patient Status:  Inpatient    3/28/1950 MRN [id number]   Location [facility] 5SW/SE Attending Chetna Amaral MD   Hosp Day # 7 PCP Donell Yin MD     Chief Complaint:     Witnessed Seizure.    Subjective:   Subjective:    Patient seen and examined   Afebrile,   Endorsing diffuse abdominal pain  Confusion improved.  Hypertensive       Objective:   Blood pressure 133/73, pulse 62, temperature 99.4 °F (37.4 °C), temperature source Oral, resp. rate 18, height 5' 5\" (1.651 m), weight 153 lb 8 oz (69.6 kg), SpO2 93%, not currently breastfeeding.  Physical Exam    General: Patient is alert and oriented x3 does not appear to be in acute distress at this time  HEENT: EOMI PERRLA, atraumatic normocephalic  Cardiac: S1-S2 appreciated  Lungs: Good air entry bilaterally clear to auscultation  Abdomen: Soft nontender nondistended positive bowel sounds  Ext: Peripheral pulses are positive  Neuro: No focal deficits noted  Psych: Normal mood  Skin: No rashes noted  MSK: Full range of motion intact      Results:   Lab Results   Component Value Date    WBC 4.1 2025    HGB 11.0 (L) 2025    HCT 33.1 (L) 2025    .0 2025    CREATSERUM 0.97 2025    BUN <5 (L) 2025     (H) 2025    K 3.7 2025    K 3.7 2025     2025    CO2 29.0 2025    GLU 98 2025    CA 8.7 2025    ALB 3.8 2025    ALKPHO 43 (L) 2025    BILT 1.4 (H) 2025    TP 6.3 2025    AST 42 (H) 2025    ALT 20 2025    PTT 32.3 2024    INR 0.99 2024    T4F 1.07 2018    TSH 1.400 2023    LIP 64 (H) 2024    DDIMER 0.72 2022    CRP <0.5 03/15/2018    MG 2.1 2025    PHOS 3.4 2025    TROP <0.045 2021    TROPHS 12 10/31/2023    CK 87 2017    B12 525 2023    ETOH <3 2019       No results  found.        Assessment & Plan:      Seizure disorder  Breakthrough seizure  -Witnessed by  on day of admission.  -Self resolved after around 5 minutes per family report.  -Family states patient with known history of seizure disorder.  Believes she has been taking her medications appropriately.  -CT brain with no acute intracranial abnormalities.  -Loaded with Keppra in ED.,  Continue 500 mg twice daily.  -Continue Vimpat.  -Neurology consulted, appreciate further recommendations.       Altered mental status  -Improving  -Likely metabolic encephalopathy  -Possible postictal state from seizure activity as above  -Some concern for possible infectious causes   -Family reports recent UTI.  Current UA less concerning for active infection.    -Continuing empiric antibiotics including coverage for possible bacterial meningitis.  -Unable to obtain MRI at this time.  -Continue empiric acyclovir.  -ID consulted, recommending further evaluation with lumbar puncture to rule out meningitis.  Continuing empiric treatment.  Appreciate further recommendations.  -Patient also with ELIJAH.  -Treating underlying conditions.  -Supportive care  -PT/OT/SLP     Acute Kidney Injury   -Weaning IVF  -Encouraging p.o. intake when cleared by SLP.  - Avoiding Nephrotoxic agents  - Cont to monitor     Hypertension  -Improved control  -Increase amlodipine  -As needed IV hydralazine  -Continue to monitor     Reported history of dementia  -Holding donepezil due to risk of lowering seizure threshold.        Plan of care discussed with patient and family at bedside . Discussed management/test result(s) with Rn and ID consultant.     Quality:  DVT Prophylaxis: Heparin  CODE status: Full  Estimated date of discharge: TBD  Discharge is dependent on: clinical stability    Global A/P  -GI panel pending  -hypertensive - will decrease fluid rate.  -s/p LP 01/30/2025  -appreciate ID recs - monitoring off of IV abx, and IV antivirals  -plan for IV  benadryl x1.   -DC planning  -hypernatremia - will switch fluids to d5 0.45  -persistent migraine headaches - will start Fioricet.  -hypokalemia - continue electrolyte replacement protocol  -hypernatremia - will monitor  -appreciate ID and Neurology recs  -Reviewed previous consultant notes  -Reviewed CBC, BMP, Mag, and Phos  -Reviewed tests ordered  -Repeat labs in am  -MDM: High, severe exacerbation of chronic illness posing a threat to life. IV medications requiring close inpatient monitoring.           Chetna Amaral MD

## 2025-02-02 NOTE — HOME CARE LIAISON
RECEIVED REFERRAL VIA [facility] FOR HOME HEALTH SERVICES. LEFT MESSAGE FOR BOTH PATIENT AND SPOUSE TO CONFIRM HOME HEALTH SERVICES. AWAITING CALL BACK

## 2025-02-02 NOTE — CM/SW NOTE
02/02/25 1500   Discharge disposition   Expected discharge disposition Home or Self   Post Acute Care Provider Residential   Discharge transportation Private car         Patient will DC today. Called and spoke with spouse. HC is [facility] choice. He will pick patient up from hospital when cleared.       SW notified RN of LACE , needs teach back.       PLAN: Return home with spouse, Residential Home Health care.      Azul Perez, MSW, LSW

## 2025-02-02 NOTE — PLAN OF CARE
Patient is AxOx4, on RA, complain of headache, gave PRN pain medicine, vitals done, IV flushed and saline lock, all needs completed in the room,call light within reach. Got an discharge order, gathered all belongings, removed IV line, discharge teach back done patient and spouse. Gave discharge papers to patient and spouse with all questions answered in the room. Patient went home with spouse in private car.    Problem: Safety Risk - Non-Violent Restraints  Goal: Patient will remain free from self-harm  Description: INTERVENTIONS:  - Apply the least restrictive restraint to prevent harm  - Notify patient and family of reasons restraints applied  - Assess for any contributing factors to confusion (electrolyte disturbances, delirium, medications)  - Discontinue any unnecessary medical devices as soon as possible  - Assess the patient's physical comfort, circulation, skin condition, hydration, nutrition and elimination needs   - Reorient and redirection as needed  - Assess for the need to continue restraints  Outcome: Adequate for Discharge     Problem: Patient Centered Care  Goal: Patient preferences are identified and integrated in the patient's plan of care  Description: Interventions:  - What would you like us to know as we care for you? I am from home with my   - Provide timely, complete, and accurate information to patient/family  - Incorporate patient and family knowledge, values, beliefs, and cultural backgrounds into the planning and delivery of care  - Encourage patient/family to participate in care and decision-making at the level they choose  - Honor patient and family perspectives and choices  Outcome: Adequate for Discharge     Problem: Patient/Family Goals  Goal: Patient/Family Long Term Goal  Description: Patient's Long Term Goal: Discharge    Interventions:  - Monitor vitals  - Monitor appropriate labs  - Administer medications as ordered  - Follow MD's orders  - Update patient on plan of care    - Discharge planning     - See additional Care Plan goals for specific interventions  Outcome: Adequate for Discharge  Goal: Patient/Family Short Term Goal  Description: Patient's Short Term Goal:     Interventions:   -   - See additional Care Plan goals for specific interventions  Outcome: Adequate for Discharge     Problem: SKIN/TISSUE INTEGRITY - ADULT  Goal: Skin integrity remains intact  Description: INTERVENTIONS  - Assess and document risk factors for pressure ulcer development  - Assess and document skin integrity  - Monitor for areas of redness and/or skin breakdown  - Initiate interventions, skin care algorithm/standards of care as needed  Outcome: Adequate for Discharge     Problem: NEUROLOGICAL - ADULT  Goal: Achieves stable or improved neurological status  Description: INTERVENTIONS  - Assess for and report changes in neurological status  - Initiate measures to prevent increased intracranial pressure  - Maintain blood pressure and fluid volume within ordered parameters to optimize cerebral perfusion and minimize risk of hemorrhage  - Monitor temperature, glucose, and sodium. Initiate appropriate interventions as ordered  Outcome: Adequate for Discharge  Goal: Absence of seizures  Description: INTERVENTIONS  - Monitor for seizure activity  - Administer anti-seizure medications as ordered  - Monitor neurological status  Outcome: Adequate for Discharge  Goal: Remains free of injury related to seizure activity  Description: INTERVENTIONS:  - Maintain airway, patient safety  and administer oxygen as ordered  - Monitor patient for seizure activity, document and report duration and description of seizure to MD/LIP  - If seizure occurs, turn patient to side and suction secretions as needed  - Reorient patient post seizure  - Seizure pads on all 4 side rails  - Instruct patient/family to notify RN of any seizure activity  - Instruct patient/family to call for assistance with activity based on  assessment  Outcome: Adequate for Discharge  Goal: Achieves maximal functionality and self care  Description: INTERVENTIONS  - Monitor swallowing and airway patency with patient fatigue and changes in neurological status  - Encourage and assist patient to increase activity and self care with guidance from PT/OT  - Encourage visually impaired, hearing impaired and aphasic patients to use assistive/communication devices  Outcome: Adequate for Discharge     Problem: PAIN - ADULT  Goal: Verbalizes/displays adequate comfort level or patient's stated pain goal  Description: INTERVENTIONS:  - Encourage pt to monitor pain and request assistance  - Assess pain using appropriate pain scale  - Administer analgesics based on type and severity of pain and evaluate response  - Implement non-pharmacological measures as appropriate and evaluate response  - Consider cultural and social influences on pain and pain management  - Manage/alleviate anxiety  - Utilize distraction and/or relaxation techniques  - Monitor for opioid side effects  - Notify MD/LIP if interventions unsuccessful or patient reports new pain  - Anticipate increased pain with activity and pre-medicate as appropriate  Outcome: Adequate for Discharge     Problem: RISK FOR INFECTION - ADULT  Goal: Absence of fever/infection during anticipated neutropenic period  Description: INTERVENTIONS  - Monitor WBC  - Administer growth factors as ordered  - Implement neutropenic guidelines  Outcome: Adequate for Discharge     Problem: SAFETY ADULT - FALL  Goal: Free from fall injury  Description: INTERVENTIONS:  - Assess pt frequently for physical needs  - Identify cognitive and physical deficits and behaviors that affect risk of falls.  - [place] fall precautions as indicated by assessment.  - Educate pt/family on patient safety including physical limitations  - Instruct pt to call for assistance with activity based on assessment  - Modify environment to reduce risk of  injury  - Provide assistive devices as appropriate  - Consider OT/PT consult to assist with strengthening/mobility  - Encourage toileting schedule  Outcome: Adequate for Discharge     Problem: DISCHARGE PLANNING  Goal: Discharge to home or other facility with appropriate resources  Description: INTERVENTIONS:  - Identify barriers to discharge w/pt and caregiver  - Include patient/family/discharge partner in discharge planning  - Arrange for needed discharge resources and transportation as appropriate  - Identify discharge learning needs (meds, wound care, etc)  - Arrange for interpreters to assist at discharge as needed  - Consider post-discharge preferences of patient/family/discharge partner  - Complete POLST form as appropriate  - Assess patient's ability to be responsible for managing their own health  - Refer to Case Management Department for coordinating discharge planning if the patient needs post-hospital services based on physician/LIP order or complex needs related to functional status, cognitive ability or social support system  Outcome: Adequate for Discharge

## 2025-02-02 NOTE — SPIRITUAL CARE NOTE
Spiritual Care Visit Note    Patient Name: Nadiya Felix Date of Spiritual Care Visit: 25   : 3/28/1950 Primary Dx: Witnessed seizure (HCC)       Referred By: Referral From:     Spiritual Care Taxonomy:    Intended Effects: Build relationship of care and support    Methods: Offer emotional support    Interventions: Acknowledge current situation;Acknowledge response to difficult experience;Active listening;Ask guided questions;Ask guided questions about jagjit;[place];Provide hospitality    Visit Type/Summary:     - Spiritual Care: Responded to a request for spiritual care and assessed the patient for spiritual care needs. Consulted with RN prior to visit. Offered empathic listening and emotional support. Patient and family expressed appreciation for  visit. Prayed.  remains available as needed for follow up.    Spiritual Care support can be requested via an Epic consult. For urgent/immediate needs, please contact the On Call  at: [facility]: ext [phone]    Chaplain Resident, Felisha Beverly PhD

## 2025-02-03 NOTE — HOME CARE LIAISON
[facility] Spoke w/ patient who is agreeable with [facility]. Contact information placed on AVS.

## 2025-02-04 ENCOUNTER — TELEPHONE (OUTPATIENT)
Dept: INTERNAL MEDICINE CLINIC | Facility: CLINIC | Age: 75
End: 2025-02-04

## 2025-02-04 NOTE — TELEPHONE ENCOUNTER
Arina from [facility] called. She saw the pt. Today and opened her up for Nursing, PT, OT services.

## 2025-02-07 ENCOUNTER — TELEPHONE (OUTPATIENT)
Dept: NEUROLOGY | Facility: CLINIC | Age: 75
End: 2025-02-07

## 2025-02-07 DIAGNOSIS — G40.909 NONINTRACTABLE EPILEPSY WITHOUT STATUS EPILEPTICUS, UNSPECIFIED EPILEPSY TYPE (HCC): ICD-10-CM

## 2025-02-11 RX ORDER — DONEPEZIL HYDROCHLORIDE 10 MG/1
TABLET, FILM COATED ORAL
Qty: 90 TABLET | Refills: 10 | OUTPATIENT
Start: 2025-02-11

## 2025-02-11 RX ORDER — LEVETIRACETAM 500 MG/1
TABLET ORAL
Qty: 180 TABLET | Refills: 10 | OUTPATIENT
Start: 2025-02-11

## 2025-02-11 RX ORDER — PANTOPRAZOLE SODIUM 40 MG/1
TABLET, DELAYED RELEASE ORAL
Qty: 90 TABLET | Refills: 0 | Status: SHIPPED | OUTPATIENT
Start: 2025-02-11

## 2025-02-11 RX ORDER — LAMOTRIGINE 150 MG/1
TABLET ORAL
Qty: 180 TABLET | Refills: 10 | OUTPATIENT
Start: 2025-02-11

## 2025-02-11 NOTE — TELEPHONE ENCOUNTER
The following prescription was reviewed, authorized, and electronically sent to the pharmacy.    Requested Prescriptions     Signed Prescriptions Disp Refills    PANTOPRAZOLE 40 MG Oral Tab EC 90 tablet 0     Sig: TAKE ONE TABLET BY MOUTH IN THE MORNING BEFORE BREAKFAST FOR THE STOMACH     Authorizing Provider: ROCK OLMSTEAD

## 2025-02-11 NOTE — TELEPHONE ENCOUNTER
Refills on file at pharmacy.     Last office visit: 12/12/2024  Due back to clinic per last office note:  6 months  Date next office visit scheduled:    Future Appointments   Date Time Provider Department Center   2/14/2025  1:00 PM ADO US [facility]1 [facility] US [facility]   2/18/2025 10:30 AM Donell Yin MD [facility]           Last OV note recommendation:    Assessment  Nadiya Felix is a 74 year old  right handed woman w/ a pmhx sig. for  ?Prior stroke (most recently admitted on 5/31/2022 for dizziness diagnosed with posterior circulation stroke), ?amarousis fugax in June 2023, epilepsy, hx of atrial fibrillation s/p watchman,  migraines with visual aura, MCI/Alzhiemer's dementia, pseudobulbar affect, prior complaint of left-sided hemisensory loss in February 2017, hepatic steatosis, atrophic kidney, carpal tunnel,  RLS, and JENNIFER not on CPAP who presented to [facility] on [facility]/2023 w/ progressive/ascending left hand/arm dysesthesias in the setting of ?prior stroke.     Her cognitive symptoms are getting worse.     she should not have access to her checking account or manage her finances.  Given that she is getting lost in familiar      Will keep her on aricept even though it can lower her seizuee threshold. For that reason will not add aricept. She needs repeat neuropsychological testing.              Plan  1.  Multi domain mild cognitive impairment w/ PET scan positive for amyloid   Diagnostics:  Repeat neuropsychological testing is scheduled for the spring per patient and her , but she did not have that completed. Will send her to [facility].  Therapeutics  Continue Aricept.  Previously held because of colitis.  Avoid anticholinergic medications in patients with mild cognitive impairment.  In particular the following medications which have a high anticholinergic activity:  Antihistamines including doxylamine, hydroxyzine, meclizine.  TCAs: amitriptyline, clomipramine, desipramine, doxepin,  imipramine, nortriptyline.  First generation antipsychotics and clozapine.  Muscle relaxants: Tizanidine (lower anticholinergic affect is seen w/ cyclobenzaprine, baclofen, and methocarbamol.)  Antiemetics:  hydroxyzine, meclizine, promethazine, scopolamine.  Antispasmodics including atropine, clozapine, hycoasmine, glycopyrrolate.  Medications for overactive bladder including darifenacin oxybutynin, solifenacin (vesicare), tolterodine  Consider stopping other neurologic agents that have low anticholinergic activity including carbamazepine, lithium, nefazodone, oxcarbazepine, phenelzine, and trazodone.  She is on oxybutynin. will monitor at this time. May need to .     2.  Left-sided hemisensory symptoms that lasted for approximately 2 weeks  Diagnostics:  Completed MRI of the brain.   Therapeutics  Cont antithrombotics, Plavix 75 and atorvastatin 10.  Patient already has a watchman.  Home BP monitoring. Pt instructed to check BP at home in the AM and PM, and bring values to future clinic visits. BP goal is for normotension, < 130/80.  HbA1c goal <7.0  LDL-C goal  <70 mg/dL.   Frequent exercise as per AHA/ASA recs (30 min of aerobic exercise, 5 times per week).  Recommend DASH diet. A diet that is rich in fruits and vegetables and thereby high in potassium is beneficial. Recommend reduced intake of sodium and increased intake of potassium, increase consumption of fruits, vegetables, and low-fat dairy products and  decreased consumption of saturated fat.

## 2025-02-12 ENCOUNTER — TELEPHONE (OUTPATIENT)
Dept: INTERNAL MEDICINE CLINIC | Facility: CLINIC | Age: 75
End: 2025-02-12

## 2025-02-12 RX ORDER — ESCITALOPRAM OXALATE 10 MG/1
TABLET ORAL
Qty: 90 TABLET | Refills: 10 | OUTPATIENT
Start: 2025-02-12

## 2025-02-12 RX ORDER — ALLOPURINOL 100 MG/1
TABLET ORAL
Qty: 180 TABLET | Refills: 10 | OUTPATIENT
Start: 2025-02-12

## 2025-02-12 NOTE — TELEPHONE ENCOUNTER
Patient is not on Allopurinol. Escitalopram prescribed by another provider.    Amlodipine \"continued\" on 1/26/25 hospital discharge, but last prescription for Amlodipine was discontinued by Dr. Yin on 9/19/23.    To Dr. Yin to please advise--      Refill request has failed the Ambulatory Medication Refill Standing Order and is routed to the primary physician to review the following:    Requested Prescriptions     Pending Prescriptions Disp Refills    amLODIPine 5 MG Oral Tab [Pharmacy Med Name: AMLODIPINE 5MG TAB 5 Tablet] 90 tablet 3     Sig: Take 1 tablet (5 mg total) by mouth daily.    aspirin (ASPIRIN LOW DOSE) 81 MG Oral Tab EC 90 tablet 3     Sig: Take 1 tablet (81 mg total) by mouth daily.    atorvastatin 20 MG Oral Tab [Pharmacy Med Name: ATORVASTATIN 20MG TAB 20 Tablet] 90 tablet 3     Sig: Take 1 tablet (20 mg total) by mouth nightly.     Refused Prescriptions Disp Refills    ALLOPURINOL 100 MG Oral Tab [Pharmacy Med Name: ALLOPURINOL 100 MG TABS 100 Tablet] 180 tablet 10     Sig: TAKE ONE (1) TABLET BY MOUTH TWICE DAILY *NEW PRESCRIPTION REQUEST*     Refused By: ISHA MONTERO     Reason for Refusal: Refill not appropriate    ESCITALOPRAM 10 MG Oral Tab [Pharmacy Med Name: ESCITALOPRAM 10MG TABLET 10 Tablet] 90 tablet 10     Sig: TAKE 1 TABLET BY MOUTH EVERY DAY AT DINNER *NEW PRESCRIPTION REQUEST*     Refused By: ISHA MONTERO     Reason for Refusal: Prescriber not at this practice

## 2025-02-13 RX ORDER — ATORVASTATIN CALCIUM 20 MG/1
20 TABLET, FILM COATED ORAL NIGHTLY
Qty: 90 TABLET | Refills: 3 | Status: SHIPPED | OUTPATIENT
Start: 2025-02-13

## 2025-02-13 RX ORDER — AMLODIPINE BESYLATE 5 MG/1
5 TABLET ORAL DAILY
Qty: 90 TABLET | Refills: 3 | Status: SHIPPED | OUTPATIENT
Start: 2025-02-13

## 2025-02-13 RX ORDER — ASPIRIN 81 MG/1
81 TABLET ORAL DAILY
Qty: 90 TABLET | Refills: 3 | Status: SHIPPED | OUTPATIENT
Start: 2025-02-13

## 2025-02-14 ENCOUNTER — HOSPITAL ENCOUNTER (OUTPATIENT)
Dept: ULTRASOUND IMAGING | Age: 75
Discharge: HOME | End: 2025-02-14
Attending: UROLOGY
Payer: MEDICARE

## 2025-02-14 DIAGNOSIS — R10.2 PELVIC PAIN SYNDROME: ICD-10-CM

## 2025-02-14 PROCEDURE — 76770 US EXAM ABDO BACK WALL COMP: CPT | Performed by: UROLOGY

## 2025-02-17 ENCOUNTER — TELEPHONE (OUTPATIENT)
Dept: INTERNAL MEDICINE CLINIC | Facility: CLINIC | Age: 75
End: 2025-02-17

## 2025-02-17 DIAGNOSIS — Z87.898 HISTORY OF SEIZURES: ICD-10-CM

## 2025-02-17 RX ORDER — BUTALBITAL, ACETAMINOPHEN AND CAFFEINE 50; 325; 40 MG/1; MG/1; MG/1
1 TABLET ORAL EVERY 6 HOURS PRN
Qty: 10 TABLET | Refills: 0 | Status: CANCELLED | OUTPATIENT
Start: 2025-02-17

## 2025-02-17 RX ORDER — LEVETIRACETAM 500 MG/1
500 TABLET ORAL 2 TIMES DAILY
Qty: 180 TABLET | Refills: 1 | Status: SHIPPED | OUTPATIENT
Start: 2025-02-17 | End: 2025-08-16

## 2025-02-17 RX ORDER — LAMOTRIGINE 150 MG/1
150 TABLET ORAL 2 TIMES DAILY
Qty: 180 TABLET | Refills: 3 | Status: SHIPPED | OUTPATIENT
Start: 2025-02-17 | End: 2026-02-17

## 2025-02-17 NOTE — TELEPHONE ENCOUNTER
Medication request noted.  For now I will not refill as that medication has not been used often and should not be used with Nadiya's condition.

## 2025-02-17 NOTE — TELEPHONE ENCOUNTER
RX was sent to local [facility] pharmacy. Called [facility] to discuss and they report RX was sent to [facility].  Will resend.  No current RX on file at [facility]    Resent to [facility] pharmacy

## 2025-02-17 NOTE — TELEPHONE ENCOUNTER
To MD:  The above refill request is for a controlled substance.  Please review pended medication order.   Print and sign for staff to fax to pharmacy or prescribe electronically.    Last office visit: 11/19/24  Last time refill sent and quantity/refills: 2/2/25 #10/0 Dr. Patrick (hospitalist)      Medication Dispense History (from 8/21/2024 to 2/17/2025)  Expand All  Collapse All  Butalbital-APAP-Caffeine     Dispensed Written Strength Quantity Refills Days Supply Provider Pharmacy   BUTALBITAL-ACETAMINOPHEN-CAFFEINE 02/02/2025 02/02/2025 40 mg 10  2 DENISE PATRICK [id number]

## 2025-02-19 ENCOUNTER — TELEPHONE (OUTPATIENT)
Dept: INTERNAL MEDICINE CLINIC | Facility: CLINIC | Age: 75
End: 2025-02-19

## 2025-02-19 NOTE — TELEPHONE ENCOUNTER
Disregard unless pt reaches out (no PA required previously).   Last filled 1/30/2025, 30 day supply, [facility] [place]

## 2025-02-19 NOTE — TELEPHONE ENCOUNTER
Received fax from [facility] prior authorization request.    Nadolol 20MG     ID: [id number]  Fax: [phone]    Placed in purple folder

## 2025-02-27 ENCOUNTER — TELEPHONE (OUTPATIENT)
Dept: INTERNAL MEDICINE CLINIC | Facility: CLINIC | Age: 75
End: 2025-02-27

## 2025-02-27 NOTE — TELEPHONE ENCOUNTER
Contacted [facility] and provided her with the verbal order (per protocol) as requested below.

## 2025-02-27 NOTE — TELEPHONE ENCOUNTER
[facility] patient is requesting no nursing visit the week of 3/17, they will see patient the week of 3/23    Requesting a verbal order  Phone [phone], confidential VM

## 2025-03-04 RX ORDER — NADOLOL 20 MG/1
20 TABLET ORAL DAILY
Qty: 90 TABLET | Refills: 3 | Status: SHIPPED | OUTPATIENT
Start: 2025-03-04

## 2025-03-04 NOTE — TELEPHONE ENCOUNTER
Refill request is for a maintenance medication and has met the criteria specified in the Ambulatory Medication Refill Standing Order for eligibility, visits, laboratory, alerts and was sent to the requested pharmacy.    Requested Prescriptions     Signed Prescriptions Disp Refills    nadolol 20 MG Oral Tab 90 tablet 3     Sig: Take 1 tablet (20 mg total) by mouth daily.     Authorizing Provider: ROCK OLMSTEAD     Ordering User: ISAURO CATES

## 2025-03-10 ENCOUNTER — TELEPHONE (OUTPATIENT)
Dept: INTERNAL MEDICINE CLINIC | Facility: CLINIC | Age: 75
End: 2025-03-10

## 2025-03-10 NOTE — TELEPHONE ENCOUNTER
[facility] contacted and provided (per protocol) with the verbal order to discharge patient from Physical Therapy next week as requested.

## 2025-03-10 NOTE — TELEPHONE ENCOUNTER
[facility] left a voicemail requesting a verbal order to discharge patient from Physical Therapy next week, patient is doing well    Phone [phone]

## 2025-03-24 ENCOUNTER — TELEPHONE (OUTPATIENT)
Dept: INTERNAL MEDICINE CLINIC | Facility: CLINIC | Age: 75
End: 2025-03-24

## 2025-04-01 ENCOUNTER — TELEPHONE (OUTPATIENT)
Dept: INTERNAL MEDICINE CLINIC | Facility: CLINIC | Age: 75
End: 2025-04-01

## 2025-04-01 DIAGNOSIS — Z87.898 HISTORY OF SEIZURES: ICD-10-CM

## 2025-04-01 NOTE — TELEPHONE ENCOUNTER
[facility] faxed refill requests for     Allopurinol & But/apap/caf tab west    Along with medication list to review     Fax placed in green folder

## 2025-04-04 ENCOUNTER — OFFICE VISIT (OUTPATIENT)
Dept: INTERNAL MEDICINE CLINIC | Facility: CLINIC | Age: 75
End: 2025-04-04

## 2025-04-04 VITALS
HEART RATE: 62 BPM | WEIGHT: 152 LBS | HEIGHT: 65 IN | TEMPERATURE: 98 F | OXYGEN SATURATION: 98 % | SYSTOLIC BLOOD PRESSURE: 126 MMHG | DIASTOLIC BLOOD PRESSURE: 74 MMHG | BODY MASS INDEX: 25.33 KG/M2

## 2025-04-04 DIAGNOSIS — M54.50 CHRONIC MIDLINE LOW BACK PAIN WITHOUT SCIATICA: ICD-10-CM

## 2025-04-04 DIAGNOSIS — Z87.898 HISTORY OF SEIZURES: ICD-10-CM

## 2025-04-04 DIAGNOSIS — D64.9 ANEMIA, UNSPECIFIED TYPE: ICD-10-CM

## 2025-04-04 DIAGNOSIS — I10 PRIMARY HYPERTENSION: Primary | ICD-10-CM

## 2025-04-04 DIAGNOSIS — N18.30 STAGE 3 CHRONIC KIDNEY DISEASE, UNSPECIFIED WHETHER STAGE 3A OR 3B CKD (HCC): ICD-10-CM

## 2025-04-04 DIAGNOSIS — Z86.79 HISTORY OF ATRIAL FIBRILLATION: ICD-10-CM

## 2025-04-04 DIAGNOSIS — F03.90 DEMENTIA, UNSPECIFIED DEMENTIA SEVERITY, UNSPECIFIED DEMENTIA TYPE, UNSPECIFIED WHETHER BEHAVIORAL, PSYCHOTIC, OR MOOD DISTURBANCE OR ANXIETY (HCC): ICD-10-CM

## 2025-04-04 DIAGNOSIS — Z95.818 PRESENCE OF WATCHMAN LEFT ATRIAL APPENDAGE CLOSURE DEVICE: ICD-10-CM

## 2025-04-04 DIAGNOSIS — I48.0 PAROXYSMAL A-FIB (HCC): ICD-10-CM

## 2025-04-04 DIAGNOSIS — G89.29 CHRONIC MIDLINE LOW BACK PAIN WITHOUT SCIATICA: ICD-10-CM

## 2025-04-04 PROCEDURE — G2211 COMPLEX E/M VISIT ADD ON: HCPCS | Performed by: INTERNAL MEDICINE

## 2025-04-04 PROCEDURE — 99214 OFFICE O/P EST MOD 30 MIN: CPT | Performed by: INTERNAL MEDICINE

## 2025-04-04 RX ORDER — DONEPEZIL HYDROCHLORIDE 10 MG/1
10 TABLET, FILM COATED ORAL DAILY
COMMUNITY
Start: 2025-03-19

## 2025-04-04 NOTE — PROGRESS NOTES
Nadiya Felix is a 75 year old female.  HPI:     Chief Complaint   Patient presents with    Hospital F/U      Nadiya presents for ER follow-up.    I did review discharge summary.  She was admitted January 26 and discharged February 2, 2025.    She was admitted after she was brought into the hospital after she had a witnessed seizure activity and subsequent confusion.  She was unable to provide history due to her acute condition.  History was from family at the bedside.     indicated that he checked on her and she was having generalized shaking, stiffness to body and unable to respond.  EMS was called.  At that time  believed the episode lasted about 5 minutes.  She is known to have a seizure history and has been taking her medications.    Around that time she had a UTI and was taking an antibiotic.    Hospital course shows that CT of brain showed no acute abnormalities.  She was loaded with Keppra and was to continue Keppra 500 mg twice a day.  Continue Vimpat.  She was seen by neurology.    She had altered mental status.  She had acute kidney injury.    I did review Dr. Liriano's consult note January 27, 2025.    He indicated in his consult note that she was on Keppra and lacosamide was added.    It was felt that her symptoms were due to diarrhea and not meningitis.    A lumbar puncture was done February January 30, 2025.  Meningitis encephalitis panel by PCR was negative.  CSF culture showed no organisms.  No white cells.  No growth after 3 days.  CSF cell count showed 8 red blood cells.  Glucose in the cerebrospinal fluid was 75.  Protein was 30.9    Today she feels well.  She looks well.  She has no cardiac pulmonary GI or  complaints.  She does have chronic difficulty with bowels alternating sometimes with constipation or diarrhea but mostly from what I can tell her bowel movements are unremarkable    I have asked her who she is following up with with neurology.  She has not followed up with  anybody yet and I did give her contact information to Dr. Liriano and asked her to follow-up with him.    Discharge medications included Keppra 500 mg twice a day.  She was to continue Lamictal.    Her last mammogram was June 2024.  She is indicated she wishes no more mammographies.    Current Outpatient Medications   Medication Sig Dispense Refill    donepezil 10 MG Oral Tab Take 1 tablet (10 mg total) by mouth daily. TAKE 1 TABLET BY MOUTH EVERY DAY AFTER BREAKFAST OR LUNCH      nadolol 20 MG Oral Tab Take 1 tablet (20 mg total) by mouth daily. 90 tablet 3    levETIRAcetam 500 MG Oral Tab Take 1 tablet (500 mg total) by mouth 2 (two) times daily. 180 tablet 1    lamoTRIgine 150 MG Oral Tab Take 1 tablet (150 mg total) by mouth 2 (two) times daily. 180 tablet 3    amLODIPine 5 MG Oral Tab Take 1 tablet (5 mg total) by mouth daily. 90 tablet 3    aspirin (ASPIRIN LOW DOSE) 81 MG Oral Tab EC Take 1 tablet (81 mg total) by mouth daily. 90 tablet 3    atorvastatin 20 MG Oral Tab Take 1 tablet (20 mg total) by mouth nightly. 90 tablet 3    PANTOPRAZOLE 40 MG Oral Tab EC TAKE ONE TABLET BY MOUTH IN THE MORNING BEFORE BREAKFAST FOR THE STOMACH 90 tablet 0    butalbital-acetaminophen-caffeine -40 MG Oral Tab Take 1 tablet by mouth every 6 (six) hours as needed for Headaches. 10 tablet 0    estradiol 0.1 MG/GM Vaginal Cream Place 1 g vaginally 3 (three) times a week.      Melatonin 10 MG Oral Tab Take 10 mg by mouth at bedtime.      escitalopram 10 MG Oral Tab Take 1 tablet (10 mg total) by mouth daily.        Past Medical History:    Abnormal PET scan of head    Patient had abnormal PET scan of brain May 6, 2016 showing bilateral temporoparietal beta amyloid deposition in the gray matter consistent with Alzheimer's disease.     Acute cystitis without hematuria    Acute pancreatitis (HCC)    Acute pancreatitis, unspecified complication status, unspecified pancreatitis type (HCC)    Alzheimer disease (HCC)    Anxiety     Mild    Aortic atherosclerosis    Aortic atherosclerosis seen on CT scan October 2014.     Arm paresthesia, left    Arrhythmia    A-Fib    Arthritis    Mild    Arthritis, gouty    Ataxia    Atrophic kidney    CT scan October 8, 2014 shows left kidney smaller than the right.  Bilateral areas of renal parenchymal scar.  Symmetric nephrograms.  Moderate calcific atherosclerosis of aorta.     AVM (arteriovenous malformation) of colon    Large nonbleeding cecal AVM found on colonoscopy March 16 2018.  At that time patient was found to have microscopic/lymphocytic colitis.      Back problem    spinal stenosis    Carpal tunnel syndrome    as per NG    Cataract    Chronic atrial fibrillation (HCC)    Closed fracture of left distal radius    Crohn disease (HCC)    Depression    Diverticulosis    as per NG    Elevated LFTs    Esophageal reflux    Mild    Family history of colon cancer    Maternal grandmother had colon cancer in her 70s.    Foreign body in alimentary tract    Foreign body in stomach, initial encounter    Gout    Hand weakness    Hepatic steatosis    High blood pressure    High cholesterol    as per NG    History of colonic polyps    as per NG    History of migraine headaches    as per NG    History of total hysterectomy with bilateral salpingo-oophorectomy (BSO)    History of total hysterectomy with removal of both tubes and ovaries    as per NG    Hyperlipidemia    Incontinence    DUE TO CYSTOCELE/RECTOCELE    Left arm weakness    Lymphocytic colitis    as per NG    Migraine    Mild cognitive impairment    as per NG    Obesity    25 lbs overweight    Obstructive sleep apnea    Pacemaker    Parkinson's disease (HCC)    Pseudobulbar affect    Renal disorder    stage 3    Restless legs syndrome    Seizure disorder ([facility])    last one 1/2025    Sleep apnea    Tendon tear    Surgically repaired; as per NG    Unspecified essential hypertension    as per NG    Vertigo    Visual impairment    reading glasses       Social History:  Social History     Socioeconomic History    Marital status:    Tobacco Use    Smoking status: Former     Current packs/day: 0.00     Average packs/day: 1 pack/day for 40.0 years (40.0 ttl pk-yrs)     Types: Cigarettes     Start date: 1958     Quit date: 1998     Years since quittin.2    Smokeless tobacco: Never   Vaping Use    Vaping status: Every Day    Substances: THC, cannabis   Substance and Sexual Activity    Alcohol use: Yes     Comment: 2 - 3 drinks a year    Drug use: Yes     Frequency: 7.0 times per week     Types: Cannabis     Comment: Medical Edible marijauna and occasional vape   Other Topics Concern    Caffeine Concern No     Comment: 4 cups tea daily    Exercise Yes     Comment: walking 2 blks 2 times week    Pt has a pacemaker Yes    Pt has a defibrillator No    Reaction to local anesthetic No   Social History Narrative    ** Merged History Encounter **         The patient does not use an assistive device..    The patient does live in a home with stairs. 16 stairs       Social Drivers of Health     Food Insecurity: No Food Insecurity (2025)    Food Insecurity     Food Insecurity: Never true   Transportation Needs: No Transportation Needs (2025)    Transportation Needs     Lack of Transportation: No   Housing Stability: Low Risk  (2025)    Housing Stability     Housing Instability: No        REVIEW OF SYSTEMS:   GENERAL HEALTH:  feels well otherwise  RESPIRATORY:  Voices no shortness of breath with exertion or cough  CARDIOVASCULAR:  Voices no chest pain on exertion or shortness of breath  GI:   Voices no abdominal pain or changes of bowels   :Viices no urning or frequency of urination.  NEURO:  Voices no  headaches or dizziness    EXAM:   /74   Pulse 62   Temp 97.8 °F (36.6 °C)   Ht 5' 5\" (1.651 m)   Wt 152 lb (68.9 kg)   SpO2 98%   BMI 25.29 kg/m²     GENERAL:  well developed, well nourished, in no apparent distress  SKIN:  no rashes  ,   HEENT: atraumatic.   Pharynx normal without exudate.  EYES:  PERRL. Sclera anicteric.  NECK:  Supple,  no adenopathy,  thyroid normal  LUNGS:  clear to auscultation.  Effort normal  CARDIO:  RRR without murmur.   S1 and S2 normal  BREAST: Breast exam benign without any masses nipple discharge or axillary adenopathy.  I did ask her if she wished to have a chaperone present and she declined.  GI:  good BS's,  no masses,   HSM or tenderness  EXTREMITIES : no cyanosis, clubbing or edema    ASSESSMENT AND PLAN:     1. Primary hypertension  Blood pressure seems to be under satisfactory control  - CBC With Differential With Platelet; Future  - Lipid Panel; Future  - Comp Metabolic Panel (14); Future  - Ferritin; Future  - Iron And Tibc; Future  - Vitamin B12 with Reflex to MMA; Future    2. Anemia, unspecified type  Will update labs.  - CBC With Differential With Platelet; Future  - Lipid Panel; Future  - Comp Metabolic Panel (14); Future  - Ferritin; Future  - Iron And Tibc; Future  - Vitamin B12 with Reflex to MMA; Future    3. History of atrial fibrillation  She does have a Watchman device in place    4. Presence of Watchman left atrial appendage closure device  Watchman device in place.    5. Stage 3 chronic kidney disease, unspecified whether stage 3a or 3b CKD (HCC)  Stable.  Will follow.    6. Dementia, unspecified dementia severity, unspecified dementia type, unspecified whether behavioral, psychotic, or mood disturbance or anxiety (HCC)  She has dementia.  I have asked her to follow-up with Dr. Liriano.    7. History of seizures  She is on Keppra.  Asymptomatic.  She had EEG in the hospital January 27, 2025 and EEG showed normal awake and sleep EEG.    8. Chronic midline low back pain without sciatica  She does have low back pain.  We spoke about this.  Will get lumbar x-ray.  I did tell her she may benefit from physical therapy.  - XR LUMBAR SPINE (MIN 2 VIEWS) (CPT=72100); Future    This visit was 30 minutes.   I spent 10 minutes before visit preparing and reviewing old records.  Greater than 50% of the visit was engaged in counseling and review of past data.    I did tell her I will be retiring June 13,025.  I did give her resources to establish herself with another primary care physician.  During discussion she indicated she will follow-up with Dr.Nuha Vega.    The patient indicates understanding of these issues and agrees to the plan.    Donell Yin MD  4/4/2025  2:01 PM

## 2025-05-12 ENCOUNTER — TELEPHONE (OUTPATIENT)
Dept: INTERNAL MEDICINE CLINIC | Facility: CLINIC | Age: 75
End: 2025-05-12

## 2025-05-12 ENCOUNTER — APPOINTMENT (OUTPATIENT)
Dept: CT IMAGING | Facility: HOSPITAL | Age: 75
End: 2025-05-12
Attending: EMERGENCY MEDICINE
Payer: MEDICARE

## 2025-05-12 ENCOUNTER — HOSPITAL ENCOUNTER (OUTPATIENT)
Age: 75
Discharge: ANOTHER HOSPITAL | End: 2025-05-12
Payer: MEDICARE

## 2025-05-12 ENCOUNTER — HOSPITAL ENCOUNTER (EMERGENCY)
Facility: HOSPITAL | Age: 75
Discharge: HOME | End: 2025-05-12
Attending: EMERGENCY MEDICINE
Payer: MEDICARE

## 2025-05-12 VITALS
RESPIRATION RATE: 17 BRPM | DIASTOLIC BLOOD PRESSURE: 73 MMHG | TEMPERATURE: 98 F | HEART RATE: 71 BPM | SYSTOLIC BLOOD PRESSURE: 143 MMHG | OXYGEN SATURATION: 99 %

## 2025-05-12 VITALS
HEART RATE: 60 BPM | BODY MASS INDEX: 24 KG/M2 | WEIGHT: 144 LBS | DIASTOLIC BLOOD PRESSURE: 80 MMHG | TEMPERATURE: 97 F | RESPIRATION RATE: 18 BRPM | OXYGEN SATURATION: 98 % | SYSTOLIC BLOOD PRESSURE: 175 MMHG

## 2025-05-12 DIAGNOSIS — R10.13 DYSPEPSIA: Primary | ICD-10-CM

## 2025-05-12 DIAGNOSIS — R10.10 UPPER ABDOMINAL PAIN: Primary | ICD-10-CM

## 2025-05-12 LAB
ALBUMIN SERPL-MCNC: 4.8 G/DL (ref 3.2–4.8)
ALBUMIN/GLOB SERPL: 1.7 {RATIO} (ref 1–2)
ALP LIVER SERPL-CCNC: 60 U/L (ref 55–142)
ALT SERPL-CCNC: 15 U/L (ref 10–49)
ANION GAP SERPL CALC-SCNC: 6 MMOL/L (ref 0–18)
AST SERPL-CCNC: 21 U/L (ref ?–34)
ATRIAL RATE: 60 BPM
BASOPHILS # BLD AUTO: 0.02 X10(3) UL (ref 0–0.2)
BASOPHILS NFR BLD AUTO: 0.4 %
BILIRUB SERPL-MCNC: 1.1 MG/DL (ref 0.2–1.1)
BUN BLD-MCNC: 14 MG/DL (ref 9–23)
BUN/CREAT SERPL: 10.8 (ref 10–20)
CALCIUM BLD-MCNC: 9.7 MG/DL (ref 8.7–10.4)
CHLORIDE SERPL-SCNC: 107 MMOL/L (ref 98–112)
CO2 SERPL-SCNC: 28 MMOL/L (ref 21–32)
CREAT BLD-MCNC: 1.3 MG/DL (ref 0.55–1.02)
DEPRECATED RDW RBC AUTO: 40.3 FL (ref 35.1–46.3)
EGFRCR SERPLBLD CKD-EPI 2021: 43 ML/MIN/1.73M2 (ref 60–?)
EOSINOPHIL # BLD AUTO: 0.16 X10(3) UL (ref 0–0.7)
EOSINOPHIL NFR BLD AUTO: 3.2 %
ERYTHROCYTE [DISTWIDTH] IN BLOOD BY AUTOMATED COUNT: 12.1 % (ref 11–15)
GLOBULIN PLAS-MCNC: 2.9 G/DL (ref 2–3.5)
GLUCOSE BLD-MCNC: 92 MG/DL (ref 70–99)
HCT VFR BLD AUTO: 42.9 % (ref 35–48)
HGB BLD-MCNC: 14 G/DL (ref 12–16)
IMM GRANULOCYTES # BLD AUTO: 0.02 X10(3) UL (ref 0–1)
IMM GRANULOCYTES NFR BLD: 0.4 %
LIPASE SERPL-CCNC: 57 U/L (ref 12–53)
LYMPHOCYTES # BLD AUTO: 1.35 X10(3) UL (ref 1–4)
LYMPHOCYTES NFR BLD AUTO: 27.3 %
MCH RBC QN AUTO: 29.6 PG (ref 26–34)
MCHC RBC AUTO-ENTMCNC: 32.6 G/DL (ref 31–37)
MCV RBC AUTO: 90.7 FL (ref 80–100)
MONOCYTES # BLD AUTO: 0.48 X10(3) UL (ref 0.1–1)
MONOCYTES NFR BLD AUTO: 9.7 %
NEUTROPHILS # BLD AUTO: 2.92 X10 (3) UL (ref 1.5–7.7)
NEUTROPHILS # BLD AUTO: 2.92 X10(3) UL (ref 1.5–7.7)
NEUTROPHILS NFR BLD AUTO: 59 %
OSMOLALITY SERPL CALC.SUM OF ELEC: 292 MOSM/KG (ref 275–295)
P AXIS: -3 DEGREES
P-R INTERVAL: 214 MS
PLATELET # BLD AUTO: 211 10(3)UL (ref 150–450)
POTASSIUM SERPL-SCNC: 3.8 MMOL/L (ref 3.5–5.1)
PROT SERPL-MCNC: 7.7 G/DL (ref 5.7–8.2)
Q-T INTERVAL: 442 MS
QRS DURATION: 92 MS
QTC CALCULATION (BEZET): 442 MS
R AXIS: 49 DEGREES
RBC # BLD AUTO: 4.73 X10(6)UL (ref 3.8–5.3)
SODIUM SERPL-SCNC: 141 MMOL/L (ref 136–145)
T AXIS: 59 DEGREES
TROPONIN I SERPL HS-MCNC: 4 NG/L (ref ?–34)
VENTRICULAR RATE: 60 BPM
WBC # BLD AUTO: 5 X10(3) UL (ref 4–11)

## 2025-05-12 PROCEDURE — 99284 EMERGENCY DEPT VISIT MOD MDM: CPT

## 2025-05-12 PROCEDURE — 83690 ASSAY OF LIPASE: CPT | Performed by: EMERGENCY MEDICINE

## 2025-05-12 PROCEDURE — 93010 ELECTROCARDIOGRAM REPORT: CPT

## 2025-05-12 PROCEDURE — 36415 COLL VENOUS BLD VENIPUNCTURE: CPT

## 2025-05-12 PROCEDURE — 99214 OFFICE O/P EST MOD 30 MIN: CPT

## 2025-05-12 PROCEDURE — 84484 ASSAY OF TROPONIN QUANT: CPT | Performed by: EMERGENCY MEDICINE

## 2025-05-12 PROCEDURE — 93005 ELECTROCARDIOGRAM TRACING: CPT

## 2025-05-12 PROCEDURE — 85025 COMPLETE CBC W/AUTO DIFF WBC: CPT | Performed by: EMERGENCY MEDICINE

## 2025-05-12 PROCEDURE — 74177 CT ABD & PELVIS W/CONTRAST: CPT | Performed by: EMERGENCY MEDICINE

## 2025-05-12 PROCEDURE — 80053 COMPREHEN METABOLIC PANEL: CPT | Performed by: EMERGENCY MEDICINE

## 2025-05-12 RX ORDER — ONDANSETRON 4 MG/1
4 TABLET, FILM COATED ORAL EVERY 8 HOURS PRN
Qty: 15 TABLET | Refills: 0 | Status: SHIPPED | OUTPATIENT
Start: 2025-05-12

## 2025-05-12 RX ORDER — DICYCLOMINE HCL 20 MG
20 TABLET ORAL 4 TIMES DAILY PRN
Qty: 20 TABLET | Refills: 0 | Status: SHIPPED | OUTPATIENT
Start: 2025-05-12

## 2025-05-12 RX ORDER — PANTOPRAZOLE SODIUM 40 MG/1
40 TABLET, DELAYED RELEASE ORAL DAILY
Qty: 30 TABLET | Refills: 0 | Status: SHIPPED | OUTPATIENT
Start: 2025-05-12 | End: 2025-06-11

## 2025-05-12 NOTE — ED INITIAL ASSESSMENT (HPI)
Pt presents to ed with c/o nausea. Pt states she has been having nausea and upper abdominal pain for 3 days. Pt states she was unable to walk d/t nausea and pain yesterday.     Sent to ed from .

## 2025-05-12 NOTE — ED PROVIDER NOTES
Patient Seen in: [facility]      History     Chief Complaint   Patient presents with    Nausea     Stated Complaint: sent by primary doctor upper abd pain     Subjective:   HPI  74 y/o female with extensive hx including A-fib sick sinus, pacer, IBS AVM HTN pancreatitis, presents with upper abd pain under ribs to back over the last 3 days.  She states she was unable to sleep due nausea.  She reports pressure pain to her left upper abdomen epigastric region radiating to her chest.  Denies reflux symptoms  History of Present Illness               Objective:     Past Medical History:    Abnormal PET scan of head    Patient had abnormal PET scan of brain May 6, 2016 showing bilateral temporoparietal beta amyloid deposition in the gray matter consistent with Alzheimer's disease.     Acute cystitis without hematuria    Acute pancreatitis (HCC)    Acute pancreatitis, unspecified complication status, unspecified pancreatitis type (HCC)    Alzheimer disease (HCC)    Anxiety    Mild    Aortic atherosclerosis    Aortic atherosclerosis seen on CT scan October 2014.     Arm paresthesia, left    Arrhythmia    A-Fib    Arthritis    Mild    Arthritis, gouty    Ataxia    Atrophic kidney    CT scan October 8, 2014 shows left kidney smaller than the right.  Bilateral areas of renal parenchymal scar.  Symmetric nephrograms.  Moderate calcific atherosclerosis of aorta.     AVM (arteriovenous malformation) of colon    Large nonbleeding cecal AVM found on colonoscopy March 16 2018.  At that time patient was found to have microscopic/lymphocytic colitis.      Back problem    spinal stenosis    Carpal tunnel syndrome    as per NG    Cataract    Chronic atrial fibrillation (HCC)    Closed fracture of left distal radius    Crohn disease (HCC)    Depression    Diverticulosis    as per NG    Elevated LFTs    Esophageal reflux    Mild    Family history of colon cancer    Maternal grandmother had colon cancer in her 70s.    Foreign  body in alimentary tract    Foreign body in stomach, initial encounter    Gout    Hand weakness    Hepatic steatosis    High blood pressure    High cholesterol    as per NG    History of colonic polyps    as per NG    History of migraine headaches    as per NG    History of total hysterectomy with bilateral salpingo-oophorectomy (BSO)    History of total hysterectomy with removal of both tubes and ovaries    as per NG    Hyperlipidemia    Incontinence    DUE TO CYSTOCELE/RECTOCELE    Left arm weakness    Lymphocytic colitis    as per NG    Migraine    Mild cognitive impairment    as per NG    Obesity    25 lbs overweight    Obstructive sleep apnea    Pacemaker    Parkinson's disease (HCC)    Pseudobulbar affect    Renal disorder    stage 3    Restless legs syndrome    Seizure disorder (HCC)    last one 2025    Sleep apnea    Tendon tear    Surgically repaired; as per NG    Unspecified essential hypertension    as per NG    Vertigo    Visual impairment    reading glasses              Past Surgical History:   Procedure Laterality Date    Arthroscopy of joint unlisted      BILAT. KNEES    Cardiac pacemaker placement      as per NG    Carpal tunnel release      as per NG    Cataract Left 07/10/2015    Cataract Right late 2015    Cholecystectomy      as per NG    Colonoscopy N/A 2018    Procedure: COLONOSCOPY;  Surgeon: Mohamud Sawyer MD;  Location: [facility] ENDOSCOPY    Colonoscopy N/A 2022    Procedure: COLONOSCOPY/ESOPHAGOGASTRODUODENOSCOPY (EGD);  Surgeon: Genaro Osman MD;  Location: [facility] ENDOSCOPY    Electrocardiogram, complete  2013    SCANNED TO MEDIA TAB - 2013    Hysterectomy      & BSO; as per NG    Knee arthroscopy Right     as per NG    Knee surgery  01/2015    x3     Nasal scopy,remv totl ethmoid      \"Ethmoidectomy, max antrostomies\"; as per NG      1966,1969 & 1973    Normal    Other   or 2015    removal of pins and plate from right wrist (fracture)     Other surgical history Left     L knee scoped--3 torn meniscus--biocartilage done    Other surgical history Right 2016    R knee scoped for torn miniscus    Other surgical history Left     wrist surgery    Pacemaker/defibrillator      Reconstr nose+eamon septal repair      as per NG    Removal gallbladder      Total abdom hysterectomy      Total knee replacement                  Social History     Socioeconomic History    Marital status:    Tobacco Use    Smoking status: Former     Current packs/day: 0.00     Average packs/day: 1 pack/day for 40.0 years (40.0 ttl pk-yrs)     Types: Cigarettes     Start date: 1958     Quit date: 1998     Years since quittin.3    Smokeless tobacco: Never   Vaping Use    Vaping status: Every Day    Substances: THC, cannabis   Substance and Sexual Activity    Alcohol use: Yes     Comment: 2 - 3 drinks a year    Drug use: Yes     Frequency: 7.0 times per week     Types: Cannabis     Comment: Medical Edible marijauna and occasional vape   Other Topics Concern    Caffeine Concern No     Comment: 4 cups tea daily    Exercise Yes     Comment: walking 2 blks 2 times week    Pt has a pacemaker Yes    Pt has a defibrillator No    Reaction to local anesthetic No   Social History Narrative    ** Merged History Encounter **         The patient does not use an assistive device..    The patient does live in a home with stairs. 16 stairs       Social Drivers of Health     Food Insecurity: No Food Insecurity (2025)    Food Insecurity     Food Insecurity: Never true   Transportation Needs: No Transportation Needs (2025)    Transportation Needs     Lack of Transportation: No   Housing Stability: Low Risk  (2025)    Housing Stability     Housing Instability: No              Review of Systems    Positive for stated complaint: sent by primary doctor  Other systems are as noted in HPI.  Constitutional and vital signs reviewed.      All other systems reviewed and  negative except as noted above.                  Physical Exam     ED Triage Vitals [05/12/25 1325]   /73   Pulse 71   Resp 17   Temp 98.2 °F (36.8 °C)   Temp src Oral   SpO2 99 %   O2 Device None (Room air)       Current Vitals:   Vital Signs  BP: 143/73  Pulse: 71  Resp: 17  Temp: 98.2 °F (36.8 °C)  Temp src: Oral    Oxygen Therapy  SpO2: 99 %  O2 Device: None (Room air)        Physical Exam  Vitals and nursing note reviewed.   Constitutional:       General: She is not in acute distress.     Appearance: Normal appearance.   Eyes:      Extraocular Movements: Extraocular movements intact.      Pupils: Pupils are equal, round, and reactive to light.   Cardiovascular:      Rate and Rhythm: Normal rate.      Pulses: Normal pulses.   Pulmonary:      Effort: Pulmonary effort is normal.      Breath sounds: Normal breath sounds.   Abdominal:      Tenderness: There is abdominal tenderness (upper abd > luq/epigastric). There is no guarding or rebound.   Neurological:      Mental Status: She is alert and oriented to person, place, and time.           Physical Exam                ED Course   Labs Reviewed - No data to display  EKG    Rate, intervals and axes as noted on EKG Report.  Rate: 60  Rhythm: Pacer rhythm prolonged AV  Reading: no stemi              Results                           MDM              Medical Decision Making  75-year-old female with extensive medical history presents with severe nausea epigastric upper abdominal pain with chest pressure over the last 3 days  Unable to sleep secondary to nausea  History of pancreatitis  Aware of the limitations of the immediate care Case discussed with supervising physician advised evaluation in the ER for higher level of care                     Amount and/or Complexity of Data Reviewed  External Data Reviewed: labs, radiology, ECG and notes.     Details: CARDIAC HISTORY:   Paroxysmal atrial fibrillation  Watchman  Pacer  hl  htnEcho 11/2018 ef 58%  Stress nuc  2016  CTA 8/2019 plaques  Pacer check 3/2021 = josé sci, est claudia 2025, no atrial fibrillation  Stress nuc 9/2020 normal ef 73           Disposition and Plan     Clinical Impression:  1. Upper abdominal pain         Disposition:  Ic to ed  5/12/2025  1:56 pm    Follow-up:  No follow-up provider specified.        Medications Prescribed:  Discharge Medication List as of 5/12/2025  1:52 PM          Supplementary Documentation:

## 2025-05-12 NOTE — ED INITIAL ASSESSMENT (HPI)
Patient reports nausea yesterday.  States it was difficult to get out of bed due to nausea.  Denies emesis.  + epigastric discomfort today.

## 2025-05-12 NOTE — ED PROVIDER NOTES
Patient Seen in: [facility] Emergency Department      History     Chief Complaint   Patient presents with    Abdomen/Flank Pain     Stated Complaint: nausea/abd pain    Subjective:   HPI  History of Present Illness            75-year-old female presents for evaluation of abdominal pain.  Patient sent from urgent care, she notes 3 to 4 days of epigastric pain.  Associated with nausea.  Radiates into her chest.  Unable to identify alleviating or exacerbating factors.  Denies fever, vomiting, shortness of breath, focal weakness or numbness, diarrhea, constipation.      Objective:     Past Medical History:    Abnormal PET scan of head    Patient had abnormal PET scan of brain May 6, 2016 showing bilateral temporoparietal beta amyloid deposition in the gray matter consistent with Alzheimer's disease.     Acute cystitis without hematuria    Acute pancreatitis (HCC)    Acute pancreatitis, unspecified complication status, unspecified pancreatitis type (HCC)    Alzheimer disease (HCC)    Anxiety    Mild    Aortic atherosclerosis    Aortic atherosclerosis seen on CT scan October 2014.     Arm paresthesia, left    Arrhythmia    A-Fib    Arthritis    Mild    Arthritis, gouty    Ataxia    Atrophic kidney    CT scan October 8, 2014 shows left kidney smaller than the right.  Bilateral areas of renal parenchymal scar.  Symmetric nephrograms.  Moderate calcific atherosclerosis of aorta.     AVM (arteriovenous malformation) of colon    Large nonbleeding cecal AVM found on colonoscopy March 16 2018.  At that time patient was found to have microscopic/lymphocytic colitis.      Back problem    spinal stenosis    Carpal tunnel syndrome    as per NG    Cataract    Chronic atrial fibrillation (HCC)    Closed fracture of left distal radius    Crohn disease (HCC)    Depression    Diverticulosis    as per NG    Elevated LFTs    Esophageal reflux    Mild    Family history of colon cancer    Maternal grandmother had colon cancer in her  70s.    Foreign body in alimentary tract    Foreign body in stomach, initial encounter    Gout    Hand weakness    Hepatic steatosis    High blood pressure    High cholesterol    as per NG    History of colonic polyps    as per NG    History of migraine headaches    as per NG    History of total hysterectomy with bilateral salpingo-oophorectomy (BSO)    History of total hysterectomy with removal of both tubes and ovaries    as per NG    Hyperlipidemia    Incontinence    DUE TO CYSTOCELE/RECTOCELE    Left arm weakness    Lymphocytic colitis    as per NG    Migraine    Mild cognitive impairment    as per NG    Obesity    25 lbs overweight    Obstructive sleep apnea    Pacemaker    Parkinson's disease (HCC)    Pseudobulbar affect    Renal disorder    stage 3    Restless legs syndrome    Seizure disorder (HCC)    last one 2025    Sleep apnea    Tendon tear    Surgically repaired; as per NG    Unspecified essential hypertension    as per NG    Vertigo    Visual impairment    reading glasses              Past Surgical History:   Procedure Laterality Date    Arthroscopy of joint unlisted      BILAT. KNEES    Cardiac pacemaker placement      as per NG    Carpal tunnel release      as per NG    Cataract Left 07/10/2015    Cataract Right late 2015    Cholecystectomy      as per NG    Colonoscopy N/A 2018    Procedure: COLONOSCOPY;  Surgeon: Mohamud Sawyer MD;  Location: [facility] ENDOSCOPY    Colonoscopy N/A 2022    Procedure: COLONOSCOPY/ESOPHAGOGASTRODUODENOSCOPY (EGD);  Surgeon: Genaro Osman MD;  Location: [facility] ENDOSCOPY    Electrocardiogram, complete  2013    SCANNED TO MEDIA TAB - 2013    Hysterectomy      & BSO; as per NG    Knee arthroscopy Right     as per NG    Knee surgery  01/2015    x3     Nasal scopy,remv totl ethmoid      \"Ethmoidectomy, max antrostomies\"; as per NG      1966,1969 & 1973    Normal    Other   or 2015    removal of pins and plate from right wrist  (fracture)    Other surgical history Left     L knee scoped--3 torn meniscus--biocartilage done    Other surgical history Right 2016    R knee scoped for torn miniscus    Other surgical history Left     wrist surgery    Pacemaker/defibrillator      Reconstr nose+eamon septal repair      as per NG    Removal gallbladder      Total abdom hysterectomy      Total knee replacement                  Social History     Socioeconomic History    Marital status:    Tobacco Use    Smoking status: Former     Current packs/day: 0.00     Average packs/day: 1 pack/day for 40.0 years (40.0 ttl pk-yrs)     Types: Cigarettes     Start date: 1958     Quit date: 1998     Years since quittin.3    Smokeless tobacco: Never   Vaping Use    Vaping status: Never Used   Substance and Sexual Activity    Alcohol use: Yes     Comment: 2 - 3 drinks a year    Drug use: Yes     Frequency: 7.0 times per week     Types: Cannabis     Comment: Medical Edible marijauna and occasional vape   Other Topics Concern    Caffeine Concern No     Comment: 4 cups tea daily    Exercise Yes     Comment: walking 2 blks 2 times week    Pt has a pacemaker Yes    Pt has a defibrillator No    Reaction to local anesthetic No   Social History Narrative    ** Merged History Encounter **         The patient does not use an assistive device..    The patient does live in a home with stairs. 16 stairs       Social Drivers of Health     Food Insecurity: No Food Insecurity (2025)    Food Insecurity     Food Insecurity: Never true   Transportation Needs: No Transportation Needs (2025)    Transportation Needs     Lack of Transportation: No   Housing Stability: Low Risk  (2025)    Housing Stability     Housing Instability: No                                Physical Exam     ED Triage Vitals [25 1419]   /76   Pulse 60   Resp 18   Temp 97.3 °F (36.3 °C)   Temp src Temporal   SpO2 98 %   O2 Device None (Room air)       Current  Vitals:   Vital Signs  BP: (!) 175/80  Pulse: 60  Resp: 18  Temp: 97.3 °F (36.3 °C)  Temp src: Temporal    Oxygen Therapy  SpO2: 98 %  O2 Device: None (Room air)          Physical Exam  Vitals and nursing note reviewed.   Constitutional:       General: She is not in acute distress.     Appearance: She is well-developed.   HENT:      Head: Normocephalic and atraumatic.   Eyes:      Conjunctiva/sclera: Conjunctivae normal.   Cardiovascular:      Rate and Rhythm: Normal rate and regular rhythm.      Heart sounds: Normal heart sounds.   Pulmonary:      Effort: Pulmonary effort is normal. No respiratory distress.      Breath sounds: Normal breath sounds.   Abdominal:      General: Bowel sounds are normal. There is no distension.      Palpations: Abdomen is soft.      Tenderness: There is abdominal tenderness in the epigastric area. There is no guarding or rebound.   Musculoskeletal:         General: Normal range of motion.      Cervical back: Normal range of motion and neck supple.   Skin:     General: Skin is warm and dry.      Findings: No rash.   Neurological:      General: No focal deficit present.      Mental Status: She is alert and oriented to person, place, and time.                 ED Course     Labs Reviewed   COMP METABOLIC PANEL (14) - Abnormal; Notable for the following components:       Result Value    Creatinine 1.30 (*)     eGFR-Cr 43 (*)     All other components within normal limits   LIPASE - Abnormal; Notable for the following components:    Lipase 57 (*)     All other components within normal limits   TROPONIN I HIGH SENSITIVITY - Normal   CBC WITH DIFFERENTIAL WITH PLATELET   RAINBOW DRAW LAVENDER   RAINBOW DRAW LIGHT GREEN   RAINBOW DRAW BLUE   RAINBOW DRAW GOLD          Results            Imaging Results Available and Reviewed while in ED:   CT ABDOMEN+PELVIS(CONTRAST ONLY)(CPT=74177)   Final Result   PROCEDURE: CT ABDOMEN + PELVIS (CONTRAST ONLY) (CPT=74177)       COMPARISON: None.        INDICATIONS: Abdominal pain and nausea.       TECHNIQUE: CT images of the abdomen and pelvis were obtained with    non-ionic intravenous contrast material.  Automated exposure control for    dose reduction was used. Adjustment of the mA and/or kV was done based on    the patient's size. Use of iterative    reconstruction technique for dose reduction was used.  Dose information is    transmitted to the ACR (American College of Radiology) NRDR (National    Radiology Data Registry) which includes the Dose Index Registry.       FINDINGS:    LIVER: Intrahepatic pneumobilia and mild central intrahepatic biliary    ductal dilatation.  Liver has normal size and contour.  No enhancing    focus.   BILIARY: Post cholecystectomy.  There is mild central intrahepatic biliary    ductal dilatation and pneumobilia.  No significant extrahepatic biliary    ductal dilatation.   SPLEEN: No enlargement or focal lesion.     STOMACH: No gastric obstruction.  Duodenum is unremarkable.   PANCREAS: No lesion, fluid collection, ductal dilatation, or atrophy.     ADRENALS: No nodule or enlargement.   KIDNEYS: Both kidneys have cortical lobulation and cortical thinning    compatible with scar.  Left kidney is smaller than the right compatible    with atrophy.  No enhancing renal lesion or hydroureteronephrosis.   AORTA/VASCULAR:   Atherosclerosis of the abdominal aorta.  No aneurysm or    dissection.   RETROPERITONEUM: No mass or enlarged adenopathy.     BOWEL/MESENTERY:  Fluid-filled jejunal and ileal loops.  Terminal ileum is    also distended with fluid.  There is fluid within the proximal colon    involving the cecum and ascending colon.  The appendix is normal.  Distal    colon has a small volume of fecal    material and also has scattered colonic diverticula.  No free air,    organized measurable fluid collection, or lymphadenopathy.   ABDOMINAL WALL: No suspicious mass lesion or significant hernia.   URINARY BLADDER: No visible focal  wall thickening, lesion or calculus.     PELVIC NODES: No enlarged mass or adenopathy.      PELVIC ORGANS: Post hysterectomy.  Ovaries are not identified.  No adnexal    mass.   BONES:   Grade 1 anterolisthesis of L4 on L5.  Advanced loss of disc    height with vacuum phenomenon at L5-S1   LUNG BASES: Linear bands of atelectasis/scar in the lung bases.  The    visualized heart has cardiac pacing electrodes within the right atrium and    right ventricle.   OTHER: Negative.                     =====   CONCLUSION:    1.  Fluid-filled small bowel loops, extending through the terminal ileum    into the proximal colon.  In no bowel obstruction.  Findings suggest a    nonspecific enteritis.   2.  Post cholecystectomy.  There is intrahepatic pneumobilia and mild    central intrahepatic biliary ductal dilatation.  No significant    extrahepatic biliary ductal dilatation.  Findings suggest chronic post    cholecystectomy changes however correlate for    biliary lab abnormality.   3.  Bilateral renal cortical scarring.  Left renal atrophy.   4.  Post hysterectomy.  No adnexal mass.   5.  Post cardiac pacing device placement.           Dictated by (CST): Chris Campoverde MD on 5/12/2025 at 5:57 PM        Finalized by (CST): Chris Campoverde MD on 5/12/2025 at 6:02 PM                   ED Medications Administered:   Medications   mylanta-dicyclomine-lidocaine 2% (G.I. Cocktail) oral liquid ( Oral Given 5/12/25 1727)   iopamidol 76% (ISOVUE-370) injection for power injector (80 mL Intravenous Given 5/12/25 1754)         Vitals:    05/12/25 1419 05/12/25 1714   BP: 154/76 (!) 175/80   Pulse: 60 60   Resp: 18 18   Temp: 97.3 °F (36.3 °C)    TempSrc: Temporal    SpO2: 98% 98%   Weight: 65.3 kg      *I personally reviewed and interpreted all ED vitals.       EKG    Rate, intervals and axes as noted on EKG Report.  Rate: 60  Rhythm: paced  Reading: Paced EKG               [facility]              Medical Decision Making  Differential diagnosis  includes but is not limited to dyspepsia, peptic ulcer disease, gastritis, pancreatitis, ACS, aortic aneurysm or dissection, biliary colic    Reassuring labs, no acute finding on imaging.  Discussed lipase with patient, advise clear liquid diet, supportive care, close outpatient follow-up as well as strict ER precautions.  Patient and  at the bedside verbalized understanding of and agreement with this plan.    Problems Addressed:  Dyspepsia:     Details: Differential diagnoses encompassed high-risk conditions with potential threats to life, limb, or major bodily functions, warranting comprehensive evaluation and high-complexity medical decision-making.      Amount and/or Complexity of Data Reviewed  External Data Reviewed: labs.     Details: Increase in creatinine, increase in hemoglobin, otherwise CBC and BMP stable compared to labs from 2/2/2025  Labs: ordered.  Radiology: ordered.  ECG/medicine tests: independent interpretation performed.    Risk  Prescription drug management.        Disposition and Plan     Clinical Impression:  1. Dyspepsia         Disposition:  Discharge  5/12/2025  6:39 pm    Follow-up:  Donell Yin MD  [address] [place] [id number]  [phone]    Schedule an appointment as soon as possible for a visit in 3 day(s)  For follow up    We recommend that you schedule follow up care with a primary care provider within the next three months to obtain basic health screening including reassessment of your blood pressure.      Medications Prescribed:  Discharge Medication List as of 5/12/2025  6:47 PM        START taking these medications    Details   !! pantoprazole 40 MG Oral Tab EC Take 1 tablet (40 mg total) by mouth daily., Normal, Disp-30 tablet, R-0      dicyclomine 20 MG Oral Tab Take 1 tablet (20 mg total) by mouth 4 (four) times daily as needed., Normal, Disp-20 tablet, R-0       !! - Potential duplicate medications found. Please discuss with provider.                 Supplementary Documentation:

## 2025-05-12 NOTE — DISCHARGE INSTRUCTIONS
Follow a clear liquid diet for the next 1 to 2 days and slowly advance as pain improves.    Continue Protonix as previously prescribed.  Continue the remainder of your home medications as well.    Avoid greasy, fatty, spicy, fried foods as well as caffeine and alcohol.    See primary care this week for follow-up appointment.    Return to the ER if you develop worsening symptoms, inability to tolerate fluids, or any emergent concerns.

## 2025-05-16 ENCOUNTER — HOSPITAL ENCOUNTER (EMERGENCY)
Facility: HOSPITAL | Age: 75
Discharge: HOME | End: 2025-05-16
Attending: EMERGENCY MEDICINE
Payer: MEDICARE

## 2025-05-16 VITALS
RESPIRATION RATE: 13 BRPM | BODY MASS INDEX: 24.16 KG/M2 | TEMPERATURE: 97 F | HEART RATE: 60 BPM | HEIGHT: 65 IN | OXYGEN SATURATION: 96 % | SYSTOLIC BLOOD PRESSURE: 146 MMHG | DIASTOLIC BLOOD PRESSURE: 73 MMHG | WEIGHT: 145 LBS

## 2025-05-16 DIAGNOSIS — R10.13 EPIGASTRIC PAIN: Primary | ICD-10-CM

## 2025-05-16 LAB
ALBUMIN SERPL-MCNC: 4.5 G/DL (ref 3.2–4.8)
ALBUMIN/GLOB SERPL: 1.7 {RATIO} (ref 1–2)
ALP LIVER SERPL-CCNC: 52 U/L (ref 55–142)
ALT SERPL-CCNC: 10 U/L (ref 10–49)
ANION GAP SERPL CALC-SCNC: 8 MMOL/L (ref 0–18)
AST SERPL-CCNC: 18 U/L (ref ?–34)
ATRIAL RATE: 60 BPM
BASOPHILS # BLD AUTO: 0.03 X10(3) UL (ref 0–0.2)
BASOPHILS NFR BLD AUTO: 0.6 %
BILIRUB SERPL-MCNC: 0.5 MG/DL (ref 0.2–1.1)
BILIRUB UR QL: NEGATIVE
BUN BLD-MCNC: 15 MG/DL (ref 9–23)
BUN/CREAT SERPL: 10.9 (ref 10–20)
CALCIUM BLD-MCNC: 9.3 MG/DL (ref 8.7–10.4)
CHLORIDE SERPL-SCNC: 108 MMOL/L (ref 98–112)
CLARITY UR: CLEAR
CO2 SERPL-SCNC: 26 MMOL/L (ref 21–32)
CREAT BLD-MCNC: 1.38 MG/DL (ref 0.55–1.02)
DEPRECATED RDW RBC AUTO: 38.9 FL (ref 35.1–46.3)
EGFRCR SERPLBLD CKD-EPI 2021: 40 ML/MIN/1.73M2 (ref 60–?)
EOSINOPHIL # BLD AUTO: 0.19 X10(3) UL (ref 0–0.7)
EOSINOPHIL NFR BLD AUTO: 3.6 %
ERYTHROCYTE [DISTWIDTH] IN BLOOD BY AUTOMATED COUNT: 12.1 % (ref 11–15)
FLUAV + FLUBV RNA SPEC NAA+PROBE: NEGATIVE
FLUAV + FLUBV RNA SPEC NAA+PROBE: NEGATIVE
GLOBULIN PLAS-MCNC: 2.6 G/DL (ref 2–3.5)
GLUCOSE BLD-MCNC: 94 MG/DL (ref 70–99)
GLUCOSE UR-MCNC: NORMAL MG/DL
HCT VFR BLD AUTO: 39.5 % (ref 35–48)
HGB BLD-MCNC: 13.3 G/DL (ref 12–16)
HGB UR QL STRIP.AUTO: NEGATIVE
IMM GRANULOCYTES # BLD AUTO: 0.01 X10(3) UL (ref 0–1)
IMM GRANULOCYTES NFR BLD: 0.2 %
KETONES UR-MCNC: NEGATIVE MG/DL
LEUKOCYTE ESTERASE UR QL STRIP.AUTO: NEGATIVE
LIPASE SERPL-CCNC: 292 U/L (ref 12–53)
LYMPHOCYTES # BLD AUTO: 1.43 X10(3) UL (ref 1–4)
LYMPHOCYTES NFR BLD AUTO: 27.1 %
MCH RBC QN AUTO: 29.5 PG (ref 26–34)
MCHC RBC AUTO-ENTMCNC: 33.7 G/DL (ref 31–37)
MCV RBC AUTO: 87.6 FL (ref 80–100)
MONOCYTES # BLD AUTO: 0.55 X10(3) UL (ref 0.1–1)
MONOCYTES NFR BLD AUTO: 10.4 %
NEUTROPHILS # BLD AUTO: 3.06 X10 (3) UL (ref 1.5–7.7)
NEUTROPHILS # BLD AUTO: 3.06 X10(3) UL (ref 1.5–7.7)
NEUTROPHILS NFR BLD AUTO: 58.1 %
NITRITE UR QL STRIP.AUTO: NEGATIVE
OSMOLALITY SERPL CALC.SUM OF ELEC: 295 MOSM/KG (ref 275–295)
P AXIS: 35 DEGREES
P-R INTERVAL: 234 MS
PH UR: 5.5 [PH] (ref 5–8)
PLATELET # BLD AUTO: 225 10(3)UL (ref 150–450)
POTASSIUM SERPL-SCNC: 4.2 MMOL/L (ref 3.5–5.1)
PROT SERPL-MCNC: 7.1 G/DL (ref 5.7–8.2)
PROT UR-MCNC: NEGATIVE MG/DL
Q-T INTERVAL: 426 MS
QRS DURATION: 104 MS
QTC CALCULATION (BEZET): 426 MS
R AXIS: 29 DEGREES
RBC # BLD AUTO: 4.51 X10(6)UL (ref 3.8–5.3)
RSV RNA SPEC NAA+PROBE: NEGATIVE
SARS-COV-2 RNA RESP QL NAA+PROBE: NOT DETECTED
SODIUM SERPL-SCNC: 142 MMOL/L (ref 136–145)
SP GR UR STRIP: 1.01 (ref 1–1.03)
T AXIS: 52 DEGREES
UROBILINOGEN UR STRIP-ACNC: NORMAL
VENTRICULAR RATE: 60 BPM
WBC # BLD AUTO: 5.3 X10(3) UL (ref 4–11)

## 2025-05-16 PROCEDURE — 96374 THER/PROPH/DIAG INJ IV PUSH: CPT

## 2025-05-16 PROCEDURE — 0241U SARS-COV-2/FLU A AND B/RSV BY PCR (GENEXPERT): CPT | Performed by: EMERGENCY MEDICINE

## 2025-05-16 PROCEDURE — 96361 HYDRATE IV INFUSION ADD-ON: CPT

## 2025-05-16 PROCEDURE — 96375 TX/PRO/DX INJ NEW DRUG ADDON: CPT

## 2025-05-16 PROCEDURE — 93010 ELECTROCARDIOGRAM REPORT: CPT

## 2025-05-16 PROCEDURE — 99284 EMERGENCY DEPT VISIT MOD MDM: CPT

## 2025-05-16 PROCEDURE — 83690 ASSAY OF LIPASE: CPT | Performed by: EMERGENCY MEDICINE

## 2025-05-16 PROCEDURE — 80053 COMPREHEN METABOLIC PANEL: CPT | Performed by: EMERGENCY MEDICINE

## 2025-05-16 PROCEDURE — 85025 COMPLETE CBC W/AUTO DIFF WBC: CPT | Performed by: EMERGENCY MEDICINE

## 2025-05-16 PROCEDURE — 81003 URINALYSIS AUTO W/O SCOPE: CPT | Performed by: EMERGENCY MEDICINE

## 2025-05-16 PROCEDURE — 93005 ELECTROCARDIOGRAM TRACING: CPT

## 2025-05-16 RX ORDER — FAMOTIDINE 10 MG/ML
20 INJECTION, SOLUTION INTRAVENOUS ONCE
Status: COMPLETED | OUTPATIENT
Start: 2025-05-16 | End: 2025-05-16

## 2025-05-16 RX ORDER — ONDANSETRON 2 MG/ML
4 INJECTION INTRAMUSCULAR; INTRAVENOUS ONCE
Status: COMPLETED | OUTPATIENT
Start: 2025-05-16 | End: 2025-05-16

## 2025-05-16 RX ORDER — PANTOPRAZOLE SODIUM 40 MG/1
40 TABLET, DELAYED RELEASE ORAL DAILY
Qty: 30 TABLET | Refills: 0 | Status: SHIPPED | OUTPATIENT
Start: 2025-05-16 | End: 2025-06-15

## 2025-05-16 RX ORDER — ONDANSETRON 4 MG/1
4 TABLET, ORALLY DISINTEGRATING ORAL EVERY 4 HOURS PRN
Qty: 10 TABLET | Refills: 0 | Status: SHIPPED | OUTPATIENT
Start: 2025-05-16 | End: 2025-05-23

## 2025-05-16 RX ORDER — MAGNESIUM HYDROXIDE/ALUMINUM HYDROXICE/SIMETHICONE 120; 1200; 1200 MG/30ML; MG/30ML; MG/30ML
30 SUSPENSION ORAL ONCE
Status: COMPLETED | OUTPATIENT
Start: 2025-05-16 | End: 2025-05-16

## 2025-05-16 NOTE — ED PROVIDER NOTES
Patient Seen in: [facility] Emergency Department    History     Chief Complaint   Patient presents with    Epigastric Pain       HPI    75-year-old female here with epigastric discomfort who comes to the emergency department with approximately 6 days now of mild intermittent bloating gassy sensation.  Denies any chest pain or dyspnea or any urinary symptoms or fever.    History reviewed. Past Medical History[1]    History reviewed. Past Surgical History[2]      Medications :  Prescriptions Prior to Admission[3]     Family History[4]    Smoking Status: Social Hx on file[5]    Constitutional and vital signs reviewed.      Social History and Family History elements reviewed from today, pertinent positives to the presenting problem noted.    Physical Exam     ED Triage Vitals [05/16/25 0925]   BP (!) 168/80   Pulse 63   Resp 16   Temp 97.1 °F (36.2 °C)   Temp src Oral   SpO2 97 %   O2 Device None (Room air)       All measures to prevent infection transmission during my interaction with the patient were taken. The patient was already wearing a droplet mask on my arrival to the room. Personal protective equipment was worn throughout the duration of the exam.  Handwashing was performed prior to and after the exam.  Stethoscope and any equipment used during my examination was cleaned with super sani-cloth germicidal wipes following the exam.     Physical Exam    General: NAD  Head: Normocephalic and atraumatic.  Mouth/Throat/Ears/Nose: Oropharynx is clear and moist.   Eyes: Conjunctivae and EOM are normal.   Neck: Normal range of motion. Supple.   Cardiovascular: Normal rate, regular rhythm, normal heart sounds.  Respiratory/Chest: Clear and equal bilaterally. Exhibits no tenderness.  Gastrointestinal: Soft, mild left upper quadrant and epigastric-tenderness, non-distended. Bowel sounds are normal.   Musculoskeletal:No swelling or deformity.   Neurological: Alert and appropriate. No focal deficits.   Skin: Skin is  warm and dry. No pallor.  Psychiatric: Has a normal mood and affect.      ED Course        Labs Reviewed   COMP METABOLIC PANEL (14) - Abnormal; Notable for the following components:       Result Value    Creatinine 1.38 (*)     eGFR-Cr 40 (*)     Alkaline Phosphatase 52 (*)     All other components within normal limits   LIPASE - Abnormal; Notable for the following components:    Lipase 292 (*)     All other components within normal limits   SARS-COV-2/FLU A AND B/RSV BY PCR (GENEXPERT) - Normal    Narrative:     This test is intended for the qualitative detection and differentiation of SARS-CoV-2, influenza A, influenza B, and respiratory syncytial virus (RSV) viral RNA in nasopharyngeal or nares swabs from individuals suspected of respiratory viral infection consistent with COVID-19 by their healthcare provider. Signs and symptoms of respiratory viral infection due to SARS-CoV-2, influenza, and RSV can be similar.                                    Test performed using the Xpert Xpress SARS-CoV-2/FLU/RSV (real time RT-PCR)  assay on the [employer] instrument, [employer], [place] [address].                   This test is being used under the Food and Drug Administration's Emergency Use Authorization.                                    The authorized Fact Sheet for Healthcare Providers for this assay is available upon request from the laboratory.   CBC WITH DIFFERENTIAL WITH PLATELET   URINALYSIS WITH CULTURE REFLEX   RAINBOW DRAW LAVENDER   RAINBOW DRAW LIGHT GREEN   RAINBOW DRAW BLUE     EKG    Rate, intervals and axes as noted on EKG Report.  Rate: 60  Rhythm: Atrial paced rhythm  Reading: No STEMI.  This is my interpretation.           As Interpreted by me    Imaging Results Available and Reviewed while in ED: No results found.  ED Medications Administered:   Medications   sodium chloride 0.9 % IV bolus 1,000 mL (0 mL Intravenous Stopped 5/16/25 1117)   ondansetron (Zofran) 4 MG/2ML injection 4 mg (4 mg  Intravenous Given 5/16/25 1017)   alum-mag hydroxide-simethicone (Maalox) 200-200-20 MG/5ML oral suspension 30 mL (30 mL Oral Given 5/16/25 1017)   famotidine (Pepcid) 20 mg/2mL injection 20 mg (20 mg Intravenous Given 5/16/25 1016)         MDM     Vitals:    05/16/25 0925 05/16/25 0945 05/16/25 1045 05/16/25 1100   BP: (!) 168/80 159/85 111/90 146/73   Pulse: 63 60 60 60   Resp: 16 20 15 13   Temp: 97.1 °F (36.2 °C)      TempSrc: Oral      SpO2: 97% 95% 99% 96%   Weight: 65.8 kg      Height: 165.1 cm (5' 5\")        *I personally reviewed and interpreted all ED vitals.    Pulse Ox: 96%, Room air, Normal       Medical Decision Making      Differential Diagnosis/ Diagnostic Considerations: PUD, GERD, gastritis, pancreatitis    Complicating Factors: The patient already has GERD to contribute to the complexity of this ED evaluation.    I reviewed prior chart records including ED note from May 12, 2025 when patient had a CT scan at that time without any acute findings.  Lab work today is unremarkable for acute findings on my interpretation.  She does feel improved after supportive Care in the emergency department.      Shared decision making discussion had regarding admission, she is comfortable following up closely with GI    Dc In stable condition.  Patient is comfortable with the plan.    Prescriptions: as below      Disposition and Plan     Clinical Impression:  1. Epigastric pain        Disposition:  Discharge    Follow-up:  Donell Yin MD  [address] [place] [id number]  [phone]    Schedule an appointment as soon as possible for a visit in 1 day(s)      Genaro Osman MD  [address] [facility] [address]  [facility] [address] [phone]    Schedule an appointment as soon as possible for a visit in 1 day(s)        Medications Prescribed:  Discharge Medication List as of 5/16/2025 11:35 AM        START taking these medications    Details   !! pantoprazole 40 MG Oral Tab EC Take 1 tablet (40 mg total) by  mouth daily., Normal, Disp-30 tablet, R-0      ondansetron 4 MG Oral Tablet Dispersible Take 1 tablet (4 mg total) by mouth every 4 (four) hours as needed for Nausea., Normal, Disp-10 tablet, R-0       !! - Potential duplicate medications found. Please discuss with provider.                           [1]   Past Medical History:   Abnormal PET scan of head    Patient had abnormal PET scan of brain May 6, 2016 showing bilateral temporoparietal beta amyloid deposition in the gray matter consistent with Alzheimer's disease.     Acute cystitis without hematuria    Acute pancreatitis (HCC)    Acute pancreatitis, unspecified complication status, unspecified pancreatitis type (HCC)    Alzheimer disease (HCC)    Anxiety    Mild    Aortic atherosclerosis    Aortic atherosclerosis seen on CT scan October 2014.     Arm paresthesia, left    Arrhythmia    A-Fib    Arthritis    Mild    Arthritis, gouty    Ataxia    Atrophic kidney    CT scan October 8, 2014 shows left kidney smaller than the right.  Bilateral areas of renal parenchymal scar.  Symmetric nephrograms.  Moderate calcific atherosclerosis of aorta.     AVM (arteriovenous malformation) of colon    Large nonbleeding cecal AVM found on colonoscopy March 16 2018.  At that time patient was found to have microscopic/lymphocytic colitis.      Back problem    spinal stenosis    Carpal tunnel syndrome    as per NG    Cataract    Chronic atrial fibrillation (HCC)    Closed fracture of left distal radius    Crohn disease (HCC)    Depression    Diverticulosis    as per NG    Elevated LFTs    Esophageal reflux    Mild    Family history of colon cancer    Maternal grandmother had colon cancer in her 70s.    Foreign body in alimentary tract    Foreign body in stomach, initial encounter    Gout    Hand weakness    Hepatic steatosis    High blood pressure    High cholesterol    as per NG    History of colonic polyps    as per NG    History of migraine headaches    as per NG    History  of total hysterectomy with bilateral salpingo-oophorectomy (BSO)    History of total hysterectomy with removal of both tubes and ovaries    as per NG    Hyperlipidemia    Incontinence    DUE TO CYSTOCELE/RECTOCELE    Left arm weakness    Lymphocytic colitis    as per NG    Migraine    Mild cognitive impairment    as per NG    Obesity    25 lbs overweight    Obstructive sleep apnea    Pacemaker    Parkinson's disease (HCC)    Pseudobulbar affect    Renal disorder    stage 3    Restless legs syndrome    Seizure disorder (HCC)    last one 2025    Sleep apnea    Tendon tear    Surgically repaired; as per NG    Unspecified essential hypertension    as per NG    Vertigo    Visual impairment    reading glasses   [2]   Past Surgical History:  Procedure Laterality Date    Arthroscopy of joint unlisted      BILAT. KNEES    Cardiac pacemaker placement      as per NG    Carpal tunnel release      as per NG    Cataract Left 07/10/2015    Cataract Right late 2015    Cholecystectomy      as per NG    Colonoscopy N/A 2018    Procedure: COLONOSCOPY;  Surgeon: Mohamud Sawyer MD;  Location: [facility] ENDOSCOPY    Colonoscopy N/A 2022    Procedure: COLONOSCOPY/ESOPHAGOGASTRODUODENOSCOPY (EGD);  Surgeon: Genaro Osman MD;  Location: [facility] ENDOSCOPY    Electrocardiogram, complete  2013    SCANNED TO MEDIA TAB - 2013    Hysterectomy      & BSO; as per NG    Knee arthroscopy Right 2013    as per NG    Knee surgery  01/2015    x3     Nasal scopy,remv totl ethmoid      \"Ethmoidectomy, max antrostomies\"; as per NG      1966,1969 & 1973    Normal    Other   or 2015    removal of pins and plate from right wrist (fracture)    Other surgical history Left     L knee scoped--3 torn meniscus--biocartilage done    Other surgical history Right 2016    R knee scoped for torn miniscus    Other surgical history Left     wrist surgery    Pacemaker/defibrillator      Reconstr nose+eamon septal repair       as per NG    Removal gallbladder      Total abdom hysterectomy      Total knee replacement     [3] (Not in a hospital admission)   [4]   Family History  Problem Relation Age of Onset    Hypertension Father         4 way bypass    Heart Disease Father         as per NG    Migraines Father         as per NG    Cancer Father         Prostate ()    Neurological Disorder Mother         Alzheimer's disease; as per NG    Hypertension Mother         Valve replacement    Heart Disease Mother         as per NG    Migraines Mother         as per NG    Dementia Mother         Dementia ()    Cancer Sister         Left kidney cancer ()    Hypertension Brother         4 way bypass    Cancer Brother         Prostate and throat ()    Heart Disease Brother         CAD; as per NG    Cancer Sister         Left kidney    Cancer Other         Thyroid Cancer; as per NG    Cancer Maternal Grandmother         Colon    Dementia Maternal Grandmother         Dementia ()   [5]   Social History  Socioeconomic History    Marital status:    Tobacco Use    Smoking status: Former     Current packs/day: 0.00     Average packs/day: 1 pack/day for 40.0 years (40.0 ttl pk-yrs)     Types: Cigarettes     Start date: 1958     Quit date: 1998     Years since quittin.3    Smokeless tobacco: Never   Vaping Use    Vaping status: Never Used   Substance and Sexual Activity    Alcohol use: Yes     Comment: 2 - 3 drinks a year    Drug use: Yes     Frequency: 7.0 times per week     Types: Cannabis     Comment: Medical Edible marijauna and occasional vape   Other Topics Concern    Caffeine Concern No     Comment: 4 cups tea daily    Exercise Yes     Comment: walking 2 blks 2 times week    Pt has a pacemaker Yes    Pt has a defibrillator No    Reaction to local anesthetic No

## 2025-05-16 NOTE — ED INITIAL ASSESSMENT (HPI)
Nadiya arrives with complaints of epigastric pains since 5/12, pain is constant. + nausea, hx of cholecystectomy.

## 2025-06-19 ENCOUNTER — MED REC SCAN ONLY (OUTPATIENT)
Dept: NEUROLOGY | Facility: CLINIC | Age: 75
End: 2025-06-19

## 2025-06-19 ENCOUNTER — TELEPHONE (OUTPATIENT)
Dept: NEUROLOGY | Facility: CLINIC | Age: 75
End: 2025-06-19

## 2025-06-19 NOTE — TELEPHONE ENCOUNTER
Rcvd fax from [facility] on 6/18/25 pt requested a neurological screening to be sent to her home to be completed.     Needing provider signature to the attached requisition form to complete genetic buccal test.     Placed in provider bin for review if agreeable

## 2025-07-09 PROBLEM — R07.2 PRECORDIAL PAIN: Status: ACTIVE | Noted: 2019-03-13

## 2025-07-09 PROBLEM — R11.0 NAUSEA: Status: ACTIVE | Noted: 2020-03-24

## 2025-07-09 PROBLEM — I63.9 ACUTE CVA (CEREBROVASCULAR ACCIDENT) (HCC): Status: ACTIVE | Noted: 2022-06-04

## 2025-07-09 PROBLEM — R07.9 CHEST PAIN, UNSPECIFIED: Status: ACTIVE | Noted: 2024-10-18

## 2025-07-09 PROBLEM — R10.13 ABDOMINAL PAIN, EPIGASTRIC: Status: ACTIVE | Noted: 2025-07-09

## 2025-07-09 PROBLEM — I49.5 SICK SINUS SYNDROME (HCC): Status: ACTIVE | Noted: 2019-03-13

## 2025-07-18 ENCOUNTER — MED REC SCAN ONLY (OUTPATIENT)
Dept: INTERNAL MEDICINE CLINIC | Facility: CLINIC | Age: 75
End: 2025-07-18

## 2025-07-18 ENCOUNTER — TELEPHONE (OUTPATIENT)
Dept: INTERNAL MEDICINE CLINIC | Facility: CLINIC | Age: 75
End: 2025-07-18

## 2025-07-18 NOTE — TELEPHONE ENCOUNTER
Prescriber response form has been completed and successfully faxed to [facility].    Fax [phone]    Form has been submitted for scanning.

## 2025-07-21 ENCOUNTER — HOSPITAL ENCOUNTER (EMERGENCY)
Facility: HOSPITAL | Age: 75
Discharge: HOME | End: 2025-07-21
Attending: EMERGENCY MEDICINE
Payer: MEDICARE

## 2025-07-21 ENCOUNTER — APPOINTMENT (OUTPATIENT)
Dept: GENERAL RADIOLOGY | Facility: HOSPITAL | Age: 75
End: 2025-07-21
Attending: EMERGENCY MEDICINE
Payer: MEDICARE

## 2025-07-21 VITALS
SYSTOLIC BLOOD PRESSURE: 168 MMHG | RESPIRATION RATE: 19 BRPM | WEIGHT: 145 LBS | TEMPERATURE: 98 F | HEIGHT: 65 IN | HEART RATE: 59 BPM | OXYGEN SATURATION: 95 % | BODY MASS INDEX: 24.16 KG/M2 | DIASTOLIC BLOOD PRESSURE: 85 MMHG

## 2025-07-21 DIAGNOSIS — R07.89 FEELING OF CHEST TIGHTNESS: Primary | ICD-10-CM

## 2025-07-21 LAB
ALBUMIN SERPL-MCNC: 4.5 G/DL (ref 3.2–4.8)
ALBUMIN/GLOB SERPL: 1.7 (ref 1–2)
ALP LIVER SERPL-CCNC: 53 U/L (ref 55–142)
ALT SERPL-CCNC: 13 U/L (ref 10–49)
ANION GAP SERPL CALC-SCNC: 6 MMOL/L (ref 0–18)
APTT PPP: 32.6 SECONDS (ref 23–36)
AST SERPL-CCNC: 33 U/L (ref ?–34)
BASOPHILS # BLD AUTO: 0.01 X10(3) UL (ref 0–0.2)
BASOPHILS NFR BLD AUTO: 0.2 %
BILIRUB SERPL-MCNC: 0.6 MG/DL (ref 0.2–1.1)
BUN BLD-MCNC: 16 MG/DL (ref 9–23)
BUN/CREAT SERPL: 12.5 (ref 10–20)
CALCIUM BLD-MCNC: 9.2 MG/DL (ref 8.7–10.4)
CHLORIDE SERPL-SCNC: 107 MMOL/L (ref 98–112)
CO2 SERPL-SCNC: 25 MMOL/L (ref 21–32)
CREAT BLD-MCNC: 1.28 MG/DL (ref 0.55–1.02)
DEPRECATED RDW RBC AUTO: 41.4 FL (ref 35.1–46.3)
EGFRCR SERPLBLD CKD-EPI 2021: 43 ML/MIN/1.73M2 (ref 60–?)
EOSINOPHIL # BLD AUTO: 0.22 X10(3) UL (ref 0–0.7)
EOSINOPHIL NFR BLD AUTO: 5.1 %
ERYTHROCYTE [DISTWIDTH] IN BLOOD BY AUTOMATED COUNT: 12.6 % (ref 11–15)
GLOBULIN PLAS-MCNC: 2.7 G/DL (ref 2–3.5)
GLUCOSE BLD-MCNC: 79 MG/DL (ref 70–99)
HCT VFR BLD AUTO: 36.8 % (ref 35–48)
HGB BLD-MCNC: 12.4 G/DL (ref 12–16)
IMM GRANULOCYTES # BLD AUTO: 0.01 X10(3) UL (ref 0–1)
IMM GRANULOCYTES NFR BLD: 0.2 %
INR BLD: 1.02 (ref 0.8–1.2)
LYMPHOCYTES # BLD AUTO: 1.16 X10(3) UL (ref 1–4)
LYMPHOCYTES NFR BLD AUTO: 27 %
MCH RBC QN AUTO: 30 PG (ref 26–34)
MCHC RBC AUTO-ENTMCNC: 33.7 G/DL (ref 31–37)
MCV RBC AUTO: 89.1 FL (ref 80–100)
MONOCYTES # BLD AUTO: 0.49 X10(3) UL (ref 0.1–1)
MONOCYTES NFR BLD AUTO: 11.4 %
NEUTROPHILS # BLD AUTO: 2.41 X10 (3) UL (ref 1.5–7.7)
NEUTROPHILS # BLD AUTO: 2.41 X10(3) UL (ref 1.5–7.7)
NEUTROPHILS NFR BLD AUTO: 56.1 %
NT-PROBNP SERPL-MCNC: 283 PG/ML (ref ?–450)
OSMOLALITY SERPL CALC.SUM OF ELEC: 286 MOSM/KG (ref 275–295)
PLATELET # BLD AUTO: 169 10(3)UL (ref 150–450)
POTASSIUM SERPL-SCNC: 4.9 MMOL/L (ref 3.5–5.1)
PROT SERPL-MCNC: 7.2 G/DL (ref 5.7–8.2)
PROTHROMBIN TIME: 14.1 SECONDS (ref 11.6–14.8)
RBC # BLD AUTO: 4.13 X10(6)UL (ref 3.8–5.3)
SODIUM SERPL-SCNC: 138 MMOL/L (ref 136–145)
TROPONIN I SERPL HS-MCNC: 3 NG/L (ref ?–34)
TROPONIN I SERPL HS-MCNC: 4 NG/L (ref ?–34)
WBC # BLD AUTO: 4.3 X10(3) UL (ref 4–11)

## 2025-07-21 PROCEDURE — 80053 COMPREHEN METABOLIC PANEL: CPT | Performed by: EMERGENCY MEDICINE

## 2025-07-21 PROCEDURE — 71045 X-RAY EXAM CHEST 1 VIEW: CPT | Performed by: EMERGENCY MEDICINE

## 2025-07-21 PROCEDURE — 93005 ELECTROCARDIOGRAM TRACING: CPT

## 2025-07-21 PROCEDURE — 99285 EMERGENCY DEPT VISIT HI MDM: CPT

## 2025-07-21 PROCEDURE — 99284 EMERGENCY DEPT VISIT MOD MDM: CPT

## 2025-07-21 PROCEDURE — 85025 COMPLETE CBC W/AUTO DIFF WBC: CPT | Performed by: EMERGENCY MEDICINE

## 2025-07-21 PROCEDURE — 83880 ASSAY OF NATRIURETIC PEPTIDE: CPT | Performed by: EMERGENCY MEDICINE

## 2025-07-21 PROCEDURE — 85730 THROMBOPLASTIN TIME PARTIAL: CPT | Performed by: EMERGENCY MEDICINE

## 2025-07-21 PROCEDURE — 84484 ASSAY OF TROPONIN QUANT: CPT | Performed by: EMERGENCY MEDICINE

## 2025-07-21 PROCEDURE — 85610 PROTHROMBIN TIME: CPT | Performed by: EMERGENCY MEDICINE

## 2025-07-21 PROCEDURE — 36415 COLL VENOUS BLD VENIPUNCTURE: CPT

## 2025-07-21 PROCEDURE — 93010 ELECTROCARDIOGRAM REPORT: CPT

## 2025-07-21 RX ORDER — ASPIRIN 81 MG/1
243 TABLET, CHEWABLE ORAL ONCE
Status: COMPLETED | OUTPATIENT
Start: 2025-07-21 | End: 2025-07-21

## 2025-07-21 RX ORDER — MAGNESIUM HYDROXIDE/ALUMINUM HYDROXICE/SIMETHICONE 120; 1200; 1200 MG/30ML; MG/30ML; MG/30ML
30 SUSPENSION ORAL ONCE
Status: COMPLETED | OUTPATIENT
Start: 2025-07-21 | End: 2025-07-21

## 2025-07-21 RX ORDER — FAMOTIDINE 20 MG/1
20 TABLET, FILM COATED ORAL 2 TIMES DAILY
Qty: 28 TABLET | Refills: 0 | Status: SHIPPED | OUTPATIENT
Start: 2025-07-21 | End: 2025-08-04

## 2025-07-21 NOTE — ED INITIAL ASSESSMENT (HPI)
Pt came in for mid CP since this morning. Reports is radiates to her upper back. States 8/10. RR even and nonlabored, speaking in full sentences, ambulatory with steady gait.

## 2025-07-22 LAB
ATRIAL RATE: 60 BPM
P AXIS: 66 DEGREES
P-R INTERVAL: 234 MS
Q-T INTERVAL: 432 MS
QRS DURATION: 102 MS
QTC CALCULATION (BEZET): 432 MS
R AXIS: 56 DEGREES
T AXIS: 59 DEGREES
VENTRICULAR RATE: 60 BPM

## 2025-07-22 NOTE — ED PROVIDER NOTES
Patient Seen in: [facility] Emergency Department        History  Chief Complaint   Patient presents with    Chest Pain Angina     Stated Complaint: Chest pain    Subjective:   HPI                  Objective:     Past Medical History:    Abnormal PET scan of head    Patient had abnormal PET scan of brain May 6, 2016 showing bilateral temporoparietal beta amyloid deposition in the gray matter consistent with Alzheimer's disease.     Acute cystitis without hematuria    Acute pancreatitis (HCC)    Acute pancreatitis, unspecified complication status, unspecified pancreatitis type (HCC)    Alzheimer disease (HCC)    Anemia    Anxiety    Mild    Aortic atherosclerosis    Aortic atherosclerosis seen on CT scan October 2014.     Arm paresthesia, left    Arrhythmia    A-Fib    Arthritis    Mild    Arthritis, gouty    Ataxia    Atrophic kidney    CT scan October 8, 2014 shows left kidney smaller than the right.  Bilateral areas of renal parenchymal scar.  Symmetric nephrograms.  Moderate calcific atherosclerosis of aorta.     AVM (arteriovenous malformation) of colon    Large nonbleeding cecal AVM found on colonoscopy March 16 2018.  At that time patient was found to have microscopic/lymphocytic colitis.      Back problem    spinal stenosis    Carpal tunnel syndrome    as per NG    Cataract    Chronic atrial fibrillation (HCC)    Closed fracture of left distal radius    Crohn disease (HCC)    Depression    Disorder of thyroid    Diverticulosis    as per NG    Elevated LFTs    Esophageal reflux    Mild    Family history of colon cancer    Maternal grandmother had colon cancer in her 70s.    Foreign body in alimentary tract    Foreign body in stomach, initial encounter    Gout    Hand weakness    Hepatic steatosis    High blood pressure    High cholesterol    as per NG    History of colonic polyps    as per NG    History of migraine headaches    as per NG    History of total hysterectomy with bilateral  salpingo-oophorectomy (BSO)    History of total hysterectomy with removal of both tubes and ovaries    as per NG    Hyperlipidemia    Incontinence    DUE TO CYSTOCELE/RECTOCELE    Left arm weakness    Lymphocytic colitis    as per NG    Migraine    Mild cognitive impairment    as per NG    Obesity    25 lbs overweight    Obstructive sleep apnea    Pacemaker    Parkinson's disease (HCC)    Pseudobulbar affect    Renal disorder    stage 3    Restless legs syndrome    Seizure disorder (HCC)    last one 2025    Sleep apnea    Tendon tear    Surgically repaired; as per NG    Unspecified essential hypertension    as per NG    Vertigo    Visual impairment    reading glasses              Past Surgical History:   Procedure Laterality Date    Arthroscopy of joint unlisted      BILAT. KNEES    Cardiac pacemaker placement      as per NG    Carpal tunnel release      as per NG    Cataract Left 07/10/2015    Cataract Right late 2015    Cholecystectomy      as per NG    Colonoscopy N/A 2018    Procedure: COLONOSCOPY;  Surgeon: Mohamud Sawyer MD;  Location: [facility] ENDOSCOPY    Colonoscopy N/A 2022    Procedure: COLONOSCOPY/ESOPHAGOGASTRODUODENOSCOPY (EGD);  Surgeon: Genaro Osman MD;  Location: [facility] ENDOSCOPY    Electrocardiogram, complete  2013    SCANNED TO MEDIA TAB - 2013    Hysterectomy      & BSO; as per NG    Knee arthroscopy Right 2013    as per NG    Knee surgery  01/2015    x3     Nasal scopy,remv totl ethmoid      \"Ethmoidectomy, max antrostomies\"; as per NG      1966,1969 & 1973    Normal    Other   or 2015    removal of pins and plate from right wrist (fracture)    Other surgical history Left     L knee scoped--3 torn meniscus--biocartilage done    Other surgical history Right 2016    R knee scoped for torn miniscus    Other surgical history Left     wrist surgery    Pacemaker/defibrillator      Reconstr nose+eamon septal repair      as per NG    Removal  gallbladder      Total abdom hysterectomy      Total knee replacement                  Social History     Socioeconomic History    Marital status:    Tobacco Use    Smoking status: Former     Current packs/day: 0.00     Average packs/day: 1 pack/day for 40.0 years (40.0 ttl pk-yrs)     Types: Cigarettes     Start date: 1958     Quit date: 1998     Years since quittin.5     Passive exposure: Never    Smokeless tobacco: Never   Vaping Use    Vaping status: Never Used   Substance and Sexual Activity    Alcohol use: Not Currently     Comment: 2 - 3 drinks a year    Drug use: Yes     Frequency: 7.0 times per week     Types: Cannabis     Comment: Medical Edible marijauna and occasional vape   Other Topics Concern    Caffeine Concern No     Comment: 4 cups tea daily    Exercise Yes     Comment: walking 2 blks 2 times week    Pt has a pacemaker Yes    Pt has a defibrillator No    Reaction to local anesthetic No   Social History Narrative    ** Merged History Encounter **         The patient does not use an assistive device..    The patient does live in a home with stairs. 16 stairs       Social Drivers of Health     Food Insecurity: Food Insecurity Present (2025)    NCSS - Food Insecurity     Worried About Running Out of Food in the Last Year: Yes     Ran Out of Food in the Last Year: No   Transportation Needs: No Transportation Needs (2025)    NCSS - Transportation     Lack of Transportation: No   Housing Stability: Not At Risk (2025)    NCSS - Housing/Utilities     Has Housing: Yes     Worried About Losing Housing: No     Unable to Get Utilities: No                                Physical Exam    ED Triage Vitals [25 1702]   /62   Pulse 61   Resp 16   Temp 98.4 °F (36.9 °C)   Temp src Oral   SpO2 95 %   O2 Device None (Room air)       Current Vitals:   Vital Signs  BP: (!) 168/85  Pulse: 59  Resp: 19  Temp: 98.4 °F (36.9 °C)  Temp src: Oral  MAP (mmHg): (!) 107    Oxygen  Therapy  SpO2: 95 %  O2 Device: None (Room air)            Physical Exam            ED Course  Labs Reviewed   COMP METABOLIC PANEL (14) - Abnormal; Notable for the following components:       Result Value    Creatinine 1.28 (*)     eGFR-Cr 43 (*)     Alkaline Phosphatase 53 (*)     All other components within normal limits   PRO BETA NATRIURETIC PEPTIDE - Normal   TROPONIN I HIGH SENSITIVITY - Normal   TROPONIN I HIGH SENSITIVITY - Normal   PROTHROMBIN TIME (PT) - Normal   PTT, ACTIVATED - Normal   CBC WITH DIFFERENTIAL WITH PLATELET     EKG    Rate, intervals and axes as noted on EKG Report.  Rate: 60  Rhythm: Atrial placed rhythm  Reading: Atrial paced rhythm, prolonged AV conduction, no acute signs of ischemia or other abnormal intervals.                                MDM     75-year-old female with a history of A-fib status post Watchman procedure, sick sinus syndrome status post pacemaker, seizure disorder, CKD, hypertension presents today with chest pain.  Patient states this morning, she was in the kitchen when she started feeling some squeezing sensation around her chest.  The sensation is rather light but has continued throughout the day.  Somewhat worse with palpation and deep breathing.  Slightly worse with eating.  Denies associated difficulty breathing, lower extremity pain/swelling, numbness/weakness, or other symptoms.    On exam, vitals normal, well-appearing, normal cardiopulmonary exam, no extremity edema/swelling, equal pulses bilaterally, no JVD    Differential: Atypical ACS, GERD, chest wall inflammation, considered but less likely PE or dissection    Patient given Maalox and aspirin.  ED workup including trended troponin, BNP, and chest x-ray reassuring.  I independently reviewed the patient's chest x-ray. No clear evidence of consolidation or pneumothorax.    On reexamination, symptoms still present but improved and patient well-appearing.  I did shared decision-making conversation with the  patient regarding admission.  She was amenable to discharge home with plans for close follow-up with her primary care doctor and cardiology team and with careful return precautions.      Medical Decision Making      Disposition and Plan     Clinical Impression:  1. Feeling of chest tightness         Disposition:  Discharge  7/21/2025  8:20 pm    Follow-up:  Karina Vega MD  [address] [place] [id number] [phone]    Schedule an appointment as soon as possible for a visit      Wang Cisneros MD  [address] [facility] [address]  [place] [address]  [phone]    Schedule an appointment as soon as possible for a visit      We recommend that you schedule follow up care with a primary care provider within the next three months to obtain basic health screening including reassessment of your blood pressure.      Medications Prescribed:  Discharge Medication List as of 7/21/2025  8:21 PM        START taking these medications    Details   famotidine 20 MG Oral Tab Take 1 tablet (20 mg total) by mouth 2 (two) times daily for 14 days., Normal, Disp-28 tablet, R-0                   Supplementary Documentation:

## 2025-08-06 ENCOUNTER — PATIENT OUTREACH (OUTPATIENT)
Age: 75
End: 2025-08-06

## 2025-08-10 DIAGNOSIS — G40.909 NONINTRACTABLE EPILEPSY WITHOUT STATUS EPILEPTICUS, UNSPECIFIED EPILEPSY TYPE (HCC): ICD-10-CM

## 2025-08-12 ENCOUNTER — HOSPITAL ENCOUNTER (OUTPATIENT)
Dept: MRI IMAGING | Facility: HOSPITAL | Age: 75
Discharge: HOME | End: 2025-08-12
Attending: INTERNAL MEDICINE

## 2025-08-12 VITALS — HEART RATE: 60 BPM | SYSTOLIC BLOOD PRESSURE: 168 MMHG | DIASTOLIC BLOOD PRESSURE: 72 MMHG | OXYGEN SATURATION: 98 %

## 2025-08-12 DIAGNOSIS — R93.5 ABNORMAL ABDOMINAL ULTRASOUND: ICD-10-CM

## 2025-08-12 DIAGNOSIS — R10.10 UPPER ABDOMINAL PAIN: ICD-10-CM

## 2025-08-12 DIAGNOSIS — R19.7 DIARRHEA OF PRESUMED INFECTIOUS ORIGIN: ICD-10-CM

## 2025-08-12 PROCEDURE — 74185 MRA ABD W OR W/O CNTRST: CPT | Performed by: INTERNAL MEDICINE

## 2025-08-12 PROCEDURE — A9575 INJ GADOTERATE MEGLUMI 0.1ML: HCPCS | Performed by: INTERNAL MEDICINE

## 2025-08-12 RX ORDER — GADOTERATE MEGLUMINE 376.9 MG/ML
15 INJECTION INTRAVENOUS
Status: COMPLETED | OUTPATIENT
Start: 2025-08-12 | End: 2025-08-12

## 2025-08-12 RX ADMIN — GADOTERATE MEGLUMINE 13 ML: 376.9 INJECTION INTRAVENOUS at 09:57:00

## 2025-08-25 RX ORDER — LEVETIRACETAM 500 MG/1
500 TABLET ORAL 2 TIMES DAILY
Qty: 180 TABLET | Refills: 1 | Status: SHIPPED | OUTPATIENT
Start: 2025-08-25 | End: 2026-02-21

## (undated) DEVICE — LAPAROTOMY: Brand: MEDLINE INDUSTRIES, INC.

## (undated) DEVICE — Device

## (undated) DEVICE — NEEDLE, SPINAL STR 22GX3 1/2\" (25/BX) 5181

## (undated) DEVICE — CONMED SCOPE SAVER BITE BLOCK, 20X27 MM: Brand: SCOPE SAVER

## (undated) DEVICE — MEDI-VAC NON-CONDUCTIVE SUCTION TUBING 6MM X 1.8M (6FT.) L: Brand: CARDINAL HEALTH

## (undated) DEVICE — 60 ML SYRINGE REGULAR TIP: Brand: MONOJECT

## (undated) DEVICE — Device: Brand: DUAL NARE NASAL CANNULAE FEMALE LUER CON 7FT O2 TUBE

## (undated) DEVICE — STERILE LATEX POWDER-FREE SURGICAL GLOVESWITH NITRILE COATING: Brand: PROTEXIS

## (undated) DEVICE — VIOLET BRAIDED (POLYGLACTIN 910), SYNTHETIC ABSORBABLE SUTURE: Brand: COATED VICRYL

## (undated) DEVICE — SCREW CRTX ST 2.4X18MM T8
Type: IMPLANTABLE DEVICE | Site: WRIST | Status: NON-FUNCTIONAL
Removed: 2021-12-30

## (undated) DEVICE — SUTURE VICRYL 2-0 CT-1

## (undated) DEVICE — SUTURE ETHILON 3-0 669H

## (undated) DEVICE — SPONGE LAP 4X18

## (undated) DEVICE — YANKAUER SUCTION INSTRUMENT NO CONTROL VENT, BULB TIP, CLEAR: Brand: YANKAUER

## (undated) DEVICE — 3 ML SYRINGE LUER-LOCK TIP: Brand: MONOJECT

## (undated) DEVICE — SUTURE PROLENE 1 CT-1

## (undated) DEVICE — MEDI-VAC NON-CONDUCTIVE SUCTION TUBING: Brand: CARDINAL HEALTH

## (undated) DEVICE — INTENDED FOR TISSUE SEPARATION, AND OTHER PROCEDURES THAT REQUIRE A SHARP SURGICAL BLADE TO PUNCTURE OR CUT.: Brand: BARD-PARKER ® STAINLESS STEEL BLADES

## (undated) DEVICE — LONE STAR SMALL RING RETRACTOR

## (undated) DEVICE — SUTURE VICRYL 3-0 VR935

## (undated) DEVICE — SCREW BN 2.4MM 4MM 22MM LCP SS
Type: IMPLANTABLE DEVICE | Site: WRIST | Status: NON-FUNCTIONAL
Removed: 2021-12-30

## (undated) DEVICE — BLADE 11 SHRP BP SS SRG STRL

## (undated) DEVICE — KIT CLEAN ENDOKIT 1.1OZ GOWNX2

## (undated) DEVICE — GAMMEX® NON-LATEX PI ORTHO SIZE 8, STERILE POLYISOPRENE POWDER-FREE SURGICAL GLOVE: Brand: GAMMEX

## (undated) DEVICE — ENDOSCOPY PACK - LOWER: Brand: MEDLINE INDUSTRIES, INC.

## (undated) DEVICE — KENDALL SCD EXPRESS SLEEVES, KNEE LENGTH, MEDIUM: Brand: KENDALL SCD

## (undated) DEVICE — 35 ML SYRINGE REGULAR TIP: Brand: MONOJECT

## (undated) DEVICE — 3M™ STERI-STRIP™ REINFORCED ADHESIVE SKIN CLOSURES, R1547, 1/2 IN X 4 IN (12 MM X 100 MM), 6 STRIPS/ENVELOPE: Brand: 3M™ STERI-STRIP™

## (undated) DEVICE — UROLOGY DRAIN BAG STERILE

## (undated) DEVICE — DRAPE UNDER BUTTOCKS

## (undated) DEVICE — SPECULUM VAGINAL MED 59001

## (undated) DEVICE — 1010 S-DRAPE TOWEL DRAPE 10/BX: Brand: STERI-DRAPE™

## (undated) DEVICE — SUTURE VICRYL 4-0 RB-1

## (undated) DEVICE — SUTURE VICRYL 3-0 SH

## (undated) DEVICE — LONE STAR 5MM HOOKS LRG STAYS

## (undated) DEVICE — 9534HP TRANSPARENT DRSG W/FRAME: Brand: 3M™ TEGADERM™

## (undated) DEVICE — REM POLYHESIVE ADULT PATIENT RETURN ELECTRODE: Brand: VALLEYLAB

## (undated) DEVICE — 6 ML SYRINGE LUER-LOCK TIP: Brand: MONOJECT

## (undated) DEVICE — SPONGE: SPECIALTY PEANUT XR 100/CS: Brand: MEDICAL ACTION INDUSTRIES

## (undated) DEVICE — ENDOSCOPY PACK UPPER: Brand: MEDLINE INDUSTRIES, INC.

## (undated) DEVICE — Device: Brand: JELCO

## (undated) DEVICE — LUBRICANT JLY SURGILUBE 2OZ

## (undated) DEVICE — COVER SGL STRL LGHT HNDL BLU

## (undated) DEVICE — INTENT TO BE USED WITH SUTURE MATERIAL FOR TISSUE CLOSURE: Brand: RICHARD-ALLAN® NEEDLE 1/2 CIRCLE TAPER

## (undated) DEVICE — SOL H2O 3000ML IRRIG

## (undated) DEVICE — CAP SEALING, REVEAL 11.8MM

## (undated) DEVICE — PAD ALC 43.5X24IN PREP  LF

## (undated) DEVICE — SUTURE SILK 2-0 685H

## (undated) DEVICE — TUBING CYSTO TUR DUAL

## (undated) DEVICE — X-RAY DETECTABLE SPONGES,16 PLY: Brand: VISTEC

## (undated) DEVICE — OCCLUSIVE GAUZE STRIP OVERWRAP,3% BISMUTH TRIBROMOPHENATE IN PETROLATUM BLEND: Brand: XEROFORM

## (undated) DEVICE — CLIPPER BLADE 3M

## (undated) DEVICE — LONE STAR LARGE RING RETRACTOR

## (undated) DEVICE — SUCTION CANISTER, 3000CC,SAFELINER: Brand: DEROYAL

## (undated) DEVICE — PROTECTOR HOOD SFT BELL SHP FOR FB RETRV DISP

## (undated) DEVICE — 1016 S-DRAPE IRRIG POUCH 10/BOX: Brand: STERI-DRAPE™

## (undated) DEVICE — KIT ENDO ORCAPOD 160/180/190

## (undated) DEVICE — NET SPECIMEN RETRIEVAL BLUE

## (undated) DEVICE — SUTURE CHROMIC GUT 3-0 SH

## (undated) DEVICE — PLASTC TOOMEY SYRNG DISP

## (undated) DEVICE — ACCESS SHEATH WITH DILATOR: Brand: WATCHMAN™ TRUSEAL™ ACCESS SYSTEM

## (undated) DEVICE — 20 ML SYRINGE LUER-LOCK TIP: Brand: MONOJECT

## (undated) DEVICE — SINGLE-USE POLYPECTOMY SNARE: Brand: CAPTIFLEX

## (undated) DEVICE — VAGINAL PACKING: Brand: DEROYAL

## (undated) DEVICE — BANDAGE ROLL,100% COTTON, 6 PLY, LARGE: Brand: KERLIX

## (undated) DEVICE — SOL  .9 1000ML BTL

## (undated) DEVICE — FORCEPS BX L240CM DIA2.4MM L NDL RAD JAW 4

## (undated) DEVICE — UPPER EXTREMITY: Brand: MEDLINE INDUSTRIES, INC.

## (undated) DEVICE — FORCEP RADIAL JAW 4

## (undated) DEVICE — DISPOSABLE TOURNIQUET CUFF SINGLE BLADDER, DUAL PORT AND QUICK CONNECT CONNECTOR: Brand: COLOR CUFF

## (undated) DEVICE — LITHOTOMY DRAPE: Brand: CONVERTORS

## (undated) DEVICE — SUTURE MONOCRYL 3-0 Y936H

## (undated) DEVICE — CHLORAPREP 26ML APPLICATOR

## (undated) DEVICE — Device: Brand: DEFENDO AIR/WATER/SUCTION AND BIOPSY VALVE

## (undated) DEVICE — BIT DRL 110MM 1.8MM SS QC D

## (undated) DEVICE — LINE MNTR ADLT SET O2 INTMD

## (undated) DEVICE — SUTURE PROLENE 0 CT-1

## (undated) DEVICE — RESCUE FORCEPS GRASPING 8MM

## (undated) DEVICE — SUTURE PROLENE 1 CTX

## (undated) DEVICE — SPECIALTY ARM SLING: Brand: DEROYAL

## (undated) DEVICE — GAMMEX® PI HYBRID SIZE 7.5, STERILE POWDER-FREE SURGICAL GLOVE, POLYISOPRENE AND NEOPRENE BLEND: Brand: GAMMEX

## (undated) NOTE — ED AVS SNAPSHOT
[facility] in [place] [address].  [place] [address]    Phone:  [phone]    Fax:  [phone] [facility]   MRN: [id number]    Department:  [facility] in [address] [place] [address]   Date of Visit: not participate in your health insurance plan. This may result in a lower benefit level being available to you or other limited reimbursement.   The physician may seek payment directly from you for amounts other than your deductible, co-payment, or co-insu prescription right away and begin taking the medication(s) as directed.   If you believe that any of the medications or instructions on this list is different from what your Primary Care doctor has instructed you - please continue to take your medications a - If you don’t have insurance, Chris Morales has partnered with [employer] to help you get signed up for insurance coverage.   [employer] is a Federal Navigator program that can help with your Affordable Care Act cover

## (undated) NOTE — MR AVS SNAPSHOT
[facility]  [address], [address] [facility]  [address] [facility] [phone]               Thank you for choosing us for your health care visit with Gustabo Larsen MD.  We are glad to serve you and happy to provide you with this summary of your ? Please allow the office 48-72 hours to fill the prescription. ? Patient must present photo ID at time of .   If a designated family member will be picking up prescription, office must be given name of individual in advance and they must present a Do NOT use deodorant, talcum powder, lotions, or creams on your breasts or underarms.  They leave a coating that may be picked up by the x-rays, thereby distorting the mammogram.  Wear a two piece outfit the day of the exam. This allows you to be more Exam - Established Patient with Yisel Vásquez MD   [facility], [address] [facility]    [address], [address] [place] [address] [phone]            Jun 13, 2017  9:30 AM   Exam - Established Haley You can access your MyChart to more actively manage your health care and view more details from this visit by going to [email or web address].   If you've recently had a stay at the Hospital you can access your discharge instructions in [address] by rodrigo

## (undated) NOTE — MR AVS SNAPSHOT
[place] [place]  [facility] [address] [place] [address]  [phone]               Thank you for choosing us for your health care visit with Don Cunningham MD.  We are glad to serve you and happy to provide you with this summary of your visit.   Please blood thinners please call the ordering physician to see if that medication can be held for 7 days prior to the procedure. It is important for Radiology to be notified if you are not able to hold any of the above medications. Please call [phone].  Quentin Casas lamoTRIgine 25 MG Tabs   Take 25 mg by mouth daily. As directed   Commonly known as:  LAMICTAL           lisinopril 10 MG Tabs   Take 1 tablet (10 mg total) by mouth daily.    What changed:  how much to take   Commonly known as:  PRINIVIL [address]    [address]  [address] [place] [address]    It is the patient's responsibility to check with and follow their insurance company's guidelines for prior authorization for this test.  You ma

## (undated) NOTE — LETTER
Hospital Discharge Documentation    From: [facility] Hospitalist's Office  Phone: [phone]    Patient discharged time/date: 2025  4:55 PM  Patient discharge disposition:  Home or Self Care       Discharge Summary - D/C Summary        Discharge Summary signed by Chetna Amaral MD at 2025  3:04 PM  Version 1 of 1      Author: Chetna Amaral MD Service: Hospitalist Author Type: Physician    Filed: 2025  3:04 PM Date of Service: 2025  3:02 PM Status: Signed    : Chetna Amaral MD (Physician)         [facility]  part of [facility]     Discharge Summary    Nadiya Felix Patient Status:  Inpatient    3/28/1950 MRN [id number]   Location [facility] 5SW/SE Attending Chetna Amaral MD   Hosp Day # 7 PCP Donell Yin MD     Date of Admission: 2025    Date of Discharge: 2025    Admitting Diagnosis: Delirium, acute [R41.0]  Witnessed seizure (HCC) [R56.9]  History of seizures [Z87.898]  Altered mental status, unspecified altered mental status type [R41.82]    Discharge Diagnosis:   Patient Active Problem List   Diagnosis    JENNIFER (obstructive sleep apnea)    Family history of colon cancer    CKD (chronic kidney disease)    Major neurocognitive disorder (HCC)    Paroxysmal A-fib (HCC)    History of migraine with aura    Nonintractable epilepsy without status epilepticus (HCC)    Essential hypertension    Presence of Watchman left atrial appendage closure device    History of cerebrovascular accident (CVA) in adulthood    History of seizures    Bruising    Altered mental status    Witnessed seizure (HCC)    Altered mental status, unspecified altered mental status type    Delirium, acute    Fever       Reason for Admission:     Witnessed Seizure     Physical Exam:     General: Patient is alert and oriented x3 does not appear to be in acute distress at this time  HEENT: EOMI PERRLA, atraumatic normocephalic  Cardiac: S1-S2 appreciated  Lungs:  Good air entry bilaterally clear to auscultation  Abdomen: Soft nontender nondistended positive bowel sounds  Ext: Peripheral pulses are positive  Neuro: No focal deficits noted  Psych: Normal mood  Skin: No rashes noted  MSK: Full range of motion intact      Hospital Course:     eizure disorder  Breakthrough seizure  -Witnessed by  on day of admission.  -Self resolved after around 5 minutes per family report.  -Family states patient with known history of seizure disorder.  Believes she has been taking her medications appropriately.  -CT brain with no acute intracranial abnormalities.  -Loaded with Keppra in ED.,  Continue 500 mg twice daily.  -Continue Vimpat.  -Neurology consulted, appreciate further recommendations.       Altered mental status  -Improving  -Likely metabolic encephalopathy  -Possible postictal state from seizure activity as above  -Some concern for possible infectious causes   -Family reports recent UTI.  Current UA less concerning for active infection.    -Continuing empiric antibiotics including coverage for possible bacterial meningitis.  -Unable to obtain MRI at this time.  -Continue empiric acyclovir.  -ID consulted, recommending further evaluation with lumbar puncture to rule out meningitis.  Continuing empiric treatment.  Appreciate further recommendations.  -Patient also with ELIJAH.  -Treating underlying conditions.  -Supportive care  -PT/OT/SLP     Acute Kidney Injury   -Weaning IVF  -Encouraging p.o. intake when cleared by SLP.  - Avoiding Nephrotoxic agents  - Cont to monitor    History of Present Illness:     Per admitting   his is a 74 year oldfemale who was brought in after patient had witnessed seizure activity and subsequent confusion.  History unable to be provided from patient due to acute of condition.  Further history from family at bedside.   states patient was in her normal state of health yesterday.   noted patient was late to get up on the day of  admission.  When he went to check on her she stated she was feeling fine.  Later he went back to check on her and noted she was having generalized shaking, stiffness to body, and unable to respond.  EMS was called.   believes episode lasted around 5 minutes.   states patient has a known history of seizure disorder.  He believes she has been taking her medications appropriately.  Further history from daughter at bedside, daughter states patient was recently diagnosed with urinary tract infection has been on an antibiotic.  At time of interview, patient remains confused and agitated.  Attempted to get out of bed.  Not following commands.     Disposition: Home or Self Care    Discharge Condition: Stable    Discharge Medications:   Current Discharge Medication List        START taking these medications    Details   butalbital-acetaminophen-caffeine -40 MG Oral Tab Take 1 tablet by mouth every 6 (six) hours as needed for Headaches.  Qty: 10 tablet, Refills: 0    Associated Diagnoses: History of seizures      levETIRAcetam 500 MG Oral Tab Take 1 tablet (500 mg total) by mouth 2 (two) times daily.  Qty: 180 tablet, Refills: 1           CONTINUE these medications which have CHANGED    Details   amLODIPine 5 MG Oral Tab Take 1 tablet (5 mg total) by mouth daily.  Qty: 30 tablet, Refills: 0           CONTINUE these medications which have NOT CHANGED    Details   estradiol 0.1 MG/GM Vaginal Cream Place 1 g vaginally 3 (three) times a week.      pantoprazole 40 MG Oral Tab EC Take 1 tablet (40 mg total) by mouth every morning before breakfast.      Melatonin 10 MG Oral Tab Take 10 mg by mouth at bedtime.      lamoTRIgine 150 MG Oral Tab Take 1 tablet (150 mg total) by mouth 2 (two) times daily.  Qty: 180 tablet, Refills: 3    Associated Diagnoses: Nonintractable epilepsy without status epilepticus, unspecified epilepsy type (HCC)      donepezil 10 MG Oral Tab Take 1 tablet (10 mg total) by mouth daily.  Qty:  90 tablet, Refills: 3    Associated Diagnoses: Major neurocognitive disorder (HCC)      atorvastatin 20 MG Oral Tab TAKE ONE TABLET BY MOUTH ONE TIME DAILY AT NIGHT  Qty: 90 tablet, Refills: 3      nadolol 20 MG Oral Tab TAKE ONE TABLET BY MOUTH ONE TIME DAILY  Qty: 90 tablet, Refills: 3      escitalopram 10 MG Oral Tab Take 1 tablet (10 mg total) by mouth daily.      aspirin 81 MG Oral Tab EC Take 1 tablet (81 mg total) by mouth daily.  Qty: 1 tablet, Refills: 0             Total dc time > 30 min    Chetna Amaral MD  2/2/2025  3:02 PM     Hospital Discharge Diagnoses:  Seizure    Lace+ Score: 68  59-90 High Risk  29-58 Medium Risk  0-28   Low Risk.    TCM Follow-Up Recommendation:  LACE > 58: High Risk of readmission after discharge from the hospital.       Electronically signed by Chetna Amaral MD on 2/2/2025  3:04 PM

## (undated) NOTE — LETTER
[place] [address]  Authorization for Invasive Procedures  Date: 01/29/2025           Time: 14:21    I hereby authorize DR Courtney, my physician and his/her assistants (if applicable), which may include medical students, residents, and/or fellows, to perform the following surgical operation/ procedure and administer such anesthesia as may be determined necessary by my physician: XR LUMBAR PUNCTURE DIAG on Nadiya Felix  2.   I recognize that during the surgical operation/procedure, unforeseen conditions may necessitate additional or different procedures than those listed above.  I, therefore, further authorize and request that the above-named surgeon, assistants, or designees perform such procedures as are, in their judgment, necessary and desirable.    3.   My surgeon/physician has discussed prior to my surgery the potential benefits, risks and side effects of this procedure; the likelihood of achieving goals; and potential problems that might occur during recuperation.  They also discussed reasonable alternatives to the procedure, including risks, benefits, and side effects related to the alternatives and risks related to not receiving this procedure.  I have had all my questions answered and I acknowledge that no guarantee has been made as to the result that may be obtained.    4.   Should the need arise during my operation/procedure, which includes change of level of care prior to discharge, I also consent to the administration of blood and/or blood products.  Further, I understand that despite careful testing and screening of blood or blood products by collecting agencies, I may still be subject to ill effects as a result of receiving a blood transfusion and/or blood products.  The following are some, but not all, of the potential risks that can occur: fever and allergic reactions, hemolytic reactions, transmission of diseases such as Hepatitis, AIDS and Cytomegalovirus (CMV) and  fluid overload.  In the event that I wish to have an autologous transfusion of my own blood, or a directed donor transfusion, I will discuss this with my physician.   Check only if Refusing Blood or Blood Products  I understand refusal of blood or blood products as deemed necessary by my physician may have serious consequences to my condition to include possible death. I hereby assume responsibility for my refusal and release the hospital, its personnel, and my physicians from any responsibility for the consequences of my refusal.         o  Refuse         5.   I authorize the use of any specimen, organs, tissues, body parts or foreign objects that may be removed from my body during the operation/procedure for diagnosis, research or teaching purposes and their subsequent disposal by hospital authorities.  I also authorize the release of specimen test results and/or written reports to my treating physician on the hospital medical staff or other referring or consulting physicians involved in my care, at the discretion of the Pathologist or my treating physician.    6.   I consent to the photographing or videotaping of the operations or procedures to be performed, including appropriate portions of my body for medical, scientific, or educational purposes, provided my identity is not revealed by the pictures or by descriptive texts accompanying them.  If the procedure has been photographed/videotaped, the surgeon will obtain the original picture, image, videotape or CD.  The hospital will not be responsible for storage, release or maintenance of the picture, image, tape or CD.    7.   I consent to the presence of a  or observers in the operating room as deemed necessary by my physician or their designees.    8.   I recognize that in the event my procedure results in extended X-Ray/fluoroscopy time, I may develop a skin reaction.    9. If I have a Do Not Attempt Resuscitation (DNAR) order in place, that  status will be suspended while in the operating room, procedural suite, and during the recovery period unless otherwise explicitly stated by me (or a person authorized to consent on my behalf). The surgeon or my attending physician will determine when the applicable recovery period ends for purposes of reinstating the DNAR order.  10. Patients having a sterilization procedure: I understand that if the procedure is successful the results will be permanent and it will therefore be impossible for me to inseminate, conceive, or bear children.  I also understand that the procedure is intended to result in sterility, although the result has not been guaranteed.   11. I acknowledge that my physician has explained sedation/analgesia administration to me including the risk and benefits I consent to the administration of sedation/analgesia as may be necessary or desirable in the judgment of my physician.    I CERTIFY THAT I HAVE READ AND FULLY UNDERSTAND THE ABOVE CONSENT TO OPERATION and/or OTHER PROCEDURE.        ____________________________________       _________________________________      ______________________________  Signature of Patient         Signature of Responsible Person        Printed Name of Responsible Person        ____________________________________      _________________________________      ______________________________       Signature of Witness          Relationship to Patient                       Date                                       Time  Patient Name: Nadiya Felix  : 3/28/1950    Reviewed: 2024   Printed: 2025  Medical Record #: [id number] Page 1 of 2             STATEMENT OF PHYSICIAN My signature below affirms that prior to the time of the procedure; I have explained to the patient and/or his/her legal representative, the risks and benefits involved in the proposed treatment and any reasonable alternative to the proposed treatment. I have also explained the risks  and benefits involved in refusal of the proposed treatment and alternatives to the proposed treatment and have answered the patient's questions. If I have a significant financial interest in a co-management agreement or a significant financial interest in any product or implant, or other significant relationship used in this procedure/surgery, I have disclosed this and had a discussion with my patient.     _______________________________________________________________ _____________________________  (Signature of Physician)                                                                                         (Date)                                   (Time)  Patient Name: Nadiya Felix  : 3/28/1950    Reviewed: 2024   Printed: 2025  Medical Record #: [id number] Page 2 of 2

## (undated) NOTE — Clinical Note
TCM outreach completed. Pt has MAW visit 6/3/22 - reached out to Todd re: changing VT to TCM HFU. Thank you!

## (undated) NOTE — IP AVS SNAPSHOT
[address]  [address] [facility] ~ [phone]                Discharge Summary   6/19/2017    Rise Klinefelter           Admission Information        Provider Department    6/19/2017 Rachele Ray MD [facility] 4w/Sw/Se Elsie Gilbert                           Phenazopyridine HCl 95 MG Tabs   Commonly known as:  AZO-GESIC        Take 1 tablet (97.2 mg total) by mouth 3 (three) times daily as needed for Pain.     Nikia Allen Next dose due:  6/22/17 at 9 am        Take 25 mg by mouth daily. As directed    Monica Height                           magnesium 250 MG Tabs   Next dose due:  6/22/17 at 9 am        Take 250 mg by mouth.                             omeprazole 20 MG Cpdr · If you had a nerve block for your surgery, take extra care not to put any pressure on your arm or hand for 24 hours    It is normal:  · For you to have a sore throat if you had a breathing tube during surgery (while you were asleep!).  The sore throat dorie stamped envelope. The questionnaire should take no longer than a few minutes to complete and your answers are private. Your health and feedback are important to us!     If you receive a NSQIP questionnaire, and have questions please contact:   Uli Osman Pneumococcal (Prevnar 13) 10/20/2015    Pneumovax 23 10/20/2014      Recent Hematology Lab Results  (Last 3 results in the past 90 days)    WBC RBC Hemoglobin Hematocrit MCV MCH MCHC RDW Platelet MPV    ([birth year])  9.2 (06/21/17)  3.24 (L) (06/21/17)  9.9 - If you have concerns related to behavioral health issues or thoughts of harming yourself, contact [facility] and [facility] at [phone].     - If you don’t have insurance, Chris Morales has partnered with [facility] [place] [facility] Use: Treat infections or suspected infection   Most common side effects:  Allergic reactions, rash, nausea, diarrhea   What to report to your healthcare team: Tolerance of medications, temperature, rash, itching, shortness of breath, chills, nausea, and kahlil omeprazole (PRILOSEC) 20 MG Oral Capsule Delayed Release       Use:  Nausea/vomiting, acid reflux, low bowel motility, stomach pain   Most common side effects:  Depends on the specific medication but generally include: diarrhea, constipation, headache, al

## (undated) NOTE — Clinical Note
NCM spoke with pt for TCM. NCM scheduled a TCM HFU for pt today 2:30pm.  Pt complains of cough, fatigue, intermittent n/v, and some SOB w/ exertion. TE sent to PCP clinical pool to advise of HFU as pt high risk for readmission. Thank you.

## (undated) NOTE — LETTER
[facility] [address], [address] [place] [address]    Consent for Anesthesia   1.    Maine Adams agree to be cared for by an anesthesiologist, who is specially trained to monitor me and give me medicine to put me to sleep or keep me comforta vision, nerves, or muscles and in extremely rare instances death. 5. My doctor has explained to me other choices available to me for my care (alternatives).   6. Pregnant Patients (“epidural”):  I understand that the risks of having an epidural (medicine g

## (undated) NOTE — MR AVS SNAPSHOT
[place]  [address] [place] [address]  [phone]               Thank you for choosing us for your health care visit with Bibi Early MD.  We are glad to serve you and happy to provide you with this summary of your visit.   Please Diarrhea, unspecified type        Bleeding hemorrhoids        Seizure disorder        Breast cyst, right          Instructions and Information about Your Health     None      Allergies as of Feb 24, 2017     Hydrocodone-Acetaminophen [Hydroxyzine] Itching discharge instructions in [employer] by going to Visits < Admission Summaries. If you've been to the Emergency Department or your doctor's office, you can view your past visit information in [employer] by going to Visits < Visit Summaries. [employer] questions?

## (undated) NOTE — Clinical Note
TCM RN outreach complete. Pt coming for TCM apt tomorrow. Pt was unhappy w Dr Yung Maldonado, interested in discussing w you an alternate GI specialist to f/u with.

## (undated) NOTE — LETTER
Hospital Discharge Documentation  Please phone to schedule a hospital follow up appointment.     From: [address] Hospitalist's Office  Phone: [phone]    Patient discharged time/date: 9/30/2020  2:58 PM  Patient discharge disposition:  Home or Self Abdomen: Soft, nontender, nondistended. Positive bowel sounds. No rebound or guarding. Neurologic: No focal neurological deficits. Musculoskeletal: Moves all extremities.     Hospital Course:   Chest pain  - resolved  - no ischemic changes on EKG  - str Acetaminophen-Codeine #3 300-30 MG Tabs  Commonly known as: TYLENOL #3      Take 1 tablet by mouth every 6 (six) hours as needed for Pain.    Quantity: 15 tablet  Refills: 0     allopurinol 100 MG Tabs  Commonly known as: ZYLOPRIM      TAKE ONE TABLET BY MO Electronically signed by Ankush Valentine MD on 9/30/2020  2:00 PM   Attribution Thurman     [address] - Ankush Valentine MD on 9/30/2020  1:55 PM

## (undated) NOTE — LETTER
[address]  Authorization for Invasive Procedure                                                                                           1. I hereby Shahla Garcia MD, my physician and his/her assistants (if applicable), which may include medical students, residents, and/or fellows, to perform the following surgical operation/ procedure and administer such anesthesia as may be determined necessary by my physician: Operation/Procedure name (s) COLONOSCOPY/ESOPHAGOGASTRODUODENOSCOPY (EGD) on [address]   2. I recognize that during the surgical operation/procedure, unforeseen conditions may necessitate additional or different procedures than those listed above. I, therefore, further authorize and request that the above-named surgeon, assistants, or designees perform such procedures as are, in their judgment, necessary and desirable. 3.   My surgeon/physician has discussed prior to my surgery the potential benefits, risks and side effects of this procedure; the likelihood of achieving goals; and potential problems that might occur during recuperation. They also discussed reasonable alternatives to the procedure, including risks, benefits, and side effects related to the alternatives and risks related to not receiving this procedure. I have had all my questions answered and I acknowledge that no guarantee has been made as to the result that may be obtained. 4.   Should the need arise during my operation/procedure, which includes change of level of care prior to discharge, I also consent to the administration of blood and/or blood products. Further, I understand that despite careful testing and screening of blood or blood products by collecting agencies, I may still be subject to ill effects as a result of receiving a blood transfusion and/or blood products.   The following are some, but not all, of the potential risks that can occur: fever and allergic reactions, hemolytic reactions, transmission of diseases such as Hepatitis, AIDS and Cytomegalovirus (CMV) and fluid overload. In the event that I wish to have an autologous transfusion of my own blood, or a directed donor transfusion, I will discuss this with my physician. Check only if Refusing Blood or Blood Products  I understand refusal of blood or blood products as deemed necessary by my physician may have serious consequences to my condition to include possible death. I hereby assume responsibility for my refusal and release the hospital, its personnel, and my physicians from any responsibility for the consequences of my refusal.           ____ Refuse      5. I authorize the use of any specimen, organs, tissues, body parts or foreign objects that may be removed from my body during the operation/procedure for diagnosis, research or teaching purposes and their subsequent disposal by hospital authorities. I also authorize the release of specimen test results and/or written reports to my treating physician on the hospital medical staff or other referring or consulting physicians involved in my care, at the discretion of the Pathologist or my treating physician. 6.   I consent to the photographing or videotaping of the operations or procedures to be performed, including appropriate portions of my body for medical, scientific, or educational purposes, provided my identity is not revealed by the pictures or by descriptive texts accompanying them. If the procedure has been photographed/videotaped, the surgeon will obtain the original picture, image, videotape or CD. The hospital will not be responsible for storage, release or maintenance of the picture, image, tape or CD.    7.   I consent to the presence of a  or observers in the operating room as deemed necessary by my physician or their designees.     8.   I recognize that in the event my procedure results in extended X-Ray/fluoroscopy time, I may develop a skin reaction. 9. If I have a Do Not Attempt Resuscitation (DNAR) order in place, that status will be suspended while in the operating room, procedural suite, and during the recovery period unless otherwise explicitly stated by me (or a person authorized to consent on my behalf). The surgeon or my attending physician will determine when the applicable recovery period ends for purposes of reinstating the DNAR order. 10. Patients having a sterilization procedure: I understand that if the procedure is successful the results will be permanent and it will therefore be impossible for me to inseminate, conceive, or bear children. I also understand that the procedure is intended to result in sterility, although the result has not been guaranteed. 11. I acknowledge that my physician has explained sedation/analgesia administration to me including the risk and benefits I consent to the administration of sedation/analgesia as may be necessary or desirable in the judgment of my physician. I CERTIFY THAT I HAVE READ AND FULLY UNDERSTAND THE ABOVE CONSENT TO OPERATION and/or OTHER PROCEDURE.     _________________________________________ _________________________________     ___________________________________  Signature of Patient     Signature of Responsible Person                   Printed Name of Responsible Person                              _________________________________________ ______________________________        ___________________________________  Signature of Witness         Date  Time         Relationship to Patient    STATEMENT OF PHYSICIAN My signature below affirms that prior to the time of the procedure; I have explained to the patient and/or his/her legal representative, the risks and benefits involved in the proposed treatment and any reasonable alternative to the proposed treatment.  I have also explained the risks and benefits involved in refusal of the proposed treatment and alternatives to the proposed treatment and have answered the patient's questions.  If I have a significant financial interest in a co-management agreement or a significant financial interest in any product or implant, or other significant relationship used in this procedure/surgery, I have disclosed this and had a discussion with my patient.     _______________________________________________________________ _____________________________  Brendan Gentile)                                                                                         (Date)                                   (Time)  Patient Name: Adelita New    : 3/28/1950   Printed: 2022      Medical Record #: [id number]                                              Page 1 of 1

## (undated) NOTE — MR AVS SNAPSHOT
[facility]  [facility] [address] [address]  [phone]               Thank you for choosing us for your health care visit with Kurt Herbert MD.  We are glad to serve you and happy to provide you with this summary of your visit.   Please Hydrocodone-Acetaminophen [Hydroxyzine] Itching                Today's Vital Signs     BP Pulse Temp Height Weight BMI    110/78 mmHg 62 97.8 °F (36.6 °C) (Oral) 5' 5\" (1.651 m) 186 lb (84.369 kg) 30.95 kg/m2         Current Medications          This lis Call [phone] for help. [employer] is NOT to be used for urgent needs. For medical emergencies, dial 911.            Visit [facility] online at  [facility]

## (undated) NOTE — LETTER
[address], [place]  Authorization for Invasive Procedures  1. I hereby authorize Dr. Shayla Salazar , my physician and whomever may be designated as the doctor's assistant, to perform the following operation and/or procedure:  Transesophageal Echocardiogram on [address] at [facility].    2. My physician has explained to me the nature and purpose of the operation or other procedure, possible alternative methods of treatment, the risks involved and the possibility of complications to me. I understand the probable consequences of declining the recommended procedure and the alternative methods of treatment. I acknowledge that no guarantee has been made as to the result that may be obtained. 3. I recognize that during the course of this operation or other procedure, unforeseen conditions may necessitate additional or different procedures than those listed above. I, therefore, further authorize and request that the above-named physician, his/her physician assistants, or designees perform such procedures as are, in his/her professional opinion, necessary and desirable. If I have a Do Not Attempt Resuscitation (DNAR) order in place, that status will be suspended while in the operating room, procedural suite, and during the recovery period unless otherwise explicitly stated by me (or a person authorized to consent on my behalf). The surgeon or my attending physician will determine when the applicable recovery period ends for purposes of reinstating the DNAR order. 4. Should the need arise during my operation or immediate post-operative period; I also consent to the administration of blood and/or blood products.  Further, I understand that despite careful testing and screening of blood and blood products, I may still be subject to ill effects as a result of recieving a blood transfusion an/or blood producst. The following are some, but not all, of the potential risks that can occur: fever and allergic reactions, hemolytic reactions, transmission of disease such as hepatitis, AIDS, cytomegalovirus (CMV), and flluid overload. In the event that I wish to have autologous transfusions of my own blood, or a directed donor transfusion, I will discuss this with my physician. 5. I consent to the photographing of the operations or procedures to be performed for the purposes of advancing medicine, science, and/or education, provided my identity is not revealed. If the procedure has been videotaped, the physician/surgeon will obtain the original videotape. The hospital will not be responsible for storage or maintenance of this tape. 6. I consent to the presence of a  or observer as deemed necessary by my physician or his designee. 7. Any tissues or organs removed in the operation or other procedure may be disposed of by and at the discretion of [facility].    8. I understand that the physician and his/her physician assistants may not be employees or agents of [facility], [facility], [address], but are independent medical practitioners who have been permitted to use its facilities for the care and treatment of their patients. 9. Patients having a sterilization procedure: I understand that if the procedure is successful the results will be permanent and it will therefore be impossible for me to inseminate, conceive or bear children. I also understand that the procedure is intended to result in sterility, although the result has not been guaranteed. 10. I CERTIFY THAT I HAVE READ AND FULLY UNDERSTAND THE ABOVE CONSENT TO OPERATION and/or OTHER PROCEDURE. 11. I acknowledge that my physician has explained sedation/analgesia administration to me including the risks and benefits. I consent to the administration of sedation/analgesia as may be necessary or desirable in the judgment of my physician. Signature of Patient:  ________________________________________________ Date: _________Time: _________    Responsible person in case of minor or unconscious: _____________________________Relationship: ____________     Witness Signature: ____________________________________________ Date: __________ Time: ___________    Statement of Physician  My signature below affirms that prior to the time of the procedure, I have explained to the patient and/or her legal representative, the risks and benefits involved in the proposed treatment and any reasonable alternative to the proposed treatment. I have also explained the risks and benefits involved in the refusal of the proposed treatment and have answered the patient's questions. If I have a significant financial interest in this procedure/surgery, I have disclosed this and had a discussion with my patient.     Signature of Physician:   ________________________________________Date: _________Time:_______ Patient Name: Dalton Samson  : 3/28/1950   Printed: 2022    Medical Record #: [id number]

## (undated) NOTE — Clinical Note
Pt is coming for hospital follow up on 5/10/19. Pt stated she is doing much better since d/c. Pt stated she she is stronger and her speech issues have resolved. Pt stated she is still off balance at times but no other concerns.  Medication list was reviewed

## (undated) NOTE — LETTER
[facility], [place]  Authorization for Invasive Procedures  1.  I hereby authorize Dr. Mayra Peace , my physician and whomever may be designated as the doctor's assistant, to perform the following operation and/or procedure:  Abby Howell performed for the purposes of advancing medicine, science, and/or education, provided my identity is not revealed. If the procedure has been videotaped, the physician/surgeon will obtain the original videotape.  The hospital will not be responsible for stor My signature below affirms that prior to the time of the procedure, I have explained to the patient and/or her legal representative, the risks and benefits involved in the proposed treatment and any reasonable alternative to the proposed treatment.  I have

## (undated) NOTE — LETTER
Hospital Discharge Documentation  Please phone to schedule a hospital follow up appointment.     From: [address] Hospitalist's Office  Phone: [phone]    Patient discharged time/date: 5/6/2019  3:55 PM  Patient discharge disposition:  [place] [address] (Oral)   Resp 16   Wt 164 lb 8 oz (74.6 kg)   SpO2 98%   BMI 27.29 kg/m²      Gen:   NAD.   A and O x 3  CV:   RRR, no m/g/r  Pulm:   CTA bilat  Abd:   +bs, soft, NT, ND  LE:   No c/c/e  Neuro:   Trouble initiating gate       Reason for Admission:    Fredy Jaimes satisfactory closure of SARABJIT appendage, no clot on the device. No other sources of cardioembolism, no remnant ASD from watchman procedure.     Hx of RLS  - cont home Requip       Hx of depression  - cont lexapro for home celexa     Hx of a-fib.   - cont nad Refills:  0     nadolol 20 MG Tabs  Commonly known as:  CORGARD      Take 0.5 tablets (10 mg total) by mouth daily.    Quantity:  45 tablet  Refills:  3     Ondansetron HCl 4 mg tablet  Commonly known as:  ZOFRAN      TAKE ONE TABLET BY MOUTH EVERY EIGHT H

## (undated) NOTE — MR AVS SNAPSHOT
[address]   2017 3:00 PM   Office Visit   MRN:  [id number]    Description:  Female : 3/28/1950   Provider:  Tres Espino   Department:  [facility]              Visit Summary      Allergies     Hydrocodone-A To Do List     Thursday August 24, 2017 10:00 AM     Appointment with Fe Gleason at [facility] ([phone])   [address]  [address] [facility] [address]       Monday September 18, 2017 10:00 AM     Appointment w

## (undated) NOTE — LETTER
Hospital Discharge Documentation  Please phone to schedule a hospital follow up appointment. From: [address] Hospitalist's Office  Phone: [phone]    Patient discharged time/date: 2023  6:34 PM  Patient discharge disposition:  Home or Self Care       Discharge Summary - D/C Summary        Discharge Summary signed by Marshall Kruger MD at 2023  1:11 PM  Version 1 of 1      Author: Marshall Kruger MD Service: Internal Medicine Author Type: Physician    Filed: 2023  1:11 PM Date of Service: 2023 12:49 PM Status: Signed    : Marshall Kruger MD (Physician)         [facility]    Discharge Summary    Magnolia Muniz Patient Status:  Inpatient    3/28/1950 MRN [id number]   Location [facility] 4W/SW/SE Attending Marshall Kruger MD   Hosp Day # 0 PCP Prashanth Contreras MD     Date of Admission: 10/31/2023   Date of Discharge: 2023    Hospital Discharge Diagnoses: Acute right hand weakness     Lace+ Score: 72  59-90 High Risk  29-58 Medium Risk  0-28   Low Risk. TCM Follow-Up Recommendation:  LACE 29-58: Moderate Risk of readmission after discharge from the hospital.        Admitting Diagnosis: Hand weakness [R29.898]    Disposition: Home    Discharge Diagnosis: . Principal Problem:    Hand weakness      Hospital Course:   Reason for Admission:   Per Dr. Yan Pool is a(n) 75year old female, a past medical history significant for chronic atrial fibrillation, seizure disorder hypertension hyperlipidemia and dementia presents with a complaint of inability to control her right arm for a brief period of time. Patient claims she was writing down an appointment for her  on the calendar when the arm started to move on its own, claims she was holding on to the pencil at the time however unable to write which she intended to and ended up to [id number] on it.   Started feel anxious, took marijuana which calmed her down 10 minutes later was able to write without difficulty. Denies any associated blurry vision slurred speech headache dizziness focal numbness weakness or tingling. Claims to have a seizure disorder however has never experienced something similar in the past.    Discharge Physical Exam:   Physical Exam:    General: No acute distress. Respiratory: Clear to auscultation bilaterally. No wheezes. No rhonchi. Cardiovascular: S1, S2. Regular rate and rhythm. No murmurs, rubs or gallops. Abdomen: Soft, nontender, nondistended. Positive bowel sounds. No rebound or guarding. Neurologic: No focal neurological deficits. Musculoskeletal: Moves all extremities. Hospital Course:     Acute TIA  Neuro on consult  CT scan negative for acute findings   Cannot get MRI due to pacemaker- will have it scheduled as an outpatient at [facility] home meds  After echo patient an be discharged  Discussed with Neurology   Follow up with Dr. Omar Novak in 1-2 weeks     Chronic Afib   Has watchman micae     HTN  Continue home meds    Seizure disorder  Continue home meds     Dementia Chronic  On Aricept       Complications: none    Consultants         Provider   Role Specialty     Madeleine Moura MD  Consulting Physician NEUROLOGY                Discharge Plan:   Discharge Condition: Stable    Current Discharge Medication List    Home Meds - Unchanged    amLODIPine 2.5 MG Oral Tab  Take 1 tablet (2.5 mg total) by mouth daily. oxybutynin 5 MG Oral Tab  Take 1 tablet (5 mg total) by mouth daily. Pancrelipase, Lip-Prot-Amyl, (CREON) 6000-54682 units Oral Cap DR Particles  Take 2 capsules by mouth 3 (three) times daily with meals. allopurinol 100 MG Oral Tab  Take 1 tablet (100 mg total) by mouth daily. lamoTRIgine 150 MG Oral Tab  Take 1 tablet (150 mg total) by mouth 2 (two) times daily.     atorvastatin 20 MG Oral Tab  TAKE ONE TABLET BY MOUTH ONE TIME DAILY AT NIGHT    pantoprazole 40 MG Oral Tab EC  TAKE ONE TABLET BY MOUTH IN THE MORNING BEFORE BREAKFAST FOR THE STOMACH    escitalopram 10 MG Oral Tab  Take 1 tablet (10 mg total) by mouth daily. nadolol 20 MG Oral Tab  Take 1 tablet (20 mg total) by mouth daily. Mirabegron ER (MYRBETRIQ) 25 MG Oral Tablet 24 Hr  Take 1 tablet (25 mg total) by mouth daily. ondansetron 4 MG Oral Tablet Dispersible  Take 1 tablet (4 mg total) by mouth every 8 (eight) hours as needed for Nausea. ROPINIROLE 1 MG Oral Tab  TAKE TWO TABLETS BY MOUTH NIGHTLY    aspirin 81 MG Oral Tab EC  Take 1 tablet (81 mg total) by mouth daily. Donepezil HCl 10 MG Oral Tab  Take 1 tablet (10 mg total) by mouth nightly. Discharge Diet: As tolerated    Discharge Activity: As tolerated       Discharge Medications        CHANGE how you take these medications        Instructions Prescription details   rOPINIRole 1 MG Tabs  Commonly known as: Requip  What changed: additional instructions      TAKE TWO TABLETS BY MOUTH NIGHTLY   Quantity: 180 tablet  Refills: 3            CONTINUE taking these medications        Instructions Prescription details   allopurinol 100 MG Tabs  Commonly known as: Zyloprim      Take 1 tablet (100 mg total) by mouth daily. Refills: 0     amLODIPine 2.5 MG Tabs  Commonly known as: Norvasc      Take 1 tablet (2.5 mg total) by mouth daily. Refills: 0     aspirin 81 MG Tbec      Take 1 tablet (81 mg total) by mouth daily. Quantity: 1 tablet  Refills: 0     atorvastatin 20 MG Tabs  Commonly known as: Lipitor      TAKE ONE TABLET BY MOUTH ONE TIME DAILY AT NIGHT   Quantity: 90 tablet  Refills: 3     Creon 6000-31679 units Cpep  Generic drug: Pancrelipase (Lip-Prot-Amyl)      Take 2 capsules by mouth 3 (three) times daily with meals. Refills: 0     donepezil 10 MG Tabs  Commonly known as: Aricept      Take 1 tablet (10 mg total) by mouth nightly. Refills: 0     escitalopram 10 MG Tabs  Commonly known as: Lexapro      Take 1 tablet (10 mg total) by mouth daily.    Refills: 0 lamoTRIgine 150 MG Tabs  Commonly known as: LaMICtal      Take 1 tablet (150 mg total) by mouth 2 (two) times daily. Quantity: 180 tablet  Refills: 1     Myrbetriq 25 MG Tb24  Generic drug: Mirabegron ER      Take 1 tablet (25 mg total) by mouth daily. Refills: 0     nadolol 20 MG Tabs  Commonly known as: Corgard      Take 1 tablet (20 mg total) by mouth daily. Quantity: 90 tablet  Refills: 3     ondansetron 4 MG Tbdp  Commonly known as: Zofran-ODT      Take 1 tablet (4 mg total) by mouth every 8 (eight) hours as needed for Nausea. Quantity: 20 tablet  Refills: 0     oxybutynin 5 MG Tabs  Commonly known as: Ditropan      Take 1 tablet (5 mg total) by mouth daily. Refills: 0     pantoprazole 40 MG Tbec  Commonly known as: Protonix      TAKE ONE TABLET BY MOUTH IN THE MORNING BEFORE BREAKFAST FOR THE STOMACH   Quantity: 90 tablet  Refills: 3              Follow up: Follow-up Information       Varun Valdez DO. Schedule an appointment as soon as possible for a visit in 1 month(s). Specialty: NEUROLOGY  Why: Your provider Dr. Jane Chang stated that they will give you a call to schedule your Neuro follow-up appointment once you are home. If you do not hear from them within a day or two, please call them to schedule your appointment for 4-6 weeks.   Contact information:  Sherif Gilbert   [phone]                             Follow up Labs and imaging:         Time spent:  > 30 minutes    Louie Sales MD  11/1/2023      Electronically signed by Dalton Hill MD on 11/1/2023  1:11 PM

## (undated) NOTE — ED AVS SNAPSHOT
Susan Kingston   MRN: [id number]    Department:  [facility] Emergency Department   Date of Visit:  3/30/2018           Disclosure     Insurance plans vary and the physician(s) referred by the ER may not be covered by your plan.  Please contact within the next three months to obtain basic health screening including reassessment of your blood pressure.     IF THERE IS ANY CHANGE OR WORSENING OF YOUR CONDITION, CALL YOUR PRIMARY CARE PHYSICIAN AT ONCE OR RETURN IMMEDIATELY TO THE EMERGENCY DEPARTMEN

## (undated) NOTE — LETTER
No referring provider defined for this encounter. 05/25/23        Patient: Naila Artis   YOB: 1950   Date of Visit: 5/25/2023       Dear  Dr. Kay Mo Recipients,      Thank you for referring Naila Artis to my practice. Please find my assessment and plan below. Naila Artis is a 67year old right handed woman w/ a pmhx sig. for  ? Prior stroke (most recently admitted on 5/31/2022 for dizziness diagnosed with posterior circulation stroke), ? amarousis fugax in June 2023, epilepsy, hx of atrial fibrillation s/p watchman,  migraines with visual aura, MCI/Alzhiemer's dementia, pseudobulbar affect, prior complaint of left-sided hemisensory loss in February 2017, hepatic steatosis, atrophic kidney, carpal tunnel,  RLS, and JENNIFER not on CPAP who presented to [facility] on 2/[facility]/2023 w/ progressive/ascending left hand/arm dysesthesias in the setting of ?prior stroke. Etiology of this most recent event remains unclear. It is possible that she is having infarcts/cerebrovascular disease below the level of detection. However, if she had such significant cerebrovascular disease one would expect to see a chronic and progressively worsening burden of changes due to cerebrovascular disease. She lacks this. She may be having migraine equivalents/migraine auras without headaches. In patients with hemiplegic migraine they can have migraine aura symptoms for weeks, and have been sent to acute rehab because of her symptoms. Therefore the duration of her symptoms does not exclude migraine. Per chart review the patient also complained of similar left-sided symptoms back in 2017. Her 71 ambulatory EEG was negative. She endorses worsening short term memory, intrusive thoughts, obsessions, somnambulism, and a strong urge to leave her home. She  Reports she will have neuropsychological testing again soon. Would like to review the results.  Ordered repeat mri of the brain now that her pacemaker is mri compatible.                     Sincerely,   Kaye Husain DO   [facility], [address] [place] [address] [address] [id number]    Document electronically generated by:  Kaye Husain DO

## (undated) NOTE — LETTER
[facility] [address], [address]  Consent for Procedure/Sedation    Date:        Time:       1. I authorize the performance upon Adrienne Duncan the following:  Left Atrial Appendage Closure Device Implant     2.  I authorize  ________________________________    ___________________    Witness: _________________________      Date: ___________________    Printed: 2018   8:14 AM  Patient Name: Shawn Louis        : 3/28/1950       Medical Record #: [id number]

## (undated) NOTE — ED AVS SNAPSHOT
Caryn López   MRN: [id number]    Department:  [facility] Emergency Department   Date of Visit:  7/10/2019           Disclosure     Insurance plans vary and the physician(s) referred by the ER may not be covered by your plan.  Please contact within the next three months to obtain basic health screening including reassessment of your blood pressure.     IF THERE IS ANY CHANGE OR WORSENING OF YOUR CONDITION, CALL YOUR PRIMARY CARE PHYSICIAN AT ONCE OR RETURN IMMEDIATELY TO THE EMERGENCY DEPARTMEN

## (undated) NOTE — LETTER
AUTHORIZATION FOR SURGICAL OPERATION OR OTHER PROCEDURE    1. I hereby authorize Dr. Justina Pace, and [facility] staff assigned to my case to perform the following operation and/or procedure at the [facility]:    __R Finger Cortisone Injection & Aspiration ____________________________________________________________________________________________      _______________________________________________________________________________________________    2. My physician has explained the nature and purpose of the operation or other procedure, possible alternative methods of treatment, the risks involved, and the possibility of complication to me. I acknowledge that no guarantee has been made as to the result that may be obtained. 3.  I recognize that, during the course of this operation, or other procedure, unforseen conditions may necessitate additional or different procedure than those listed above. I, therefore, further authorize and request that the above named physician, his/her physician assistants or designees perform such procedures as are, in his/her professional opinion, necessary and desirable. 4.  Any tissue or organs removed in the operation or other procedure may be disposed of by and at the discretion of the [facility] and [facility]. 5.  I understand that in the event of a medical emergency, I will be transported by local paramedics to [facility] or other [facility] emergency department. 6.  I certify that I have read and fully understand the above consent to operation and/or other procedure. 7.  I acknowledge that my physician has explained sedation/analgesia administration to me including the risks and benefits. I consent to the administration of sedation/analgesia as may be necessary or desirable in the judgement of my physician.     Witness signature: ___________________________________________________ Date:  ______/______/_____ Time:  ________ A. M.  P.M. Patient Name:  ______________________________________________________  (please print)      Patient signature:  ___________________________________________________             Relationship to Patient:           []  Parent    Responsible person                          []  Spouse  In case of minor or                    [] Other  _____________   Incompetent name:  __________________________________________________                               (please print)      _____________      Responsible person  In case of minor or  Incompetent signature:  _______________________________________________    Statement of Physician  My signature below affirms that prior to the time of the procedure, I have explained to the patient and/or his/her guardian, the risks and benefits involved in the proposed treatment and any reasonable alternative to the proposed treatment. I have also explained the risks and benefits involved in the refusal of the proposed treatment and have answered the patient's questions.                         Date:  ______/______/_______  Provider                      Signature:  __________________________________________________________       Time:  ___________ A.M    P.M.

## (undated) NOTE — LETTER
[address], [place], [place]     AUTHORIZATION FOR SURGICAL OPERATION OR PROCEDURE    1.    I hereby authorize Dr. Dylon Washington MD, my Physician(s) and whomever may be designated as the doctor's Assistant, to perform the fo to have an autologous transfusion of my own blood, or a directed donor transfusion, I will discuss this with my         Physician.   5.   I consent to the photographing of procedure(s) to be performed for the purposes of advancing medicine, science (Responsible person in case of minor or unconscious patient)   (Relationship to Patient)      _______________________________________________________________ ____________________________  (Witness signature)

## (undated) NOTE — LETTER
[facility], [place]    Consent for Coronary CT Angiography    Your doctor has recommended that you have a Coronary CT Angiography procedure.  Coronary CT Angiography is a diagnostic procedure using computed tomography to scan the patient when taking medication. Allergic reactions to medication can range from very minor to very serious leading to a life threatening situation or even death. Please be sure to communicate any allergy you may have to your caregiver immediately.     The i

## (undated) NOTE — LETTER
Hospital Discharge Documentation    Please phone to schedule a hospital follow up appointment.     From: [address] Hospitalist's Office  Phone: [phone]    Patient discharged time/date: 6/21/2017  4:14 PM  Patient discharge disposition:  Home or Se administered. She was put in the lithotripsy position in 2015 [facility] with compression boots. She had the above procedure with Dr. Geronimo Novak and Dr. Princess Britton. She did well. She had about 400 mL blood loss but then stabilized with packing and a Dobbins. has urinary retention we will go back to intermittent catheterizations. She will _______ urine culture and antibiotic _______ positive. We will continue her on iron and follow her blood loss, and if necessary do more testing.   She will follow up with

## (undated) NOTE — LETTER
Hospital Discharge Documentation    From: [address] Hospitalist's Office  Phone: [phone]    Patient discharged time/date: 3/1/2017  5:25 PM  Patient discharge disposition:  Home or Self Care       Discharge Summaries - D/C Summary      Discharge nadolol 40 MG Oral Tab  Take 20 mg by mouth nightly. Take one half tablet daily. Qty: 90 tablet Refills: 3    lamoTRIgine 25 MG Oral Tab  Take 25 mg by mouth daily. As directed   Refills: 3    Donepezil HCl 5 MG Oral Tab  Take 5 mg by mouth nightly.     A -s/p CTA--> wnl  -home with OP f/u      Physical Exam:  Chest:  CTA B/L  Heart:  RRR S1,S2  Abdomen:  Soft, NT, BS+  Extremities:  No Edema    Disposition: Final discharge disposition home        [address]  3/1/2017  1:36 PM    Timespent> 30 mins

## (undated) NOTE — LETTER
[address], [place]  Authorization for Invasive Procedures  1.  I hereby authorize Dr. Shaniqua Silva , my physician and whomever may be designated as the doctor's assistant, to perform the following operation and/or procedure:  Cardiov performed for the purposes of advancing medicine, science, and/or education, provided my identity is not revealed. If the procedure has been videotaped, the physician/surgeon will obtain the original videotape.  The hospital will not be responsible for stor My signature below affirms that prior to the time of the procedure, I have explained to the patient and/or her legal representative, the risks and benefits involved in the proposed treatment and any reasonable alternative to the proposed treatment.  I have

## (undated) NOTE — LETTER
[address] [place], [place]  Authorization for Surgical Operation and Procedure                                                                                           I hereby authorize Dorian Cornell MD, my physician and his/her assistants (if applicable), which may include medical students, residents, and/or fellows, to perform the following surgical operation/ procedure and administer such anesthesia as may be determined necessary by my physician: Operation/Procedure name (s) ESOPHAGOGASTRODUODENOSCOPY on [address]   2. I recognize that during the surgical operation/procedure, unforeseen conditions may necessitate additional or different procedures than those listed above. I, therefore, further authorize and request that the above-named surgeon, assistants, or designees perform such procedures as are, in their judgment, necessary and desirable. 3.   My surgeon/physician has discussed prior to my surgery the potential benefits, risks and side effects of this procedure; the likelihood of achieving goals; and potential problems that might occur during recuperation. They also discussed reasonable alternatives to the procedure, including risks, benefits, and side effects related to the alternatives and risks related to not receiving this procedure. I have had all my questions answered and I acknowledge that no guarantee has been made as to the result that may be obtained. 4.   Should the need arise during my operation/procedure, which includes change of level of care prior to discharge, I also consent to the administration of blood and/or blood products. Further, I understand that despite careful testing and screening of blood or blood products by collecting agencies, I may still be subject to ill effects as a result of receiving a blood transfusion and/or blood products.   The following are some, but not all, of the potential risks that can occur: fever and allergic reactions, hemolytic reactions, transmission of diseases such as Hepatitis, AIDS and Cytomegalovirus (CMV) and fluid overload. In the event that I wish to have an autologous transfusion of my own blood, or a directed donor transfusion, I will discuss this with my physician. Check only if Refusing Blood or Blood Products  I understand refusal of blood or blood products as deemed necessary by my physician may have serious consequences to my condition to include possible death. I hereby assume responsibility for my refusal and release the hospital, its personnel, and my physicians from any responsibility for the consequences of my refusal.    o  Refuse   5. I authorize the use of any specimen, organs, tissues, body parts or foreign objects that may be removed from my body during the operation/procedure for diagnosis, research or teaching purposes and their subsequent disposal by hospital authorities. I also authorize the release of specimen test results and/or written reports to my treating physician on the hospital medical staff or other referring or consulting physicians involved in my care, at the discretion of the Pathologist or my treating physician. 6.   I consent to the photographing or videotaping of the operations or procedures to be performed, including appropriate portions of my body for medical, scientific, or educational purposes, provided my identity is not revealed by the pictures or by descriptive texts accompanying them. If the procedure has been photographed/videotaped, the surgeon will obtain the original picture, image, videotape or CD. The hospital will not be responsible for storage, release or maintenance of the picture, image, tape or CD.    7.   I consent to the presence of a  or observers in the operating room as deemed necessary by my physician or their designees.     8.   I recognize that in the event my procedure results in extended X-Ray/fluoroscopy time, I may develop a skin reaction. 9. If I have a Do Not Attempt Resuscitation (DNAR) order in place, that status will be suspended while in the operating room, procedural suite, and during the recovery period unless otherwise explicitly stated by me (or a person authorized to consent on my behalf). The surgeon or my attending physician will determine when the applicable recovery period ends for purposes of reinstating the DNAR order. 10. Patients having a sterilization procedure: I understand that if the procedure is successful the results will be permanent and it will therefore be impossible for me to inseminate, conceive, or bear children. I also understand that the procedure is intended to result in sterility, although the result has not been guaranteed. 11. I acknowledge that my physician has explained sedation/analgesia administration to me including the risk and benefits I consent to the administration of sedation/analgesia as may be necessary or desirable in the judgment of my physician. I CERTIFY THAT I HAVE READ AND FULLY UNDERSTAND THE ABOVE CONSENT TO OPERATION and/or OTHER PROCEDURE.     _________________________________________ _________________________________     ___________________________________  Signature of Patient     Signature of Responsible Person                   Printed Name of Responsible Person                              _________________________________________ ______________________________        ___________________________________  Signature of Witness         Date  Time         Relationship to Patient    STATEMENT OF PHYSICIAN My signature below affirms that prior to the time of the procedure; I have explained to the patient and/or his/her legal representative, the risks and benefits involved in the proposed treatment and any reasonable alternative to the proposed treatment.  I have also explained the risks and benefits involved in refusal of the proposed treatment and alternatives to the proposed treatment and have answered the patient's questions.  If I have a significant financial interest in a co-management agreement or a significant financial interest in any product or implant, or other significant relationship used in this procedure/surgery, I have disclosed this and had a discussion with my patient.     _______________________________________________________________ _____________________________  Elenora Frame of Physician)                                                                                         (Date)                                   (Time)  Patient Name: Lalitha Dowd    : 3/28/1950   Printed: 10/17/2023      Medical Record #: [id number]                                              Page 1 of 1

## (undated) NOTE — ED AVS SNAPSHOT
Niko Delgado   MRN: [id number]    Department:  [facility] Emergency Department   Date of Visit:  7/8/2019           Disclosure     Insurance plans vary and the physician(s) referred by the ER may not be covered by your plan.  Please contact y within the next three months to obtain basic health screening including reassessment of your blood pressure.     IF THERE IS ANY CHANGE OR WORSENING OF YOUR CONDITION, CALL YOUR PRIMARY CARE PHYSICIAN AT ONCE OR RETURN IMMEDIATELY TO THE EMERGENCY DEPARTMEN

## (undated) NOTE — LETTER
Hospital Discharge Documentation  Please phone to schedule a hospital follow up appointment. No discharge summary available at this time, below is the most recent progress note  for your review .       From: [address] Hospitalist's Office  Phone: [phone]   ABDOMEN: Non-distended  MUSCULOSKELETAL:  There was no obvious deformity.    EXTREMITIES: There was no edema  NEUROLOGICAL:  There was no gross deficit.       Current Scheduled Inpatient Meds:      • aspirin  81 mg Oral Daily   • lidocaine-ment 06/07/2017         Culture:  No results found for this visit on 06/19/21.     Imaging/EKG:   XR CHEST AP PORTABLE  (CPT=71045)     Result Date: 6/19/2021  CONCLUSION:   Enlarged cardiomediastinal silhouette, unchanged since the prior exams.   Mild bibasilar

## (undated) NOTE — LETTER
[facility] [address], [address]  Consent for Procedure/Sedation    Date:     Time:       1.  I authorize the performance upon David Simon the following:  TRANSESOPHAGEAL ECHOCARDIOGRAM    2. I authorize Dr. Lucinda Valdivia (and who ________________________________    ___________________    Witness: _________________________      Date: ___________________    Printed: 2018   9:06 AM  Patient Name: Kelsi Lundberg        : 3/28/1950       Medical Record #: [id number]

## (undated) NOTE — LETTER
[address] [facility] FOR BLOOD OR BLOOD COMPONENTS      In the course of your treatment, it may become necessary to administer a transfusion of blood or blood components.  This form provides basic information concerning this proc explain the alternatives to you if it has not already been done. I,Nadiya Felix, have read/had read to me the above. I understand the matters bearing on the decision whether or not to authorize a transfusion of blood or blood components.  I have no ques

## (undated) NOTE — IP AVS SNAPSHOT
[facility]            (For Outpatient Use Only) Initial Admit Date: 2022   Inpt/Obs Admit Date: Inpt: 22 / Obs: 22   Discharge Date:    Benedicto Neff:  [de-identified]   MRN: [de-identified]   CSN: [id number]   CEID: [id number]        ENCOUNTER  Patient Class: Inpatient Admitting Provider: Kelly Baez MD Unit: [facility] Service: Cardiac Telemetry Attending Provider: Chas Fierro MD   Bed: 502-A   Visit Type:   Referring Physician: No ref. provider found Billing Flag:    Admit Diagnosis: Ataxia [R27.0]      PATIENT  Legal Name:   Sandra Babin   Legal Sex: Female  Gender ID: Female             Pref Name:   Rico Villasenor PCP:  Jamison Cruz MD Home: [phone]   Address:  [address] [place] [address] : 3/28/1950 (72 yrs) Mobile: [phone]         City/State/Zip: [address] [place] [id number] Marital:  Language: [place] park: [place] SSN4: [id number] Mu-ism: [address] Not [address]*     Race: White Ethnicity: Non  Or [address]   Name Relationship Legal Guardian? Home Phone Work Phone Mobile Phone   1. Iglesia Felix  2.  Emmanuel Blas  Daughter    [phone]           GUARANTOR  Guarantor: Marjorie GOMES : 3/28/1950 Home Phone: [phone]   Address: [address] [place] [address]  Sex: Female Work Phone:    City/State/Zip: [place] [address]   Rel. to Patient: Self Guarantor ID: [id number]   GUARANTOR EMPLOYER   Employer:  Status: RETIRED     COVERAGE  PRIMARY INSURANCE   Payor: MEDICARE Plan: MEDICARE PART A&B   Group Number:  Insurance Type: INDEMNITY   Subscriber Name: Monica Caruso : 1950   Subscriber ID: [id number] Pt Rel to Subscriber: Self   SECONDARY INSURANCE   Payor: [place] PPO Plan: BLUE CROSS [facility] PPO   Group Number: [id number] Insurance Type: [id number] Name: Monica Caruso : 3/28/1950   Subscriber ID: [id number] Pt Rel to Subscriber: SELF   TERTIARY INSURANCE   Payor:  Plan: Group Number:  Insurance Type:    Subscriber Name:  Subscriber :    Subscriber ID:  Pt Rel to Subscriber:    Hospital Account Financial Class: Medicare    2022

## (undated) NOTE — LETTER
[address]  Authorization for Invasive Procedure                                                                                           1. I hereby authorize Keila Rivera MD, my physician and his/her assistants (if applicable), which may include medical students, residents, and/or fellows, to perform the following surgical operation/ procedure and administer such anesthesia as may be determined necessary by my physician: Operation/Procedure name (s) COLONOSCOPY/ESOPHAGOGASTRODUODENOSCOPY (EGD) on [address]   2. I recognize that during the surgical operation/procedure, unforeseen conditions may necessitate additional or different procedures than those listed above. I, therefore, further authorize and request that the above-named surgeon, assistants, or designees perform such procedures as are, in their judgment, necessary and desirable. 3.   My surgeon/physician has discussed prior to my surgery the potential benefits, risks and side effects of this procedure; the likelihood of achieving goals; and potential problems that might occur during recuperation. They also discussed reasonable alternatives to the procedure, including risks, benefits, and side effects related to the alternatives and risks related to not receiving this procedure. I have had all my questions answered and I acknowledge that no guarantee has been made as to the result that may be obtained. 4.   Should the need arise during my operation/procedure, which includes change of level of care prior to discharge, I also consent to the administration of blood and/or blood products. Further, I understand that despite careful testing and screening of blood or blood products by collecting agencies, I may still be subject to ill effects as a result of receiving a blood transfusion and/or blood products.   The following are some, but not all, of the potential risks that can occur: fever and allergic reactions, hemolytic reactions, transmission of diseases such as Hepatitis, AIDS and Cytomegalovirus (CMV) and fluid overload. In the event that I wish to have an autologous transfusion of my own blood, or a directed donor transfusion, I will discuss this with my physician. Check only if Refusing Blood or Blood Products  I understand refusal of blood or blood products as deemed necessary by my physician may have serious consequences to my condition to include possible death. I hereby assume responsibility for my refusal and release the hospital, its personnel, and my physicians from any responsibility for the consequences of my refusal.           ____ Refuse      5. I authorize the use of any specimen, organs, tissues, body parts or foreign objects that may be removed from my body during the operation/procedure for diagnosis, research or teaching purposes and their subsequent disposal by hospital authorities. I also authorize the release of specimen test results and/or written reports to my treating physician on the hospital medical staff or other referring or consulting physicians involved in my care, at the discretion of the Pathologist or my treating physician. 6.   I consent to the photographing or videotaping of the operations or procedures to be performed, including appropriate portions of my body for medical, scientific, or educational purposes, provided my identity is not revealed by the pictures or by descriptive texts accompanying them. If the procedure has been photographed/videotaped, the surgeon will obtain the original picture, image, videotape or CD. The hospital will not be responsible for storage, release or maintenance of the picture, image, tape or CD.    7.   I consent to the presence of a  or observers in the operating room as deemed necessary by my physician or their designees.     8.   I recognize that in the event my procedure results in extended X-Ray/fluoroscopy time, I may develop a skin reaction. 9. If I have a Do Not Attempt Resuscitation (DNAR) order in place, that status will be suspended while in the operating room, procedural suite, and during the recovery period unless otherwise explicitly stated by me (or a person authorized to consent on my behalf). The surgeon or my attending physician will determine when the applicable recovery period ends for purposes of reinstating the DNAR order. 10. Patients having a sterilization procedure: I understand that if the procedure is successful the results will be permanent and it will therefore be impossible for me to inseminate, conceive, or bear children. I also understand that the procedure is intended to result in sterility, although the result has not been guaranteed. 11. I acknowledge that my physician has explained sedation/analgesia administration to me including the risk and benefits I consent to the administration of sedation/analgesia as may be necessary or desirable in the judgment of my physician. I CERTIFY THAT I HAVE READ AND FULLY UNDERSTAND THE ABOVE CONSENT TO OPERATION and/or OTHER PROCEDURE.     _________________________________________ _________________________________     ___________________________________  Signature of Patient     Signature of Responsible Person                   Printed Name of Responsible Person                              _________________________________________ ______________________________        ___________________________________  Signature of Witness         Date  Time         Relationship to Patient    STATEMENT OF PHYSICIAN My signature below affirms that prior to the time of the procedure; I have explained to the patient and/or his/her legal representative, the risks and benefits involved in the proposed treatment and any reasonable alternative to the proposed treatment.  I have also explained the risks and benefits involved in refusal of the proposed treatment and alternatives to the proposed treatment and have answered the patient's questions.  If I have a significant financial interest in a co-management agreement or a significant financial interest in any product or implant, or other significant relationship used in this procedure/surgery, I have disclosed this and had a discussion with my patient.     _______________________________________________________________ _____________________________  Elysia Mcclendon of Physician)                                                                                         (Date)                                   (Time)  Patient Name: Carter Chang    : 3/28/1950   Printed: 11/15/2022      Medical Record #: [id number]                                              Page 1 of 1

## (undated) NOTE — ED AVS SNAPSHOT
Everton Castillo   MRN: [id number]    Department:  [facility] Emergency Department   Date of Visit:  2/12/2019           Disclosure     Insurance plans vary and the physician(s) referred by the ER may not be covered by your plan.  Please contact yo within the next three months to obtain basic health screening including reassessment of your blood pressure.     IF THERE IS ANY CHANGE OR WORSENING OF YOUR CONDITION, CALL YOUR PRIMARY CARE PHYSICIAN AT ONCE OR RETURN IMMEDIATELY TO THE EMERGENCY DEPARTMEN

## (undated) NOTE — LETTER
[facility] [address], [address]  Consent for Procedure/Sedation    Date:     Time:       {edw ivs consent:7255}

## (undated) NOTE — IP AVS SNAPSHOT
[address]  [address] [facility] [phone]                Dis[facility] Summary   [facility]           Admission Information        Provider Department    2/28/2017 Raphael Beyer MD [facility] 5w Next dose due: Tomorrow at 8am        Take 25 mg by mouth daily. As directed    Majorn Sample                           lisinopril 10 MG Tabs   Last time this was given:  10 mg on 3/1/2017  8:20 AM   Commonly known as:  PRINIVIL   Next dose due:   Tomorrow [phone]            Jun 13, 2017  9:30 AM   Exam - Established Patient with Raphael Sellers MD   [address] [place] [address], [address] [place] [address]    [address] [facility], [address] [place] [address]  Rennie Olszewski [phone] I HgbA1C Glucose BUN Creatinine Calcium Alkaline Phosph AST    -- (03/01/17)  90 (03/01/17)  19 (03/01/17)  1.10 (03/01/17)  8.2 (L) (01/25/17)  61 (02/28/17)  28      Metabolic Lab Results  (Last result in the past 90 days)    ALT Bilirubin,Total Total Pro Noemi questions? Call [phone] for help. MyChart is NOT to be used for urgent needs.   For medical emergencies, dial 911.             _____________________________________________________________________________    Medication Side Effects - Medica retention (inability to urinate)   What to report to your healthcare team: Difficulty urinating, dizziness, no bowel movement in 2+ days, unresolved pain           GI Medications     Meclizine HCl 12.5 MG Oral Tab    omeprazole (PRILOSEC) 20 MG Oral Capsul

## (undated) NOTE — MR AVS SNAPSHOT
[place]  [facility] [address] [address]  [phone]               Thank you for choosing us for your health care visit with Bibi Early MD.  We are glad to serve you and happy to provide you with this summary of your visit.   Please Instructions and Information about Your Health     None      Allergies as of Mar 03, 2017     Hydrocodone-Acetaminophen [Hydroxyzine] Itching                Today's Vital Signs     BP Pulse Temp Weight          88/58 mmHg 64 99 °F (37.2 °C) (Oral) 186 lb ( office, you can view your past visit information in [employer] by going to Visits < Visit Summaries. [employer] questions? Call [phone] for help. [employer] is NOT to be used for urgent needs. For medical emergencies, dial 911.            Visit [facility]

## (undated) NOTE — LETTER
[address] [place] [address] Rd, [place], [place]     AUTHORIZATION FOR SURGICAL OPERATION OR PROCEDURE    1.  I hereby authorize Dr. Jordyn Mares MD, my Physician(s) and whomever may be designated as the doctor's Assistant, to perform the f 5. I consent to the photographing of procedure(s) to be performed for the purposes of advancing medicine, science and/or education, provided my identity is not revealed.  If the procedure has been videotaped, the physician/surgeon will obtain the original v (Witness signature)                                                                                                  (Date)                                (Time)  STATEMENT OF PHYSICIAN My signature below affirms that prior to the time of the procedure;  I

## (undated) NOTE — LETTER
Hospital Discharge Documentation  Please phone to schedule a hospital follow up appointment.     From: [address] [facility]  Phone: [phone]    Patient discharged time/date: 3/18/2018  5:01 PM  Patient discharge disposition:  Home or Self - home on soft diet for a few days and then follow up with PCP in 1 week and GI in 2-3 weeks   - appreciate GI recs  - will continue to monitor.       Generalized weakness and dizziness  - possibly in the setting of volume depletion, diarrhea   - the patie ferrous sulfate 325 (65 FE) MG Oral Tab EC  Take 1 tablet (325 mg total) by mouth 2 (two) times daily with meals. Donepezil HCl 5 MG Oral Tab  Take 5 mg by mouth nightly. rivaroxaban 15 MG Oral Tab  Take 15 mg by mouth daily with food.     omeprazol Commonly known as:  REQUIP      TAKE ONE TABLET BY MOUTH THREE TIMES DAILY   Quantity:  270 tablet  Refills:  0           Where to Get Your Medications      Please  your prescriptions at the location directed by your doctor or nurse    Bring a paper

## (undated) NOTE — ED AVS SNAPSHOT
Mati Murillo   MRN: [id number]    Department:  [facility] Emergency Department   Date of Visit:  9/3/2019           Disclosure     Insurance plans vary and the physician(s) referred by the ER may not be covered by your plan.  Please contact y within the next three months to obtain basic health screening including reassessment of your blood pressure.     IF THERE IS ANY CHANGE OR WORSENING OF YOUR CONDITION, CALL YOUR PRIMARY CARE PHYSICIAN AT ONCE OR RETURN IMMEDIATELY TO THE EMERGENCY DEPARTMEN